# Patient Record
Sex: FEMALE | Race: OTHER | Employment: OTHER | URBAN - METROPOLITAN AREA
[De-identification: names, ages, dates, MRNs, and addresses within clinical notes are randomized per-mention and may not be internally consistent; named-entity substitution may affect disease eponyms.]

---

## 2017-02-13 ENCOUNTER — HOSPITAL ENCOUNTER (OUTPATIENT)
Age: 77
Discharge: HOME | End: 2017-02-13
Payer: MEDICARE

## 2017-02-13 ENCOUNTER — HOSPITAL ENCOUNTER (OUTPATIENT)
Dept: PAIN MANAGEMENT | Age: 77
Discharge: HOME | End: 2017-02-13
Attending: NURSE PRACTITIONER | Admitting: NURSE PRACTITIONER
Payer: MEDICARE

## 2017-02-13 DIAGNOSIS — M46.1 SACROILIITIS (HCC): ICD-10-CM

## 2017-02-13 DIAGNOSIS — M51.36 DEGENERATIVE LUMBAR DISC: ICD-10-CM

## 2017-02-13 DIAGNOSIS — Z71.89 ENCOUNTER FOR MEDICATION COUNSELING: Primary | ICD-10-CM

## 2017-02-13 DIAGNOSIS — M96.1 FAILED BACK SYNDROME OF LUMBAR SPINE: ICD-10-CM

## 2017-02-13 DIAGNOSIS — Z96.89 SPINAL CORD STIMULATOR STATUS: ICD-10-CM

## 2017-02-13 DIAGNOSIS — Z51.81 ENCOUNTER FOR MEDICATION MONITORING: ICD-10-CM

## 2017-02-13 DIAGNOSIS — M54.16 LUMBAR RADICULOPATHY: ICD-10-CM

## 2017-02-13 DIAGNOSIS — G89.4 CHRONIC PAIN ASSOCIATED WITH SIGNIFICANT PSYCHOSOCIAL DYSFUNCTION: ICD-10-CM

## 2017-02-13 DIAGNOSIS — M43.06 LUMBAR SPONDYLOLYSIS: ICD-10-CM

## 2017-02-13 DIAGNOSIS — M16.11 PRIMARY OSTEOARTHRITIS OF RIGHT HIP: ICD-10-CM

## 2017-02-13 LAB
-: ABNORMAL
ABSOLUTE EOS #: 0.2 K/UL (ref 0–0.4)
ABSOLUTE LYMPH #: 1.8 K/UL (ref 1–4.8)
ABSOLUTE MONO #: 0.4 K/UL (ref 0.1–1.3)
AMORPHOUS: ABNORMAL
ANION GAP SERPL CALCULATED.3IONS-SCNC: 12 MMOL/L (ref 9–17)
BACTERIA: ABNORMAL
BASOPHILS # BLD: 1 % (ref 0–2)
BASOPHILS ABSOLUTE: 0 K/UL (ref 0–0.2)
BILIRUBIN URINE: NEGATIVE
BUN BLDV-MCNC: 26 MG/DL (ref 8–23)
CASTS UA: ABNORMAL /LPF
CHLORIDE BLD-SCNC: 103 MMOL/L (ref 98–107)
CHOLESTEROL/HDL RATIO: 2.8
CHOLESTEROL: 139 MG/DL
CO2: 27 MMOL/L (ref 20–31)
COLOR: YELLOW
COMMENT UA: ABNORMAL
CREAT SERPL-MCNC: 0.95 MG/DL (ref 0.5–0.9)
CREATININE URINE: 170.4 MG/DL (ref 28–217)
CRYSTALS, UA: ABNORMAL /HPF
DIFFERENTIAL TYPE: ABNORMAL
EOSINOPHILS RELATIVE PERCENT: 3 % (ref 0–4)
EPITHELIAL CELLS UA: ABNORMAL /HPF
GFR AFRICAN AMERICAN: >60 ML/MIN
GFR NON-AFRICAN AMERICAN: 58 ML/MIN
GFR SERPL CREATININE-BSD FRML MDRD: ABNORMAL ML/MIN/{1.73_M2}
GFR SERPL CREATININE-BSD FRML MDRD: ABNORMAL ML/MIN/{1.73_M2}
GLUCOSE URINE: NEGATIVE
HCT VFR BLD CALC: 34.2 % (ref 36–46)
HDLC SERPL-MCNC: 50 MG/DL
HEMOGLOBIN: 11 G/DL (ref 12–16)
KETONES, URINE: NEGATIVE
LDL CHOLESTEROL: 68 MG/DL (ref 0–130)
LEUKOCYTE ESTERASE, URINE: NEGATIVE
LYMPHOCYTES # BLD: 34 % (ref 24–44)
MCH RBC QN AUTO: 28 PG (ref 26–34)
MCHC RBC AUTO-ENTMCNC: 32.2 G/DL (ref 31–37)
MCV RBC AUTO: 86.7 FL (ref 80–100)
MICROALBUMIN/CREAT 24H UR: <12 MG/L
MICROALBUMIN/CREAT UR-RTO: 7 MCG/MG CREAT
MONOCYTES # BLD: 7 % (ref 1–7)
MUCUS: ABNORMAL
NITRITE, URINE: NEGATIVE
OTHER OBSERVATIONS UA: ABNORMAL
PDW BLD-RTO: 14.6 % (ref 11.5–14.9)
PH UA: 5 (ref 5–8)
PLATELET # BLD: 204 K/UL (ref 150–450)
PLATELET ESTIMATE: ABNORMAL
PMV BLD AUTO: 8.6 FL (ref 6–12)
POTASSIUM SERPL-SCNC: 4.2 MMOL/L (ref 3.7–5.3)
PROTEIN UA: NEGATIVE
RBC # BLD: 3.95 M/UL (ref 4–5.2)
RBC # BLD: ABNORMAL 10*6/UL
RBC UA: ABNORMAL /HPF
RENAL EPITHELIAL, UA: ABNORMAL /HPF
SEG NEUTROPHILS: 55 % (ref 36–66)
SEGMENTED NEUTROPHILS ABSOLUTE COUNT: 3 K/UL (ref 1.3–9.1)
SODIUM BLD-SCNC: 142 MMOL/L (ref 135–144)
SPECIFIC GRAVITY UA: 1.02 (ref 1–1.03)
THYROXINE, FREE: 1.4 NG/DL (ref 0.93–1.7)
TRICHOMONAS: ABNORMAL
TRIGL SERPL-MCNC: 107 MG/DL
TSH SERPL DL<=0.05 MIU/L-ACNC: 1.81 MIU/L (ref 0.3–5)
TURBIDITY: ABNORMAL
URINE HGB: NEGATIVE
UROBILINOGEN, URINE: NORMAL
VLDLC SERPL CALC-MCNC: NORMAL MG/DL (ref 1–30)
WBC # BLD: 5.4 K/UL (ref 3.5–11)
WBC # BLD: ABNORMAL 10*3/UL
WBC UA: ABNORMAL /HPF
YEAST: ABNORMAL

## 2017-02-13 PROCEDURE — 80051 ELECTROLYTE PANEL: CPT

## 2017-02-13 PROCEDURE — 82043 UR ALBUMIN QUANTITATIVE: CPT

## 2017-02-13 PROCEDURE — 99213 OFFICE O/P EST LOW 20 MIN: CPT | Performed by: NURSE PRACTITIONER

## 2017-02-13 PROCEDURE — 36415 COLL VENOUS BLD VENIPUNCTURE: CPT

## 2017-02-13 PROCEDURE — 82570 ASSAY OF URINE CREATININE: CPT

## 2017-02-13 PROCEDURE — 82565 ASSAY OF CREATININE: CPT

## 2017-02-13 PROCEDURE — 84520 ASSAY OF UREA NITROGEN: CPT

## 2017-02-13 PROCEDURE — 80061 LIPID PANEL: CPT

## 2017-02-13 PROCEDURE — 85025 COMPLETE CBC W/AUTO DIFF WBC: CPT

## 2017-02-13 PROCEDURE — 84439 ASSAY OF FREE THYROXINE: CPT

## 2017-02-13 PROCEDURE — 84443 ASSAY THYROID STIM HORMONE: CPT

## 2017-02-13 PROCEDURE — 99213 OFFICE O/P EST LOW 20 MIN: CPT

## 2017-02-13 PROCEDURE — 81001 URINALYSIS AUTO W/SCOPE: CPT

## 2017-02-13 RX ORDER — OXYCODONE AND ACETAMINOPHEN 7.5; 325 MG/1; MG/1
1 TABLET ORAL EVERY 6 HOURS PRN
Qty: 120 TABLET | Refills: 0 | Status: SHIPPED | OUTPATIENT
Start: 2017-02-13 | End: 2017-03-02 | Stop reason: SDUPTHER

## 2017-02-13 RX ORDER — OXYCODONE HCL 20 MG/1
20 TABLET, FILM COATED, EXTENDED RELEASE ORAL EVERY 12 HOURS
Qty: 60 TABLET | Refills: 0 | Status: SHIPPED | OUTPATIENT
Start: 2017-02-13 | End: 2017-02-16

## 2017-02-13 RX ORDER — MORPHINE SULFATE 30 MG/1
30 TABLET, FILM COATED, EXTENDED RELEASE ORAL 2 TIMES DAILY
Qty: 60 TABLET | Refills: 0 | Status: SHIPPED | OUTPATIENT
Start: 2017-02-13 | End: 2017-02-13

## 2017-03-02 RX ORDER — OXYCODONE HCL 20 MG/1
20 TABLET, FILM COATED, EXTENDED RELEASE ORAL EVERY 12 HOURS
Qty: 60 TABLET | Refills: 0 | Status: SHIPPED | OUTPATIENT
Start: 2017-03-22 | End: 2017-04-21 | Stop reason: SDUPTHER

## 2017-03-02 RX ORDER — OXYCODONE AND ACETAMINOPHEN 7.5; 325 MG/1; MG/1
1 TABLET ORAL EVERY 6 HOURS PRN
Qty: 120 TABLET | Refills: 0 | Status: SHIPPED | OUTPATIENT
Start: 2017-03-15 | End: 2017-03-21 | Stop reason: SDUPTHER

## 2017-03-21 ENCOUNTER — HOSPITAL ENCOUNTER (OUTPATIENT)
Dept: PAIN MANAGEMENT | Age: 77
Discharge: HOME | End: 2017-03-21
Payer: MEDICARE

## 2017-03-21 VITALS
HEART RATE: 69 BPM | HEIGHT: 58 IN | SYSTOLIC BLOOD PRESSURE: 131 MMHG | WEIGHT: 147 LBS | TEMPERATURE: 97.7 F | OXYGEN SATURATION: 97 % | RESPIRATION RATE: 16 BRPM | DIASTOLIC BLOOD PRESSURE: 54 MMHG | BODY MASS INDEX: 30.86 KG/M2

## 2017-03-21 DIAGNOSIS — Z71.89 ENCOUNTER FOR MEDICATION COUNSELING: Primary | ICD-10-CM

## 2017-03-21 DIAGNOSIS — M43.06 LUMBAR SPONDYLOLYSIS: ICD-10-CM

## 2017-03-21 DIAGNOSIS — M16.11 PRIMARY OSTEOARTHRITIS OF RIGHT HIP: ICD-10-CM

## 2017-03-21 DIAGNOSIS — M46.1 SACROILIITIS (HCC): ICD-10-CM

## 2017-03-21 DIAGNOSIS — G89.4 CHRONIC PAIN ASSOCIATED WITH SIGNIFICANT PSYCHOSOCIAL DYSFUNCTION: ICD-10-CM

## 2017-03-21 DIAGNOSIS — M51.36 DEGENERATIVE LUMBAR DISC: ICD-10-CM

## 2017-03-21 DIAGNOSIS — M96.1 FAILED BACK SYNDROME OF LUMBAR SPINE: ICD-10-CM

## 2017-03-21 DIAGNOSIS — Z96.89 SPINAL CORD STIMULATOR STATUS: ICD-10-CM

## 2017-03-21 DIAGNOSIS — M54.16 LUMBAR RADICULOPATHY: ICD-10-CM

## 2017-03-21 PROCEDURE — 99213 OFFICE O/P EST LOW 20 MIN: CPT

## 2017-03-21 PROCEDURE — 99214 OFFICE O/P EST MOD 30 MIN: CPT | Performed by: PAIN MEDICINE

## 2017-03-21 PROCEDURE — 80307 DRUG TEST PRSMV CHEM ANLYZR: CPT

## 2017-03-21 PROCEDURE — 99214 OFFICE O/P EST MOD 30 MIN: CPT

## 2017-03-21 RX ORDER — TAMSULOSIN HYDROCHLORIDE 0.4 MG/1
0.4 CAPSULE ORAL DAILY
Status: ON HOLD | COMMUNITY
End: 2017-11-04 | Stop reason: HOSPADM

## 2017-03-21 RX ORDER — MORPHINE SULFATE 30 MG/1
30 CAPSULE, EXTENDED RELEASE ORAL 2 TIMES DAILY
COMMUNITY
End: 2017-04-21

## 2017-03-21 RX ORDER — OXYCODONE AND ACETAMINOPHEN 7.5; 325 MG/1; MG/1
1 TABLET ORAL EVERY 6 HOURS PRN
Qty: 28 TABLET | Refills: 0 | Status: SHIPPED | OUTPATIENT
Start: 2017-04-14 | End: 2017-04-21 | Stop reason: SDUPTHER

## 2017-03-21 ASSESSMENT — PAIN DESCRIPTION - ONSET: ONSET: ON-GOING

## 2017-03-21 ASSESSMENT — PAIN SCALES - GENERAL: PAINLEVEL_OUTOF10: 8

## 2017-03-21 ASSESSMENT — PAIN DESCRIPTION - PROGRESSION: CLINICAL_PROGRESSION: GRADUALLY WORSENING

## 2017-03-21 ASSESSMENT — ENCOUNTER SYMPTOMS
EYES NEGATIVE: 1
CONSTIPATION: 1
BACK PAIN: 1
RESPIRATORY NEGATIVE: 1

## 2017-03-21 ASSESSMENT — PAIN DESCRIPTION - DESCRIPTORS: DESCRIPTORS: BURNING;ACHING;SHARP

## 2017-03-21 ASSESSMENT — PAIN DESCRIPTION - LOCATION: LOCATION: BACK;HIP

## 2017-03-21 ASSESSMENT — PAIN DESCRIPTION - PAIN TYPE: TYPE: CHRONIC PAIN

## 2017-03-21 ASSESSMENT — PAIN DESCRIPTION - FREQUENCY: FREQUENCY: CONTINUOUS

## 2017-03-21 ASSESSMENT — PAIN DESCRIPTION - ORIENTATION: ORIENTATION: LOWER;LEFT

## 2017-03-22 ASSESSMENT — ENCOUNTER SYMPTOMS
ABDOMINAL PAIN: 0
SHORTNESS OF BREATH: 0
BOWEL INCONTINENCE: 0

## 2017-03-25 LAB
6-ACETYLMORPHINE, UR: NOT DETECTED
7-AMINOCLONAZEPAM, URINE: NOT DETECTED
ALPHA-OH-ALPRAZ, URINE: NOT DETECTED
ALPRAZOLAM, URINE: NOT DETECTED
AMPHETAMINES, URINE: NOT DETECTED
BARBITURATES, URINE: NOT DETECTED
BENZOYLECGONINE, UR: NOT DETECTED
BUPRENORPHINE URINE: NOT DETECTED
CARISOPRODOL, UR: NOT DETECTED
CLONAZEPAM, URINE: NOT DETECTED
CODEINE, URINE: NOT DETECTED
CREATININE URINE: 166 MG/DL (ref 20–400)
DIAZEPAM, URINE: NOT DETECTED
DRUGS EXPECTED, UR: NORMAL
EER HI RES INTERP UR: NORMAL
ETHYL GLUCURONIDE UR: PRESENT
FENTANYL URINE: NOT DETECTED
HYDROCODONE, URINE: NOT DETECTED
HYDROMORPHONE, URINE: NOT DETECTED
LORAZEPAM, URINE: NOT DETECTED
MARIJUANA METAB, UR: NOT DETECTED
MDA, UR: NOT DETECTED
MDEA, EVE, UR: NOT DETECTED
MDMA URINE: NOT DETECTED
MEPERIDINE METAB, UR: NOT DETECTED
METHADONE, URINE: NOT DETECTED
METHAMPHETAMINE, URINE: NOT DETECTED
METHYLPHENIDATE: NOT DETECTED
MIDAZOLAM, URINE: NOT DETECTED
MORPHINE URINE: PRESENT
NORBUPRENORPHINE, URINE: NOT DETECTED
NORDIAZEPAM, URINE: NOT DETECTED
NORFENTANYL, URINE: NOT DETECTED
NORHYDROCODONE, URINE: NOT DETECTED
NOROXYCODONE, URINE: PRESENT
NOROXYMORPHONE, URINE: NOT DETECTED
OXAZEPAM, URINE: NOT DETECTED
OXYCODONE URINE: PRESENT
OXYMORPHONE, URINE: PRESENT
PAIN MANAGEMENT DRUG PANEL INTERP, URINE: NORMAL
PAIN MGT DRUG PANEL, HI RES, UR: NORMAL
PCP,URINE: NOT DETECTED
PHENTERMINE, UR: NOT DETECTED
PROPOXYPHENE, URINE: NOT DETECTED
TAPENTADOL, URINE: NOT DETECTED
TAPENTADOL-O-SULFATE, URINE: NOT DETECTED
TEMAZEPAM, URINE: NOT DETECTED
TRAMADOL, URINE: NOT DETECTED
ZOLPIDEM, URINE: NOT DETECTED

## 2017-04-21 ENCOUNTER — HOSPITAL ENCOUNTER (OUTPATIENT)
Dept: PAIN MANAGEMENT | Age: 77
Discharge: HOME | End: 2017-04-21
Payer: MEDICARE

## 2017-04-21 DIAGNOSIS — G89.4 CHRONIC PAIN ASSOCIATED WITH SIGNIFICANT PSYCHOSOCIAL DYSFUNCTION: ICD-10-CM

## 2017-04-21 DIAGNOSIS — Z71.89 ENCOUNTER FOR MEDICATION COUNSELING: Primary | ICD-10-CM

## 2017-04-21 DIAGNOSIS — M96.1 FAILED BACK SYNDROME OF LUMBAR SPINE: ICD-10-CM

## 2017-04-21 DIAGNOSIS — Z96.89 SPINAL CORD STIMULATOR STATUS: ICD-10-CM

## 2017-04-21 DIAGNOSIS — M43.06 LUMBAR SPONDYLOLYSIS: ICD-10-CM

## 2017-04-21 DIAGNOSIS — M16.11 PRIMARY OSTEOARTHRITIS OF RIGHT HIP: ICD-10-CM

## 2017-04-21 DIAGNOSIS — M54.16 LUMBAR RADICULOPATHY: ICD-10-CM

## 2017-04-21 DIAGNOSIS — Z51.81 ENCOUNTER FOR MEDICATION MONITORING: ICD-10-CM

## 2017-04-21 DIAGNOSIS — M51.36 DEGENERATIVE LUMBAR DISC: ICD-10-CM

## 2017-04-21 DIAGNOSIS — M46.1 SACROILIITIS (HCC): ICD-10-CM

## 2017-04-21 PROCEDURE — 99213 OFFICE O/P EST LOW 20 MIN: CPT

## 2017-04-21 PROCEDURE — 99213 OFFICE O/P EST LOW 20 MIN: CPT | Performed by: NURSE PRACTITIONER

## 2017-04-21 PROCEDURE — 99214 OFFICE O/P EST MOD 30 MIN: CPT

## 2017-04-21 RX ORDER — OXYCODONE AND ACETAMINOPHEN 7.5; 325 MG/1; MG/1
1 TABLET ORAL EVERY 6 HOURS PRN
Qty: 120 TABLET | Refills: 0 | Status: SHIPPED | OUTPATIENT
Start: 2017-04-21 | End: 2017-04-21

## 2017-04-21 RX ORDER — OXYCODONE AND ACETAMINOPHEN 10; 325 MG/1; MG/1
1 TABLET ORAL EVERY 6 HOURS PRN
Qty: 120 TABLET | Refills: 0 | Status: SHIPPED | OUTPATIENT
Start: 2017-04-21 | End: 2017-05-22 | Stop reason: SDUPTHER

## 2017-04-21 RX ORDER — MORPHINE SULFATE 30 MG/1
30 CAPSULE, EXTENDED RELEASE ORAL 2 TIMES DAILY
Qty: 60 CAPSULE | Refills: 0 | Status: SHIPPED | OUTPATIENT
Start: 2017-04-21 | End: 2017-04-21

## 2017-04-21 RX ORDER — OXYCODONE HCL 20 MG/1
20 TABLET, FILM COATED, EXTENDED RELEASE ORAL EVERY 12 HOURS
Qty: 60 TABLET | Refills: 0 | Status: SHIPPED | OUTPATIENT
Start: 2017-04-21 | End: 2017-04-21

## 2017-04-21 RX ORDER — MORPHINE SULFATE 30 MG/1
30 TABLET, FILM COATED, EXTENDED RELEASE ORAL 2 TIMES DAILY
Qty: 60 TABLET | Refills: 0 | Status: SHIPPED | OUTPATIENT
Start: 2017-04-21 | End: 2017-05-22 | Stop reason: SDUPTHER

## 2017-05-22 ENCOUNTER — HOSPITAL ENCOUNTER (OUTPATIENT)
Dept: PAIN MANAGEMENT | Age: 77
Discharge: HOME | End: 2017-05-22
Payer: MEDICARE

## 2017-05-22 DIAGNOSIS — M96.1 FAILED BACK SYNDROME OF LUMBAR SPINE: ICD-10-CM

## 2017-05-22 DIAGNOSIS — M46.1 SACROILIITIS (HCC): ICD-10-CM

## 2017-05-22 DIAGNOSIS — M54.16 LUMBAR RADICULOPATHY: ICD-10-CM

## 2017-05-22 DIAGNOSIS — G89.4 CHRONIC PAIN ASSOCIATED WITH SIGNIFICANT PSYCHOSOCIAL DYSFUNCTION: ICD-10-CM

## 2017-05-22 DIAGNOSIS — M51.36 DEGENERATIVE LUMBAR DISC: ICD-10-CM

## 2017-05-22 DIAGNOSIS — M43.06 LUMBAR SPONDYLOLYSIS: ICD-10-CM

## 2017-05-22 DIAGNOSIS — Z96.89 SPINAL CORD STIMULATOR STATUS: ICD-10-CM

## 2017-05-22 DIAGNOSIS — Z51.81 ENCOUNTER FOR MEDICATION MONITORING: ICD-10-CM

## 2017-05-22 DIAGNOSIS — Z71.89 ENCOUNTER FOR MEDICATION COUNSELING: Primary | ICD-10-CM

## 2017-05-22 DIAGNOSIS — M16.11 PRIMARY OSTEOARTHRITIS OF RIGHT HIP: ICD-10-CM

## 2017-05-22 PROCEDURE — 99213 OFFICE O/P EST LOW 20 MIN: CPT | Performed by: NURSE PRACTITIONER

## 2017-05-22 PROCEDURE — 99213 OFFICE O/P EST LOW 20 MIN: CPT

## 2017-05-22 RX ORDER — OXYCODONE AND ACETAMINOPHEN 10; 325 MG/1; MG/1
1 TABLET ORAL EVERY 6 HOURS PRN
Qty: 120 TABLET | Refills: 0 | Status: SHIPPED | OUTPATIENT
Start: 2017-05-22 | End: 2017-06-21 | Stop reason: SDUPTHER

## 2017-05-22 RX ORDER — MORPHINE SULFATE 30 MG/1
30 TABLET, FILM COATED, EXTENDED RELEASE ORAL 2 TIMES DAILY
Qty: 60 TABLET | Refills: 0 | Status: SHIPPED | OUTPATIENT
Start: 2017-05-22 | End: 2017-06-21 | Stop reason: SDUPTHER

## 2017-05-25 ENCOUNTER — OFFICE VISIT (OUTPATIENT)
Dept: PODIATRY | Age: 77
End: 2017-05-25
Payer: MEDICARE

## 2017-05-25 VITALS
WEIGHT: 149 LBS | HEART RATE: 74 BPM | HEIGHT: 58 IN | DIASTOLIC BLOOD PRESSURE: 58 MMHG | BODY MASS INDEX: 31.28 KG/M2 | SYSTOLIC BLOOD PRESSURE: 125 MMHG

## 2017-05-25 DIAGNOSIS — M79.672 PAIN IN LEFT FOOT: ICD-10-CM

## 2017-05-25 DIAGNOSIS — L98.9 BENIGN SKIN LESION: Primary | ICD-10-CM

## 2017-05-25 PROCEDURE — G8400 PT W/DXA NO RESULTS DOC: HCPCS | Performed by: PODIATRIST

## 2017-05-25 PROCEDURE — 17110 DESTRUCTION B9 LES UP TO 14: CPT | Performed by: PODIATRIST

## 2017-05-25 PROCEDURE — 3017F COLORECTAL CA SCREEN DOC REV: CPT | Performed by: PODIATRIST

## 2017-05-25 PROCEDURE — 1123F ACP DISCUSS/DSCN MKR DOCD: CPT | Performed by: PODIATRIST

## 2017-05-25 PROCEDURE — 99213 OFFICE O/P EST LOW 20 MIN: CPT | Performed by: PODIATRIST

## 2017-05-25 PROCEDURE — G8417 CALC BMI ABV UP PARAM F/U: HCPCS | Performed by: PODIATRIST

## 2017-05-25 PROCEDURE — 4040F PNEUMOC VAC/ADMIN/RCVD: CPT | Performed by: PODIATRIST

## 2017-05-25 PROCEDURE — 1036F TOBACCO NON-USER: CPT | Performed by: PODIATRIST

## 2017-05-25 PROCEDURE — 3014F SCREEN MAMMO DOC REV: CPT | Performed by: PODIATRIST

## 2017-05-25 PROCEDURE — 1090F PRES/ABSN URINE INCON ASSESS: CPT | Performed by: PODIATRIST

## 2017-05-25 PROCEDURE — G8427 DOCREV CUR MEDS BY ELIG CLIN: HCPCS | Performed by: PODIATRIST

## 2017-05-25 ASSESSMENT — ENCOUNTER SYMPTOMS
DIARRHEA: 0
SHORTNESS OF BREATH: 0
NAUSEA: 0
BACK PAIN: 0
COLOR CHANGE: 0

## 2017-06-16 ENCOUNTER — OFFICE VISIT (OUTPATIENT)
Dept: PODIATRY | Age: 77
End: 2017-06-16
Payer: MEDICARE

## 2017-06-16 VITALS
BODY MASS INDEX: 31.07 KG/M2 | SYSTOLIC BLOOD PRESSURE: 102 MMHG | HEART RATE: 64 BPM | WEIGHT: 148 LBS | HEIGHT: 58 IN | DIASTOLIC BLOOD PRESSURE: 45 MMHG

## 2017-06-16 DIAGNOSIS — M79.672 PAIN IN LEFT FOOT: ICD-10-CM

## 2017-06-16 DIAGNOSIS — M20.42 HAMMER TOES OF BOTH FEET: ICD-10-CM

## 2017-06-16 DIAGNOSIS — M20.41 HAMMER TOES OF BOTH FEET: ICD-10-CM

## 2017-06-16 DIAGNOSIS — L98.9 BENIGN SKIN LESION: Primary | ICD-10-CM

## 2017-06-16 DIAGNOSIS — E11.9 TYPE 2 DIABETES MELLITUS WITHOUT COMPLICATION, WITHOUT LONG-TERM CURRENT USE OF INSULIN (HCC): ICD-10-CM

## 2017-06-16 PROCEDURE — 11055 PARING/CUTG B9 HYPRKER LES 1: CPT | Performed by: PODIATRIST

## 2017-06-16 PROCEDURE — 1036F TOBACCO NON-USER: CPT | Performed by: PODIATRIST

## 2017-06-21 ENCOUNTER — HOSPITAL ENCOUNTER (OUTPATIENT)
Dept: PAIN MANAGEMENT | Age: 77
Discharge: HOME | End: 2017-06-21
Payer: MEDICARE

## 2017-06-21 DIAGNOSIS — M51.36 DEGENERATIVE LUMBAR DISC: ICD-10-CM

## 2017-06-21 DIAGNOSIS — G89.4 CHRONIC PAIN ASSOCIATED WITH SIGNIFICANT PSYCHOSOCIAL DYSFUNCTION: ICD-10-CM

## 2017-06-21 DIAGNOSIS — M46.1 SACROILIITIS (HCC): ICD-10-CM

## 2017-06-21 DIAGNOSIS — M96.1 FAILED BACK SYNDROME OF LUMBAR SPINE: ICD-10-CM

## 2017-06-21 DIAGNOSIS — M43.06 LUMBAR SPONDYLOLYSIS: ICD-10-CM

## 2017-06-21 DIAGNOSIS — Z71.89 ENCOUNTER FOR MEDICATION COUNSELING: Primary | ICD-10-CM

## 2017-06-21 DIAGNOSIS — M54.16 LUMBAR RADICULOPATHY: ICD-10-CM

## 2017-06-21 DIAGNOSIS — M16.11 PRIMARY OSTEOARTHRITIS OF RIGHT HIP: ICD-10-CM

## 2017-06-21 DIAGNOSIS — Z51.81 ENCOUNTER FOR MEDICATION MONITORING: ICD-10-CM

## 2017-06-21 DIAGNOSIS — Z96.89 SPINAL CORD STIMULATOR STATUS: ICD-10-CM

## 2017-06-21 PROCEDURE — 99213 OFFICE O/P EST LOW 20 MIN: CPT | Performed by: NURSE PRACTITIONER

## 2017-06-21 PROCEDURE — 99213 OFFICE O/P EST LOW 20 MIN: CPT

## 2017-06-21 RX ORDER — OXYCODONE AND ACETAMINOPHEN 10; 325 MG/1; MG/1
1 TABLET ORAL EVERY 6 HOURS PRN
Qty: 120 TABLET | Refills: 0 | Status: SHIPPED | OUTPATIENT
Start: 2017-06-21 | End: 2017-07-18 | Stop reason: SDUPTHER

## 2017-06-21 RX ORDER — MORPHINE SULFATE 30 MG/1
30 TABLET, FILM COATED, EXTENDED RELEASE ORAL 2 TIMES DAILY
Qty: 60 TABLET | Refills: 0 | Status: SHIPPED | OUTPATIENT
Start: 2017-06-21 | End: 2017-07-18 | Stop reason: SDUPTHER

## 2017-06-22 ASSESSMENT — ENCOUNTER SYMPTOMS
DIARRHEA: 0
BACK PAIN: 0
COLOR CHANGE: 0
NAUSEA: 0
SHORTNESS OF BREATH: 0

## 2017-07-12 ENCOUNTER — TELEPHONE (OUTPATIENT)
Dept: PAIN MANAGEMENT | Age: 77
End: 2017-07-12

## 2017-07-17 ENCOUNTER — HOSPITAL ENCOUNTER (OUTPATIENT)
Dept: PAIN MANAGEMENT | Age: 77
Discharge: HOME | End: 2017-07-17
Payer: MEDICARE

## 2017-07-18 ENCOUNTER — HOSPITAL ENCOUNTER (OUTPATIENT)
Dept: PAIN MANAGEMENT | Age: 77
Discharge: HOME | End: 2017-07-18
Payer: MEDICARE

## 2017-07-18 VITALS
SYSTOLIC BLOOD PRESSURE: 112 MMHG | HEART RATE: 71 BPM | OXYGEN SATURATION: 98 % | RESPIRATION RATE: 16 BRPM | TEMPERATURE: 98.4 F | WEIGHT: 147 LBS | BODY MASS INDEX: 30.86 KG/M2 | HEIGHT: 58 IN | DIASTOLIC BLOOD PRESSURE: 64 MMHG

## 2017-07-18 DIAGNOSIS — Z51.81 ENCOUNTER FOR MEDICATION MONITORING: ICD-10-CM

## 2017-07-18 DIAGNOSIS — M43.06 LUMBAR SPONDYLOLYSIS: ICD-10-CM

## 2017-07-18 DIAGNOSIS — M51.36 DEGENERATIVE LUMBAR DISC: ICD-10-CM

## 2017-07-18 DIAGNOSIS — Z96.89 SPINAL CORD STIMULATOR STATUS: ICD-10-CM

## 2017-07-18 DIAGNOSIS — M54.16 LUMBAR RADICULOPATHY: ICD-10-CM

## 2017-07-18 DIAGNOSIS — Z71.89 ENCOUNTER FOR MEDICATION COUNSELING: Primary | ICD-10-CM

## 2017-07-18 DIAGNOSIS — M96.1 FAILED BACK SYNDROME OF LUMBAR SPINE: ICD-10-CM

## 2017-07-18 DIAGNOSIS — M16.11 PRIMARY OSTEOARTHRITIS OF RIGHT HIP: ICD-10-CM

## 2017-07-18 DIAGNOSIS — G89.4 CHRONIC PAIN ASSOCIATED WITH SIGNIFICANT PSYCHOSOCIAL DYSFUNCTION: ICD-10-CM

## 2017-07-18 PROCEDURE — 99213 OFFICE O/P EST LOW 20 MIN: CPT

## 2017-07-18 PROCEDURE — 99213 OFFICE O/P EST LOW 20 MIN: CPT | Performed by: NURSE PRACTITIONER

## 2017-07-18 RX ORDER — OXYCODONE AND ACETAMINOPHEN 10; 325 MG/1; MG/1
1 TABLET ORAL EVERY 6 HOURS PRN
Qty: 120 TABLET | Refills: 0 | Status: SHIPPED | OUTPATIENT
Start: 2017-07-21 | End: 2017-08-22 | Stop reason: SDUPTHER

## 2017-07-18 RX ORDER — MORPHINE SULFATE 30 MG/1
30 TABLET, FILM COATED, EXTENDED RELEASE ORAL 2 TIMES DAILY
Qty: 60 TABLET | Refills: 0 | Status: SHIPPED | OUTPATIENT
Start: 2017-07-21 | End: 2017-08-22 | Stop reason: SDUPTHER

## 2017-07-18 ASSESSMENT — ENCOUNTER SYMPTOMS
CONSTIPATION: 1
SHORTNESS OF BREATH: 1
BACK PAIN: 1
EYES NEGATIVE: 1

## 2017-08-08 ENCOUNTER — TELEPHONE (OUTPATIENT)
Dept: PODIATRY | Age: 77
End: 2017-08-08

## 2017-08-14 ENCOUNTER — APPOINTMENT (OUTPATIENT)
Dept: MRI IMAGING | Age: 77
DRG: 419 | End: 2017-08-14
Payer: MEDICARE

## 2017-08-14 ENCOUNTER — APPOINTMENT (OUTPATIENT)
Dept: CT IMAGING | Age: 77
DRG: 419 | End: 2017-08-14
Payer: MEDICARE

## 2017-08-14 ENCOUNTER — APPOINTMENT (OUTPATIENT)
Dept: ULTRASOUND IMAGING | Age: 77
DRG: 419 | End: 2017-08-14
Payer: MEDICARE

## 2017-08-14 ENCOUNTER — HOSPITAL ENCOUNTER (INPATIENT)
Age: 77
LOS: 6 days | Discharge: HOME | DRG: 419 | End: 2017-08-20
Attending: EMERGENCY MEDICINE | Admitting: FAMILY MEDICINE
Payer: MEDICARE

## 2017-08-14 DIAGNOSIS — K85.90 ACUTE PANCREATITIS, UNSPECIFIED COMPLICATION STATUS, UNSPECIFIED PANCREATITIS TYPE: Primary | ICD-10-CM

## 2017-08-14 LAB
ABSOLUTE EOS #: 0.1 K/UL (ref 0–0.4)
ABSOLUTE LYMPH #: 0.8 K/UL (ref 1–4.8)
ABSOLUTE MONO #: 0.5 K/UL (ref 0.1–1.3)
ALBUMIN SERPL-MCNC: 3.8 G/DL (ref 3.5–5.2)
ALBUMIN/GLOBULIN RATIO: ABNORMAL (ref 1–2.5)
ALP BLD-CCNC: 118 U/L (ref 35–104)
ALT SERPL-CCNC: 57 U/L (ref 5–33)
ANION GAP SERPL CALCULATED.3IONS-SCNC: 13 MMOL/L (ref 9–17)
AST SERPL-CCNC: 166 U/L
BASOPHILS # BLD: 0 %
BASOPHILS ABSOLUTE: 0 K/UL (ref 0–0.2)
BILIRUB SERPL-MCNC: 0.41 MG/DL (ref 0.3–1.2)
BILIRUBIN DIRECT: 0.23 MG/DL
BILIRUBIN, INDIRECT: 0.18 MG/DL (ref 0–1)
BUN BLDV-MCNC: 23 MG/DL (ref 8–23)
BUN/CREAT BLD: ABNORMAL (ref 9–20)
CALCIUM SERPL-MCNC: 8.6 MG/DL (ref 8.6–10.4)
CHLORIDE BLD-SCNC: 100 MMOL/L (ref 98–107)
CO2: 26 MMOL/L (ref 20–31)
CREAT SERPL-MCNC: 0.86 MG/DL (ref 0.5–0.9)
DIFFERENTIAL TYPE: ABNORMAL
EOSINOPHILS RELATIVE PERCENT: 1 %
GFR AFRICAN AMERICAN: >60 ML/MIN
GFR NON-AFRICAN AMERICAN: >60 ML/MIN
GFR SERPL CREATININE-BSD FRML MDRD: ABNORMAL ML/MIN/{1.73_M2}
GFR SERPL CREATININE-BSD FRML MDRD: ABNORMAL ML/MIN/{1.73_M2}
GLOBULIN: ABNORMAL G/DL (ref 1.5–3.8)
GLUCOSE BLD-MCNC: 151 MG/DL (ref 70–99)
HCT VFR BLD CALC: 35.1 % (ref 36–46)
HEMOGLOBIN: 11.3 G/DL (ref 12–16)
LACTIC ACID: 2.2 MMOL/L (ref 0.5–2.2)
LIPASE: 1500 U/L (ref 13–60)
LYMPHOCYTES # BLD: 8 %
MCH RBC QN AUTO: 28.2 PG (ref 26–34)
MCHC RBC AUTO-ENTMCNC: 32.2 G/DL (ref 31–37)
MCV RBC AUTO: 87.6 FL (ref 80–100)
MONOCYTES # BLD: 5 %
PDW BLD-RTO: 13.5 % (ref 11.5–14.9)
PLATELET # BLD: 207 K/UL (ref 150–450)
PLATELET ESTIMATE: ABNORMAL
PMV BLD AUTO: 9.2 FL (ref 6–12)
POTASSIUM SERPL-SCNC: 4.1 MMOL/L (ref 3.7–5.3)
RBC # BLD: 4.01 M/UL (ref 4–5.2)
RBC # BLD: ABNORMAL 10*6/UL
SEG NEUTROPHILS: 86 %
SEGMENTED NEUTROPHILS ABSOLUTE COUNT: 9.1 K/UL (ref 1.3–9.1)
SODIUM BLD-SCNC: 139 MMOL/L (ref 135–144)
TOTAL PROTEIN: 6.3 G/DL (ref 6.4–8.3)
TROPONIN INTERP: NORMAL
TROPONIN T: <0.03 NG/ML
WBC # BLD: 10.5 K/UL (ref 3.5–11)
WBC # BLD: ABNORMAL 10*3/UL

## 2017-08-14 PROCEDURE — 6360000004 HC RX CONTRAST MEDICATION: Performed by: EMERGENCY MEDICINE

## 2017-08-14 PROCEDURE — 2580000003 HC RX 258: Performed by: EMERGENCY MEDICINE

## 2017-08-14 PROCEDURE — 85025 COMPLETE CBC W/AUTO DIFF WBC: CPT

## 2017-08-14 PROCEDURE — 83690 ASSAY OF LIPASE: CPT

## 2017-08-14 PROCEDURE — 2060000000 HC ICU INTERMEDIATE R&B

## 2017-08-14 PROCEDURE — 83605 ASSAY OF LACTIC ACID: CPT

## 2017-08-14 PROCEDURE — 2580000003 HC RX 258: Performed by: FAMILY MEDICINE

## 2017-08-14 PROCEDURE — 93005 ELECTROCARDIOGRAM TRACING: CPT

## 2017-08-14 PROCEDURE — 74177 CT ABD & PELVIS W/CONTRAST: CPT

## 2017-08-14 PROCEDURE — 99285 EMERGENCY DEPT VISIT HI MDM: CPT

## 2017-08-14 PROCEDURE — 76705 ECHO EXAM OF ABDOMEN: CPT

## 2017-08-14 PROCEDURE — 96374 THER/PROPH/DIAG INJ IV PUSH: CPT

## 2017-08-14 PROCEDURE — 6360000002 HC RX W HCPCS: Performed by: EMERGENCY MEDICINE

## 2017-08-14 PROCEDURE — 36415 COLL VENOUS BLD VENIPUNCTURE: CPT

## 2017-08-14 PROCEDURE — 6370000000 HC RX 637 (ALT 250 FOR IP): Performed by: EMERGENCY MEDICINE

## 2017-08-14 PROCEDURE — 80076 HEPATIC FUNCTION PANEL: CPT

## 2017-08-14 PROCEDURE — 84484 ASSAY OF TROPONIN QUANT: CPT

## 2017-08-14 PROCEDURE — 80048 BASIC METABOLIC PNL TOTAL CA: CPT

## 2017-08-14 RX ORDER — SODIUM CHLORIDE 0.9 % (FLUSH) 0.9 %
10 SYRINGE (ML) INJECTION PRN
Status: DISCONTINUED | OUTPATIENT
Start: 2017-08-14 | End: 2017-08-14 | Stop reason: SDUPTHER

## 2017-08-14 RX ORDER — FUROSEMIDE 20 MG/1
20 TABLET ORAL DAILY
Status: DISCONTINUED | OUTPATIENT
Start: 2017-08-15 | End: 2017-08-15

## 2017-08-14 RX ORDER — PREGABALIN 150 MG/1
150 CAPSULE ORAL 2 TIMES DAILY
Status: DISCONTINUED | OUTPATIENT
Start: 2017-08-14 | End: 2017-08-15

## 2017-08-14 RX ORDER — ACETAMINOPHEN 325 MG/1
650 TABLET ORAL EVERY 4 HOURS PRN
Status: DISCONTINUED | OUTPATIENT
Start: 2017-08-14 | End: 2017-08-14 | Stop reason: SDUPTHER

## 2017-08-14 RX ORDER — OXYCODONE HYDROCHLORIDE 5 MG/1
5 TABLET ORAL EVERY 6 HOURS PRN
Status: DISCONTINUED | OUTPATIENT
Start: 2017-08-14 | End: 2017-08-18 | Stop reason: SDUPTHER

## 2017-08-14 RX ORDER — OXYCODONE AND ACETAMINOPHEN 10; 325 MG/1; MG/1
1 TABLET ORAL EVERY 6 HOURS PRN
Status: DISCONTINUED | OUTPATIENT
Start: 2017-08-14 | End: 2017-08-14 | Stop reason: CLARIF

## 2017-08-14 RX ORDER — MORPHINE SULFATE 4 MG/ML
4 INJECTION, SOLUTION INTRAMUSCULAR; INTRAVENOUS ONCE
Status: COMPLETED | OUTPATIENT
Start: 2017-08-14 | End: 2017-08-14

## 2017-08-14 RX ORDER — OXYCODONE HYDROCHLORIDE AND ACETAMINOPHEN 5; 325 MG/1; MG/1
1 TABLET ORAL EVERY 6 HOURS PRN
Status: DISCONTINUED | OUTPATIENT
Start: 2017-08-14 | End: 2017-08-18 | Stop reason: SDUPTHER

## 2017-08-14 RX ORDER — MAGNESIUM HYDROXIDE/ALUMINUM HYDROXICE/SIMETHICONE 120; 1200; 1200 MG/30ML; MG/30ML; MG/30ML
15 SUSPENSION ORAL ONCE
Status: COMPLETED | OUTPATIENT
Start: 2017-08-14 | End: 2017-08-14

## 2017-08-14 RX ORDER — 0.9 % SODIUM CHLORIDE 0.9 %
1000 INTRAVENOUS SOLUTION INTRAVENOUS ONCE
Status: COMPLETED | OUTPATIENT
Start: 2017-08-14 | End: 2017-08-14

## 2017-08-14 RX ORDER — SODIUM CHLORIDE 0.9 % (FLUSH) 0.9 %
10 SYRINGE (ML) INJECTION EVERY 12 HOURS SCHEDULED
Status: DISCONTINUED | OUTPATIENT
Start: 2017-08-14 | End: 2017-08-20 | Stop reason: HOSPADM

## 2017-08-14 RX ORDER — MORPHINE SULFATE 30 MG/1
30 TABLET, FILM COATED, EXTENDED RELEASE ORAL 2 TIMES DAILY
Status: DISCONTINUED | OUTPATIENT
Start: 2017-08-14 | End: 2017-08-20 | Stop reason: HOSPADM

## 2017-08-14 RX ORDER — ASPIRIN 81 MG/1
81 TABLET ORAL DAILY
Status: DISCONTINUED | OUTPATIENT
Start: 2017-08-15 | End: 2017-08-20 | Stop reason: HOSPADM

## 2017-08-14 RX ORDER — ACETAMINOPHEN 325 MG/1
650 TABLET ORAL EVERY 4 HOURS PRN
Status: DISCONTINUED | OUTPATIENT
Start: 2017-08-14 | End: 2017-08-20 | Stop reason: HOSPADM

## 2017-08-14 RX ORDER — LOSARTAN POTASSIUM 50 MG/1
50 TABLET ORAL DAILY
Status: DISCONTINUED | OUTPATIENT
Start: 2017-08-15 | End: 2017-08-20 | Stop reason: HOSPADM

## 2017-08-14 RX ORDER — HYDROCHLOROTHIAZIDE 12.5 MG/1
12.5 CAPSULE, GELATIN COATED ORAL DAILY
Status: DISCONTINUED | OUTPATIENT
Start: 2017-08-15 | End: 2017-08-15

## 2017-08-14 RX ORDER — TELMISARTAN AND HYDROCHLORTHIAZIDE 40; 12.5 MG/1; MG/1
1 TABLET ORAL DAILY
Status: DISCONTINUED | OUTPATIENT
Start: 2017-08-15 | End: 2017-08-14 | Stop reason: CLARIF

## 2017-08-14 RX ORDER — 0.9 % SODIUM CHLORIDE 0.9 %
100 INTRAVENOUS SOLUTION INTRAVENOUS ONCE
Status: COMPLETED | OUTPATIENT
Start: 2017-08-14 | End: 2017-08-14

## 2017-08-14 RX ORDER — PANTOPRAZOLE SODIUM 40 MG/1
40 TABLET, DELAYED RELEASE ORAL
Status: DISCONTINUED | OUTPATIENT
Start: 2017-08-15 | End: 2017-08-15

## 2017-08-14 RX ORDER — LEVOTHYROXINE SODIUM 0.05 MG/1
50 TABLET ORAL DAILY
Status: DISCONTINUED | OUTPATIENT
Start: 2017-08-15 | End: 2017-08-20 | Stop reason: HOSPADM

## 2017-08-14 RX ORDER — MONTELUKAST SODIUM 10 MG/1
10 TABLET ORAL NIGHTLY
Status: DISCONTINUED | OUTPATIENT
Start: 2017-08-14 | End: 2017-08-20 | Stop reason: HOSPADM

## 2017-08-14 RX ORDER — ONDANSETRON 2 MG/ML
4 INJECTION INTRAMUSCULAR; INTRAVENOUS EVERY 6 HOURS PRN
Status: DISCONTINUED | OUTPATIENT
Start: 2017-08-14 | End: 2017-08-18 | Stop reason: SDUPTHER

## 2017-08-14 RX ORDER — SODIUM CHLORIDE 9 MG/ML
INJECTION, SOLUTION INTRAVENOUS CONTINUOUS
Status: DISCONTINUED | OUTPATIENT
Start: 2017-08-14 | End: 2017-08-20

## 2017-08-14 RX ORDER — SODIUM CHLORIDE 9 MG/ML
INJECTION, SOLUTION INTRAVENOUS CONTINUOUS
Status: DISCONTINUED | OUTPATIENT
Start: 2017-08-14 | End: 2017-08-14 | Stop reason: SDUPTHER

## 2017-08-14 RX ORDER — TAMSULOSIN HYDROCHLORIDE 0.4 MG/1
0.4 CAPSULE ORAL DAILY
Status: DISCONTINUED | OUTPATIENT
Start: 2017-08-15 | End: 2017-08-15

## 2017-08-14 RX ORDER — SODIUM CHLORIDE 0.9 % (FLUSH) 0.9 %
10 SYRINGE (ML) INJECTION EVERY 12 HOURS SCHEDULED
Status: DISCONTINUED | OUTPATIENT
Start: 2017-08-14 | End: 2017-08-14 | Stop reason: SDUPTHER

## 2017-08-14 RX ORDER — SODIUM CHLORIDE 0.9 % (FLUSH) 0.9 %
10 SYRINGE (ML) INJECTION PRN
Status: DISCONTINUED | OUTPATIENT
Start: 2017-08-14 | End: 2017-08-20 | Stop reason: HOSPADM

## 2017-08-14 RX ADMIN — SODIUM CHLORIDE 100 ML: 9 INJECTION, SOLUTION INTRAVENOUS at 16:47

## 2017-08-14 RX ADMIN — SODIUM CHLORIDE: 9 INJECTION, SOLUTION INTRAVENOUS at 23:41

## 2017-08-14 RX ADMIN — MORPHINE SULFATE 4 MG: 4 INJECTION, SOLUTION INTRAMUSCULAR; INTRAVENOUS at 20:15

## 2017-08-14 RX ADMIN — ALUMINUM HYDROXIDE, MAGNESIUM HYDROXIDE, AND SIMETHICONE 15 ML: 200; 200; 20 SUSPENSION ORAL at 16:11

## 2017-08-14 RX ADMIN — SODIUM CHLORIDE 1000 ML: 9 INJECTION, SOLUTION INTRAVENOUS at 20:14

## 2017-08-14 RX ADMIN — SODIUM CHLORIDE: 9 INJECTION, SOLUTION INTRAVENOUS at 16:13

## 2017-08-14 RX ADMIN — Medication 10 ML: at 16:47

## 2017-08-14 RX ADMIN — LIDOCAINE HYDROCHLORIDE 15 ML: 20 SOLUTION ORAL; TOPICAL at 16:10

## 2017-08-14 RX ADMIN — SODIUM CHLORIDE 1000 ML: 9 INJECTION, SOLUTION INTRAVENOUS at 22:24

## 2017-08-14 RX ADMIN — IOVERSOL 130 ML: 741 INJECTION INTRA-ARTERIAL; INTRAVENOUS at 16:47

## 2017-08-14 ASSESSMENT — PAIN DESCRIPTION - DESCRIPTORS
DESCRIPTORS: ACHING;PRESSURE
DESCRIPTORS: ACHING;PRESSURE

## 2017-08-14 ASSESSMENT — PAIN DESCRIPTION - ORIENTATION
ORIENTATION: RIGHT;LEFT;MID
ORIENTATION: MID;LEFT
ORIENTATION: MID;LEFT

## 2017-08-14 ASSESSMENT — PAIN SCALES - GENERAL
PAINLEVEL_OUTOF10: 8
PAINLEVEL_OUTOF10: 9
PAINLEVEL_OUTOF10: 8
PAINLEVEL_OUTOF10: 7

## 2017-08-14 ASSESSMENT — PAIN DESCRIPTION - FREQUENCY: FREQUENCY: CONTINUOUS

## 2017-08-14 ASSESSMENT — ENCOUNTER SYMPTOMS
CONSTIPATION: 0
NAUSEA: 1
BACK PAIN: 0
SHORTNESS OF BREATH: 0
COUGH: 0
VOMITING: 1
DIARRHEA: 0
ABDOMINAL PAIN: 1

## 2017-08-14 ASSESSMENT — PAIN DESCRIPTION - ONSET: ONSET: ON-GOING

## 2017-08-14 ASSESSMENT — PAIN DESCRIPTION - LOCATION
LOCATION: ABDOMEN

## 2017-08-14 ASSESSMENT — PAIN DESCRIPTION - PAIN TYPE
TYPE: ACUTE PAIN

## 2017-08-14 ASSESSMENT — PAIN DESCRIPTION - PROGRESSION: CLINICAL_PROGRESSION: NOT CHANGED

## 2017-08-14 NOTE — IP AVS SNAPSHOT
After Visit Summary  (Discharge Instructions)    Medication List for Home    Based on the information you provided to us as well as any changes during this visit, the following is your updated medication list.  Compare this with your prescription bottles at home. If you have any questions or concerns, contact your primary care physician's office. Daily Medication List (This medication list can be shared with any healthcare provider who is helping you manage your medications)      These are medications you told us you were taking at home, CONTINUE taking them after you leave the hospital        Last Dose    Next Dose Due AM NOON PM NIGHT    aspirin 81 MG tablet   Take 81 mg by mouth daily. Tomorrow                          diclofenac sodium 1 % Gel   Apply 2 g topically 2 times daily                2 g on 8/20/2017  8:32 AM     Tonight                          furosemide 20 MG tablet   Commonly known as:  LASIX   Take 20 mg by mouth three times a week Takes 3 times a week                  Resume home regimen                       levothyroxine 50 MCG tablet   Commonly known as:  SYNTHROID   Take 50 mcg by mouth Daily. 50 mcg on 8/20/2017  5:20 AM     Tomorrow                          metFORMIN 500 MG tablet   Commonly known as:  GLUCOPHAGE   Take 500 mg by mouth 2 times daily (with meals). Tonight                          MICARDIS HCT 40-12.5 MG per tablet   Generic drug:  telmisartan-hydrochlorothiazide   Take 1 tablet by mouth daily. Tonight                          montelukast 10 MG tablet   Commonly known as:  SINGULAIR   Take 10 mg by mouth nightly. 10 mg on 8/19/2017  8:45 PM     Tonight                          morphine 30 MG extended release tablet   Commonly known as:  MS CONTIN   Take 1 tablet by mouth 2 times daily .  Earliest Fill Date: 7/21/17                30 mg on 8/20/2017  8:31 AM     Tonight omeprazole 20 MG delayed release capsule   Commonly known as:  PRILOSEC   Take 20 mg by mouth every evening. Tonight                          oxyCODONE-acetaminophen  MG per tablet   Commonly known as:  PERCOCET   Take 1 tablet by mouth every 6 hours as needed for Pain . Earliest Fill Date: 17                  As needed                       pregabalin 150 MG capsule   Commonly known as:  LYRICA   Take 150 mg by mouth 2 times daily. Tonight                          QVAR 40 MCG/ACT inhaler   Generic drug:  beclomethasone                1 puff on 2017  8:32 AM     Tomorrow                          tamsulosin 0.4 MG capsule   Commonly known as:  FLOMAX   Take 0.4 mg by mouth daily                  Tonight                                  Allergies as of 2017        Reactions    Adhesive Tape     Azithromycin       Immunizations as of 2017     No immunizations on file. Last Vitals          Most Recent Value    Temp  98 °F (36.7 °C)    Pulse  59    Resp  19    BP  (!)  159/50         After Visit Summary    This summary was created for you. Thank you for entrusting your care to us. The following information includes details about your hospital/visit stay along with steps you should take to help with your recovery once you leave the hospital.  In this packet, you will find information about the topics listed below:    · Instructions about your medications including a list of your home medications  · A summary of your hospital visit  · Follow-up appointments once you have left the hospital  · Your care plan at home      You may receive a survey regarding the care you received during your stay. Your input is valuable to us. We encourage you to complete and return your survey in the envelope provided. We hope you will choose us in the future for your healthcare needs.           Patient Information     Patient Name  Take the medication exactly as it was prescribed for you. Do not use the medication in larger amounts, or use it for longer than recommended by your doctor. Follow the instructions on your prescription label. Take cephalexin with a full glass of water. Dissolve the cephalexin dispersible tablet in a small amount of water, about 2 teaspoonfuls. Stir this mixture and drink all of it right away. To make sure you get the entire dose, add a little more water to the same glass, swirl gently and drink right away. Do not swallow or chew a dispersible tablet. Shake the oral suspension (liquid) well just before you measure a dose. To be sure you get the correct dose, measure the liquid with a marked measuring spoon or medicine cup, not with a regular table spoon. If you do not have a dose-measuring device, ask your pharmacist for one. Take cephalexin for the entire length of time prescribed by your doctor. Your symptoms may get better before the infection is completely treated. Cephalexin will not treat a viral infection such as the common cold or flu. This medication can cause you to have unusual results with certain medical tests. Tell any doctor who treats you that you are using cephalexin. Store the tablets and capsules at room temperature away from moisture and heat. Store the liquid medicine in the refrigerator. Throw away any unused medication after 14 days. What happens if I miss a dose? Take the missed dose as soon as you remember. If it is almost time for your next dose, skip the missed dose and take the medicine at your next regularly scheduled time. Do not take extra medicine to make up the missed dose. What happens if I overdose? Seek emergency medical attention if you think you have used too much of this medicine. Overdose symptoms may include nausea, vomiting, stomach pain, diarrhea, and blood in your urine. What should I avoid while taking cephalexin? Antibiotic medicines can cause diarrhea, which may be a sign of a new infection. If you have diarrhea that is watery or has blood in it, call your doctor. Do not use any medicine to stop the diarrhea unless your doctor has told you to. What are the possible side effects of cephalexin? Get emergency medical help if you have any of these signs of an allergic reaction: hives; difficulty breathing; swelling of your face, lips, tongue, or throat. Call your doctor at once if you have any of these serious side effects:  · diarrhea that is watery or bloody;  · seizure (convulsions);  · fever, sore throat, and headache with a severe blistering, peeling, and red skin rash;  · pale or yellowed skin, dark colored urine, fever, confusion or weakness;  · easy bruising or bleeding, unusual weakness;  · confusion, agitation, hallucinations (seeing things that are not there); or  · urinating less than usual or not at all. Less serious side effects may include:  · mild nausea, vomiting, diarrhea;  · dizziness, tired feeling;  · joint pain; or  · vaginal itching or discharge. This is not a complete list of side effects and others may occur. Call your doctor for medical advice about side effects. You may report side effects to FDA at 1-800-FDA-1088. You may report side effects to FDA at 1-800-FDA-1088. What other drugs will affect cephalexin? Before using cephalexin, tell your doctor if you are using any of the following drugs:  · a blood thinner such as warfarin (Coumadin);  · metformin (Fortamet, Glucophage, Riomet, Actoplus, Avandamet, Metaglip); or  · probenecid (Benemid). This list is not complete and there may be other drugs that can interact with cephalexin. Tell your doctor about all the prescription and over-the-counter medications you use. This includes vitamins, minerals, herbal products, and drugs prescribed by other doctors. Do not start using a new medication without telling your doctor. Where can I get more information? Your pharmacist can provide more information about cephalexin. Remember, keep this and all other medicines out of the reach of children, never share your medicines with others, and use this medication only for the indication prescribed. Every effort has been made to ensure that the information provided by Suraj [address] is accurate, up-to-date, and complete, but no guarantee is made to that effect. Drug information contained herein may be time sensitive. [facility] information has been compiled for use by healthcare practitioners and consumers in the [place] and therefore [facility] does not warrant that uses outside of the [place] are appropriate, unless specifically indicated otherwise. [facility]'s drug information does not endorse drugs, diagnose patients or recommend therapy. [facility]'s drug information is an informational resource designed to assist licensed healthcare practitioners in caring for their patients and/or to serve consumers viewing this service as a supplement to, and not a substitute for, the expertise, skill, knowledge and judgment of healthcare practitioners. The absence of a warning for a given drug or drug combination in no way should be construed to indicate that the drug or drug combination is safe, effective or appropriate for any given patient. [facility] does not assume any responsibility for any aspect of healthcare administered with the aid of information [facility] provides. The information contained herein is not intended to cover all possible uses, directions, precautions, warnings, drug interactions, allergic reactions, or adverse effects. If you have questions about the drugs you are taking, check with your doctor, nurse or pharmacist.  Copyright 3141-2235 [address] [address] [place] [address] Avenue: 7.04. Revision date: 12/15/2010.   Care instructions adapted under license by your healthcare professional.

## 2017-08-14 NOTE — IP AVS SNAPSHOT
Patient Information     Patient Name DOB Meda Mcburney 1940         This is your updated medication list to keep with you all times      TAKE these medications     aspirin 81 MG tablet       diclofenac sodium 1 % Gel   Apply 2 g topically 2 times daily       furosemide 20 MG tablet   Commonly known as:  LASIX       levothyroxine 50 MCG tablet   Commonly known as:  SYNTHROID       metFORMIN 500 MG tablet   Commonly known as:  GLUCOPHAGE       MICARDIS HCT 40-12.5 MG per tablet   Generic drug:  telmisartan-hydrochlorothiazide       montelukast 10 MG tablet   Commonly known as:  SINGULAIR       morphine 30 MG extended release tablet   Commonly known as:  MS CONTIN   Take 1 tablet by mouth 2 times daily . Earliest Fill Date: 17       omeprazole 20 MG delayed release capsule   Commonly known as:  PRILOSEC       oxyCODONE-acetaminophen  MG per tablet   Commonly known as:  PERCOCET   Take 1 tablet by mouth every 6 hours as needed for Pain .  Earliest Fill Date: 17       pregabalin 150 MG capsule   Commonly known as:  LYRICA       QVAR 40 MCG/ACT inhaler   Generic drug:  beclomethasone       tamsulosin 0.4 MG capsule   Commonly known as:  FLOMAX

## 2017-08-14 NOTE — ED PROVIDER NOTES
Osteoarthritis      SURGICAL HISTORY       Past Surgical History:   Procedure Laterality Date    ABDOMEN SURGERY      BACK SURGERY      CARPAL TUNNEL RELEASE Bilateral     CATARACT REMOVAL WITH IMPLANT Right 7/22/2014    [facility]    CATARACT REMOVAL WITH IMPLANT Left 8/5/2014    [facility]    CATARACT REMOVAL WITH IMPLANT  8-5-14    left eye    CYSTOSCOPY  1/2013    CYSTOSCOPY  11/04/2016    HYSTERECTOMY      KNEE ARTHROSCOPY Left     ROTATOR CUFF REPAIR Bilateral 1989    SKIN BIOPSY Right 11/2015    mole right posterior shoulder    SPINE SURGERY      spinal cord stimulator    SPINE SURGERY  91-4-15    renoval of spinal cord stimulator leads and battery    TUMOR REMOVAL  2005    Bladder tumor - Cancer    UVULOPALATOPHARYGOPLASTY  2006       CURRENT MEDICATIONS       Previous Medications    ASPIRIN 81 MG TABLET    Take 81 mg by mouth daily. DICLOFENAC SODIUM 1 % GEL    Apply 2 g topically 2 times daily    FUROSEMIDE (LASIX) 20 MG TABLET    Take 20 mg by mouth daily. Takes 3 times a week    LEVOTHYROXINE (SYNTHROID) 50 MCG TABLET    Take 50 mcg by mouth Daily. METFORMIN (GLUCOPHAGE) 500 MG TABLET    Take 500 mg by mouth 2 times daily (with meals). MONTELUKAST (SINGULAIR) 10 MG TABLET    Take 10 mg by mouth nightly. MORPHINE (MS CONTIN) 30 MG EXTENDED RELEASE TABLET    Take 1 tablet by mouth 2 times daily . Earliest Fill Date: 7/21/17    OMEPRAZOLE (PRILOSEC) 20 MG CAPSULE    Take 20 mg by mouth every evening. OXYCODONE-ACETAMINOPHEN (PERCOCET)  MG PER TABLET    Take 1 tablet by mouth every 6 hours as needed for Pain . Earliest Fill Date: 7/21/17    PREGABALIN (LYRICA) 150 MG CAPSULE    Take 150 mg by mouth 2 times daily. QVAR 40 MCG/ACT INHALER               TELMISARTAN-HYDROCHLOROTHIAZIDE (MICARDIS HCT) 40-12.5 MG PER TABLET    Take 1 tablet by mouth daily. ALLERGIES     is allergic to adhesive tape and azithromycin.     FAMILY HISTORY     indicated that her mother is . She indicated that her father is . SOCIAL HISTORY      reports that she has never smoked. She has never used smokeless tobacco. She reports that she does not drink alcohol or use illicit drugs. PHYSICAL EXAM     INITIAL VITALS: BP (!) 118/47  Pulse 77  Temp 98.6 °F (37 °C) (Temporal)   Resp 18  Ht 4' 10\" (1.473 m)  Wt 144 lb (65.3 kg)  SpO2 99%  BMI 30.1 kg/m2  Gen: Appears uncomfortable. Head: Normocephalic, atraumatic  Eye: Pupils equal round reactive to light, no conjunctivitis  Heart: Regular rate and rhythm no murmurs  Lungs: Clear to auscultation bilaterally, no respiratory distress  Chest wall: No crepitus, no tenderness palpation  Abdomen: Soft, epigastric TTP, nondistended, with no peritoneal signs  Neurologic: Patient is alert and oriented x3, motor and sensation is intact in all 4 extremities, cerebellar function is normal  Extremities: Full range of motion, no cyanosis, no edema, no signs of trauma, no tenderness to palpation    MEDICAL DECISION MAKING:     MDM  45-year-old female presenting with epigastric abdominal pain nausea and vomiting. Differential diagnosis is a gastritis perforated ulcer pancreatitis cholecystitis diverticulitis ischemic bowel acute coronary syndrome. EKG shows no evidence of acute ischemia, and there is no troponin elevation and so I doubt acute coronary syndrome. Laboratory studies are significant for an elevated lipase at 1500, and elevated liver function tests  Alk  P (118), ALT (57), AST (166). CT scan of the abdomen shows a distended gallbladder. Ultrasound of the gallbladder shows distention but no evidence of cholecystitis and there is no evident cholelithiasis. Gallstone pancreatitis given the elevated liver function tests and lipase and her symptoms. Discussed with GI, Dr. Dagoberto Sierra. Would like an MRCP. Pt has multiple indwelling hardware, unable to get tonight. Giving aggressive IVF hydration. NPO. 08/14/17 2015 08/14/17 2030 08/14/17 2045 08/14/17 2100   BP: (!) 115/50 (!) 123/46 (!) 118/49 (!) 118/47   Pulse:       Resp:       Temp:       TempSrc:       SpO2: 99% 99% 98% 99%   Weight:       Height:           The patient was given the following medications while in the emergency department:  Orders Placed This Encounter   Medications    aluminum & magnesium hydroxide-simethicone (MAALOX) 200-200-20 MG/5ML suspension 15 mL    lidocaine viscous (XYLOCAINE) 2 % solution 15 mL    0.9 % sodium chloride infusion    ioversol (OPTIRAY) 74 % injection 130 mL    0.9 % sodium chloride bolus    sodium chloride flush 0.9 % injection 10 mL    0.9 % sodium chloride bolus    0.9 % sodium chloride bolus    morphine (PF) injection 4 mg    tamsulosin (FLOMAX) capsule 0.4 mg    aspirin tablet 81 mg    diclofenac sodium 1 % gel 2 g    furosemide (LASIX) tablet 20 mg    levothyroxine (SYNTHROID) tablet 50 mcg    montelukast (SINGULAIR) tablet 10 mg    morphine (MS CONTIN) extended release tablet 30 mg    pantoprazole (PROTONIX) tablet 40 mg    oxyCODONE-acetaminophen (PERCOCET)  MG per tablet 1 tablet    pregabalin (LYRICA) capsule 150 mg    beclomethasone (QVAR) 40 MCG/ACT inhaler 2 puff    telmisartan-hydrochlorothiazide (MICARDIS HCT) 40-12.5 MG per tablet 1 tablet     Order Specific Question:   Please select a reason the therapeutic interchange was not accepted:      Answer:   [facility] for Pharmacy to Substitute with Components    sodium chloride flush 0.9 % injection 10 mL    sodium chloride flush 0.9 % injection 10 mL    acetaminophen (TYLENOL) tablet 650 mg    magnesium hydroxide (MILK OF MAGNESIA) 400 MG/5ML suspension 30 mL    ondansetron (ZOFRAN) injection 4 mg    enoxaparin (LOVENOX) injection 40 mg       -------------------------    CRITICAL CARE:       CONSULTS:  IP CONSULT TO GI  IP CONSULT TO PRIMARY CARE PROVIDER  IP CONSULT TO HISTORY AND PHYSICAL    PROCEDURES:  Procedures     FINAL

## 2017-08-15 ENCOUNTER — APPOINTMENT (OUTPATIENT)
Dept: MRI IMAGING | Age: 77
DRG: 419 | End: 2017-08-15
Payer: MEDICARE

## 2017-08-15 LAB
ABSOLUTE EOS #: 0.1 K/UL (ref 0–0.4)
ABSOLUTE LYMPH #: 2.3 K/UL (ref 1–4.8)
ABSOLUTE MONO #: 0.6 K/UL (ref 0.1–1.3)
AMYLASE: 493 U/L (ref 28–100)
ANION GAP SERPL CALCULATED.3IONS-SCNC: 8 MMOL/L (ref 9–17)
BASOPHILS # BLD: 0 %
BASOPHILS ABSOLUTE: 0 K/UL (ref 0–0.2)
BUN BLDV-MCNC: 14 MG/DL (ref 8–23)
BUN/CREAT BLD: ABNORMAL (ref 9–20)
CALCIUM SERPL-MCNC: 8.1 MG/DL (ref 8.6–10.4)
CHLORIDE BLD-SCNC: 108 MMOL/L (ref 98–107)
CO2: 25 MMOL/L (ref 20–31)
CREAT SERPL-MCNC: 0.67 MG/DL (ref 0.5–0.9)
DIFFERENTIAL TYPE: ABNORMAL
EOSINOPHILS RELATIVE PERCENT: 1 %
GFR AFRICAN AMERICAN: >60 ML/MIN
GFR NON-AFRICAN AMERICAN: >60 ML/MIN
GFR SERPL CREATININE-BSD FRML MDRD: ABNORMAL ML/MIN/{1.73_M2}
GFR SERPL CREATININE-BSD FRML MDRD: ABNORMAL ML/MIN/{1.73_M2}
GLUCOSE BLD-MCNC: 96 MG/DL (ref 70–99)
HCT VFR BLD CALC: 32.6 % (ref 36–46)
HEMOGLOBIN: 10.4 G/DL (ref 12–16)
LIPASE: 1170 U/L (ref 13–60)
LYMPHOCYTES # BLD: 21 %
MCH RBC QN AUTO: 28.1 PG (ref 26–34)
MCHC RBC AUTO-ENTMCNC: 31.9 G/DL (ref 31–37)
MCV RBC AUTO: 88 FL (ref 80–100)
MONOCYTES # BLD: 5 %
PDW BLD-RTO: 13.2 % (ref 11.5–14.9)
PLATELET # BLD: 186 K/UL (ref 150–450)
PLATELET ESTIMATE: ABNORMAL
PMV BLD AUTO: 9.2 FL (ref 6–12)
POTASSIUM SERPL-SCNC: 4.1 MMOL/L (ref 3.7–5.3)
RBC # BLD: 3.71 M/UL (ref 4–5.2)
RBC # BLD: ABNORMAL 10*6/UL
SEG NEUTROPHILS: 73 %
SEGMENTED NEUTROPHILS ABSOLUTE COUNT: 7.8 K/UL (ref 1.3–9.1)
SODIUM BLD-SCNC: 141 MMOL/L (ref 135–144)
WBC # BLD: 10.8 K/UL (ref 3.5–11)
WBC # BLD: ABNORMAL 10*3/UL

## 2017-08-15 PROCEDURE — 2060000000 HC ICU INTERMEDIATE R&B

## 2017-08-15 PROCEDURE — 2580000003 HC RX 258: Performed by: FAMILY MEDICINE

## 2017-08-15 PROCEDURE — 94664 DEMO&/EVAL PT USE INHALER: CPT

## 2017-08-15 PROCEDURE — 36415 COLL VENOUS BLD VENIPUNCTURE: CPT

## 2017-08-15 PROCEDURE — 82150 ASSAY OF AMYLASE: CPT

## 2017-08-15 PROCEDURE — 6370000000 HC RX 637 (ALT 250 FOR IP): Performed by: FAMILY MEDICINE

## 2017-08-15 PROCEDURE — 6360000002 HC RX W HCPCS: Performed by: FAMILY MEDICINE

## 2017-08-15 PROCEDURE — 80048 BASIC METABOLIC PNL TOTAL CA: CPT

## 2017-08-15 PROCEDURE — 85025 COMPLETE CBC W/AUTO DIFF WBC: CPT

## 2017-08-15 PROCEDURE — 83690 ASSAY OF LIPASE: CPT

## 2017-08-15 RX ADMIN — HYDROMORPHONE HYDROCHLORIDE 1 MG: 1 INJECTION, SOLUTION INTRAMUSCULAR; INTRAVENOUS; SUBCUTANEOUS at 07:46

## 2017-08-15 RX ADMIN — MORPHINE SULFATE 30 MG: 30 TABLET, EXTENDED RELEASE ORAL at 20:25

## 2017-08-15 RX ADMIN — HYDROMORPHONE HYDROCHLORIDE 1 MG: 1 INJECTION, SOLUTION INTRAMUSCULAR; INTRAVENOUS; SUBCUTANEOUS at 00:19

## 2017-08-15 RX ADMIN — DICLOFENAC 2 G: 10 GEL TOPICAL at 00:33

## 2017-08-15 RX ADMIN — HYDROMORPHONE HYDROCHLORIDE 1 MG: 1 INJECTION, SOLUTION INTRAMUSCULAR; INTRAVENOUS; SUBCUTANEOUS at 16:21

## 2017-08-15 RX ADMIN — SODIUM CHLORIDE: 9 INJECTION, SOLUTION INTRAVENOUS at 06:01

## 2017-08-15 RX ADMIN — BECLOMETHASONE DIPROPIONATE 1 PUFF: 40 AEROSOL, METERED RESPIRATORY (INHALATION) at 20:28

## 2017-08-15 RX ADMIN — LOSARTAN POTASSIUM 50 MG: 50 TABLET, FILM COATED ORAL at 12:16

## 2017-08-15 RX ADMIN — MONTELUKAST SODIUM 10 MG: 10 TABLET, FILM COATED ORAL at 22:52

## 2017-08-15 RX ADMIN — ENOXAPARIN SODIUM 40 MG: 40 INJECTION SUBCUTANEOUS at 12:14

## 2017-08-15 RX ADMIN — SODIUM CHLORIDE: 9 INJECTION, SOLUTION INTRAVENOUS at 16:21

## 2017-08-15 RX ADMIN — BECLOMETHASONE DIPROPIONATE 1 PUFF: 40 AEROSOL, METERED RESPIRATORY (INHALATION) at 12:13

## 2017-08-15 RX ADMIN — DICLOFENAC 2 G: 10 GEL TOPICAL at 20:29

## 2017-08-15 RX ADMIN — BECLOMETHASONE DIPROPIONATE 2 PUFF: 40 AEROSOL, METERED RESPIRATORY (INHALATION) at 00:19

## 2017-08-15 RX ADMIN — DICLOFENAC 2 G: 10 GEL TOPICAL at 12:16

## 2017-08-15 RX ADMIN — HYDROMORPHONE HYDROCHLORIDE 1 MG: 1 INJECTION, SOLUTION INTRAMUSCULAR; INTRAVENOUS; SUBCUTANEOUS at 04:24

## 2017-08-15 RX ADMIN — OXYCODONE HYDROCHLORIDE AND ACETAMINOPHEN 1 TABLET: 5; 325 TABLET ORAL at 12:23

## 2017-08-15 RX ADMIN — ASPIRIN 81 MG: 81 TABLET, COATED ORAL at 12:13

## 2017-08-15 ASSESSMENT — PAIN DESCRIPTION - PAIN TYPE
TYPE: ACUTE PAIN

## 2017-08-15 ASSESSMENT — PAIN DESCRIPTION - LOCATION
LOCATION: ABDOMEN

## 2017-08-15 ASSESSMENT — PAIN DESCRIPTION - DESCRIPTORS
DESCRIPTORS: ACHING;PRESSURE

## 2017-08-15 ASSESSMENT — PAIN DESCRIPTION - ORIENTATION
ORIENTATION: UPPER
ORIENTATION: MID;LEFT
ORIENTATION: MID;LEFT
ORIENTATION: UPPER
ORIENTATION: MID;LEFT
ORIENTATION: MID;LEFT

## 2017-08-15 ASSESSMENT — PAIN DESCRIPTION - ONSET
ONSET: ON-GOING

## 2017-08-15 ASSESSMENT — PAIN DESCRIPTION - PROGRESSION
CLINICAL_PROGRESSION: GRADUALLY IMPROVING
CLINICAL_PROGRESSION: NOT CHANGED
CLINICAL_PROGRESSION: NOT CHANGED
CLINICAL_PROGRESSION: GRADUALLY IMPROVING

## 2017-08-15 ASSESSMENT — PAIN DESCRIPTION - FREQUENCY
FREQUENCY: CONTINUOUS

## 2017-08-15 ASSESSMENT — PAIN SCALES - GENERAL
PAINLEVEL_OUTOF10: 6
PAINLEVEL_OUTOF10: 3
PAINLEVEL_OUTOF10: 0
PAINLEVEL_OUTOF10: 5
PAINLEVEL_OUTOF10: 6
PAINLEVEL_OUTOF10: 0
PAINLEVEL_OUTOF10: 5
PAINLEVEL_OUTOF10: 8
PAINLEVEL_OUTOF10: 4
PAINLEVEL_OUTOF10: 6
PAINLEVEL_OUTOF10: 2
PAINLEVEL_OUTOF10: 2

## 2017-08-15 NOTE — H&P
Bilateral 7/22/2014, 8/5/2014    [facility]    CYSTOSCOPY  1/2013    CYSTOSCOPY  11/04/2016    KNEE ARTHROSCOPY Left     LUMBAR FUSION      ROTATOR CUFF REPAIR Bilateral 1989    SKIN BIOPSY Right 11/2015    mole right posterior shoulder    SPINE SURGERY      spinal cord stimulator    SPINE SURGERY  91-4-15    renoval of spinal cord stimulator leads and battery    PASQUALE AND BSO      UVULOPALATOPHARYGOPLASTY  2006       FAMILY HISTORY       Family History   Problem Relation Age of Onset    Heart Failure Mother     Other Father       pneumonia    Alzheimer's Disease Father        SOCIAL HISTORY       Social History     Social History    Marital status:      Spouse name: N/A    Number of children: N/A    Years of education: N/A     Occupational History    retired      Social History Main Topics    Smoking status: Never Smoker    Smokeless tobacco: Never Used    Alcohol use No    Drug use: No    Sexual activity: Not Asked     Other Topics Concern    None     Social History Narrative           REVIEW OF SYSTEMS      Allergies   Allergen Reactions    Adhesive Tape     Azithromycin        No current facility-administered medications on file prior to encounter. Current Outpatient Prescriptions on File Prior to Encounter   Medication Sig Dispense Refill    oxyCODONE-acetaminophen (PERCOCET)  MG per tablet Take 1 tablet by mouth every 6 hours as needed for Pain . Earliest Fill Date: 7/21/17 120 tablet 0    morphine (MS CONTIN) 30 MG extended release tablet Take 1 tablet by mouth 2 times daily . Earliest Fill Date: 7/21/17 60 tablet 0    tamsulosin (FLOMAX) 0.4 MG capsule Take 0.4 mg by mouth daily      diclofenac sodium 1 % GEL Apply 2 g topically 2 times daily 1 Tube 0    QVAR 40 MCG/ACT inhaler       furosemide (LASIX) 20 MG tablet Take 20 mg by mouth three times a week Takes 3 times a week      pregabalin (LYRICA) 150 MG capsule Take 150 mg by mouth 2 times daily.       metFORMIN (GLUCOPHAGE) 500 MG tablet Take 500 mg by mouth 2 times daily (with meals).  telmisartan-hydrochlorothiazide (MICARDIS HCT) 40-12.5 MG per tablet Take 1 tablet by mouth daily.  montelukast (SINGULAIR) 10 MG tablet Take 10 mg by mouth nightly.  levothyroxine (SYNTHROID) 50 MCG tablet Take 50 mcg by mouth Daily.  omeprazole (PRILOSEC) 20 MG capsule Take 20 mg by mouth every evening.  aspirin 81 MG tablet Take 81 mg by mouth daily. General health:  Fairly good. No fever or chills. Skin:  No itching, redness or rash. Head, eyes, ears, nose, throat:  No headache, epistaxis, rhinorrhea hearing loss or sore throat. Neck:  No pain, stiffness or masses. Cardiovascular/Respiratory system:  No chest pain, palpitation, shortness of breath, coughing or expectoration. Gastrointestinal tract: See HPI. Genitourinary:  No burning on micturition. No hesitancy, urgency, frequency or discoloration of urine. Locomotor:  No bone or joint pains. No swelling or deformities. Neuropsychiatric:  No referable complaints. GENERAL PHYSICAL EXAM:         Vitals: BP (!) 136/51  Pulse 72  Temp 97.5 °F (36.4 °C) (Axillary)   Resp 16  Ht 4' 10\" (1.473 m)  Wt 149 lb 14.6 oz (68 kg)  SpO2 99%  BMI 31.33 kg/m2 Body mass index is 31.33 kg/(m^2). GENERAL APPEARANCE:  Aby Soriano is 78 y.o.,  female, moderately obese, nourished, conscious, alert. Does not appear to be distress or pain at this time. SKIN:  Warm, dry, no cyanosis or jaundice. HEAD:  Normocephalic, atraumatic, no swelling or tenderness. EYES:  Pupils equal, reactive to light, Conjunctiva is clear, EOMs intact silverio. eyelids WNL. EARS:  No discharge, no marked hearing loss. NOSE:  No rhinorrhea, epistaxis or septal deformity. THROAT:  Not congested. No ulceration bleeding or discharge.  No uvula    NECK:  No stiffness,

## 2017-08-15 NOTE — ED NOTES
Report given to [facility]. Copies made of MRI screening report, also carry cards from pt regarding implanted neuro-stimulator in her back. Copies forwarded to [facility] floor. pt ready for room 6568     Jonh White RN  08/14/17 9325

## 2017-08-15 NOTE — PROGRESS NOTES
Pt arrived to floor via stretcher from ED and was transferred to bed. Vitals taken. Admission and assessment complete. No distress noted. See doc flowsheet and admission navigator for details. POC and education initiated and reviewed with patient. Call light within reach, and pt educated on its use. Bed in lowest position and locked. Side rails up x 2. Denied further questions or needs at this time. Will continue to monitor.

## 2017-08-16 ENCOUNTER — ANESTHESIA EVENT (OUTPATIENT)
Dept: OPERATING ROOM | Age: 77
DRG: 419 | End: 2017-08-16
Payer: MEDICARE

## 2017-08-16 LAB
ABSOLUTE EOS #: 0 K/UL (ref 0–0.4)
ABSOLUTE LYMPH #: 1.4 K/UL (ref 1–4.8)
ABSOLUTE MONO #: 0.3 K/UL (ref 0.1–1.3)
AMYLASE: 178 U/L (ref 28–100)
ANION GAP SERPL CALCULATED.3IONS-SCNC: 15 MMOL/L (ref 9–17)
BASOPHILS # BLD: 0 %
BASOPHILS ABSOLUTE: 0 K/UL (ref 0–0.2)
BUN BLDV-MCNC: 16 MG/DL (ref 8–23)
BUN/CREAT BLD: ABNORMAL (ref 9–20)
CALCIUM SERPL-MCNC: 8.5 MG/DL (ref 8.6–10.4)
CHLORIDE BLD-SCNC: 109 MMOL/L (ref 98–107)
CO2: 20 MMOL/L (ref 20–31)
CREAT SERPL-MCNC: 0.61 MG/DL (ref 0.5–0.9)
DIFFERENTIAL TYPE: ABNORMAL
EOSINOPHILS RELATIVE PERCENT: 1 %
GFR AFRICAN AMERICAN: >60 ML/MIN
GFR NON-AFRICAN AMERICAN: >60 ML/MIN
GFR SERPL CREATININE-BSD FRML MDRD: ABNORMAL ML/MIN/{1.73_M2}
GFR SERPL CREATININE-BSD FRML MDRD: ABNORMAL ML/MIN/{1.73_M2}
GLUCOSE BLD-MCNC: 77 MG/DL (ref 70–99)
HCT VFR BLD CALC: 30.4 % (ref 36–46)
HEMOGLOBIN: 9.9 G/DL (ref 12–16)
LIPASE: 501 U/L (ref 13–60)
LYMPHOCYTES # BLD: 22 %
MAGNESIUM: 2 MG/DL (ref 1.6–2.6)
MCH RBC QN AUTO: 28.4 PG (ref 26–34)
MCHC RBC AUTO-ENTMCNC: 32.5 G/DL (ref 31–37)
MCV RBC AUTO: 87.4 FL (ref 80–100)
MONOCYTES # BLD: 4 %
PDW BLD-RTO: 13.4 % (ref 11.5–14.9)
PLATELET # BLD: 166 K/UL (ref 150–450)
PLATELET ESTIMATE: ABNORMAL
PMV BLD AUTO: 9.3 FL (ref 6–12)
POTASSIUM SERPL-SCNC: 4.3 MMOL/L (ref 3.7–5.3)
RBC # BLD: 3.47 M/UL (ref 4–5.2)
RBC # BLD: ABNORMAL 10*6/UL
SEG NEUTROPHILS: 73 %
SEGMENTED NEUTROPHILS ABSOLUTE COUNT: 4.5 K/UL (ref 1.3–9.1)
SODIUM BLD-SCNC: 144 MMOL/L (ref 135–144)
WBC # BLD: 6.2 K/UL (ref 3.5–11)
WBC # BLD: ABNORMAL 10*3/UL

## 2017-08-16 PROCEDURE — 83690 ASSAY OF LIPASE: CPT

## 2017-08-16 PROCEDURE — 99222 1ST HOSP IP/OBS MODERATE 55: CPT | Performed by: INTERNAL MEDICINE

## 2017-08-16 PROCEDURE — 93005 ELECTROCARDIOGRAM TRACING: CPT

## 2017-08-16 PROCEDURE — 36415 COLL VENOUS BLD VENIPUNCTURE: CPT

## 2017-08-16 PROCEDURE — 80048 BASIC METABOLIC PNL TOTAL CA: CPT

## 2017-08-16 PROCEDURE — 6360000002 HC RX W HCPCS: Performed by: FAMILY MEDICINE

## 2017-08-16 PROCEDURE — 2580000003 HC RX 258: Performed by: FAMILY MEDICINE

## 2017-08-16 PROCEDURE — 6370000000 HC RX 637 (ALT 250 FOR IP): Performed by: FAMILY MEDICINE

## 2017-08-16 PROCEDURE — 83735 ASSAY OF MAGNESIUM: CPT

## 2017-08-16 PROCEDURE — 85025 COMPLETE CBC W/AUTO DIFF WBC: CPT

## 2017-08-16 PROCEDURE — 82150 ASSAY OF AMYLASE: CPT

## 2017-08-16 PROCEDURE — 2060000000 HC ICU INTERMEDIATE R&B

## 2017-08-16 RX ADMIN — ASPIRIN 81 MG: 81 TABLET, COATED ORAL at 07:44

## 2017-08-16 RX ADMIN — LOSARTAN POTASSIUM 50 MG: 50 TABLET, FILM COATED ORAL at 07:47

## 2017-08-16 RX ADMIN — DICLOFENAC 2 G: 10 GEL TOPICAL at 07:45

## 2017-08-16 RX ADMIN — BECLOMETHASONE DIPROPIONATE 1 PUFF: 40 AEROSOL, METERED RESPIRATORY (INHALATION) at 07:43

## 2017-08-16 RX ADMIN — OXYCODONE HYDROCHLORIDE AND ACETAMINOPHEN 1 TABLET: 5; 325 TABLET ORAL at 07:44

## 2017-08-16 RX ADMIN — OXYCODONE HYDROCHLORIDE AND ACETAMINOPHEN 1 TABLET: 5; 325 TABLET ORAL at 20:35

## 2017-08-16 RX ADMIN — MORPHINE SULFATE 30 MG: 30 TABLET, EXTENDED RELEASE ORAL at 03:00

## 2017-08-16 RX ADMIN — MORPHINE SULFATE 30 MG: 30 TABLET, EXTENDED RELEASE ORAL at 07:44

## 2017-08-16 RX ADMIN — LEVOTHYROXINE SODIUM 50 MCG: 50 TABLET ORAL at 07:44

## 2017-08-16 RX ADMIN — MONTELUKAST SODIUM 10 MG: 10 TABLET, FILM COATED ORAL at 20:37

## 2017-08-16 RX ADMIN — SODIUM CHLORIDE: 9 INJECTION, SOLUTION INTRAVENOUS at 01:48

## 2017-08-16 RX ADMIN — BECLOMETHASONE DIPROPIONATE 1 PUFF: 40 AEROSOL, METERED RESPIRATORY (INHALATION) at 20:35

## 2017-08-16 RX ADMIN — ENOXAPARIN SODIUM 40 MG: 40 INJECTION SUBCUTANEOUS at 07:43

## 2017-08-16 RX ADMIN — SODIUM CHLORIDE: 9 INJECTION, SOLUTION INTRAVENOUS at 20:35

## 2017-08-16 RX ADMIN — DICLOFENAC 2 G: 10 GEL TOPICAL at 20:36

## 2017-08-16 ASSESSMENT — PAIN SCALES - GENERAL
PAINLEVEL_OUTOF10: 2
PAINLEVEL_OUTOF10: 6
PAINLEVEL_OUTOF10: 6
PAINLEVEL_OUTOF10: 3
PAINLEVEL_OUTOF10: 5

## 2017-08-16 ASSESSMENT — COPD QUESTIONNAIRES: CAT_SEVERITY: NO INTERVAL CHANGE

## 2017-08-16 NOTE — PLAN OF CARE
Problem: Falls - Risk of  Goal: Absence of falls  Outcome: Ongoing  No falls noted this shift. Patient ambulates with x1 standby staff assistance without difficulty. Bed kept in low position. Safe environment maintained. Bedside table & call light in reach. Uses call light appropriately when needing assistance. Problem: Pain:  Goal: Pain level will decrease  Pain level will decrease   Outcome: Ongoing  Pt medicated with pain medication prn. Assessed all pain characteristics including level, type, location, frequency, and onset. Non-pharmacologic interventions offered to pt as well. Pt states pain is tolerable at this time. Will continue to monitor.

## 2017-08-16 NOTE — PROGRESS NOTES
Patients home BIPAP/CPAP checked for integrity and cleanliness. Equipment sticker placed on home unit after check completed.

## 2017-08-16 NOTE — CONSULTS
REVIEW OF SYSTEMS: A 12-point review of systems was obtained and pertinent positives and negatives were enumerated above in the history of present illness. All other reviewed systems / symptoms were negative. ROS      PHYSICAL EXAMINATION: Vital signs reviewed per the nursing documentation. [unfilled]   [unfilled]   Body mass index is 31.29 kg/(m^2). General:  A O x 3 in NAD   Psych: . Normal affect. Mentation normal   HEENT: PERRLA. Clear conjunctivae and sclerae. Moist oral mucosae, no lesions or ulcers. The neck is supple, without lymphadenopathy or jugular venous distension. No masses. Normal thyroid. Cardiovascular: S1 S2 RRR no rubs or murmurs. Pulmonary: clear BL. No accessory muscle usage. Abdominal Exam: Soft, NT ND, no hepato or spleno megaly, +BS, no ascites. No groin masses or lymphadenopathy. Extremities: No edema. Skin: Warm skin. No skin rash. No spider nevi palmar erythema nail dystrophy. Joint: No joint swelling or deformity. Neurological: intact sensory. DTR+.  No asterixis     LABORATORY DATA: Reviewed   Lab Results   Component Value Date    WBC 6.2 08/16/2017    HGB 9.9 (L) 08/16/2017    HCT 30.4 (L) 08/16/2017    MCV 87.4 08/16/2017     08/16/2017     08/16/2017    K 4.3 08/16/2017     (H) 08/16/2017    CO2 20 08/16/2017    BUN 16 08/16/2017    CREATININE 0.61 08/16/2017    LABPROT 6.6 11/19/2012    LABALBU 3.8 08/14/2017    BILITOT 0.41 08/14/2017    ALKPHOS 118 (H) 08/14/2017     (H) 08/14/2017    ALT 57 (H) 08/14/2017    INR 1.0 03/12/2013      Lab Results   Component Value Date    RBC 3.47 (L) 08/16/2017    HGB 9.9 (L) 08/16/2017    MCV 87.4 08/16/2017    MCH 28.4 08/16/2017    MCHC 32.5 08/16/2017    RDW 13.4 08/16/2017    MPV 9.3 08/16/2017    BASOPCT 0 08/16/2017    LYMPHSABS 1.40 08/16/2017    MONOSABS 0.30 08/16/2017    NEUTROABS 4.50 08/16/2017    EOSABS 0.00 08/16/2017    BASOSABS 0.00 08/16/2017 DIAGNOSTIC TESTING:   Ct Abdomen Pelvis W Iv Contrast    Result Date: 8/14/2017  EXAMINATION: CT OF THE ABDOMEN AND PELVIS WITH CONTRAST 8/14/2017 4:48 pm TECHNIQUE: CT of the abdomen and pelvis was performed with the administration of intravenous contrast. Multiplanar reformatted images are provided for review. Dose modulation, iterative reconstruction, and/or weight based adjustment of the mA/kV was utilized to reduce the radiation dose to as low as reasonably achievable. COMPARISON: December 18, 2014 HISTORY: ORDERING SYSTEM PROVIDED HISTORY: abdominal pain TECHNOLOGIST PROVIDED HISTORY: IV Only Contrast Ordering Physician Provided Reason for Exam: ABDOMINAL PAIN Acuity: Unknown Type of Exam: Unknown FINDINGS: Lower Chest: Clear Organs: The abdominal wall appears normal. The liver, spleen, pancreas, and adrenals appear normal.  The gallbladder appears somewhat distended. Kidneys appear normal. The bladder appears normal. GI/Bowel: There is increased stool throughout the colon. The small bowel appears normal.  The stomach is normal.  The appendix appears normal. Pelvis: Normal Peritoneum/Retroperitoneum: The abdominal aorta and iliac arteries are normal in caliber. There is no pathologic adenopathy. Bones/Soft Tissues: There been laminectomies at L2 through L5. There are posterior fusion rods and bilateral pedicular screws at L2 through S1. The hardware appears intact without evidence of loosening. There is a disc prosthesis at L4-L5. There is a subcutaneous neurostimulator at the L2 level to the right of midline. Leads terminate posterior to the facets at this level. No acute disease. Incidental findings as noted above. Us Gallbladder Ruq    Result Date: 8/14/2017  EXAMINATION: RIGHT UPPER QUADRANT ULTRASOUND 8/14/2017 5:47 pm COMPARISON: None.  HISTORY: ORDERING SYSTEM PROVIDED HISTORY: RUQ pain, elevated LFT's TECHNOLOGIST PROVIDED HISTORY: Ordering Physician Provided Reason for Exam: RUQ

## 2017-08-17 ENCOUNTER — ANESTHESIA EVENT (OUTPATIENT)
Dept: OPERATING ROOM | Age: 77
DRG: 419 | End: 2017-08-17
Payer: MEDICARE

## 2017-08-17 ENCOUNTER — ANESTHESIA (OUTPATIENT)
Dept: OPERATING ROOM | Age: 77
DRG: 419 | End: 2017-08-17
Payer: MEDICARE

## 2017-08-17 VITALS — DIASTOLIC BLOOD PRESSURE: 59 MMHG | OXYGEN SATURATION: 100 % | SYSTOLIC BLOOD PRESSURE: 138 MMHG

## 2017-08-17 LAB
ABSOLUTE EOS #: 0.1 K/UL (ref 0–0.4)
ABSOLUTE LYMPH #: 1.5 K/UL (ref 1–4.8)
ABSOLUTE MONO #: 0.4 K/UL (ref 0.1–1.3)
AMYLASE: 69 U/L (ref 28–100)
ANION GAP SERPL CALCULATED.3IONS-SCNC: 12 MMOL/L (ref 9–17)
BASOPHILS # BLD: 1 %
BASOPHILS ABSOLUTE: 0 K/UL (ref 0–0.2)
BUN BLDV-MCNC: 23 MG/DL (ref 8–23)
BUN/CREAT BLD: ABNORMAL (ref 9–20)
CALCIUM SERPL-MCNC: 8.6 MG/DL (ref 8.6–10.4)
CHLORIDE BLD-SCNC: 113 MMOL/L (ref 98–107)
CO2: 20 MMOL/L (ref 20–31)
CREAT SERPL-MCNC: 0.67 MG/DL (ref 0.5–0.9)
DIFFERENTIAL TYPE: ABNORMAL
EOSINOPHILS RELATIVE PERCENT: 1 %
GFR AFRICAN AMERICAN: >60 ML/MIN
GFR NON-AFRICAN AMERICAN: >60 ML/MIN
GFR SERPL CREATININE-BSD FRML MDRD: ABNORMAL ML/MIN/{1.73_M2}
GFR SERPL CREATININE-BSD FRML MDRD: ABNORMAL ML/MIN/{1.73_M2}
GLUCOSE BLD-MCNC: 103 MG/DL (ref 65–105)
GLUCOSE BLD-MCNC: 64 MG/DL (ref 65–105)
GLUCOSE BLD-MCNC: 73 MG/DL (ref 70–99)
HCT VFR BLD CALC: 29.7 % (ref 36–46)
HEMOGLOBIN: 9.6 G/DL (ref 12–16)
LIPASE: 151 U/L (ref 13–60)
LYMPHOCYTES # BLD: 32 %
MCH RBC QN AUTO: 28.5 PG (ref 26–34)
MCHC RBC AUTO-ENTMCNC: 32.4 G/DL (ref 31–37)
MCV RBC AUTO: 87.8 FL (ref 80–100)
MONOCYTES # BLD: 8 %
PDW BLD-RTO: 13.6 % (ref 11.5–14.9)
PLATELET # BLD: 178 K/UL (ref 150–450)
PLATELET ESTIMATE: ABNORMAL
PMV BLD AUTO: 9.2 FL (ref 6–12)
POTASSIUM SERPL-SCNC: 4.2 MMOL/L (ref 3.7–5.3)
RBC # BLD: 3.39 M/UL (ref 4–5.2)
RBC # BLD: ABNORMAL 10*6/UL
SEG NEUTROPHILS: 58 %
SEGMENTED NEUTROPHILS ABSOLUTE COUNT: 2.7 K/UL (ref 1.3–9.1)
SODIUM BLD-SCNC: 145 MMOL/L (ref 135–144)
WBC # BLD: 4.7 K/UL (ref 3.5–11)
WBC # BLD: ABNORMAL 10*3/UL

## 2017-08-17 PROCEDURE — 2580000003 HC RX 258: Performed by: FAMILY MEDICINE

## 2017-08-17 PROCEDURE — 0FJB8ZZ INSPECTION OF HEPATOBILIARY DUCT, VIA NATURAL OR ARTIFICIAL OPENING ENDOSCOPIC: ICD-10-PCS | Performed by: INTERNAL MEDICINE

## 2017-08-17 PROCEDURE — 83690 ASSAY OF LIPASE: CPT

## 2017-08-17 PROCEDURE — 3700000001 HC ADD 15 MINUTES (ANESTHESIA): Performed by: INTERNAL MEDICINE

## 2017-08-17 PROCEDURE — 0FJ4XZZ INSPECTION OF GALLBLADDER, EXTERNAL APPROACH: ICD-10-PCS | Performed by: INTERNAL MEDICINE

## 2017-08-17 PROCEDURE — 2580000003 HC RX 258: Performed by: ANESTHESIOLOGY

## 2017-08-17 PROCEDURE — 7100000001 HC PACU RECOVERY - ADDTL 15 MIN: Performed by: INTERNAL MEDICINE

## 2017-08-17 PROCEDURE — 0FJGXZZ INSPECTION OF PANCREAS, EXTERNAL APPROACH: ICD-10-PCS | Performed by: INTERNAL MEDICINE

## 2017-08-17 PROCEDURE — 85025 COMPLETE CBC W/AUTO DIFF WBC: CPT

## 2017-08-17 PROCEDURE — 6360000002 HC RX W HCPCS: Performed by: ANESTHESIOLOGY

## 2017-08-17 PROCEDURE — 2500000003 HC RX 250 WO HCPCS: Performed by: ANESTHESIOLOGY

## 2017-08-17 PROCEDURE — 6370000000 HC RX 637 (ALT 250 FOR IP): Performed by: FAMILY MEDICINE

## 2017-08-17 PROCEDURE — 82150 ASSAY OF AMYLASE: CPT

## 2017-08-17 PROCEDURE — 82947 ASSAY GLUCOSE BLOOD QUANT: CPT

## 2017-08-17 PROCEDURE — 2060000000 HC ICU INTERMEDIATE R&B

## 2017-08-17 PROCEDURE — 3700000000 HC ANESTHESIA ATTENDED CARE: Performed by: INTERNAL MEDICINE

## 2017-08-17 PROCEDURE — 3609018700 HC ENDOSCOPIC ULTRASOUND: Performed by: INTERNAL MEDICINE

## 2017-08-17 PROCEDURE — 7100000000 HC PACU RECOVERY - FIRST 15 MIN: Performed by: INTERNAL MEDICINE

## 2017-08-17 PROCEDURE — 36415 COLL VENOUS BLD VENIPUNCTURE: CPT

## 2017-08-17 PROCEDURE — 0FJD8ZZ INSPECTION OF PANCREATIC DUCT, VIA NATURAL OR ARTIFICIAL OPENING ENDOSCOPIC: ICD-10-PCS | Performed by: INTERNAL MEDICINE

## 2017-08-17 PROCEDURE — 43242 EGD US FINE NEEDLE BX/ASPIR: CPT | Performed by: INTERNAL MEDICINE

## 2017-08-17 PROCEDURE — 80048 BASIC METABOLIC PNL TOTAL CA: CPT

## 2017-08-17 RX ORDER — ONDANSETRON 2 MG/ML
INJECTION INTRAMUSCULAR; INTRAVENOUS PRN
Status: DISCONTINUED | OUTPATIENT
Start: 2017-08-17 | End: 2017-08-17 | Stop reason: SDUPTHER

## 2017-08-17 RX ORDER — OXYCODONE HYDROCHLORIDE AND ACETAMINOPHEN 5; 325 MG/1; MG/1
2 TABLET ORAL PRN
Status: DISCONTINUED | OUTPATIENT
Start: 2017-08-17 | End: 2017-08-17 | Stop reason: HOSPADM

## 2017-08-17 RX ORDER — PROMETHAZINE HYDROCHLORIDE 25 MG/ML
6.25 INJECTION, SOLUTION INTRAMUSCULAR; INTRAVENOUS
Status: DISCONTINUED | OUTPATIENT
Start: 2017-08-17 | End: 2017-08-17 | Stop reason: HOSPADM

## 2017-08-17 RX ORDER — DIPHENHYDRAMINE HYDROCHLORIDE 50 MG/ML
12.5 INJECTION INTRAMUSCULAR; INTRAVENOUS
Status: DISCONTINUED | OUTPATIENT
Start: 2017-08-17 | End: 2017-08-17 | Stop reason: HOSPADM

## 2017-08-17 RX ORDER — OXYCODONE HYDROCHLORIDE AND ACETAMINOPHEN 5; 325 MG/1; MG/1
1 TABLET ORAL PRN
Status: DISCONTINUED | OUTPATIENT
Start: 2017-08-17 | End: 2017-08-17 | Stop reason: HOSPADM

## 2017-08-17 RX ORDER — MEPERIDINE HYDROCHLORIDE 25 MG/ML
12.5 INJECTION INTRAMUSCULAR; INTRAVENOUS; SUBCUTANEOUS EVERY 5 MIN PRN
Status: DISCONTINUED | OUTPATIENT
Start: 2017-08-17 | End: 2017-08-17 | Stop reason: HOSPADM

## 2017-08-17 RX ORDER — FENTANYL CITRATE 50 UG/ML
INJECTION, SOLUTION INTRAMUSCULAR; INTRAVENOUS PRN
Status: DISCONTINUED | OUTPATIENT
Start: 2017-08-17 | End: 2017-08-17 | Stop reason: SDUPTHER

## 2017-08-17 RX ORDER — MIDAZOLAM HYDROCHLORIDE 1 MG/ML
INJECTION INTRAMUSCULAR; INTRAVENOUS PRN
Status: DISCONTINUED | OUTPATIENT
Start: 2017-08-17 | End: 2017-08-17 | Stop reason: SDUPTHER

## 2017-08-17 RX ORDER — LIDOCAINE HYDROCHLORIDE 10 MG/ML
INJECTION, SOLUTION EPIDURAL; INFILTRATION; INTRACAUDAL; PERINEURAL PRN
Status: DISCONTINUED | OUTPATIENT
Start: 2017-08-17 | End: 2017-08-17 | Stop reason: SDUPTHER

## 2017-08-17 RX ORDER — DEXTROSE MONOHYDRATE 50 MG/ML
INJECTION, SOLUTION INTRAVENOUS CONTINUOUS
Status: ACTIVE | OUTPATIENT
Start: 2017-08-17 | End: 2017-08-17

## 2017-08-17 RX ORDER — SODIUM CHLORIDE 9 MG/ML
INJECTION, SOLUTION INTRAVENOUS CONTINUOUS PRN
Status: DISCONTINUED | OUTPATIENT
Start: 2017-08-17 | End: 2017-08-17 | Stop reason: SDUPTHER

## 2017-08-17 RX ORDER — FENTANYL CITRATE 50 UG/ML
25 INJECTION, SOLUTION INTRAMUSCULAR; INTRAVENOUS EVERY 5 MIN PRN
Status: DISCONTINUED | OUTPATIENT
Start: 2017-08-17 | End: 2017-08-17 | Stop reason: HOSPADM

## 2017-08-17 RX ORDER — PROPOFOL 10 MG/ML
INJECTION, EMULSION INTRAVENOUS PRN
Status: DISCONTINUED | OUTPATIENT
Start: 2017-08-17 | End: 2017-08-17 | Stop reason: SDUPTHER

## 2017-08-17 RX ORDER — MORPHINE SULFATE 2 MG/ML
1 INJECTION, SOLUTION INTRAMUSCULAR; INTRAVENOUS EVERY 5 MIN PRN
Status: DISCONTINUED | OUTPATIENT
Start: 2017-08-17 | End: 2017-08-17 | Stop reason: HOSPADM

## 2017-08-17 RX ORDER — DEXAMETHASONE SODIUM PHOSPHATE 4 MG/ML
INJECTION, SOLUTION INTRA-ARTICULAR; INTRALESIONAL; INTRAMUSCULAR; INTRAVENOUS; SOFT TISSUE PRN
Status: DISCONTINUED | OUTPATIENT
Start: 2017-08-17 | End: 2017-08-17 | Stop reason: SDUPTHER

## 2017-08-17 RX ADMIN — PROPOFOL 20 MG: 10 INJECTION, EMULSION INTRAVENOUS at 13:01

## 2017-08-17 RX ADMIN — DEXTROSE MONOHYDRATE: 50 INJECTION, SOLUTION INTRAVENOUS at 13:40

## 2017-08-17 RX ADMIN — PROPOFOL 20 MG: 10 INJECTION, EMULSION INTRAVENOUS at 13:13

## 2017-08-17 RX ADMIN — FENTANYL CITRATE 100 MCG: 50 INJECTION, SOLUTION INTRAMUSCULAR; INTRAVENOUS at 13:00

## 2017-08-17 RX ADMIN — ONDANSETRON 4 MG: 2 INJECTION INTRAMUSCULAR; INTRAVENOUS at 13:10

## 2017-08-17 RX ADMIN — MONTELUKAST SODIUM 10 MG: 10 TABLET, FILM COATED ORAL at 21:43

## 2017-08-17 RX ADMIN — SODIUM CHLORIDE: 9 INJECTION, SOLUTION INTRAVENOUS at 12:57

## 2017-08-17 RX ADMIN — DEXAMETHASONE SODIUM PHOSPHATE 4 MG: 4 INJECTION, SOLUTION INTRAMUSCULAR; INTRAVENOUS at 13:10

## 2017-08-17 RX ADMIN — DICLOFENAC 2 G: 10 GEL TOPICAL at 08:51

## 2017-08-17 RX ADMIN — BECLOMETHASONE DIPROPIONATE 1 PUFF: 40 AEROSOL, METERED RESPIRATORY (INHALATION) at 21:43

## 2017-08-17 RX ADMIN — SODIUM CHLORIDE: 9 INJECTION, SOLUTION INTRAVENOUS at 06:20

## 2017-08-17 RX ADMIN — MIDAZOLAM 2 MG: 1 INJECTION INTRAMUSCULAR; INTRAVENOUS at 12:58

## 2017-08-17 RX ADMIN — LIDOCAINE HYDROCHLORIDE 50 MG: 10 INJECTION, SOLUTION EPIDURAL; INFILTRATION; INTRACAUDAL; PERINEURAL at 13:01

## 2017-08-17 RX ADMIN — BECLOMETHASONE DIPROPIONATE 1 PUFF: 40 AEROSOL, METERED RESPIRATORY (INHALATION) at 08:51

## 2017-08-17 RX ADMIN — MORPHINE SULFATE 30 MG: 30 TABLET, EXTENDED RELEASE ORAL at 21:42

## 2017-08-17 RX ADMIN — PROPOFOL 20 MG: 10 INJECTION, EMULSION INTRAVENOUS at 13:15

## 2017-08-17 RX ADMIN — PROPOFOL 20 MG: 10 INJECTION, EMULSION INTRAVENOUS at 13:08

## 2017-08-17 ASSESSMENT — PAIN SCALES - GENERAL
PAINLEVEL_OUTOF10: 4
PAINLEVEL_OUTOF10: 0
PAINLEVEL_OUTOF10: 6

## 2017-08-17 ASSESSMENT — PAIN - FUNCTIONAL ASSESSMENT: PAIN_FUNCTIONAL_ASSESSMENT: 0-10

## 2017-08-17 NOTE — PROGRESS NOTES
Dr. Taqueria Gamboa NOTE        Patient:  Jeannie Cerda  YOB: 1940    MRN: [id number]     Acct: [de-identified]     Admit date: 8/14/2017    Pt seen and Chart reviewed. Consultant notes reviewed and care evaluated.     Problem List:  Patient Active Problem List   Diagnosis Code    Degenerative lumbar disc M51.36    Lumbar radiculopathy M54.16    Lumbar spondylolysis M43.06    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    Chronic pain associated with significant psychosocial dysfunction G89.4    Encounter for long-term (current) use of other medications Z79.899    Encounter for medication counseling Z71.89    Encounter for medication monitoring Z51.81    Primary osteoarthritis of right hip M16.11    Sacroiliitis ([address] M46.1    Spinal cord stimulator status Z96.89         Subjective: NPO for procedure,improving  Bradycardia episode yest, no S/S, resolved, EKG no changes    Diet:  Diet NPO, After Midnight      Medications:Current Inpatient    Scheduled Meds:   beclomethasone  1 puff Inhalation BID    sodium chloride flush  10 mL Intravenous 2 times per day    aspirin  81 mg Oral Daily    diclofenac sodium  2 g Topical BID    levothyroxine  50 mcg Oral Daily    montelukast  10 mg Oral Nightly    morphine  30 mg Oral BID    enoxaparin  40 mg Subcutaneous Daily    losartan  50 mg Oral Daily     Continuous Infusions:   sodium chloride 100 mL/hr at 08/17/17 0620     PRN Meds:sodium chloride flush, acetaminophen, magnesium hydroxide, ondansetron, HYDROmorphone, oxyCODONE-acetaminophen **AND** oxyCODONE    Objective:    Physical Exam:  Vitals: BP (!) 150/54  Pulse 64  Temp 97.9 °F (36.6 °C) (Oral)   Resp 18  Ht 4' 10\" (1.473 m)  Wt 149 lb 11.1 oz (67.9 kg)  SpO2 100%  BMI 31.29 kg/m2  24 hour intake/output:  Intake/Output Summary (Last 24 hours) at 08/17/17 0755  Last data filed at 08/17/17 0620   Gross per 24 hour   Intake          2106.67 ml   Output              650 ml   Net          1456.67 ml     Last 3 weights: Wt Readings from Last 3 Encounters:   08/16/17 149 lb 11.1 oz (67.9 kg)   07/18/17 147 lb (66.7 kg)   06/16/17 148 lb (67.1 kg)       Physical Examination:   General appearance - alert, well appearing, and in no distress  Mental status - alert, oriented to person, place, and time  Chest - clear to auscultation, no wheezes, rales or rhonchi, symmetric air entry  Heart - normal rate, regular rhythm, normal S1, S2, no murmurs, rubs, clicks or gallops  Abdomen - soft, nontender, nondistended, no masses or organomegaly  Neurological - alert, oriented, normal speech, no focal findings or movement disorder noted}  Extremities - peripheral pulses normal, no pedal edema, no clubbing or cyanosis  Skin - normal coloration and turgor, no rashes, no suspicious skin lesions noted     Labs:-    CBC:   Recent Labs      08/15/17   0746  08/16/17   0610  08/17/17   0517   WBC  10.8  6.2  4.7   HGB  10.4*  9.9*  9.6*   PLT  186  166  178     BMP:  Recent Labs      08/15/17   0813  08/16/17   0610  08/17/17   0517   NA  141  144  145*   K  4.1  4.3  4.2   CL  108*  109*  113*   CO2  25  20  20   BUN  14  16  23   CREATININE  0.67  0.61  0.67   GLUCOSE  96  77  73     Glucose:No results for input(s): POCGLU in the last 72 hours. HgbA1C: No results for input(s): LABA1C in the last 72 hours. INR: No results for input(s): INR in the last 72 hours. CARDIAC ENZYMES:No results for input(s): CKTOTAL, CKMB, CKMBINDEX, TROPONINI in the last 72 hours. BNP: No results for input(s): BNP in the last 72 hours. Lipids: No results for input(s): CHOL, TRIG, HDL, LDLCALC in the last 72 hours.     Invalid input(s): LDL  ABGs: No results found for: PH, PCO2, PO2, HCO3, O2SAT  Thyroid:   Lab Results   Component Value Date    TSH 1.81 02/13/2017      Urinalysis: Color, UA

## 2017-08-17 NOTE — DISCHARGE SUMMARY
Physician Discharge Summary     Patient ID:  Aby Soriano  [id number]  2 y.o.  1940    Admit date: 2017    Discharge date and time:      Admitting Physician: Mayra York DO     Discharge Physician: Mayra York DO      Admission Diagnoses:   Acute pancreatitis, unspecified complication status, unspecified pancreatitis type [K85.90]    Discharge Diagnoses:   Patient Active Problem List   Diagnosis Code    Degenerative lumbar disc M51.36    Lumbar radiculopathy M54.16    Lumbar spondylolysis M43.06    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    Chronic pain associated with significant psychosocial dysfunction G89.4    Encounter for long-term (current) use of other medications Z79.899    Encounter for medication counseling Z71.89    Encounter for medication monitoring Z51.81    Primary osteoarthritis of right hip M16.11    Sacroiliitis ([address] M46.1    Spinal cord stimulator status Z96.89     Active Problems:    * No active hospital problems. *      Admission Condition: stable    Discharged Condition: good    Hospital Course: NPO tx pancreatitis, etiol not yet known at this time, endoscopic US today    Significant Diagnostic Studies: endoscopy: pending    Treatments: IV hydration    Disposition: home    Patient Instructions:     Discharge Medications:  Current Discharge Medication List           Details   oxyCODONE-acetaminophen (PERCOCET)  MG per tablet Take 1 tablet by mouth every 6 hours as needed for Pain . Earliest Fill Date: 17  Qty: 120 tablet, Refills: 0      morphine (MS CONTIN) 30 MG extended release tablet Take 1 tablet by mouth 2 times daily .  Earliest Fill Date: 17  Qty: 60 tablet, Refills: 0      tamsulosin (FLOMAX) 0.4 MG capsule Take 0.4 mg by mouth daily      diclofenac sodium 1 % GEL Apply 2 g topically 2 times daily  Qty: 1 Tube, Refills: 0    Comments: Sample  3/2018  [id number]      QVAR 40 MCG/ACT inhaler       furosemide (LASIX) 20 MG tablet Take 20 mg by mouth three times a week Takes 3 times a week      pregabalin (LYRICA) 150 MG capsule Take 150 mg by mouth 2 times daily. metFORMIN (GLUCOPHAGE) 500 MG tablet Take 500 mg by mouth 2 times daily (with meals). telmisartan-hydrochlorothiazide (MICARDIS HCT) 40-12.5 MG per tablet Take 1 tablet by mouth daily. montelukast (SINGULAIR) 10 MG tablet Take 10 mg by mouth nightly. levothyroxine (SYNTHROID) 50 MCG tablet Take 50 mcg by mouth Daily. omeprazole (PRILOSEC) 20 MG capsule Take 20 mg by mouth every evening. aspirin 81 MG tablet Take 81 mg by mouth daily. Activity: activity as tolerated    Diet: pending    Follow-up with Nina Wheatley DO in 1 to 2 weeks, patient to call  for an appointment and if has any problems.       Electronically signed by Aundrea Hidalgo DO  on 8/17/2017 at 7:54 AM

## 2017-08-18 ENCOUNTER — ANESTHESIA (OUTPATIENT)
Dept: OPERATING ROOM | Age: 77
DRG: 419 | End: 2017-08-18
Payer: MEDICARE

## 2017-08-18 VITALS — OXYGEN SATURATION: 100 % | TEMPERATURE: 97.5 F | SYSTOLIC BLOOD PRESSURE: 191 MMHG | DIASTOLIC BLOOD PRESSURE: 111 MMHG

## 2017-08-18 LAB
ABSOLUTE EOS #: 0 K/UL (ref 0–0.4)
ABSOLUTE LYMPH #: 1.1 K/UL (ref 1–4.8)
ABSOLUTE MONO #: 0.5 K/UL (ref 0.1–1.3)
AMYLASE: 79 U/L (ref 28–100)
ANION GAP SERPL CALCULATED.3IONS-SCNC: 14 MMOL/L (ref 9–17)
BASOPHILS # BLD: 1 %
BASOPHILS ABSOLUTE: 0 K/UL (ref 0–0.2)
BUN BLDV-MCNC: 21 MG/DL (ref 8–23)
BUN/CREAT BLD: ABNORMAL (ref 9–20)
CALCIUM SERPL-MCNC: 8.6 MG/DL (ref 8.6–10.4)
CHLORIDE BLD-SCNC: 109 MMOL/L (ref 98–107)
CO2: 19 MMOL/L (ref 20–31)
CREAT SERPL-MCNC: 0.71 MG/DL (ref 0.5–0.9)
DIFFERENTIAL TYPE: ABNORMAL
EOSINOPHILS RELATIVE PERCENT: 0 %
GFR AFRICAN AMERICAN: >60 ML/MIN
GFR NON-AFRICAN AMERICAN: >60 ML/MIN
GFR SERPL CREATININE-BSD FRML MDRD: ABNORMAL ML/MIN/{1.73_M2}
GFR SERPL CREATININE-BSD FRML MDRD: ABNORMAL ML/MIN/{1.73_M2}
GLUCOSE BLD-MCNC: 104 MG/DL (ref 70–99)
GLUCOSE BLD-MCNC: 123 MG/DL (ref 65–105)
HCT VFR BLD CALC: 31.4 % (ref 36–46)
HEMOGLOBIN: 10.2 G/DL (ref 12–16)
LIPASE: 126 U/L (ref 13–60)
LYMPHOCYTES # BLD: 21 %
MCH RBC QN AUTO: 28.2 PG (ref 26–34)
MCHC RBC AUTO-ENTMCNC: 32.4 G/DL (ref 31–37)
MCV RBC AUTO: 87.1 FL (ref 80–100)
MONOCYTES # BLD: 9 %
PDW BLD-RTO: 13.2 % (ref 11.5–14.9)
PLATELET # BLD: 215 K/UL (ref 150–450)
PLATELET ESTIMATE: ABNORMAL
PMV BLD AUTO: 9.5 FL (ref 6–12)
POTASSIUM SERPL-SCNC: 4.3 MMOL/L (ref 3.7–5.3)
RBC # BLD: 3.6 M/UL (ref 4–5.2)
RBC # BLD: ABNORMAL 10*6/UL
SEG NEUTROPHILS: 69 %
SEGMENTED NEUTROPHILS ABSOLUTE COUNT: 3.8 K/UL (ref 1.3–9.1)
SODIUM BLD-SCNC: 142 MMOL/L (ref 135–144)
WBC # BLD: 5.4 K/UL (ref 3.5–11)
WBC # BLD: ABNORMAL 10*3/UL

## 2017-08-18 PROCEDURE — 88304 TISSUE EXAM BY PATHOLOGIST: CPT

## 2017-08-18 PROCEDURE — 2500000003 HC RX 250 WO HCPCS

## 2017-08-18 PROCEDURE — 3600000019 HC SURGERY ROBOT ADDTL 15MIN: Performed by: SURGERY

## 2017-08-18 PROCEDURE — 82947 ASSAY GLUCOSE BLOOD QUANT: CPT

## 2017-08-18 PROCEDURE — 3700000000 HC ANESTHESIA ATTENDED CARE: Performed by: SURGERY

## 2017-08-18 PROCEDURE — 82150 ASSAY OF AMYLASE: CPT

## 2017-08-18 PROCEDURE — 80048 BASIC METABOLIC PNL TOTAL CA: CPT

## 2017-08-18 PROCEDURE — 2500000003 HC RX 250 WO HCPCS: Performed by: SURGERY

## 2017-08-18 PROCEDURE — 99232 SBSQ HOSP IP/OBS MODERATE 35: CPT | Performed by: INTERNAL MEDICINE

## 2017-08-18 PROCEDURE — 6360000002 HC RX W HCPCS: Performed by: SURGERY

## 2017-08-18 PROCEDURE — 6370000000 HC RX 637 (ALT 250 FOR IP): Performed by: FAMILY MEDICINE

## 2017-08-18 PROCEDURE — 6370000000 HC RX 637 (ALT 250 FOR IP): Performed by: SURGERY

## 2017-08-18 PROCEDURE — 2060000000 HC ICU INTERMEDIATE R&B

## 2017-08-18 PROCEDURE — 2720000010 HC SURG SUPPLY STERILE: Performed by: SURGERY

## 2017-08-18 PROCEDURE — 3600000009 HC SURGERY ROBOT BASE: Performed by: SURGERY

## 2017-08-18 PROCEDURE — 2780000010 HC IMPLANT OTHER: Performed by: SURGERY

## 2017-08-18 PROCEDURE — 6360000002 HC RX W HCPCS: Performed by: ANESTHESIOLOGY

## 2017-08-18 PROCEDURE — 8E0W4CZ ROBOTIC ASSISTED PROCEDURE OF TRUNK REGION, PERCUTANEOUS ENDOSCOPIC APPROACH: ICD-10-PCS | Performed by: SURGERY

## 2017-08-18 PROCEDURE — 0FT44ZZ RESECTION OF GALLBLADDER, PERCUTANEOUS ENDOSCOPIC APPROACH: ICD-10-PCS | Performed by: SURGERY

## 2017-08-18 PROCEDURE — 7100000000 HC PACU RECOVERY - FIRST 15 MIN: Performed by: SURGERY

## 2017-08-18 PROCEDURE — 85025 COMPLETE CBC W/AUTO DIFF WBC: CPT

## 2017-08-18 PROCEDURE — 83690 ASSAY OF LIPASE: CPT

## 2017-08-18 PROCEDURE — 2580000003 HC RX 258: Performed by: FAMILY MEDICINE

## 2017-08-18 PROCEDURE — 36415 COLL VENOUS BLD VENIPUNCTURE: CPT

## 2017-08-18 PROCEDURE — 6360000002 HC RX W HCPCS

## 2017-08-18 PROCEDURE — 7100000001 HC PACU RECOVERY - ADDTL 15 MIN: Performed by: SURGERY

## 2017-08-18 PROCEDURE — 3700000001 HC ADD 15 MINUTES (ANESTHESIA): Performed by: SURGERY

## 2017-08-18 PROCEDURE — S2900 ROBOTIC SURGICAL SYSTEM: HCPCS | Performed by: SURGERY

## 2017-08-18 RX ORDER — LABETALOL HYDROCHLORIDE 5 MG/ML
5 INJECTION, SOLUTION INTRAVENOUS EVERY 10 MIN PRN
Status: DISCONTINUED | OUTPATIENT
Start: 2017-08-18 | End: 2017-08-18 | Stop reason: HOSPADM

## 2017-08-18 RX ORDER — DEXAMETHASONE SODIUM PHOSPHATE 4 MG/ML
INJECTION, SOLUTION INTRA-ARTICULAR; INTRALESIONAL; INTRAMUSCULAR; INTRAVENOUS; SOFT TISSUE PRN
Status: DISCONTINUED | OUTPATIENT
Start: 2017-08-18 | End: 2017-08-18 | Stop reason: SDUPTHER

## 2017-08-18 RX ORDER — CEFAZOLIN SODIUM 1 G/3ML
INJECTION, POWDER, FOR SOLUTION INTRAMUSCULAR; INTRAVENOUS
Status: DISPENSED
Start: 2017-08-18 | End: 2017-08-19

## 2017-08-18 RX ORDER — DIPHENHYDRAMINE HYDROCHLORIDE 50 MG/ML
12.5 INJECTION INTRAMUSCULAR; INTRAVENOUS
Status: DISCONTINUED | OUTPATIENT
Start: 2017-08-18 | End: 2017-08-18 | Stop reason: HOSPADM

## 2017-08-18 RX ORDER — OXYCODONE HYDROCHLORIDE AND ACETAMINOPHEN 5; 325 MG/1; MG/1
2 TABLET ORAL EVERY 4 HOURS PRN
Status: DISCONTINUED | OUTPATIENT
Start: 2017-08-18 | End: 2017-08-20 | Stop reason: HOSPADM

## 2017-08-18 RX ORDER — ONDANSETRON 2 MG/ML
4 INJECTION INTRAMUSCULAR; INTRAVENOUS EVERY 6 HOURS PRN
Status: DISCONTINUED | OUTPATIENT
Start: 2017-08-18 | End: 2017-08-20 | Stop reason: HOSPADM

## 2017-08-18 RX ORDER — FENTANYL CITRATE 50 UG/ML
50 INJECTION, SOLUTION INTRAMUSCULAR; INTRAVENOUS EVERY 5 MIN PRN
Status: DISCONTINUED | OUTPATIENT
Start: 2017-08-18 | End: 2017-08-18 | Stop reason: HOSPADM

## 2017-08-18 RX ORDER — MIDAZOLAM HYDROCHLORIDE 1 MG/ML
INJECTION INTRAMUSCULAR; INTRAVENOUS PRN
Status: DISCONTINUED | OUTPATIENT
Start: 2017-08-18 | End: 2017-08-18 | Stop reason: SDUPTHER

## 2017-08-18 RX ORDER — LIDOCAINE HYDROCHLORIDE 10 MG/ML
INJECTION, SOLUTION EPIDURAL; INFILTRATION; INTRACAUDAL; PERINEURAL PRN
Status: DISCONTINUED | OUTPATIENT
Start: 2017-08-18 | End: 2017-08-18 | Stop reason: SDUPTHER

## 2017-08-18 RX ORDER — BUPIVACAINE HYDROCHLORIDE 5 MG/ML
INJECTION, SOLUTION EPIDURAL; INTRACAUDAL PRN
Status: DISCONTINUED | OUTPATIENT
Start: 2017-08-18 | End: 2017-08-18 | Stop reason: HOSPADM

## 2017-08-18 RX ORDER — ACETAMINOPHEN 325 MG/1
650 TABLET ORAL EVERY 4 HOURS PRN
Status: DISCONTINUED | OUTPATIENT
Start: 2017-08-18 | End: 2017-08-18 | Stop reason: SDUPTHER

## 2017-08-18 RX ORDER — FENTANYL CITRATE 50 UG/ML
INJECTION, SOLUTION INTRAMUSCULAR; INTRAVENOUS PRN
Status: DISCONTINUED | OUTPATIENT
Start: 2017-08-18 | End: 2017-08-18 | Stop reason: SDUPTHER

## 2017-08-18 RX ORDER — FENTANYL CITRATE 50 UG/ML
25 INJECTION, SOLUTION INTRAMUSCULAR; INTRAVENOUS EVERY 5 MIN PRN
Status: DISCONTINUED | OUTPATIENT
Start: 2017-08-18 | End: 2017-08-18 | Stop reason: HOSPADM

## 2017-08-18 RX ORDER — ONDANSETRON 2 MG/ML
4 INJECTION INTRAMUSCULAR; INTRAVENOUS
Status: DISCONTINUED | OUTPATIENT
Start: 2017-08-18 | End: 2017-08-18 | Stop reason: HOSPADM

## 2017-08-18 RX ORDER — MEPERIDINE HYDROCHLORIDE 25 MG/ML
12.5 INJECTION INTRAMUSCULAR; INTRAVENOUS; SUBCUTANEOUS EVERY 5 MIN PRN
Status: DISCONTINUED | OUTPATIENT
Start: 2017-08-18 | End: 2017-08-18 | Stop reason: HOSPADM

## 2017-08-18 RX ORDER — SODIUM CHLORIDE 9 MG/ML
INJECTION, SOLUTION INTRAVENOUS CONTINUOUS
Status: DISCONTINUED | OUTPATIENT
Start: 2017-08-18 | End: 2017-08-20

## 2017-08-18 RX ORDER — MORPHINE SULFATE 2 MG/ML
1 INJECTION, SOLUTION INTRAMUSCULAR; INTRAVENOUS EVERY 5 MIN PRN
Status: DISCONTINUED | OUTPATIENT
Start: 2017-08-18 | End: 2017-08-18 | Stop reason: HOSPADM

## 2017-08-18 RX ORDER — GLYCOPYRROLATE 0.2 MG/ML
INJECTION INTRAMUSCULAR; INTRAVENOUS PRN
Status: DISCONTINUED | OUTPATIENT
Start: 2017-08-18 | End: 2017-08-18 | Stop reason: SDUPTHER

## 2017-08-18 RX ORDER — HYDRALAZINE HYDROCHLORIDE 20 MG/ML
5 INJECTION INTRAMUSCULAR; INTRAVENOUS EVERY 10 MIN PRN
Status: DISCONTINUED | OUTPATIENT
Start: 2017-08-18 | End: 2017-08-18 | Stop reason: HOSPADM

## 2017-08-18 RX ORDER — METOCLOPRAMIDE HYDROCHLORIDE 5 MG/ML
10 INJECTION INTRAMUSCULAR; INTRAVENOUS
Status: DISCONTINUED | OUTPATIENT
Start: 2017-08-18 | End: 2017-08-18 | Stop reason: HOSPADM

## 2017-08-18 RX ORDER — HYDROCODONE BITARTRATE AND ACETAMINOPHEN 5; 325 MG/1; MG/1
2 TABLET ORAL PRN
Status: DISCONTINUED | OUTPATIENT
Start: 2017-08-18 | End: 2017-08-18 | Stop reason: HOSPADM

## 2017-08-18 RX ORDER — OXYCODONE HYDROCHLORIDE AND ACETAMINOPHEN 5; 325 MG/1; MG/1
1 TABLET ORAL EVERY 4 HOURS PRN
Status: DISCONTINUED | OUTPATIENT
Start: 2017-08-18 | End: 2017-08-20 | Stop reason: HOSPADM

## 2017-08-18 RX ORDER — PROPOFOL 10 MG/ML
INJECTION, EMULSION INTRAVENOUS PRN
Status: DISCONTINUED | OUTPATIENT
Start: 2017-08-18 | End: 2017-08-18 | Stop reason: SDUPTHER

## 2017-08-18 RX ORDER — HYDROCODONE BITARTRATE AND ACETAMINOPHEN 5; 325 MG/1; MG/1
1 TABLET ORAL PRN
Status: DISCONTINUED | OUTPATIENT
Start: 2017-08-18 | End: 2017-08-18 | Stop reason: HOSPADM

## 2017-08-18 RX ORDER — ROCURONIUM BROMIDE 10 MG/ML
INJECTION, SOLUTION INTRAVENOUS PRN
Status: DISCONTINUED | OUTPATIENT
Start: 2017-08-18 | End: 2017-08-18 | Stop reason: SDUPTHER

## 2017-08-18 RX ORDER — SODIUM CHLORIDE 0.9 % (FLUSH) 0.9 %
10 SYRINGE (ML) INJECTION EVERY 12 HOURS SCHEDULED
Status: DISCONTINUED | OUTPATIENT
Start: 2017-08-18 | End: 2017-08-19 | Stop reason: SDUPTHER

## 2017-08-18 RX ORDER — ONDANSETRON 2 MG/ML
INJECTION INTRAMUSCULAR; INTRAVENOUS PRN
Status: DISCONTINUED | OUTPATIENT
Start: 2017-08-18 | End: 2017-08-18 | Stop reason: SDUPTHER

## 2017-08-18 RX ORDER — SODIUM CHLORIDE 0.9 % (FLUSH) 0.9 %
10 SYRINGE (ML) INJECTION PRN
Status: DISCONTINUED | OUTPATIENT
Start: 2017-08-18 | End: 2017-08-19 | Stop reason: SDUPTHER

## 2017-08-18 RX ADMIN — FENTANYL CITRATE 100 MCG: 50 INJECTION, SOLUTION INTRAMUSCULAR; INTRAVENOUS at 14:06

## 2017-08-18 RX ADMIN — ROCURONIUM BROMIDE 20 MG: 10 INJECTION INTRAVENOUS at 14:37

## 2017-08-18 RX ADMIN — OXYCODONE HYDROCHLORIDE AND ACETAMINOPHEN 2 TABLET: 5; 325 TABLET ORAL at 19:06

## 2017-08-18 RX ADMIN — LOSARTAN POTASSIUM 50 MG: 50 TABLET, FILM COATED ORAL at 09:06

## 2017-08-18 RX ADMIN — DICLOFENAC 2 G: 10 GEL TOPICAL at 20:31

## 2017-08-18 RX ADMIN — LIDOCAINE HYDROCHLORIDE 50 MG: 10 INJECTION, SOLUTION EPIDURAL; INFILTRATION; INTRACAUDAL; PERINEURAL at 14:01

## 2017-08-18 RX ADMIN — SODIUM CHLORIDE: 9 INJECTION, SOLUTION INTRAVENOUS at 04:54

## 2017-08-18 RX ADMIN — MIDAZOLAM 2 MG: 1 INJECTION INTRAMUSCULAR; INTRAVENOUS at 13:54

## 2017-08-18 RX ADMIN — HYDROMORPHONE HYDROCHLORIDE 0.5 MG: 1 INJECTION, SOLUTION INTRAMUSCULAR; INTRAVENOUS; SUBCUTANEOUS at 16:03

## 2017-08-18 RX ADMIN — ROCURONIUM BROMIDE 50 MG: 10 INJECTION INTRAVENOUS at 14:01

## 2017-08-18 RX ADMIN — PROPOFOL 50 MG: 10 INJECTION, EMULSION INTRAVENOUS at 14:04

## 2017-08-18 RX ADMIN — MONTELUKAST SODIUM 10 MG: 10 TABLET, FILM COATED ORAL at 20:29

## 2017-08-18 RX ADMIN — HYDROMORPHONE HYDROCHLORIDE 0.5 MG: 1 INJECTION, SOLUTION INTRAMUSCULAR; INTRAVENOUS; SUBCUTANEOUS at 16:12

## 2017-08-18 RX ADMIN — HYDROMORPHONE HYDROCHLORIDE 0.5 MG: 1 INJECTION, SOLUTION INTRAMUSCULAR; INTRAVENOUS; SUBCUTANEOUS at 16:31

## 2017-08-18 RX ADMIN — BECLOMETHASONE DIPROPIONATE 1 PUFF: 40 AEROSOL, METERED RESPIRATORY (INHALATION) at 09:06

## 2017-08-18 RX ADMIN — FENTANYL CITRATE 100 MCG: 50 INJECTION, SOLUTION INTRAMUSCULAR; INTRAVENOUS at 14:37

## 2017-08-18 RX ADMIN — SUGAMMADEX 150 MG: 100 INJECTION, SOLUTION INTRAVENOUS at 15:43

## 2017-08-18 RX ADMIN — DEXAMETHASONE SODIUM PHOSPHATE 4 MG: 4 INJECTION, SOLUTION INTRAMUSCULAR; INTRAVENOUS at 14:25

## 2017-08-18 RX ADMIN — BECLOMETHASONE DIPROPIONATE 1 PUFF: 40 AEROSOL, METERED RESPIRATORY (INHALATION) at 20:29

## 2017-08-18 RX ADMIN — Medication 2 G: at 23:11

## 2017-08-18 RX ADMIN — OXYCODONE HYDROCHLORIDE AND ACETAMINOPHEN 2 TABLET: 5; 325 TABLET ORAL at 23:15

## 2017-08-18 RX ADMIN — GLYCOPYRROLATE 0.3 MG: 0.2 INJECTION, SOLUTION INTRAMUSCULAR; INTRAVENOUS at 14:01

## 2017-08-18 RX ADMIN — FENTANYL CITRATE 50 MCG: 50 INJECTION, SOLUTION INTRAMUSCULAR; INTRAVENOUS at 14:01

## 2017-08-18 RX ADMIN — Medication 2 G: at 13:54

## 2017-08-18 RX ADMIN — MORPHINE SULFATE 30 MG: 30 TABLET, EXTENDED RELEASE ORAL at 20:29

## 2017-08-18 RX ADMIN — PROPOFOL 150 MG: 10 INJECTION, EMULSION INTRAVENOUS at 14:01

## 2017-08-18 RX ADMIN — HYDROMORPHONE HYDROCHLORIDE 0.5 MG: 1 INJECTION, SOLUTION INTRAMUSCULAR; INTRAVENOUS; SUBCUTANEOUS at 16:40

## 2017-08-18 RX ADMIN — ONDANSETRON 4 MG: 2 INJECTION INTRAMUSCULAR; INTRAVENOUS at 14:25

## 2017-08-18 ASSESSMENT — PAIN SCALES - GENERAL
PAINLEVEL_OUTOF10: 7
PAINLEVEL_OUTOF10: 4
PAINLEVEL_OUTOF10: 5
PAINLEVEL_OUTOF10: 5
PAINLEVEL_OUTOF10: 6
PAINLEVEL_OUTOF10: 0
PAINLEVEL_OUTOF10: 7
PAINLEVEL_OUTOF10: 5
PAINLEVEL_OUTOF10: 0
PAINLEVEL_OUTOF10: 6
PAINLEVEL_OUTOF10: 5
PAINLEVEL_OUTOF10: 9
PAINLEVEL_OUTOF10: 5

## 2017-08-18 ASSESSMENT — PAIN DESCRIPTION - ONSET: ONSET: AWAKENED FROM SLEEP

## 2017-08-18 ASSESSMENT — PAIN DESCRIPTION - FREQUENCY: FREQUENCY: CONTINUOUS

## 2017-08-18 ASSESSMENT — PAIN DESCRIPTION - PAIN TYPE: TYPE: SURGICAL PAIN

## 2017-08-18 ASSESSMENT — PAIN DESCRIPTION - DESCRIPTORS: DESCRIPTORS: ACHING

## 2017-08-18 ASSESSMENT — PAIN DESCRIPTION - PROGRESSION: CLINICAL_PROGRESSION: GRADUALLY WORSENING

## 2017-08-18 ASSESSMENT — PAIN DESCRIPTION - ORIENTATION: ORIENTATION: MID

## 2017-08-18 ASSESSMENT — PAIN DESCRIPTION - LOCATION: LOCATION: ABDOMEN

## 2017-08-18 NOTE — OP NOTE
[facility]     REFERRING PHYSICIAN: No ref. provider found     PRIMARY CARE PROVIDER: David Loza DO    ATTENDING PHYSICIAN: Chavo Ramirez MD     HISTORY: Ms. Marques Feliz is a 78 y.o. female who presents to the [place] [address] Endoscopy unit for EUS. The patient's clinical history is remarkable for recurrent pancreatitis. She is currently medically stable and appropriate for the planned procedure. PRPRCEDURE DIAGNOSIS:  Recurrent pancreatitis    POSTPROCEDURE DIAGNOSIS:  Gallbladder sludge  Prominent CBD   Prominent PD in HOP  Haziness of pancreatic tissue in body and tail    PROCEDURES PERFORMED:   1. EUS. INSTRUMENTS:   1. Pentax Linear [id number] Echoendoscope      MEDICATIONS:   MAC as per anesthesia      PREPARATION: After the risks, benefits and alternatives of the procedure were discussed, informed consent was obtained. Complications were said to include, but were not limited to: medication allergy, medication reaction, cardiovascular and respiratory problems, bleeding, perforation, infection, pancreatitis, aspiration, and/or missed diagnosis. Following arrival in the endoscopy room, the patient was placed in the supine position and a final time-out was performed in the presence of the nursing staff. The patient was then sedated and intubated, and the examination was started. FINDINGS: The Echoendoscope was inserted into the oropharynx under direct visualization and advanced smoothly into the stomach where a small gastric pool was suctioned. A cursory view of the gastric mucosa was normal. The scope was then further advanced through a patent pylorus to the second portion of the duodenum. The Gallbladder was evaluated in bulb position. Thick sludge was noted. The CBD was 7.8 mm with no filling defects. The PD was 5 mm in the HOP. The pancreatic tissue was edematous in body and tail.     IMPRESSION:   1. Gallbladder sludge  Prominent CBD   Prominent PD in
hemostasis was confirmed. All the port sites are visualized. No bleeding noted. All the ports were removed at this time. Fascia and the skin was approximated in usual fashion. Local anesthetic was infiltrated. Steri-Strips are applied. Sterile dressing was applied. Patient tolerated procedure well and was transferred to the recovery room in a stable condition. Recommendations: Findings are discussed with the patient's family at length. We'll keep her overnight for observation.

## 2017-08-18 NOTE — PROGRESS NOTES
NEGATIVE NEG Final     RBC, UA   Date Value Ref Range Status   02/13/2017 0 TO 2 /HPF Final     Bacteria, UA   Date Value Ref Range Status   02/13/2017 FEW (A) NONE Final     Comment:     Performed at [address] [facility], [address] [place] [address]   [phone]       Nitrite, Urine   Date Value Ref Range Status   02/13/2017 NEGATIVE NEG Final     WBC, UA   Date Value Ref Range Status   02/13/2017 2 TO 5 /HPF Final     Leukocyte Esterase, Urine   Date Value Ref Range Status   02/13/2017 NEGATIVE NEG Final     Comment:     Performed at [address] [facility], [address] [place] [address]   [phone]       Yeast, UA   Date Value Ref Range Status   02/13/2017 NOT REPORTED NONE Final     Glucose, Ur   Date Value Ref Range Status   02/13/2017 NEGATIVE NEG Final     Bilirubin Urine   Date Value Ref Range Status   02/13/2017 NEGATIVE NEG Final       CULTURES:    Current Rehabilitation Assessments:  PHYSICAL THERAPY:     OCCUPATIONAL THERAPY:     SPEECH:      Assessment:  Biliary colic  Gall bladder dysfunction  DM   COPD / asthma     Plan:  1. Is stable and ready for surgery   2.  Will follow postop     Ana Maria Maier MD             8/18/2017, 6:40 AM

## 2017-08-19 LAB
ABSOLUTE EOS #: 0 K/UL (ref 0–0.4)
ABSOLUTE LYMPH #: 1.2 K/UL (ref 1–4.8)
ABSOLUTE MONO #: 0.6 K/UL (ref 0.1–1.3)
AMYLASE: 39 U/L (ref 28–100)
ANION GAP SERPL CALCULATED.3IONS-SCNC: 11 MMOL/L (ref 9–17)
BASOPHILS # BLD: 0 %
BASOPHILS ABSOLUTE: 0 K/UL (ref 0–0.2)
BUN BLDV-MCNC: 16 MG/DL (ref 8–23)
BUN/CREAT BLD: ABNORMAL (ref 9–20)
CALCIUM SERPL-MCNC: 8.2 MG/DL (ref 8.6–10.4)
CHLORIDE BLD-SCNC: 108 MMOL/L (ref 98–107)
CO2: 22 MMOL/L (ref 20–31)
CREAT SERPL-MCNC: 0.74 MG/DL (ref 0.5–0.9)
DIFFERENTIAL TYPE: ABNORMAL
EOSINOPHILS RELATIVE PERCENT: 0 %
GFR AFRICAN AMERICAN: >60 ML/MIN
GFR NON-AFRICAN AMERICAN: >60 ML/MIN
GFR SERPL CREATININE-BSD FRML MDRD: ABNORMAL ML/MIN/{1.73_M2}
GFR SERPL CREATININE-BSD FRML MDRD: ABNORMAL ML/MIN/{1.73_M2}
GLUCOSE BLD-MCNC: 109 MG/DL (ref 70–99)
HCT VFR BLD CALC: 30.1 % (ref 36–46)
HEMOGLOBIN: 9.8 G/DL (ref 12–16)
LIPASE: 59 U/L (ref 13–60)
LYMPHOCYTES # BLD: 17 %
MCH RBC QN AUTO: 28.1 PG (ref 26–34)
MCHC RBC AUTO-ENTMCNC: 32.4 G/DL (ref 31–37)
MCV RBC AUTO: 86.7 FL (ref 80–100)
MONOCYTES # BLD: 8 %
PDW BLD-RTO: 13.3 % (ref 11.5–14.9)
PLATELET # BLD: 203 K/UL (ref 150–450)
PLATELET ESTIMATE: ABNORMAL
PMV BLD AUTO: 9.1 FL (ref 6–12)
POTASSIUM SERPL-SCNC: 3.7 MMOL/L (ref 3.7–5.3)
RBC # BLD: 3.47 M/UL (ref 4–5.2)
RBC # BLD: ABNORMAL 10*6/UL
SEG NEUTROPHILS: 75 %
SEGMENTED NEUTROPHILS ABSOLUTE COUNT: 5.4 K/UL (ref 1.3–9.1)
SODIUM BLD-SCNC: 141 MMOL/L (ref 135–144)
WBC # BLD: 7.2 K/UL (ref 3.5–11)
WBC # BLD: ABNORMAL 10*3/UL

## 2017-08-19 PROCEDURE — 2060000000 HC ICU INTERMEDIATE R&B

## 2017-08-19 PROCEDURE — 99232 SBSQ HOSP IP/OBS MODERATE 35: CPT | Performed by: INTERNAL MEDICINE

## 2017-08-19 PROCEDURE — 36415 COLL VENOUS BLD VENIPUNCTURE: CPT

## 2017-08-19 PROCEDURE — 82150 ASSAY OF AMYLASE: CPT

## 2017-08-19 PROCEDURE — 85025 COMPLETE CBC W/AUTO DIFF WBC: CPT

## 2017-08-19 PROCEDURE — 2580000003 HC RX 258: Performed by: SURGERY

## 2017-08-19 PROCEDURE — 80048 BASIC METABOLIC PNL TOTAL CA: CPT

## 2017-08-19 PROCEDURE — 83690 ASSAY OF LIPASE: CPT

## 2017-08-19 PROCEDURE — 6360000002 HC RX W HCPCS: Performed by: FAMILY MEDICINE

## 2017-08-19 PROCEDURE — 6360000002 HC RX W HCPCS: Performed by: SURGERY

## 2017-08-19 PROCEDURE — 6370000000 HC RX 637 (ALT 250 FOR IP): Performed by: FAMILY MEDICINE

## 2017-08-19 RX ADMIN — DICLOFENAC 2 G: 10 GEL TOPICAL at 20:46

## 2017-08-19 RX ADMIN — DICLOFENAC 2 G: 10 GEL TOPICAL at 08:42

## 2017-08-19 RX ADMIN — MONTELUKAST SODIUM 10 MG: 10 TABLET, FILM COATED ORAL at 20:45

## 2017-08-19 RX ADMIN — SODIUM CHLORIDE: 9 INJECTION, SOLUTION INTRAVENOUS at 04:19

## 2017-08-19 RX ADMIN — LEVOTHYROXINE SODIUM 50 MCG: 50 TABLET ORAL at 05:12

## 2017-08-19 RX ADMIN — ASPIRIN 81 MG: 81 TABLET, COATED ORAL at 08:41

## 2017-08-19 RX ADMIN — SODIUM CHLORIDE: 9 INJECTION, SOLUTION INTRAVENOUS at 23:42

## 2017-08-19 RX ADMIN — ENOXAPARIN SODIUM 40 MG: 40 INJECTION SUBCUTANEOUS at 08:41

## 2017-08-19 RX ADMIN — MORPHINE SULFATE 30 MG: 30 TABLET, EXTENDED RELEASE ORAL at 20:45

## 2017-08-19 RX ADMIN — MORPHINE SULFATE 30 MG: 30 TABLET, EXTENDED RELEASE ORAL at 08:41

## 2017-08-19 RX ADMIN — LOSARTAN POTASSIUM 50 MG: 50 TABLET, FILM COATED ORAL at 08:41

## 2017-08-19 RX ADMIN — BECLOMETHASONE DIPROPIONATE 1 PUFF: 40 AEROSOL, METERED RESPIRATORY (INHALATION) at 20:45

## 2017-08-19 RX ADMIN — BECLOMETHASONE DIPROPIONATE 1 PUFF: 40 AEROSOL, METERED RESPIRATORY (INHALATION) at 08:41

## 2017-08-19 RX ADMIN — Medication 2 G: at 05:12

## 2017-08-19 ASSESSMENT — PAIN SCALES - GENERAL
PAINLEVEL_OUTOF10: 5
PAINLEVEL_OUTOF10: 5
PAINLEVEL_OUTOF10: 7
PAINLEVEL_OUTOF10: 3

## 2017-08-19 NOTE — PROGRESS NOTES
19  Ht 4' 10\" (1.473 m)  Wt 149 lb 11.1 oz (67.9 kg)  SpO2 99%  BMI 31.29 kg/m2  24 hour intake/output:  Intake/Output Summary (Last 24 hours) at 08/19/17 0758  Last data filed at 08/18/17 1555   Gross per 24 hour   Intake             1000 ml   Output                5 ml   Net              995 ml     Last 3 weights: Wt Readings from Last 3 Encounters:   08/16/17 149 lb 11.1 oz (67.9 kg)   07/18/17 147 lb (66.7 kg)   06/16/17 148 lb (67.1 kg)       Physical Examination:   General appearance - alert, well appearing, and in no distress  Mental status - alert, oriented to person, place, and time  Chest - clear to auscultation, no wheezes, rales or rhonchi, symmetric air entry  Heart - normal rate, regular rhythm, normal S1, S2, no murmurs, rubs, clicks or gallops  Abdomen - soft, nontender,  ,mild distended, no masses or organomegaly  Neurological - }  Extremities - peripheral pulses normal, no pedal edema, no clubbing or cyanosis  Skin -      Labs:-    CBC:   Recent Labs      08/17/17   0517  08/18/17   0645  08/19/17   0557   WBC  4.7  5.4  7.2   HGB  9.6*  10.2*  9.8*   PLT  178  215  203     BMP:  Recent Labs      08/17/17   0517  08/18/17   0645  08/19/17   0557   NA  145*  142  141   K  4.2  4.3  3.7   CL  113*  109*  108*   CO2  20  19*  22   BUN  23  21  16   CREATININE  0.67  0.71  0.74   GLUCOSE  73  104*  109*     Glucose:  Recent Labs      08/17/17   1333  08/17/17   1405  08/18/17   1556   POCGLU  64*  103  123*     HgbA1C: No results for input(s): LABA1C in the last 72 hours. INR: No results for input(s): INR in the last 72 hours. CARDIAC ENZYMES:No results for input(s): CKTOTAL, CKMB, CKMBINDEX, TROPONINI in the last 72 hours. BNP: No results for input(s): BNP in the last 72 hours. Lipids: No results for input(s): CHOL, TRIG, HDL, LDLCALC in the last 72 hours.     Invalid input(s): LDL  ABGs: No results found for: PH, PCO2, PO2, HCO3, O2SAT  Thyroid:   Lab Results   Component Value Date    TSH

## 2017-08-19 NOTE — CONSULTS
or edema  GI Denies any melena, hematochezia, hematemesis or pyrosis   Denies any frequency, urgency, hesitancy or incontinence  Heme Denies bruising or bleeding easily  Endocrine See chart  Neuro Denies any focal motor or sensory deficits    OBJECTIVE:   VITALS:  height is 4' 10\" (1.473 m) and weight is 149 lb 11.1 oz (67.9 kg). Her oral temperature is 98.2 °F (36.8 °C). Her blood pressure is 132/45 (abnormal) and her pulse is 55. Her respiration is 19 and oxygen saturation is 99%. CONSTITUTIONAL: Alert and oriented times 3, no acute distress and cooperative to examination with proper mood and affect. SKIN: Skin color, texture, turgor normal. No rashes or lesions. LYMPH: no cervical nodes, no inguinal nodes  HEENT: Head is normocephalic, atraumatic. EOMI, PERRLA  NECK: Supple, symmetrical, trachea midline, no adenopathy, thyroid symmetric, not enlarged and no tenderness, skin normal  CHEST/LUNGS: chest symmetric with normal A/P diameter, normal respiratory rate and rhythm, lungs clear to auscultation without wheezes, rales or rhonchi. No accessory muscle use. Scars None   CARDIOVASCULAR: Heart regular rate and rhythm Normal S1 and S2. . Carotid and femoral pulses 2+/4 and equal bilaterally  ABDOMEN: Soft abdomen nondistended tender epigastric region nothing acute. No peritoneal signs  RECTAL: deferred, not clinically indicated  NEUROLOGIC: There are no focalizing motor or sensory deficits. CN II-XII are grossly intact.   EXTREMITIES: no cyanosis, no clubbing and no edema    LABS:   CBC with Differential:    Lab Results   Component Value Date    WBC 7.2 08/19/2017    RBC 3.47 08/19/2017    HGB 9.8 08/19/2017    HCT 30.1 08/19/2017     08/19/2017    MCV 86.7 08/19/2017    MCH 28.1 08/19/2017    MCHC 32.4 08/19/2017    RDW 13.3 08/19/2017    LYMPHOPCT 17 08/19/2017    MONOPCT 8 08/19/2017    BASOPCT 0 08/19/2017    MONOSABS 0.60 08/19/2017    LYMPHSABS 1.20 08/19/2017    EOSABS 0.00 08/19/2017    BASOSABS

## 2017-08-19 NOTE — PROGRESS NOTES
Interval Follow-Up Note     Subjective:  Main problem pancreatic ducts. She did well today. She underwent cholecystectomy. No chest pain shortness breath. OBJECTIVE:   BP (!) 143/51  Pulse 59  Temp 98.1 °F (36.7 °C) (Oral)   Resp 16  Ht 4' 10\" (1.473 m)  Wt 149 lb 11.1 oz (67.9 kg)  SpO2 100%  BMI 31.29 kg/m2  Body mass index is 31.29 kg/(m^2). GEN: A O x 3 in NAD   HEENT: Conjunctivae clear. Eyelids normal. No lymphadenopathy. No thyroid mass. CV: s1s2, RRR, no rubs. PULM: No accessory muscle use. Normal breath sounds bilaterally. ABD/GI: Soft NT ND +BS  EXT: No edema or rash. Warm skin. No joint swelling. Limited neuro exam intact.      Lab Results   Component Value Date    WBC 5.4 08/18/2017    HGB 10.2 (L) 08/18/2017    HCT 31.4 (L) 08/18/2017    MCV 87.1 08/18/2017     08/18/2017     08/18/2017    K 4.3 08/18/2017     (H) 08/18/2017    CO2 19 (L) 08/18/2017    BUN 21 08/18/2017    CREATININE 0.71 08/18/2017    LABPROT 6.6 11/19/2012    LABALBU 3.8 08/14/2017    BILITOT 0.41 08/14/2017    ALKPHOS 118 (H) 08/14/2017     (H) 08/14/2017    ALT 57 (H) 08/14/2017      Lab Results   Component Value Date    RBC 3.60 (L) 08/18/2017    HGB 10.2 (L) 08/18/2017    MCV 87.1 08/18/2017    MCH 28.2 08/18/2017    MCHC 32.4 08/18/2017    RDW 13.2 08/18/2017    MPV 9.5 08/18/2017    BASOPCT 1 08/18/2017    LYMPHSABS 1.10 08/18/2017    MONOSABS 0.50 08/18/2017    NEUTROABS 3.80 08/18/2017    EOSABS 0.00 08/18/2017    BASOSABS 0.00 08/18/2017        Past Medical History:   Diagnosis Date    Achilles tendonitis     right    Asthma     Degenerative lumbar disc     Diabetes mellitus ([facility] [address] [facility]     Failed back syndrome, lumbar     Fibromyalgia     History of bladder cancer     Hypertension     Hypothyroid     Kidney stones     Lumbar radiculopathy     Lumbar spondylolysis     Osteoarthritis     Spinal stenosis in cervical region           Current Facility-Administered

## 2017-08-20 VITALS
OXYGEN SATURATION: 100 % | HEART RATE: 59 BPM | BODY MASS INDEX: 31.42 KG/M2 | WEIGHT: 149.69 LBS | HEIGHT: 58 IN | DIASTOLIC BLOOD PRESSURE: 50 MMHG | RESPIRATION RATE: 19 BRPM | TEMPERATURE: 98 F | SYSTOLIC BLOOD PRESSURE: 159 MMHG

## 2017-08-20 LAB
ABSOLUTE EOS #: 0.3 K/UL (ref 0–0.4)
ABSOLUTE LYMPH #: 1.1 K/UL (ref 1–4.8)
ABSOLUTE MONO #: 0.4 K/UL (ref 0.1–1.3)
AMYLASE: 53 U/L (ref 28–100)
ANION GAP SERPL CALCULATED.3IONS-SCNC: 9 MMOL/L (ref 9–17)
BASOPHILS # BLD: 1 %
BASOPHILS ABSOLUTE: 0 K/UL (ref 0–0.2)
BUN BLDV-MCNC: 11 MG/DL (ref 8–23)
BUN/CREAT BLD: ABNORMAL (ref 9–20)
CALCIUM SERPL-MCNC: 8 MG/DL (ref 8.6–10.4)
CHLORIDE BLD-SCNC: 112 MMOL/L (ref 98–107)
CO2: 23 MMOL/L (ref 20–31)
CREAT SERPL-MCNC: 0.74 MG/DL (ref 0.5–0.9)
DIFFERENTIAL TYPE: ABNORMAL
EOSINOPHILS RELATIVE PERCENT: 8 %
GFR AFRICAN AMERICAN: >60 ML/MIN
GFR NON-AFRICAN AMERICAN: >60 ML/MIN
GFR SERPL CREATININE-BSD FRML MDRD: ABNORMAL ML/MIN/{1.73_M2}
GFR SERPL CREATININE-BSD FRML MDRD: ABNORMAL ML/MIN/{1.73_M2}
GLUCOSE BLD-MCNC: 86 MG/DL (ref 70–99)
HCT VFR BLD CALC: 27.3 % (ref 36–46)
HEMOGLOBIN: 9.1 G/DL (ref 12–16)
LIPASE: 73 U/L (ref 13–60)
LYMPHOCYTES # BLD: 26 %
MCH RBC QN AUTO: 29.3 PG (ref 26–34)
MCHC RBC AUTO-ENTMCNC: 33.4 G/DL (ref 31–37)
MCV RBC AUTO: 87.6 FL (ref 80–100)
MONOCYTES # BLD: 9 %
PDW BLD-RTO: 13.5 % (ref 11.5–14.9)
PLATELET # BLD: 177 K/UL (ref 150–450)
PLATELET ESTIMATE: ABNORMAL
PMV BLD AUTO: 9 FL (ref 6–12)
POTASSIUM SERPL-SCNC: 3.6 MMOL/L (ref 3.7–5.3)
RBC # BLD: 3.11 M/UL (ref 4–5.2)
RBC # BLD: ABNORMAL 10*6/UL
SEG NEUTROPHILS: 56 %
SEGMENTED NEUTROPHILS ABSOLUTE COUNT: 2.5 K/UL (ref 1.3–9.1)
SODIUM BLD-SCNC: 144 MMOL/L (ref 135–144)
WBC # BLD: 4.3 K/UL (ref 3.5–11)
WBC # BLD: ABNORMAL 10*3/UL

## 2017-08-20 PROCEDURE — 6360000002 HC RX W HCPCS: Performed by: FAMILY MEDICINE

## 2017-08-20 PROCEDURE — 83690 ASSAY OF LIPASE: CPT

## 2017-08-20 PROCEDURE — 85025 COMPLETE CBC W/AUTO DIFF WBC: CPT

## 2017-08-20 PROCEDURE — 80048 BASIC METABOLIC PNL TOTAL CA: CPT

## 2017-08-20 PROCEDURE — 97161 PT EVAL LOW COMPLEX 20 MIN: CPT

## 2017-08-20 PROCEDURE — 6370000000 HC RX 637 (ALT 250 FOR IP): Performed by: FAMILY MEDICINE

## 2017-08-20 PROCEDURE — 82150 ASSAY OF AMYLASE: CPT

## 2017-08-20 PROCEDURE — 36415 COLL VENOUS BLD VENIPUNCTURE: CPT

## 2017-08-20 RX ADMIN — LEVOTHYROXINE SODIUM 50 MCG: 50 TABLET ORAL at 05:20

## 2017-08-20 RX ADMIN — MORPHINE SULFATE 30 MG: 30 TABLET, EXTENDED RELEASE ORAL at 08:31

## 2017-08-20 RX ADMIN — LOSARTAN POTASSIUM 50 MG: 50 TABLET, FILM COATED ORAL at 08:31

## 2017-08-20 RX ADMIN — ENOXAPARIN SODIUM 40 MG: 40 INJECTION SUBCUTANEOUS at 08:32

## 2017-08-20 RX ADMIN — ASPIRIN 81 MG: 81 TABLET, COATED ORAL at 08:32

## 2017-08-20 RX ADMIN — BECLOMETHASONE DIPROPIONATE 1 PUFF: 40 AEROSOL, METERED RESPIRATORY (INHALATION) at 08:32

## 2017-08-20 RX ADMIN — DICLOFENAC 2 G: 10 GEL TOPICAL at 08:32

## 2017-08-20 ASSESSMENT — PAIN SCALES - GENERAL
PAINLEVEL_OUTOF10: 8
PAINLEVEL_OUTOF10: 8
PAINLEVEL_OUTOF10: 6

## 2017-08-20 ASSESSMENT — PAIN DESCRIPTION - DESCRIPTORS: DESCRIPTORS: ACHING;CONSTANT

## 2017-08-20 ASSESSMENT — PAIN DESCRIPTION - ORIENTATION: ORIENTATION: RIGHT

## 2017-08-20 ASSESSMENT — PAIN DESCRIPTION - FREQUENCY: FREQUENCY: CONTINUOUS

## 2017-08-20 ASSESSMENT — PAIN DESCRIPTION - PAIN TYPE: TYPE: SURGICAL PAIN

## 2017-08-20 ASSESSMENT — PAIN DESCRIPTION - LOCATION: LOCATION: ABDOMEN

## 2017-08-20 NOTE — PROGRESS NOTES
16  Ht 4' 10\" (1.473 m)  Wt 149 lb 11.1 oz (67.9 kg)  SpO2 100%  BMI 31.29 kg/m2  24 hour intake/output:  Intake/Output Summary (Last 24 hours) at 08/20/17 0807  Last data filed at 08/20/17 0556   Gross per 24 hour   Intake             4629 ml   Output             1200 ml   Net             3429 ml     Last 3 weights: Wt Readings from Last 3 Encounters:   08/16/17 149 lb 11.1 oz (67.9 kg)   07/18/17 147 lb (66.7 kg)   06/16/17 148 lb (67.1 kg)       Physical Examination:   General appearance - alert, well appearing, and in no distress  Mental status - alert, oriented to person, place, and time  Chest -   Heart -   Abdomen -   Neurological - }  Extremities - peripheral pulses normal, no pedal edema, no clubbing or cyanosis  Skin -      Labs:-    CBC:   Recent Labs      08/18/17   0645  08/19/17   0557  08/20/17   0502   WBC  5.4  7.2  4.3   HGB  10.2*  9.8*  9.1*   PLT  215  203  177     BMP:  Recent Labs      08/18/17   0645  08/19/17   0557  08/20/17   0502   NA  142  141  144   K  4.3  3.7  3.6*   CL  109*  108*  112*   CO2  19*  22  23   BUN  21  16  11   CREATININE  0.71  0.74  0.74   GLUCOSE  104*  109*  86     Glucose:  Recent Labs      08/17/17   1333  08/17/17   1405  08/18/17   1556   POCGLU  64*  103  123*     HgbA1C: No results for input(s): LABA1C in the last 72 hours. INR: No results for input(s): INR in the last 72 hours. CARDIAC ENZYMES:No results for input(s): CKTOTAL, CKMB, CKMBINDEX, TROPONINI in the last 72 hours. BNP: No results for input(s): BNP in the last 72 hours. Lipids: No results for input(s): CHOL, TRIG, HDL, LDLCALC in the last 72 hours.     Invalid input(s): LDL  ABGs: No results found for: PH, PCO2, PO2, HCO3, O2SAT  Thyroid:   Lab Results   Component Value Date    TSH 1.81 02/13/2017      Urinalysis: Color, UA   Date Value Ref Range Status   02/13/2017 YELLOW YEL Final     pH, UA   Date Value Ref Range Status   02/13/2017 5.0 5.0 - 8.0 Final     Specific Gravity, UA   Date

## 2017-08-20 NOTE — PROGRESS NOTES
Physical Therapy    Facility/Department: [facility] PROGRESSIVE CARE  Initial Assessment    NAME: Alina Weiss  : 1940  MRN: [id number]    Date of Service: 2017    Patient Diagnosis(es):   Patient Active Problem List    Diagnosis Date Noted    Acute pancreatitis     Abdominal pain, epigastric     Spinal cord stimulator status     Primary osteoarthritis of right hip     Sacroiliitis ([address]     Chronic pain associated with significant psychosocial dysfunction     Encounter for long-term (current) use of other medications     Encounter for medication counseling     Encounter for medication monitoring     Degenerative lumbar disc 2014    Lumbar radiculopathy 2014    Lumbar spondylolysis 2014    Failed back syndrome of lumbar spine 2014       Past Medical History:   Diagnosis Date    Achilles tendonitis     right    Asthma     Degenerative lumbar disc     Diabetes mellitus ([address]     Failed back syndrome, lumbar     Fibromyalgia     History of bladder cancer     Hypertension     Hypothyroid     Kidney stones     Lumbar radiculopathy     Lumbar spondylolysis     Osteoarthritis     Spinal stenosis in cervical region      Past Surgical History:   Procedure Laterality Date    BLADDER TUMOR EXCISION      CARPAL TUNNEL RELEASE Bilateral     CATARACT REMOVAL WITH IMPLANT Bilateral 2014, 2014    Dilma/Demian    CHOLECYSTECTOMY, LAPAROSCOPIC N/A 2017    CHOLECYSTECTOMY LAPAROSCOPIC ROBOTIC MULTIPORT performed by Kayli Orta MD at [address]  2013    CYSTOSCOPY  2016    ENDOSCOPIC ULTRASOUND (LOWER) N/A 2017    ENDOSCOPIC ULTRASOUND performed [address] Ramon Ronquillo MD [address] ARTHROS[address] Left     LUMBAR FUSION      ROTATOR CUFF REPAIR Bilateral     SKIN BIOPSY Right 2015    mole right posterior shoulder    SPINE SURGERY      spinal cord stimulator    SPINE SURGERY  91-4-15    renoval of spinal

## 2017-08-21 ENCOUNTER — TELEPHONE (OUTPATIENT)
Dept: PAIN MANAGEMENT | Age: 77
End: 2017-08-21

## 2017-08-22 LAB — SURGICAL PATHOLOGY REPORT: NORMAL

## 2017-08-22 RX ORDER — MORPHINE SULFATE 30 MG/1
30 TABLET, FILM COATED, EXTENDED RELEASE ORAL 2 TIMES DAILY
Qty: 60 TABLET | Refills: 0 | Status: SHIPPED | OUTPATIENT
Start: 2017-08-26 | End: 2017-09-25 | Stop reason: SDUPTHER

## 2017-08-22 RX ORDER — OXYCODONE AND ACETAMINOPHEN 10; 325 MG/1; MG/1
1 TABLET ORAL EVERY 6 HOURS PRN
Qty: 120 TABLET | Refills: 0 | Status: SHIPPED | OUTPATIENT
Start: 2017-08-26 | End: 2017-09-25 | Stop reason: SDUPTHER

## 2017-08-23 LAB
EKG ATRIAL RATE: 55 BPM
EKG ATRIAL RATE: 77 BPM
EKG P AXIS: 29 DEGREES
EKG P AXIS: 5 DEGREES
EKG P-R INTERVAL: 172 MS
EKG P-R INTERVAL: 178 MS
EKG Q-T INTERVAL: 370 MS
EKG Q-T INTERVAL: 440 MS
EKG QRS DURATION: 80 MS
EKG QRS DURATION: 82 MS
EKG QTC CALCULATION (BAZETT): 418 MS
EKG QTC CALCULATION (BAZETT): 420 MS
EKG R AXIS: -19 DEGREES
EKG R AXIS: -38 DEGREES
EKG T AXIS: 0 DEGREES
EKG T AXIS: 5 DEGREES
EKG VENTRICULAR RATE: 55 BPM
EKG VENTRICULAR RATE: 77 BPM

## 2017-09-08 ENCOUNTER — NURSE ONLY (OUTPATIENT)
Dept: PODIATRY | Age: 77
End: 2017-09-08

## 2017-09-08 ENCOUNTER — OFFICE VISIT (OUTPATIENT)
Dept: ORTHOPEDIC SURGERY | Age: 77
End: 2017-09-08
Payer: MEDICARE

## 2017-09-08 VITALS
WEIGHT: 139 LBS | DIASTOLIC BLOOD PRESSURE: 57 MMHG | HEART RATE: 72 BPM | HEIGHT: 58 IN | SYSTOLIC BLOOD PRESSURE: 114 MMHG | BODY MASS INDEX: 29.18 KG/M2

## 2017-09-08 DIAGNOSIS — M21.962 FOOT DEFORMITY, BILATERAL: ICD-10-CM

## 2017-09-08 DIAGNOSIS — M21.961 FOOT DEFORMITY, BILATERAL: ICD-10-CM

## 2017-09-08 DIAGNOSIS — G89.29 CHRONIC PAIN OF BOTH KNEES: Primary | ICD-10-CM

## 2017-09-08 DIAGNOSIS — M25.562 CHRONIC PAIN OF BOTH KNEES: Primary | ICD-10-CM

## 2017-09-08 DIAGNOSIS — E11.9 TYPE 2 DIABETES MELLITUS WITHOUT COMPLICATION, WITHOUT LONG-TERM CURRENT USE OF INSULIN (HCC): Primary | ICD-10-CM

## 2017-09-08 DIAGNOSIS — M25.561 CHRONIC PAIN OF BOTH KNEES: Primary | ICD-10-CM

## 2017-09-08 PROCEDURE — 1111F DSCHRG MED/CURRENT MED MERGE: CPT | Performed by: ORTHOPAEDIC SURGERY

## 2017-09-08 PROCEDURE — G8417 CALC BMI ABV UP PARAM F/U: HCPCS | Performed by: ORTHOPAEDIC SURGERY

## 2017-09-08 PROCEDURE — 3014F SCREEN MAMMO DOC REV: CPT | Performed by: ORTHOPAEDIC SURGERY

## 2017-09-08 PROCEDURE — 99213 OFFICE O/P EST LOW 20 MIN: CPT | Performed by: ORTHOPAEDIC SURGERY

## 2017-09-08 PROCEDURE — 3017F COLORECTAL CA SCREEN DOC REV: CPT | Performed by: ORTHOPAEDIC SURGERY

## 2017-09-08 PROCEDURE — 1123F ACP DISCUSS/DSCN MKR DOCD: CPT | Performed by: ORTHOPAEDIC SURGERY

## 2017-09-08 PROCEDURE — G8400 PT W/DXA NO RESULTS DOC: HCPCS | Performed by: ORTHOPAEDIC SURGERY

## 2017-09-08 PROCEDURE — 4040F PNEUMOC VAC/ADMIN/RCVD: CPT | Performed by: ORTHOPAEDIC SURGERY

## 2017-09-08 PROCEDURE — 1036F TOBACCO NON-USER: CPT | Performed by: ORTHOPAEDIC SURGERY

## 2017-09-08 PROCEDURE — 1090F PRES/ABSN URINE INCON ASSESS: CPT | Performed by: ORTHOPAEDIC SURGERY

## 2017-09-08 PROCEDURE — G8427 DOCREV CUR MEDS BY ELIG CLIN: HCPCS | Performed by: ORTHOPAEDIC SURGERY

## 2017-09-22 ENCOUNTER — NURSE ONLY (OUTPATIENT)
Dept: PODIATRY | Age: 77
End: 2017-09-22
Payer: MEDICARE

## 2017-09-22 DIAGNOSIS — M21.962 FOOT DEFORMITY, BILATERAL: ICD-10-CM

## 2017-09-22 DIAGNOSIS — M21.961 FOOT DEFORMITY, BILATERAL: ICD-10-CM

## 2017-09-22 DIAGNOSIS — E11.9 TYPE 2 DIABETES MELLITUS WITHOUT COMPLICATION, WITHOUT LONG-TERM CURRENT USE OF INSULIN (HCC): Primary | ICD-10-CM

## 2017-09-22 PROCEDURE — A5513 MULTI DEN INSERT CUSTOM MOLD: HCPCS | Performed by: PODIATRIST

## 2017-09-22 PROCEDURE — A5500 DIAB SHOE FOR DENSITY INSERT: HCPCS | Performed by: PODIATRIST

## 2017-09-25 ENCOUNTER — HOSPITAL ENCOUNTER (OUTPATIENT)
Dept: PAIN MANAGEMENT | Age: 77
Discharge: HOME | End: 2017-09-25
Payer: MEDICARE

## 2017-09-25 DIAGNOSIS — M43.06 LUMBAR SPONDYLOLYSIS: ICD-10-CM

## 2017-09-25 DIAGNOSIS — M51.36 DEGENERATIVE LUMBAR DISC: ICD-10-CM

## 2017-09-25 DIAGNOSIS — M54.16 LUMBAR RADICULOPATHY: ICD-10-CM

## 2017-09-25 DIAGNOSIS — Z51.81 ENCOUNTER FOR MEDICATION MONITORING: Primary | ICD-10-CM

## 2017-09-25 PROCEDURE — 99213 OFFICE O/P EST LOW 20 MIN: CPT

## 2017-09-25 PROCEDURE — 99213 OFFICE O/P EST LOW 20 MIN: CPT | Performed by: NURSE PRACTITIONER

## 2017-09-25 RX ORDER — OXYCODONE AND ACETAMINOPHEN 10; 325 MG/1; MG/1
1 TABLET ORAL EVERY 6 HOURS PRN
Qty: 120 TABLET | Refills: 0 | Status: SHIPPED | OUTPATIENT
Start: 2017-09-26 | End: 2017-10-23 | Stop reason: SDUPTHER

## 2017-09-25 RX ORDER — MORPHINE SULFATE 30 MG/1
30 TABLET, FILM COATED, EXTENDED RELEASE ORAL 2 TIMES DAILY
Qty: 60 TABLET | Refills: 0 | Status: SHIPPED | OUTPATIENT
Start: 2017-09-26 | End: 2017-10-23 | Stop reason: SDUPTHER

## 2017-09-25 ASSESSMENT — ENCOUNTER SYMPTOMS
BOWEL INCONTINENCE: 0
COUGH: 0
CONSTIPATION: 0
SHORTNESS OF BREATH: 0
BACK PAIN: 1

## 2017-10-23 ENCOUNTER — HOSPITAL ENCOUNTER (OUTPATIENT)
Dept: PAIN MANAGEMENT | Age: 77
Discharge: HOME | End: 2017-10-23
Payer: MEDICARE

## 2017-10-23 VITALS
HEART RATE: 72 BPM | OXYGEN SATURATION: 98 % | BODY MASS INDEX: 29.81 KG/M2 | SYSTOLIC BLOOD PRESSURE: 122 MMHG | WEIGHT: 142 LBS | HEIGHT: 58 IN | RESPIRATION RATE: 16 BRPM | DIASTOLIC BLOOD PRESSURE: 76 MMHG | TEMPERATURE: 98.6 F

## 2017-10-23 DIAGNOSIS — M51.36 DEGENERATIVE LUMBAR DISC: ICD-10-CM

## 2017-10-23 DIAGNOSIS — Z71.89 ENCOUNTER FOR MEDICATION COUNSELING: ICD-10-CM

## 2017-10-23 DIAGNOSIS — Z51.81 ENCOUNTER FOR MEDICATION MONITORING: ICD-10-CM

## 2017-10-23 DIAGNOSIS — G89.4 CHRONIC PAIN ASSOCIATED WITH SIGNIFICANT PSYCHOSOCIAL DYSFUNCTION: ICD-10-CM

## 2017-10-23 DIAGNOSIS — M46.1 SACROILIITIS (HCC): ICD-10-CM

## 2017-10-23 DIAGNOSIS — M96.1 FAILED BACK SYNDROME OF LUMBAR SPINE: ICD-10-CM

## 2017-10-23 DIAGNOSIS — M43.06 LUMBAR SPONDYLOLYSIS: Primary | ICD-10-CM

## 2017-10-23 DIAGNOSIS — Z96.89 SPINAL CORD STIMULATOR STATUS: ICD-10-CM

## 2017-10-23 DIAGNOSIS — M16.11 PRIMARY OSTEOARTHRITIS OF RIGHT HIP: ICD-10-CM

## 2017-10-23 DIAGNOSIS — M54.16 LUMBAR RADICULOPATHY: ICD-10-CM

## 2017-10-23 PROCEDURE — 99214 OFFICE O/P EST MOD 30 MIN: CPT | Performed by: PAIN MEDICINE

## 2017-10-23 PROCEDURE — 80307 DRUG TEST PRSMV CHEM ANLYZR: CPT

## 2017-10-23 PROCEDURE — 99213 OFFICE O/P EST LOW 20 MIN: CPT

## 2017-10-23 RX ORDER — MORPHINE SULFATE 30 MG/1
30 TABLET, FILM COATED, EXTENDED RELEASE ORAL 2 TIMES DAILY
Qty: 60 TABLET | Refills: 0 | Status: SHIPPED | OUTPATIENT
Start: 2017-10-26 | End: 2017-11-21 | Stop reason: SDUPTHER

## 2017-10-23 RX ORDER — OXYCODONE AND ACETAMINOPHEN 10; 325 MG/1; MG/1
1 TABLET ORAL EVERY 6 HOURS PRN
Qty: 120 TABLET | Refills: 0 | Status: SHIPPED | OUTPATIENT
Start: 2017-10-26 | End: 2017-11-21 | Stop reason: SDUPTHER

## 2017-10-23 ASSESSMENT — ENCOUNTER SYMPTOMS
PHOTOPHOBIA: 0
SHORTNESS OF BREATH: 0
BACK PAIN: 1
RESPIRATORY NEGATIVE: 1
EYES NEGATIVE: 1
BOWEL INCONTINENCE: 0
BLURRED VISION: 0
CONSTIPATION: 1

## 2017-10-23 ASSESSMENT — ACTIVITIES OF DAILY LIVING (ADL): EFFECT OF PAIN ON DAILY ACTIVITIES: PAIN INCREASES WITH STANDING AND WALKING

## 2017-10-23 ASSESSMENT — PAIN SCALES - GENERAL: PAINLEVEL_OUTOF10: 8

## 2017-10-23 ASSESSMENT — PAIN DESCRIPTION - PAIN TYPE: TYPE: CHRONIC PAIN

## 2017-10-23 ASSESSMENT — PAIN DESCRIPTION - PROGRESSION: CLINICAL_PROGRESSION: NOT CHANGED

## 2017-10-23 ASSESSMENT — PAIN DESCRIPTION - LOCATION: LOCATION: BACK;HIP

## 2017-10-23 ASSESSMENT — PAIN DESCRIPTION - DESCRIPTORS: DESCRIPTORS: ACHING;BURNING;CONSTANT;SHARP

## 2017-10-23 ASSESSMENT — PAIN DESCRIPTION - FREQUENCY: FREQUENCY: CONTINUOUS

## 2017-10-23 ASSESSMENT — PAIN DESCRIPTION - ORIENTATION: ORIENTATION: LEFT

## 2017-10-23 NOTE — PROGRESS NOTES
[facility]  Patient Pain Assessment  Consultation - No att. providers found    Primary Care Physician: Fer Ibarra DO    Chief complaint:   Chief Complaint   Patient presents with    Back Pain   . HISTORY OF PRESENT ILLNESS:  Junito Hogan is 78 y.o. female with    Patient return to the pain clinic with a chief complaint of pain involving the low back. Patient reports her pain is essentially same and has not changed since her last visit. She reports current medications are helping the pain. Patient apparently has cervical pain and the [facility] wanted to do an MRI but she couldn't have it done because she has a bone stimulator in place. She reports she is planning a trip more in December or so. Patient reports she may have a herniated disc in the neck. She also reports she is losing balance and falling. She is using a walker or a cane to help with ambulation. Back Pain   This is a chronic problem. The problem occurs constantly. The problem is unchanged. The pain is present in the lumbar spine and sacro-iliac. The quality of the pain is described as aching, shooting and stabbing. The pain radiates to the left thigh. The pain is at a severity of 8/10. The pain is severe. The pain is worse during the day. The symptoms are aggravated by bending, sitting, standing and twisting. Stiffness is present in the morning. Associated symptoms include leg pain (lleg pain ) and weakness. Pertinent negatives include no bladder incontinence, bowel incontinence, chest pain, fever, headaches, numbness, tingling or weight loss. Risk factors include sedentary lifestyle. She has tried analgesics, bed rest and walking (oral narcotics Morphine and Tramadol) for the symptoms. The treatment provided moderate relief. OARRS compliant?  yes  Concern for prescription abuse?no    Current Pain Assessment  Pain Assessment  Pain Assessment: 0-10  Pain Level: 8  Pain Type: Chronic pain  Pain MORPHINE : based on morphine   ________________________________________________________________   INCONSISTENT with medications provided:   Ethyl-glucuronide: based on immunoassay detection     Need to talk to the patient about alcohol use  Electronically signed by Shakila Del Rosario MD on 3/26/2017 at 5:35 AM          Was Serum/UDS as anticipated? No. See above. Recollecting today  Brought pill bottles in :yes   Was Pill count appropriate? :yes   Are currently pregnant? not applicable  Recent ER visits: No           Past Medical History      Diagnosis Date    Achilles tendonitis     right    Asthma     Degenerative lumbar disc     Diabetes mellitus (HCC)     Failed back syndrome, lumbar     Fibromyalgia     History of bladder cancer     Hypertension     Hypothyroid     Kidney stones     Lumbar radiculopathy     Lumbar spondylolysis     Osteoarthritis     Spinal stenosis in cervical region        Surgical History  Past Surgical History:   Procedure Laterality Date    BLADDER TUMOR EXCISION  2005    CARPAL TUNNEL RELEASE Bilateral     CATARACT REMOVAL WITH IMPLANT Bilateral 7/22/2014, 8/5/2014    Dilma/Demian    CHOLECYSTECTOMY, LAPAROSCOPIC N/A 8/18/2017    CHOLECYSTECTOMY LAPAROSCOPIC ROBOTIC MULTIPORT performed by María Bonilla MD at [address] [facility]  1/2013    CYSTOSCOPY  11/04/2016    ENDOSCOPIC ULTRASOUND (LOWER) N/A 8/17/2017    ENDOSCOPIC ULTRASOUND performed by Wiliam Ho MD at [address] (UPPER)  08/17/2017    A cursory view of the gastric mucosa was normal. The scope was then further advanced through a patent pylorus to the second portion of the duodenum. The Gallbladder was evaluated in bulb position. Thick sludge was noted. The CBD was 7.8 mm with no filling defects. The PD was 5 mm in the HOP. The pancreatic tissue was edematous in body and tail.     KNEE ARTHROSCOPY Left     LUMBAR FUSION      ROTATOR CUFF REPAIR Bilateral 1989    oriented to person, place, and time. She displays no atrophy. No cranial nerve deficit or sensory deficit. She exhibits normal muscle tone. Gait abnormal. Coordination normal.   Reflex Scores:       Patellar reflexes are 1+ on the right side and 1+ on the left side. Achilles reflexes are 1+ on the right side and 1+ on the left side. Skin: Skin is warm and dry. No erythema. Psychiatric: She has a normal mood and affect. Her speech is normal and behavior is normal. Judgment and thought content normal. Cognition and memory are normal.   Nursing note and vitals reviewed. Right Ankle Exam     Muscle Strength   The patient has normal right ankle strength. Left Ankle Exam     Muscle Strength   The patient has normal left ankle strength. Right Knee Exam     Range of Motion   The patient has normal right knee ROM. Left Knee Exam     Range of Motion   The patient has normal left knee ROM. Right Hip Exam   Right hip exam is normal.     Tenderness   The patient is experiencing tenderness in the posterior and greater trochanter. Range of Motion   The patient has normal right hip ROM. Right hip extension: Painful. Right hip flexion: Minimally painful. Right hip internal rotation: Painful. Right hip external rotation: Somewhat painful. Right hip abduction: Somewhat limited and painful. Adduction: normal     Muscle Strength   The patient has normal right hip strength. Abduction: 5/5/5   Adduction: 5/5/5   Flexion: 5/5/5     Tests   MANFRED: positive    Other   Erythema: absent  Scars: absent  Sensation: normal  Pulse: present      Left Hip Exam   Left hip exam is normal.    Range of Motion   The patient has normal left hip ROM. Left hip abduction:  is somewhat limited. Muscle Strength   The patient has normal left hip strength.    Abduction: 5/5/5   Adduction: 5/5/5   Flexion: 5/5/5     Tests   MANFRED: negative    Other   Erythema: absent  Scars: absent  Sensation: normal  Pulse: present      Back Exam     Tenderness   The patient is experiencing tenderness in the lumbar and sacroiliac. Range of Motion   The patient has normal back ROM. Back extension: Limited and painful. Back flexion: Limited and painful. Back lateral bend right: Limited and painful. Back lateral bend left: Limited and painful. Back rotation right: Limited and painful. Back rotation left: Limited and painful. Muscle Strength   Left Hamstrings:  5/5/5     Tests   Straight leg raise right: positive  Straight leg raise left: positive    Reflexes   Patellar reflexes: bilateral.    Other   Gait: antalgic (Using a cane to help with ambulation)   Erythema: no back redness  Scars: present    Comments:    kyphosis absent and scoliosis absent  Alignment of her shoulders, scapulae and iliac crests--symmetric  Paraspinal tenderness:Present  Lumbar lordosis-----------Decreased  Movements of the lumbar spine indicated above are diminished range with pain   Facet loading---  : Right side--    Pain-Moderate                                   Left side----   Pain  Moderate  Cricket's test -------------- Right side---positive                                           Left side-----negative            Nurses Notes and Vital Signs reviewed. DATA  Labs:  Benzodiazepines   Date Value Ref Range Status   05/08/2013 NEGATIVE NEG Final     Comment:           (Positive cutoff 200 ng/mL)                 Benzodiazepine Screen, Urine   Date Value Ref Range Status   10/09/2014 NEGATIVE NEG Final     Comment:           (Positive cutoff 200 ng/mL)                      Imaging:  Radiology Images and Reports reviewed where indicated and necessary  Lumbar spine 3 views, 9/6/2016       History: Fall       Technique: 3 views of the lumbar spine demonstrate L2-S1 bilateral transpedicular fixation. No evidence for hardware fractures. Interbody fusion is also seen at L2-L3 and L4-5 levels. Endplate degenerative changes at multiple levels.  Mild morphine (MS CONTIN) 30 MG extended release tablet     Sig: Take 1 tablet by mouth 2 times daily . Earliest Fill Date: 10/26/17     Dispense:  60 tablet     Refill:  0     Urine drug screens have been appropriateExcept for presence of alcohol in the screen done in March. No aberrant activity noted. Analgesia is achieved. Activities of daily living are possible because of medications. Safe use of medications explained to patient. Counselling/Preventive measures for pain  Control:    [x]  Spine strengthening exercises are discussed with patient in detail. [x] Ill effects of being on chronic pain medications such as sleep disturbances, hormonal changes, withdrawal symptoms,  chronic opioid dependence and tolerance were discussed with patient. I had asked the patient to minimize medication use and utilize pain medications only for uncontrolled rest pain or pain with exertional activities. I advised patient not to self escalate pain medications without consulting with us. At each of patient's future visits we will try to taper pain medications, while adjusting the adjunct medications, and re-evaluating for Physical Therapy to improve spinal and joint strength. We will continue to have discussions to decrease pain medications as tolerated. We discussed the same at today's visit and have not been to implement it, as the patient's pain is somewhat under control with current medications. We will repeat the urine screen today to check for drug compliance  Orders Placed This Encounter   Procedures    DRUG SCREEN, PAIN     Standing Status:   Standing     Number of Occurrences:   1     Controlled Substances Monitoring:     Attestation: The Prescription Monitoring Report for this patient was reviewed today.  (Almita De La Cruz MD)  Documentation: Random urine drug screen sent today., Obtaining appropriate analgesic effect of treatment., No signs of potential drug abuse or diversion identified., Existing medication contract. (Last urine screen was positive for alcohol) (Rut Mac MD)  Decision Making Process : Patient's health history and referral records thoroughly reviewed before focused physical examination and discussion with patient. Over 50% of today's visit is spent on examining the patient and counseling. Level of complexity of date to be reviewed is Moderate. The chart date reviewed include the following: Imaging Reports. Summary of Care. Time spent reviewing with patient the below reports:   Medication safety, Treatment options. Level of diagnosis and management options of this case is multiple: involving the following management options: Interventions as needed, medication management as appropriate, future visits, activity modification, heat/ice as needed, Urine drug screen as required. [x]The patient's questions were answered to the best of my abilities. This note was created using voice recognition software. There may be inaccuracies of transcription  that are inadvertently overlooked prior to the signature. There is any questions about the transcription please contact me. Return in  4 weeks  with  Afshin Alvarez CNP  for further plan of treatment.          Electronically signed by Rut Mac MD on 10/23/2017 at 10:20 PM

## 2017-10-27 LAB
6-ACETYLMORPHINE, UR: NOT DETECTED
7-AMINOCLONAZEPAM, URINE: NOT DETECTED
ALPHA-OH-ALPRAZ, URINE: NOT DETECTED
ALPRAZOLAM, URINE: NOT DETECTED
AMPHETAMINES, URINE: NOT DETECTED
BARBITURATES, URINE: NOT DETECTED
BENZOYLECGONINE, UR: NOT DETECTED
BUPRENORPHINE URINE: NOT DETECTED
CARISOPRODOL, UR: NOT DETECTED
CLONAZEPAM, URINE: NOT DETECTED
CODEINE, URINE: NOT DETECTED
CREATININE URINE: 76.1 MG/DL (ref 20–400)
DIAZEPAM, URINE: NOT DETECTED
DRUGS EXPECTED, UR: NORMAL
EER HI RES INTERP UR: NORMAL
ETHYL GLUCURONIDE UR: NOT DETECTED
FENTANYL URINE: NOT DETECTED
HYDROCODONE, URINE: NOT DETECTED
HYDROMORPHONE, URINE: NOT DETECTED
LORAZEPAM, URINE: NOT DETECTED
MARIJUANA METAB, UR: NOT DETECTED
MDA, UR: NOT DETECTED
MDEA, EVE, UR: NOT DETECTED
MDMA URINE: NOT DETECTED
MEPERIDINE METAB, UR: NOT DETECTED
METHADONE, URINE: NOT DETECTED
METHAMPHETAMINE, URINE: NOT DETECTED
METHYLPHENIDATE: NOT DETECTED
MIDAZOLAM, URINE: NOT DETECTED
MORPHINE URINE: PRESENT
NORBUPRENORPHINE, URINE: NOT DETECTED
NORDIAZEPAM, URINE: NOT DETECTED
NORFENTANYL, URINE: NOT DETECTED
NORHYDROCODONE, URINE: NOT DETECTED
NOROXYCODONE, URINE: PRESENT
NOROXYMORPHONE, URINE: NOT DETECTED
OXAZEPAM, URINE: NOT DETECTED
OXYCODONE URINE: PRESENT
OXYMORPHONE, URINE: NOT DETECTED
PAIN MANAGEMENT DRUG PANEL INTERP, URINE: NORMAL
PAIN MGT DRUG PANEL, HI RES, UR: NORMAL
PCP,URINE: NOT DETECTED
PHENTERMINE, UR: NOT DETECTED
PROPOXYPHENE, URINE: NOT DETECTED
TAPENTADOL, URINE: NOT DETECTED
TAPENTADOL-O-SULFATE, URINE: NOT DETECTED
TEMAZEPAM, URINE: NOT DETECTED
TRAMADOL, URINE: NOT DETECTED
ZOLPIDEM, URINE: NOT DETECTED

## 2017-11-02 ENCOUNTER — APPOINTMENT (OUTPATIENT)
Dept: CT IMAGING | Age: 77
DRG: 390 | End: 2017-11-02
Payer: MEDICARE

## 2017-11-02 ENCOUNTER — HOSPITAL ENCOUNTER (INPATIENT)
Age: 77
LOS: 2 days | Discharge: HOME | DRG: 390 | End: 2017-11-04
Attending: EMERGENCY MEDICINE | Admitting: FAMILY MEDICINE
Payer: MEDICARE

## 2017-11-02 DIAGNOSIS — K56.7 ILEUS (HCC): ICD-10-CM

## 2017-11-02 DIAGNOSIS — R10.13 ABDOMINAL PAIN, EPIGASTRIC: Primary | ICD-10-CM

## 2017-11-02 LAB
ABSOLUTE EOS #: 0.2 K/UL (ref 0–0.4)
ABSOLUTE IMMATURE GRANULOCYTE: ABNORMAL K/UL (ref 0–0.3)
ABSOLUTE LYMPH #: 1.3 K/UL (ref 1–4.8)
ABSOLUTE MONO #: 0.5 K/UL (ref 0.1–1.3)
ALBUMIN SERPL-MCNC: 3.8 G/DL (ref 3.5–5.2)
ALBUMIN/GLOBULIN RATIO: ABNORMAL (ref 1–2.5)
ALP BLD-CCNC: 96 U/L (ref 35–104)
ALT SERPL-CCNC: 12 U/L (ref 5–33)
ANION GAP SERPL CALCULATED.3IONS-SCNC: 11 MMOL/L (ref 9–17)
AST SERPL-CCNC: 13 U/L
BASOPHILS # BLD: 1 %
BASOPHILS ABSOLUTE: 0 K/UL (ref 0–0.2)
BILIRUB SERPL-MCNC: 0.27 MG/DL (ref 0.3–1.2)
BILIRUBIN URINE: NEGATIVE
BUN BLDV-MCNC: 23 MG/DL (ref 8–23)
BUN/CREAT BLD: ABNORMAL (ref 9–20)
CALCIUM SERPL-MCNC: 8.9 MG/DL (ref 8.6–10.4)
CHLORIDE BLD-SCNC: 101 MMOL/L (ref 98–107)
CO2: 28 MMOL/L (ref 20–31)
COLOR: YELLOW
COMMENT UA: NORMAL
CREAT SERPL-MCNC: 0.76 MG/DL (ref 0.5–0.9)
DIFFERENTIAL TYPE: ABNORMAL
EKG ATRIAL RATE: 62 BPM
EKG P AXIS: 4 DEGREES
EKG P-R INTERVAL: 172 MS
EKG Q-T INTERVAL: 410 MS
EKG QRS DURATION: 80 MS
EKG QTC CALCULATION (BAZETT): 416 MS
EKG R AXIS: -29 DEGREES
EKG T AXIS: 15 DEGREES
EKG VENTRICULAR RATE: 62 BPM
EOSINOPHILS RELATIVE PERCENT: 3 %
GFR AFRICAN AMERICAN: >60 ML/MIN
GFR NON-AFRICAN AMERICAN: >60 ML/MIN
GFR SERPL CREATININE-BSD FRML MDRD: ABNORMAL ML/MIN/{1.73_M2}
GFR SERPL CREATININE-BSD FRML MDRD: ABNORMAL ML/MIN/{1.73_M2}
GLUCOSE BLD-MCNC: 120 MG/DL (ref 70–99)
GLUCOSE BLD-MCNC: 123 MG/DL (ref 65–105)
GLUCOSE URINE: NEGATIVE
HCT VFR BLD CALC: 35.1 % (ref 36–46)
HEMOGLOBIN: 11.5 G/DL (ref 12–16)
IMMATURE GRANULOCYTES: ABNORMAL %
KETONES, URINE: NEGATIVE
LEUKOCYTE ESTERASE, URINE: NEGATIVE
LIPASE: 33 U/L (ref 13–60)
LYMPHOCYTES # BLD: 19 %
MCH RBC QN AUTO: 27.9 PG (ref 26–34)
MCHC RBC AUTO-ENTMCNC: 32.7 G/DL (ref 31–37)
MCV RBC AUTO: 85.2 FL (ref 80–100)
MONOCYTES # BLD: 8 %
MYOGLOBIN: 50 NG/ML (ref 25–58)
NITRITE, URINE: NEGATIVE
PDW BLD-RTO: 14.3 % (ref 11.5–14.9)
PH UA: 6.5 (ref 5–8)
PLATELET # BLD: 223 K/UL (ref 150–450)
PLATELET ESTIMATE: ABNORMAL
PMV BLD AUTO: 8.9 FL (ref 6–12)
POTASSIUM SERPL-SCNC: 4.2 MMOL/L (ref 3.7–5.3)
PROTEIN UA: NEGATIVE
RBC # BLD: 4.12 M/UL (ref 4–5.2)
RBC # BLD: ABNORMAL 10*6/UL
SEG NEUTROPHILS: 69 %
SEGMENTED NEUTROPHILS ABSOLUTE COUNT: 4.7 K/UL (ref 1.3–9.1)
SODIUM BLD-SCNC: 140 MMOL/L (ref 135–144)
SPECIFIC GRAVITY UA: 1.02 (ref 1–1.03)
TOTAL PROTEIN: 6.7 G/DL (ref 6.4–8.3)
TROPONIN INTERP: NORMAL
TROPONIN T: <0.03 NG/ML
TURBIDITY: CLEAR
URINE HGB: NEGATIVE
UROBILINOGEN, URINE: NORMAL
WBC # BLD: 6.7 K/UL (ref 3.5–11)
WBC # BLD: ABNORMAL 10*3/UL

## 2017-11-02 PROCEDURE — 99285 EMERGENCY DEPT VISIT HI MDM: CPT

## 2017-11-02 PROCEDURE — 74176 CT ABD & PELVIS W/O CONTRAST: CPT

## 2017-11-02 PROCEDURE — 83690 ASSAY OF LIPASE: CPT

## 2017-11-02 PROCEDURE — 2580000003 HC RX 258: Performed by: FAMILY MEDICINE

## 2017-11-02 PROCEDURE — 93005 ELECTROCARDIOGRAM TRACING: CPT

## 2017-11-02 PROCEDURE — 2500000003 HC RX 250 WO HCPCS: Performed by: EMERGENCY MEDICINE

## 2017-11-02 PROCEDURE — 96374 THER/PROPH/DIAG INJ IV PUSH: CPT

## 2017-11-02 PROCEDURE — 1200000000 HC SEMI PRIVATE

## 2017-11-02 PROCEDURE — 84484 ASSAY OF TROPONIN QUANT: CPT

## 2017-11-02 PROCEDURE — 36415 COLL VENOUS BLD VENIPUNCTURE: CPT

## 2017-11-02 PROCEDURE — 83874 ASSAY OF MYOGLOBIN: CPT

## 2017-11-02 PROCEDURE — S0028 INJECTION, FAMOTIDINE, 20 MG: HCPCS | Performed by: EMERGENCY MEDICINE

## 2017-11-02 PROCEDURE — 85025 COMPLETE CBC W/AUTO DIFF WBC: CPT

## 2017-11-02 PROCEDURE — 81003 URINALYSIS AUTO W/O SCOPE: CPT

## 2017-11-02 PROCEDURE — 80053 COMPREHEN METABOLIC PANEL: CPT

## 2017-11-02 PROCEDURE — 6370000000 HC RX 637 (ALT 250 FOR IP): Performed by: FAMILY MEDICINE

## 2017-11-02 PROCEDURE — 6360000002 HC RX W HCPCS: Performed by: EMERGENCY MEDICINE

## 2017-11-02 PROCEDURE — 82947 ASSAY GLUCOSE BLOOD QUANT: CPT

## 2017-11-02 PROCEDURE — 96375 TX/PRO/DX INJ NEW DRUG ADDON: CPT

## 2017-11-02 RX ORDER — PREGABALIN 150 MG/1
150 CAPSULE ORAL 2 TIMES DAILY
Status: DISCONTINUED | OUTPATIENT
Start: 2017-11-02 | End: 2017-11-04 | Stop reason: HOSPADM

## 2017-11-02 RX ORDER — SODIUM CHLORIDE 0.9 % (FLUSH) 0.9 %
10 SYRINGE (ML) INJECTION EVERY 12 HOURS SCHEDULED
Status: DISCONTINUED | OUTPATIENT
Start: 2017-11-02 | End: 2017-11-04 | Stop reason: HOSPADM

## 2017-11-02 RX ORDER — LEVOTHYROXINE SODIUM 0.05 MG/1
50 TABLET ORAL DAILY
Status: DISCONTINUED | OUTPATIENT
Start: 2017-11-03 | End: 2017-11-04 | Stop reason: HOSPADM

## 2017-11-02 RX ORDER — OXYCODONE HYDROCHLORIDE 5 MG/1
5 TABLET ORAL EVERY 6 HOURS PRN
Status: DISCONTINUED | OUTPATIENT
Start: 2017-11-02 | End: 2017-11-04 | Stop reason: HOSPADM

## 2017-11-02 RX ORDER — SODIUM CHLORIDE 0.9 % (FLUSH) 0.9 %
10 SYRINGE (ML) INJECTION PRN
Status: DISCONTINUED | OUTPATIENT
Start: 2017-11-02 | End: 2017-11-04 | Stop reason: HOSPADM

## 2017-11-02 RX ORDER — ONDANSETRON 2 MG/ML
4 INJECTION INTRAMUSCULAR; INTRAVENOUS EVERY 6 HOURS PRN
Status: DISCONTINUED | OUTPATIENT
Start: 2017-11-02 | End: 2017-11-04 | Stop reason: HOSPADM

## 2017-11-02 RX ORDER — ONDANSETRON 2 MG/ML
4 INJECTION INTRAMUSCULAR; INTRAVENOUS ONCE
Status: COMPLETED | OUTPATIENT
Start: 2017-11-02 | End: 2017-11-02

## 2017-11-02 RX ORDER — MAGNESIUM HYDROXIDE/ALUMINUM HYDROXICE/SIMETHICONE 120; 1200; 1200 MG/30ML; MG/30ML; MG/30ML
30 SUSPENSION ORAL
Status: ACTIVE | OUTPATIENT
Start: 2017-11-02 | End: 2017-11-02

## 2017-11-02 RX ORDER — PANTOPRAZOLE SODIUM 40 MG/1
40 TABLET, DELAYED RELEASE ORAL
Status: DISCONTINUED | OUTPATIENT
Start: 2017-11-03 | End: 2017-11-04 | Stop reason: HOSPADM

## 2017-11-02 RX ORDER — LOSARTAN POTASSIUM 50 MG/1
50 TABLET ORAL DAILY
Status: DISCONTINUED | OUTPATIENT
Start: 2017-11-02 | End: 2017-11-04 | Stop reason: HOSPADM

## 2017-11-02 RX ORDER — HYDROCHLOROTHIAZIDE 25 MG/1
12.5 TABLET ORAL DAILY
Status: DISCONTINUED | OUTPATIENT
Start: 2017-11-02 | End: 2017-11-04 | Stop reason: HOSPADM

## 2017-11-02 RX ORDER — DICYCLOMINE HYDROCHLORIDE 10 MG/1
20 CAPSULE ORAL ONCE
Status: DISCONTINUED | OUTPATIENT
Start: 2017-11-02 | End: 2017-11-03

## 2017-11-02 RX ORDER — OXYCODONE HYDROCHLORIDE AND ACETAMINOPHEN 5; 325 MG/1; MG/1
1 TABLET ORAL EVERY 6 HOURS PRN
Status: DISCONTINUED | OUTPATIENT
Start: 2017-11-02 | End: 2017-11-04 | Stop reason: HOSPADM

## 2017-11-02 RX ORDER — TELMISARTAN AND HYDROCHLORTHIAZIDE 40; 12.5 MG/1; MG/1
1 TABLET ORAL DAILY
Status: DISCONTINUED | OUTPATIENT
Start: 2017-11-02 | End: 2017-11-02

## 2017-11-02 RX ORDER — FUROSEMIDE 20 MG/1
20 TABLET ORAL
Status: DISCONTINUED | OUTPATIENT
Start: 2017-11-03 | End: 2017-11-04 | Stop reason: HOSPADM

## 2017-11-02 RX ORDER — MONTELUKAST SODIUM 10 MG/1
10 TABLET ORAL NIGHTLY
Status: DISCONTINUED | OUTPATIENT
Start: 2017-11-02 | End: 2017-11-04 | Stop reason: HOSPADM

## 2017-11-02 RX ORDER — MORPHINE SULFATE 30 MG/1
30 TABLET, FILM COATED, EXTENDED RELEASE ORAL 2 TIMES DAILY
Status: DISCONTINUED | OUTPATIENT
Start: 2017-11-02 | End: 2017-11-04 | Stop reason: HOSPADM

## 2017-11-02 RX ORDER — TELMISARTAN AND HYDROCHLORTHIAZIDE 40; 12.5 MG/1; MG/1
1 TABLET ORAL DAILY
Status: DISCONTINUED | OUTPATIENT
Start: 2017-11-03 | End: 2017-11-02 | Stop reason: CLARIF

## 2017-11-02 RX ORDER — TAMSULOSIN HYDROCHLORIDE 0.4 MG/1
0.4 CAPSULE ORAL DAILY
Status: DISCONTINUED | OUTPATIENT
Start: 2017-11-02 | End: 2017-11-04 | Stop reason: HOSPADM

## 2017-11-02 RX ORDER — ACETAMINOPHEN 325 MG/1
650 TABLET ORAL EVERY 4 HOURS PRN
Status: DISCONTINUED | OUTPATIENT
Start: 2017-11-02 | End: 2017-11-04 | Stop reason: HOSPADM

## 2017-11-02 RX ORDER — SODIUM CHLORIDE 9 MG/ML
INJECTION, SOLUTION INTRAVENOUS CONTINUOUS
Status: DISCONTINUED | OUTPATIENT
Start: 2017-11-02 | End: 2017-11-04 | Stop reason: HOSPADM

## 2017-11-02 RX ORDER — DICYCLOMINE HCL 20 MG
20 TABLET ORAL EVERY 6 HOURS
Qty: 15 TABLET | Refills: 0 | Status: SHIPPED | OUTPATIENT
Start: 2017-11-02 | End: 2017-11-04

## 2017-11-02 RX ORDER — ASPIRIN 81 MG/1
81 TABLET ORAL DAILY
Status: DISCONTINUED | OUTPATIENT
Start: 2017-11-03 | End: 2017-11-04 | Stop reason: HOSPADM

## 2017-11-02 RX ORDER — OXYCODONE AND ACETAMINOPHEN 10; 325 MG/1; MG/1
1 TABLET ORAL EVERY 6 HOURS PRN
Status: DISCONTINUED | OUTPATIENT
Start: 2017-11-02 | End: 2017-11-02 | Stop reason: CLARIF

## 2017-11-02 RX ADMIN — PREGABALIN 150 MG: 150 CAPSULE ORAL at 21:29

## 2017-11-02 RX ADMIN — ONDANSETRON 4 MG: 2 INJECTION INTRAMUSCULAR; INTRAVENOUS at 14:45

## 2017-11-02 RX ADMIN — TAMSULOSIN HYDROCHLORIDE 0.4 MG: 0.4 CAPSULE ORAL at 21:29

## 2017-11-02 RX ADMIN — SODIUM CHLORIDE: 9 INJECTION, SOLUTION INTRAVENOUS at 21:29

## 2017-11-02 RX ADMIN — MORPHINE SULFATE 30 MG: 30 TABLET, EXTENDED RELEASE ORAL at 21:29

## 2017-11-02 RX ADMIN — FAMOTIDINE 20 MG: 10 INJECTION, SOLUTION INTRAVENOUS at 14:45

## 2017-11-02 RX ADMIN — MONTELUKAST SODIUM 10 MG: 10 TABLET, FILM COATED ORAL at 21:29

## 2017-11-02 RX ADMIN — BECLOMETHASONE DIPROPIONATE 2 PUFF: 40 AEROSOL, METERED RESPIRATORY (INHALATION) at 21:29

## 2017-11-02 ASSESSMENT — PAIN SCALES - GENERAL
PAINLEVEL_OUTOF10: 8
PAINLEVEL_OUTOF10: 1
PAINLEVEL_OUTOF10: 2

## 2017-11-02 ASSESSMENT — ENCOUNTER SYMPTOMS
BACK PAIN: 0
RHINORRHEA: 0
NAUSEA: 0
EYE PAIN: 0
SHORTNESS OF BREATH: 0
ABDOMINAL PAIN: 1
EYE REDNESS: 0
COUGH: 0
VOMITING: 0

## 2017-11-02 ASSESSMENT — PAIN DESCRIPTION - LOCATION: LOCATION: ABDOMEN

## 2017-11-02 ASSESSMENT — PAIN DESCRIPTION - ORIENTATION: ORIENTATION: UPPER;RIGHT;LEFT

## 2017-11-02 NOTE — ED PROVIDER NOTES
[address]  eMERGENCY dEPARTMENT eNCOUnter      Pt Name: Tim Pino  MRN: [id number]  [place] 1940  Date of evaluation: 11/2/2017  PCP:    Anatoliy Willson DO      CHIEF COMPLAINT       Chief Complaint   Patient presents with    Abdominal Pain         HISTORY OF PRESENT ILLNESS   HPI   Tim Pino is a 78 y.o. female who presents For evaluation for abdominal pain. Patient states she's had abdominal pain in the upper aspect since this morning. Today's beta gastritis and reflux however she's not sure. She has history of pancreatitis and had her gallbladder removed recently as well. She denies any relation of pain to the back. No nausea vomiting or diarrhea. No fevers or chills. Denies chest pain or shortness of breath. Otherwise no other complaints       REVIEW OF SYSTEMS         Review of Systems   Constitutional: Negative for chills and fever. HENT: Negative for congestion and rhinorrhea. Eyes: Negative for pain and redness. Respiratory: Negative for cough and shortness of breath. Cardiovascular: Negative for chest pain and palpitations. Gastrointestinal: Positive for abdominal pain. Negative for nausea and vomiting. Genitourinary: Negative for dysuria, flank pain and urgency. Musculoskeletal: Negative for back pain, myalgias, neck pain and neck stiffness. Skin: Negative for rash. Neurological: Negative for light-headedness and headaches. Hematological: Does not bruise/bleed easily.           PAST MEDICAL HISTORY     Past Medical History:   Diagnosis Date    Achilles tendonitis     right    Asthma     Degenerative lumbar disc     Diabetes mellitus (HCC)     Failed back syndrome, lumbar     Fibromyalgia     History of bladder cancer     Hypertension     Hypothyroid     Kidney stones     Lumbar radiculopathy     Lumbar spondylolysis     Osteoarthritis     Spinal stenosis in cervical region        SURGICAL HISTORY       Past Surgical History:   Procedure Laterality Date    BLADDER TUMOR EXCISION  2005    CARPAL TUNNEL RELEASE Bilateral     CATARACT REMOVAL WITH IMPLANT Bilateral 7/22/2014, 8/5/2014    Dilma/Demian    CHOLECYSTECTOMY, LAPAROSCOPIC N/A 8/18/2017    CHOLECYSTECTOMY LAPAROSCOPIC ROBOTIC MULTIPORT performed by Bud France MD at [address]  1/2013    CYSTOSCOPY  11/04/2016    ENDOSCOPIC ULTRASOUND (LOWER) N/A 8/17/2017    ENDOSCOPIC ULTRASOUND performed by Leola Litten, MD at [address] (UPPER)  08/17/2017    A cursory view of the gastric mucosa was normal. The scope was then further advanced through a patent pylorus to the second portion of the duodenum. The Gallbladder was evaluated in bulb position. Thick sludge was noted. The CBD was 7.8 mm with no filling defects. The PD was 5 mm in the HOP. The pancreatic tissue was edematous in body and tail.  KNEE ARTHROSCOPY Left     LUMBAR FUSION      ROTATOR CUFF REPAIR Bilateral 1989    SKIN BIOPSY Right 11/2015    mole right posterior shoulder    SPINE SURGERY      spinal cord stimulator    SPINE SURGERY  91-4-15    renoval of spinal cord stimulator leads and battery    PASQUALE AND BSO      UVULOPALATOPHARYGOPLASTY  2006       CURRENT MEDICATIONS       Previous Medications    ASPIRIN 81 MG TABLET    Take 81 mg by mouth daily. DICLOFENAC SODIUM 1 % GEL    Apply 2 g topically 2 times daily    FUROSEMIDE (LASIX) 20 MG TABLET    Take 20 mg by mouth three times a week Takes 3 times a week    LEVOTHYROXINE (SYNTHROID) 50 MCG TABLET    Take 50 mcg by mouth Daily. METFORMIN (GLUCOPHAGE) 500 MG TABLET    Take 500 mg by mouth 2 times daily (with meals). MONTELUKAST (SINGULAIR) 10 MG TABLET    Take 10 mg by mouth nightly. MORPHINE (MS CONTIN) 30 MG EXTENDED RELEASE TABLET    Take 1 tablet by mouth 2 times daily . Earliest Fill Date: 10/26/17    OMEPRAZOLE (PRILOSEC) 20 MG CAPSULE    Take 20 mg by mouth every evening. OXYCODONE-ACETAMINOPHEN (PERCOCET)  MG PER TABLET    Take 1 tablet by mouth every 6 hours as needed for Pain . Earliest Fill Date: 10/26/17    PREGABALIN (LYRICA) 150 MG CAPSULE    Take 150 mg by mouth 2 times daily. QVAR 40 MCG/ACT INHALER        TAMSULOSIN (FLOMAX) 0.4 MG CAPSULE    Take 0.4 mg by mouth daily    TELMISARTAN-HYDROCHLOROTHIAZIDE (MICARDIS HCT) 40-12.5 MG PER TABLET    Take 1 tablet by mouth daily. ALLERGIES     Adhesive tape and Azithromycin    FAMILY HISTORY       Family Status   Relation Status    Mother     Father       Family History   Problem Relation Age of Onset    Heart Failure Mother     Other Father       pneumonia    Alzheimer's Disease Father        SOCIAL HISTORY       Social History     Social History    Marital status:      Spouse name: N/A    Number of children: N/A    Years of education: N/A     Occupational History    retired      Social History Main Topics    Smoking status: Never Smoker    Smokeless tobacco: Never Used    Alcohol use No    Drug use: No    Sexual activity: Not Asked     Other Topics Concern    None     Social History Narrative    None       PHYSICAL EXAM     INITIAL VITALS:  height is 4' 10\" (1.473 m) and weight is 145 lb (65.8 kg). Her oral temperature is 97.8 °F (36.6 °C). Her blood pressure is 145/46 (abnormal) and her pulse is 71. Her respiration is 16 and oxygen saturation is 98%. Physical Exam   Constitutional: She is oriented to person, place, and time. She appears well-developed and well-nourished. HENT:   Head: Normocephalic and atraumatic. Nose: Nose normal.   Mouth/Throat: Oropharynx is clear and moist.   Eyes: Conjunctivae and EOM are normal. Pupils are equal, round, and reactive to light. Neck: Normal range of motion. Neck supple. Cardiovascular: Normal rate, regular rhythm, normal heart sounds and intact distal pulses.     Pulmonary/Chest: Effort normal and breath sounds normal. Abdominal: Soft. Bowel sounds are normal. There is tenderness (Mild epigastric with some guarding and no rebound). Musculoskeletal: Normal range of motion. Neurological: She is alert and oriented to person, place, and time. Skin: Skin is warm and dry. Nursing note and vitals reviewed. DIFFERENTIAL DIAGNOSIS/ MDM:     Patient here with upper abdominal pain without any significant changes. Vitals are stable. Exam as noted. Labs and CT pending. Overall this could be more of a gastritis related symptoms    1500: Patient continues to stable condition. Labs are stable. CT shows a very mild ileus. At this time I talk to patient about next (home versus observation) and she does state she feels better and she wants to go home. Did tell her if these were to worsen as nausea vomiting worsening abdominal pain or fevers to return the emergency Department    1530: patient at this time changed her mind and wants to stay and will admit. PCP called Dr. Storm Lowery for admission    DIAGNOSTIC RESULTS     EKG: All EKG's are interpreted by the Emergency Department Physician who either signs or Co-signs this chart in the absence of a cardiologist.  EKG Interpretation    Interpreted by emergency department physician    Rhythm: normal sinus   Rate: normal  Axis: normal  Ectopy: none  Conduction: normal  ST Segments: nonspecific changes  T Waves: non specific changes  Q Waves: none    EKG  Impression: non-specific EKG        RADIOLOGY:   I directly visualized the following  images and reviewed the radiologist interpretations:  CT ABDOMEN PELVIS WO IV CONTRAST   Final Result   Fluid-filled mildly prominent small bowel loops without discrete transition   point, suggesting ileus. No free intraperitoneal air or fluid. Multiple additional chronic findings as described above.                 LABS:  Labs Reviewed   CBC WITH AUTO DIFFERENTIAL - Abnormal; Notable for the following:        Result Value    Hemoglobin 11.5 (*) Hematocrit 35.1 (*)     All other components within normal limits   COMPREHENSIVE METABOLIC PANEL - Abnormal; Notable for the following:     Glucose 120 (*)     Total Bilirubin 0.27 (*)     All other components within normal limits   TROP/MYOGLOBIN   LIPASE   URINALYSIS           EMERGENCY DEPARTMENT COURSE:   Vitals:    Vitals:    11/02/17 1322   BP: (!) 145/46   Pulse: 71   Resp: 16   Temp: 97.8 °F (36.6 °C)   TempSrc: Oral   SpO2: 98%   Weight: 145 lb (65.8 kg)   Height: 4' 10\" (1.473 m)     -------------------------  BP: (!) 145/46, Temp: 97.8 °F (36.6 °C), Pulse: 71, Resp: 16      FINAL IMPRESSION      1. Abdominal pain, epigastric    2.  Ileus [facility])          DISPOSITION/PLAN    DISPOSITION Admitted      (Please note that portions of this note were completed with a voice recognition program.  Efforts were made to edit the dictations but occasionally words are mis-transcribed.)    Kelly Vazquez MD, ANIYA, [facility]  Attending Emergency Physician                     Kelly Vazquez MD  11/02/17 7 Sarah Flynn MD  11/02/17 1535

## 2017-11-03 ENCOUNTER — APPOINTMENT (OUTPATIENT)
Dept: GENERAL RADIOLOGY | Age: 77
DRG: 390 | End: 2017-11-03
Payer: MEDICARE

## 2017-11-03 LAB
ABSOLUTE EOS #: 0.2 K/UL (ref 0–0.4)
ABSOLUTE IMMATURE GRANULOCYTE: ABNORMAL K/UL (ref 0–0.3)
ABSOLUTE LYMPH #: 2.55 K/UL (ref 1–4.8)
ABSOLUTE MONO #: 0.5 K/UL (ref 0.1–1.3)
ANION GAP SERPL CALCULATED.3IONS-SCNC: 10 MMOL/L (ref 9–17)
BASOPHILS # BLD: 0 %
BASOPHILS ABSOLUTE: 0 K/UL (ref 0–0.2)
BUN BLDV-MCNC: 20 MG/DL (ref 8–23)
BUN/CREAT BLD: ABNORMAL (ref 9–20)
CALCIUM SERPL-MCNC: 8.5 MG/DL (ref 8.6–10.4)
CHLORIDE BLD-SCNC: 104 MMOL/L (ref 98–107)
CO2: 28 MMOL/L (ref 20–31)
CREAT SERPL-MCNC: 0.71 MG/DL (ref 0.5–0.9)
DIFFERENTIAL TYPE: ABNORMAL
EOSINOPHILS RELATIVE PERCENT: 4 %
GFR AFRICAN AMERICAN: >60 ML/MIN
GFR NON-AFRICAN AMERICAN: >60 ML/MIN
GFR SERPL CREATININE-BSD FRML MDRD: ABNORMAL ML/MIN/{1.73_M2}
GFR SERPL CREATININE-BSD FRML MDRD: ABNORMAL ML/MIN/{1.73_M2}
GLUCOSE BLD-MCNC: 94 MG/DL (ref 70–99)
HCT VFR BLD CALC: 32.1 % (ref 36–46)
HEMOGLOBIN: 10.6 G/DL (ref 12–16)
IMMATURE GRANULOCYTES: ABNORMAL %
LYMPHOCYTES # BLD: 51 %
MCH RBC QN AUTO: 28.4 PG (ref 26–34)
MCHC RBC AUTO-ENTMCNC: 32.9 G/DL (ref 31–37)
MCV RBC AUTO: 86.4 FL (ref 80–100)
MONOCYTES # BLD: 10 %
MORPHOLOGY: NORMAL
PDW BLD-RTO: 14.7 % (ref 11.5–14.9)
PLATELET # BLD: 203 K/UL (ref 150–450)
PLATELET ESTIMATE: ABNORMAL
PMV BLD AUTO: 9.1 FL (ref 6–12)
POTASSIUM SERPL-SCNC: 4.3 MMOL/L (ref 3.7–5.3)
RBC # BLD: 3.72 M/UL (ref 4–5.2)
RBC # BLD: ABNORMAL 10*6/UL
SEG NEUTROPHILS: 35 %
SEGMENTED NEUTROPHILS ABSOLUTE COUNT: 1.75 K/UL (ref 1.3–9.1)
SODIUM BLD-SCNC: 142 MMOL/L (ref 135–144)
WBC # BLD: 5 K/UL (ref 3.5–11)
WBC # BLD: ABNORMAL 10*3/UL

## 2017-11-03 PROCEDURE — 6370000000 HC RX 637 (ALT 250 FOR IP): Performed by: FAMILY MEDICINE

## 2017-11-03 PROCEDURE — 1200000000 HC SEMI PRIVATE

## 2017-11-03 PROCEDURE — 2580000003 HC RX 258: Performed by: FAMILY MEDICINE

## 2017-11-03 PROCEDURE — 36415 COLL VENOUS BLD VENIPUNCTURE: CPT

## 2017-11-03 PROCEDURE — 99222 1ST HOSP IP/OBS MODERATE 55: CPT | Performed by: INTERNAL MEDICINE

## 2017-11-03 PROCEDURE — 74000 XR ABDOMEN LIMITED (KUB): CPT

## 2017-11-03 PROCEDURE — 80048 BASIC METABOLIC PNL TOTAL CA: CPT

## 2017-11-03 PROCEDURE — 85025 COMPLETE CBC W/AUTO DIFF WBC: CPT

## 2017-11-03 RX ORDER — DOCUSATE SODIUM 100 MG/1
100 CAPSULE, LIQUID FILLED ORAL 2 TIMES DAILY
Status: DISCONTINUED | OUTPATIENT
Start: 2017-11-03 | End: 2017-11-04 | Stop reason: HOSPADM

## 2017-11-03 RX ADMIN — MONTELUKAST SODIUM 10 MG: 10 TABLET, FILM COATED ORAL at 20:33

## 2017-11-03 RX ADMIN — LOSARTAN POTASSIUM 50 MG: 50 TABLET, FILM COATED ORAL at 08:06

## 2017-11-03 RX ADMIN — FUROSEMIDE 20 MG: 20 TABLET ORAL at 09:31

## 2017-11-03 RX ADMIN — LINACLOTIDE 145 MCG: 145 CAPSULE, GELATIN COATED ORAL at 08:01

## 2017-11-03 RX ADMIN — METFORMIN HYDROCHLORIDE 500 MG: 500 TABLET, FILM COATED ORAL at 09:31

## 2017-11-03 RX ADMIN — BECLOMETHASONE DIPROPIONATE 2 PUFF: 40 AEROSOL, METERED RESPIRATORY (INHALATION) at 20:32

## 2017-11-03 RX ADMIN — PREGABALIN 150 MG: 150 CAPSULE ORAL at 08:06

## 2017-11-03 RX ADMIN — TAMSULOSIN HYDROCHLORIDE 0.4 MG: 0.4 CAPSULE ORAL at 09:31

## 2017-11-03 RX ADMIN — DOCUSATE SODIUM 100 MG: 100 CAPSULE, LIQUID FILLED ORAL at 09:31

## 2017-11-03 RX ADMIN — PREGABALIN 150 MG: 150 CAPSULE ORAL at 20:33

## 2017-11-03 RX ADMIN — HYDROCHLOROTHIAZIDE 12.5 MG: 25 TABLET ORAL at 09:33

## 2017-11-03 RX ADMIN — SODIUM CHLORIDE: 9 INJECTION, SOLUTION INTRAVENOUS at 19:53

## 2017-11-03 RX ADMIN — SODIUM CHLORIDE: 9 INJECTION, SOLUTION INTRAVENOUS at 06:11

## 2017-11-03 RX ADMIN — DOCUSATE SODIUM 100 MG: 100 CAPSULE, LIQUID FILLED ORAL at 20:31

## 2017-11-03 RX ADMIN — METFORMIN HYDROCHLORIDE 500 MG: 500 TABLET, FILM COATED ORAL at 20:31

## 2017-11-03 RX ADMIN — LEVOTHYROXINE SODIUM 50 MCG: 50 TABLET ORAL at 08:05

## 2017-11-03 RX ADMIN — MORPHINE SULFATE 30 MG: 30 TABLET, EXTENDED RELEASE ORAL at 20:31

## 2017-11-03 RX ADMIN — ASPIRIN 81 MG: 81 TABLET, COATED ORAL at 09:33

## 2017-11-03 ASSESSMENT — PAIN SCALES - GENERAL
PAINLEVEL_OUTOF10: 1
PAINLEVEL_OUTOF10: 1
PAINLEVEL_OUTOF10: 2
PAINLEVEL_OUTOF10: 0

## 2017-11-03 NOTE — PLAN OF CARE
Problem: Falls - Risk of  Goal: Absence of falls  Outcome: Ongoing  Pt. Free of falls and injuries this shift. Problem: Pain:  Intervention: Assess pain scale for assessing level of pain  Adequate pain control achieved this shift. See MAR. Problem: Skin Integrity:  Intervention: Assess risk factors for impaired skin integrity and/or pressure ulcers  Skin integrity improved/maintained this shift. See head to toe assessment.

## 2017-11-03 NOTE — PROGRESS NOTES
Dr. Afshin Washington                                                                       Admission Note        Patient:  Candido Suero  YOB: 1940    MRN: [id number]     Acct: [de-identified]     Admit date: 11/2/2017    Pt seen and Chart reviewed. Consultant notes reviewed and outpatient/ ED care evaluated.     Subjective: adm with ileus, likely opiate induced constipation,   GB with EGD done 8/17  C/o fullness  Hold lovenox for now  hopefullly home soon if linzess helps    Patient Active Problem List   Diagnosis Code    Degenerative lumbar disc M51.36    Lumbar radiculopathy M54.16    Lumbar spondylolysis M43.06    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    Chronic pain associated with significant psychosocial dysfunction G89.4    Encounter for long-term (current) use of other medications Z79.899    Encounter for medication counseling Z71.89    Encounter for medication monitoring Z51.81    Primary osteoarthritis of right hip M16.11    Sacroiliitis (HCC) M46.1    Spinal cord stimulator status Z96.89    Acute pancreatitis K85.90    Abdominal pain, epigastric R10.13       Diet:  DIET FULL LIQUID;      Medications:Current Inpatient    Scheduled Meds:   linaclotide  145 mcg Oral QAM AC    sodium chloride flush  10 mL Intravenous 2 times per day    pneumococcal 13-valent conjugate  0.5 mL Intramuscular Once    influenza virus vaccine  0.5 mL Intramuscular Once    furosemide  20 mg Oral Once per day on Mon Wed Fri    levothyroxine  50 mcg Oral Daily    montelukast  10 mg Oral Nightly    morphine  30 mg Oral BID    pantoprazole  40 mg Oral QAM AC    pregabalin  150 mg Oral BID    beclomethasone  2 puff Inhalation BID    tamsulosin  0.4 mg Oral Daily    aspirin  81 mg Oral Daily    metFORMIN  500 mg Oral BID WC    losartan  50 mg Oral Daily    And    hydrochlorothiazide  12.5 mg Oral Daily     Continuous

## 2017-11-03 NOTE — FLOWSHEET NOTE
11/03/17 0815   Encounter Summary   Place of [address] [place] [address] Completed   Spiritual/Sabianist   Intervention Contacted support as requested per patient/family request   Who? [address] [facility] [address]    Why?  Spiritual Support   At Request Of Patient/Family

## 2017-11-03 NOTE — PROGRESS NOTES
Nutrition Assessment    Type and Reason for Visit: Positive Nutrition Screen (Difficulty chewing or swallowing)    Malnutrition Assessment:  · Malnutrition Status: No malnutrition    Nutrition Diagnosis:   · Problem: Swallowing difficulty  · Etiology: Difficulty swallowing    Signs and symptoms: Patient report of (intermittent difficulty swallowing)    Nutrition Assessment:  · Subjective Assessment: Patient reports occasional difficulty swallowing at times. Patient states that it feeling like she is \"swallowing at the wrong time, like the muscle will not work\". Patient ate 100% of breakfast and lunch full liquid trays. Patient reports constipation and receiving stool softeners. Nutrition Risk Level   Risk Level: Low    Nutrition Intervention  Food and/or Delivery: Continue current diet  Nutrition Education/Counseling/Coordination of Care:  Continued Inpatient Monitoring    Patient assessed for nutrition risk. Deemed to be at low risk at this time. Will continue to follow patient.       Regulo ALVAREZ R.D, L.D,  Clinical Dietitian  Pager # [phone]

## 2017-11-03 NOTE — FLOWSHEET NOTE
11/03/17 1243   Encounter Summary   Services provided to: Patient   Referral/Consult From: [place]   Continue Visiting (11-3-17)   Complexity of Encounter Low   Length of Encounter 15 minutes   Spiritual/Restorationism   Type Ritual   Intervention Anointing   Sacraments   Sacrament of Sick-Anointing Anointed  (Fr Knight 11-3-17)

## 2017-11-03 NOTE — FLOWSHEET NOTE
11/03/17 0956   Encounter Summary   Services provided to: Patient   Referral/Consult From: [place]  (AD Consult)   Support System Spouse; Children;Family members; Methodist/cher community;Friends/neighbors   Place of [facility]   Continue Visiting (11/3/17)   Complexity of Encounter Moderate   Length of Encounter 45 minutes   Spiritual Assessment Completed Yes   Routine   Type Initial   Intervention [place]   Spiritual/Worship   Type Spiritual support   Assessment Hopeful; Approachable;Coping;Peaceful   Intervention Active listening;Explored feelings, thoughts, concerns; Discussed meaning/purpose;Discussed relationship with God;Discussed belief system/[employer]aritan practices/cher;Communion;Prayer;Scripture;Ritual;Nurtured hope   Outcome Engaged in conversation;Receptive;Encouraged; Shared life review;Expressed feelings/needs/concerns;Coping;Expressed gratitude;Comfort   Sacraments   Communion Patient received communion   Advance Directives (For Healthcare)   Healthcare Directive No, patient does not have an advance directive for healthcare treatment   Information on Healthcare Directives Requested Yes   Patient Requests Assistance Yes, referral made to    Advance Directives Documents explained; Documents given

## 2017-11-04 ENCOUNTER — APPOINTMENT (OUTPATIENT)
Dept: GENERAL RADIOLOGY | Age: 77
DRG: 390 | End: 2017-11-04
Payer: MEDICARE

## 2017-11-04 VITALS
RESPIRATION RATE: 16 BRPM | WEIGHT: 144.18 LBS | TEMPERATURE: 98.4 F | DIASTOLIC BLOOD PRESSURE: 46 MMHG | HEIGHT: 58 IN | SYSTOLIC BLOOD PRESSURE: 110 MMHG | OXYGEN SATURATION: 99 % | HEART RATE: 59 BPM | BODY MASS INDEX: 30.27 KG/M2

## 2017-11-04 LAB
ABSOLUTE EOS #: 0.15 K/UL (ref 0–0.4)
ABSOLUTE IMMATURE GRANULOCYTE: ABNORMAL K/UL (ref 0–0.3)
ABSOLUTE LYMPH #: 2.04 K/UL (ref 1–4.8)
ABSOLUTE MONO #: 0.46 K/UL (ref 0.1–1.3)
ANION GAP SERPL CALCULATED.3IONS-SCNC: 9 MMOL/L (ref 9–17)
BASOPHILS # BLD: 0 %
BASOPHILS ABSOLUTE: 0 K/UL (ref 0–0.2)
BUN BLDV-MCNC: 20 MG/DL (ref 8–23)
BUN/CREAT BLD: ABNORMAL (ref 9–20)
CALCIUM SERPL-MCNC: 8.5 MG/DL (ref 8.6–10.4)
CHLORIDE BLD-SCNC: 105 MMOL/L (ref 98–107)
CO2: 28 MMOL/L (ref 20–31)
CREAT SERPL-MCNC: 0.68 MG/DL (ref 0.5–0.9)
DIFFERENTIAL TYPE: ABNORMAL
EOSINOPHILS RELATIVE PERCENT: 3 %
GFR AFRICAN AMERICAN: >60 ML/MIN
GFR NON-AFRICAN AMERICAN: >60 ML/MIN
GFR SERPL CREATININE-BSD FRML MDRD: ABNORMAL ML/MIN/{1.73_M2}
GFR SERPL CREATININE-BSD FRML MDRD: ABNORMAL ML/MIN/{1.73_M2}
GLUCOSE BLD-MCNC: 88 MG/DL (ref 70–99)
HCT VFR BLD CALC: 31.2 % (ref 36–46)
HEMOGLOBIN: 10.2 G/DL (ref 12–16)
IMMATURE GRANULOCYTES: ABNORMAL %
LYMPHOCYTES # BLD: 40 %
MCH RBC QN AUTO: 28.1 PG (ref 26–34)
MCHC RBC AUTO-ENTMCNC: 32.6 G/DL (ref 31–37)
MCV RBC AUTO: 86.4 FL (ref 80–100)
MONOCYTES # BLD: 9 %
MORPHOLOGY: NORMAL
PDW BLD-RTO: 14.4 % (ref 11.5–14.9)
PLATELET # BLD: 200 K/UL (ref 150–450)
PLATELET ESTIMATE: ABNORMAL
PMV BLD AUTO: 9.4 FL (ref 6–12)
POTASSIUM SERPL-SCNC: 3.9 MMOL/L (ref 3.7–5.3)
RBC # BLD: 3.61 M/UL (ref 4–5.2)
RBC # BLD: ABNORMAL 10*6/UL
SEG NEUTROPHILS: 48 %
SEGMENTED NEUTROPHILS ABSOLUTE COUNT: 2.45 K/UL (ref 1.3–9.1)
SODIUM BLD-SCNC: 142 MMOL/L (ref 135–144)
WBC # BLD: 5.1 K/UL (ref 3.5–11)
WBC # BLD: ABNORMAL 10*3/UL

## 2017-11-04 PROCEDURE — 90670 PCV13 VACCINE IM: CPT | Performed by: FAMILY MEDICINE

## 2017-11-04 PROCEDURE — 36415 COLL VENOUS BLD VENIPUNCTURE: CPT

## 2017-11-04 PROCEDURE — 85025 COMPLETE CBC W/AUTO DIFF WBC: CPT

## 2017-11-04 PROCEDURE — 99232 SBSQ HOSP IP/OBS MODERATE 35: CPT | Performed by: INTERNAL MEDICINE

## 2017-11-04 PROCEDURE — 6370000000 HC RX 637 (ALT 250 FOR IP): Performed by: FAMILY MEDICINE

## 2017-11-04 PROCEDURE — 6360000002 HC RX W HCPCS: Performed by: FAMILY MEDICINE

## 2017-11-04 PROCEDURE — 74000 XR ABDOMEN LIMITED (KUB): CPT

## 2017-11-04 PROCEDURE — G0009 ADMIN PNEUMOCOCCAL VACCINE: HCPCS | Performed by: FAMILY MEDICINE

## 2017-11-04 PROCEDURE — 2580000003 HC RX 258: Performed by: FAMILY MEDICINE

## 2017-11-04 PROCEDURE — 80048 BASIC METABOLIC PNL TOTAL CA: CPT

## 2017-11-04 RX ORDER — PSEUDOEPHEDRINE HCL 30 MG
100 TABLET ORAL 2 TIMES DAILY
Qty: 60 CAPSULE | Refills: 5 | Status: SHIPPED | OUTPATIENT
Start: 2017-11-04 | End: 2018-01-18 | Stop reason: ALTCHOICE

## 2017-11-04 RX ORDER — DICYCLOMINE HCL 20 MG
20 TABLET ORAL EVERY 6 HOURS
Qty: 15 TABLET | Refills: 0 | Status: SHIPPED | OUTPATIENT
Start: 2017-11-04 | End: 2017-11-04

## 2017-11-04 RX ORDER — DICYCLOMINE HCL 20 MG
20 TABLET ORAL EVERY 6 HOURS
Qty: 15 TABLET | Refills: 0 | Status: SHIPPED | OUTPATIENT
Start: 2017-11-04 | End: 2017-11-04 | Stop reason: HOSPADM

## 2017-11-04 RX ADMIN — HYDROCHLOROTHIAZIDE 12.5 MG: 25 TABLET ORAL at 09:29

## 2017-11-04 RX ADMIN — PREGABALIN 150 MG: 150 CAPSULE ORAL at 09:34

## 2017-11-04 RX ADMIN — METFORMIN HYDROCHLORIDE 500 MG: 500 TABLET, FILM COATED ORAL at 09:31

## 2017-11-04 RX ADMIN — DOCUSATE SODIUM 100 MG: 100 CAPSULE, LIQUID FILLED ORAL at 09:31

## 2017-11-04 RX ADMIN — SODIUM CHLORIDE: 9 INJECTION, SOLUTION INTRAVENOUS at 05:35

## 2017-11-04 RX ADMIN — TAMSULOSIN HYDROCHLORIDE 0.4 MG: 0.4 CAPSULE ORAL at 09:33

## 2017-11-04 RX ADMIN — MORPHINE SULFATE 30 MG: 30 TABLET, EXTENDED RELEASE ORAL at 09:31

## 2017-11-04 RX ADMIN — LINACLOTIDE 145 MCG: 145 CAPSULE, GELATIN COATED ORAL at 05:35

## 2017-11-04 RX ADMIN — PANTOPRAZOLE SODIUM 40 MG: 40 TABLET, DELAYED RELEASE ORAL at 05:35

## 2017-11-04 RX ADMIN — OXYCODONE HYDROCHLORIDE AND ACETAMINOPHEN 1 TABLET: 5; 325 TABLET ORAL at 13:23

## 2017-11-04 RX ADMIN — LOSARTAN POTASSIUM 50 MG: 50 TABLET, FILM COATED ORAL at 09:34

## 2017-11-04 RX ADMIN — PNEUMOCOCCAL 13-VALENT CONJUGATE VACCINE 0.5 ML: 2.2; 2.2; 2.2; 2.2; 2.2; 4.4; 2.2; 2.2; 2.2; 2.2; 2.2; 2.2; 2.2 INJECTION, SUSPENSION INTRAMUSCULAR at 13:19

## 2017-11-04 RX ADMIN — ASPIRIN 81 MG: 81 TABLET, COATED ORAL at 09:31

## 2017-11-04 RX ADMIN — Medication 10 ML: at 09:35

## 2017-11-04 RX ADMIN — LEVOTHYROXINE SODIUM 50 MCG: 50 TABLET ORAL at 05:35

## 2017-11-04 RX ADMIN — BECLOMETHASONE DIPROPIONATE 2 PUFF: 40 AEROSOL, METERED RESPIRATORY (INHALATION) at 09:31

## 2017-11-04 ASSESSMENT — PAIN DESCRIPTION - DESCRIPTORS: DESCRIPTORS: ACHING

## 2017-11-04 ASSESSMENT — PAIN SCALES - GENERAL
PAINLEVEL_OUTOF10: 7
PAINLEVEL_OUTOF10: 5
PAINLEVEL_OUTOF10: 3
PAINLEVEL_OUTOF10: 0

## 2017-11-04 ASSESSMENT — ENCOUNTER SYMPTOMS
SHORTNESS OF BREATH: 0
EYE DISCHARGE: 0
CONSTIPATION: 1
BACK PAIN: 0
NAUSEA: 1
BLURRED VISION: 0
SORE THROAT: 0
WHEEZING: 0
ABDOMINAL PAIN: 1
COUGH: 0

## 2017-11-04 ASSESSMENT — PAIN DESCRIPTION - PAIN TYPE: TYPE: CHRONIC PAIN

## 2017-11-04 ASSESSMENT — PAIN DESCRIPTION - FREQUENCY: FREQUENCY: CONTINUOUS

## 2017-11-04 ASSESSMENT — PAIN DESCRIPTION - LOCATION: LOCATION: BACK

## 2017-11-04 NOTE — PLAN OF CARE
Problem: Pain:  Goal: Pain level will decrease  Pain level will decrease  Outcome: Met This Shift  Pain assessed with each interaction. Medication offered as needed. Assisted with/encouraged position of comfort. Pain reassessment done and will continue to monitor. Patient denied complaint of pain    Problem: Skin Integrity:  Goal: Will show no infection signs and symptoms  Will show no infection signs and symptoms  Outcome: Met This Shift  Skin assessment completed. See Head to Toe assessment for details. No skin breakdown noted. Patient is ambulatory and able to reposition self. Vital signs are stable.

## 2017-11-04 NOTE — H&P
HISTORY and Amber [id number]         NAME:  Tim Pino  MRN: [id number]   YOB: 1940   Date: 11/4/2017   Age: 78 y.o. Gender: female       COMPLAINT AND PRESENT HISTORY:      Tim Pino is 78 y.o.,   female, admitted because of abdominal pain. Pt states pain started on 11/2 in the AM. Pt states abdominal pain in the epigastric, RUQ and LUQ of the abdomen, the pain is constant in character not related to meals or bowel movements, pain as 10/10 in intensity. Pt also C/O constipation, bowel movement before admission, 2 bowel movements since being admitted, denies any blood in stool. Pt has hx pancreatitis, gallbladder surgery on 8/18. Pt states abdominal pain has resolved. Pt denies nausea or vomiting,diarrhea, fever, chills. No urinary symptoms. DIAGNOSTIC RESULTS   Radiology:     SUPINE VIEW(S) OF THE ABDOMEN       11/4/2017 8:52 am       COMPARISON:   KUB November 3, 2017.       HISTORY:   ORDERING SYSTEM PROVIDED HISTORY: illeus   TECHNOLOGIST PROVIDED HISTORY:   Before dc this am   Reason for exam:->illeus   Ordering Physician Provided Reason for Exam: abd pain  R/o ileus   Acuity: Unknown   Type of Exam: Unknown   Additional signs and symptoms: abd pain  R/o ileus       FINDINGS:   Nonspecific bowel gas pattern is noted.  Moderate amount stool and air seen   throughout a nondilated colon.  No suspicious calcifications or free air. Patient status post lumbar fusion surgery with presumed neurostimulator in   place.           Impression   No significant change in abdominal findings compared to the prior KUB study   of November 3, 2017. CT OF THE ABDOMEN AND PELVIS WITHOUT CONTRAST 11/2/2017 2:30 pm       TECHNIQUE:   CT of the abdomen and pelvis was performed without the administration of   intravenous contrast. Multiplanar reformatted images are provided for review.    Dose modulation, iterative reconstruction, and/or weight based adjustment of the mA/kV was utilized to reduce the radiation dose to as low as reasonably   achievable. COMPARISON:   08/14/2017       HISTORY:   ORDERING SYSTEM PROVIDED HISTORY: upper abd pain   TECHNOLOGIST PROVIDED HISTORY:   Ordering Physician Provided Reason for Exam: ABDOMINAL PAIN   Acuity: Unknown   Type of Exam: Unknown       FINDINGS:   Lower Chest: Bibasilar dependent atelectasis/scarring noted. No obvious   focal airspace consolidation, sizeable pleural effusion, or pneumothorax. Organs: The liver, pancreas, and spleen are grossly within normal limits. No   evidence for intrahepatic or extrahepatic biliary ductal dilatation. Status   post cholecystectomy. GI/Bowel: Proximal small bowel loops are fluid-filled and mildly prominent. There is no discrete transition point although the distal small bowel loops   are relatively decompressed. Findings are suggestive of ileus or partial   small bowel obstruction. Fecal material noted throughout the colon. Pelvis: No evidence for free fluid. Peritoneum/Retroperitoneum: Adrenal glands are normal in size and   configuration. The kidneys are normal in size without definite   nephrolithiasis or hydronephrosis. The ureters run a nonobstructed course to   a normal-appearing urinary bladder. Vasculature: The aorta is normal in caliber. No definite lymphadenopathy   identified. Bones/Soft Tissues: Status post L2 through S1 posterior spinal fusion. There   is severe superimposed degenerative disease at L1-L2. Degenerative disease   and vacuum disc formation noted throughout the visualized lower thoracic and   upper lumbar spine. Spinal stimulator device noted overlying the right flank   although leads do not appear to extend into the spinal canal.           Impression   Fluid-filled mildly prominent small bowel loops without discrete transition   point, suggesting ileus. No free intraperitoneal air or fluid.        Multiple additional chronic findings as described above.        EKG:     Component Results     Component Value Ref Range & Units Status Collected Lab   Ventricular Rate 62  BPM Preliminary 11/02/2017  1:52    Atrial Rate 62  BPM Preliminary 11/02/2017  1:52    P-R Interval 172  ms Preliminary 11/02/2017  1:52    QRS Duration 80  ms Preliminary 11/02/2017  1:52    Q-T Interval 410  ms Preliminary 11/02/2017  1:52    QTc Calculation (Bazett) 416  ms Preliminary 11/02/2017  1:52    P Axis 4  degrees Preliminary 11/02/2017  1:52    R Axis -29  degrees Preliminary 11/02/2017  1:52    T Axis 15  degrees Preliminary 11/02/2017  1:52      Narrative     Normal sinus rhythm  Low voltage QRS  Cannot rule out Anterior infarct , age undetermined  Abnormal ECG  When compared with ECG of 16-AUG-2017 15:29,  No significant change was found       Labs:  CBC:   Recent Labs      11/02/17   1358  11/03/17   0641  11/04/17   0611   WBC  6.7  5.0  5.1   HGB  11.5*  10.6*  10.2*   PLT  223  203  200     BMP:    Recent Labs      11/02/17   1358  11/03/17   0641  11/04/17   0611   NA  140  142  142   K  4.2  4.3  3.9   CL  101  104  105   CO2  28  28  28   BUN  23  20  20   CREATININE  0.76  0.71  0.68   GLUCOSE  120*  94  88     Hepatic:   Recent Labs      11/02/17   1358   AST  13   ALT  12   BILITOT  0.27*   ALKPHOS  96     CARDIAC ENZY:   Recent Labs      11/02/17   1358   TROPONINT  <0.03   MYOGLOBIN  50     U/A:  Lab Results   Component Value Date    COLORU YELLOW 11/02/2017    WBCUA 2 TO 5 02/13/2017    RBCUA 0 TO 2 02/13/2017    MUCUS NOT REPORTED 02/13/2017    BACTERIA FEW 02/13/2017    SPECGRAV 1.017 11/02/2017    LEUKOCYTESUR NEGATIVE 11/02/2017    GLUCOSEU NEGATIVE 11/02/2017    AMORPHOUS NOT REPORTED 02/13/2017       PAST MEDICAL HISTORY     Past Medical History:   Diagnosis Date    Achilles tendonitis     right    Asthma     Degenerative lumbar disc     Diabetes mellitus ([address] Number of children: N/A    Years of education: N/A     Occupational History    retired      Social History Main Topics    Smoking status: Never Smoker    Smokeless tobacco: Never Used    Alcohol use No    Drug use: No    Sexual activity: Not Asked     Other Topics Concern    None     Social History Narrative    None           REVIEW OF SYSTEMS      Allergies   Allergen Reactions    Adhesive Tape     Azithromycin        No current facility-administered medications on file prior to encounter. Current Outpatient Prescriptions on File Prior to Encounter   Medication Sig Dispense Refill    oxyCODONE-acetaminophen (PERCOCET)  MG per tablet Take 1 tablet by mouth every 6 hours as needed for Pain . Earliest Fill Date: 10/26/17 120 tablet 0    morphine (MS CONTIN) 30 MG extended release tablet Take 1 tablet by mouth 2 times daily . Earliest Fill Date: 10/26/17 60 tablet 0    diclofenac sodium 1 % GEL Apply 2 g topically 2 times daily 1 Tube 0    QVAR 40 MCG/ACT inhaler       furosemide (LASIX) 20 MG tablet Take 20 mg by mouth three times a week Takes 3 times a week      pregabalin (LYRICA) 150 MG capsule Take 150 mg by mouth 2 times daily.  metFORMIN (GLUCOPHAGE) 500 MG tablet Take 500 mg by mouth 2 times daily (with meals).  telmisartan-hydrochlorothiazide (MICARDIS HCT) 40-12.5 MG per tablet Take 1 tablet by mouth daily.  montelukast (SINGULAIR) 10 MG tablet Take 10 mg by mouth nightly.  levothyroxine (SYNTHROID) 50 MCG tablet Take 50 mcg by mouth Daily.  omeprazole (PRILOSEC) 20 MG capsule Take 20 mg by mouth every evening.  aspirin 81 MG tablet Take 81 mg by mouth daily. General health:  Fairly good. No fever or chills. Skin:  No itching, redness or rash. Head, eyes, ears, nose, throat:  No headache, epistaxis, rhinorrhea hearing loss or sore throat. Neck:  No pain, stiffness or masses.

## 2017-11-04 NOTE — PROGRESS NOTES
[id number] [facility]      PROGRESS NOTE        Patient:  Joseph Ro  YOB: 1940    MRN: [id number]     Acct: [de-identified]     Admit date: 11/2/2017    Pt seen and Chart reviewed. Consultant notes reviewed and care evaluated. Subjective: doing better had good bm yest and feels better no pain or fullnes no n/v eating ok . Diet:  DIET GENERAL;      Medications:Current Inpatient    Scheduled Meds:   linaclotide  145 mcg Oral QAM AC    docusate sodium  100 mg Oral BID    sodium chloride flush  10 mL Intravenous 2 times per day    pneumococcal 13-valent conjugate  0.5 mL Intramuscular Once    influenza virus vaccine  0.5 mL Intramuscular Once    furosemide  20 mg Oral Once per day on Mon Wed Fri    levothyroxine  50 mcg Oral Daily    montelukast  10 mg Oral Nightly    morphine  30 mg Oral BID    pantoprazole  40 mg Oral QAM AC    pregabalin  150 mg Oral BID    beclomethasone  2 puff Inhalation BID    tamsulosin  0.4 mg Oral Daily    aspirin  81 mg Oral Daily    metFORMIN  500 mg Oral BID WC    losartan  50 mg Oral Daily    And    hydrochlorothiazide  12.5 mg Oral Daily     Continuous Infusions:   sodium chloride 100 mL/hr at 11/04/17 0535     PRN Meds:lidocaine viscous, sodium chloride flush, acetaminophen, ondansetron, diclofenac sodium, oxyCODONE-acetaminophen **AND** oxyCODONE        Physical Exam:  Vitals: BP (!) 110/46   Pulse 59   Temp 98.4 °F (36.9 °C) (Oral)   Resp 16   Ht 4' 10\" (1.473 m)   Wt 144 lb 2.9 oz (65.4 kg)   SpO2 99%   BMI 30.13 kg/m²   24 hour intake/output:  Intake/Output Summary (Last 24 hours) at 11/04/17 0815  Last data filed at 11/04/17 0539   Gross per 24 hour   Intake             2874 ml   Output             2475 ml   Net              399 ml     Last 3 weights:   Wt Readings from Last 3 Encounters:   11/02/17 144 lb 2.9 oz (65.4 kg)   10/23/17 142 lb (64.4 kg)   09/08/17 139 lb (63 kg) Physical Examination:   General appearance - alert, well appearing, and in no distress  Mental status - alert, oriented to person, place, and time  PERRLA wnl  Chest - clear to auscultation, no wheezes, rales or rhonchi, symmetric air entry  Heart - normal rate, regular rhythm, normal S1, S2, no murmurs, rubs, clicks or gallops  Abdomen - soft, nontender, nondistended, no masses or organomegaly has very good BS   Neurological - alert, oriented, normal speech, no focal findings or movement disorder noted  Extremities - peripheral pulses normal, no pedal edema, no clubbing or cyanosis  Skin - normal coloration and turgor, no rashes, no suspicious skin lesions noted     Labs rev   Xray rev    Assessment:  Abdominal pain  Opiates induced constipation  htn  Chronic back pain dm  htn  Left hip sprain sp fall 3 months ago    Plan:  1. Ok to dc this pm  2. Follow up with dr rosario   3.  Pt to take lenzies and colaoce and metamucil and ?mom if can not get lenzies to take mirelax and see dr rosario in 3-4 days    Liza Holman,              11/4/2017, 8:15 AM

## 2017-11-04 NOTE — PROGRESS NOTES
Interval Follow-Up Note     Subjective:  Main problem abdominal pain. She feels much better. Bowels are moving. No blood in the stool melena. No chest pain or shortness breath. OBJECTIVE:   BP (!) 110/46   Pulse 59   Temp 98.4 °F (36.9 °C) (Oral)   Resp 16   Ht 4' 10\" (1.473 m)   Wt 144 lb 2.9 oz (65.4 kg)   SpO2 99%   BMI 30.13 kg/m²   Body mass index is 30.13 kg/m². GEN: A O x 3 in NAD   HEENT: Conjunctivae clear. Eyelids normal. No lymphadenopathy. No thyroid mass. CV: s1s2, RRR, no rubs. PULM: No accessory muscle use. Normal breath sounds bilaterally. ABD/GI: Soft NT ND +BS  EXT: No edema or rash. Warm skin. No joint swelling. Limited neuro exam intact.      Lab Results   Component Value Date    WBC 5.1 11/04/2017    HGB 10.2 (L) 11/04/2017    HCT 31.2 (L) 11/04/2017    MCV 86.4 11/04/2017     11/04/2017     11/04/2017    K 3.9 11/04/2017     11/04/2017    CO2 28 11/04/2017    BUN 20 11/04/2017    CREATININE 0.68 11/04/2017    LABPROT 6.6 11/19/2012    LABALBU 3.8 11/02/2017    BILITOT 0.27 (L) 11/02/2017    ALKPHOS 96 11/02/2017    AST 13 11/02/2017    ALT 12 11/02/2017      Lab Results   Component Value Date    RBC 3.61 (L) 11/04/2017    HGB 10.2 (L) 11/04/2017    MCV 86.4 11/04/2017    MCH 28.1 11/04/2017    MCHC 32.6 11/04/2017    RDW 14.4 11/04/2017    MPV 9.4 11/04/2017    BASOPCT 0 11/04/2017    LYMPHSABS 2.04 11/04/2017    MONOSABS 0.46 11/04/2017    NEUTROABS 2.45 11/04/2017    EOSABS 0.15 11/04/2017    BASOSABS 0.00 11/04/2017        Past Medical History:   Diagnosis Date    Achilles tendonitis     right    Asthma     Degenerative lumbar disc     Diabetes mellitus ([facility] [address]     Failed back syndrome, lumbar     Fibromyalgia     History of bladder cancer     Hypertension     Hypothyroid     Kidney stones     Lumbar radiculopathy     Lumbar spondylolysis     Osteoarthritis     Spinal stenosis in cervical region           Current Facility-Administered 11/04/17 0933    aspirin EC tablet 81 mg, 81 mg, Oral, Daily, Shiva Wheatley, DO, 81 mg at 11/04/17 0931    metFORMIN (GLUCOPHAGE) tablet 500 mg, 500 mg, Oral, BID WC, Shiva Wheatley, DO, 500 mg at 11/04/17 0931    oxyCODONE-acetaminophen (PERCOCET) 5-325 MG per tablet 1 tablet, 1 tablet, Oral, Q6H PRN, 1 tablet at 11/04/17 1323 **AND** oxyCODONE (ROXICODONE) immediate release tablet 5 mg, 5 mg, Oral, Q6H PRN, Shiva LIZABETH Duranaziza DO    losartan (COZAAR) tablet 50 mg, 50 mg, Oral, Daily, 50 mg at 11/04/17 0934 **AND** hydrochlorothiazide (HYDRODIURIL) tablet 12.5 mg, 12.5 mg, Oral, Daily, Shiva Wheatley, DO, 12.5 mg at 11/04/17 5595     Ct Abdomen Pelvis Wo Iv Contrast    Result Date: 11/2/2017  EXAMINATION: CT OF THE ABDOMEN AND PELVIS WITHOUT CONTRAST 11/2/2017 2:30 pm TECHNIQUE: CT of the abdomen and pelvis was performed without the administration of intravenous contrast. Multiplanar reformatted images are provided for review. Dose modulation, iterative reconstruction, and/or weight based adjustment of the mA/kV was utilized to reduce the radiation dose to as low as reasonably achievable. COMPARISON: 08/14/2017 HISTORY: ORDERING SYSTEM PROVIDED HISTORY: upper abd pain TECHNOLOGIST PROVIDED HISTORY: Ordering Physician Provided Reason for Exam: ABDOMINAL PAIN Acuity: Unknown Type of Exam: Unknown FINDINGS: Lower Chest: Bibasilar dependent atelectasis/scarring noted. No obvious focal airspace consolidation, sizeable pleural effusion, or pneumothorax. Organs: The liver, pancreas, and spleen are grossly within normal limits. No evidence for intrahepatic or extrahepatic biliary ductal dilatation. Status post cholecystectomy. GI/Bowel: Proximal small bowel loops are fluid-filled and mildly prominent. There is no discrete transition point although the distal small bowel loops are relatively decompressed. Findings are suggestive of ileus or partial small bowel obstruction. Fecal material noted throughout the colon. Pelvis: No evidence for free fluid. Peritoneum/Retroperitoneum: Adrenal glands are normal in size and configuration. The kidneys are normal in size without definite nephrolithiasis or hydronephrosis. The ureters run a nonobstructed course to a normal-appearing urinary bladder. Vasculature: The aorta is normal in caliber. No definite lymphadenopathy identified. Bones/Soft Tissues: Status post L2 through S1 posterior spinal fusion. There is severe superimposed degenerative disease at L1-L2. Degenerative disease and vacuum disc formation noted throughout the visualized lower thoracic and upper lumbar spine. Spinal stimulator device noted overlying the right flank although leads do not appear to extend into the spinal canal.     Fluid-filled mildly prominent small bowel loops without discrete transition point, suggesting ileus. No free intraperitoneal air or fluid. Multiple additional chronic findings as described above. Xr Abdomen (kub) (single Ap View)    Result Date: 11/4/2017  EXAMINATION: SUPINE VIEW(S) OF THE ABDOMEN 11/4/2017 8:52 am COMPARISON: KUB November 3, 2017. HISTORY: ORDERING SYSTEM PROVIDED HISTORY: illeus TECHNOLOGIST PROVIDED HISTORY: Before dc this am Reason for exam:->illeus Ordering Physician Provided Reason for Exam: abd pain  R/o ileus Acuity: Unknown Type of Exam: Unknown Additional signs and symptoms: abd pain  R/o ileus FINDINGS: Nonspecific bowel gas pattern is noted. Moderate amount stool and air seen throughout a nondilated colon. No suspicious calcifications or free air. Patient status post lumbar fusion surgery with presumed neurostimulator in place. No significant change in abdominal findings compared to the prior KUB study of November 3, 2017.      Xr Abdomen (kub) (single Ap View)    Result Date: 11/3/2017  EXAMINATION: SUPINE VIEW(S) OF THE ABDOMEN 11/3/2017 7:06 am COMPARISON: CT abdomen and pelvis 11/02/2017 HISTORY: ORDERING SYSTEM PROVIDED HISTORY: abdominal pain TECHNOLOGIST PROVIDED HISTORY: Reason for exam:->abdominal pain Ordering Physician Provided Reason for Exam: F/u upper abdomen pain with possible ileus Acuity: Acute Type of Exam: Subsequent/Follow-up Additional signs and symptoms: F/u upper abdomen pain with possible ileus FINDINGS: There is large amount of stool throughout the colon. There is an overall relative paucity of air-filled small bowel which limits evaluation. The lung bases are clear. The sequelae of lumbar fusion is noted. 1. Overall paucity of air-filled small bowel loops limiting evaluation resulting in a nonspecific bowel gas pattern. Given the findings on the previous CT scan, the appearance suggests an ongoing ileus. 2. Large amount of stool throughout the colon. Intake/Output Summary (Last 24 hours) at 11/04/17 1417  Last data filed at 11/04/17 0539   Gross per 24 hour   Intake             2274 ml   Output             1950 ml   Net              324 ml        IMPRESSION / RECOMMENDATIONS: Ms. Joe Thomas is a 78 y.o. female followed for management of Abdominal pain. Constipation. Doing much better. Okay to discharge home. Follow-up in GI clinic in 3-4 weeks.        Naman Reveles MD

## 2017-11-04 NOTE — CONSULTS
GI Consult Note:  Name: Kisha Marcano  MRN: [id number]     [place]: [de-identified]  Room: [facility]9545-83    Admit Date: 11/2/2017  PCP: Jose Castro DO    Physician Requesting Consult: Jose Castro DO     Reason for Consult:  Abdominal distention and bloating. Chief Complaint:     Chief Complaint   Patient presents with    Abdominal Pain       History Obtained From:     patient, electronic medical record, nursing staff. History of Present Illness:      Kisha Marcano is a  78 y.o.  female who presents with Abdominal Pain      Symptoms:  Patient seen around 2 PM.  At that time she is doing well and denies symptoms. At the time of admission, yesterday, patient had abdominal distention and bloating. She also has vague epigastric discomfort. Has nausea. For this reason patient came to the emergency department, and imaging studies revealed ileus. Subsequently patient was admitted. Patient had similar symptoms in August 2017 and at that time it was felt that she may have gallbladder disease and had a cholecystectomy. Following this surgery patient has seen him, recurrent symptoms. On further discussion patient states that she has history of abdominal distention bloating. She has significant constipation. Has fair appetite and there is no dysphagia. She does not have nocturnal symptoms. No diarrhea. No melena or hematochezia. Patient did have EGD colonoscopy in the past.    Patient received Linzess 145 µg this morning. With that patient had a good bowel movement and she feels good.     Past Medical History:     Past Medical History:   Diagnosis Date    Achilles tendonitis     right    Asthma     Degenerative lumbar disc     Diabetes mellitus (HCC)     Failed back syndrome, lumbar     Fibromyalgia     History of bladder cancer     Hypertension     Hypothyroid     Kidney stones     Lumbar radiculopathy     Lumbar spondylolysis     Osteoarthritis     Spinal stenosis in by mouth daily   Yes Historical Provider, MD   diclofenac sodium 1 % GEL Apply 2 g topically 2 times daily 5/6/16  Yes CONSUELO Holley   QVAR 40 MCG/ACT inhaler  5/20/14  Yes Historical Provider, MD   furosemide (LASIX) 20 MG tablet Take 20 mg by mouth three times a week Takes 3 times a week   Yes Historical Provider, MD   pregabalin (LYRICA) 150 MG capsule Take 150 mg by mouth 2 times daily. Yes Historical Provider, MD   metFORMIN (GLUCOPHAGE) 500 MG tablet Take 500 mg by mouth 2 times daily (with meals). Yes Historical Provider, MD   telmisartan-hydrochlorothiazide (MICARDIS HCT) 40-12.5 MG per tablet Take 1 tablet by mouth daily. Yes Historical Provider, MD   montelukast (SINGULAIR) 10 MG tablet Take 10 mg by mouth nightly. Yes Historical Provider, MD   levothyroxine (SYNTHROID) 50 MCG tablet Take 50 mcg by mouth Daily. Yes Historical Provider, MD   omeprazole (PRILOSEC) 20 MG capsule Take 20 mg by mouth every evening. Yes Historical Provider, MD   aspirin 81 MG tablet Take 81 mg by mouth daily. Yes Historical Provider, MD        Allergies:       Adhesive tape and Azithromycin    Social History:     Tobacco:    reports that she has never smoked. She has never used smokeless tobacco.  Alcohol:      reports that she does not drink alcohol. Drug Use:  reports that she does not use drugs. Family History:     Family History   Problem Relation Age of Onset    Heart Failure Mother     Other Father       pneumonia    Alzheimer's Disease Father        Review of Systems:     Review of Systems   Constitutional: Negative for fever and weight loss. HENT: Negative for nosebleeds and sore throat. Eyes: Negative for blurred vision and discharge. Respiratory: Negative for cough, shortness of breath and wheezing. Cardiovascular: Negative for chest pain, palpitations and leg swelling. Gastrointestinal: Positive for abdominal pain, constipation and nausea.    Genitourinary: Negative for flank pain,

## 2017-11-04 NOTE — DISCHARGE SUMMARY
Physician Discharge Summary     Patient ID:  Дмитрий Reynolds  [id number]  87 y.o.  1940    Admit date: 11/2/2017    Discharge date and time: No discharge date for patient encounter. Admitting Physician: Naveen Gramajo DO     Discharge Physician: Janell Cain DO      Admission Diagnoses:   [facility]) [K56.7]    Discharge Diagnoses:   illeus   Opiates induced constipation  Chronic back pain  htn  dm    Hospital Course:admitetd with abdominal pain had illeus and constiipation second to opiates use did ok staretd on lenzies and had a bm and felt better and dc home    Patient Instructions: see list incr fluids    Discharge Medications:  [unfilled]    Activity: ad phyllis    Diet: DIET GENERAL;    Follow-up with Janell Cain DO in 1 to 2 weeks, patient to call  for an appointment and if has any problems.       Electronically signed by Janell Cain DO  on 11/4/2017 at 8:18 AM

## 2017-11-04 NOTE — DISCHARGE INSTR - DIET

## 2017-11-07 ENCOUNTER — HOSPITAL ENCOUNTER (OUTPATIENT)
Dept: WOMENS IMAGING | Age: 77
Discharge: HOME | End: 2017-11-07
Payer: MEDICARE

## 2017-11-07 DIAGNOSIS — Z12.31 SCREENING MAMMOGRAM, ENCOUNTER FOR: ICD-10-CM

## 2017-11-07 PROCEDURE — 77063 BREAST TOMOSYNTHESIS BI: CPT

## 2017-11-21 ENCOUNTER — HOSPITAL ENCOUNTER (OUTPATIENT)
Dept: PAIN MANAGEMENT | Age: 77
Discharge: HOME | End: 2017-11-21
Payer: MEDICARE

## 2017-11-21 VITALS
OXYGEN SATURATION: 99 % | HEIGHT: 58 IN | SYSTOLIC BLOOD PRESSURE: 100 MMHG | RESPIRATION RATE: 16 BRPM | WEIGHT: 145 LBS | DIASTOLIC BLOOD PRESSURE: 47 MMHG | TEMPERATURE: 98.4 F | BODY MASS INDEX: 30.44 KG/M2 | HEART RATE: 68 BPM

## 2017-11-21 DIAGNOSIS — M54.16 LUMBAR RADICULOPATHY: ICD-10-CM

## 2017-11-21 DIAGNOSIS — M46.1 SACROILIITIS (HCC): ICD-10-CM

## 2017-11-21 DIAGNOSIS — Z71.89 ENCOUNTER FOR MEDICATION COUNSELING: ICD-10-CM

## 2017-11-21 DIAGNOSIS — Z96.89 SPINAL CORD STIMULATOR STATUS: ICD-10-CM

## 2017-11-21 DIAGNOSIS — M96.1 FAILED BACK SYNDROME OF LUMBAR SPINE: ICD-10-CM

## 2017-11-21 DIAGNOSIS — M51.36 DEGENERATIVE LUMBAR DISC: ICD-10-CM

## 2017-11-21 DIAGNOSIS — Z51.81 ENCOUNTER FOR MEDICATION MONITORING: ICD-10-CM

## 2017-11-21 DIAGNOSIS — G89.4 CHRONIC PAIN ASSOCIATED WITH SIGNIFICANT PSYCHOSOCIAL DYSFUNCTION: ICD-10-CM

## 2017-11-21 DIAGNOSIS — M43.06 LUMBAR SPONDYLOLYSIS: Primary | ICD-10-CM

## 2017-11-21 PROCEDURE — 99213 OFFICE O/P EST LOW 20 MIN: CPT | Performed by: NURSE PRACTITIONER

## 2017-11-21 PROCEDURE — 99213 OFFICE O/P EST LOW 20 MIN: CPT

## 2017-11-21 RX ORDER — CEPHALEXIN 500 MG/1
CAPSULE ORAL
COMMUNITY
Start: 2017-11-13 | End: 2017-12-18 | Stop reason: ALTCHOICE

## 2017-11-21 RX ORDER — OXYCODONE AND ACETAMINOPHEN 10; 325 MG/1; MG/1
1 TABLET ORAL EVERY 6 HOURS PRN
Qty: 120 TABLET | Refills: 0 | Status: SHIPPED | OUTPATIENT
Start: 2017-11-25 | End: 2017-12-18 | Stop reason: SDUPTHER

## 2017-11-21 RX ORDER — MORPHINE SULFATE 30 MG/1
30 TABLET, FILM COATED, EXTENDED RELEASE ORAL 2 TIMES DAILY
Qty: 60 TABLET | Refills: 0 | Status: SHIPPED | OUTPATIENT
Start: 2017-11-25 | End: 2017-12-18 | Stop reason: SDUPTHER

## 2017-11-21 ASSESSMENT — ENCOUNTER SYMPTOMS
RESPIRATORY NEGATIVE: 1
BACK PAIN: 1
CONSTIPATION: 1

## 2017-11-21 NOTE — PROGRESS NOTES
[facility]  Progress  Note    Patient is here today to review medication contract. Chief Complaint: low back pain      HPI: She c/o low back pain , non-radiating. She has history of 2 lumbar surgeries. She has history of spinal cord stimulator which was removed as it was not helping. She is  Waiting to get  bone stimulator removed, which she had put in from a previous lumbar surgery. .  She had one ED visit for constipation and was admitted  for 2 days for constipation. Now on linzess and colace. Sleep is okay. She uses c-pap. She is hoping to get bone stimulator removed. She will then have cervical MRI done and go back and see surgeon at  [facility]. She states at this time she does not feel she can decrease her pain medication. Back Pain   This is a chronic problem. The current episode started more than 1 year ago. The problem occurs constantly. The problem is unchanged. The pain is present in the lumbar spine. The quality of the pain is described as aching. The pain does not radiate. The pain is at a severity of 8/10. The pain is severe. The pain is worse during the day. The symptoms are aggravated by standing (walking). Associated symptoms include numbness and weakness. (Left leg ) Risk factors include menopause. She has tried analgesics and ice for the symptoms. The treatment provided mild relief. *Possible side effects, risk of tolerance and or dependence and alternative treatments discussed    Obtaining appropriate analgesic effect of treatment   No signs of potential drug abuse or diversion identified    Treatment goals:  Functional status: walk without pain      Aberrancy none   Analgesia  Pain 8  Adverse  Effects : on linzess, BM every other day  ADL;s :  Housework, ymca 3 times a week    Patient denies any new neurological symptoms. No bowel or bladder incontinence, no weakness, and no falling.     Pill count: appropriate  :   Controlled Substances Monitoring: Detected   Final 10/23/2017  2:11 PM [facility]   PCP, Urine Not Detected   Final 10/23/2017  2:11 PM [facility]   Phentermine, Ur Not Detected   Final 10/23/2017  2:11 PM [facility]   Propoxyphene, Urine Not Detected   Final 10/23/2017  2:11 PM [facility]   Tapentadol-O-Sulfate, Urine Not Detected   Final 10/23/2017  2:11 PM [facility]   Tapentadol, Urine Not Detected   Final 10/23/2017  2:11 PM [facility]   Temazepam, Urine Not Detected   Final 10/23/2017  2:11 PM [facility]   Tramadol, Urine Not Detected   Final 10/23/2017  2:11 PM [facility]   Zolpidem, Urine Not Detected   Final 10/23/2017  2:11 PM [facility]   Drugs Expected, Ur   Final 10/23/2017  2:11 PM [phone] [id number] AT 6AM, [place] [id number] AT 6AM AND 12 NOON    Creatinine, Ur 76.1  20.0 - 400.0 mg/dL Final 10/23/2017  2:11 PM [facility]   Pain Mgt Drug Panel, Hi Res, Ur See Below   Final 10/23/2017  2:11 PM [facility]   (NOTE)   Methodology: Qualitative Enzyme Immunoassay and Qualitative Liquid   Chromatography-Time of Flight-Mass Spectrometry or Tandem Mass   Spectrometry, Quantitative Spectrophotometry   The absence of expected drug(s) and/or drug metabolite(s) may   indicate non-compliance, inappropriate timing of specimen   collection relative to drug administration, poor drug absorption,   diluted/adulterated urine, or limitations of testing. The   concentration must be greater than or equal to the cutoff to be   reported as present.  If specific drug concentrations are   required, contact the laboratory within two weeks of specimen   collection to request quantification by a second analytical   technique. Interpretive questions should be directed to the   laboratory. Results based on immunoassay detection that do not match clinical   expectations should be   interpreted with caution. Confirmatory testing by mass   spectrometry for immunoassay-based results is available, if   ordered within two weeks of specimen collection. Additional   charges apply.    For medical purposes

## 2017-11-29 ENCOUNTER — TELEPHONE (OUTPATIENT)
Dept: ORTHOPEDIC SURGERY | Age: 77
End: 2017-11-29

## 2017-12-12 ENCOUNTER — OFFICE VISIT (OUTPATIENT)
Dept: ORTHOPEDIC SURGERY | Age: 77
End: 2017-12-12

## 2017-12-12 VITALS — WEIGHT: 148 LBS | HEIGHT: 58 IN | BODY MASS INDEX: 31.07 KG/M2

## 2017-12-12 DIAGNOSIS — M96.1 FAILED BACK SYNDROME OF LUMBAR SPINE: ICD-10-CM

## 2017-12-12 DIAGNOSIS — M54.16 LUMBAR RADICULOPATHY: ICD-10-CM

## 2017-12-12 DIAGNOSIS — M51.36 DEGENERATIVE LUMBAR DISC: Primary | ICD-10-CM

## 2017-12-12 DIAGNOSIS — M43.06 LUMBAR SPONDYLOLYSIS: ICD-10-CM

## 2017-12-12 PROCEDURE — 99024 POSTOP FOLLOW-UP VISIT: CPT | Performed by: ORTHOPAEDIC SURGERY

## 2017-12-12 NOTE — PROGRESS NOTES
Subjective:      Patient ID: Wendi Simental is a 78 y.o. female. HPI   patient has been told by multiple providers that she needs to have a on stimulator removed from her lumbar spine so she can have an MRI of her neck. Patient with history of L 2 to sacrum posterior decompression Yovanny Barthel fusion. patient is requesting bone stimulator be removed so that she can have an MRI  Review of Systems    Objective:   Physical Exam   Constitutional: She is oriented to person, place, and time. She appears well-developed and well-nourished. HENT:   Head: Normocephalic and atraumatic. Eyes: Conjunctivae and EOM are normal.   Neck: Normal range of motion. Pulmonary/Chest: Effort normal. No respiratory distress. Neurological: She is alert and oriented to person, place, and time. She has normal strength. No sensory deficit. Normal gait   Skin: Skin is warm and dry. Psychiatric: Her behavior is normal. Thought content normal.   Nursing note and vitals reviewed. normal gait with a cane. Diagnostic x-rays thoracic spine reviewed no spinal cord stimulator     Diagnostic x-rays CT scan lumbar spine reviewed patient with a bone growth stimulator that I had not seen nevertheless she does have a power source  That is subcu with wires  That  Track to the superior aspect of her screw malachi construct    Assessment:      Encounter Diagnoses   Name Primary?  Degenerative lumbar disc Yes    Lumbar spondylolysis     Lumbar radiculopathy     Failed back syndrome of lumbar spine     painful hardware lumbar actually the patient has a bone stimulator power packet knees be removed for MRI cervical        Plan:      Encounter Diagnoses   Name Primary?     Degenerative lumbar disc Yes    Lumbar spondylolysis     Lumbar radiculopathy     Failed back syndrome of lumbar spine       patient with chronic neck pain     Okay to schedule removal  bone growth stimulator

## 2017-12-18 ENCOUNTER — HOSPITAL ENCOUNTER (OUTPATIENT)
Dept: PAIN MANAGEMENT | Age: 77
Discharge: HOME | End: 2017-12-18
Payer: MEDICARE

## 2017-12-18 VITALS
SYSTOLIC BLOOD PRESSURE: 135 MMHG | HEART RATE: 75 BPM | OXYGEN SATURATION: 96 % | WEIGHT: 148 LBS | RESPIRATION RATE: 16 BRPM | HEIGHT: 57 IN | DIASTOLIC BLOOD PRESSURE: 68 MMHG | TEMPERATURE: 98 F | BODY MASS INDEX: 31.93 KG/M2

## 2017-12-18 DIAGNOSIS — M16.11 PRIMARY OSTEOARTHRITIS OF RIGHT HIP: ICD-10-CM

## 2017-12-18 DIAGNOSIS — G89.4 CHRONIC PAIN ASSOCIATED WITH SIGNIFICANT PSYCHOSOCIAL DYSFUNCTION: ICD-10-CM

## 2017-12-18 DIAGNOSIS — M51.36 DEGENERATIVE LUMBAR DISC: ICD-10-CM

## 2017-12-18 DIAGNOSIS — M46.1 SACROILIITIS (HCC): ICD-10-CM

## 2017-12-18 DIAGNOSIS — Z71.89 ENCOUNTER FOR MEDICATION COUNSELING: ICD-10-CM

## 2017-12-18 DIAGNOSIS — Z51.81 ENCOUNTER FOR MEDICATION MONITORING: ICD-10-CM

## 2017-12-18 DIAGNOSIS — M43.06 LUMBAR SPONDYLOLYSIS: Primary | ICD-10-CM

## 2017-12-18 DIAGNOSIS — M96.1 FAILED BACK SYNDROME OF LUMBAR SPINE: ICD-10-CM

## 2017-12-18 DIAGNOSIS — M54.16 LUMBAR RADICULOPATHY: ICD-10-CM

## 2017-12-18 PROCEDURE — 99213 OFFICE O/P EST LOW 20 MIN: CPT | Performed by: NURSE PRACTITIONER

## 2017-12-18 PROCEDURE — 99213 OFFICE O/P EST LOW 20 MIN: CPT

## 2017-12-18 RX ORDER — MORPHINE SULFATE 30 MG/1
30 TABLET, FILM COATED, EXTENDED RELEASE ORAL 2 TIMES DAILY
Qty: 20 TABLET | Refills: 0 | Status: SHIPPED | OUTPATIENT
Start: 2017-12-24 | End: 2017-12-18 | Stop reason: SDUPTHER

## 2017-12-18 RX ORDER — OXYCODONE AND ACETAMINOPHEN 10; 325 MG/1; MG/1
1 TABLET ORAL EVERY 6 HOURS PRN
Qty: 120 TABLET | Refills: 0 | Status: SHIPPED | OUTPATIENT
Start: 2018-01-04 | End: 2018-02-02 | Stop reason: SDUPTHER

## 2017-12-18 RX ORDER — CEFUROXIME AXETIL 500 MG/1
TABLET ORAL
COMMUNITY
Start: 2017-12-01 | End: 2017-12-18 | Stop reason: ALTCHOICE

## 2017-12-18 RX ORDER — PREDNISONE 20 MG/1
TABLET ORAL
COMMUNITY
Start: 2017-11-30 | End: 2017-12-18 | Stop reason: ALTCHOICE

## 2017-12-18 RX ORDER — MORPHINE SULFATE 30 MG/1
30 TABLET, FILM COATED, EXTENDED RELEASE ORAL 2 TIMES DAILY
Qty: 60 TABLET | Refills: 0 | Status: SHIPPED | OUTPATIENT
Start: 2018-01-04 | End: 2018-02-02 | Stop reason: SDUPTHER

## 2017-12-18 RX ORDER — OXYCODONE AND ACETAMINOPHEN 10; 325 MG/1; MG/1
1 TABLET ORAL EVERY 6 HOURS PRN
Qty: 40 TABLET | Refills: 0 | Status: SHIPPED | OUTPATIENT
Start: 2017-12-24 | End: 2017-12-18 | Stop reason: SDUPTHER

## 2017-12-18 ASSESSMENT — ENCOUNTER SYMPTOMS
CONSTIPATION: 1
SHORTNESS OF BREATH: 1
BACK PAIN: 1

## 2018-01-15 ENCOUNTER — HOSPITAL ENCOUNTER (OUTPATIENT)
Dept: PREADMISSION TESTING | Age: 78
Discharge: HOME | End: 2018-01-15

## 2018-01-18 ENCOUNTER — HOSPITAL ENCOUNTER (OUTPATIENT)
Dept: PREADMISSION TESTING | Age: 78
Discharge: HOME | End: 2018-01-18
Payer: MEDICARE

## 2018-01-18 VITALS
WEIGHT: 149 LBS | TEMPERATURE: 98.3 F | HEART RATE: 60 BPM | OXYGEN SATURATION: 99 % | SYSTOLIC BLOOD PRESSURE: 120 MMHG | DIASTOLIC BLOOD PRESSURE: 61 MMHG | BODY MASS INDEX: 32.15 KG/M2 | RESPIRATION RATE: 16 BRPM | HEIGHT: 57 IN

## 2018-01-18 LAB
ABSOLUTE EOS #: 0.2 K/UL (ref 0–0.4)
ABSOLUTE IMMATURE GRANULOCYTE: ABNORMAL K/UL (ref 0–0.3)
ABSOLUTE LYMPH #: 1.6 K/UL (ref 1–4.8)
ABSOLUTE MONO #: 0.6 K/UL (ref 0.1–1.3)
ANION GAP SERPL CALCULATED.3IONS-SCNC: 11 MMOL/L (ref 9–17)
BASOPHILS # BLD: 1 % (ref 0–2)
BASOPHILS ABSOLUTE: 0 K/UL (ref 0–0.2)
BUN BLDV-MCNC: 25 MG/DL (ref 8–23)
BUN/CREAT BLD: ABNORMAL (ref 9–20)
CALCIUM SERPL-MCNC: 9 MG/DL (ref 8.6–10.4)
CHLORIDE BLD-SCNC: 100 MMOL/L (ref 98–107)
CO2: 29 MMOL/L (ref 20–31)
CREAT SERPL-MCNC: 0.7 MG/DL (ref 0.5–0.9)
DIFFERENTIAL TYPE: ABNORMAL
EOSINOPHILS RELATIVE PERCENT: 4 % (ref 0–4)
GFR AFRICAN AMERICAN: >60 ML/MIN
GFR NON-AFRICAN AMERICAN: >60 ML/MIN
GFR SERPL CREATININE-BSD FRML MDRD: ABNORMAL ML/MIN/{1.73_M2}
GFR SERPL CREATININE-BSD FRML MDRD: ABNORMAL ML/MIN/{1.73_M2}
GLUCOSE BLD-MCNC: 89 MG/DL (ref 70–99)
HCT VFR BLD CALC: 33 % (ref 36–46)
HEMOGLOBIN: 10.7 G/DL (ref 12–16)
IMMATURE GRANULOCYTES: ABNORMAL %
LYMPHOCYTES # BLD: 32 % (ref 24–44)
MCH RBC QN AUTO: 27.9 PG (ref 26–34)
MCHC RBC AUTO-ENTMCNC: 32.3 G/DL (ref 31–37)
MCV RBC AUTO: 86.4 FL (ref 80–100)
MONOCYTES # BLD: 11 % (ref 1–7)
NRBC AUTOMATED: ABNORMAL PER 100 WBC
PDW BLD-RTO: 14.3 % (ref 11.5–14.9)
PLATELET # BLD: 213 K/UL (ref 150–450)
PLATELET ESTIMATE: ABNORMAL
PMV BLD AUTO: 9.5 FL (ref 6–12)
POTASSIUM SERPL-SCNC: 4.3 MMOL/L (ref 3.7–5.3)
RBC # BLD: 3.82 M/UL (ref 4–5.2)
RBC # BLD: ABNORMAL 10*6/UL
SEG NEUTROPHILS: 52 % (ref 36–66)
SEGMENTED NEUTROPHILS ABSOLUTE COUNT: 2.6 K/UL (ref 1.3–9.1)
SODIUM BLD-SCNC: 140 MMOL/L (ref 135–144)
WBC # BLD: 5.1 K/UL (ref 3.5–11)
WBC # BLD: ABNORMAL 10*3/UL

## 2018-01-18 PROCEDURE — 80048 BASIC METABOLIC PNL TOTAL CA: CPT

## 2018-01-18 PROCEDURE — 85025 COMPLETE CBC W/AUTO DIFF WBC: CPT

## 2018-01-18 PROCEDURE — 36415 COLL VENOUS BLD VENIPUNCTURE: CPT

## 2018-01-18 ASSESSMENT — PAIN DESCRIPTION - PROGRESSION: CLINICAL_PROGRESSION: NOT CHANGED

## 2018-01-18 ASSESSMENT — PAIN DESCRIPTION - ONSET: ONSET: ON-GOING

## 2018-01-18 ASSESSMENT — PAIN DESCRIPTION - DESCRIPTORS: DESCRIPTORS: CONSTANT;ACHING

## 2018-01-18 ASSESSMENT — PAIN DESCRIPTION - FREQUENCY: FREQUENCY: CONTINUOUS

## 2018-01-18 ASSESSMENT — PAIN DESCRIPTION - ORIENTATION: ORIENTATION: LOWER

## 2018-01-18 ASSESSMENT — PAIN DESCRIPTION - PAIN TYPE: TYPE: CHRONIC PAIN

## 2018-01-18 ASSESSMENT — PAIN DESCRIPTION - LOCATION: LOCATION: BACK

## 2018-01-18 ASSESSMENT — PAIN SCALES - GENERAL: PAINLEVEL_OUTOF10: 8

## 2018-01-18 NOTE — H&P
HISTORY and [facility] [address]       NAME:  Ninfa Jasmine  MRN: [id number]   YOB: 1940   Date: 1/18/2018   Age: 78 y.o. Gender: female       COMPLAINT AND PRESENT HISTORY:   HPI   Ninfa Jasmine is 78 y.o.,  female, undergoing preadmission testing for Preadmission Testing for Lumbar back pain. Pt C/O of persistent pain that didn't improve even after spinal cord stimulator. The pains don't radiate to the legs. The symptoms started 10 years. No recent falls or trauma. No redness, swelling or rashes. Pt C/O of pain, limitation in the range of motion. Pt denies any other symptoms. PAST MEDICAL HISTORY     Past Medical History:   Diagnosis Date    Achilles tendonitis     right    Asthma     Cough     clear phlegm    Degenerative lumbar disc     Diabetes mellitus (HCC)     type 2    Failed back syndrome, lumbar     Fast heart beat     Hx of \"8-10 years ago\"    Fibromyalgia     History of bladder cancer     Hypertension     Hypothyroid     Kidney stones     Lumbar radiculopathy     Lumbar spondylolysis     Osteoarthritis     Sleep apnea     uses CPAP machine nightly    Spinal stenosis in cervical region     Wears glasses      Pt denies any history of stroke, heart disease, COPD, GERD, HLD, Cancer, Seizures,Thyroid disease, Hepatitis, TB, Psychiatric Disorders or Substance abuse.     SURGICAL HISTORY       Past Surgical History:   Procedure Laterality Date    BLADDER TUMOR EXCISION  2005    CARDIOVERSION      \"8-10 years ago\"  to get \"heart into regular rhythm\"    CARPAL TUNNEL RELEASE Bilateral     CATARACT REMOVAL WITH IMPLANT Bilateral 7/22/2014, 8/5/2014    Dilma/Demian    CHOLECYSTECTOMY, LAPAROSCOPIC N/A 8/18/2017    CHOLECYSTECTOMY LAPAROSCOPIC ROBOTIC MULTIPORT performed by Jeri Lee MD at [facility]  1/2013    CYSTOSCOPY  11/04/2016    ENDOSCOPIC ULTRASOUND (LOWER) N/A 8/17/2017    ENDOSCOPIC ULTRASOUND Earliest Fill Date: 1/4/18 60 tablet 0    linaclotide (LINZESS) 145 MCG capsule Take 1 capsule by mouth every morning (before breakfast) 30 capsule 0    diclofenac sodium 1 % GEL Apply 2 g topically 2 times daily 1 Tube 0    QVAR 40 MCG/ACT inhaler Inhale 1 puff into the lungs daily       furosemide (LASIX) 20 MG tablet Take 20 mg by mouth three times a week Takes 3 times a week      pregabalin (LYRICA) 150 MG capsule Take 150 mg by mouth 2 times daily.  metFORMIN (GLUCOPHAGE) 500 MG tablet Take 500 mg by mouth 2 times daily (with meals).  telmisartan-hydrochlorothiazide (MICARDIS HCT) 40-12.5 MG per tablet Take 1 tablet by mouth daily.  montelukast (SINGULAIR) 10 MG tablet Take 10 mg by mouth nightly.  levothyroxine (SYNTHROID) 50 MCG tablet Take 50 mcg by mouth Daily.  omeprazole (PRILOSEC) 20 MG capsule Take 20 mg by mouth every evening.  aspirin 81 MG tablet Take 81 mg by mouth daily. No current facility-administered medications on file prior to encounter. General health:  Fairly good. No fever or chills. Skin:  No itching, redness or rash. HEENT:  No headache, epistaxis or sore throat. Neck:  Chronic neck pain, stiffness No masses. Cardiovascular/Respiratory system:  Mild chest congestion with occ green phlegm. No chest pain, palpitation or shortness of breath. Gastrointestinal tract: No abdominal pain, nausea, vomiting, diarrhea or constipation. Genitourinary:  No burning on micturition. No hesitancy, urgency, frequency or discoloration of urine. Locomotor: See HPI. Neuropsychiatric:  No referable complaints. GENERAL PHYSICAL EXAM:     Vitals: /61   Pulse 60   Temp 98.3 °F (36.8 °C) (Oral)   Resp 16   Ht 4' 9\" (1.448 m)   Wt 149 lb (67.6 kg)   SpO2 99%   BMI 32.24 kg/m²  Body mass index is 32.24 kg/m². GENERAL APPEARANCE:   Subha Sterling is 78 y.o.,  female, mildly obese, nourished, conscious, alert. Does not appear to be distress or pain at this time. SKIN:  Warm, dry, no cyanosis or jaundice. HEAD:  Normocephalic, atraumatic, no swelling or tenderness. EYES:  Pupils equal, reactive to light. EARS:  No discharge, no marked hearing loss. NOSE:  No rhinorrhea, epistaxis or septal deformity. THROAT:  Not congested. No ulceration bleeding or discharge. NECK:  No stiffness, trachea central.  No palpable masses or L.N.                 CHEST:  Symmetrical and equal on expansion. HEART:  RRR S1 > S2. No audible murmurs or gallops. LUNGS:  Equal on expansion, normal breath sounds. No adventitious sounds. ABDOMEN:  Obese. Soft on palpation. No localized tenderness. No guarding or rigidity. No palpable organomegaly. LYMPHATICS:  No palpable cervical lymphadenopathy. LOCOMOTOR, BACK AND SPINE: Lumbar tenderness No deformities. EXTREMITIES:  Symmetrical, no pedal edema. Opals sign negative. No discoloration or ulcerations. NEUROLOGIC:  The patient is conscious, alert, oriented, No apparent focal sensory or motor deficits.                                                                                   PROVISIONAL DIAGNOSES / SURGERY:      Lumbar back pain, painful hardware, spinal stimulator Removal.    Patient Active Problem List    Diagnosis Date Noted    Irritable bowel syndrome with constipation     Acute pancreatitis     Abdominal pain, epigastric     Spinal cord stimulator status     Primary osteoarthritis of right hip     Sacroiliitis (HCC)     Chronic pain associated with significant psychosocial dysfunction     Encounter for long-term (current) use of other medications     Encounter for medication counseling     Encounter for medication monitoring     Degenerative lumbar disc 05/13/2014    Lumbar radiculopathy 05/13/2014    Lumbar spondylolysis 05/13/2014    Failed back syndrome of lumbar spine 05/13/2014           YAYA CRUZ PA-C on 1/18/2018 at 2:17 PM

## 2018-01-20 ENCOUNTER — ANESTHESIA EVENT (OUTPATIENT)
Dept: OPERATING ROOM | Age: 78
End: 2018-01-20
Payer: MEDICARE

## 2018-01-20 RX ORDER — SODIUM CHLORIDE, SODIUM LACTATE, POTASSIUM CHLORIDE, CALCIUM CHLORIDE 600; 310; 30; 20 MG/100ML; MG/100ML; MG/100ML; MG/100ML
INJECTION, SOLUTION INTRAVENOUS CONTINUOUS
Status: CANCELLED | OUTPATIENT
Start: 2018-01-20

## 2018-01-20 RX ORDER — SODIUM CHLORIDE 0.9 % (FLUSH) 0.9 %
10 SYRINGE (ML) INJECTION PRN
Status: CANCELLED | OUTPATIENT
Start: 2018-01-20

## 2018-01-20 RX ORDER — SODIUM CHLORIDE 0.9 % (FLUSH) 0.9 %
10 SYRINGE (ML) INJECTION EVERY 12 HOURS SCHEDULED
Status: CANCELLED | OUTPATIENT
Start: 2018-01-20

## 2018-01-20 RX ORDER — LIDOCAINE HYDROCHLORIDE 10 MG/ML
1 INJECTION, SOLUTION EPIDURAL; INFILTRATION; INTRACAUDAL; PERINEURAL
Status: CANCELLED | OUTPATIENT
Start: 2018-01-20 | End: 2018-01-20

## 2018-01-24 ENCOUNTER — APPOINTMENT (OUTPATIENT)
Dept: GENERAL RADIOLOGY | Age: 78
End: 2018-01-24
Attending: ORTHOPAEDIC SURGERY
Payer: MEDICARE

## 2018-01-24 ENCOUNTER — ANESTHESIA (OUTPATIENT)
Dept: OPERATING ROOM | Age: 78
End: 2018-01-24
Payer: MEDICARE

## 2018-01-24 ENCOUNTER — HOSPITAL ENCOUNTER (OUTPATIENT)
Age: 78
Setting detail: OUTPATIENT SURGERY
Discharge: HOME | End: 2018-01-24
Attending: ORTHOPAEDIC SURGERY | Admitting: ORTHOPAEDIC SURGERY
Payer: MEDICARE

## 2018-01-24 VITALS
RESPIRATION RATE: 18 BRPM | BODY MASS INDEX: 32.15 KG/M2 | TEMPERATURE: 96.7 F | WEIGHT: 149 LBS | OXYGEN SATURATION: 98 % | SYSTOLIC BLOOD PRESSURE: 138 MMHG | HEIGHT: 57 IN | HEART RATE: 71 BPM | DIASTOLIC BLOOD PRESSURE: 72 MMHG

## 2018-01-24 VITALS — TEMPERATURE: 96.6 F | SYSTOLIC BLOOD PRESSURE: 102 MMHG | OXYGEN SATURATION: 100 % | DIASTOLIC BLOOD PRESSURE: 54 MMHG

## 2018-01-24 PROCEDURE — 3600000003 HC SURGERY LEVEL 3 BASE: Performed by: ORTHOPAEDIC SURGERY

## 2018-01-24 PROCEDURE — 3209999900 FLUORO FOR SURGICAL PROCEDURES

## 2018-01-24 PROCEDURE — 2720000010 HC SURG SUPPLY STERILE: Performed by: ORTHOPAEDIC SURGERY

## 2018-01-24 PROCEDURE — 2580000003 HC RX 258: Performed by: ORTHOPAEDIC SURGERY

## 2018-01-24 PROCEDURE — 3700000000 HC ANESTHESIA ATTENDED CARE: Performed by: ORTHOPAEDIC SURGERY

## 2018-01-24 PROCEDURE — 88300 SURGICAL PATH GROSS: CPT

## 2018-01-24 PROCEDURE — 2580000003 HC RX 258: Performed by: ANESTHESIOLOGY

## 2018-01-24 PROCEDURE — 2500000003 HC RX 250 WO HCPCS: Performed by: ORTHOPAEDIC SURGERY

## 2018-01-24 PROCEDURE — 72100 X-RAY EXAM L-S SPINE 2/3 VWS: CPT

## 2018-01-24 PROCEDURE — 7100000031 HC ASPR PHASE II RECOVERY - ADDTL 15 MIN: Performed by: ORTHOPAEDIC SURGERY

## 2018-01-24 PROCEDURE — 7100000030 HC ASPR PHASE II RECOVERY - FIRST 15 MIN: Performed by: ORTHOPAEDIC SURGERY

## 2018-01-24 PROCEDURE — A6251 ABSORPT DRG <=16 SQ IN W/O B: HCPCS | Performed by: ORTHOPAEDIC SURGERY

## 2018-01-24 PROCEDURE — 3600000013 HC SURGERY LEVEL 3 ADDTL 15MIN: Performed by: ORTHOPAEDIC SURGERY

## 2018-01-24 PROCEDURE — 3700000001 HC ADD 15 MINUTES (ANESTHESIA): Performed by: ORTHOPAEDIC SURGERY

## 2018-01-24 PROCEDURE — 2500000003 HC RX 250 WO HCPCS: Performed by: NURSE ANESTHETIST, CERTIFIED REGISTERED

## 2018-01-24 PROCEDURE — 7100000000 HC PACU RECOVERY - FIRST 15 MIN: Performed by: ORTHOPAEDIC SURGERY

## 2018-01-24 PROCEDURE — 6360000002 HC RX W HCPCS: Performed by: NURSE ANESTHETIST, CERTIFIED REGISTERED

## 2018-01-24 PROCEDURE — 2580000003 HC RX 258: Performed by: NURSE ANESTHETIST, CERTIFIED REGISTERED

## 2018-01-24 PROCEDURE — 7100000001 HC PACU RECOVERY - ADDTL 15 MIN: Performed by: ORTHOPAEDIC SURGERY

## 2018-01-24 PROCEDURE — 6360000002 HC RX W HCPCS: Performed by: ORTHOPAEDIC SURGERY

## 2018-01-24 RX ORDER — DEXAMETHASONE SODIUM PHOSPHATE 4 MG/ML
INJECTION, SOLUTION INTRA-ARTICULAR; INTRALESIONAL; INTRAMUSCULAR; INTRAVENOUS; SOFT TISSUE PRN
Status: DISCONTINUED | OUTPATIENT
Start: 2018-01-24 | End: 2018-01-24 | Stop reason: SDUPTHER

## 2018-01-24 RX ORDER — FENTANYL CITRATE 50 UG/ML
INJECTION, SOLUTION INTRAMUSCULAR; INTRAVENOUS PRN
Status: DISCONTINUED | OUTPATIENT
Start: 2018-01-24 | End: 2018-01-24 | Stop reason: SDUPTHER

## 2018-01-24 RX ORDER — NEOSTIGMINE METHYLSULFATE 1 MG/ML
INJECTION, SOLUTION INTRAVENOUS PRN
Status: DISCONTINUED | OUTPATIENT
Start: 2018-01-24 | End: 2018-01-24 | Stop reason: SDUPTHER

## 2018-01-24 RX ORDER — MORPHINE SULFATE 2 MG/ML
2 INJECTION, SOLUTION INTRAMUSCULAR; INTRAVENOUS EVERY 5 MIN PRN
Status: DISCONTINUED | OUTPATIENT
Start: 2018-01-24 | End: 2018-01-24 | Stop reason: HOSPADM

## 2018-01-24 RX ORDER — LIDOCAINE HYDROCHLORIDE 10 MG/ML
1 INJECTION, SOLUTION EPIDURAL; INFILTRATION; INTRACAUDAL; PERINEURAL
Status: DISCONTINUED | OUTPATIENT
Start: 2018-01-24 | End: 2018-01-24 | Stop reason: HOSPADM

## 2018-01-24 RX ORDER — LIDOCAINE HYDROCHLORIDE 10 MG/ML
INJECTION, SOLUTION EPIDURAL; INFILTRATION; INTRACAUDAL; PERINEURAL PRN
Status: DISCONTINUED | OUTPATIENT
Start: 2018-01-24 | End: 2018-01-24 | Stop reason: SDUPTHER

## 2018-01-24 RX ORDER — SODIUM CHLORIDE, SODIUM LACTATE, POTASSIUM CHLORIDE, CALCIUM CHLORIDE 600; 310; 30; 20 MG/100ML; MG/100ML; MG/100ML; MG/100ML
INJECTION, SOLUTION INTRAVENOUS CONTINUOUS PRN
Status: DISCONTINUED | OUTPATIENT
Start: 2018-01-24 | End: 2018-01-24 | Stop reason: SDUPTHER

## 2018-01-24 RX ORDER — PROPOFOL 10 MG/ML
INJECTION, EMULSION INTRAVENOUS PRN
Status: DISCONTINUED | OUTPATIENT
Start: 2018-01-24 | End: 2018-01-24 | Stop reason: SDUPTHER

## 2018-01-24 RX ORDER — SODIUM CHLORIDE 0.9 % (FLUSH) 0.9 %
10 SYRINGE (ML) INJECTION PRN
Status: DISCONTINUED | OUTPATIENT
Start: 2018-01-24 | End: 2018-01-24 | Stop reason: HOSPADM

## 2018-01-24 RX ORDER — ONDANSETRON 2 MG/ML
INJECTION INTRAMUSCULAR; INTRAVENOUS PRN
Status: DISCONTINUED | OUTPATIENT
Start: 2018-01-24 | End: 2018-01-24 | Stop reason: SDUPTHER

## 2018-01-24 RX ORDER — DIPHENHYDRAMINE HYDROCHLORIDE 50 MG/ML
12.5 INJECTION INTRAMUSCULAR; INTRAVENOUS
Status: DISCONTINUED | OUTPATIENT
Start: 2018-01-24 | End: 2018-01-24 | Stop reason: HOSPADM

## 2018-01-24 RX ORDER — MIDAZOLAM HYDROCHLORIDE 1 MG/ML
INJECTION INTRAMUSCULAR; INTRAVENOUS PRN
Status: DISCONTINUED | OUTPATIENT
Start: 2018-01-24 | End: 2018-01-24 | Stop reason: SDUPTHER

## 2018-01-24 RX ORDER — LABETALOL HYDROCHLORIDE 5 MG/ML
5 INJECTION, SOLUTION INTRAVENOUS EVERY 10 MIN PRN
Status: DISCONTINUED | OUTPATIENT
Start: 2018-01-24 | End: 2018-01-24 | Stop reason: HOSPADM

## 2018-01-24 RX ORDER — HYDROMORPHONE HCL 110MG/55ML
0.5 PATIENT CONTROLLED ANALGESIA SYRINGE INTRAVENOUS EVERY 5 MIN PRN
Status: DISCONTINUED | OUTPATIENT
Start: 2018-01-24 | End: 2018-01-24 | Stop reason: HOSPADM

## 2018-01-24 RX ORDER — LIDOCAINE HYDROCHLORIDE AND EPINEPHRINE BITARTRATE 20; .01 MG/ML; MG/ML
INJECTION, SOLUTION SUBCUTANEOUS PRN
Status: DISCONTINUED | OUTPATIENT
Start: 2018-01-24 | End: 2018-01-24 | Stop reason: HOSPADM

## 2018-01-24 RX ORDER — ONDANSETRON 2 MG/ML
4 INJECTION INTRAMUSCULAR; INTRAVENOUS
Status: DISCONTINUED | OUTPATIENT
Start: 2018-01-24 | End: 2018-01-24 | Stop reason: HOSPADM

## 2018-01-24 RX ORDER — SODIUM CHLORIDE, SODIUM LACTATE, POTASSIUM CHLORIDE, CALCIUM CHLORIDE 600; 310; 30; 20 MG/100ML; MG/100ML; MG/100ML; MG/100ML
INJECTION, SOLUTION INTRAVENOUS CONTINUOUS
Status: DISCONTINUED | OUTPATIENT
Start: 2018-01-24 | End: 2018-01-24 | Stop reason: HOSPADM

## 2018-01-24 RX ORDER — GLYCOPYRROLATE 0.2 MG/ML
INJECTION INTRAMUSCULAR; INTRAVENOUS PRN
Status: DISCONTINUED | OUTPATIENT
Start: 2018-01-24 | End: 2018-01-24 | Stop reason: SDUPTHER

## 2018-01-24 RX ORDER — ROCURONIUM BROMIDE 10 MG/ML
INJECTION, SOLUTION INTRAVENOUS PRN
Status: DISCONTINUED | OUTPATIENT
Start: 2018-01-24 | End: 2018-01-24 | Stop reason: SDUPTHER

## 2018-01-24 RX ORDER — SODIUM CHLORIDE 0.9 % (FLUSH) 0.9 %
10 SYRINGE (ML) INJECTION EVERY 12 HOURS SCHEDULED
Status: DISCONTINUED | OUTPATIENT
Start: 2018-01-24 | End: 2018-01-24 | Stop reason: HOSPADM

## 2018-01-24 RX ORDER — MEPERIDINE HYDROCHLORIDE 50 MG/ML
12.5 INJECTION INTRAMUSCULAR; INTRAVENOUS; SUBCUTANEOUS EVERY 5 MIN PRN
Status: DISCONTINUED | OUTPATIENT
Start: 2018-01-24 | End: 2018-01-24 | Stop reason: HOSPADM

## 2018-01-24 RX ADMIN — CEFAZOLIN 2 G: 1 INJECTION, POWDER, FOR SOLUTION INTRAMUSCULAR; INTRAVENOUS at 14:55

## 2018-01-24 RX ADMIN — SODIUM CHLORIDE, POTASSIUM CHLORIDE, SODIUM LACTATE AND CALCIUM CHLORIDE: 600; 310; 30; 20 INJECTION, SOLUTION INTRAVENOUS at 11:55

## 2018-01-24 RX ADMIN — SODIUM CHLORIDE, POTASSIUM CHLORIDE, SODIUM LACTATE AND CALCIUM CHLORIDE: 600; 310; 30; 20 INJECTION, SOLUTION INTRAVENOUS at 14:35

## 2018-01-24 RX ADMIN — MIDAZOLAM 2 MG: 1 INJECTION INTRAMUSCULAR; INTRAVENOUS at 14:35

## 2018-01-24 RX ADMIN — ONDANSETRON 4 MG: 2 INJECTION INTRAMUSCULAR; INTRAVENOUS at 15:13

## 2018-01-24 RX ADMIN — GLYCOPYRROLATE 0.6 MG: 0.2 INJECTION, SOLUTION INTRAMUSCULAR; INTRAVENOUS at 15:13

## 2018-01-24 RX ADMIN — DEXAMETHASONE SODIUM PHOSPHATE 4 MG: 4 INJECTION, SOLUTION INTRAMUSCULAR; INTRAVENOUS at 15:13

## 2018-01-24 RX ADMIN — NEOSTIGMINE METHYLSULFATE 3 MG: 1 INJECTION, SOLUTION INTRAVENOUS at 15:13

## 2018-01-24 RX ADMIN — PROPOFOL 150 MG: 10 INJECTION, EMULSION INTRAVENOUS at 14:38

## 2018-01-24 RX ADMIN — LIDOCAINE HYDROCHLORIDE 50 MG: 10 INJECTION, SOLUTION EPIDURAL; INFILTRATION; INTRACAUDAL; PERINEURAL at 14:38

## 2018-01-24 RX ADMIN — FENTANYL CITRATE 100 MCG: 50 INJECTION, SOLUTION INTRAMUSCULAR; INTRAVENOUS at 14:38

## 2018-01-24 RX ADMIN — ROCURONIUM BROMIDE 30 MG: 10 INJECTION INTRAVENOUS at 14:38

## 2018-01-24 ASSESSMENT — PULMONARY FUNCTION TESTS
PIF_VALUE: 31
PIF_VALUE: 26
PIF_VALUE: 15
PIF_VALUE: 26
PIF_VALUE: 28
PIF_VALUE: 2
PIF_VALUE: 22
PIF_VALUE: 27
PIF_VALUE: 2
PIF_VALUE: 0
PIF_VALUE: 26
PIF_VALUE: 1
PIF_VALUE: 20
PIF_VALUE: 15
PIF_VALUE: 25
PIF_VALUE: 1
PIF_VALUE: 27
PIF_VALUE: 24
PIF_VALUE: 22
PIF_VALUE: 5
PIF_VALUE: 15
PIF_VALUE: 26
PIF_VALUE: 27
PIF_VALUE: 27
PIF_VALUE: 26
PIF_VALUE: 24
PIF_VALUE: 2
PIF_VALUE: 27
PIF_VALUE: 15
PIF_VALUE: 1
PIF_VALUE: 15
PIF_VALUE: 26
PIF_VALUE: 18
PIF_VALUE: 1
PIF_VALUE: 27
PIF_VALUE: 27
PIF_VALUE: 2
PIF_VALUE: 2
PIF_VALUE: 26
PIF_VALUE: 27
PIF_VALUE: 15
PIF_VALUE: 15
PIF_VALUE: 6
PIF_VALUE: 26

## 2018-01-24 ASSESSMENT — PAIN SCALES - GENERAL
PAINLEVEL_OUTOF10: 0

## 2018-01-24 ASSESSMENT — PAIN - FUNCTIONAL ASSESSMENT: PAIN_FUNCTIONAL_ASSESSMENT: 0-10

## 2018-01-24 ASSESSMENT — ENCOUNTER SYMPTOMS
SHORTNESS OF BREATH: 0
STRIDOR: 0

## 2018-01-24 ASSESSMENT — LIFESTYLE VARIABLES: SMOKING_STATUS: 0

## 2018-01-24 NOTE — ANESTHESIA PRE PROCEDURE
hypertension: no interval change,     (-) pacemaker, valvular problems/murmurs, past MI, CAD, CABG/stent, dysrhythmias,  angina,  CHF, orthopnea, PND,  ODEN, murmur, weak pulses,  friction rub, systolic click, carotid bruit,  JVD and peripheral edema    ECG reviewed  Rhythm: regular  Rate: normal           Beta Blocker:  Not on Beta Blocker         Neuro/Psych:   (+) neuromuscular disease:,    (-) seizures, TIA, CVA, headaches, psychiatric history and depression/anxiety            GI/Hepatic/Renal: Neg GI/Hepatic/Renal ROS       (-) hiatal hernia, GERD, PUD, hepatitis, liver disease, no renal disease, bowel prep and no morbid obesity       Endo/Other:    (+) Type II DM, , hypothyroidism: arthritis: OA., no malignancy/cancer. (-) hyperthyroidism, blood dyscrasia, no Type I DM, no electrolyte abnormalities, no malignancy/cancer               Abdominal:           Vascular: negative vascular ROS. - PVD, DVT and PE. Anesthesia Plan      MAC and general     ASA 3       Induction: intravenous. MIPS: Postoperative opioids intended and Prophylactic antiemetics administered. Anesthetic plan and risks discussed with patient. Plan discussed with CRNA.                   Cuong Eason MD   1/24/2018

## 2018-01-24 NOTE — INTERVAL H&P NOTE
HISTORY and [facility] [address]       NAME:  Demar Santacruz  MRN: [id number]   YOB: 1940   Date: 1/24/2018   Age: 78 y.o. Gender: female     H&P Update Note    H&P from 01/18/2018  reviewed and updated, Patient examined. Pt just recovering from a cold. sl inspiratory coarseness to post bases, silverio. No fever or chills. No chest pain or SOB. Vitals: BP (!) 105/52   Pulse 58   Temp 98.6 °F (37 °C) (Oral)   Resp 16   Ht 4' 9\" (1.448 m)   Wt 149 lb (67.6 kg)   SpO2 95%   BMI 32.24 kg/m²  Body mass index is 32.24 kg/m². No interval changes. I concur with the findings.        NAGI CONDE, CNP on 1/24/2018 at 12:50 PM

## 2018-01-24 NOTE — OP NOTE
The power source was dissected. The wire loops were  freed and dissected. They were followed to the cephalad aspect of the  pocket where they started to go anterior to their context apparently with  the spinal hardware. The wire connector and a wire extension were then pulled out. On one side,  a small tip of a secondary wire extension-type device remained implanted in  the soft tissue, presumably potentially in bone as well as the scar that  surrounds the screw malachi construct. As this is not  to cause any significant problems with MRIs in the  future and dissection to remove, it was anticipated to cause much greater  problems than leaving it alone. We elected to leave this small portion of  the wire remaining. Deep fascial layer was reapproximated with a #2-0  Vicryl suture, subcuticular layer with 2-0 Vicryl, followed by a 3-0 Vicryl  skin closure. Sterile dressing was applied. The patient was awakened from  anesthesia, taken to the recovery room in stable condition. Complications  arising during operation, none noted.         JOVANNA Rubin    D: 01/24/2018 15:36:38       T: 01/24/2018 17:43:07     [id number]  Job#: [id number]     Doc#: [id number]    CC:

## 2018-01-24 NOTE — BRIEF OP NOTE
Brief Postoperative Note    Amy Glass  YOB: 1940  [id number]    Pre-operative Diagnosis: retained hardware    Post-operative Diagnosis: Same    Procedure: removal hardware deep with fluroscopy    Anesthesia: General    Surgeons/Assistants: Ana    Estimated Blood Loss: less than 50     Complications: None    Specimens: Was Not Obtained    Findings: see dictation    Electronically signed by Cherry Phan MD on 1/24/2018 at 3:20 PM

## 2018-01-24 NOTE — H&P (VIEW-ONLY)
Earliest Fill Date: 1/4/18 60 tablet 0    linaclotide (LINZESS) 145 MCG capsule Take 1 capsule by mouth every morning (before breakfast) 30 capsule 0    diclofenac sodium 1 % GEL Apply 2 g topically 2 times daily 1 Tube 0    QVAR 40 MCG/ACT inhaler Inhale 1 puff into the lungs daily       furosemide (LASIX) 20 MG tablet Take 20 mg by mouth three times a week Takes 3 times a week      pregabalin (LYRICA) 150 MG capsule Take 150 mg by mouth 2 times daily.  metFORMIN (GLUCOPHAGE) 500 MG tablet Take 500 mg by mouth 2 times daily (with meals).  telmisartan-hydrochlorothiazide (MICARDIS HCT) 40-12.5 MG per tablet Take 1 tablet by mouth daily.  montelukast (SINGULAIR) 10 MG tablet Take 10 mg by mouth nightly.  levothyroxine (SYNTHROID) 50 MCG tablet Take 50 mcg by mouth Daily.  omeprazole (PRILOSEC) 20 MG capsule Take 20 mg by mouth every evening.  aspirin 81 MG tablet Take 81 mg by mouth daily. No current facility-administered medications on file prior to encounter. General health:  Fairly good. No fever or chills. Skin:  No itching, redness or rash. HEENT:  No headache, epistaxis or sore throat. Neck:  Chronic neck pain, stiffness No masses. Cardiovascular/Respiratory system:  Mild chest congestion with occ green phlegm. No chest pain, palpitation or shortness of breath. Gastrointestinal tract: No abdominal pain, nausea, vomiting, diarrhea or constipation. Genitourinary:  No burning on micturition. No hesitancy, urgency, frequency or discoloration of urine. Locomotor: See HPI. Neuropsychiatric:  No referable complaints. GENERAL PHYSICAL EXAM:     Vitals: /61   Pulse 60   Temp 98.3 °F (36.8 °C) (Oral)   Resp 16   Ht 4' 9\" (1.448 m)   Wt 149 lb (67.6 kg)   SpO2 99%   BMI 32.24 kg/m²  Body mass index is 32.24 kg/m².

## 2018-01-24 NOTE — ANESTHESIA POSTPROCEDURE EVALUATION
POST- ANESTHESIA EVALUATION       Pt Name: Joyce Mares  MRN: [id number]  YOB: 1940  Date of evaluation: 1/24/2018  Time:  4:11 PM      /88   Pulse 87   Temp 97.5 °F (36.4 °C) (Infrared)   Resp 16   Ht 4' 9\" (1.448 m)   Wt 149 lb (67.6 kg)   SpO2 99%   BMI 32.24 kg/m²      Consciousness Level  Awake  Cardiopulmonary Status  Stable  Pain Adequately Treated YES  Nausea / Vomiting  NO  Adequate Hydration  YES  Anesthesia Related Complications NONE      Electronically signed by Elida Baca MD on 1/24/2018 at 4:11 PM       Department of Anesthesiology  Postprocedure Note    Patient: Joyce Mares  MRN: [id number]  [place]: 1940  Date of evaluation: 1/24/2018  Time:  4:10 PM     Procedure Summary     Date:  01/24/18 Room / Location:  [address] [place] [address] [address] / [address]    Anesthesia Start:  4243 Anesthesia Stop:  8787    Procedure:  SPINAL HARDWARE REMOVAL LUMBAR BONE STIMULATOR (N/A ) Diagnosis:  (PAINFUL LUMBAR HARDWARE)    Surgeon:  Davey Snow MD Responsible Provider:  Elida Baca MD    Anesthesia Type:  MAC, general ASA Status:  3          Anesthesia Type: MAC, general    Ana Phase I: Ana Score: 7    Ana Phase II:      Last vitals: Reviewed and per EMR flowsheets.        Anesthesia Post Evaluation

## 2018-01-26 LAB — SURGICAL PATHOLOGY REPORT: NORMAL

## 2018-02-02 ENCOUNTER — HOSPITAL ENCOUNTER (OUTPATIENT)
Dept: PAIN MANAGEMENT | Age: 78
Discharge: HOME | End: 2018-02-02
Payer: MEDICARE

## 2018-02-02 VITALS
RESPIRATION RATE: 16 BRPM | SYSTOLIC BLOOD PRESSURE: 118 MMHG | OXYGEN SATURATION: 98 % | HEART RATE: 70 BPM | TEMPERATURE: 99.3 F | DIASTOLIC BLOOD PRESSURE: 58 MMHG

## 2018-02-02 DIAGNOSIS — M51.36 DEGENERATIVE LUMBAR DISC: ICD-10-CM

## 2018-02-02 DIAGNOSIS — M54.16 LUMBAR RADICULOPATHY: ICD-10-CM

## 2018-02-02 DIAGNOSIS — Z51.81 ENCOUNTER FOR MEDICATION MONITORING: ICD-10-CM

## 2018-02-02 DIAGNOSIS — Z71.89 ENCOUNTER FOR MEDICATION COUNSELING: ICD-10-CM

## 2018-02-02 DIAGNOSIS — G89.4 CHRONIC PAIN ASSOCIATED WITH SIGNIFICANT PSYCHOSOCIAL DYSFUNCTION: ICD-10-CM

## 2018-02-02 DIAGNOSIS — M47.816 LUMBAR SPONDYLOSIS: ICD-10-CM

## 2018-02-02 DIAGNOSIS — M43.06 LUMBAR SPONDYLOLYSIS: ICD-10-CM

## 2018-02-02 DIAGNOSIS — M51.36 DDD (DEGENERATIVE DISC DISEASE), LUMBAR: Primary | ICD-10-CM

## 2018-02-02 DIAGNOSIS — M16.11 PRIMARY OSTEOARTHRITIS OF RIGHT HIP: ICD-10-CM

## 2018-02-02 DIAGNOSIS — M96.1 FAILED BACK SYNDROME OF LUMBAR SPINE: ICD-10-CM

## 2018-02-02 DIAGNOSIS — M46.1 SACROILIITIS (HCC): ICD-10-CM

## 2018-02-02 DIAGNOSIS — M96.1 FAILED BACK SYNDROME: ICD-10-CM

## 2018-02-02 PROCEDURE — 99213 OFFICE O/P EST LOW 20 MIN: CPT | Performed by: NURSE PRACTITIONER

## 2018-02-02 PROCEDURE — 99213 OFFICE O/P EST LOW 20 MIN: CPT

## 2018-02-02 RX ORDER — MORPHINE SULFATE 30 MG/1
30 TABLET, FILM COATED, EXTENDED RELEASE ORAL 2 TIMES DAILY
Qty: 60 TABLET | Refills: 0 | Status: SHIPPED | OUTPATIENT
Start: 2018-02-03 | End: 2018-02-26 | Stop reason: SDUPTHER

## 2018-02-02 RX ORDER — OXYCODONE AND ACETAMINOPHEN 10; 325 MG/1; MG/1
1 TABLET ORAL EVERY 6 HOURS PRN
Qty: 120 TABLET | Refills: 0 | Status: SHIPPED | OUTPATIENT
Start: 2018-02-03 | End: 2018-02-26 | Stop reason: SDUPTHER

## 2018-02-02 ASSESSMENT — ENCOUNTER SYMPTOMS
CONSTIPATION: 1
BACK PAIN: 1
RESPIRATORY NEGATIVE: 1

## 2018-02-02 NOTE — PROGRESS NOTES
Amphetamines, urine Not Detected   Final 10/23/2017  2:11 PM [facility]   Barbiturates, Ur Not Detected   Final 10/23/2017  2:11 PM [facility]   Benzoylecgonine, Ur Not Detected   Final 10/23/2017  2:11 PM [facility]   Buprenorphine Urine Not Detected   Final 10/23/2017  2:11 PM [facility]   Carisoprodol, Ur Not Detected   Final 10/23/2017  2:11 PM [facility]   (NOTE)   The carisoprodol immunoassay has cross-reactivity to carisoprodol   and meprobamate.     Clonazepam, Urine Not Detected   Final 10/23/2017  2:11 PM [facility]   Codeine, Urine Not Detected   Final 10/23/2017  2:11 PM [facility]   MDA, Ur Not Detected   Final 10/23/2017  2:11 PM [facility]   Diazepam, Urine Not Detected   Final 10/23/2017  2:11 PM [facility]   Ethyl Glucuronide Ur Not Detected   Final 10/23/2017  2:11 PM [facility]   Fentanyl, Ur Not Detected   Final 10/23/2017  2:11 PM [facility]   Hydrocodone, Urine Not Detected   Final 10/23/2017  2:11 PM [facility]   Hydromorphone, Urine Not Detected   Final 10/23/2017  2:11 PM [facility]   Lorazepam, Urine Not Detected   Final 10/23/2017  2:11 PM [facility]   Marijuana Metab, Ur Not Detected   Final 10/23/2017  2:11 PM [facility]   MDEA, CRYSTAL, Ur Not Detected   Final 10/23/2017  2:11 PM [facility]   MDMA URINE Not Detected   Final 10/23/2017  2:11 PM [facility]   Meperidine Metab, Ur Not Detected   Final 10/23/2017  2:11 PM [facility]   Methadone, Urine Not Detected   Final 10/23/2017  2:11 PM [facility]   Methamphetamine, Urine Not Detected   Final 10/23/2017  2:11 PM [facility]   Methylphenidate Not Detected   Final 10/23/2017  2:11 PM [facility]   Midazolam, Urine Not Detected   Final 10/23/2017  2:11 PM [facility]   Morphine Urine Present   Final 10/23/2017  2:11 PM [facility]   Norbuprenorphine, Urine Not Detected   Final 10/23/2017  2:11 PM [facility]   Nordiazepam, Urine Not Detected   Final 10/23/2017  2:11 PM [facility]   Norfentanyl, Urine Not Detected   Final 10/23/2017  2:11 PM [facility]   NORHYDROCODONE, URINE Not Detected   Final 10/23/2017  2:11 PM [facility]   Noroxycodone, Urine Present   Final 10/23/2017  2:11 PM [facility]   NOROXYMORPHONE, URINE Not Detected   Final 10/23/2017  2:11 PM [facility]   Oxazepam, Urine Not Detected   Final 10/23/2017  2:11 PM [facility]   Oxycodone Urine Present   Final 10/23/2017  2:11 PM [facility]   Oxymorphone, Urine Not Detected   Final 10/23/2017  2:11 PM [facility]   PCP, Urine Not Detected   Final 10/23/2017  2:11 PM [facility]   Phentermine, Ur Not Detected   Final 10/23/2017  2:11 PM [facility]   Propoxyphene, Urine Not Detected   Final 10/23/2017  2:11 PM [facility]   Tapentadol-O-Sulfate, Urine Not Detected   Final 10/23/2017  2:11 PM [facility]   Tapentadol, Urine Not Detected   Final 10/23/2017  2:11 PM [facility]   Temazepam, Urine Not Detected   Final 10/23/2017  2:11 PM [facility]   Tramadol, Urine Not Detected   Final 10/23/2017  2:11 PM [facility]   Zolpidem, Urine Not Detected   Final 10/23/2017  2:11 PM [facility]   Drugs Expected, Ur   Final 10/23/2017  2:11 PM [phone] AT 6AM, [place] ON [id number] AT 6AM AND 12 NOON    Creatinine, Ur 76.1  20.0 - 400.0 mg/dL Final 10/23/2017  2:11 PM [facility]   Pain Mgt Drug Panel, Hi Res, Ur See Below   Final 10/23/2017  2:11 PM [facility]   (NOTE)   Methodology: Qualitative Enzyme Immunoassay and Qualitative Liquid   Chromatography-Time of Flight-Mass Spectrometry or Tandem Mass   Spectrometry, Quantitative Spectrophotometry   The absence of expected drug(s) and/or drug metabolite(s) may   indicate non-compliance, inappropriate timing of specimen   collection relative to drug administration, poor drug absorption,   diluted/adulterated urine, or limitations of testing. The   concentration must be greater than or equal to the cutoff to be   reported as present.  If specific drug concentrations are   required, contact the laboratory within two weeks of specimen   collection to request quantification by a second analytical   technique. Interpretive questions should be directed to the   laboratory.    Results based on immunoassay detection that do not match clinical   expectations should be   interpreted with caution. Confirmatory testing by mass   spectrometry for immunoassay-based results is available, if   ordered within two weeks of specimen collection. Additional   charges apply. For medical purposes only; not valid for forensic use. This test was developed and its performance characteristics   determined by [facility]. The U.S. Food and Drug   Administration has not approved or cleared this test; however, FDA   clearance or approval is not currently required for clinical use. The results are not intended to be used as the sole means for   clinical diagnosis or patient management decisions. EER Hi Res Interp Ur See Note   Final 10/23/2017  2:11 PM [facility]   (NOTE)   Access [facility] Enhanced Report using either link below:   -Direct access: [email or web address]   -Enter Username, Password: [email or web address]   Username: [email or web address]   Password: [email or web address]   Performed by [facility] [phone]   www. Lebron Diggs MD, Lab. Director   Performed at [facility]: SALINA ZHAO [address] [place] [address] [phone]    Testing Performed By       Assessment:    Problem List Items Addressed This Visit     DDD (degenerative disc disease), lumbar - Primary    Relevant Medications    oxyCODONE-acetaminophen (PERCOCET)  MG per tablet (Start on 2/3/2018)    morphine (MS CONTIN) 30 MG extended release tablet (Start on 2/3/2018)    Lumbar radiculopathy    Lumbar spondylolysis    Failed back syndrome of lumbar spine    Chronic pain associated with significant psychosocial dysfunction    Encounter for medication counseling    Encounter for medication monitoring    Primary osteoarthritis of right hip    Relevant Medications    oxyCODONE-acetaminophen (PERCOCET)  MG per tablet (Start on 2/3/2018)    morphine (MS CONTIN) 30 MG extended release tablet (Start on 2/3/2018)    Sacroiliitis (HCC)    Relevant Medications contin  Samples of  movantik 25 mg  Contract requirements met. Satisfactory pain management plan. Medication helps with personal goals and self care needs. Patient is stable on current regimen of meds and medication is effectively managing pain, we will continue current medications without changes. As always, we encourage daily stretching and strengthening exercises, and recommend minimizing use of pain medications unless patient cannot get through daily activities due to pain.   Follow up appointment made for 4 weeks

## 2018-02-06 ENCOUNTER — OFFICE VISIT (OUTPATIENT)
Dept: ORTHOPEDIC SURGERY | Age: 78
End: 2018-02-06

## 2018-02-06 DIAGNOSIS — M50.30 DDD (DEGENERATIVE DISC DISEASE), CERVICAL: ICD-10-CM

## 2018-02-06 DIAGNOSIS — M51.36 DDD (DEGENERATIVE DISC DISEASE), LUMBAR: Primary | ICD-10-CM

## 2018-02-06 DIAGNOSIS — M43.06 LUMBAR SPONDYLOLYSIS: ICD-10-CM

## 2018-02-06 DIAGNOSIS — M96.1 FAILED BACK SYNDROME OF LUMBAR SPINE: ICD-10-CM

## 2018-02-06 DIAGNOSIS — Z98.890 S/P HARDWARE REMOVAL: ICD-10-CM

## 2018-02-06 DIAGNOSIS — M54.12 CERVICAL RADICULAR PAIN: ICD-10-CM

## 2018-02-06 DIAGNOSIS — M54.2 NECK PAIN: ICD-10-CM

## 2018-02-06 PROCEDURE — 99024 POSTOP FOLLOW-UP VISIT: CPT | Performed by: ORTHOPAEDIC SURGERY

## 2018-02-26 ENCOUNTER — HOSPITAL ENCOUNTER (OUTPATIENT)
Dept: PAIN MANAGEMENT | Age: 78
Discharge: HOME | End: 2018-02-26
Payer: MEDICARE

## 2018-02-26 DIAGNOSIS — M47.816 LUMBAR SPONDYLOSIS: ICD-10-CM

## 2018-02-26 DIAGNOSIS — M51.36 DDD (DEGENERATIVE DISC DISEASE), LUMBAR: ICD-10-CM

## 2018-02-26 DIAGNOSIS — M96.1 FAILED BACK SYNDROME: ICD-10-CM

## 2018-02-26 PROCEDURE — 99213 OFFICE O/P EST LOW 20 MIN: CPT | Performed by: NURSE PRACTITIONER

## 2018-02-26 PROCEDURE — 99214 OFFICE O/P EST MOD 30 MIN: CPT

## 2018-02-26 RX ORDER — OMEGA-3/DHA/EPA/FISH OIL 60 MG-90MG
CAPSULE ORAL
COMMUNITY
End: 2018-04-30 | Stop reason: ALTCHOICE

## 2018-02-26 RX ORDER — OXYCODONE AND ACETAMINOPHEN 10; 325 MG/1; MG/1
1 TABLET ORAL EVERY 6 HOURS PRN
Qty: 120 TABLET | Refills: 0 | Status: SHIPPED | OUTPATIENT
Start: 2018-03-05 | End: 2018-03-29 | Stop reason: SDUPTHER

## 2018-02-26 RX ORDER — DIPHENHYDRAMINE HCL 25 MG
25 CAPSULE ORAL EVERY 6 HOURS PRN
COMMUNITY
End: 2018-04-30 | Stop reason: ALTCHOICE

## 2018-02-26 RX ORDER — MORPHINE SULFATE 30 MG/1
30 TABLET, FILM COATED, EXTENDED RELEASE ORAL 2 TIMES DAILY
Qty: 60 TABLET | Refills: 0 | Status: SHIPPED | OUTPATIENT
Start: 2018-03-05 | End: 2018-03-27 | Stop reason: SDUPTHER

## 2018-02-26 RX ORDER — LORATADINE 10 MG/1
10 CAPSULE, LIQUID FILLED ORAL DAILY
COMMUNITY
End: 2018-04-30 | Stop reason: ALTCHOICE

## 2018-02-26 ASSESSMENT — ENCOUNTER SYMPTOMS
CONSTIPATION: 0
SHORTNESS OF BREATH: 0
COUGH: 0
BACK PAIN: 1

## 2018-02-26 NOTE — PROGRESS NOTES
Take 25 mg by mouth every 6 hours as needed for Itching, Disp: , Rfl:     loratadine (CLARITIN) 10 MG capsule, Take 10 mg by mouth daily, Disp: , Rfl:     oxyCODONE-acetaminophen (PERCOCET)  MG per tablet, Take 1 tablet by mouth every 6 hours as needed for Pain for up to 30 days. Earliest Fill Date: 2/3/18, Disp: 120 tablet, Rfl: 0    morphine (MS CONTIN) 30 MG extended release tablet, Take 1 tablet by mouth 2 times daily for 30 days. Earliest Fill Date: 2/3/18, Disp: 60 tablet, Rfl: 0    diclofenac sodium 1 % GEL, Apply 2 g topically 2 times daily, Disp: 1 Tube, Rfl: 0    QVAR 40 MCG/ACT inhaler, Inhale 1 puff into the lungs daily , Disp: , Rfl:     furosemide (LASIX) 20 MG tablet, Take 20 mg by mouth three times a week Takes 3 times a week, Disp: , Rfl:     pregabalin (LYRICA) 150 MG capsule, Take 150 mg by mouth 2 times daily. , Disp: , Rfl:     metFORMIN (GLUCOPHAGE) 500 MG tablet, Take 500 mg by mouth 2 times daily (with meals). , Disp: , Rfl:     telmisartan-hydrochlorothiazide (MICARDIS HCT) 40-12.5 MG per tablet, Take 1 tablet by mouth daily. , Disp: , Rfl:     montelukast (SINGULAIR) 10 MG tablet, Take 10 mg by mouth nightly., Disp: , Rfl:     levothyroxine (SYNTHROID) 50 MCG tablet, Take 50 mcg by mouth Daily. , Disp: , Rfl:     omeprazole (PRILOSEC) 20 MG capsule, Take 20 mg by mouth every evening., Disp: , Rfl:     aspirin 81 MG tablet, Take 81 mg by mouth daily. , Disp: , Rfl:     Family History   Problem Relation Age of Onset    Heart Failure Mother     Other Father       pneumonia    Alzheimer's Disease Father        Social History     Social History    Marital status:      Spouse name: N/A    Number of children: N/A    Years of education: N/A     Occupational History    retired      Social History Main Topics    Smoking status: Never Smoker    Smokeless tobacco: Never Used    Alcohol use Yes      Comment: rare    Drug use: No    Sexual activity: Not on file     Other Urine Not Detected   Final 10/23/2017  2:11 PM [facility]  Methamphetamine, Urine Not Detected   Final 10/23/2017  2:11 PM [facility]  Methylphenidate Not Detected   Final 10/23/2017  2:11 PM [facility]  Midazolam, Urine Not Detected   Final 10/23/2017  2:11 PM [facility]  Morphine Urine Present   Final 10/23/2017  2:11 PM [facility]  Norbuprenorphine, Urine Not Detected   Final 10/23/2017  2:11 PM [facility]  Nordiazepam, Urine Not Detected   Final 10/23/2017  2:11 PM [facility]  Norfentanyl, Urine Not Detected   Final 10/23/2017  2:11 PM [facility]  NORHYDROCODONE, URINE Not Detected   Final 10/23/2017  2:11 PM [facility]  Noroxycodone, Urine Present   Final 10/23/2017  2:11 PM [facility]  NOROXYMORPHONE, URINE Not Detected   Final 10/23/2017  2:11 PM [facility]  Oxazepam, Urine Not Detected   Final 10/23/2017  2:11 PM [facility]  Oxycodone Urine Present   Final 10/23/2017  2:11 PM [facility]  Oxymorphone, Urine Not Detected   Final 10/23/2017  2:11 PM [facility]  PCP, Urine Not Detected   Final 10/23/2017  2:11 PM [facility]  Phentermine, Ur Not Detected   Final 10/23/2017  2:11 PM [facility]  Propoxyphene, Urine Not Detected   Final 10/23/2017  2:11 PM [facility]  Tapentadol-O-Sulfate, Urine Not Detected   Final 10/23/2017  2:11 PM [facility]  Tapentadol, Urine Not Detected   Final 10/23/2017  2:11 PM [facility]  Temazepam, Urine Not Detected   Final 10/23/2017  2:11 PM [facility]  Tramadol, Urine Not Detected   Final 10/23/2017  2:11 PM [facility]  Zolpidem, Urine Not Detected   Final 10/23/2017  2:11 PM [facility]  Drugs Expected, Ur   Final 10/23/2017  2:11 PM [phone] AT 6AM, [place] ON [phone] AT 6AM AND 12 NOON   Creatinine, Ur 76.1  20.0 - 400.0 mg/dL Final 10/23/2017  2:11 PM [facility]  Pain Mgt Drug Panel, Hi Res, Ur See Below   Final 10/23/2017  2:11 PM [facility]  (NOTE)   Methodology: Qualitative Enzyme Immunoassay and Qualitative Liquid   Chromatography-Time of Flight-Mass Spectrometry or Tandem Mass   Spectrometry, Quantitative Spectrophotometry   The absence of expected drug(s) and/or drug on 3/5/2018)    morphine (MS CONTIN) 30 MG extended release tablet (Start on 3/5/2018)            Treatment Plan:  DISCUSSION: Treatment options discussed with patient and all questions answered to patient's satisfaction. TREATMENT OPTIONS:   Continue current medication  Contract compliance requirements are met. Satisfactory pain management plan. Medications help with personal goals and self-care needs. Follow up appointment scheduled.

## 2018-03-01 ENCOUNTER — OFFICE VISIT (OUTPATIENT)
Dept: ORTHOPEDIC SURGERY | Age: 78
End: 2018-03-01
Payer: MEDICARE

## 2018-03-01 DIAGNOSIS — M43.06 LUMBAR SPONDYLOLYSIS: ICD-10-CM

## 2018-03-01 DIAGNOSIS — M54.16 LUMBAR RADICULOPATHY: ICD-10-CM

## 2018-03-01 DIAGNOSIS — M43.06 LUMBAR SPONDYLOLYSIS: Primary | ICD-10-CM

## 2018-03-01 DIAGNOSIS — M51.36 DDD (DEGENERATIVE DISC DISEASE), LUMBAR: Primary | ICD-10-CM

## 2018-03-01 DIAGNOSIS — M96.1 FAILED BACK SYNDROME OF LUMBAR SPINE: ICD-10-CM

## 2018-03-01 DIAGNOSIS — M47.816 LUMBAR SPONDYLOSIS: ICD-10-CM

## 2018-03-01 DIAGNOSIS — M51.36 DDD (DEGENERATIVE DISC DISEASE), LUMBAR: ICD-10-CM

## 2018-03-27 ENCOUNTER — HOSPITAL ENCOUNTER (OUTPATIENT)
Dept: PAIN MANAGEMENT | Age: 78
Discharge: HOME | End: 2018-03-27
Payer: MEDICARE

## 2018-03-27 VITALS
BODY MASS INDEX: 30.44 KG/M2 | TEMPERATURE: 98.2 F | WEIGHT: 145 LBS | OXYGEN SATURATION: 99 % | RESPIRATION RATE: 16 BRPM | SYSTOLIC BLOOD PRESSURE: 99 MMHG | DIASTOLIC BLOOD PRESSURE: 50 MMHG | HEIGHT: 58 IN | HEART RATE: 75 BPM

## 2018-03-27 DIAGNOSIS — M51.36 DDD (DEGENERATIVE DISC DISEASE), LUMBAR: Primary | ICD-10-CM

## 2018-03-27 DIAGNOSIS — Z71.89 ENCOUNTER FOR MEDICATION COUNSELING: ICD-10-CM

## 2018-03-27 DIAGNOSIS — M50.30 DDD (DEGENERATIVE DISC DISEASE), CERVICAL: ICD-10-CM

## 2018-03-27 DIAGNOSIS — G89.4 CHRONIC PAIN ASSOCIATED WITH SIGNIFICANT PSYCHOSOCIAL DYSFUNCTION: ICD-10-CM

## 2018-03-27 DIAGNOSIS — M54.12 CERVICAL RADICULAR PAIN: ICD-10-CM

## 2018-03-27 DIAGNOSIS — Z51.81 ENCOUNTER FOR MEDICATION MONITORING: ICD-10-CM

## 2018-03-27 DIAGNOSIS — M96.1 FAILED BACK SYNDROME: ICD-10-CM

## 2018-03-27 DIAGNOSIS — M51.36 DEGENERATIVE LUMBAR DISC: ICD-10-CM

## 2018-03-27 DIAGNOSIS — M43.06 LUMBAR SPONDYLOLYSIS: ICD-10-CM

## 2018-03-27 DIAGNOSIS — M47.816 LUMBAR SPONDYLOSIS: ICD-10-CM

## 2018-03-27 PROCEDURE — 99213 OFFICE O/P EST LOW 20 MIN: CPT | Performed by: NURSE PRACTITIONER

## 2018-03-27 PROCEDURE — 80307 DRUG TEST PRSMV CHEM ANLYZR: CPT

## 2018-03-27 PROCEDURE — 99213 OFFICE O/P EST LOW 20 MIN: CPT

## 2018-03-27 RX ORDER — BECLOMETHASONE DIPROPIONATE HFA 40 UG/1
1 AEROSOL, METERED RESPIRATORY (INHALATION) DAILY
COMMUNITY
Start: 2018-03-08 | End: 2019-04-25 | Stop reason: ALTCHOICE

## 2018-03-27 RX ORDER — MORPHINE SULFATE 30 MG/1
30 TABLET, FILM COATED, EXTENDED RELEASE ORAL 2 TIMES DAILY
Qty: 60 TABLET | Refills: 0 | Status: SHIPPED | OUTPATIENT
Start: 2018-04-04 | End: 2018-05-04

## 2018-03-27 RX ORDER — TOBRAMYCIN AND DEXAMETHASONE 3; 1 MG/ML; MG/ML
SUSPENSION/ DROPS OPHTHALMIC
COMMUNITY
Start: 2018-03-23 | End: 2018-05-31 | Stop reason: ALTCHOICE

## 2018-03-27 ASSESSMENT — ENCOUNTER SYMPTOMS
BACK PAIN: 1
RESPIRATORY NEGATIVE: 1
CONSTIPATION: 1

## 2018-03-27 NOTE — PROGRESS NOTES
not valid for forensic use. This test was developed and its performance characteristics   determined by [facility]. The U.S. Food and Drug   Administration has not approved or cleared this test; however, FDA   clearance or approval is not currently required for clinical use. The results are not intended to be used as the sole means for   clinical diagnosis or patient management decisions. EER Hi Res Interp Ur See Note    Final 10/23/2017  2:11 PM [facility]   (NOTE)   Access [facility] Enhanced Report using either link below:   -Direct access: [email or web address]   -Enter Username, Password: [email or web address]   Username: [email or web address]   Password: [email or web address]   Performed by [place] [place] [address], [address] [facility] [phone]   www. Klarissa Elliott MD, Lab. Director   Performed at [facility]: SALINA HERNANDEZ [address] [phone]    Testing Performed By         Past Medical History:   Diagnosis Date    Achilles tendonitis     right    Asthma     Cough     clear phlegm    Degenerative lumbar disc     Diabetes mellitus (HCC)     type 2    Failed back syndrome, lumbar     Fast heart beat     Hx of \"8-10 years ago\"    Fibromyalgia     History of bladder cancer     Hypertension     Hypothyroid     Kidney stones     Lumbar radiculopathy     Lumbar spondylolysis     Osteoarthritis     Sleep apnea     uses CPAP machine nightly    Spinal stenosis in cervical region     Wears glasses        Past Surgical History:   Procedure Laterality Date    BLADDER TUMOR EXCISION  2005    CARDIOVERSION      \"8-10 years ago\"  to get \"heart into regular rhythm\"    CARPAL TUNNEL RELEASE Bilateral     CATARACT REMOVAL WITH IMPLANT Bilateral 7/22/2014, 8/5/2014    Dilma/Demian    CHOLECYSTECTOMY, LAPAROSCOPIC N/A 8/18/2017    CHOLECYSTECTOMY LAPAROSCOPIC ROBOTIC MULTIPORT performed by Georges Chow MD at [address] [place] [address] 1/2013    CYSTOSCOPY  11/04/2016    ENDOSCOPIC ULTRASOUND (LOWER) N/A 8/17/2017    ENDOSCOPIC ULTRASOUND performed by Nichole Cardoza MD at [address] (UPPER)  08/17/2017    A cursory view of the gastric mucosa was normal. The scope was then further advanced through a patent pylorus to the second portion of the duodenum. The Gallbladder was evaluated in bulb position. Thick sludge was noted. The CBD was 7.8 mm with no filling defects. The PD was 5 mm in the HOP. The pancreatic tissue was edematous in body and tail.  KNEE ARTHROSCOPY Left     LUMBAR FUSION      [place] OFFICE/OUTPT VISIT,PROCEDURE ONLY N/A 1/24/2018    SPINAL HARDWARE REMOVAL LUMBAR BONE STIMULATOR performed by Samir Davidson MD at [facility] Bilateral 1989    SKIN BIOPSY Right 11/2015    mole right posterior shoulder    SPINE SURGERY      spinal cord stimulator    SPINE SURGERY  91-4-15    renoval of spinal cord stimulator leads and battery    PASQUALE AND BSO      TUBAL LIGATION      UVULOPALATOPHARYGOPLASTY  2006       Allergies   Allergen Reactions    Azithromycin Shortness Of Breath     Tightness in chest    Adhesive Tape      OK to use paper tape per Patient         Current Outpatient Prescriptions:     [START ON 4/4/2018] morphine (MS CONTIN) 30 MG extended release tablet, Take 1 tablet by mouth 2 times daily for 30 days. Earliest Fill Date: 4/4/18, Disp: 60 tablet, Rfl: 0    oxyCODONE-acetaminophen (PERCOCET)  MG per tablet, Take 1 tablet by mouth every 6 hours as needed for Pain for up to 30 days. Earliest Fill Date: 3/5/18, Disp: 120 tablet, Rfl: 0    furosemide (LASIX) 20 MG tablet, Take 20 mg by mouth three times a week Takes 3 times a week, Disp: , Rfl:     pregabalin (LYRICA) 150 MG capsule, Take 150 mg by mouth 2 times daily. , Disp: , Rfl:     telmisartan-hydrochlorothiazide (MICARDIS HCT) 40-12.5 MG per tablet, Take 1 tablet by mouth daily. , Disp: , Rfl:     montelukast

## 2018-03-29 ENCOUNTER — HOSPITAL ENCOUNTER (OUTPATIENT)
Dept: MRI IMAGING | Age: 78
Discharge: HOME | End: 2018-03-31
Payer: MEDICARE

## 2018-03-29 DIAGNOSIS — M96.1 FAILED BACK SYNDROME: ICD-10-CM

## 2018-03-29 DIAGNOSIS — M47.816 LUMBAR SPONDYLOSIS: ICD-10-CM

## 2018-03-29 DIAGNOSIS — M50.30 DDD (DEGENERATIVE DISC DISEASE), CERVICAL: ICD-10-CM

## 2018-03-29 DIAGNOSIS — M54.2 NECK PAIN: ICD-10-CM

## 2018-03-29 DIAGNOSIS — M54.12 CERVICAL RADICULAR PAIN: ICD-10-CM

## 2018-03-29 DIAGNOSIS — M51.36 DDD (DEGENERATIVE DISC DISEASE), LUMBAR: ICD-10-CM

## 2018-03-29 DIAGNOSIS — Z98.890 S/P HARDWARE REMOVAL: ICD-10-CM

## 2018-03-29 PROCEDURE — 72141 MRI NECK SPINE W/O DYE: CPT

## 2018-03-29 RX ORDER — OXYCODONE AND ACETAMINOPHEN 10; 325 MG/1; MG/1
1 TABLET ORAL EVERY 6 HOURS PRN
Qty: 120 TABLET | Refills: 0 | Status: SHIPPED | OUTPATIENT
Start: 2018-04-04 | End: 2018-05-04

## 2018-03-31 LAB
6-ACETYLMORPHINE, UR: NOT DETECTED
7-AMINOCLONAZEPAM, URINE: NOT DETECTED
ALPHA-OH-ALPRAZ, URINE: NOT DETECTED
ALPRAZOLAM, URINE: NOT DETECTED
AMPHETAMINES, URINE: NOT DETECTED
BARBITURATES, URINE: NOT DETECTED
BENZOYLECGONINE, UR: NOT DETECTED
BUPRENORPHINE URINE: NOT DETECTED
CARISOPRODOL, UR: NOT DETECTED
CLONAZEPAM, URINE: NOT DETECTED
CODEINE, URINE: NOT DETECTED
CREATININE URINE: 72.6 MG/DL (ref 20–400)
DIAZEPAM, URINE: NOT DETECTED
DRUGS EXPECTED, UR: NORMAL
EER HI RES INTERP UR: NORMAL
ETHYL GLUCURONIDE UR: NOT DETECTED
FENTANYL URINE: NOT DETECTED
HYDROCODONE, URINE: NOT DETECTED
HYDROMORPHONE, URINE: NOT DETECTED
LORAZEPAM, URINE: NOT DETECTED
MARIJUANA METAB, UR: NOT DETECTED
MDA, UR: NOT DETECTED
MDEA, EVE, UR: NOT DETECTED
MDMA URINE: NOT DETECTED
MEPERIDINE METAB, UR: NOT DETECTED
METHADONE, URINE: NOT DETECTED
METHAMPHETAMINE, URINE: NOT DETECTED
METHYLPHENIDATE: NOT DETECTED
MIDAZOLAM, URINE: NOT DETECTED
MORPHINE URINE: PRESENT
NORBUPRENORPHINE, URINE: NOT DETECTED
NORDIAZEPAM, URINE: NOT DETECTED
NORFENTANYL, URINE: NOT DETECTED
NORHYDROCODONE, URINE: NOT DETECTED
NOROXYCODONE, URINE: PRESENT
NOROXYMORPHONE, URINE: NOT DETECTED
OXAZEPAM, URINE: NOT DETECTED
OXYCODONE URINE: PRESENT
OXYMORPHONE, URINE: NOT DETECTED
PAIN MANAGEMENT DRUG PANEL INTERP, URINE: NORMAL
PAIN MGT DRUG PANEL, HI RES, UR: NORMAL
PCP,URINE: NOT DETECTED
PHENTERMINE, UR: NOT DETECTED
PROPOXYPHENE, URINE: NOT DETECTED
TAPENTADOL, URINE: NOT DETECTED
TAPENTADOL-O-SULFATE, URINE: NOT DETECTED
TEMAZEPAM, URINE: NOT DETECTED
TRAMADOL, URINE: NOT DETECTED
ZOLPIDEM, URINE: NOT DETECTED

## 2018-04-24 ENCOUNTER — OFFICE VISIT (OUTPATIENT)
Dept: ORTHOPEDIC SURGERY | Age: 78
End: 2018-04-24
Payer: MEDICARE

## 2018-04-24 DIAGNOSIS — M54.2 NECK PAIN: ICD-10-CM

## 2018-04-24 DIAGNOSIS — M50.30 DDD (DEGENERATIVE DISC DISEASE), CERVICAL: ICD-10-CM

## 2018-04-24 DIAGNOSIS — M48.02 SPINAL STENOSIS IN CERVICAL REGION: ICD-10-CM

## 2018-04-24 DIAGNOSIS — M54.12 CERVICAL RADICULAR PAIN: Primary | ICD-10-CM

## 2018-04-24 PROCEDURE — 99213 OFFICE O/P EST LOW 20 MIN: CPT | Performed by: ORTHOPAEDIC SURGERY

## 2018-04-30 ENCOUNTER — HOSPITAL ENCOUNTER (OUTPATIENT)
Dept: PAIN MANAGEMENT | Age: 78
Discharge: HOME | End: 2018-04-30
Payer: MEDICARE

## 2018-04-30 DIAGNOSIS — M51.36 DDD (DEGENERATIVE DISC DISEASE), LUMBAR: ICD-10-CM

## 2018-04-30 DIAGNOSIS — M96.1 FAILED BACK SYNDROME OF LUMBAR SPINE: ICD-10-CM

## 2018-04-30 DIAGNOSIS — M47.816 LUMBAR SPONDYLOSIS: ICD-10-CM

## 2018-04-30 DIAGNOSIS — Z71.89 ENCOUNTER FOR MEDICATION COUNSELING: ICD-10-CM

## 2018-04-30 DIAGNOSIS — M43.06 LUMBAR SPONDYLOLYSIS: ICD-10-CM

## 2018-04-30 DIAGNOSIS — M96.1 FAILED BACK SYNDROME: ICD-10-CM

## 2018-04-30 DIAGNOSIS — M54.16 LUMBAR RADICULOPATHY: Primary | ICD-10-CM

## 2018-04-30 DIAGNOSIS — M54.12 CERVICAL RADICULAR PAIN: ICD-10-CM

## 2018-04-30 DIAGNOSIS — Z51.81 ENCOUNTER FOR MEDICATION MONITORING: ICD-10-CM

## 2018-04-30 PROCEDURE — 99213 OFFICE O/P EST LOW 20 MIN: CPT

## 2018-04-30 PROCEDURE — 99213 OFFICE O/P EST LOW 20 MIN: CPT | Performed by: NURSE PRACTITIONER

## 2018-04-30 RX ORDER — OXYCODONE AND ACETAMINOPHEN 10; 325 MG/1; MG/1
1 TABLET ORAL EVERY 6 HOURS PRN
Qty: 120 TABLET | Refills: 0 | Status: SHIPPED | OUTPATIENT
Start: 2018-05-05 | End: 2018-05-31 | Stop reason: SDUPTHER

## 2018-04-30 RX ORDER — MORPHINE SULFATE 30 MG/1
30 TABLET, FILM COATED, EXTENDED RELEASE ORAL 2 TIMES DAILY
Qty: 60 TABLET | Refills: 0 | Status: SHIPPED | OUTPATIENT
Start: 2018-05-05 | End: 2018-05-31 | Stop reason: SDUPTHER

## 2018-04-30 ASSESSMENT — ENCOUNTER SYMPTOMS
COUGH: 0
BACK PAIN: 1
SHORTNESS OF BREATH: 0
CONSTIPATION: 0

## 2018-05-31 ENCOUNTER — HOSPITAL ENCOUNTER (OUTPATIENT)
Dept: PAIN MANAGEMENT | Age: 78
Discharge: HOME | End: 2018-05-31
Payer: MEDICARE

## 2018-05-31 VITALS
RESPIRATION RATE: 16 BRPM | OXYGEN SATURATION: 94 % | DIASTOLIC BLOOD PRESSURE: 54 MMHG | TEMPERATURE: 98.5 F | WEIGHT: 151.5 LBS | BODY MASS INDEX: 31.8 KG/M2 | HEIGHT: 58 IN | SYSTOLIC BLOOD PRESSURE: 102 MMHG | HEART RATE: 65 BPM

## 2018-05-31 DIAGNOSIS — M54.16 LUMBAR RADICULOPATHY: Primary | ICD-10-CM

## 2018-05-31 DIAGNOSIS — Z71.89 ENCOUNTER FOR MEDICATION COUNSELING: ICD-10-CM

## 2018-05-31 DIAGNOSIS — M51.36 DDD (DEGENERATIVE DISC DISEASE), LUMBAR: ICD-10-CM

## 2018-05-31 DIAGNOSIS — M47.816 LUMBAR SPONDYLOSIS: ICD-10-CM

## 2018-05-31 DIAGNOSIS — G89.4 CHRONIC PAIN ASSOCIATED WITH SIGNIFICANT PSYCHOSOCIAL DYSFUNCTION: ICD-10-CM

## 2018-05-31 DIAGNOSIS — M46.1 SACROILIITIS (HCC): ICD-10-CM

## 2018-05-31 DIAGNOSIS — M43.06 LUMBAR SPONDYLOLYSIS: ICD-10-CM

## 2018-05-31 DIAGNOSIS — M48.02 SPINAL STENOSIS IN CERVICAL REGION: ICD-10-CM

## 2018-05-31 DIAGNOSIS — M96.1 FAILED BACK SYNDROME: ICD-10-CM

## 2018-05-31 DIAGNOSIS — Z51.81 ENCOUNTER FOR MEDICATION MONITORING: ICD-10-CM

## 2018-05-31 PROCEDURE — 99213 OFFICE O/P EST LOW 20 MIN: CPT

## 2018-05-31 PROCEDURE — 99214 OFFICE O/P EST MOD 30 MIN: CPT | Performed by: PAIN MEDICINE

## 2018-05-31 RX ORDER — MORPHINE SULFATE 30 MG/1
30 TABLET, FILM COATED, EXTENDED RELEASE ORAL 2 TIMES DAILY
Qty: 60 TABLET | Refills: 0 | Status: SHIPPED | OUTPATIENT
Start: 2018-06-04 | End: 2018-06-29 | Stop reason: SDUPTHER

## 2018-05-31 RX ORDER — OXYCODONE AND ACETAMINOPHEN 10; 325 MG/1; MG/1
1 TABLET ORAL EVERY 6 HOURS PRN
Qty: 120 TABLET | Refills: 0 | Status: SHIPPED | OUTPATIENT
Start: 2018-06-04 | End: 2018-06-29 | Stop reason: SDUPTHER

## 2018-05-31 ASSESSMENT — PAIN DESCRIPTION - PROGRESSION: CLINICAL_PROGRESSION: GRADUALLY WORSENING

## 2018-05-31 ASSESSMENT — ENCOUNTER SYMPTOMS
GASTROINTESTINAL NEGATIVE: 1
SINUS PAIN: 0
BLURRED VISION: 0
SORE THROAT: 0
BACK PAIN: 1
EYES NEGATIVE: 1
NAUSEA: 0
HEARTBURN: 0
COUGH: 1
SHORTNESS OF BREATH: 1
VOMITING: 0

## 2018-05-31 ASSESSMENT — PAIN DESCRIPTION - PAIN TYPE: TYPE: CHRONIC PAIN

## 2018-05-31 ASSESSMENT — PAIN DESCRIPTION - ONSET: ONSET: ON-GOING

## 2018-05-31 ASSESSMENT — PAIN DESCRIPTION - LOCATION: LOCATION: BACK

## 2018-05-31 ASSESSMENT — PAIN DESCRIPTION - ORIENTATION: ORIENTATION: LEFT

## 2018-05-31 ASSESSMENT — PAIN SCALES - GENERAL: PAINLEVEL_OUTOF10: 8

## 2018-05-31 ASSESSMENT — PAIN DESCRIPTION - DESCRIPTORS: DESCRIPTORS: CONSTANT;ACHING;SHARP

## 2018-05-31 ASSESSMENT — ACTIVITIES OF DAILY LIVING (ADL): EFFECT OF PAIN ON DAILY ACTIVITIES: PAIN INCREASES W/STANDING & WALKING

## 2018-05-31 ASSESSMENT — PAIN DESCRIPTION - FREQUENCY: FREQUENCY: CONTINUOUS

## 2018-06-01 ASSESSMENT — ENCOUNTER SYMPTOMS
PHOTOPHOBIA: 0
CONSTIPATION: 0
BOWEL INCONTINENCE: 0

## 2018-06-04 ENCOUNTER — TELEPHONE (OUTPATIENT)
Dept: PAIN MANAGEMENT | Age: 78
End: 2018-06-04

## 2018-06-22 ENCOUNTER — HOSPITAL ENCOUNTER (EMERGENCY)
Age: 78
Discharge: HOME | End: 2018-06-22
Attending: EMERGENCY MEDICINE
Payer: MEDICARE

## 2018-06-22 ENCOUNTER — APPOINTMENT (OUTPATIENT)
Dept: GENERAL RADIOLOGY | Age: 78
End: 2018-06-22
Payer: MEDICARE

## 2018-06-22 VITALS
TEMPERATURE: 97.8 F | DIASTOLIC BLOOD PRESSURE: 86 MMHG | RESPIRATION RATE: 18 BRPM | HEIGHT: 58 IN | HEART RATE: 75 BPM | BODY MASS INDEX: 31.49 KG/M2 | OXYGEN SATURATION: 99 % | WEIGHT: 150 LBS | SYSTOLIC BLOOD PRESSURE: 99 MMHG

## 2018-06-22 DIAGNOSIS — S86.911A KNEE STRAIN, RIGHT, INITIAL ENCOUNTER: Primary | ICD-10-CM

## 2018-06-22 PROCEDURE — 99283 EMERGENCY DEPT VISIT LOW MDM: CPT

## 2018-06-22 PROCEDURE — 73562 X-RAY EXAM OF KNEE 3: CPT

## 2018-06-22 ASSESSMENT — PAIN SCALES - GENERAL: PAINLEVEL_OUTOF10: 10

## 2018-06-22 ASSESSMENT — PAIN DESCRIPTION - LOCATION: LOCATION: KNEE

## 2018-06-22 ASSESSMENT — PAIN DESCRIPTION - DESCRIPTORS: DESCRIPTORS: SHARP

## 2018-06-22 ASSESSMENT — PAIN DESCRIPTION - ORIENTATION: ORIENTATION: RIGHT

## 2018-06-22 ASSESSMENT — ENCOUNTER SYMPTOMS: BACK PAIN: 1

## 2018-06-22 ASSESSMENT — PAIN DESCRIPTION - PAIN TYPE: TYPE: ACUTE PAIN

## 2018-06-29 ENCOUNTER — HOSPITAL ENCOUNTER (OUTPATIENT)
Dept: PAIN MANAGEMENT | Age: 78
Discharge: HOME | End: 2018-06-29
Payer: MEDICARE

## 2018-06-29 ENCOUNTER — OFFICE VISIT (OUTPATIENT)
Dept: ORTHOPEDIC SURGERY | Age: 78
End: 2018-06-29
Payer: MEDICARE

## 2018-06-29 VITALS
DIASTOLIC BLOOD PRESSURE: 72 MMHG | BODY MASS INDEX: 31.49 KG/M2 | SYSTOLIC BLOOD PRESSURE: 122 MMHG | HEIGHT: 58 IN | HEART RATE: 74 BPM | WEIGHT: 150 LBS

## 2018-06-29 DIAGNOSIS — M54.16 LUMBAR RADICULOPATHY: ICD-10-CM

## 2018-06-29 DIAGNOSIS — M96.1 FAILED BACK SYNDROME: ICD-10-CM

## 2018-06-29 DIAGNOSIS — Z51.81 ENCOUNTER FOR MEDICATION MONITORING: ICD-10-CM

## 2018-06-29 DIAGNOSIS — M47.816 LUMBAR SPONDYLOSIS: ICD-10-CM

## 2018-06-29 DIAGNOSIS — M43.06 LUMBAR SPONDYLOLYSIS: ICD-10-CM

## 2018-06-29 DIAGNOSIS — M96.1 FAILED BACK SYNDROME OF LUMBAR SPINE: ICD-10-CM

## 2018-06-29 DIAGNOSIS — S70.02XA HEMATOMA OF LEFT HIP, INITIAL ENCOUNTER: ICD-10-CM

## 2018-06-29 DIAGNOSIS — M50.30 DDD (DEGENERATIVE DISC DISEASE), CERVICAL: ICD-10-CM

## 2018-06-29 DIAGNOSIS — M48.02 SPINAL STENOSIS IN CERVICAL REGION: ICD-10-CM

## 2018-06-29 DIAGNOSIS — M54.12 CERVICAL RADICULAR PAIN: ICD-10-CM

## 2018-06-29 DIAGNOSIS — M51.36 DDD (DEGENERATIVE DISC DISEASE), LUMBAR: Primary | ICD-10-CM

## 2018-06-29 DIAGNOSIS — S89.91XA RIGHT KNEE INJURY, INITIAL ENCOUNTER: Primary | ICD-10-CM

## 2018-06-29 PROCEDURE — 99213 OFFICE O/P EST LOW 20 MIN: CPT | Performed by: NURSE PRACTITIONER

## 2018-06-29 PROCEDURE — 99214 OFFICE O/P EST MOD 30 MIN: CPT

## 2018-06-29 PROCEDURE — 99213 OFFICE O/P EST LOW 20 MIN: CPT | Performed by: ORTHOPAEDIC SURGERY

## 2018-06-29 RX ORDER — MORPHINE SULFATE 30 MG/1
30 TABLET, FILM COATED, EXTENDED RELEASE ORAL 2 TIMES DAILY
Qty: 60 TABLET | Refills: 0 | Status: SHIPPED | OUTPATIENT
Start: 2018-07-05 | End: 2018-07-26 | Stop reason: SDUPTHER

## 2018-06-29 RX ORDER — NALOXONE HYDROCHLORIDE 4 MG/.1ML
1 SPRAY NASAL PRN
Qty: 1 EACH | Refills: 0 | Status: SHIPPED | OUTPATIENT
Start: 2018-06-29 | End: 2019-05-24 | Stop reason: SDUPTHER

## 2018-06-29 RX ORDER — OXYCODONE AND ACETAMINOPHEN 10; 325 MG/1; MG/1
1 TABLET ORAL EVERY 6 HOURS PRN
Qty: 120 TABLET | Refills: 0 | Status: SHIPPED | OUTPATIENT
Start: 2018-07-05 | End: 2018-07-26 | Stop reason: SDUPTHER

## 2018-06-30 VITALS
HEART RATE: 66 BPM | WEIGHT: 150 LBS | SYSTOLIC BLOOD PRESSURE: 115 MMHG | DIASTOLIC BLOOD PRESSURE: 56 MMHG | BODY MASS INDEX: 31.49 KG/M2 | HEIGHT: 58 IN | RESPIRATION RATE: 16 BRPM

## 2018-06-30 ASSESSMENT — ENCOUNTER SYMPTOMS
SHORTNESS OF BREATH: 0
CONSTIPATION: 0
COUGH: 0
BACK PAIN: 1

## 2018-07-05 DIAGNOSIS — M25.561 ACUTE PAIN OF RIGHT KNEE: ICD-10-CM

## 2018-07-05 DIAGNOSIS — M51.36 DDD (DEGENERATIVE DISC DISEASE), LUMBAR: Primary | ICD-10-CM

## 2018-07-05 DIAGNOSIS — M54.16 LUMBAR RADICULOPATHY: ICD-10-CM

## 2018-07-05 DIAGNOSIS — M43.06 LUMBAR SPONDYLOLYSIS: ICD-10-CM

## 2018-07-07 ENCOUNTER — HOSPITAL ENCOUNTER (OUTPATIENT)
Dept: MRI IMAGING | Age: 78
Discharge: HOME | End: 2018-07-09
Payer: MEDICARE

## 2018-07-07 DIAGNOSIS — S89.91XA RIGHT KNEE INJURY, INITIAL ENCOUNTER: ICD-10-CM

## 2018-07-07 PROCEDURE — 72148 MRI LUMBAR SPINE W/O DYE: CPT

## 2018-07-09 ENCOUNTER — TELEPHONE (OUTPATIENT)
Dept: ORTHOPEDIC SURGERY | Age: 78
End: 2018-07-09

## 2018-07-26 ENCOUNTER — HOSPITAL ENCOUNTER (OUTPATIENT)
Dept: PAIN MANAGEMENT | Age: 78
Discharge: HOME | End: 2018-07-26
Payer: MEDICARE

## 2018-07-26 VITALS
WEIGHT: 150 LBS | DIASTOLIC BLOOD PRESSURE: 49 MMHG | SYSTOLIC BLOOD PRESSURE: 128 MMHG | HEART RATE: 60 BPM | HEIGHT: 58 IN | BODY MASS INDEX: 31.49 KG/M2 | RESPIRATION RATE: 16 BRPM | OXYGEN SATURATION: 100 % | TEMPERATURE: 98.1 F

## 2018-07-26 DIAGNOSIS — M46.1 SACROILIITIS (HCC): ICD-10-CM

## 2018-07-26 DIAGNOSIS — M50.30 DDD (DEGENERATIVE DISC DISEASE), CERVICAL: ICD-10-CM

## 2018-07-26 DIAGNOSIS — M43.06 LUMBAR SPONDYLOLYSIS: ICD-10-CM

## 2018-07-26 DIAGNOSIS — G89.4 CHRONIC PAIN ASSOCIATED WITH SIGNIFICANT PSYCHOSOCIAL DYSFUNCTION: ICD-10-CM

## 2018-07-26 DIAGNOSIS — M54.16 LUMBAR RADICULOPATHY: ICD-10-CM

## 2018-07-26 DIAGNOSIS — M16.11 PRIMARY OSTEOARTHRITIS OF RIGHT HIP: ICD-10-CM

## 2018-07-26 DIAGNOSIS — Z51.81 ENCOUNTER FOR MEDICATION MONITORING: ICD-10-CM

## 2018-07-26 DIAGNOSIS — M96.1 FAILED BACK SYNDROME: ICD-10-CM

## 2018-07-26 DIAGNOSIS — M47.816 LUMBAR SPONDYLOSIS: ICD-10-CM

## 2018-07-26 DIAGNOSIS — Z71.89 ENCOUNTER FOR MEDICATION COUNSELING: ICD-10-CM

## 2018-07-26 DIAGNOSIS — M51.36 DEGENERATIVE LUMBAR DISC: ICD-10-CM

## 2018-07-26 DIAGNOSIS — Z96.89 SPINAL CORD STIMULATOR STATUS: ICD-10-CM

## 2018-07-26 DIAGNOSIS — M96.1 FAILED BACK SYNDROME OF LUMBAR SPINE: ICD-10-CM

## 2018-07-26 DIAGNOSIS — M51.36 DDD (DEGENERATIVE DISC DISEASE), LUMBAR: Primary | ICD-10-CM

## 2018-07-26 DIAGNOSIS — M48.02 SPINAL STENOSIS IN CERVICAL REGION: ICD-10-CM

## 2018-07-26 PROCEDURE — 99213 OFFICE O/P EST LOW 20 MIN: CPT

## 2018-07-26 RX ORDER — MORPHINE SULFATE 30 MG/1
30 TABLET, FILM COATED, EXTENDED RELEASE ORAL 2 TIMES DAILY
Qty: 60 TABLET | Refills: 0 | Status: SHIPPED | OUTPATIENT
Start: 2018-08-04 | End: 2018-08-28 | Stop reason: SDUPTHER

## 2018-07-26 RX ORDER — OXYCODONE AND ACETAMINOPHEN 10; 325 MG/1; MG/1
1 TABLET ORAL EVERY 6 HOURS PRN
Qty: 120 TABLET | Refills: 0 | Status: SHIPPED | OUTPATIENT
Start: 2018-08-04 | End: 2018-08-28 | Stop reason: SDUPTHER

## 2018-07-26 ASSESSMENT — ENCOUNTER SYMPTOMS
BACK PAIN: 1
CONSTIPATION: 1

## 2018-07-26 NOTE — PROGRESS NOTES
Josh 89 PROGRESS NOTE      Patient here today to review MEDICATION CONTRACT    Chief Complaint: low back pain    HPI: She c/o low back pain. Has history of 2 lumbar surgeries. Her back pain is unchanged. She has had a few falls , she states she loses her balance. She stumbles. She denies dizziness. She saw Dr Shameka Arora for her right knee and she had MRI lumbar done. She is in PT for her back  She has not noted any difference. She has had no ED visits. She is sleeping better. She has right knee pain and left knee. Back Pain   This is a chronic problem. The current episode started more than 1 year ago. The problem occurs constantly. The problem is unchanged. The pain is present in the lumbar spine. The quality of the pain is described as burning and aching. The pain does not radiate. The pain is at a severity of 8/10. The pain is severe. The pain is the same all the time. Exacerbated by: activity. (Numbness feet   Back feels heavy) Risk factors include menopause. She has tried analgesics, ice and bed rest for the symptoms. The treatment provided mild relief. Patient denies any new neurological symptoms. No bowel or bladder incontinence, no weakness, and no falling. Treatment goals:walk without cane  Functional status:       Aberrancy none   Analgesia  Pain 8  Adverse  Effects :metamucil, BM QOD  ADL;s :in PT, light housework        Pill count: appropriate forgot narcan    Morphine equivalent dose as reported on OARRS:120  Attestation: The Prescription Monitoring Report for this patient was reviewed today. CONSUELO العلي - CNP)  Documentation: Obtaining appropriate analgesic effect of treatment., No signs of potential drug abuse or diversion identified. Nabeel Baez, CONSUELO - CNP)  Chronic Pain: Treatment objectives documented - patient is progressing appropriately. , Functional status reviewed - continues with improved or maintaining ADL's., Reviewed the patient's functional status Not Detected   Final 03/27/2018 12:00 PM [facility]   Propoxyphene, Urine Not Detected   Final 03/27/2018 12:00 PM [facility]   Tapentadol-O-Sulfate, Urine Not Detected   Final 03/27/2018 12:00 PM [facility]   Tapentadol, Urine Not Detected   Final 03/27/2018 12:00 PM [facility]   Temazepam, Urine Not Detected   Final 03/27/2018 12:00 PM [facility]   Tramadol, Urine Not Detected   Final 03/27/2018 12:00 PM [facility]   Zolpidem, Urine Not Detected   Final 03/27/2018 12:00 PM [facility]   Drugs Expected, Ur   Final 03/27/2018 12:00  [facility] [facility]   MORPHINE 6AM 3.27.18 OXYCODONE 6 AM 3.27.18    Creatinine, Ur 72.6  20.0 - 400.0 mg/dL Final 03/27/2018 12:00 PM [facility]   Pain Mgt Drug Panel, Hi Res, Ur See Below   Final 03/27/2018 12:00 PM [facility]   (NOTE)   Methodology: Qualitative Enzyme Immunoassay and Qualitative Liquid   Chromatography-Time of Flight-Mass Spectrometry or Tandem Mass   Spectrometry, Quantitative Spectrophotometry   The absence of expected drug(s) and/or drug metabolite(s) may   indicate non-compliance, inappropriate timing of specimen   collection relative to drug administration, poor drug absorption,   diluted/adulterated urine, or limitations of testing. The   concentration must be greater than or equal to the cutoff to be   reported as present.  If specific drug concentrations are   required, contact the laboratory within two weeks of specimen   collection to request quantification by a second analytical   technique. Interpretive questions should be directed to the   laboratory. Results based on immunoassay detection that do not match clinical   expectations should be   interpreted with caution. Confirmatory testing by mass   spectrometry for immunoassay-based results is available, if   ordered within two weeks of specimen collection. Additional   charges apply. For medical purposes only; not valid for forensic use. This test was developed and its performance characteristics   determined by [employer].  The U.S. Food and Drug   Administration has not approved or cleared this test; however, FDA   clearance or approval is not currently required for clinical use. The results are not intended to be used as the sole means for   clinical diagnosis or patient management decisions. EER Hi Res Interp Ur See Note   Final 03/27/2018 12:00 PM [facility]   (NOTE)   Access [facility] Enhanced Report using either link below:   -Direct access: [email or web address]   -Enter Username, Password: [email or web address]   Username: [email or web address]   Password: [email or web address]   Performed by [place] [address], [address] [facility] [phone]   www. Whitney Figueroa MD, Lab. Director   Performed at [facility]: SALINA DAVID W [address] [place] [address], [address] [place] [address] [phone]            Past Medical History:   Diagnosis Date    Achilles tendonitis     right    Asthma     Cough     clear phlegm    Degenerative lumbar disc     Diabetes mellitus (HCC)     type 2    Failed back syndrome, lumbar     Fast heart beat     Hx of \"8-10 years ago\"    Fibromyalgia     History of bladder cancer     Hypertension     Hypothyroid     Kidney stones     Lumbar radiculopathy     Lumbar spondylolysis     Osteoarthritis     Sleep apnea     uses CPAP machine nightly    Spinal stenosis in cervical region     Wears glasses        Past Surgical History:   Procedure Laterality Date    BACK SURGERY  2010    lumbar fusion    BLADDER TUMOR EXCISION  2005    CARDIOVERSION      \"8-10 years ago\"  to get \"heart into regular rhythm\"    CARPAL TUNNEL RELEASE Bilateral     CATARACT REMOVAL WITH IMPLANT Bilateral 7/22/2014, 8/5/2014    Dilma/Demian    CHOLECYSTECTOMY, LAPAROSCOPIC N/A 8/18/2017    CHOLECYSTECTOMY LAPAROSCOPIC ROBOTIC MULTIPORT performed by Whitney Dao MD at [address]  1/2013    CYSTOSCOPY  11/04/2016    ENDOSCOPIC ULTRASOUND (LOWER) N/A 8/17/2017    ENDOSCOPIC ULTRASOUND performed by tablet, Take 50 mcg by mouth Daily. , Disp: , Rfl:     omeprazole (PRILOSEC) 20 MG capsule, Take 20 mg by mouth every evening., Disp: , Rfl:     aspirin 81 MG tablet, Take 81 mg by mouth daily. , Disp: , Rfl:     naloxone (NARCAN) 4 MG/0.1ML LIQD nasal spray, 1 spray by Nasal route as needed (respiratory distress), Disp: 1 each, Rfl: 0    diclofenac sodium 1 % GEL, Apply 2 g topically 2 times daily, Disp: 1 Tube, Rfl: 0    telmisartan-hydrochlorothiazide (MICARDIS HCT) 40-12.5 MG per tablet, Take 1 tablet by mouth daily. , Disp: , Rfl:     Family History   Problem Relation Age of Onset    Heart Failure Mother     Other Father          pneumonia    Alzheimer's Disease Father        Social History     Social History    Marital status:      Spouse name: N/A    Number of children: N/A    Years of education: N/A     Occupational History    retired      Social History Main Topics    Smoking status: Never Smoker    Smokeless tobacco: Never Used    Alcohol use Yes      Comment: rare    Drug use: No    Sexual activity: Not on file     Other Topics Concern    Not on file     Social History Narrative    No narrative on file       Review of Systems:  Review of Systems   Constitution: Negative. HENT:        Buzzing sound in ears   Eyes:        Glasses   Cardiovascular: Negative. Endocrine:        Blood sugars low 100's   Hematologic/Lymphatic: Bruises/bleeds easily. Musculoskeletal: Positive for back pain, falls and joint pain. Gastrointestinal: Positive for constipation. Genitourinary: Negative. Neurological: Positive for loss of balance. Uses cane    Psychiatric/Behavioral: Negative. Physical Exam:  BP (!) 128/49   Pulse 60   Temp 98.1 °F (36.7 °C) (Oral)   Resp 16   Ht 4' 10\" (1.473 m)   Wt 150 lb (68 kg)   SpO2 100%   BMI 31.35 kg/m²     Physical Exam   Neck: Normal range of motion. Neck supple. Musculoskeletal:        Left hip: She exhibits tenderness. Right knee: Tenderness found. Left knee: Tenderness found. Lumbar back: She exhibits decreased range of motion and tenderness. Back:    Lumbar scar   Neurological: She has normal strength. Gait abnormal.   Reflex Scores:       Patellar reflexes are 1+ on the right side and 0 on the left side. Achilles reflexes are 1+ on the right side and 0 on the left side. Sensory deficit  Ambulates with a cane   Skin: Skin is warm, dry and intact. Psychiatric: Affect and judgment normal.     Imaging:  EXAMINATION:   MRI OF THE LUMBAR SPINE WITHOUT CONTRAST, 7/7/2018 1:24 pm       TECHNIQUE:   Multiplanar multisequence MRI of the lumbar spine was performed without the   administration of intravenous contrast.       COMPARISON:   Radiograph lumbar spine March 1, 2018       HISTORY:   ORDERING SYSTEM PROVIDED HISTORY: Right knee injury, initial encounter   TECHNOLOGIST PROVIDED HISTORY:   Reason for exam:->Back pain   Ordering Physician Provided Reason for Exam: RIGHT KNEE PAIN/ BACK PAIN   Acuity: Chronic   Type of Exam: Ongoing   Additional signs and symptoms: PT COMPLAINS OF RIGHT KNEE AND LOW BACK PAIN;   PT STATES LOW BACK PAIN X YEAR AND A HALF, BUT HAS BEEN GETTING WORSE AND   HAVING FALL THE LAST COUPLE MONTHS       FINDINGS:   BONES/ALIGNMENT: There is normal alignment of the spine except mild   levoconvex scoliosis.  The vertebral body heights are maintained.  There is   slight retro listhesis at L1-2 posterior fusion rods and bilateral pedicular   screws at L2 through S1.  Sensitivity is limited due to magnetic   susceptibility artifact.  Laminectomies noted at this level.  There is a   fluid collection at L4-5 measuring 23 x 24 mm in AP and transverse dimensions   and 36 mm in craniocaudal dimension.  This appears to communicate with the   thecal sac.  . The bone marrow signal appears unremarkable.       SPINAL CORD: The conus terminates normally.       SOFT TISSUES: No paraspinal mass

## 2018-08-28 ENCOUNTER — HOSPITAL ENCOUNTER (OUTPATIENT)
Dept: PAIN MANAGEMENT | Age: 78
Discharge: HOME | End: 2018-08-28
Payer: MEDICARE

## 2018-08-28 VITALS
DIASTOLIC BLOOD PRESSURE: 67 MMHG | WEIGHT: 149 LBS | BODY MASS INDEX: 31.28 KG/M2 | HEIGHT: 58 IN | OXYGEN SATURATION: 97 % | SYSTOLIC BLOOD PRESSURE: 124 MMHG | RESPIRATION RATE: 16 BRPM | TEMPERATURE: 98.6 F | HEART RATE: 68 BPM

## 2018-08-28 DIAGNOSIS — M54.16 LUMBAR RADICULOPATHY: ICD-10-CM

## 2018-08-28 DIAGNOSIS — M43.06 LUMBAR SPONDYLOLYSIS: ICD-10-CM

## 2018-08-28 DIAGNOSIS — M47.816 LUMBAR SPONDYLOSIS: ICD-10-CM

## 2018-08-28 DIAGNOSIS — G89.4 CHRONIC PAIN ASSOCIATED WITH SIGNIFICANT PSYCHOSOCIAL DYSFUNCTION: ICD-10-CM

## 2018-08-28 DIAGNOSIS — M50.30 DDD (DEGENERATIVE DISC DISEASE), CERVICAL: ICD-10-CM

## 2018-08-28 DIAGNOSIS — M96.1 FAILED BACK SYNDROME: ICD-10-CM

## 2018-08-28 DIAGNOSIS — M48.02 SPINAL STENOSIS IN CERVICAL REGION: ICD-10-CM

## 2018-08-28 DIAGNOSIS — M16.11 PRIMARY OSTEOARTHRITIS OF RIGHT HIP: ICD-10-CM

## 2018-08-28 DIAGNOSIS — M96.1 FAILED BACK SYNDROME OF LUMBAR SPINE: ICD-10-CM

## 2018-08-28 DIAGNOSIS — M51.36 DEGENERATIVE LUMBAR DISC: ICD-10-CM

## 2018-08-28 DIAGNOSIS — M51.36 DDD (DEGENERATIVE DISC DISEASE), LUMBAR: Primary | ICD-10-CM

## 2018-08-28 DIAGNOSIS — Z51.81 ENCOUNTER FOR MEDICATION MONITORING: ICD-10-CM

## 2018-08-28 DIAGNOSIS — Z96.89 SPINAL CORD STIMULATOR STATUS: ICD-10-CM

## 2018-08-28 DIAGNOSIS — M46.1 SACROILIITIS (HCC): ICD-10-CM

## 2018-08-28 DIAGNOSIS — Z71.89 ENCOUNTER FOR MEDICATION COUNSELING: ICD-10-CM

## 2018-08-28 PROCEDURE — 99213 OFFICE O/P EST LOW 20 MIN: CPT

## 2018-08-28 PROCEDURE — 99213 OFFICE O/P EST LOW 20 MIN: CPT | Performed by: NURSE PRACTITIONER

## 2018-08-28 RX ORDER — OXYCODONE AND ACETAMINOPHEN 10; 325 MG/1; MG/1
1 TABLET ORAL EVERY 6 HOURS PRN
Qty: 120 TABLET | Refills: 0 | Status: SHIPPED | OUTPATIENT
Start: 2018-09-03 | End: 2018-09-28 | Stop reason: SDUPTHER

## 2018-08-28 RX ORDER — MORPHINE SULFATE 30 MG/1
30 TABLET, FILM COATED, EXTENDED RELEASE ORAL 2 TIMES DAILY
Qty: 60 TABLET | Refills: 0 | Status: SHIPPED | OUTPATIENT
Start: 2018-09-03 | End: 2018-09-28 | Stop reason: SDUPTHER

## 2018-08-28 ASSESSMENT — ENCOUNTER SYMPTOMS
BACK PAIN: 1
COUGH: 1
SORE THROAT: 1
CONSTIPATION: 1

## 2018-08-28 NOTE — PROGRESS NOTES
Clonazepam, Urine Not Detected   Final 03/27/2018 12:00 PM [facility]   Codeine, Urine Not Detected   Final 03/27/2018 12:00 PM [facility]   MDA, Ur Not Detected   Final 03/27/2018 12:00 PM [facility]   Diazepam, Urine Not Detected   Final 03/27/2018 12:00 PM [facility]   Ethyl Glucuronide Ur Not Detected   Final 03/27/2018 12:00 PM [facility]   Fentanyl, Ur Not Detected   Final 03/27/2018 12:00 PM [facility]   Hydrocodone, Urine Not Detected   Final 03/27/2018 12:00 PM [facility]   Hydromorphone, Urine Not Detected   Final 03/27/2018 12:00 PM [facility]   Lorazepam, Urine Not Detected   Final 03/27/2018 12:00 PM [facility]   Marijuana Metab, Ur Not Detected   Final 03/27/2018 12:00 PM [facility]   MDEA, CRYSTAL, Ur Not Detected   Final 03/27/2018 12:00 PM [facility]   MDMA, Urine Not Detected   Final 03/27/2018 12:00 PM [facility]   Meperidine Metab, Ur Not Detected   Final 03/27/2018 12:00 PM [facility]   Methadone, Urine Not Detected   Final 03/27/2018 12:00 PM [facility]   Methamphetamine, Urine Not Detected   Final 03/27/2018 12:00 PM [facility]   Methylphenidate Not Detected   Final 03/27/2018 12:00 PM [facility]   Midazolam, Urine Not Detected   Final 03/27/2018 12:00 PM [facility]   Morphine Urine Present   Final 03/27/2018 12:00 PM [facility]   Norbuprenorphine, Urine Not Detected   Final 03/27/2018 12:00 PM [facility]   Nordiazepam, Urine Not Detected   Final 03/27/2018 12:00 PM [facility]   Norfentanyl, Urine Not Detected   Final 03/27/2018 12:00 PM [facility]   NORHYDROCODONE, URINE Not Detected   Final 03/27/2018 12:00 PM [facility]   Noroxycodone, Urine Present   Final 03/27/2018 12:00 PM [facility]   NOROXYMORPHONE, URINE Not Detected   Final 03/27/2018 12:00 PM [facility]   Oxazepam, Urine Not Detected   Final 03/27/2018 12:00 PM [facility]   Oxycodone Urine Present   Final 03/27/2018 12:00 PM [facility]   Oxymorphone, Urine Not Detected   Final 03/27/2018 12:00 PM [facility]   PCP, Urine Not Detected   Final 03/27/2018 12:00 PM [facility]   Phentermine, Ur Not Detected   Final 03/27/2018 12:00 PM [facility]   Propoxyphene, Urine Not Detected   Final 03/27/2018 12:00 PM [facility]   Tapentadol-O-Sulfate, Urine Not Detected   Final 03/27/2018 12:00 PM [facility]   Tapentadol, Urine Not Detected   Final 03/27/2018 12:00 PM [facility]   Temazepam, Urine Not Detected   Final 03/27/2018 12:00 PM [facility]   Tramadol, Urine Not Detected   Final 03/27/2018 12:00 PM [facility]   Zolpidem, Urine Not Detected   Final 03/27/2018 12:00 PM [facility]   Drugs Expected, Ur   Final 03/27/2018 12:00  [facility] [facility]   MORPHINE 6AM 3.27.18 OXYCODONE 6 AM 3.27.18    Creatinine, Ur 72.6  20.0 - 400.0 mg/dL Final 03/27/2018 12:00 PM [facility]   Pain Mgt Drug Panel, Hi Res, Ur See Below   Final 03/27/2018 12:00 PM [facility]   (NOTE)   Methodology: Qualitative Enzyme Immunoassay and Qualitative Liquid   Chromatography-Time of Flight-Mass Spectrometry or Tandem Mass   Spectrometry, Quantitative Spectrophotometry   The absence of expected drug(s) and/or drug metabolite(s) may   indicate non-compliance, inappropriate timing of specimen   collection relative to drug administration, poor drug absorption,   diluted/adulterated urine, or limitations of testing. The   concentration must be greater than or equal to the cutoff to be   reported as present.  If specific drug concentrations are   required, contact the laboratory within two weeks of specimen   collection to request quantification by a second analytical   technique. Interpretive questions should be directed to the   laboratory. Results based on immunoassay detection that do not match clinical   expectations should be   interpreted with caution. Confirmatory testing by mass   spectrometry for immunoassay-based results is available, if   ordered within two weeks of specimen collection. Additional   charges apply. For medical purposes only; not valid for forensic use. This test was developed and its performance characteristics   determined by [employer]. The U.S.  Food and Drug   Administration has not approved or cleared this test; however, FDA   clearance or approval is not currently required for clinical use. The results are not intended to be used as the sole means for   clinical diagnosis or patient management decisions. EER Hi Res Interp Ur See Note   Final 03/27/2018 12:00 PM [facility]   (NOTE)   Access [facility] Enhanced Report using either link below:   -Direct access: [email or web address]   -Enter Username, Password: [email or web address]   Username: [email or web address]   Password: [email or web address]   Performed by [place] [address], [address] [facility] [phone]   www. Carlos Palmer MD, Lab. Director   Performed at [facility]: SALINA ZHAO [address] [place] [address] [phone]            Past Medical History:   Diagnosis Date    Achilles tendonitis     right    Asthma     Cough     clear phlegm    Degenerative lumbar disc     Diabetes mellitus (HCC)     type 2    Failed back syndrome, lumbar     Fast heart beat     Hx of \"8-10 years ago\"    Fibromyalgia     History of bladder cancer     Hypertension     Hypothyroid     Kidney stones     Lumbar radiculopathy     Lumbar spondylolysis     Osteoarthritis     Sleep apnea     uses CPAP machine nightly    Spinal stenosis in cervical region     Wears glasses        Past Surgical History:   Procedure Laterality Date    BACK SURGERY  2010    lumbar fusion    BLADDER TUMOR EXCISION  2005    CARDIOVERSION      \"8-10 years ago\"  to get \"heart into regular rhythm\"    CARPAL TUNNEL RELEASE Bilateral     CATARACT REMOVAL WITH IMPLANT Bilateral 7/22/2014, 8/5/2014    Dilma/eDmian    CHOLECYSTECTOMY, LAPAROSCOPIC N/A 8/18/2017    CHOLECYSTECTOMY LAPAROSCOPIC ROBOTIC MULTIPORT performed by Ela Lema MD at [address]  1/2013    CYSTOSCOPY  11/04/2016    ENDOSCOPIC ULTRASOUND (LOWER) N/A 8/17/2017    ENDOSCOPIC ULTRASOUND performed by Kirsty Ceron MD at [address] (UPPER)  08/17/2017    A cursory view of the gastric motion. Neck supple. Musculoskeletal:        Lumbar back: She exhibits decreased range of motion and tenderness. Back:    Lumbar scar   Neurological: She is alert and oriented to person, place, and time. Gait abnormal.   Reflex Scores:       Patellar reflexes are 1+ on the left side. Achilles reflexes are 1+ on the right side and 1+ on the left side. Ambulates with a cane   Skin: Skin is warm, dry and intact. Psychiatric: Affect and judgment normal.       EXAMINATION:   MRI OF THE LUMBAR SPINE WITHOUT CONTRAST, 7/7/2018 1:24 pm       TECHNIQUE:   Multiplanar multisequence MRI of the lumbar spine was performed without the   administration of intravenous contrast.       COMPARISON:   Radiograph lumbar spine March 1, 2018       HISTORY:   ORDERING SYSTEM PROVIDED HISTORY: Right knee injury, initial encounter   TECHNOLOGIST PROVIDED HISTORY:   Reason for exam:->Back pain   Ordering Physician Provided Reason for Exam: RIGHT KNEE PAIN/ BACK PAIN   Acuity: Chronic   Type of Exam: Ongoing   Additional signs and symptoms: PT COMPLAINS OF RIGHT KNEE AND LOW BACK PAIN;   PT STATES LOW BACK PAIN X YEAR AND A HALF, BUT HAS BEEN GETTING WORSE AND   HAVING FALL THE LAST COUPLE MONTHS       FINDINGS:   BONES/ALIGNMENT: There is normal alignment of the spine except mild   levoconvex scoliosis.  The vertebral body heights are maintained.  There is   slight retro listhesis at L1-2 posterior fusion rods and bilateral pedicular   screws at L2 through S1.  Sensitivity is limited due to magnetic   susceptibility artifact.  Laminectomies noted at this level.  There is a   fluid collection at L4-5 measuring 23 x 24 mm in AP and transverse dimensions   and 36 mm in craniocaudal dimension.  This appears to communicate with the   thecal sac.  . The bone marrow signal appears unremarkable.       SPINAL CORD: The conus terminates normally.       SOFT TISSUES: No paraspinal mass identified.       L1-L2: Moderate central lateral spinal stenosis.       L2-L3: Small posterior central disc marginal osteophyte and circumferential   disc marginal osteophyte causing moderate narrowing of the neural foramina   bilaterally.  Central thecal sac is patent.       L3-L4: There is no significant disc herniation, spinal canal stenosis or   neural foraminal narrowing.       L4-L5: There is no significant disc herniation, spinal canal stenosis or   neural foraminal narrowing.       L5-S1: Mild unroofing of the posterior disc due to slight anterior   subluxation.  Moderate narrowing of the neural foramina bilaterally.  Central   thecal sac is widely patent.           Impression   Posterior interbody fusion L2 through S1.  Posterior fluid collection L3-4   through L5-S1 consistent with possible pseudomeningocele.  This could also   represent a seroma, hematoma, or abscess.  Follow-up MRI with contrast may be   helpful if clinically warranted.           Assessment:  Problem List Items Addressed This Visit     Degenerative lumbar disc - Primary    Relevant Medications    oxyCODONE-acetaminophen (PERCOCET)  MG per tablet (Start on 9/3/2018)    morphine (MS CONTIN) 30 MG extended release tablet (Start on 9/3/2018)    Lumbar radiculopathy    Lumbar spondylolysis    Failed back syndrome of lumbar spine    Chronic pain associated with significant psychosocial dysfunction    Relevant Medications    oxyCODONE-acetaminophen (PERCOCET)  MG per tablet (Start on 9/3/2018)    morphine (MS CONTIN) 30 MG extended release tablet (Start on 9/3/2018)    Encounter for medication monitoring    Primary osteoarthritis of right hip    Relevant Medications    oxyCODONE-acetaminophen (PERCOCET)  MG per tablet (Start on 9/3/2018)    morphine (MS CONTIN) 30 MG extended release tablet (Start on 9/3/2018)    Sacroiliitis (HCC)    Relevant Medications    oxyCODONE-acetaminophen (PERCOCET)  MG per tablet (Start on 9/3/2018)    morphine (MS CONTIN) 30 MG extended release tablet (Start on 9/3/2018)    Spinal cord stimulator status    Lumbar spondylosis    Relevant Medications    oxyCODONE-acetaminophen (PERCOCET)  MG per tablet (Start on 9/3/2018)    morphine (MS CONTIN) 30 MG extended release tablet (Start on 9/3/2018)    DDD (degenerative disc disease), cervical    Relevant Medications    oxyCODONE-acetaminophen (PERCOCET)  MG per tablet (Start on 9/3/2018)    morphine (MS CONTIN) 30 MG extended release tablet (Start on 9/3/2018)    Spinal stenosis in cervical region    Failed back syndrome    Relevant Medications    oxyCODONE-acetaminophen (PERCOCET)  MG per tablet (Start on 9/3/2018)    morphine (MS CONTIN) 30 MG extended release tablet (Start on 9/3/2018)    Encounter for medication counseling        Treatment Plan:  DISCUSSION: Treatment options discussed with patient and all questions answered to patient's satisfaction. Possible side effects, risk of tolerance and or dependence and alternative treatments discussed    Obtaining appropriate analgesic effect of treatment   No signs of potential drug abuse or diversion identified    [x] Ill effects of being on chronic pain medications such as sleep disturbances, respiratory depression, hormonal changes, withdrawal symptoms, chronic opioid dependence and tolerance as well as risk of taking opioids with Benzodiazepines and taking opioids along with alcohol,  were discussed with patient. I had asked the patient to minimize medication use and utilize pain medications only for uncontrolled rest pain or pain with exertional activities. I advised patient not to self-escalate pain medications without consulting with us. At each of patient's future visits we will try to taper pain medications, while adjusting the adjunct medications, and re-evaluating for Physical Therapy to improve spinal and joint strength. We will continue to have discussions to decrease pain medications as tolerated.       Counseled

## 2018-09-05 ENCOUNTER — HOSPITAL ENCOUNTER (OUTPATIENT)
Age: 78
Discharge: HOME | End: 2018-09-05
Payer: MEDICARE

## 2018-09-05 LAB
ANION GAP SERPL CALCULATED.3IONS-SCNC: 11 MMOL/L (ref 9–17)
BUN BLDV-MCNC: 21 MG/DL (ref 8–23)
BUN/CREAT BLD: ABNORMAL (ref 9–20)
CALCIUM SERPL-MCNC: 9.1 MG/DL (ref 8.6–10.4)
CHLORIDE BLD-SCNC: 101 MMOL/L (ref 98–107)
CO2: 28 MMOL/L (ref 20–31)
CREAT SERPL-MCNC: 0.87 MG/DL (ref 0.5–0.9)
GFR AFRICAN AMERICAN: >60 ML/MIN
GFR NON-AFRICAN AMERICAN: >60 ML/MIN
GFR SERPL CREATININE-BSD FRML MDRD: ABNORMAL ML/MIN/{1.73_M2}
GFR SERPL CREATININE-BSD FRML MDRD: ABNORMAL ML/MIN/{1.73_M2}
GLUCOSE BLD-MCNC: 151 MG/DL (ref 70–99)
POTASSIUM SERPL-SCNC: 5 MMOL/L (ref 3.7–5.3)
SODIUM BLD-SCNC: 140 MMOL/L (ref 135–144)

## 2018-09-05 PROCEDURE — 80048 BASIC METABOLIC PNL TOTAL CA: CPT

## 2018-09-05 PROCEDURE — 36415 COLL VENOUS BLD VENIPUNCTURE: CPT

## 2018-09-28 ENCOUNTER — HOSPITAL ENCOUNTER (OUTPATIENT)
Dept: GENERAL RADIOLOGY | Age: 78
Discharge: HOME | End: 2018-09-30
Payer: MEDICARE

## 2018-09-28 ENCOUNTER — HOSPITAL ENCOUNTER (OUTPATIENT)
Dept: PAIN MANAGEMENT | Age: 78
Discharge: HOME | End: 2018-09-28
Payer: MEDICARE

## 2018-09-28 ENCOUNTER — HOSPITAL ENCOUNTER (OUTPATIENT)
Age: 78
Discharge: HOME | End: 2018-09-30
Payer: MEDICARE

## 2018-09-28 VITALS
OXYGEN SATURATION: 100 % | TEMPERATURE: 98.2 F | HEIGHT: 58 IN | SYSTOLIC BLOOD PRESSURE: 117 MMHG | WEIGHT: 147 LBS | HEART RATE: 61 BPM | BODY MASS INDEX: 30.86 KG/M2 | RESPIRATION RATE: 20 BRPM | DIASTOLIC BLOOD PRESSURE: 46 MMHG

## 2018-09-28 DIAGNOSIS — M51.36 DEGENERATIVE LUMBAR DISC: ICD-10-CM

## 2018-09-28 DIAGNOSIS — M47.816 LUMBAR SPONDYLOSIS: ICD-10-CM

## 2018-09-28 DIAGNOSIS — R05.9 COUGH: ICD-10-CM

## 2018-09-28 DIAGNOSIS — G89.4 CHRONIC PAIN ASSOCIATED WITH SIGNIFICANT PSYCHOSOCIAL DYSFUNCTION: ICD-10-CM

## 2018-09-28 DIAGNOSIS — M96.1 FAILED BACK SYNDROME: ICD-10-CM

## 2018-09-28 DIAGNOSIS — M96.1 FAILED BACK SYNDROME OF LUMBAR SPINE: ICD-10-CM

## 2018-09-28 DIAGNOSIS — M43.06 LUMBAR SPONDYLOLYSIS: ICD-10-CM

## 2018-09-28 DIAGNOSIS — M54.12 CERVICAL RADICULAR PAIN: ICD-10-CM

## 2018-09-28 DIAGNOSIS — M46.1 SACROILIITIS (HCC): ICD-10-CM

## 2018-09-28 DIAGNOSIS — Z71.89 ENCOUNTER FOR MEDICATION COUNSELING: ICD-10-CM

## 2018-09-28 DIAGNOSIS — R52 PAIN: ICD-10-CM

## 2018-09-28 DIAGNOSIS — M51.36 DDD (DEGENERATIVE DISC DISEASE), LUMBAR: Primary | ICD-10-CM

## 2018-09-28 DIAGNOSIS — M54.16 LUMBAR RADICULOPATHY: ICD-10-CM

## 2018-09-28 DIAGNOSIS — M48.02 SPINAL STENOSIS IN CERVICAL REGION: ICD-10-CM

## 2018-09-28 DIAGNOSIS — M16.11 PRIMARY OSTEOARTHRITIS OF RIGHT HIP: ICD-10-CM

## 2018-09-28 DIAGNOSIS — M50.30 DDD (DEGENERATIVE DISC DISEASE), CERVICAL: ICD-10-CM

## 2018-09-28 PROCEDURE — 73030 X-RAY EXAM OF SHOULDER: CPT

## 2018-09-28 PROCEDURE — 99213 OFFICE O/P EST LOW 20 MIN: CPT | Performed by: NURSE PRACTITIONER

## 2018-09-28 PROCEDURE — 99213 OFFICE O/P EST LOW 20 MIN: CPT

## 2018-09-28 PROCEDURE — 71046 X-RAY EXAM CHEST 2 VIEWS: CPT

## 2018-09-28 RX ORDER — MORPHINE SULFATE 30 MG/1
30 TABLET, FILM COATED, EXTENDED RELEASE ORAL 2 TIMES DAILY
Qty: 60 TABLET | Refills: 0 | Status: SHIPPED | OUTPATIENT
Start: 2018-10-03 | End: 2018-11-01 | Stop reason: SDUPTHER

## 2018-09-28 RX ORDER — FLUTICASONE PROPIONATE 50 MCG
SPRAY, SUSPENSION (ML) NASAL
COMMUNITY
End: 2019-08-21

## 2018-09-28 RX ORDER — OXYCODONE AND ACETAMINOPHEN 10; 325 MG/1; MG/1
1 TABLET ORAL EVERY 6 HOURS PRN
Qty: 120 TABLET | Refills: 0 | Status: SHIPPED | OUTPATIENT
Start: 2018-10-03 | End: 2018-11-01 | Stop reason: SDUPTHER

## 2018-09-28 ASSESSMENT — ENCOUNTER SYMPTOMS
CONSTIPATION: 1
BACK PAIN: 1
EYES NEGATIVE: 1
RESPIRATORY NEGATIVE: 1

## 2018-09-28 NOTE — PROGRESS NOTES
03/27/2018 12:00 PM [facility]   Carisoprodol, Ur Not Detected    Final 03/27/2018 12:00 PM [facility]   (NOTE)   The carisoprodol immunoassay has cross-reactivity to carisoprodol   and meprobamate.     Clonazepam, Urine Not Detected    Final 03/27/2018 12:00 PM [facility]   Codeine, Urine Not Detected    Final 03/27/2018 12:00 PM [facility]   MDA, Ur Not Detected    Final 03/27/2018 12:00 PM [facility]   Diazepam, Urine Not Detected    Final 03/27/2018 12:00 PM [facility]   Ethyl Glucuronide Ur Not Detected    Final 03/27/2018 12:00 PM [facility]   Fentanyl, Ur Not Detected    Final 03/27/2018 12:00 PM [facility]   Hydrocodone, Urine Not Detected    Final 03/27/2018 12:00 PM [facility]   Hydromorphone, Urine Not Detected    Final 03/27/2018 12:00 PM [facility]   Lorazepam, Urine Not Detected    Final 03/27/2018 12:00 PM [facility]   Marijuana Metab, Ur Not Detected    Final 03/27/2018 12:00 PM [facility]   MDEA, CRYSTAL, Ur Not Detected    Final 03/27/2018 12:00 PM [facility]   MDMA, Urine Not Detected    Final 03/27/2018 12:00 PM [facility]   Meperidine Metab, Ur Not Detected    Final 03/27/2018 12:00 PM [facility]   Methadone, Urine Not Detected    Final 03/27/2018 12:00 PM [facility]   Methamphetamine, Urine Not Detected    Final 03/27/2018 12:00 PM [facility]   Methylphenidate Not Detected    Final 03/27/2018 12:00 PM [facility]   Midazolam, Urine Not Detected    Final 03/27/2018 12:00 PM [facility]   Morphine Urine Present    Final 03/27/2018 12:00 PM [facility]   Norbuprenorphine, Urine Not Detected    Final 03/27/2018 12:00 PM [facility]   Nordiazepam, Urine Not Detected    Final 03/27/2018 12:00 PM [facility]   Norfentanyl, Urine Not Detected    Final 03/27/2018 12:00 PM [facility]   NORHYDROCODONE, URINE Not Detected    Final 03/27/2018 12:00 PM [facility]   Noroxycodone, Urine Present    Final 03/27/2018 12:00 PM [facility]   NOROXYMORPHONE, URINE Not Detected    Final 03/27/2018 12:00 PM [facility]   Oxazepam, Urine Not Detected    Final 03/27/2018 12:00 PM [facility]   Oxycodone Urine Present    Final 03/27/2018 12:00 PM [facility]   Oxymorphone, Urine Not Detected    Disp: , Rfl:     telmisartan-hydrochlorothiazide (MICARDIS HCT) 40-12.5 MG per tablet, Take 1 tablet by mouth daily. , Disp: , Rfl:     montelukast (SINGULAIR) 10 MG tablet, Take 10 mg by mouth nightly., Disp: , Rfl:     levothyroxine (SYNTHROID) 50 MCG tablet, Take 50 mcg by mouth Daily. , Disp: , Rfl:     omeprazole (PRILOSEC) 20 MG capsule, Take 20 mg by mouth every evening., Disp: , Rfl:     aspirin 81 MG tablet, Take 81 mg by mouth daily. , Disp: , Rfl:     fluticasone (FLONASE) 50 MCG/ACT nasal spray, fluticasone 50 mcg/actuation nasal spray,suspension, Disp: , Rfl:     naloxone (NARCAN) 4 MG/0.1ML LIQD nasal spray, 1 spray by Nasal route as needed (respiratory distress), Disp: 1 each, Rfl: 0    diclofenac sodium 1 % GEL, Apply 2 g topically 2 times daily, Disp: 1 Tube, Rfl: 0    Family History   Problem Relation Age of Onset    Heart Failure Mother     Other Father          pneumonia    Alzheimer's Disease Father        Social History     Social History    Marital status:      Spouse name: N/A    Number of children: N/A    Years of education: N/A     Occupational History    retired      Social History Main Topics    Smoking status: Never Smoker    Smokeless tobacco: Never Used    Alcohol use Yes      Comment: rare    Drug use: No    Sexual activity: Not on file     Other Topics Concern    Not on file     Social History Narrative    No narrative on file       Review of Systems:  Review of Systems   Constitution: Positive for weakness. HENT: Positive for congestion. Eyes: Negative. Cardiovascular: Negative. Respiratory: Negative. Endocrine: Negative. HGB A1c was 5.6   Hematologic/Lymphatic: Bruises/bleeds easily. Skin: Negative. Musculoskeletal: Positive for back pain and joint pain. Left shoulder   Gastrointestinal: Positive for constipation. Genitourinary: Positive for nocturia.    Neurological:        Uses a cane    Psychiatric/Behavioral: Negative. Physical Exam:  BP (!) 117/46   Pulse 61   Temp 98.2 °F (36.8 °C) (Oral)   Resp 20   Ht 4' 10\" (1.473 m)   Wt 147 lb (66.7 kg)   SpO2 100%   BMI 30.72 kg/m²     Physical Exam   Constitutional: She is oriented to person, place, and time and well-developed, well-nourished, and in no distress. Neck: Normal range of motion. Neck supple. Pulmonary/Chest: Effort normal.   Musculoskeletal:        Lumbar back: She exhibits decreased range of motion and tenderness. Lumbar scar   Neurological: She is alert and oriented to person, place, and time. She has normal strength. Gait abnormal.   Reflex Scores:       Patellar reflexes are 1+ on the right side and 1+ on the left side. Achilles reflexes are 1+ on the right side and 1+ on the left side. Ambulate with a cane   Skin: Skin is warm, dry and intact. Area of pain   Psychiatric: Affect and judgment normal.        Imaging:  EXAMINATION:   MRI OF THE LUMBAR SPINE WITHOUT CONTRAST, 7/7/2018 1:24 pm       TECHNIQUE:   Multiplanar multisequence MRI of the lumbar spine was performed without the   administration of intravenous contrast.       COMPARISON:   Radiograph lumbar spine March 1, 2018       HISTORY:   ORDERING SYSTEM PROVIDED HISTORY: Right knee injury, initial encounter   TECHNOLOGIST PROVIDED HISTORY:   Reason for exam:->Back pain   Ordering Physician Provided Reason for Exam: RIGHT KNEE PAIN/ BACK PAIN   Acuity: Chronic   Type of Exam: Ongoing   Additional signs and symptoms: PT COMPLAINS OF RIGHT KNEE AND LOW BACK PAIN;   PT STATES LOW BACK PAIN X YEAR AND A HALF, BUT HAS BEEN GETTING WORSE AND   HAVING FALL THE LAST COUPLE MONTHS       FINDINGS:   BONES/ALIGNMENT: There is normal alignment of the spine except mild   levoconvex scoliosis.  The vertebral body heights are maintained.  There is   slight retro listhesis at L1-2 posterior fusion rods and bilateral pedicular   screws at L2 through S1.  Sensitivity is limited due to

## 2018-10-24 ENCOUNTER — HOSPITAL ENCOUNTER (OUTPATIENT)
Age: 78
Discharge: HOME | End: 2018-10-26
Payer: MEDICARE

## 2018-10-24 ENCOUNTER — HOSPITAL ENCOUNTER (OUTPATIENT)
Dept: GENERAL RADIOLOGY | Age: 78
Discharge: HOME | End: 2018-10-26
Payer: MEDICARE

## 2018-10-24 DIAGNOSIS — R52 PAIN: ICD-10-CM

## 2018-10-24 PROCEDURE — 73560 X-RAY EXAM OF KNEE 1 OR 2: CPT

## 2018-11-01 ENCOUNTER — HOSPITAL ENCOUNTER (OUTPATIENT)
Dept: PAIN MANAGEMENT | Age: 78
Discharge: HOME | End: 2018-11-01
Payer: MEDICARE

## 2018-11-01 VITALS
HEART RATE: 72 BPM | BODY MASS INDEX: 30.86 KG/M2 | RESPIRATION RATE: 16 BRPM | HEIGHT: 58 IN | TEMPERATURE: 98.7 F | OXYGEN SATURATION: 96 % | DIASTOLIC BLOOD PRESSURE: 73 MMHG | SYSTOLIC BLOOD PRESSURE: 117 MMHG | WEIGHT: 147 LBS

## 2018-11-01 DIAGNOSIS — M51.36 DEGENERATIVE LUMBAR DISC: ICD-10-CM

## 2018-11-01 DIAGNOSIS — M50.30 DDD (DEGENERATIVE DISC DISEASE), CERVICAL: ICD-10-CM

## 2018-11-01 DIAGNOSIS — M47.816 LUMBAR SPONDYLOSIS: ICD-10-CM

## 2018-11-01 DIAGNOSIS — M51.36 DDD (DEGENERATIVE DISC DISEASE), LUMBAR: Primary | ICD-10-CM

## 2018-11-01 DIAGNOSIS — M54.16 LUMBAR RADICULOPATHY: ICD-10-CM

## 2018-11-01 DIAGNOSIS — Z71.89 ENCOUNTER FOR MEDICATION COUNSELING: ICD-10-CM

## 2018-11-01 DIAGNOSIS — Z51.81 ENCOUNTER FOR MEDICATION MONITORING: ICD-10-CM

## 2018-11-01 DIAGNOSIS — M43.06 LUMBAR SPONDYLOLYSIS: ICD-10-CM

## 2018-11-01 DIAGNOSIS — M16.11 PRIMARY OSTEOARTHRITIS OF RIGHT HIP: ICD-10-CM

## 2018-11-01 DIAGNOSIS — M96.1 FAILED BACK SYNDROME: ICD-10-CM

## 2018-11-01 DIAGNOSIS — M46.1 SACROILIITIS (HCC): ICD-10-CM

## 2018-11-01 DIAGNOSIS — G89.4 CHRONIC PAIN ASSOCIATED WITH SIGNIFICANT PSYCHOSOCIAL DYSFUNCTION: ICD-10-CM

## 2018-11-01 DIAGNOSIS — M96.1 FAILED BACK SYNDROME OF LUMBAR SPINE: ICD-10-CM

## 2018-11-01 DIAGNOSIS — M48.02 SPINAL STENOSIS IN CERVICAL REGION: ICD-10-CM

## 2018-11-01 PROCEDURE — 99213 OFFICE O/P EST LOW 20 MIN: CPT | Performed by: NURSE PRACTITIONER

## 2018-11-01 PROCEDURE — 99213 OFFICE O/P EST LOW 20 MIN: CPT

## 2018-11-01 RX ORDER — MORPHINE SULFATE 30 MG/1
30 TABLET, FILM COATED, EXTENDED RELEASE ORAL 2 TIMES DAILY
Qty: 60 TABLET | Refills: 0 | Status: SHIPPED | OUTPATIENT
Start: 2018-11-02 | End: 2018-11-28 | Stop reason: SDUPTHER

## 2018-11-01 RX ORDER — OXYCODONE AND ACETAMINOPHEN 10; 325 MG/1; MG/1
1 TABLET ORAL EVERY 6 HOURS PRN
Qty: 120 TABLET | Refills: 0 | Status: SHIPPED | OUTPATIENT
Start: 2018-11-05 | End: 2018-11-28 | Stop reason: SDUPTHER

## 2018-11-01 RX ORDER — LORATADINE 10 MG/1
10 CAPSULE, LIQUID FILLED ORAL DAILY
COMMUNITY
End: 2018-11-28 | Stop reason: ALTCHOICE

## 2018-11-01 ASSESSMENT — ENCOUNTER SYMPTOMS
CONSTIPATION: 1
BACK PAIN: 1
COUGH: 1

## 2018-11-01 NOTE — PROGRESS NOTES
CONSUELO Mar - CNP)  Review ofOARRS does not show any aberrant prescription behavior. Medication is helping the patient stay active. Patient denies any side effects and reports adequate analgesia. No sign of misuse/abuse. When was the last UDS: 3-27-18          Was the UDS appropriate:yes        Record/Diagnostics Review:       As above, I did review the imaging     3/31/2018 10:44 AM - Jose Alejandro, M[facility]pn Incoming Lab Results From [email or web address]      Component Results      Component Value Ref Range & Units Status Collected Lab   Pain Management Drug Panel Interp, Urine Consistent    Final 03/27/2018 12:00 PM [facility]   (NOTE)   ________________________________________________________________   DRUGS EXPECTED:   MORPHINE [3/27/18]   OXYCODONE [3/27/18]   ________________________________________________________________   CONSISTENT with medications provided:   MORPHINE : based on morphine   OXYCODONE : based on oxycodone, noroxycodone   ________________________________________________________________   INTERPRETIVE INFORMATION: Pain Mgt [place], Mass Spec/EMIT, Ur,                            Interp   Interpretation depends on accuracy and completeness of patient   medication information submitted by client. 6-Acetylmorphine, Ur Not Detected    Final 03/27/2018 12:00 PM [facility]   7-Aminoclonazepam, Urine Not Detected    Final 03/27/2018 12:00 PM [facility]   Alpha-OH-Alpraz, Urine Not Detected    Final 03/27/2018 12:00 PM [facility]   Alprazolam, Urine Not Detected    Final 03/27/2018 12:00 PM [facility]   Amphetamines, urine Not Detected    Final 03/27/2018 12:00 PM [facility]   Barbiturates, Ur Not Detected    Final 03/27/2018 12:00 PM [facility]   Benzoylecgonine, Ur Not Detected    Final 03/27/2018 12:00 PM [facility]   Buprenorphine Urine Not Detected    Final 03/27/2018 12:00 PM [facility]   Carisoprodol, Ur Not Detected    Final 03/27/2018 12:00 PM [facility]   (NOTE)   The carisoprodol immunoassay has cross-reactivity to carisoprodol   and meprobamate.     Clonazepam, Urine Disp: , Rfl:     montelukast (SINGULAIR) 10 MG tablet, Take 10 mg by mouth nightly., Disp: , Rfl:     levothyroxine (SYNTHROID) 50 MCG tablet, Take 50 mcg by mouth Daily. , Disp: , Rfl:     omeprazole (PRILOSEC) 20 MG capsule, Take 20 mg by mouth every evening., Disp: , Rfl:     aspirin 81 MG tablet, Take 81 mg by mouth daily. , Disp: , Rfl:     fluticasone (FLONASE) 50 MCG/ACT nasal spray, fluticasone 50 mcg/actuation nasal spray,suspension, Disp: , Rfl:     naloxone (NARCAN) 4 MG/0.1ML LIQD nasal spray, 1 spray by Nasal route as needed (respiratory distress), Disp: 1 each, Rfl: 0    diclofenac sodium 1 % GEL, Apply 2 g topically 2 times daily, Disp: 1 Tube, Rfl: 0    Family History   Problem Relation Age of Onset    Heart Failure Mother     Other Father          pneumonia    Alzheimer's Disease Father        Social History     Social History    Marital status:      Spouse name: N/A    Number of children: N/A    Years of education: N/A     Occupational History    retired      Social History Main Topics    Smoking status: Never Smoker    Smokeless tobacco: Never Used    Alcohol use Yes      Comment: rare    Drug use: No    Sexual activity: Not on file     Other Topics Concern    Not on file     Social History Narrative    No narrative on file       Review of Systems:  Review of Systems   Constitution: Negative. HENT: Positive for congestion. Eyes:        Glasses   Cardiovascular: Negative. Respiratory: Positive for cough. Endocrine:        Blood sugars under control   Hematologic/Lymphatic: Bruises/bleeds easily. Skin: Negative. Musculoskeletal: Positive for back pain and joint pain. Gastrointestinal: Positive for constipation. Genitourinary: Negative. Neurological: Positive for loss of balance. Cane    Psychiatric/Behavioral: Negative.           Physical Exam:  /73   Pulse 72   Temp 98.7 °F (37.1 °C) (Oral)   Resp 16   Ht 4' 10\" (1.473 m)   Wt 147 lb extended release tablet (Start on 11/2/2018)    Sacroiliitis ([address]    Relevant Medications    oxyCODONE-acetaminophen (PERCOCET)  MG per tablet (Start on 11/5/2018)    morphine (MS CONTIN) 30 MG extended release tablet (Start on 11/2/2018)    Other Relevant Orders    SC INJECT SI JOINT ARTHRGRPHY&/ANES/STEROID W/IMAGE    Lumbar spondylosis    Relevant Medications    oxyCODONE-acetaminophen (PERCOCET)  MG per tablet (Start on 11/5/2018)    morphine (MS CONTIN) 30 MG extended release tablet (Start on 11/2/2018)    DDD (degenerative disc disease), cervical    Relevant Medications    oxyCODONE-acetaminophen (PERCOCET)  MG per tablet (Start on 11/5/2018)    morphine (MS CONTIN) 30 MG extended release tablet (Start on 11/2/2018)    Spinal stenosis in cervical region    Failed back syndrome    Relevant Medications    oxyCODONE-acetaminophen (PERCOCET)  MG per tablet (Start on 11/5/2018)    morphine (MS CONTIN) 30 MG extended release tablet (Start on 11/2/2018)    Encounter for medication counseling      Other Visit Diagnoses     Degenerative lumbar disc        Relevant Medications    oxyCODONE-acetaminophen (PERCOCET)  MG per tablet (Start on 11/5/2018)    morphine (MS CONTIN) 30 MG extended release tablet (Start on 11/2/2018)          Treatment Plan:  DISCUSSION: Treatment options discussed withpatient and all questions answered to patient's satisfaction. Possible side effects, risk of tolerance and or dependence and alternative treatments discussed    Obtaining appropriate analgesic effect of treatment   No signs of potential drug abuse or diversion identified    [x] Ill effects of being on chronic pain medications such as sleepdisturbances, respiratory depression, hormonal changes, withdrawal symptoms, chronic opioid dependence and tolerance as well as risk of taking opioids with Benzodiazepines and taking opioids along with alcohol,  werediscussed with patient.  I had asked the patient

## 2018-11-12 ENCOUNTER — HOSPITAL ENCOUNTER (OUTPATIENT)
Dept: GENERAL RADIOLOGY | Age: 78
Discharge: HOME | End: 2018-11-14
Payer: MEDICARE

## 2018-11-12 ENCOUNTER — HOSPITAL ENCOUNTER (OUTPATIENT)
Dept: PAIN MANAGEMENT | Age: 78
Discharge: HOME | End: 2018-11-12
Payer: MEDICARE

## 2018-11-12 VITALS
TEMPERATURE: 97.8 F | DIASTOLIC BLOOD PRESSURE: 65 MMHG | WEIGHT: 147 LBS | BODY MASS INDEX: 30.86 KG/M2 | OXYGEN SATURATION: 100 % | RESPIRATION RATE: 15 BRPM | HEART RATE: 76 BPM | SYSTOLIC BLOOD PRESSURE: 147 MMHG | HEIGHT: 58 IN

## 2018-11-12 DIAGNOSIS — M46.1 SACROILIITIS (HCC): ICD-10-CM

## 2018-11-12 DIAGNOSIS — M46.1 SACROILIITIS (HCC): Primary | ICD-10-CM

## 2018-11-12 PROCEDURE — 6360000004 HC RX CONTRAST MEDICATION

## 2018-11-12 PROCEDURE — 6360000002 HC RX W HCPCS

## 2018-11-12 PROCEDURE — 3209999900 FLUORO FOR SURGICAL PROCEDURES

## 2018-11-12 PROCEDURE — 27096 INJECT SACROILIAC JOINT: CPT | Performed by: PAIN MEDICINE

## 2018-11-12 PROCEDURE — 2500000003 HC RX 250 WO HCPCS

## 2018-11-12 PROCEDURE — G0260 INJ FOR SACROILIAC JT ANESTH: HCPCS

## 2018-11-12 ASSESSMENT — PAIN DESCRIPTION - PAIN TYPE: TYPE: CHRONIC PAIN

## 2018-11-12 ASSESSMENT — PAIN SCALES - GENERAL
PAINLEVEL_OUTOF10: 7
PAINLEVEL_OUTOF10: 0

## 2018-11-12 ASSESSMENT — ACTIVITIES OF DAILY LIVING (ADL): EFFECT OF PAIN ON DAILY ACTIVITIES: PAIN INCREASES WITH STANDING AND WALKING

## 2018-11-12 ASSESSMENT — PAIN DESCRIPTION - LOCATION: LOCATION: BACK

## 2018-11-12 ASSESSMENT — PAIN DESCRIPTION - FREQUENCY: FREQUENCY: CONTINUOUS

## 2018-11-12 ASSESSMENT — PAIN DESCRIPTION - PROGRESSION: CLINICAL_PROGRESSION: NOT CHANGED

## 2018-11-12 ASSESSMENT — PAIN DESCRIPTION - ORIENTATION: ORIENTATION: RIGHT;LEFT;LOWER

## 2018-11-12 ASSESSMENT — PAIN DESCRIPTION - ONSET: ONSET: ON-GOING

## 2018-11-12 ASSESSMENT — PAIN - FUNCTIONAL ASSESSMENT: PAIN_FUNCTIONAL_ASSESSMENT: 0-10

## 2018-11-12 ASSESSMENT — PAIN DESCRIPTION - DESCRIPTORS: DESCRIPTORS: ACHING;SHARP

## 2018-11-13 ENCOUNTER — TELEPHONE (OUTPATIENT)
Dept: PAIN MANAGEMENT | Age: 78
End: 2018-11-13

## 2018-11-28 ENCOUNTER — HOSPITAL ENCOUNTER (OUTPATIENT)
Dept: PAIN MANAGEMENT | Age: 78
Discharge: HOME | End: 2018-11-28
Payer: MEDICARE

## 2018-11-28 VITALS
OXYGEN SATURATION: 98 % | BODY MASS INDEX: 30.86 KG/M2 | HEIGHT: 58 IN | SYSTOLIC BLOOD PRESSURE: 132 MMHG | HEART RATE: 74 BPM | RESPIRATION RATE: 16 BRPM | WEIGHT: 147 LBS | TEMPERATURE: 98.8 F | DIASTOLIC BLOOD PRESSURE: 53 MMHG

## 2018-11-28 DIAGNOSIS — M47.816 LUMBAR SPONDYLOSIS: ICD-10-CM

## 2018-11-28 DIAGNOSIS — M46.1 SACROILIITIS (HCC): ICD-10-CM

## 2018-11-28 DIAGNOSIS — Z51.81 ENCOUNTER FOR MEDICATION MONITORING: ICD-10-CM

## 2018-11-28 DIAGNOSIS — M54.16 LUMBAR RADICULOPATHY: Primary | ICD-10-CM

## 2018-11-28 DIAGNOSIS — M51.36 DDD (DEGENERATIVE DISC DISEASE), LUMBAR: ICD-10-CM

## 2018-11-28 DIAGNOSIS — M54.12 CERVICAL RADICULAR PAIN: ICD-10-CM

## 2018-11-28 DIAGNOSIS — Z71.89 ENCOUNTER FOR MEDICATION COUNSELING: ICD-10-CM

## 2018-11-28 DIAGNOSIS — M96.1 FAILED BACK SYNDROME: ICD-10-CM

## 2018-11-28 DIAGNOSIS — M48.02 SPINAL STENOSIS IN CERVICAL REGION: ICD-10-CM

## 2018-11-28 PROCEDURE — 99214 OFFICE O/P EST MOD 30 MIN: CPT | Performed by: PAIN MEDICINE

## 2018-11-28 PROCEDURE — 99213 OFFICE O/P EST LOW 20 MIN: CPT

## 2018-11-28 RX ORDER — OXYCODONE AND ACETAMINOPHEN 10; 325 MG/1; MG/1
1 TABLET ORAL EVERY 6 HOURS PRN
Qty: 88 TABLET | Refills: 0 | Status: SHIPPED | OUTPATIENT
Start: 2018-11-28 | End: 2018-11-28 | Stop reason: SDUPTHER

## 2018-11-28 RX ORDER — OXYCODONE AND ACETAMINOPHEN 10; 325 MG/1; MG/1
1 TABLET ORAL EVERY 6 HOURS PRN
Qty: 88 TABLET | Refills: 0 | Status: SHIPPED | OUTPATIENT
Start: 2018-11-28 | End: 2018-12-13 | Stop reason: SDUPTHER

## 2018-11-28 RX ORDER — MORPHINE SULFATE 30 MG/1
30 TABLET, FILM COATED, EXTENDED RELEASE ORAL 2 TIMES DAILY
Qty: 60 TABLET | Refills: 0 | Status: SHIPPED | OUTPATIENT
Start: 2018-12-02 | End: 2018-12-19 | Stop reason: SDUPTHER

## 2018-11-28 ASSESSMENT — PAIN DESCRIPTION - PROGRESSION: CLINICAL_PROGRESSION: NOT CHANGED

## 2018-11-28 ASSESSMENT — ENCOUNTER SYMPTOMS
GASTROINTESTINAL NEGATIVE: 1
TROUBLE SWALLOWING: 0
EYE PAIN: 0
DIARRHEA: 0
BACK PAIN: 1
SINUS PAIN: 0
NAUSEA: 0
SHORTNESS OF BREATH: 0
VOMITING: 0
CONSTIPATION: 0
PHOTOPHOBIA: 0
COUGH: 0
ABDOMINAL PAIN: 0

## 2018-11-28 ASSESSMENT — PAIN SCALES - GENERAL: PAINLEVEL_OUTOF10: 6

## 2018-11-28 ASSESSMENT — ACTIVITIES OF DAILY LIVING (ADL): EFFECT OF PAIN ON DAILY ACTIVITIES: PAIN INCREASES W/STANDING & WALKING

## 2018-11-28 ASSESSMENT — PAIN DESCRIPTION - PAIN TYPE: TYPE: CHRONIC PAIN

## 2018-11-28 ASSESSMENT — PAIN DESCRIPTION - DESCRIPTORS: DESCRIPTORS: CONSTANT;ACHING

## 2018-11-28 ASSESSMENT — PAIN DESCRIPTION - ORIENTATION: ORIENTATION: RIGHT;LEFT

## 2018-11-28 ASSESSMENT — PAIN DESCRIPTION - FREQUENCY: FREQUENCY: CONTINUOUS

## 2018-11-28 ASSESSMENT — PAIN DESCRIPTION - LOCATION: LOCATION: BACK

## 2018-11-28 ASSESSMENT — PAIN DESCRIPTION - ONSET: ONSET: ON-GOING

## 2018-11-28 NOTE — PROGRESS NOTES
CYSTOSCOPY  11/04/2016    ENDOSCOPIC ULTRASOUND (LOWER) N/A 8/17/2017    ENDOSCOPIC ULTRASOUND performed by Ce Garcia MD at [address] (UPPER)  08/17/2017    A cursory view of the gastric mucosa was normal. The scope was then further advanced through a patent pylorus to the second portion of the duodenum. The Gallbladder was evaluated in bulb position. Thick sludge was noted. The CBD was 7.8 mm with no filling defects. The PD was 5 mm in the HOP. The pancreatic tissue was edematous in body and tail.  KNEE ARTHROSCOPY Left     LUMBAR FUSION      [facility] OFFICE/OUTPT VISIT,PROCEDURE ONLY N/A 1/24/2018    SPINAL HARDWARE REMOVAL LUMBAR BONE STIMULATOR performed by Arley Rivera MD at [facility] Bilateral 1989    SKIN BIOPSY Right 11/2015    mole right posterior shoulder    SPINE SURGERY      spinal cord stimulator    SPINE SURGERY  91-4-15    renoval of spinal cord stimulator leads and battery    PASQUALE AND BSO      TUBAL LIGATION      UVULOPALATOPHARYGOPLASTY  2006       Medications  Current Outpatient Prescriptions   Medication Sig Dispense Refill    [START ON 12/2/2018] morphine (MS CONTIN) 30 MG extended release tablet Take 1 tablet by mouth 2 times daily for 30 days. [facility] Date: 12/2/18 60 tablet 0    oxyCODONE-acetaminophen (PERCOCET)  MG per tablet Take 1 tablet by mouth every 6 hours as needed for Pain for up to 30 days. . 88 tablet 0    fluticasone (FLONASE) 50 MCG/ACT nasal spray fluticasone 50 mcg/actuation nasal spray,suspension      naloxone (NARCAN) 4 MG/0.1ML LIQD nasal spray 1 spray by Nasal route as needed (respiratory distress) 1 each 0    QVAR REDIHALER 40 MCG/ACT AERB inhaler       diclofenac sodium 1 % GEL Apply 2 g topically 2 times daily 1 Tube 0    pregabalin (LYRICA) 150 MG capsule Take 150 mg by mouth 2 times daily.  metFORMIN (GLUCOPHAGE) 500 MG tablet Take 500 mg by mouth 2 times daily (with meals).       telmisartan-hydrochlorothiazide (MICARDIS HCT) 40-12.5 MG per tablet Take 1 tablet by mouth daily.  montelukast (SINGULAIR) 10 MG tablet Take 10 mg by mouth nightly.  levothyroxine (SYNTHROID) 50 MCG tablet Take 50 mcg by mouth Daily.  omeprazole (PRILOSEC) 20 MG capsule Take 20 mg by mouth every evening.  aspirin 81 MG tablet Take 81 mg by mouth daily. No current facility-administered medications for this encounter. Allergies  Azithromycin and Adhesive tape    Family History  family history includes Alzheimer's Disease in her father; Heart Failure in her mother; Other in her father. Social History  Social History     Social History    Marital status:      Spouse name: N/A    Number of children: N/A    Years of education: N/A     Occupational History    retired      Social History Main Topics    Smoking status: Never Smoker    Smokeless tobacco: Never Used    Alcohol use Yes      Comment: rare    Drug use: No    Sexual activity: Not Asked     Other Topics Concern    None     Social History Narrative    None      reports that she does not use drugs. REVIEW OF SYSTEMS:  Review of Systems   Constitutional: Negative. Negative for chills and fever. HENT: Positive for congestion. Negative for sinus pain and trouble swallowing. Eyes: Negative for photophobia, pain and visual disturbance. Respiratory: Negative for cough and shortness of breath. Cardiovascular: Negative. Negative for chest pain, palpitations and leg swelling. Gastrointestinal: Negative. Negative for abdominal pain, constipation, diarrhea, nausea and vomiting. Endocrine: Negative for cold intolerance and polyuria. Genitourinary: Negative. Negative for dysuria and hematuria. Musculoskeletal: Positive for back pain, gait problem and myalgias. Negative for neck pain. Skin: Negative for pallor. Allergic/Immunologic: Negative for environmental allergies and food allergies. chart date reviewed include the following: Imaging Reports. Summary of Care. Time spent reviewing with patient the below reports:   Medication safety, Treatment options. Level of diagnosis and management options of this case is multiple: involving the following management options: Interventions as needed, medication management as appropriate, future visits, activity modification, heat/ice as needed, Urine drug screen as required. [x]The patient's questions were answered to the best of my abilities. This note was created using voice recognition software. There may be inaccuracies of transcription  that are inadvertently overlooked prior to the signature. There is any questions about the transcription please contact me. Return in  4 weeks  With CNP  for further plan of treatment.     Electronically signed by Shaggy Dai MD on 11/28/2018 at 1:47 PM

## 2018-12-13 ENCOUNTER — HOSPITAL ENCOUNTER (OUTPATIENT)
Dept: PAIN MANAGEMENT | Age: 78
Discharge: HOME | End: 2018-12-13
Payer: MEDICARE

## 2018-12-13 VITALS
HEIGHT: 58 IN | SYSTOLIC BLOOD PRESSURE: 115 MMHG | OXYGEN SATURATION: 97 % | TEMPERATURE: 98.4 F | WEIGHT: 147 LBS | RESPIRATION RATE: 16 BRPM | DIASTOLIC BLOOD PRESSURE: 37 MMHG | BODY MASS INDEX: 30.86 KG/M2 | HEART RATE: 64 BPM

## 2018-12-13 DIAGNOSIS — M16.11 PRIMARY OSTEOARTHRITIS OF RIGHT HIP: ICD-10-CM

## 2018-12-13 DIAGNOSIS — M54.12 CERVICAL RADICULAR PAIN: ICD-10-CM

## 2018-12-13 DIAGNOSIS — M96.1 FAILED BACK SYNDROME OF LUMBAR SPINE: ICD-10-CM

## 2018-12-13 DIAGNOSIS — M54.16 LUMBAR RADICULOPATHY: ICD-10-CM

## 2018-12-13 DIAGNOSIS — M43.06 LUMBAR SPONDYLOLYSIS: ICD-10-CM

## 2018-12-13 DIAGNOSIS — Z51.81 ENCOUNTER FOR MEDICATION MONITORING: ICD-10-CM

## 2018-12-13 DIAGNOSIS — M96.1 FAILED BACK SYNDROME: ICD-10-CM

## 2018-12-13 DIAGNOSIS — M51.36 DDD (DEGENERATIVE DISC DISEASE), LUMBAR: Primary | ICD-10-CM

## 2018-12-13 DIAGNOSIS — Z71.89 ENCOUNTER FOR MEDICATION COUNSELING: ICD-10-CM

## 2018-12-13 DIAGNOSIS — M50.30 DDD (DEGENERATIVE DISC DISEASE), CERVICAL: ICD-10-CM

## 2018-12-13 DIAGNOSIS — M46.1 SACROILIITIS (HCC): ICD-10-CM

## 2018-12-13 DIAGNOSIS — M48.02 SPINAL STENOSIS IN CERVICAL REGION: ICD-10-CM

## 2018-12-13 DIAGNOSIS — M47.816 LUMBAR SPONDYLOSIS: ICD-10-CM

## 2018-12-13 PROCEDURE — 99213 OFFICE O/P EST LOW 20 MIN: CPT | Performed by: NURSE PRACTITIONER

## 2018-12-13 PROCEDURE — 99213 OFFICE O/P EST LOW 20 MIN: CPT

## 2018-12-13 RX ORDER — OXYCODONE AND ACETAMINOPHEN 10; 325 MG/1; MG/1
1 TABLET ORAL EVERY 6 HOURS PRN
Qty: 120 TABLET | Refills: 0 | Status: SHIPPED | OUTPATIENT
Start: 2018-12-14 | End: 2019-01-11 | Stop reason: SDUPTHER

## 2018-12-13 ASSESSMENT — ENCOUNTER SYMPTOMS
BACK PAIN: 1
CONSTIPATION: 1

## 2018-12-13 NOTE — PROGRESS NOTES
Josh Echols PROGRESS NOTE      Patient here today to review Medication Agreement    Chief Complaint:low back pain       HPI: She c/o low back painwhich is unchanged. She has history of lumbar surgery. .She had undergone left SI joint injection on 2018 with about 20% improvement. I tis starting to wear off. She is sleeping well. She  Isstarting to do some home exercises. She has no Ed visits. She was unable to fill her  Percocet script as it . Back Pain   This is a chronic problem. The problem occurs constantly. The problem is unchanged. The pain is present in the lumbar spine. The quality of the pain is described as aching (dull). The pain does not radiate. The pain is at a severity of 5/10. The pain is moderate. Worse during: in morning. Exacerbated by: walking. Associated symptoms include numbness. (Feet, cramps in toes and ankle) Risk factors include menopause. She has tried analgesics and home exercises for the symptoms. The treatment provided mild relief. Patient denies any new neurological symptoms. No bowel or bladder incontinence, no weakness, and no falling. Treatment goals:  Functional status: walk better      Aberrancy:none    Analgesia:pain 5    Adverse  Effects :constipation, senokot BM every 3 days    ADL;s :some housework,     Home exercises        Pill count: appropriate  37 ms contin   Brought narcan    Morphine equivalent dose as reported on OARRS:120  Attestation: The Prescription Monitoring Report for this patient was reviewed today. Kimmy Mccormack, APRN - CNP)  Documentation: Obtaining appropriate analgesic effect of treatment., No signs of potential drug abuse or diversion identified. Kimmy Mccormack, APRN - CNP)  Chronic Pain: Dose reduction has been attempted., Reviewed the patient's functional status and documentation. , Functional status reviewed - continues with improved or maintaining ADL's., Treatment objectives documented - patient is Phentermine, Ur Not Detected    Final 03/27/2018 12:00 PM [facility]   Propoxyphene, Urine Not Detected    Final 03/27/2018 12:00 PM [facility]   Tapentadol-O-Sulfate, Urine Not Detected    Final 03/27/2018 12:00 PM [facility]   Tapentadol, Urine Not Detected    Final 03/27/2018 12:00 PM [facility]   Temazepam, Urine Not Detected    Final 03/27/2018 12:00 PM [facility]   Tramadol, Urine Not Detected    Final 03/27/2018 12:00 PM [facility]   Zolpidem, Urine Not Detected    Final 03/27/2018 12:00 PM [facility]   Drugs Expected, Ur     Final 03/27/2018 12:00  [facility] [facility]   MORPHINE 6AM 3.27.18 OXYCODONE 6 AM 3.27.18    Creatinine, Ur 72.6  20.0 - 400.0 mg/dL Final 03/27/2018 12:00 PM [facility]   Pain Mgt Drug Panel, Hi Res, Ur See Below    Final 03/27/2018 12:00 PM [facility]   (NOTE)   Methodology: Qualitative Enzyme Immunoassay and Qualitative Liquid   Chromatography-Time of Flight-Mass Spectrometry or Tandem Mass   Spectrometry, Quantitative Spectrophotometry   The absence of expected drug(s) and/or drug metabolite(s) may   indicate non-compliance, inappropriate timing of specimen   collection relative to drug administration, poor drug absorption,   diluted/adulterated urine, or limitations of testing. The   concentration must be greater than or equal to the cutoff to be   reported as present.  If specific drug concentrations are   required, contact the laboratory within two weeks of specimen   collection to request quantification by a second analytical   technique. Interpretive questions should be directed to the   laboratory. Results based on immunoassay detection that do not match clinical   expectations should be   interpreted with caution. Confirmatory testing by mass   spectrometry for immunoassay-based results is available, if   ordered within two weeks of specimen collection. Additional   charges apply. For medical purposes only; not valid for forensic use.    This test was developed and its performance characteristics   determined by (PRILOSEC) 20 MG capsule, Take 20 mg by mouth every evening., Disp: , Rfl:     aspirin 81 MG tablet, Take 81 mg by mouth daily. , Disp: , Rfl:     fluticasone (FLONASE) 50 MCG/ACT nasal spray, fluticasone 50 mcg/actuation nasal spray,suspension, Disp: , Rfl:     naloxone (NARCAN) 4 MG/0.1ML LIQD nasal spray, 1 spray by Nasal route as needed (respiratory distress), Disp: 1 each, Rfl: 0    diclofenac sodium 1 % GEL, Apply 2 g topically 2 times daily, Disp: 1 Tube, Rfl: 0    telmisartan-hydrochlorothiazide (MICARDIS HCT) 40-12.5 MG per tablet, Take 1 tablet by mouth daily. , Disp: , Rfl:     Family History   Problem Relation Age of Onset    Heart Failure Mother     Other Father          pneumonia    Alzheimer's Disease Father        Social History     Social History    Marital status:      Spouse name: N/A    Number of children: N/A    Years of education: N/A     Occupational History    retired      Social History Main Topics    Smoking status: Never Smoker    Smokeless tobacco: Never Used    Alcohol use Yes      Comment: rare    Drug use: No    Sexual activity: Not on file     Other Topics Concern    Not on file     Social History Narrative    No narrative on file       Review of Systems:  Review of Systems   Constitution: Negative. HENT:        Buzzing sound ears   Eyes:        Glasses   Cardiovascular: Negative. Endocrine:        Blood sugars under control   Hematologic/Lymphatic: Bruises/bleeds easily. Musculoskeletal: Positive for back pain and joint pain. Gastrointestinal: Positive for constipation. Genitourinary: Positive for nocturia. Neurological: Positive for loss of balance and numbness. Uses a cane    Psychiatric/Behavioral: Negative.           Physical Exam:  BP (!) 115/37   Pulse 64   Temp 98.4 °F (36.9 °C) (Oral)   Resp 16   Ht 4' 10\" (1.473 m)   Wt 147 lb (66.7 kg)   SpO2 97%   BMI 30.72 kg/m²     Physical Exam   Constitutional: She appears drugs  or any medications that can depress breathing  Patient is also advised to tell us if there is any changes in their medications from other physicians.     Her ms contin is due 19, I told her I would e-prescribe it to her pharmacy next week so it does not  and will do partial script to get on same schedule as percocet    TREATMENT OPTIONS:     Return in 4 weeks  Medication Agreement Requirements Met  Continue Opioid therapy  Script written for percocet  Follow up appointment made

## 2018-12-21 NOTE — PROGRESS NOTES
"Telephone Encounter by Markie Guerrier at 05/24/17 02:37 PM     Author:  Markie Guerrier Service:  (none) Author Type:  Patient      Filed:  05/24/17 03:29 PM Encounter Date:  5/24/2017 Status:  Signed     :  Markie Guerrier (Patient )              Luana Tsang    Patient Age: 5year old    ACCT STATUS: COPAY  MESSAGE:[MM1.1T]   Patient's mom calling to let Dr. Erna Guadarrama know that they have decided to go with [facility] for patient custom orthotics. Contact number for Michelle Ruggiero [address] is [phone]. Any other questions mom can be reached at [phone]. Message routed to team.[MM1.1M]      Next and Last Visit with Provider and Department  Next visit with Vandana Gill. is on No match found  Next visit with PODIATRY is on No match found  Last visit with Vandana Gill. was on 05/15/2017 at 11:30 AM in [address] [place] [address]  visit with PODIATRY was on 05/15/2017 at 11:30 AM in [address]: As of 05/09/2017 weight is 30 lbs. (13.608 kg). BMI is 15.51 kg/(m^2) calculated from:     Height 3' . 375"" (0.924 m) as of 3/24/17     Weight 29 lb 3.2 oz (13.245 kg) as of 3/24/17[MM1.1T]      No Known Allergies[MM1.2T]  Current Outpatient Prescriptions     Medication  Sig   â¢ topiramate (TOPAMAX) 15 MG Cap 25 mg. PHARMACY to use:[MM1.1T] n/a[MM1.1M]          Pharmacy preference(s) on file:    [address], [address] [place] [address], [address] [place] [address] to leave response (including medical information) with family member or on answering machine[MM1.1M]  ROUTING:[MM1.1T] Patient's physician/staff[MM1.1M]        PCP: Pankaj Morton.  Fabrizio Aj MD         INS: Payor: Rodger Schaumann / Plan: B Jones[employer]neill / Product Type: *No Product type* / Note: This is the primary coverage, but no account was found for this location or the patient's " primary location.    ADDRESS:  [place] [address][[id number]]       Revision History        User Key Date/Time User Provider Type Action    > MM1.2 05/24/17 03:29 PM Alysha Neves Patient  Sign     MM1.1 05/24/17 02:37 PM Kjisabelle [facility] Patient      M - Manual, T - Template 6-Acetylmorphine, Ur Not Detected   Final 10/23/2017  2:11 PM [facility]   7-Aminoclonazepam, Urine Not Detected   Final 10/23/2017  2:11 PM [facility]   Alpha-OH-Alpraz, Urine Not Detected   Final 10/23/2017  2:11 PM [facility]   Alprazolam, Urine Not Detected   Final 10/23/2017  2:11 PM [facility]   Amphetamines, urine Not Detected   Final 10/23/2017  2:11 PM [facility]   Barbiturates, Ur Not Detected   Final 10/23/2017  2:11 PM [facility]   Benzoylecgonine, Ur Not Detected   Final 10/23/2017  2:11 PM [facility]   Buprenorphine Urine Not Detected   Final 10/23/2017  2:11 PM [facility]   Carisoprodol, Ur Not Detected   Final 10/23/2017  2:11 PM [facility]   (NOTE)   The carisoprodol immunoassay has cross-reactivity to carisoprodol   and meprobamate.     Clonazepam, Urine Not Detected   Final 10/23/2017  2:11 PM [facility]   Codeine, Urine Not Detected   Final 10/23/2017  2:11 PM [facility]   MDA, Ur Not Detected   Final 10/23/2017  2:11 PM [facility]   Diazepam, Urine Not Detected   Final 10/23/2017  2:11 PM [facility]   Ethyl Glucuronide Ur Not Detected   Final 10/23/2017  2:11 PM [facility]   Fentanyl, Ur Not Detected   Final 10/23/2017  2:11 PM [facility]   Hydrocodone, Urine Not Detected   Final 10/23/2017  2:11 PM [facility]   Hydromorphone, Urine Not Detected   Final 10/23/2017  2:11 PM [facility]   Lorazepam, Urine Not Detected   Final 10/23/2017  2:11 PM [facility]   Marijuana Metab, Ur Not Detected   Final 10/23/2017  2:11 PM [facility]   MDEA, CRYSTAL, Ur Not Detected   Final 10/23/2017  2:11 PM [facility]   MDMA URINE Not Detected   Final 10/23/2017  2:11 PM [facility]   Meperidine Metab, Ur Not Detected   Final 10/23/2017  2:11 PM [facility]   Methadone, Urine Not Detected   Final 10/23/2017  2:11 PM [facility]   Methamphetamine, Urine Not Detected   Final 10/23/2017  2:11 PM [facility]   Methylphenidate Not Detected   Final 10/23/2017  2:11 PM [facility]   Midazolam, Urine Not Detected   Final 10/23/2017  2:11 PM [facility]   Morphine Urine Present   Final 10/23/2017  2:11 PM [facility]   Norbuprenorphine, Urine Not Detected   Final 10/23/2017  2:11 PM [facility] laboratory within two weeks of specimen   collection to request quantification by a second analytical   technique. Interpretive questions should be directed to the   laboratory. Results based on immunoassay detection that do not match clinical   expectations should be   interpreted with caution. Confirmatory testing by mass   spectrometry for immunoassay-based results is available, if   ordered within two weeks of specimen collection. Additional   charges apply. For medical purposes only; not valid for forensic use. This test was developed and its performance characteristics   determined by [facility]. The U.S. Food and Drug   Administration has not approved or cleared this test; however, FDA   clearance or approval is not currently required for clinical use. The results are not intended to be used as the sole means for   clinical diagnosis or patient management decisions. EER Hi Res Interp Ur See Note   Final 10/23/2017  2:11 PM [facility]   (NOTE)   Access [facility] Enhanced Report using either link below:   -Direct access: [email or web address]   -Enter Username, Password: [email or web address]   Username: [email or web address]   Password: [email or web address]   Performed by [place] [address], [address] [facility] [phone]   www. Brooklyn Dietrich MD, Lab. Director   Performed at [facility]: SALINA ZHAO [address] [place] [address] [phone]    Testing Performed By       Assessment:    Problem List Items Addressed This Visit     Degenerative lumbar disc    Relevant Medications    oxyCODONE-acetaminophen (PERCOCET)  MG per tablet (Start on 1/4/2018)    morphine (MS CONTIN) 30 MG extended release tablet (Start on 1/4/2018)    Lumbar radiculopathy    Lumbar spondylolysis - Primary    Failed back syndrome of lumbar spine    Chronic pain associated with significant psychosocial dysfunction    Encounter for medication counseling    Encounter for medication

## 2019-01-11 ENCOUNTER — HOSPITAL ENCOUNTER (OUTPATIENT)
Dept: PAIN MANAGEMENT | Age: 79
Discharge: HOME | End: 2019-01-11
Payer: MEDICARE

## 2019-01-11 ENCOUNTER — OFFICE VISIT (OUTPATIENT)
Dept: ORTHOPEDIC SURGERY | Age: 79
End: 2019-01-11
Payer: MEDICARE

## 2019-01-11 VITALS
HEART RATE: 67 BPM | BODY MASS INDEX: 30.86 KG/M2 | RESPIRATION RATE: 16 BRPM | TEMPERATURE: 98.1 F | HEIGHT: 58 IN | WEIGHT: 147 LBS | SYSTOLIC BLOOD PRESSURE: 126 MMHG | OXYGEN SATURATION: 96 % | DIASTOLIC BLOOD PRESSURE: 58 MMHG

## 2019-01-11 DIAGNOSIS — M54.16 LUMBAR RADICULOPATHY: ICD-10-CM

## 2019-01-11 DIAGNOSIS — M25.562 CHRONIC PAIN OF LEFT KNEE: Primary | ICD-10-CM

## 2019-01-11 DIAGNOSIS — F11.90 CHRONIC, CONTINUOUS USE OF OPIOIDS: ICD-10-CM

## 2019-01-11 DIAGNOSIS — G89.29 CHRONIC PAIN OF LEFT KNEE: Primary | ICD-10-CM

## 2019-01-11 DIAGNOSIS — M48.02 SPINAL STENOSIS IN CERVICAL REGION: ICD-10-CM

## 2019-01-11 DIAGNOSIS — M96.1 FAILED BACK SYNDROME: ICD-10-CM

## 2019-01-11 DIAGNOSIS — M50.30 DDD (DEGENERATIVE DISC DISEASE), CERVICAL: ICD-10-CM

## 2019-01-11 DIAGNOSIS — M47.816 LUMBAR SPONDYLOSIS: ICD-10-CM

## 2019-01-11 DIAGNOSIS — M51.36 DDD (DEGENERATIVE DISC DISEASE), LUMBAR: Primary | ICD-10-CM

## 2019-01-11 DIAGNOSIS — M79.601 PAIN OF RIGHT UPPER EXTREMITY: ICD-10-CM

## 2019-01-11 DIAGNOSIS — M96.1 FAILED BACK SYNDROME OF LUMBAR SPINE: ICD-10-CM

## 2019-01-11 PROCEDURE — 20610 DRAIN/INJ JOINT/BURSA W/O US: CPT | Performed by: ORTHOPAEDIC SURGERY

## 2019-01-11 PROCEDURE — 99213 OFFICE O/P EST LOW 20 MIN: CPT

## 2019-01-11 PROCEDURE — 99213 OFFICE O/P EST LOW 20 MIN: CPT | Performed by: NURSE PRACTITIONER

## 2019-01-11 PROCEDURE — 99213 OFFICE O/P EST LOW 20 MIN: CPT | Performed by: ORTHOPAEDIC SURGERY

## 2019-01-11 RX ORDER — BUPIVACAINE HYDROCHLORIDE 2.5 MG/ML
2 INJECTION, SOLUTION INFILTRATION; PERINEURAL ONCE
Status: COMPLETED | OUTPATIENT
Start: 2019-01-11 | End: 2019-01-11

## 2019-01-11 RX ORDER — BETAMETHASONE SODIUM PHOSPHATE AND BETAMETHASONE ACETATE 3; 3 MG/ML; MG/ML
12 INJECTION, SUSPENSION INTRA-ARTICULAR; INTRALESIONAL; INTRAMUSCULAR; SOFT TISSUE ONCE
Status: COMPLETED | OUTPATIENT
Start: 2019-01-11 | End: 2019-01-11

## 2019-01-11 RX ORDER — INFLUENZA A VIRUS A/SINGAPORE/GP1908/2015 IVR-180A (H1N1) ANTIGEN (PROPIOLACTONE INACTIVATED), INFLUENZA A VIRUS A/SINGAPORE/INFIMH-16-0019/2016 IVR-186 (H3N2) ANTIGEN (PROPIOLACTONE INACTIVATED) AND INFLUENZA B VIRUS B/MARYLAND/15/2016 ANTIGEN (PROPIOLACTONE INACTIVATED) 15; 15; 15 UG/.5ML; UG/.5ML; UG/.5ML
INJECTION, SUSPENSION INTRAMUSCULAR
COMMUNITY
Start: 2018-12-27 | End: 2019-01-11 | Stop reason: ALTCHOICE

## 2019-01-11 RX ORDER — OXYCODONE AND ACETAMINOPHEN 10; 325 MG/1; MG/1
1 TABLET ORAL EVERY 6 HOURS PRN
Qty: 120 TABLET | Refills: 0 | Status: SHIPPED | OUTPATIENT
Start: 2019-01-13 | End: 2019-02-12 | Stop reason: SDUPTHER

## 2019-01-11 RX ORDER — LIDOCAINE HYDROCHLORIDE 10 MG/ML
2 INJECTION, SOLUTION EPIDURAL; INFILTRATION; INTRACAUDAL; PERINEURAL ONCE
Status: COMPLETED | OUTPATIENT
Start: 2019-01-11 | End: 2019-01-11

## 2019-01-11 RX ADMIN — BETAMETHASONE SODIUM PHOSPHATE AND BETAMETHASONE ACETATE 12 MG: 3; 3 INJECTION, SUSPENSION INTRA-ARTICULAR; INTRALESIONAL; INTRAMUSCULAR; SOFT TISSUE at 09:56

## 2019-01-11 RX ADMIN — LIDOCAINE HYDROCHLORIDE 2 ML: 10 INJECTION, SOLUTION EPIDURAL; INFILTRATION; INTRACAUDAL; PERINEURAL at 09:57

## 2019-01-11 RX ADMIN — BUPIVACAINE HYDROCHLORIDE 5 MG: 2.5 INJECTION, SOLUTION INFILTRATION; PERINEURAL at 09:56

## 2019-01-11 ASSESSMENT — ENCOUNTER SYMPTOMS
BACK PAIN: 1
CONSTIPATION: 1
RESPIRATORY NEGATIVE: 1

## 2019-01-23 DIAGNOSIS — M96.1 FAILED BACK SYNDROME: ICD-10-CM

## 2019-01-23 DIAGNOSIS — M47.816 LUMBAR SPONDYLOSIS: ICD-10-CM

## 2019-01-23 DIAGNOSIS — M51.36 DDD (DEGENERATIVE DISC DISEASE), LUMBAR: ICD-10-CM

## 2019-01-23 RX ORDER — MORPHINE SULFATE 30 MG/1
30 TABLET, FILM COATED, EXTENDED RELEASE ORAL 2 TIMES DAILY
Qty: 60 TABLET | Refills: 0 | Status: SHIPPED | OUTPATIENT
Start: 2019-02-01 | End: 2019-03-04 | Stop reason: SDUPTHER

## 2019-02-04 ENCOUNTER — HOSPITAL ENCOUNTER (OUTPATIENT)
Dept: PAIN MANAGEMENT | Age: 79
Discharge: HOME | End: 2019-02-04
Payer: MEDICARE

## 2019-02-12 ENCOUNTER — HOSPITAL ENCOUNTER (OUTPATIENT)
Dept: PAIN MANAGEMENT | Age: 79
Discharge: HOME | End: 2019-02-12
Payer: MEDICARE

## 2019-02-12 VITALS
BODY MASS INDEX: 31.49 KG/M2 | HEIGHT: 58 IN | WEIGHT: 150 LBS | HEART RATE: 68 BPM | DIASTOLIC BLOOD PRESSURE: 53 MMHG | RESPIRATION RATE: 16 BRPM | TEMPERATURE: 99.4 F | OXYGEN SATURATION: 95 % | SYSTOLIC BLOOD PRESSURE: 118 MMHG

## 2019-02-12 DIAGNOSIS — M54.12 CERVICAL RADICULAR PAIN: ICD-10-CM

## 2019-02-12 DIAGNOSIS — M48.02 SPINAL STENOSIS IN CERVICAL REGION: ICD-10-CM

## 2019-02-12 DIAGNOSIS — M46.1 SACROILIITIS (HCC): ICD-10-CM

## 2019-02-12 DIAGNOSIS — M43.06 LUMBAR SPONDYLOLYSIS: ICD-10-CM

## 2019-02-12 DIAGNOSIS — M50.30 DDD (DEGENERATIVE DISC DISEASE), CERVICAL: ICD-10-CM

## 2019-02-12 DIAGNOSIS — F11.90 CHRONIC, CONTINUOUS USE OF OPIOIDS: ICD-10-CM

## 2019-02-12 DIAGNOSIS — M96.1 FAILED BACK SYNDROME OF LUMBAR SPINE: ICD-10-CM

## 2019-02-12 DIAGNOSIS — M51.36 DDD (DEGENERATIVE DISC DISEASE), LUMBAR: Primary | ICD-10-CM

## 2019-02-12 DIAGNOSIS — Z51.81 ENCOUNTER FOR MEDICATION MONITORING: ICD-10-CM

## 2019-02-12 DIAGNOSIS — Z71.89 ENCOUNTER FOR MEDICATION COUNSELING: ICD-10-CM

## 2019-02-12 DIAGNOSIS — M54.16 LUMBAR RADICULOPATHY: ICD-10-CM

## 2019-02-12 DIAGNOSIS — M16.11 PRIMARY OSTEOARTHRITIS OF RIGHT HIP: ICD-10-CM

## 2019-02-12 DIAGNOSIS — M96.1 FAILED BACK SYNDROME: ICD-10-CM

## 2019-02-12 DIAGNOSIS — M47.816 LUMBAR SPONDYLOSIS: ICD-10-CM

## 2019-02-12 PROCEDURE — 99213 OFFICE O/P EST LOW 20 MIN: CPT

## 2019-02-12 PROCEDURE — 99213 OFFICE O/P EST LOW 20 MIN: CPT | Performed by: NURSE PRACTITIONER

## 2019-02-12 RX ORDER — OXYCODONE AND ACETAMINOPHEN 10; 325 MG/1; MG/1
1 TABLET ORAL EVERY 6 HOURS PRN
Qty: 120 TABLET | Refills: 0 | Status: SHIPPED | OUTPATIENT
Start: 2019-02-14 | End: 2019-03-11 | Stop reason: SDUPTHER

## 2019-02-12 ASSESSMENT — ENCOUNTER SYMPTOMS
SHORTNESS OF BREATH: 1
GASTROINTESTINAL NEGATIVE: 1
BACK PAIN: 1

## 2019-02-26 ENCOUNTER — HOSPITAL ENCOUNTER (OUTPATIENT)
Dept: WOMENS IMAGING | Age: 79
Discharge: HOME | End: 2019-02-28
Payer: MEDICARE

## 2019-02-26 DIAGNOSIS — Z12.39 SCREENING BREAST EXAMINATION: ICD-10-CM

## 2019-02-26 PROCEDURE — 77067 SCR MAMMO BI INCL CAD: CPT

## 2019-03-11 ENCOUNTER — HOSPITAL ENCOUNTER (OUTPATIENT)
Dept: PAIN MANAGEMENT | Age: 79
Discharge: HOME | End: 2019-03-11
Payer: MEDICARE

## 2019-03-11 VITALS
WEIGHT: 150 LBS | HEIGHT: 58 IN | RESPIRATION RATE: 16 BRPM | OXYGEN SATURATION: 96 % | TEMPERATURE: 97 F | HEART RATE: 68 BPM | BODY MASS INDEX: 31.49 KG/M2

## 2019-03-11 DIAGNOSIS — F11.90 CHRONIC, CONTINUOUS USE OF OPIOIDS: ICD-10-CM

## 2019-03-11 DIAGNOSIS — M48.02 SPINAL STENOSIS IN CERVICAL REGION: ICD-10-CM

## 2019-03-11 DIAGNOSIS — R26.89 IMBALANCE: Chronic | ICD-10-CM

## 2019-03-11 DIAGNOSIS — M96.1 FAILED BACK SYNDROME: Primary | ICD-10-CM

## 2019-03-11 PROCEDURE — 99213 OFFICE O/P EST LOW 20 MIN: CPT

## 2019-03-11 PROCEDURE — 99215 OFFICE O/P EST HI 40 MIN: CPT | Performed by: ANESTHESIOLOGY

## 2019-03-11 RX ORDER — MORPHINE SULFATE 30 MG/1
30 TABLET, FILM COATED, EXTENDED RELEASE ORAL 2 TIMES DAILY
Qty: 24 TABLET | Refills: 0 | Status: SHIPPED | OUTPATIENT
Start: 2019-03-11 | End: 2019-04-08 | Stop reason: SDUPTHER

## 2019-03-11 RX ORDER — OXYCODONE AND ACETAMINOPHEN 10; 325 MG/1; MG/1
1 TABLET ORAL EVERY 6 HOURS PRN
Qty: 120 TABLET | Refills: 0 | Status: SHIPPED | OUTPATIENT
Start: 2019-03-11 | End: 2019-04-08 | Stop reason: SDUPTHER

## 2019-03-11 ASSESSMENT — ENCOUNTER SYMPTOMS
SHORTNESS OF BREATH: 1
EYES NEGATIVE: 1
SINUS PRESSURE: 1
BACK PAIN: 1

## 2019-03-11 ASSESSMENT — PAIN DESCRIPTION - DESCRIPTORS: DESCRIPTORS: ACHING;CONSTANT

## 2019-03-11 ASSESSMENT — PAIN DESCRIPTION - FREQUENCY: FREQUENCY: CONTINUOUS

## 2019-03-11 ASSESSMENT — PAIN SCALES - GENERAL: PAINLEVEL_OUTOF10: 6

## 2019-03-11 ASSESSMENT — PAIN DESCRIPTION - ORIENTATION: ORIENTATION: RIGHT;LEFT

## 2019-03-11 ASSESSMENT — PAIN DESCRIPTION - LOCATION: LOCATION: BACK;LEG

## 2019-03-11 ASSESSMENT — PAIN DESCRIPTION - PROGRESSION: CLINICAL_PROGRESSION: NOT CHANGED

## 2019-03-11 ASSESSMENT — PAIN - FUNCTIONAL ASSESSMENT: PAIN_FUNCTIONAL_ASSESSMENT: PREVENTS OR INTERFERES SOME ACTIVE ACTIVITIES AND ADLS

## 2019-03-11 ASSESSMENT — PAIN DESCRIPTION - ONSET: ONSET: ON-GOING

## 2019-03-11 ASSESSMENT — ACTIVITIES OF DAILY LIVING (ADL): EFFECT OF PAIN ON DAILY ACTIVITIES: PAIN INCREASES WITH STANDING AND WALKING

## 2019-03-22 ENCOUNTER — HOSPITAL ENCOUNTER (OUTPATIENT)
Dept: CT IMAGING | Age: 79
Discharge: HOME | End: 2019-03-24
Payer: MEDICARE

## 2019-03-22 ENCOUNTER — HOSPITAL ENCOUNTER (OUTPATIENT)
Dept: INTERVENTIONAL RADIOLOGY/VASCULAR | Age: 79
Discharge: HOME | End: 2019-03-24
Payer: MEDICARE

## 2019-03-22 VITALS
DIASTOLIC BLOOD PRESSURE: 56 MMHG | RESPIRATION RATE: 18 BRPM | HEIGHT: 58 IN | HEART RATE: 61 BPM | OXYGEN SATURATION: 97 % | SYSTOLIC BLOOD PRESSURE: 113 MMHG | TEMPERATURE: 96.8 F | BODY MASS INDEX: 31.28 KG/M2 | WEIGHT: 149 LBS

## 2019-03-22 DIAGNOSIS — M96.1 FAILED BACK SYNDROME: ICD-10-CM

## 2019-03-22 LAB
INR BLD: 1
PARTIAL THROMBOPLASTIN TIME: 30.8 SEC (ref 24–36)
PLATELET # BLD: 231 K/UL (ref 150–450)
PROTHROMBIN TIME: 12.7 SEC (ref 11.8–14.6)

## 2019-03-22 PROCEDURE — 36415 COLL VENOUS BLD VENIPUNCTURE: CPT

## 2019-03-22 PROCEDURE — 7100000030 HC ASPR PHASE II RECOVERY - FIRST 15 MIN

## 2019-03-22 PROCEDURE — 7100000011 HC PHASE II RECOVERY - ADDTL 15 MIN

## 2019-03-22 PROCEDURE — 2709999900 IR MYELOGRAM 2 OR MORE REGIONS

## 2019-03-22 PROCEDURE — 85730 THROMBOPLASTIN TIME PARTIAL: CPT

## 2019-03-22 PROCEDURE — 72132 CT LUMBAR SPINE W/DYE: CPT

## 2019-03-22 PROCEDURE — 6360000004 HC RX CONTRAST MEDICATION: Performed by: RADIOLOGY

## 2019-03-22 PROCEDURE — 85049 AUTOMATED PLATELET COUNT: CPT

## 2019-03-22 PROCEDURE — 85610 PROTHROMBIN TIME: CPT

## 2019-03-22 PROCEDURE — 62305 MYELOGRAPHY LUMBAR INJECTION: CPT | Performed by: RADIOLOGY

## 2019-03-22 PROCEDURE — 72126 CT NECK SPINE W/DYE: CPT

## 2019-03-22 PROCEDURE — 2500000003 HC RX 250 WO HCPCS: Performed by: RADIOLOGY

## 2019-03-22 PROCEDURE — 7100000010 HC PHASE II RECOVERY - FIRST 15 MIN

## 2019-03-22 PROCEDURE — 7100000031 HC ASPR PHASE II RECOVERY - ADDTL 15 MIN

## 2019-03-22 PROCEDURE — 72129 CT CHEST SPINE W/DYE: CPT

## 2019-03-22 RX ORDER — LIDOCAINE HYDROCHLORIDE 10 MG/ML
INJECTION, SOLUTION INFILTRATION; PERINEURAL
Status: COMPLETED | OUTPATIENT
Start: 2019-03-22 | End: 2019-03-22

## 2019-03-22 RX ORDER — ACETAMINOPHEN 325 MG/1
650 TABLET ORAL EVERY 4 HOURS PRN
Status: DISCONTINUED | OUTPATIENT
Start: 2019-03-22 | End: 2019-03-25 | Stop reason: HOSPADM

## 2019-03-22 RX ADMIN — IOPAMIDOL 11 ML: 612 INJECTION, SOLUTION INTRATHECAL at 13:18

## 2019-03-22 RX ADMIN — LIDOCAINE HYDROCHLORIDE 2 ML: 10 INJECTION, SOLUTION INFILTRATION; PERINEURAL at 12:58

## 2019-03-22 ASSESSMENT — PAIN SCALES - GENERAL: PAINLEVEL_OUTOF10: 4

## 2019-03-22 ASSESSMENT — PAIN DESCRIPTION - ORIENTATION: ORIENTATION: LOWER

## 2019-03-22 ASSESSMENT — PAIN DESCRIPTION - DESCRIPTORS: DESCRIPTORS: ACHING;CONSTANT

## 2019-03-22 ASSESSMENT — PAIN DESCRIPTION - PAIN TYPE: TYPE: CHRONIC PAIN

## 2019-03-22 ASSESSMENT — PAIN DESCRIPTION - LOCATION: LOCATION: BACK

## 2019-03-22 ASSESSMENT — PAIN - FUNCTIONAL ASSESSMENT: PAIN_FUNCTIONAL_ASSESSMENT: 0-10

## 2019-03-25 ENCOUNTER — OFFICE VISIT (OUTPATIENT)
Dept: FAMILY MEDICINE CLINIC | Age: 79
End: 2019-03-25
Payer: MEDICARE

## 2019-03-25 ENCOUNTER — HOSPITAL ENCOUNTER (OUTPATIENT)
Dept: GENERAL RADIOLOGY | Age: 79
Discharge: HOME | End: 2019-03-27
Payer: MEDICARE

## 2019-03-25 ENCOUNTER — HOSPITAL ENCOUNTER (OUTPATIENT)
Age: 79
Discharge: HOME | End: 2019-03-27
Payer: MEDICARE

## 2019-03-25 VITALS
DIASTOLIC BLOOD PRESSURE: 71 MMHG | BODY MASS INDEX: 31.7 KG/M2 | RESPIRATION RATE: 18 BRPM | OXYGEN SATURATION: 95 % | WEIGHT: 151 LBS | HEIGHT: 58 IN | SYSTOLIC BLOOD PRESSURE: 139 MMHG | HEART RATE: 69 BPM | TEMPERATURE: 98.3 F

## 2019-03-25 DIAGNOSIS — M25.532 LEFT WRIST PAIN: ICD-10-CM

## 2019-03-25 DIAGNOSIS — M25.532 LEFT WRIST PAIN: Primary | ICD-10-CM

## 2019-03-25 PROCEDURE — G8510 SCR DEP NEG, NO PLAN REQD: HCPCS | Performed by: FAMILY MEDICINE

## 2019-03-25 PROCEDURE — 73110 X-RAY EXAM OF WRIST: CPT

## 2019-03-25 PROCEDURE — 99213 OFFICE O/P EST LOW 20 MIN: CPT | Performed by: FAMILY MEDICINE

## 2019-03-25 PROCEDURE — 3288F FALL RISK ASSESSMENT DOCD: CPT | Performed by: FAMILY MEDICINE

## 2019-03-25 ASSESSMENT — ENCOUNTER SYMPTOMS
ALLERGIC/IMMUNOLOGIC NEGATIVE: 1
GASTROINTESTINAL NEGATIVE: 1
RESPIRATORY NEGATIVE: 1
EYES NEGATIVE: 1

## 2019-03-25 ASSESSMENT — PATIENT HEALTH QUESTIONNAIRE - PHQ9: DEPRESSION UNABLE TO ASSESS: PT REFUSES

## 2019-03-28 ENCOUNTER — APPOINTMENT (OUTPATIENT)
Dept: GENERAL RADIOLOGY | Age: 79
End: 2019-03-28
Payer: MEDICARE

## 2019-03-28 ENCOUNTER — HOSPITAL ENCOUNTER (EMERGENCY)
Age: 79
Discharge: HOME | End: 2019-03-28
Attending: EMERGENCY MEDICINE
Payer: MEDICARE

## 2019-03-28 VITALS
WEIGHT: 150 LBS | TEMPERATURE: 98.9 F | BODY MASS INDEX: 31.49 KG/M2 | HEIGHT: 58 IN | SYSTOLIC BLOOD PRESSURE: 126 MMHG | OXYGEN SATURATION: 100 % | HEART RATE: 83 BPM | RESPIRATION RATE: 18 BRPM | DIASTOLIC BLOOD PRESSURE: 51 MMHG

## 2019-03-28 DIAGNOSIS — M25.532 WRIST PAIN, LEFT: Primary | ICD-10-CM

## 2019-03-28 PROCEDURE — 73110 X-RAY EXAM OF WRIST: CPT

## 2019-03-28 PROCEDURE — 99283 EMERGENCY DEPT VISIT LOW MDM: CPT

## 2019-03-28 RX ORDER — IBUPROFEN 200 MG
400 TABLET ORAL ONCE
Status: DISCONTINUED | OUTPATIENT
Start: 2019-03-28 | End: 2019-03-29 | Stop reason: HOSPADM

## 2019-03-28 ASSESSMENT — PAIN SCALES - GENERAL
PAINLEVEL_OUTOF10: 10
PAINLEVEL_OUTOF10: 10

## 2019-03-29 NOTE — ED PROVIDER NOTES
[address] ED  eMERGENCY dEPARTMENT eNCOUnter   Independent Attestation     Pt Name: Waqas Turner  MRN: [id number]  [place] 1940  Date of evaluation: 3/28/19       Waqas Turner is a 70 y.o. female who presents with Wrist Injury        Based on the medical record, the care appears appropriate. I was personally available for consultation in the Emergency Department.     Daniel Vieyra MD  Attending Emergency  Physician                  Daniel Vieyra MD  03/28/19 3008

## 2019-03-29 NOTE — ED PROVIDER NOTES
[address] ED  eMERGENCY dEPARTMENT eNCOUnter      Pt Name: Abigail Garcia  MRN: [id number]  [place] 1940  Date of evaluation: 3/28/19      CHIEF COMPLAINT       Chief Complaint   Patient presents with    Wrist Injury         HISTORY OF PRESENT ILLNESS    Abigail Garcia is a 70 y.o. female who presents complaining of left wrist pain  The history is provided by the patient. Wrist Problem   Location:  Wrist  Wrist location:  L wrist  Injury: yes    Time since incident:  4 days  Mechanism of injury: fall    Fall:     Fall occurred: was tying her shoes and lost her balance landed on outstretched left hand, had neg xray, still has pain. Entrapped after fall: no    Pain details:     Quality:  Aching    Radiates to:  Does not radiate    Severity:  Mild    Onset quality:  Sudden    Duration:  4 days    Timing:  Intermittent    Progression:  Waxing and waning  Dislocation: no    Foreign body present:  No foreign bodies  Tetanus status:  Unknown  Prior injury to area:  Unable to specify  Relieved by:  Rest  Worsened by: Movement, stress and stretching area  Ineffective treatments:  None tried  Associated symptoms: decreased range of motion and swelling        REVIEW OF SYSTEMS       Review of Systems   Musculoskeletal: Positive for arthralgias (left wrist pain). All other systems reviewed and are negative.       PAST MEDICAL HISTORY     Past Medical History:   Diagnosis Date    Achilles tendonitis     right    Asthma     COPD [facility]chronic obstructive pulmonary disease) ([facility])     Cough     clear phlegm    Degenerative lumbar disc     Diabetes mellitus (HCC)     type 2    Failed back syndrome, lumbar     Fast heart beat     Hx of \"8-10 years ago\"    Fibromyalgia     History of bladder cancer     Hypertension     Hypothyroid     Kidney stones     Lumbar radiculopathy     Lumbar spondylolysis     Osteoarthritis     Sleep apnea     uses CPAP machine nightly    Spinal stenosis in cervical region     Wears glasses        SURGICAL HISTORY       Past Surgical History:   Procedure Laterality Date    BACK SURGERY  2010    lumbar fusion    BLADDER TUMOR EXCISION  2005    CARDIOVERSION      \"8-10 years ago\"  to get \"heart into regular rhythm\"    CARPAL TUNNEL RELEASE Bilateral     CATARACT REMOVAL WITH IMPLANT Bilateral 7/22/2014, 8/5/2014    Dilma/Demian    CHOLECYSTECTOMY, LAPAROSCOPIC N/A 8/18/2017    CHOLECYSTECTOMY LAPAROSCOPIC ROBOTIC MULTIPORT performed by Jordon Beebe MD at [address]  1/2013    CYSTOSCOPY  11/04/2016    CYSTOSCOPY  01/04/2019    ENDOSCOPIC ULTRASOUND (LOWER) N/A 8/17/2017    ENDOSCOPIC ULTRASOUND performed by Susy Reeder MD at [address] (UPPER)  08/17/2017    A cursory view of the gastric mucosa was normal. The scope was then further advanced through a patent pylorus to the second portion of the duodenum. The Gallbladder was evaluated in bulb position. Thick sludge was noted. The CBD was 7.8 mm with no filling defects. The PD was 5 mm in the HOP. The pancreatic tissue was edematous in body and tail.  KNEE ARTHROSCOPY Left     LUMBAR FUSION      HI OFFICE/OUTPT VISIT,PROCEDURE ONLY N/A 1/24/2018    SPINAL HARDWARE REMOVAL LUMBAR BONE STIMULATOR performed by Jamarcus Vasquez MD at [facility] Bilateral 1989    SKIN BIOPSY Right 11/2015    mole right posterior shoulder    SPINE SURGERY      spinal cord stimulator    SPINE SURGERY  91-4-15    renoval of spinal cord stimulator leads and battery    PASQUALE AND BSO      TUBAL LIGATION      UVULOPALATOPHARYGOPLASTY  2006       CURRENT MEDICATIONS       Discharge Medication List as of 3/28/2019 10:33 PM      CONTINUE these medications which have NOT CHANGED    Details   oxyCODONE-acetaminophen (PERCOCET)  MG per tablet Take 1 tablet by mouth every 6 hours as needed for Pain for up to 30 days. , Disp-120 tablet, R-0Print      fluticasone (FLONASE) 50 MCG/ACT nasal spray fluticasone 50 mcg/actuation nasal spray,suspensionHistorical Med      naloxone (NARCAN) 4 MG/0.1ML LIQD nasal spray 1 spray by Nasal route as needed (respiratory distress), Disp-1 each, R-0Normal      QVAR REDIHALER 40 MCG/ACT AERB inhaler 1 puff daily , DAWHistorical Med      diclofenac sodium 1 % GEL Apply 2 g topically 2 times daily, Topical, 2 TIMES DAILY Starting 2016, Until Discontinued, Disp-1 Tube, R-0, OTC      pregabalin (LYRICA) 150 MG capsule Take 150 mg by mouth 2 times daily. metFORMIN (GLUCOPHAGE) 500 MG tablet Take 500 mg by mouth 2 times daily (with meals). telmisartan-hydrochlorothiazide (MICARDIS HCT) 40-12.5 MG per tablet Take 1 tablet by mouth daily. montelukast (SINGULAIR) 10 MG tablet Take 10 mg by mouth daily Historical Med      levothyroxine (SYNTHROID) 50 MCG tablet Take 50 mcg by mouth Daily. omeprazole (PRILOSEC) 20 MG capsule Take 20 mg by mouth every evening. aspirin 81 MG tablet Take 81 mg by mouth daily. ALLERGIES     is allergic to azithromycin and adhesive tape. FAMILY HISTORY     indicated that her mother is . She indicated that her father is . SOCIAL HISTORY      reports that she has never smoked. She has never used smokeless tobacco. She reports that she drinks alcohol. She reports that she does not use drugs. PHYSICAL EXAM     INITIAL VITALS: BP (!) 126/51   Pulse 83   Temp 98.9 °F (37.2 °C) (Oral)   Resp 18   Ht 4' 10\" (1.473 m)   Wt 150 lb (68 kg)   SpO2 100%   BMI 31.35 kg/m²      Physical Exam   Constitutional: She is oriented to person, place, and time. She appears well-developed and well-nourished. HENT:   Head: Normocephalic and atraumatic. Cardiovascular: Normal rate, regular rhythm and normal heart sounds.    Pulmonary/Chest: Effort normal and breath sounds normal.   Musculoskeletal: She exhibits tenderness (distal radius tenderness, tender of distal ulne, there is mild swelling, no deformity. ). Neurological: She is alert and oriented to person, place, and time. Nursing note and vitals reviewed. MEDICAL DECISION MAKING:         DIAGNOSTIC RESULTS     EKG: All EKG's are interpreted by the Emergency Department Physician who either signs or Co-signs this chart in the absence of acardiologist.        RADIOLOGY:[place] film, CT, MRI, and formal ultrasound images (except ED bedside ultrasound) are read by the radiologist and the images and interpretations are directly viewed by the emergency physician. XR WRIST LEFT (MIN 3 VIEWS) (Final result)   Result time 03/28/19 22:15:53   Final result by Mariella Osorio MD (03/28/19 22:15:53)                Impression:    No acute or subacute fractures are identified. 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 LABS:All lab results were reviewed by myself, and all abnormals are listed below. Labs Reviewed - No data to display      EMERGENCY DEPARTMENT COURSE:   Vitals:    Vitals:    03/28/19 2052 03/28/19 2053   BP:  (!) 126/51   Pulse: 83    Resp: 18    Temp: 98.9 °F (37.2 °C)    TempSrc: Oral    SpO2: 100%    Weight: 150 lb (68 kg)    Height: 4' 10\" (1.473 m)        The patient was given the following medications while in the emergency department:  Orders Placed This Encounter   Medications    DISCONTD: ibuprofen (ADVIL;MOTRIN) tablet 400 mg       -------------------------      CRITICAL CARE:     CONSULTS:  None    PROCEDURES:  Procedures    FINAL IMPRESSION      1.  Wrist pain, left          DISPOSITION/PLAN   DISPOSITION Decision To Discharge 03/28/2019 10:13:02 PM      PATIENT REFERREDTO:  Kathleen Bliss DO  [address]  [address] [place] [address] Rd  [phone]    Schedule an appointment as soon as possible for a visit in 2 days  Or see your orthopedic doctor for recheck    [address]  [address]

## 2019-04-01 ENCOUNTER — OFFICE VISIT (OUTPATIENT)
Dept: NEUROSURGERY | Age: 79
End: 2019-04-01
Payer: MEDICARE

## 2019-04-01 VITALS
WEIGHT: 151 LBS | SYSTOLIC BLOOD PRESSURE: 127 MMHG | BODY MASS INDEX: 31.7 KG/M2 | HEART RATE: 74 BPM | HEIGHT: 58 IN | DIASTOLIC BLOOD PRESSURE: 72 MMHG

## 2019-04-01 DIAGNOSIS — M48.02 CERVICAL STENOSIS OF SPINAL CANAL: ICD-10-CM

## 2019-04-01 DIAGNOSIS — Z98.1 HISTORY OF FUSION OF LUMBAR SPINE: ICD-10-CM

## 2019-04-01 DIAGNOSIS — R26.81 GAIT INSTABILITY: ICD-10-CM

## 2019-04-01 DIAGNOSIS — R53.81 DEBILITY: ICD-10-CM

## 2019-04-01 DIAGNOSIS — M47.14 THORACIC SPONDYLOSIS WITH MYELOPATHY: Primary | ICD-10-CM

## 2019-04-01 DIAGNOSIS — R33.9 URINE RETENTION: ICD-10-CM

## 2019-04-01 PROCEDURE — 99202 OFFICE O/P NEW SF 15 MIN: CPT | Performed by: PHYSICIAN ASSISTANT

## 2019-04-01 NOTE — PROGRESS NOTES
[address]  [address] [id number]  Dept: [phone]    Patient:  Monica Leonard  YOB: 1940  Date: 4/1/19  PRIMARY CARE PHYSICIAN: Christine Banerjee DO  REFERRED BY: Antonio Durán     Chief Complaint   Patient presents with    Consultation    Back Pain    Other     gait instability        HPI:     Monica Leonard is a 70 y.o. female on whom a neurosurgical consultation has been requested by Christine Banerjee DO for back pain and gait change.       History:     Past Medical History:   Diagnosis Date    Achilles tendonitis     right    Asthma     COPD (chronic obstructive pulmonary disease) (HCC)     Cough     clear phlegm    Degenerative lumbar disc     Diabetes mellitus (HCC)     type 2    Failed back syndrome, lumbar     Fast heart beat     Hx of \"8-10 years ago\"    Fibromyalgia     History of bladder cancer     Hypertension     Hypothyroid     Kidney stones     Lumbar radiculopathy     Lumbar spondylolysis     Osteoarthritis     Sleep apnea     uses CPAP machine nightly    Spinal stenosis in cervical region     Wears glasses      Past Surgical History:   Procedure Laterality Date    BACK SURGERY  2010    lumbar fusion    BLADDER TUMOR EXCISION  2005    CARDIOVERSION      \"8-10 years ago\"  to get \"heart into regular rhythm\"    CARPAL TUNNEL RELEASE Bilateral     CATARACT REMOVAL WITH IMPLANT Bilateral 7/22/2014, 8/5/2014    Dilma/Demian    CHOLECYSTECTOMY, LAPAROSCOPIC N/A 8/18/2017    CHOLECYSTECTOMY LAPAROSCOPIC ROBOTIC MULTIPORT performed by Noe Cross MD at [address] [place] [address]  1/2013    CYSTOSCOPY  11/04/2016    CYSTOSCOPY  01/04/2019    ENDOSCOPIC ULTRASOUND (LOWER) N/A 8/17/2017    ENDOSCOPIC ULTRASOUND performed by Venessa Bess MD at [address] [place] [address] (UPPER)  08/17/2017    A cursory view of the gastric mucosa was normal. The scope was then further advanced .   Strength bilateral lower extremities full and symmetric at 5/5 iliopsoas, quadriceps, hamstring, tibialis anterior, gastrocnemius, and EHL. Sensory: Grossly intact. Reflexes: +2/4 in bilateral upper extremities. +2/4 in bilateral patellar and achilles. Hoffmans negative, no clonus. Gait: Normal, narrow based. Studies Review:     Reviewed CT Type:  Film and Report    Images:  CT cervical 3/22/19  Narrative   EXAMINATION:   CT OF THE CERVICAL SPINE WITH INTRATHECAL CONTRAST 3/22/2019 1:25 pm:       TECHNIQUE:   CT of the cervical spine was performed after the administration of   intrathecal contrast with multiplanar reconstructed images provided for   interpretation.  Dose modulation, iterative reconstruction, and/or weight   based adjustment of the mA/kV was utilized to reduce the radiation dose to as   low as reasonably achievable.       COMPARISON:   MRI 03/29/2018       HISTORY:   ORDERING SYSTEM PROVIDED HISTORY: Failed back syndrome   TECHNOLOGIST PROVIDED HISTORY:   POST MYELOGRAM   Ordering Physician Provided Reason for Exam: FAILED BACK SYNDROME, POST   MYELOGRAM       FINDINGS:   BONES/ALIGNMENT: Straightening of normal cervical lordosis which could be   positional or due to spasm.  Vertebral body heights are maintained.  No   osseous destructive lesion is seen.  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       SOFT TISSUES: There is no prevertebral soft tissue swelling.  Possible 3 mm   hypodense left thyroid nodule.  No follow-up imaging is recommended.       C2-C3: There is no significant disc protrusion, spinal canal stenosis or   neural foraminal narrowing.       C3-C4: Disc degenerative changes with loss of disc space height and   osteophytes.  Mild diffuse disc osteophyte complex slightly eccentric to the   left resulting in mass effect on the ventral aspect of the thecal sac.  Mild   central spinal and moderate left neural INTRATHECAL CONTRAST 3/22/2019 1:25 pm:       TECHNIQUE:   CT of the lumbar spine was performed after the administration of intrathecal   contrast with multiplanar reconstructed images provided for interpretation. Dose modulation, iterative reconstruction, and/or weight based adjustment of   the mA/kV was utilized to reduce the radiation dose to as low as reasonably   achievable.       COMPARISON:   MRI 07/07/2018       HISTORY:   ORDERING SYSTEM PROVIDED HISTORY: Failed back syndrome   TECHNOLOGIST PROVIDED HISTORY:   POST MYELOGRAM   Ordering Physician Provided Reason for Exam: FAILED BACK SYNDROME, POST   MYELOGRAM       Chronic back pain, surgery approximately five years ago.       FINDINGS:   BONES/ALIGNMENT: Slight levoconvex curvature of the lumbar spine with   advanced lumbar spondylotic changes.  Vertebral body heights are maintained. Extensive postop changes with bilateral pedicle screws and posterior fusion   rods L2-S1 with a transverse bar at the L3-L4 level.  Intervertebral disc   prostheses at L4-L5 and L2-L3.  Previous posterior decompression L2-L5. There is regional artifact from metallic hardware.  Stable mild grade 1   anterolisthesis L5 on S1.  At least moderate spinal stenosis T12-L1.       SOFT TISSUES: No paraspinal mass is seen.  Similar to the prior MRI there is   a fluid collection/probable seroma in the soft tissues posterior to the   spinal canal extending between the fusion rods from approximately L3-L5.    This area is difficult to evaluate due to artifact from hardware.  No   definite contrast is seen in the region to indicate communication with the   thecal sac.       L1-L2: Advanced disc degenerative changes with near complete loss of disc   space height and posterior disc osteophyte complex.  Facet arthropathy and   ligamentum flavum thickening.  Combination results in moderate to severe   spinal stenosis.  Moderate to severe bilateral neural foraminal stenosis.       L2-L3: [place]        Plan:   Ms. Vicky Carbajal is a 70 y.o. female being seen in the neurosurgery clinic today in regard to chronic neck pain, progressive worsening mid and lower back pain and increasing gait instability over the past year. Patient has had at least 4 falls in the last 2 months. Patient has longstanding history of chronic back and neck pain related to degenerative spine disease. Prevous spine surgery x 2. includes posterior lumbar spinal  fusion, L2-S1, in 2010. Hx implanted bone growth stimulator surgically removed 1/24/18. Patient had CT myelogram of cervical, thoracic, and lumbar spine on 3/22/19. Diagnosis and plan discussed with the patient and all questions answered. Followup: Return for With Dr Retha Gowers. Thoracic myelopathy. Prescriptions Ordered:  No orders of the defined types were placed in this encounter. Orders Placed:  Orders Placed This Encounter   Procedures    MRI THORACIC SPINE WO CONTRAST     Standing Status:   Future     Number of Occurrences:   1     Standing Expiration Date:   4/1/2020    MRI Cervical Spine WO Contrast     Standing Status:   Future     Number of Occurrences:   1     Standing Expiration Date:   6/30/2019   Frdea Velásquez MD, Physical Medicine and Rehabilitation, [place]     Referral Priority:   Routine     Referral Type:   Eval and Treat     Referral Reason:   Specialty Services Required     Referred to Provider:   Jackie Hernandez MD     Requested Specialty:   Physical Medicine and Rehab     Number of Visits Requested:   1       Electronically signed by DENNY Wells on 5/12/2019 at 6:24 PM    Please note that this chart was generated using voice recognition Dragon dictation software. Although every effort was made to ensure the accuracy of this automated transcription, some errors in transcription may have occurred.

## 2019-04-03 ENCOUNTER — OFFICE VISIT (OUTPATIENT)
Dept: ORTHOPEDIC SURGERY | Age: 79
End: 2019-04-03
Payer: MEDICARE

## 2019-04-03 VITALS — HEIGHT: 59 IN | BODY MASS INDEX: 30.24 KG/M2 | WEIGHT: 150 LBS

## 2019-04-03 DIAGNOSIS — S69.92XA INJURY OF LEFT WRIST, INITIAL ENCOUNTER: Primary | ICD-10-CM

## 2019-04-03 PROCEDURE — 99213 OFFICE O/P EST LOW 20 MIN: CPT | Performed by: ORTHOPAEDIC SURGERY

## 2019-04-03 NOTE — PROGRESS NOTES
Octavio Rivas M.D.            [address] [facility] [address] [facility], [address] [facility]             Dept Phone: [phone]             Dept Fax:  [phone]    Suzette Reaves returns today. She's here for left wrist injury. She took a fall on outstretched hand on 3/24/19. She was seen at both at an urgent care center as well as an emergency room. She had x-rays at that time and these were all read as unremarkable. There was a question by primary care with possible fracture she's here today for follow-up    Examination the patient out of her left wrist splint notes that she has basically no tenderness over the distal radius at all. She has no snuffbox tenderness no styloid tenderness no ulnar styloid tenderness. She has no metacarpal tenderness. She does have some tenderness on the volar side over the FCR tendon. She has no finger pain motion. Very minimal of any swelling. She has removed her rings. No elbow tenderness. She's neurovascular intact    I reviewed the patient's both sets of x-rays which are AP lateral upright both wrist on 2 different occasions. I do not see evidence for acute injury. She does have a little bit of ulnar plus alignment. Impression  Left wrist sprain without evidence for acute injury other than/bony fracture    Plan   patient and her  perform the above. Encouraged her to work on range of motion and gradually wean herself out of the splint. X here when necessary        Review of Systems   Constitutional: Negative. HENT: Negative. Respiratory: Negative. Cardiovascular: Negative. Neurological: Negative. Musculoskeletal:   Wrist Injury Pearl Lam and injured wrist 3/24/19)          Current Outpatient Medications:     oxyCODONE-acetaminophen (PERCOCET)  MG per tablet, Take 1 tablet by mouth every 6 hours as needed for Pain for up to 30 days. , Disp: 120 tablet, Rfl: 0    fluticasone (FLONASE) 50 MCG/ACT nasal spray, fluticasone 50 mcg/actuation nasal spray,suspension, Disp: , Rfl:     naloxone (NARCAN) 4 MG/0.1ML LIQD nasal spray, 1 spray by Nasal route as needed (respiratory distress), Disp: 1 each, Rfl: 0    QVAR REDIHALER 40 MCG/ACT AERB inhaler, 1 puff daily , Disp: , Rfl:     diclofenac sodium 1 % GEL, Apply 2 g topically 2 times daily, Disp: 1 Tube, Rfl: 0    pregabalin (LYRICA) 150 MG capsule, Take 150 mg by mouth 2 times daily. , Disp: , Rfl:     metFORMIN (GLUCOPHAGE) 500 MG tablet, Take 500 mg by mouth 2 times daily (with meals). , Disp: , Rfl:     telmisartan-hydrochlorothiazide (MICARDIS HCT) 40-12.5 MG per tablet, Take 1 tablet by mouth daily. , Disp: , Rfl:     montelukast (SINGULAIR) 10 MG tablet, Take 10 mg by mouth daily , Disp: , Rfl:     levothyroxine (SYNTHROID) 50 MCG tablet, Take 50 mcg by mouth Daily. , Disp: , Rfl:     omeprazole (PRILOSEC) 20 MG capsule, Take 20 mg by mouth every evening., Disp: , Rfl:     aspirin 81 MG tablet, Take 81 mg by mouth daily. , Disp: , Rfl:     Allergies   Allergen Reactions    Azithromycin Shortness Of Breath     Tightness in chest    Adhesive Tape      OK to use paper tape per Patient       Social History     Socioeconomic History    Marital status:      Spouse name: Not on file    Number of children: Not on file    Years of education: Not on file    Highest education level: Not on file   Occupational History    Occupation: retired   Social Needs    Financial resource strain: Not on file    Food insecurity:     Worry: Not on file     Inability: Not on file   [employer] needs:     Medical: Not on file     Non-medical: Not on file   Tobacco Use    Smoking status: Never Smoker    Smokeless tobacco: Never Used   Substance and Sexual Activity    Alcohol use: Yes     Comment: rare    Drug use: No    Sexual activity: Not on file   Lifestyle    Physical activity:     Days per week: Not on file     Minutes per session: Not on file    Stress: Not on file   Relationships    Social connections:     Talks on phone: Not on file     Gets together: Not on file     Attends [employer] service: Not on file     Active member of club or organization: Not on file     Attends meetings of clubs or organizations: Not on file     Relationship status: Not on file    Intimate partner violence:     Fear of current or ex partner: Not on file     Emotionally abused: Not on file     Physically abused: Not on file     Forced sexual activity: Not on file   Other Topics Concern    Not on file   Social History Narrative    Not on file       Past Medical History:   Diagnosis Date    Achilles tendonitis     right    Asthma     COPD (chronic obstructive pulmonary disease) ([facility] [address]     Cough     clear phlegm    Degenerative lumbar disc     Diabetes mellitus ([facility] [address] [facility]     type 2    Failed back syndrome, lumbar     Fast heart beat     Hx of \"8-10 years ago\"    Fibromyalgia     History of bladder cancer     Hypertension     Hypothyroid     Kidney stones     Lumbar radiculopathy     Lumbar spondylolysis     Osteoarthritis     Sleep apnea     uses CPAP machine nightly    Spinal stenosis in cervical region     Wears glasses      Past Surgical History:   Procedure Laterality Date    BACK SURGERY  2010    lumbar fusion    BLADDER TUMOR EXCISION  2005    CARDIOVERSION      \"8-10 years ago\"  to get \"heart into regular rhythm\"    CARPAL TUNNEL RELEASE Bilateral     CATARACT REMOVAL WITH IMPLANT Bilateral 7/22/2014, 8/5/2014    Dilma/Demian    CHOLECYSTECTOMY, LAPAROSCOPIC N/A 8/18/2017    CHOLECYSTECTOMY LAPAROSCOPIC ROBOTIC MULTIPORT performed by Sun Lee MD at [address]  1/2013    CYSTOSCOPY  11/04/2016    CYSTOSCOPY  01/04/2019    ENDOSCOPIC ULTRASOUND (LOWER) N/A 8/17/2017    ENDOSCOPIC ULTRASOUND performed by Rosa Diehl MD at [address] [facility] (UPPER)  08/17/2017    A cursory view of the gastric mucosa was normal. The scope was then further advanced through a patent pylorus to the second portion of the duodenum. The Gallbladder was evaluated in bulb position. Thick sludge was noted. The CBD was 7.8 mm with no filling defects. The PD was 5 mm in the HOP. The pancreatic tissue was edematous in body and tail.  KNEE ARTHROSCOPY Left     LUMBAR FUSION      MD OFFICE/OUTPT VISIT,PROCEDURE ONLY N/A 1/24/2018    SPINAL HARDWARE REMOVAL LUMBAR BONE STIMULATOR performed by Moy Miles MD at [address] [place] [address] Bilateral 1989    SKIN BIOPSY Right 11/2015    mole right posterior shoulder    SPINE SURGERY      spinal cord stimulator    SPINE SURGERY  91-4-15    renoval of spinal cord stimulator leads and battery    PASQUALE AND BSO      TUBAL LIGATION      UVULOPALATOPHARYGOPLASTY  2006     Family History   Problem Relation Age of Onset    Heart Failure Mother     Other Father          pneumonia    Alzheimer's Disease Father          No orders of the defined types were placed in this encounter. 1. Injury of left wrist, initial encounter            Aracelis Dumont MD    Please note that this chart was generated using voice recognition Dragon dictation software. Although every effort was made to ensure the accuracy of this automated transcription, some errors in transcription may have occurred.

## 2019-04-08 ENCOUNTER — HOSPITAL ENCOUNTER (OUTPATIENT)
Dept: MRI IMAGING | Age: 79
Discharge: HOME | End: 2019-04-10
Payer: MEDICARE

## 2019-04-08 ENCOUNTER — HOSPITAL ENCOUNTER (OUTPATIENT)
Dept: PAIN MANAGEMENT | Age: 79
Discharge: HOME | End: 2019-04-08
Payer: MEDICARE

## 2019-04-08 VITALS
HEIGHT: 59 IN | RESPIRATION RATE: 16 BRPM | WEIGHT: 150 LBS | TEMPERATURE: 98.1 F | BODY MASS INDEX: 30.24 KG/M2 | OXYGEN SATURATION: 97 % | HEART RATE: 69 BPM

## 2019-04-08 DIAGNOSIS — R26.81 GAIT INSTABILITY: ICD-10-CM

## 2019-04-08 DIAGNOSIS — M48.062 SPINAL STENOSIS OF LUMBAR REGION WITH NEUROGENIC CLAUDICATION: ICD-10-CM

## 2019-04-08 DIAGNOSIS — M96.1 FAILED BACK SYNDROME: ICD-10-CM

## 2019-04-08 DIAGNOSIS — Z51.81 ENCOUNTER FOR MEDICATION MONITORING: ICD-10-CM

## 2019-04-08 DIAGNOSIS — M47.816 LUMBAR SPONDYLOSIS: ICD-10-CM

## 2019-04-08 DIAGNOSIS — M16.11 PRIMARY OSTEOARTHRITIS OF RIGHT HIP: ICD-10-CM

## 2019-04-08 DIAGNOSIS — M54.16 LUMBAR RADICULOPATHY: ICD-10-CM

## 2019-04-08 DIAGNOSIS — M43.06 LUMBAR SPONDYLOLYSIS: ICD-10-CM

## 2019-04-08 DIAGNOSIS — M48.02 SPINAL STENOSIS IN CERVICAL REGION: Primary | ICD-10-CM

## 2019-04-08 DIAGNOSIS — M50.30 DDD (DEGENERATIVE DISC DISEASE), CERVICAL: ICD-10-CM

## 2019-04-08 DIAGNOSIS — F11.90 CHRONIC, CONTINUOUS USE OF OPIOIDS: ICD-10-CM

## 2019-04-08 DIAGNOSIS — R26.89 IMBALANCE: ICD-10-CM

## 2019-04-08 DIAGNOSIS — M51.36 DEGENERATIVE LUMBAR DISC: ICD-10-CM

## 2019-04-08 DIAGNOSIS — M51.36 DDD (DEGENERATIVE DISC DISEASE), LUMBAR: ICD-10-CM

## 2019-04-08 DIAGNOSIS — R33.9 URINE RETENTION: ICD-10-CM

## 2019-04-08 DIAGNOSIS — M47.14 THORACIC SPONDYLOSIS WITH MYELOPATHY: ICD-10-CM

## 2019-04-08 DIAGNOSIS — Z96.89 SPINAL CORD STIMULATOR STATUS: ICD-10-CM

## 2019-04-08 DIAGNOSIS — Z71.89 ENCOUNTER FOR MEDICATION COUNSELING: ICD-10-CM

## 2019-04-08 DIAGNOSIS — M96.1 FAILED BACK SYNDROME OF LUMBAR SPINE: ICD-10-CM

## 2019-04-08 DIAGNOSIS — M46.1 SACROILIITIS (HCC): ICD-10-CM

## 2019-04-08 PROCEDURE — 72141 MRI NECK SPINE W/O DYE: CPT

## 2019-04-08 PROCEDURE — 72146 MRI CHEST SPINE W/O DYE: CPT

## 2019-04-08 PROCEDURE — 99213 OFFICE O/P EST LOW 20 MIN: CPT | Performed by: NURSE PRACTITIONER

## 2019-04-08 PROCEDURE — 80307 DRUG TEST PRSMV CHEM ANLYZR: CPT

## 2019-04-08 PROCEDURE — 99213 OFFICE O/P EST LOW 20 MIN: CPT

## 2019-04-08 RX ORDER — OXYCODONE AND ACETAMINOPHEN 10; 325 MG/1; MG/1
1 TABLET ORAL EVERY 6 HOURS PRN
Qty: 120 TABLET | Refills: 0 | Status: SHIPPED | OUTPATIENT
Start: 2019-04-12 | End: 2019-05-06 | Stop reason: SDUPTHER

## 2019-04-08 RX ORDER — MORPHINE SULFATE 30 MG/1
30 TABLET, FILM COATED, EXTENDED RELEASE ORAL 2 TIMES DAILY
Qty: 60 TABLET | Refills: 0 | Status: SHIPPED | OUTPATIENT
Start: 2019-04-08 | End: 2019-05-06 | Stop reason: SDUPTHER

## 2019-04-08 ASSESSMENT — ENCOUNTER SYMPTOMS
RESPIRATORY NEGATIVE: 1
BACK PAIN: 1
CONSTIPATION: 1

## 2019-04-08 NOTE — PROGRESS NOTES
Josh 89 PROGRESS NOTE      Patient here today to review Medication Agreement    Chief Complaint: low back pain    HPI: She c/o low back pain which has not. Changed,She states a CT myelogram done l of cervical, thoracic and lumbar spine. She saw Neurosurgeon's assistant and will have MRI cervical and thoracic spine done today. She states had a few falls, 3 times, since last viist. She landed on left wrist last fall and went to urgent care and had x-ray done and.ent to ED because pain got worse. She states was referred to PMR> She had another x-ray of left wrist, no fracture, Has a splint left wrist.  She saw DR Karolina Anaya, no fractuue. Sleep is good. Back Pain   This is a chronic problem. The current episode started more than 1 year ago. The problem occurs constantly. The problem is unchanged. The pain is present in the lumbar spine. The quality of the pain is described as aching and burning. The pain does not radiate. The pain is at a severity of 6/10. The pain is moderate. The pain is the same all the time. The symptoms are aggravated by standing (walking,). Associated symptoms include numbness and weakness. (Numbness left leg, weakness legs) Risk factors include menopause. She has tried analgesics and ice for the symptoms. The treatment provided mild relief. Patient denies any new neurological symptoms. No bowel or bladder incontinence, no weakness, and no falling. Treatment goals:  Functional status: walk better       Aberrancy:   Any alcoholic beverages   no   Any illegal drugs   no      Analgesia:pain 6      Adverse  Effects :  Constipation,   Senokot, BM qod drowsiness    ADL;s :not as active this past month        Pill count: appropriate  Forgot narcan    Morphine equivalent dose as reported on OARRS:120  Attestation: The Prescription Monitoring Report for this patient was reviewed today.  Marcine Fleischer, APRN - CNP)  Chronic Pain Routine Monitoring: Random urine drug screen Detected    Final 03/27/2018 12:00 PM [facility]   Buprenorphine Urine Not Detected    Final 03/27/2018 12:00 PM [facility]   Carisoprodol, Ur Not Detected    Final 03/27/2018 12:00 PM [facility]   (NOTE)   The carisoprodol immunoassay has cross-reactivity to carisoprodol   and meprobamate.     Clonazepam, Urine Not Detected    Final 03/27/2018 12:00 PM [facility]   Codeine, Urine Not Detected    Final 03/27/2018 12:00 PM [facility]   MDA, Ur Not Detected    Final 03/27/2018 12:00 PM [facility]   Diazepam, Urine Not Detected    Final 03/27/2018 12:00 PM [facility]   Ethyl Glucuronide Ur Not Detected    Final 03/27/2018 12:00 PM [facility]   Fentanyl, Ur Not Detected    Final 03/27/2018 12:00 PM [facility]   Hydrocodone, Urine Not Detected    Final 03/27/2018 12:00 PM [facility]   Hydromorphone, Urine Not Detected    Final 03/27/2018 12:00 PM [facility]   Lorazepam, Urine Not Detected    Final 03/27/2018 12:00 PM [facility]   Marijuana Metab, Ur Not Detected    Final 03/27/2018 12:00 PM [facility]   MDEA, CRYSTAL, Ur Not Detected    Final 03/27/2018 12:00 PM [facility]   MDMA, Urine Not Detected    Final 03/27/2018 12:00 PM [facility]   Meperidine Metab, Ur Not Detected    Final 03/27/2018 12:00 PM [facility]   Methadone, Urine Not Detected    Final 03/27/2018 12:00 PM [facility]   Methamphetamine, Urine Not Detected    Final 03/27/2018 12:00 PM [facility]   Methylphenidate Not Detected    Final 03/27/2018 12:00 PM [facility]   Midazolam, Urine Not Detected    Final 03/27/2018 12:00 PM [facility]   Morphine Urine Present    Final 03/27/2018 12:00 PM [facility]   Norbuprenorphine, Urine Not Detected    Final 03/27/2018 12:00 PM [facility]   Nordiazepam, Urine Not Detected    Final 03/27/2018 12:00 PM [facility]   Norfentanyl, Urine Not Detected    Final 03/27/2018 12:00 PM [facility]   NORHYDROCODONE, URINE Not Detected    Final 03/27/2018 12:00 PM [facility]   Noroxycodone, Urine Present    Final 03/27/2018 12:00 PM [facility]   NOROXYMORPHONE, URINE Not Detected    Final 03/27/2018 12:00 PM [facility]   Oxazepam, Urine Not Detected    Final 03/27/2018 12:00 PM [facility]   Oxycodone Urine Present    Final 03/27/2018 12:00 PM [facility]   Oxymorphone, Urine Not Detected    Final 03/27/2018 12:00 PM [facility]   PCP, Urine Not Detected    Final 03/27/2018 12:00 PM [facility]   Phentermine, Ur Not Detected    Final 03/27/2018 12:00 PM [facility]   Propoxyphene, Urine Not Detected    Final 03/27/2018 12:00 PM [facility]   Tapentadol-O-Sulfate, Urine Not Detected    Final 03/27/2018 12:00 PM [facility]   Tapentadol, Urine Not Detected    Final 03/27/2018 12:00 PM [facility]   Temazepam, Urine Not Detected    Final 03/27/2018 12:00 PM [facility]   Tramadol, Urine Not Detected    Final 03/27/2018 12:00 PM [facility]   Zolpidem, Urine Not Detected    Final 03/27/2018 12:00 PM [facility]   Drugs Expected, Ur     Final 03/27/2018 12:00  [facility]   MORPHINE 6AM 3.27.18 OXYCODONE 6 AM 3.27.18    Creatinine, Ur 72.6  20.0 - 400.0 mg/dL Final 03/27/2018 12:00 PM [facility]   Pain Mgt Drug Panel, Hi Res, Ur See Below    Final 03/27/2018 12:00 PM [facility]   (NOTE)   Methodology: Qualitative Enzyme Immunoassay and Qualitative Liquid   Chromatography-Time of Flight-Mass Spectrometry or Tandem Mass   Spectrometry, Quantitative Spectrophotometry   The absence of expected drug(s) and/or drug metabolite(s) may   indicate non-compliance, inappropriate timing of specimen   collection relative to drug administration, poor drug absorption,   diluted/adulterated urine, or limitations of testing. The   concentration must be greater than or equal to the cutoff to be   reported as present.  If specific drug concentrations are   required, contact the laboratory within two weeks of specimen   collection to request quantification by a second analytical   technique. Interpretive questions should be directed to the   laboratory. Results based on immunoassay detection that do not match clinical   expectations should be   interpreted with caution.  Confirmatory testing by mass   spectrometry for immunoassay-based results is available, if   ordered within two weeks of Dilma/Demian    CHOLECYSTECTOMY, LAPAROSCOPIC N/A 8/18/2017    CHOLECYSTECTOMY LAPAROSCOPIC ROBOTIC MULTIPORT performed by David Marinelli MD at [facility]  1/2013    CYSTOSCOPY  11/04/2016    CYSTOSCOPY  01/04/2019    ENDOSCOPIC ULTRASOUND (LOWER) N/A 8/17/2017    ENDOSCOPIC ULTRASOUND performed by Gracia Peace MD at [facility] [address] (UPPER)  08/17/2017    A cursory view of the gastric mucosa was normal. The scope was then further advanced through a patent pylorus to the second portion of the duodenum. The Gallbladder was evaluated in bulb position. Thick sludge was noted. The CBD was 7.8 mm with no filling defects. The PD was 5 mm in the HOP. The pancreatic tissue was edematous in body and tail.  KNEE ARTHROSCOPY Left     LUMBAR FUSION      [place] OFFICE/OUTPT VISIT,PROCEDURE ONLY N/A 1/24/2018    SPINAL HARDWARE REMOVAL LUMBAR BONE STIMULATOR performed by Anna Fung MD at [address] [place] [address] Bilateral 1989    SKIN BIOPSY Right 11/2015    mole right posterior shoulder    SPINE SURGERY      spinal cord stimulator    SPINE SURGERY  91-4-15    renoval of spinal cord stimulator leads and battery    PASQUALE AND BSO      TUBAL LIGATION      UVULOPALATOPHARYGOPLASTY  2006       Allergies   Allergen Reactions    Azithromycin Shortness Of Breath     Tightness in chest    Adhesive Tape      OK to use paper tape per Patient         Current Outpatient Medications:     morphine (MS CONTIN) 30 MG extended release tablet, Take 1 tablet by mouth 2 times daily for 12 days. , Disp: 60 tablet, Rfl: 0    [START ON 4/12/2019] oxyCODONE-acetaminophen (PERCOCET)  MG per tablet, Take 1 tablet by mouth every 6 hours as needed for Pain for up to 30 days. , Disp: 120 tablet, Rfl: 0    QVAR REDIHALER 40 MCG/ACT AERB inhaler, 1 puff daily , Disp: , Rfl:     diclofenac sodium 1 % GEL, Apply 2 g topically 2 times daily, Disp: 1 Tube, Rfl: 0    pregabalin (LYRICA) 150 MG capsule, Take 150 mg by mouth 2 times daily. , Disp: , Rfl:     metFORMIN (GLUCOPHAGE) 500 MG tablet, Take 500 mg by mouth 2 times daily (with meals). , Disp: , Rfl:     telmisartan-hydrochlorothiazide (MICARDIS HCT) 40-12.5 MG per tablet, Take 1 tablet by mouth daily. , Disp: , Rfl:     montelukast (SINGULAIR) 10 MG tablet, Take 10 mg by mouth daily , Disp: , Rfl:     levothyroxine (SYNTHROID) 50 MCG tablet, Take 50 mcg by mouth Daily. , Disp: , Rfl:     omeprazole (PRILOSEC) 20 MG capsule, Take 20 mg by mouth every evening., Disp: , Rfl:     aspirin 81 MG tablet, Take 81 mg by mouth daily. , Disp: , Rfl:     fluticasone (FLONASE) 50 MCG/ACT nasal spray, fluticasone 50 mcg/actuation nasal spray,suspension, Disp: , Rfl:     naloxone (NARCAN) 4 MG/0.1ML LIQD nasal spray, 1 spray by Nasal route as needed (respiratory distress), Disp: 1 each, Rfl: 0    Family History   Problem Relation Age of Onset    Heart Failure Mother     Other Father          pneumonia    Alzheimer's Disease Father        Social History     Socioeconomic History    Marital status:      Spouse name: Not on file    Number of children: Not on file    Years of education: Not on file    Highest education level: Not on file   Occupational History    Occupation: retired   Social Needs    Financial resource strain: Not on file    Food insecurity:     Worry: Not on file     Inability: Not on file   [employer] needs:     Medical: Not on file     Non-medical: Not on file   Tobacco Use    Smoking status: Never Smoker    Smokeless tobacco: Never Used   Substance and Sexual Activity    Alcohol use: Yes     Comment: rare    Drug use: No    Sexual activity: Not on file   Lifestyle    Physical activity:     Days per week: Not on file     Minutes per session: Not on file    Stress: Not on file   Relationships    Social connections:     Talks on phone: Not on file     Gets together: Not on file     Attends Zoroastrian service: Not on file     Active member of club or organization: Not on file     Attends meetings of clubs or organizations: Not on file     Relationship status: Not on file    Intimate partner violence:     Fear of current or ex partner: Not on file     Emotionally abused: Not on file     Physically abused: Not on file     Forced sexual activity: Not on file   Other Topics Concern    Not on file   Social History Narrative    Not on file       Review of Systems:  Review of Systems   Constitution: Negative. HENT: Negative. Eyes:        Glasses   Cardiovascular: Negative. Respiratory: Negative. Endocrine: Negative. Hematologic/Lymphatic: Negative. Skin: Negative. Musculoskeletal: Positive for back pain, falls and joint pain. Gastrointestinal: Positive for constipation. Genitourinary: Positive for hesitancy. Neurological: Positive for loss of balance, numbness and weakness. Cane   Psychiatric/Behavioral: Negative. Physical Exam:  Pulse 69   Temp 98.1 °F (36.7 °C) (Oral)   Resp 16   Ht 4' 11\" (1.499 m)   Wt 150 lb (68 kg)   SpO2 97%   BMI 30.30 kg/m²     Physical Exam   Constitutional: She appears well-developed. HENT:   Head: Normocephalic. Neck: Neck supple. Pulmonary/Chest: Effort normal.   Musculoskeletal:        Left wrist: She exhibits tenderness. Lumbar back: She exhibits decreased range of motion and tenderness. Lumbar scar   Neurological: She is alert. She has normal strength. Gait abnormal.   Reflex Scores:       Patellar reflexes are 1+ on the right side and 1+ on the left side. Achilles reflexes are 1+ on the right side and 1+ on the left side. Ambulates with a cane   Skin:        Psychiatric: She has a normal mood and affect.  Her speech is normal and behavior is normal. Judgment and thought content normal. Cognition and memory are normal.        Imaging:  Narrative   EXAMINATION:   CT OF THE LUMBAR SPINE WITH INTRATHECAL CONTRAST 3/22/2019 1:25 pm:     TECHNIQUE:   CT of the lumbar spine was performed after the administration of intrathecal   contrast with multiplanar reconstructed images provided for interpretation. Dose modulation, iterative reconstruction, and/or weight based adjustment of   the mA/kV was utilized to reduce the radiation dose to as low as reasonably   achievable.       COMPARISON:   MRI 07/07/2018       HISTORY:   ORDERING SYSTEM PROVIDED HISTORY: Failed back syndrome   TECHNOLOGIST PROVIDED HISTORY:   POST MYELOGRAM   Ordering Physician Provided Reason for Exam: FAILED BACK SYNDROME, POST   MYELOGRAM       Chronic back pain, surgery approximately five years ago.       FINDINGS:   BONES/ALIGNMENT: Slight levoconvex curvature of the lumbar spine with   advanced lumbar spondylotic changes.  Vertebral body heights are maintained. Extensive postop changes with bilateral pedicle screws and posterior fusion   rods L2-S1 with a transverse bar at the L3-L4 level.  Intervertebral disc   prostheses at L4-L5 and L2-L3.  Previous posterior decompression L2-L5. There is regional artifact from metallic hardware.  Stable mild grade 1   anterolisthesis L5 on S1.  At least moderate spinal stenosis T12-L1.       SOFT TISSUES: No paraspinal mass is seen.  Similar to the prior MRI there is   a fluid collection/probable seroma in the soft tissues posterior to the   spinal canal extending between the fusion rods from approximately L3-L5.    This area is difficult to evaluate due to artifact from hardware.  No   definite contrast is seen in the region to indicate communication with the   thecal sac.       L1-L2: Advanced disc degenerative changes with near complete loss of disc   space height and posterior disc osteophyte complex.  Facet arthropathy and   ligamentum flavum thickening.  Combination results in moderate to severe   spinal stenosis.  Moderate to severe bilateral neural foraminal stenosis.       L2-L3: Postoperative changes.  No significant spinal stenosis.  T3-T4 and T4-T5 disc spaces show no significant spinal stenosis. There are disc degenerative changes T5-T9 with mild diffuse disc osteophyte   complex at all levels resulting in mass effect on the ventral thecal sac and   mild spinal stenoses.  Mild ligamentum flavum thickening at T6-T7 and to a   lesser extent T7-T8.  At T10-T11 there are advanced disc degenerative changes   with vacuum disc phenomena.  Diffuse disc osteophyte complex resulting in   mass effect on the ventral aspect of thecal sac.  Bilateral facet arthropathy   with posterior ligamentous thickening resulting in moderate to severe spinal   and right neural foraminal stenosis.  At least mild left neural foraminal   stenosis.  At T11-T12 there are advanced disc degenerative changes.  Mild   facet arthropathy and ligamentum flavum thickening.  Mild diffuse disc   osteophyte complex resulting in moderate spinal and neural foraminal   stenoses.  At T12-L1 there is bilateral facet arthropathy and ligamentum   flavum thickening.  Disc osteophyte complex resulting in flattening of the   ventral aspect of the thecal sac.  The combination results in moderate to   severe spinal and neural foraminal stenoses.       SOFT TISSUES: No paraspinal mass is seen.  Calcified left hilar and   mediastinal lymph nodes compatible with prior granulomatous infection.  Mild   calcific plaque of the aorta.           Impression   Moderately advanced thoracic spondylotic changes with multilevel spinal and   neural foraminal stenoses, most prominent from T10-L1 with moderate to severe   spinal stenoses.         EXAMINATION:   CT OF THE CERVICAL SPINE WITH INTRATHECAL CONTRAST 3/22/2019 1:25 pm:       TECHNIQUE:   CT of the cervical spine was performed after the administration of   intrathecal contrast with multiplanar reconstructed images provided for   interpretation.  Dose modulation, iterative reconstruction, and/or weight   based adjustment of the mA/kV was neural foraminal narrowing.  Mild spondylotic changes.           Impression   Moderately advanced cervical spondylotic changes and multilevel   mild-to-moderate spinal and neural foraminal stenoses as noted above.               EXAMINATION:   3 XRAY VIEWS OF THE LEFT WRIST       3/28/2019 6:25 pm       COMPARISON:   03/25/2019       HISTORY:   ORDERING SYSTEM PROVIDED HISTORY: pain s/p fall on sunday   TECHNOLOGIST PROVIDED HISTORY:   pain s/p fall on sunday   Ordering Physician Provided Reason for Exam: Pain   Acuity: Acute   Type of Exam: Initial   Mechanism of Injury: Fall x 4 days       FINDINGS:   No acute fracture or dislocation identified. Char Peaches is borderline widening of   the scapholunate interosseous distance.  Having said that, normal carpal   alignment is demonstrated on the lateral examination, with no elevation of   the scapholunate angle, and with normal alignment of the lunate relative to   the distal radius.  Chondrocalcinosis is detected at the triangular   fibrocartilage.  Subtle ulnar positive variance is noted, with cystic changes   seen proximally within the lunate and triquetrum.  Mild osteoarthrosis of the   1st carpometacarpal joint.           Impression   No acute or subacute fractures are identified.       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             Assessment:      Problem List Items Addressed This Visit     Spinal stenosis in cervical region - Primary    Spinal cord stimulator status    Sacroiliitis (HCC)    Relevant Medications    morphine (MS CONTIN) 30 MG extended release tablet    oxyCODONE-acetaminophen (PERCOCET)  MG per tablet (Start on 4/12/2019)    Primary osteoarthritis of right hip    Relevant Medications    morphine (MS CONTIN) 30 MG extended release tablet    oxyCODONE-acetaminophen (PERCOCET)  MG per tablet (Start on 4/12/2019)    Lumbar spondylosis Relevant Medications    morphine (MS CONTIN) 30 MG extended release tablet    oxyCODONE-acetaminophen (PERCOCET)  MG per tablet (Start on 4/12/2019)    Lumbar spondylolysis    Lumbar radiculopathy    Imbalance (Chronic)    Failed back syndrome of lumbar spine    Failed back syndrome    Relevant Medications    morphine (MS CONTIN) 30 MG extended release tablet    oxyCODONE-acetaminophen (PERCOCET)  MG per tablet (Start on 4/12/2019)    Encounter for medication monitoring    Encounter for medication counseling    Degenerative lumbar disc    Relevant Medications    morphine (MS CONTIN) 30 MG extended release tablet    oxyCODONE-acetaminophen (PERCOCET)  MG per tablet (Start on 4/12/2019)    DDD (degenerative disc disease), cervical    Relevant Medications    morphine (MS CONTIN) 30 MG extended release tablet    oxyCODONE-acetaminophen (PERCOCET)  MG per tablet (Start on 4/12/2019)    Chronic, continuous use of opioids            Treatment Plan:  DISCUSSION: Treatment options discussed withpatient and all questions answered to patient's satisfaction. Possible side effects, risk of tolerance and or dependence and alternative treatments discussed    Obtaining appropriate analgesic effect of treatment   No signs of potential drug abuse or diversion identified    [x] Ill effects of being on chronic pain medications such as sleep disturbances, respiratory depression, hormonal changes, withdrawal symptoms, chronic opioid dependence and tolerance as well as risk of taking opioids with Benzodiazepines and taking opioids along with alcohol,  werediscussed with patient. I had asked the patient to minimize medication use and utilize pain medications only for uncontrolled rest pain or pain with exertional activities. I advised patient not to self-escalate painmedications without consulting with us.   At each of patient's future visits we will try to taper pain medications, while adjusting the adjunct

## 2019-04-12 LAB
6-ACETYLMORPHINE, UR: NOT DETECTED
7-AMINOCLONAZEPAM, URINE: NOT DETECTED
ALPHA-OH-ALPRAZ, URINE: NOT DETECTED
ALPRAZOLAM, URINE: NOT DETECTED
AMPHETAMINES, URINE: NOT DETECTED
BARBITURATES, URINE: NOT DETECTED
BENZOYLECGONINE, UR: NOT DETECTED
BUPRENORPHINE URINE: NOT DETECTED
CARISOPRODOL, UR: NOT DETECTED
CLONAZEPAM, URINE: NOT DETECTED
CODEINE, URINE: NOT DETECTED
CREATININE URINE: 175.7 MG/DL (ref 20–400)
DIAZEPAM, URINE: NOT DETECTED
DRUGS EXPECTED, UR: NORMAL
EER HI RES INTERP UR: NORMAL
ETHYL GLUCURONIDE UR: NOT DETECTED
FENTANYL URINE: NOT DETECTED
HYDROCODONE, URINE: NOT DETECTED
HYDROMORPHONE, URINE: NOT DETECTED
LORAZEPAM, URINE: NOT DETECTED
MARIJUANA METAB, UR: NOT DETECTED
MDA, UR: NOT DETECTED
MDEA, EVE, UR: NOT DETECTED
MDMA URINE: NOT DETECTED
MEPERIDINE METAB, UR: NOT DETECTED
METHADONE, URINE: NOT DETECTED
METHAMPHETAMINE, URINE: NOT DETECTED
METHYLPHENIDATE: NOT DETECTED
MIDAZOLAM, URINE: NOT DETECTED
MORPHINE URINE: PRESENT
NORBUPRENORPHINE, URINE: NOT DETECTED
NORDIAZEPAM, URINE: NOT DETECTED
NORFENTANYL, URINE: NOT DETECTED
NORHYDROCODONE, URINE: NOT DETECTED
NOROXYCODONE, URINE: PRESENT
NOROXYMORPHONE, URINE: PRESENT
OXAZEPAM, URINE: NOT DETECTED
OXYCODONE URINE: PRESENT
OXYMORPHONE, URINE: PRESENT
PAIN MANAGEMENT DRUG PANEL INTERP, URINE: NORMAL
PAIN MGT DRUG PANEL, HI RES, UR: NORMAL
PCP,URINE: NOT DETECTED
PHENTERMINE, UR: NOT DETECTED
PROPOXYPHENE, URINE: NOT DETECTED
TAPENTADOL, URINE: NOT DETECTED
TAPENTADOL-O-SULFATE, URINE: NOT DETECTED
TEMAZEPAM, URINE: NOT DETECTED
TRAMADOL, URINE: NOT DETECTED
ZOLPIDEM, URINE: NOT DETECTED

## 2019-04-25 ENCOUNTER — INITIAL CONSULT (OUTPATIENT)
Dept: PHYSICAL MEDICINE AND REHAB | Age: 79
End: 2019-04-25
Payer: MEDICARE

## 2019-04-25 VITALS
WEIGHT: 154.6 LBS | DIASTOLIC BLOOD PRESSURE: 61 MMHG | SYSTOLIC BLOOD PRESSURE: 101 MMHG | TEMPERATURE: 98.2 F | BODY MASS INDEX: 31.23 KG/M2 | HEART RATE: 67 BPM

## 2019-04-25 DIAGNOSIS — G89.29 CHRONIC BILATERAL LOW BACK PAIN WITHOUT SCIATICA: ICD-10-CM

## 2019-04-25 DIAGNOSIS — M96.1 FAILED BACK SYNDROME: Primary | ICD-10-CM

## 2019-04-25 DIAGNOSIS — M54.50 CHRONIC BILATERAL LOW BACK PAIN WITHOUT SCIATICA: ICD-10-CM

## 2019-04-25 PROCEDURE — 99204 OFFICE O/P NEW MOD 45 MIN: CPT | Performed by: PHYSICAL MEDICINE & REHABILITATION

## 2019-04-25 ASSESSMENT — ENCOUNTER SYMPTOMS
BOWEL INCONTINENCE: 1
BACK PAIN: 1

## 2019-04-25 NOTE — PROGRESS NOTES
[facility] PHYSICIANS  [facility] PHYSICAL MEDICINE & REHABILITATION  [address] [place] [address]  Dept: [phone]  Dept Fax: [phone]    New Patient ConsultationNote    Deborah Comment, 70 y.o., female, is c/o of Back Pain (c/o low back pain but does not radiate) and Neck Pain (does not radiate and is not constant)   and request for evaluation of low back pain and impaired gait by Donald Todd PA.    HPI:     Back Pain   This is a chronic problem. The current episode started more than 1 year ago. The problem occurs daily. The problem is unchanged. The pain is present in the lumbar spine. The quality of the pain is described as aching. The pain does not radiate. The pain is at a severity of 4/10. The pain is moderate. The pain is worse during the night. The symptoms are aggravated by standing and twisting. Stiffness is present in the morning. Associated symptoms include bowel incontinence and weakness. Pertinent negatives include no bladder incontinence, leg pain, numbness, paresthesias, perianal numbness or tingling. Risk factors include lack of exercise and menopause. She has tried analgesics and ice for the symptoms. The treatment provided mild relief. Patient with chronic low back pain. Denies lower extremity weakness, but ADLs are affected by her osteoarthritis in B knees and R shoulder. These issues are affecting her as much if not more than the chronic aching low back pain. She has not had any formal physical therapy for 1-2 years. She reports 1 episode of bowel incontinence 1 week ago, but feels this may be attributed to her rectocele. She was referred to GI by her urologist for this. Advised her to contact neurosurgery if she has any recurrent or worsening episodes of incontinence that are not related to a GI cause. She sees pain management for medications. She had recent MRI and sees neurosurgery again soon. Ambulating with straight cane.      She had one recent incident with sharp shooting pain just inferior to her L scapula without injury causing it. This responded to heat and myofascial release with a tennis ball. Past Medical History:   Diagnosis Date    Achilles tendonitis     right    Asthma     COPD [facility]chronic obstructive [facility] disease) ([facility])     Cough     clear phlegm    Degenerative lumbar disc     Diabetes mellitus (HCC)     type 2    Failed back syndrome, lumbar     Fast heart beat     Hx of \"8-10 years ago\"    Fibromyalgia     History of bladder cancer     Hypertension     Hypothyroid     Kidney stones     Lumbar radiculopathy     Lumbar spondylolysis     Osteoarthritis     Sleep apnea     uses CPAP machine nightly    Spinal stenosis in cervical region     Wears glasses       Past Surgical History:   Procedure Laterality Date    BACK SURGERY  2010    lumbar fusion    BLADDER TUMOR EXCISION  2005    CARDIOVERSION      \"8-10 years ago\"  to get \"heart into regular rhythm\"    CARPAL TUNNEL RELEASE Bilateral     CATARACT REMOVAL WITH IMPLANT Bilateral 7/22/2014, 8/5/2014    Dilma/Demian    CHOLECYSTECTOMY, LAPAROSCOPIC N/A 8/18/2017    CHOLECYSTECTOMY LAPAROSCOPIC ROBOTIC MULTIPORT performed by Mary Ortiz MD at [address]  1/2013    CYSTOSCOPY  11/04/2016    CYSTOSCOPY  01/04/2019    ENDOSCOPIC ULTRASOUND (LOWER) N/A 8/17/2017    ENDOSCOPIC ULTRASOUND performed by Maria Elena Barahona MD at [address] (UPPER)  08/17/2017    A cursory view of the gastric mucosa was normal. The scope was then further advanced through a patent pylorus to the second portion of the duodenum. The Gallbladder was evaluated in bulb position. Thick sludge was noted. The CBD was 7.8 mm with no filling defects. The PD was 5 mm in the HOP. The pancreatic tissue was edematous in body and tail.     KNEE ARTHROSCOPY Left     LUMBAR FUSION      [place] OFFICE/OUTPT VISIT,PROCEDURE ONLY N/A 1/24/2018    SPINAL HARDWARE REMOVAL LUMBAR BONE STIMULATOR performed by Sherlyn Sepulveda MD at [address] Bilateral 1989    SKIN BIOPSY Right 11/2015    mole right posterior shoulder    SPINE SURGERY      spinal cord stimulator    SPINE SURGERY  91-4-15    renoval of spinal cord stimulator leads and battery    PASQUALE AND BSO      TUBAL LIGATION      UVULOPALATOPHARYGOPLASTY  2006     Family History   Problem Relation Age of Onset    Heart Failure Mother     Other Father          pneumonia    Alzheimer's Disease Father      Social History     Socioeconomic History    Marital status:      Spouse name: None    Number of children: None    Years of education: None    Highest education level: None   Occupational History    Occupation: retired   Social Needs    Financial resource strain: None    Food insecurity:     Worry: None     Inability: None    Transportation needs:     Medical: None     Non-medical: None   Tobacco Use    Smoking status: Never Smoker    Smokeless tobacco: Never Used   Substance and Sexual Activity    Alcohol use: Yes     Comment: rare    Drug use: No    Sexual activity: None   Lifestyle    Physical activity:     Days per week: None     Minutes per session: None    Stress: None   Relationships    Social connections:     Talks on phone: None     Gets together: None     Attends Synagogue service: None     Active member of club or organization: None     Attends meetings of clubs or organizations: None     Relationship status: None    Intimate partner violence:     Fear of current or ex partner: None     Emotionally abused: None     Physically abused: None     Forced sexual activity: None   Other Topics Concern    None   Social History Narrative    None          Current Outpatient Medications   Medication Sig Dispense Refill    beclomethasone (QVAR) 80 MCG/ACT inhaler Inhale 1 puff into the lungs daily      Misc.  Devices MISC 1 each by Does not apply route once as needed (mid-low back muscle spasm urgency,frequency and difficulty urinating. Musculoskeletal: Negative for stiff joints. Neurological: Negative for headaches. Dermatologic: Negative rashes, ulcers, or lesions. Psychiatric: Negative for depressed mood or anxiety. Objective:     Physical Exam  /61   Pulse 67   Temp 98.2 °F (36.8 °C) (Tympanic)   Wt 154 lb 9.6 oz (70.1 kg)   BMI 31.23 kg/m²   Constitutional: She is in no distress. She appears well-developed and well-nourished. HEENT:  Normocephalic. EOMI. PERRL. Mucous membranes pink and moist.   Pulmonary/Chest: Respirations WNL and unlabored. Neurological: She is alert and oriented to person, place, and time. She has DTRs 2+ R patella and B Achilles, absent L patella. Sensation intact to light touch. Skin: Skin is warm, dry and intact with good turgor. Psychiatric: She has a normal mood and affect. Her behavior is normal.Thought content normal.   MSK: Significant paraspinal muscle tightness/spasm B lumbar paraspinal muscles. Tenderness:      Back Lumbar     Greater Trochanter        Range of Motion:      Back Flexion: normal     Back Extension: decreased       Tests      Pina's:            Right negative                             Left negative     Slump:            Right negative                             Left negative     SLR:                Right Negative                             Left Negative       Muscle Strength RIGHT     Abductor: 5/5     Adductor: 5/5     Quadriceps: 5/5     Hamstrings: 5/5     Iliopsoas: 5/5       Muscle Strength LEFT     Abductor: 5/5     Adductor: 5/5     Quadriceps: 5/5     Hamstrings: 5/5     Iliopsoas: 5/5       Reflexes     Patellar: Abnormal Left    Achilles: Normal       Sensation: Normal   Gait: Antalgic   Toe Walk: normal   Heel Walk: abnormal: unable to perform     EXTREMITIES: No edema. No calf tenderness to palpation bilaterally. Nursing note and vitals reviewed.     Imaging:    MRI OF THE LUMBAR SPINE WITHOUT CONTRAST, 7/7/2018 1:24 pm       TECHNIQUE:   Multiplanar multisequence MRI of the lumbar spine was performed without the   administration of intravenous contrast.       COMPARISON:   Radiograph lumbar spine March 1, 2018       HISTORY:   ORDERING SYSTEM PROVIDED HISTORY: Right knee injury, initial encounter   TECHNOLOGIST PROVIDED HISTORY:   Reason for exam:->Back pain   Ordering Physician Provided Reason for Exam: RIGHT KNEE PAIN/ BACK PAIN   Acuity: Chronic   Type of Exam: Ongoing   Additional signs and symptoms: PT COMPLAINS OF RIGHT KNEE AND LOW BACK PAIN;   PT STATES LOW BACK PAIN X YEAR AND A HALF, BUT HAS BEEN GETTING WORSE AND   HAVING FALL THE LAST COUPLE MONTHS       FINDINGS:   BONES/ALIGNMENT: There is normal alignment of the spine except mild   levoconvex scoliosis. The vertebral body heights are maintained.  There is   slight retro listhesis at L1-2 posterior fusion rods and bilateral pedicular   screws at L2 through S1.  Sensitivity is limited due to magnetic   susceptibility artifact.  Laminectomies noted at this level.  There is a   fluid collection at L4-5 measuring 23 x 24 mm in AP and transverse dimensions   and 36 mm in craniocaudal dimension.  This appears to communicate with the   thecal sac.  . The bone marrow signal appears unremarkable.       SPINAL CORD: The conus terminates normally.       SOFT TISSUES: No paraspinal mass identified.       L1-L2: Moderate central lateral spinal stenosis.       L2-L3: Small posterior central disc marginal osteophyte and circumferential   disc marginal osteophyte causing moderate narrowing of the neural foramina   bilaterally.  Central thecal sac is patent.       L3-L4: There is no significant disc herniation, spinal canal stenosis or   neural foraminal narrowing.       L4-L5: There is no significant disc herniation, spinal canal stenosis or   neural foraminal narrowing.       L5-S1: Mild unroofing of the posterior disc due to slight anterior   subluxation.  Moderate narrowing of the neural foramina bilaterally.  Central   thecal sac is widely patent.           Impression   Posterior interbody fusion L2 through S1.  Posterior fluid collection L3-4   through L5-S1 consistent with possible pseudomeningocele.  This could also   represent a seroma, hematoma, or abscess.  Follow-up MRI with contrast may be   helpful if clinically warranted. CT OF THE LUMBAR SPINE WITH INTRATHECAL CONTRAST 3/22/2019 1:25 pm:       TECHNIQUE:   CT of the lumbar spine was performed after the administration of intrathecal   contrast with multiplanar reconstructed images provided for interpretation. Dose modulation, iterative reconstruction, and/or weight based adjustment of   the mA/kV was utilized to reduce the radiation dose to as low as reasonably   achievable.       COMPARISON:   MRI 07/07/2018       HISTORY:   ORDERING SYSTEM PROVIDED HISTORY: Failed back syndrome   TECHNOLOGIST PROVIDED HISTORY:   POST MYELOGRAM   Ordering Physician Provided Reason for Exam: FAILED BACK SYNDROME, POST   MYELOGRAM       Chronic back pain, surgery approximately five years ago.       FINDINGS:   BONES/ALIGNMENT: Slight levoconvex curvature of the lumbar spine with   advanced lumbar spondylotic changes.  Vertebral body heights are maintained. Extensive postop changes with bilateral pedicle screws and posterior fusion   rods L2-S1 with a transverse bar at the L3-L4 level.  Intervertebral disc   prostheses at L4-L5 and L2-L3.  Previous posterior decompression L2-L5. There is regional artifact from metallic hardware.  Stable mild grade 1   anterolisthesis L5 on S1.  At least moderate spinal stenosis T12-L1.       SOFT TISSUES: No paraspinal mass is seen.  Similar to the prior MRI there is   a fluid collection/probable seroma in the soft tissues posterior to the   spinal canal extending between the fusion rods from approximately L3-L5. This area is difficult to evaluate due to artifact from hardware.  No   definite contrast is seen in the region to indicate communication with the   thecal sac.       L1-L2: Advanced disc degenerative changes with near complete loss of disc   space height and posterior disc osteophyte complex.  Facet arthropathy and   ligamentum flavum thickening.  Combination results in moderate to severe   spinal stenosis.  Moderate to severe bilateral neural foraminal stenosis.       L2-L3: Postoperative changes.  No significant spinal or neural foraminal   stenosis.       L3-L4: Previous surgery without significant spinal or neural foraminal   stenosis.       L4-L5: Prior surgery with posterior decompression.  No significant spinal or   neural foraminal stenosis visualized.       L5-S1: No significant spinal stenosis.  Mild facet arthropathy.  Previous   posterior decompression.  Mild bilateral neural foraminal stenosis.         Impression   Advanced degenerative changes with wide posterior decompression and fusion   L2-S1 without significant spinal stenosis at the surgical levels.       Moderate to severe spinal stenosis L1-L2.       Similar to and better shown on the previous MRI is a fluid collection in the   posterior soft tissues centered at L4 favored to represent a seroma. Assessment:       Diagnosis Orders   1. Failed back syndrome     2. Chronic bilateral low back pain without sciatica            Plan:      Discussed therapy refresher with patient to encourage long-term maintenance of core strengthening and stretching program. She was previously active in aquatic classes at the local [facility] before the winter and would like to resume those with some [employer] wellness classes prior to therapy. Agree with this plan and will try to conserve therapy sessions for potential post-operative treatment of B knee and R shoulder osteoarthritis. Will evaluate her progress at next visit and consider formal therapy if no improvement at that time.  She may also benefit from some deep

## 2019-04-25 NOTE — LETTER
[address] [address]  Phone: [phone]  Fax: [phone]    [place], [place]        2019       Patient: Scooter Crowe   MR Number: [id number]   YOB: 1940   Date of Visit: 2019       Dear Dr. Karishma Lacy: Thank you for the request for consultation for Cecelia Maddox to me for the evaluation of chronic low back pain. Patient reported this was her primary complaint as cervical spine pain was not affecting her daily function and is tolerable. Below are the relevant portions of my assessment and plan of care. Assessment:      Diagnosis Orders   1. Failed back syndrome     2. Chronic bilateral low back pain without sciatica           Plan:     Discussed therapy refresher with patient to encourage long-term maintenance of core strengthening and stretching program. She was previously active in aquatic classes at the local [facility] before the winter and would like to resume those with some [employer] wellness classes prior to therapy. Agree with this plan and will try to conserve therapy sessions for potential post-operative treatment of B knee and R shoulder osteoarthritis. Will evaluate her progress at next visit and consider formal therapy if no improvement at that time. She may also benefit from some deep tissue/myofascial treatments in therapy but will monitor self-guided efforts first.     Encourage heat for 20 minutes prior to use of theracane. No orders of the defined types were placed in this encounter. Orders Placed This Encounter   Medications    Misc. Devices MISC     Si each by Does not apply route once as needed (mid-low back muscle spasm and pain s/p laminectomy and fusion) Please dispense one theracane device. Massage back as tolerated to relieve muscle spasms. Dispense:  1 Device     Refill:  0     Return in about 8 weeks (around 2019).

## 2019-04-25 NOTE — COMMUNICATION BODY
Assessment:      Diagnosis Orders   1. Failed back syndrome     2. Chronic bilateral low back pain without sciatica           Plan:     Discussed therapy refresher with patient to encourage long-term maintenance of core strengthening and stretching program. She was previously active in aquatic classes at the local [facility] before the winter and would like to resume those with some [employer] wellness classes prior to therapy. Agree with this plan and will try to conserve therapy sessions for potential post-operative treatment of B knee and R shoulder osteoarthritis. Will evaluate her progress at next visit and consider formal therapy if no improvement at that time. She may also benefit from some deep tissue/myofascial treatments in therapy but will monitor self-guided efforts first.     Encourage heat for 20 minutes prior to use of theracane. No orders of the defined types were placed in this encounter. Orders Placed This Encounter   Medications    Misc. Devices MISC     Si each by Does not apply route once as needed (mid-low back muscle spasm and pain s/p laminectomy and fusion) Please dispense one theracane device. Massage back as tolerated to relieve muscle spasms. Dispense:  1 Device     Refill:  0     Return in about 8 weeks (around 2019). 16

## 2019-05-06 ENCOUNTER — HOSPITAL ENCOUNTER (OUTPATIENT)
Dept: PAIN MANAGEMENT | Age: 79
Discharge: HOME | End: 2019-05-06
Payer: MEDICARE

## 2019-05-06 ENCOUNTER — OFFICE VISIT (OUTPATIENT)
Dept: ORTHOPEDIC SURGERY | Age: 79
End: 2019-05-06
Payer: MEDICARE

## 2019-05-06 VITALS
HEIGHT: 59 IN | WEIGHT: 150 LBS | HEART RATE: 66 BPM | OXYGEN SATURATION: 96 % | DIASTOLIC BLOOD PRESSURE: 56 MMHG | RESPIRATION RATE: 16 BRPM | BODY MASS INDEX: 30.24 KG/M2 | TEMPERATURE: 98 F | SYSTOLIC BLOOD PRESSURE: 116 MMHG

## 2019-05-06 VITALS — BODY MASS INDEX: 31.49 KG/M2 | HEIGHT: 58 IN | WEIGHT: 150 LBS

## 2019-05-06 DIAGNOSIS — F11.90 CHRONIC, CONTINUOUS USE OF OPIOIDS: ICD-10-CM

## 2019-05-06 DIAGNOSIS — M96.1 FAILED BACK SYNDROME OF LUMBAR SPINE: ICD-10-CM

## 2019-05-06 DIAGNOSIS — G89.4 CHRONIC PAIN ASSOCIATED WITH SIGNIFICANT PSYCHOSOCIAL DYSFUNCTION: ICD-10-CM

## 2019-05-06 DIAGNOSIS — M25.521 RIGHT ELBOW PAIN: Primary | ICD-10-CM

## 2019-05-06 DIAGNOSIS — M47.816 LUMBAR SPONDYLOSIS: ICD-10-CM

## 2019-05-06 DIAGNOSIS — M46.1 SACROILIITIS (HCC): ICD-10-CM

## 2019-05-06 DIAGNOSIS — M51.36 DEGENERATIVE LUMBAR DISC: ICD-10-CM

## 2019-05-06 DIAGNOSIS — R26.89 IMBALANCE: ICD-10-CM

## 2019-05-06 DIAGNOSIS — Z96.89 SPINAL CORD STIMULATOR STATUS: ICD-10-CM

## 2019-05-06 DIAGNOSIS — M96.1 FAILED BACK SYNDROME: ICD-10-CM

## 2019-05-06 DIAGNOSIS — M51.36 DDD (DEGENERATIVE DISC DISEASE), LUMBAR: ICD-10-CM

## 2019-05-06 DIAGNOSIS — M54.12 CERVICAL RADICULAR PAIN: ICD-10-CM

## 2019-05-06 DIAGNOSIS — M48.02 SPINAL STENOSIS IN CERVICAL REGION: Primary | ICD-10-CM

## 2019-05-06 DIAGNOSIS — M43.06 LUMBAR SPONDYLOLYSIS: ICD-10-CM

## 2019-05-06 DIAGNOSIS — M54.16 LUMBAR RADICULOPATHY: ICD-10-CM

## 2019-05-06 DIAGNOSIS — M16.11 PRIMARY OSTEOARTHRITIS OF RIGHT HIP: ICD-10-CM

## 2019-05-06 DIAGNOSIS — Z71.89 ENCOUNTER FOR MEDICATION COUNSELING: ICD-10-CM

## 2019-05-06 DIAGNOSIS — Z51.81 ENCOUNTER FOR MEDICATION MONITORING: ICD-10-CM

## 2019-05-06 PROCEDURE — 99213 OFFICE O/P EST LOW 20 MIN: CPT | Performed by: ORTHOPAEDIC SURGERY

## 2019-05-06 PROCEDURE — 20550 NJX 1 TENDON SHEATH/LIGAMENT: CPT | Performed by: ORTHOPAEDIC SURGERY

## 2019-05-06 PROCEDURE — 99213 OFFICE O/P EST LOW 20 MIN: CPT

## 2019-05-06 PROCEDURE — 99213 OFFICE O/P EST LOW 20 MIN: CPT | Performed by: NURSE PRACTITIONER

## 2019-05-06 RX ORDER — BETAMETHASONE SODIUM PHOSPHATE AND BETAMETHASONE ACETATE 3; 3 MG/ML; MG/ML
6 INJECTION, SUSPENSION INTRA-ARTICULAR; INTRALESIONAL; INTRAMUSCULAR; SOFT TISSUE ONCE
Status: COMPLETED | OUTPATIENT
Start: 2019-05-06 | End: 2019-05-06

## 2019-05-06 RX ORDER — BUPIVACAINE HYDROCHLORIDE 5 MG/ML
2 INJECTION, SOLUTION PERINEURAL ONCE
Status: COMPLETED | OUTPATIENT
Start: 2019-05-06 | End: 2019-05-06

## 2019-05-06 RX ORDER — LIDOCAINE HYDROCHLORIDE 10 MG/ML
2 INJECTION, SOLUTION EPIDURAL; INFILTRATION; INTRACAUDAL; PERINEURAL ONCE
Status: COMPLETED | OUTPATIENT
Start: 2019-05-06 | End: 2019-05-06

## 2019-05-06 RX ORDER — OXYCODONE AND ACETAMINOPHEN 10; 325 MG/1; MG/1
1 TABLET ORAL EVERY 6 HOURS PRN
Qty: 96 TABLET | Refills: 0 | Status: SHIPPED | OUTPATIENT
Start: 2019-05-14 | End: 2019-05-24 | Stop reason: SDUPTHER

## 2019-05-06 RX ORDER — MORPHINE SULFATE 30 MG/1
30 TABLET, FILM COATED, EXTENDED RELEASE ORAL 2 TIMES DAILY
Qty: 60 TABLET | Refills: 0 | Status: SHIPPED | OUTPATIENT
Start: 2019-05-08 | End: 2019-05-20

## 2019-05-06 RX ADMIN — BETAMETHASONE SODIUM PHOSPHATE AND BETAMETHASONE ACETATE 6 MG: 3; 3 INJECTION, SUSPENSION INTRA-ARTICULAR; INTRALESIONAL; INTRAMUSCULAR; SOFT TISSUE at 15:43

## 2019-05-06 RX ADMIN — BUPIVACAINE HYDROCHLORIDE 10 MG: 5 INJECTION, SOLUTION PERINEURAL at 15:44

## 2019-05-06 RX ADMIN — LIDOCAINE HYDROCHLORIDE 2 ML: 10 INJECTION, SOLUTION EPIDURAL; INFILTRATION; INTRACAUDAL; PERINEURAL at 15:53

## 2019-05-06 ASSESSMENT — ENCOUNTER SYMPTOMS
SHORTNESS OF BREATH: 1
CONSTIPATION: 1
BACK PAIN: 1

## 2019-05-06 NOTE — PROGRESS NOTES
file    Stress: Not on file   Relationships    Social connections:     Talks on phone: Not on file     Gets together: Not on file     Attends Mosque service: Not on file     Active member of club or organization: Not on file     Attends meetings of clubs or organizations: Not on file     Relationship status: Not on file    Intimate partner violence:     Fear of current or ex partner: Not on file     Emotionally abused: Not on file     Physically abused: Not on file     Forced sexual activity: Not on file   Other Topics Concern    Not on file   Social History Narrative    Not on file       Past Medical History:   Diagnosis Date    Achilles tendonitis     right    Asthma     [facility]     Cough     clear phlegm    Degenerative lumbar disc     Diabetes mellitus ([facility]     type 2    Failed back syndrome, lumbar     Fast heart beat     Hx of \"8-10 years ago\"    Fibromyalgia     History of bladder cancer     Hypertension     Hypothyroid     Kidney stones     Lumbar radiculopathy     Lumbar spondylolysis     Osteoarthritis     Sleep apnea     uses CPAP machine nightly    Spinal stenosis in cervical region     Wears glasses      Past Surgical History:   Procedure Laterality Date    BACK SURGERY  2010    lumbar fusion    BLADDER TUMOR EXCISION  2005    CARDIOVERSION      \"8-10 years ago\"  to get \"heart into regular rhythm\"    CARPAL TUNNEL RELEASE Bilateral     CATARACT REMOVAL WITH IMPLANT Bilateral 7/22/2014, 8/5/2014    Dilma/Demian    CHOLECYSTECTOMY, LAPAROSCOPIC N/A 8/18/2017    CHOLECYSTECTOMY LAPAROSCOPIC ROBOTIC MULTIPORT performed by Kathy Diamond MD at [address] [facility]  1/2013    CYSTOSCOPY  11/04/2016    CYSTOSCOPY  01/04/2019    ENDOSCOPIC ULTRASOUND (LOWER) N/A 8/17/2017    ENDOSCOPIC ULTRASOUND performed by Roni Nino MD at [address] (UPPER)  08/17/2017    A cursory view of the gastric mucosa was normal. The scope was then further advanced through a patent pylorus to the second portion of the duodenum. The Gallbladder was evaluated in bulb position. Thick sludge was noted. The CBD was 7.8 mm with no filling defects. The PD was 5 mm in the HOP. The pancreatic tissue was edematous in body and tail.  KNEE ARTHROSCOPY Left     LUMBAR FUSION      MD OFFICE/OUTPT VISIT,PROCEDURE ONLY N/A 1/24/2018    SPINAL HARDWARE REMOVAL LUMBAR BONE STIMULATOR performed by Yandel Benton MD at [address] [facility] Bilateral 1989    SKIN BIOPSY Right 11/2015    mole right posterior shoulder    SPINE SURGERY      spinal cord stimulator    SPINE SURGERY  91-4-15    renoval of spinal cord stimulator leads and battery    PASQUALE AND BSO      TUBAL LIGATION      UVULOPALATOPHARYGOPLASTY  2006     Family History   Problem Relation Age of Onset    Heart Failure Mother     Other Father          pneumonia    Alzheimer's Disease Father            Orders Placed This Encounter   Procedures    20605 - DRAIN/INJECT INTERMEDIATE JOINT/BURSA       1. Right elbow pain            [employer] MD    Please note that this chart was generated using voice recognition Dragon dictation software. Although every effort was made to ensure the accuracy of this automated transcription, some errors in transcription may have occurred.

## 2019-05-06 NOTE — PROGRESS NOTES
Josh 89 PROGRESS NOTE      Patient here today to review Medication Agreement    Chief Complaint:  Low back pain      HPI: She c/o low back pain which has not changed. She has history of 2 cervical surgeries. She had recent cervical and thoracic MRI. She has appt with neurosurgeon this month. She saw Dr Alesha Up was discussed. Sleep is good. She has had no ED visits. Back Pain   This is a chronic problem. The current episode started more than 1 year ago. The problem occurs constantly. The problem is unchanged. The pain is present in the lumbar spine and thoracic spine. The quality of the pain is described as aching. The pain does not radiate. The pain is at a severity of 8/10. The pain is moderate. Worse during: in am. The symptoms are aggravated by standing (walking). Associated symptoms include weakness. Risk factors include menopause. She has tried analgesics, heat and ice for the symptoms. The treatment provided mild relief. Patient denies any new neurological symptoms. No bowel or bladder incontinence, no weakness, and no falling. Treatment goals:  Functional status: walk better      Aberrancy:   Any alcoholic beverages   no    Any illegal drugs   no      Analgesia:pain 8      Adverse  Effects :BM qod, senokot    ADL;s :housework, plans on starting back to [facility]        Pill count: appropriate 36 percocet 4 ms contin    Morphine equivalent dose as reported on OARRS:120  Attestation: The Prescription Monitoring Report for this patient was reviewed today. Teresa Sales, APRN - CNP)  Chronic Pain Routine Monitoring: No signs of potential drug abuse or diversion identified: otherwise, see note documentation, Obtaining appropriate analgesic effect of treatment. (Teresa Sales, APRN - CNP)  Chronic Pain > 80 MEDD: Obtained or confirmed a written medication contract was on file. Teresa Sales, APRN - CNP)  Review ofOARRS does not show any aberrant prescription behavior. Final 03/27/2018 12:00 PM [facility]   MDA, Ur Not Detected    Final 03/27/2018 12:00 PM [facility]   Diazepam, Urine Not Detected    Final 03/27/2018 12:00 PM [facility]   Ethyl Glucuronide Ur Not Detected    Final 03/27/2018 12:00 PM [facility]   Fentanyl, Ur Not Detected    Final 03/27/2018 12:00 PM [facility]   Hydrocodone, Urine Not Detected    Final 03/27/2018 12:00 PM [facility]   Hydromorphone, Urine Not Detected    Final 03/27/2018 12:00 PM [facility]   Lorazepam, Urine Not Detected    Final 03/27/2018 12:00 PM [facility]   Marijuana Metab, Ur Not Detected    Final 03/27/2018 12:00 PM [facility]   MDEA, CRYSTAL, Ur Not Detected    Final 03/27/2018 12:00 PM [facility]   MDMA, Urine Not Detected    Final 03/27/2018 12:00 PM [facility]   Meperidine Metab, Ur Not Detected    Final 03/27/2018 12:00 PM [facility]   Methadone, Urine Not Detected    Final 03/27/2018 12:00 PM [facility]   Methamphetamine, Urine Not Detected    Final 03/27/2018 12:00 PM [facility]   Methylphenidate Not Detected    Final 03/27/2018 12:00 PM [facility]   Midazolam, Urine Not Detected    Final 03/27/2018 12:00 PM [facility]   Morphine Urine Present    Final 03/27/2018 12:00 PM [facility]   Norbuprenorphine, Urine Not Detected    Final 03/27/2018 12:00 PM [facility]   Nordiazepam, Urine Not Detected    Final 03/27/2018 12:00 PM [facility]   Norfentanyl, Urine Not Detected    Final 03/27/2018 12:00 PM [facility]   NORHYDROCODONE, URINE Not Detected    Final 03/27/2018 12:00 PM [facility]   Noroxycodone, Urine Present    Final 03/27/2018 12:00 PM [facility]   NOROXYMORPHONE, URINE Not Detected    Final 03/27/2018 12:00 PM [facility]   Oxazepam, Urine Not Detected    Final 03/27/2018 12:00 PM [facility]   Oxycodone Urine Present    Final 03/27/2018 12:00 PM [facility]   Oxymorphone, Urine Not Detected    Final 03/27/2018 12:00 PM [facility]   PCP, Urine Not Detected    Final 03/27/2018 12:00 PM [facility]   Phentermine, Ur Not Detected    Final 03/27/2018 12:00 PM [facility]   Propoxyphene, Urine Not Detected    Final 03/27/2018 12:00 PM [facility]   Tapentadol-O-Sulfate, Urine Not Detected    Final 03/27/2018 not intended to be used as the sole means for   clinical diagnosis or patient management decisions. EER Hi Res Interp Ur See Note    Final 03/27/2018 12:00 PM [facility]   (NOTE)   Access [facility] Enhanced Report using either link below:   -Direct access: [email or web address]   -Enter Username, Password: [email or web address]   Username: [email or web address]   Password: [email or web address]   Performed by [facility] [address] [facility] [phone]   www. Denia Tineo MD, Lab. Director   Performed at [facility]: SALINA ZHAO [address] [place] [address] [phone]                       Past Medical History:   Diagnosis Date    Achilles tendonitis     right    Asthma     COPD (chronic obstructive pulmonary disease) (HCC)     Cough     clear phlegm    Degenerative lumbar disc     Diabetes mellitus (HCC)     type 2    Failed back syndrome, lumbar     Fast heart beat     Hx of \"8-10 years ago\"    Fibromyalgia     History of bladder cancer     Hypertension     Hypothyroid     Kidney stones     Lumbar radiculopathy     Lumbar spondylolysis     Osteoarthritis     Sleep apnea     uses CPAP machine nightly    Spinal stenosis in cervical region     Wears glasses        Past Surgical History:   Procedure Laterality Date    BACK SURGERY  2010    lumbar fusion    BLADDER TUMOR EXCISION  2005    CARDIOVERSION      \"8-10 years ago\"  to get \"heart into regular rhythm\"    CARPAL TUNNEL RELEASE Bilateral     CATARACT REMOVAL WITH IMPLANT Bilateral 7/22/2014, 8/5/2014    Dilma/Demian    CHOLECYSTECTOMY, LAPAROSCOPIC N/A 8/18/2017    CHOLECYSTECTOMY LAPAROSCOPIC ROBOTIC MULTIPORT performed by David Marinelli MD at [address]  1/2013    CYSTOSCOPY  11/04/2016    CYSTOSCOPY  01/04/2019    ENDOSCOPIC ULTRASOUND (LOWER) N/A 8/17/2017    ENDOSCOPIC ULTRASOUND performed by Gracia Peace MD at [address] [facility] ([facility]) 08/17/2017    A cursory view of the gastric mucosa was normal. The scope was then further advanced through a patent pylorus to the second portion of the duodenum. The Gallbladder was evaluated in bulb position. Thick sludge was noted. The CBD was 7.8 mm with no filling defects. The PD was 5 mm in the HOP. The pancreatic tissue was edematous in body and tail.  KNEE ARTHROSCOPY Left     LUMBAR FUSION      TX OFFICE/OUTPT VISIT,PROCEDURE ONLY N/A 1/24/2018    SPINAL HARDWARE REMOVAL LUMBAR BONE STIMULATOR performed by Ronan Wan MD at [facility] Bilateral 1989    SKIN BIOPSY Right 11/2015    mole right posterior shoulder    SPINE SURGERY      spinal cord stimulator    SPINE SURGERY  91-4-15    renoval of spinal cord stimulator leads and battery    PASQUALE AND BSO      TUBAL LIGATION      UVULOPALATOPHARYGOPLASTY  2006       Allergies   Allergen Reactions    Azithromycin Shortness Of Breath     Tightness in chest    Adhesive Tape      OK to use paper tape per Patient         Current Outpatient Medications:     [START ON 5/14/2019] oxyCODONE-acetaminophen (PERCOCET)  MG per tablet, Take 1 tablet by mouth every 6 hours as needed for Pain for up to 24 days. , Disp: 96 tablet, Rfl: 0    [START ON 5/8/2019] morphine (MS CONTIN) 30 MG extended release tablet, Take 1 tablet by mouth 2 times daily for 12 days. , Disp: 60 tablet, Rfl: 0    beclomethasone (QVAR) 80 MCG/ACT inhaler, Inhale 1 puff into the lungs daily, Disp: , Rfl:     pregabalin (LYRICA) 150 MG capsule, Take 150 mg by mouth 2 times daily. , Disp: , Rfl:     metFORMIN (GLUCOPHAGE) 500 MG tablet, Take 500 mg by mouth 2 times daily (with meals). , Disp: , Rfl:     telmisartan-hydrochlorothiazide (MICARDIS HCT) 40-12.5 MG per tablet, Take 1 tablet by mouth daily. , Disp: , Rfl:     montelukast (SINGULAIR) 10 MG tablet, Take 10 mg by mouth daily , Disp: , Rfl:     omeprazole (PRILOSEC) 20 MG capsule, Take 20 mg by mouth every without the   administration of intravenous contrast.       COMPARISON:   03/29/2018       HISTORY:   ORDERING SYSTEM PROVIDED HISTORY: Spinal stenosis of lumbar region with   neurogenic claudication   TECHNOLOGIST PROVIDED HISTORY:   Ordering Physician Provided Reason for Exam: pt states neck and back pain -   chroninc, no recent injury   Acuity: Chronic   Type of Exam: Ongoing       FINDINGS:   BONES/ALIGNMENT: Vertebral body heights are maintained.  Alignment is normal.   Minimal edematous degenerative changes are present at C3-4, unchanged. Marrow signal is otherwise within normal limits for age.  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       SPINAL CORD: No spinal cord signal abnormality is seen. Nayeli Leventhal is no abnormal   fluid collection or mass within the spinal canal.       SOFT TISSUES: There is an incidental subcentimeter cystic left thyroid   nodule.  Paraspinal soft tissues are otherwise unremarkable.       C2-C3: Disc height and signal maintained.  No neural foraminal narrowing or   spinal canal stenosis.       C3-C4: Mild disc height loss and desiccation.  Severe left and mild right   neural foraminal narrowing secondary to uncovertebral and facet hypertrophy. Moderate spinal canal stenosis secondary to disc bulge and ligamentum flavum   hypertrophy.       C4-C5: Mild disc height loss and desiccation.  Severe left and moderate right   neural foraminal narrowing secondary to uncovertebral and facet hypertrophy.    Moderate spinal canal stenosis secondary to disc bulge and ligamentum flavum   hypertrophy.       C5-C6: Mild disc height loss and desiccation.  Severe left neural foraminal   narrowing secondary to uncovertebral and facet hypertrophy.  Mild right   neural foraminal narrowing secondary to uncovertebral hypertrophy.  Mild   spinal canal stenosis secondary to disc bulge.       C6-C7: Mild disc height loss and desiccation.  Mild bilateral neural   foraminal narrowing secondary to visualized at L2-3. mild   edematous super endplate changes are present anteriorly at T12-L1.  No   suspect osseous lesion is evident.       SPINAL CORD: There is focal T2 hyperintensity in the spinal cord at the   T10-11 level.  No additional cord signal abnormality is seen. Gradie Bran is no   abnormal fluid collection or mass within the spinal canal.       SOFT TISSUES: Paraspinal soft tissues are unremarkable.       DEGENERATIVE CHANGES: Mild diffuse disc height loss and desiccation   throughout the thoracic spine.  Multilevel disc bulges and facet hypertrophy   resulting in severe T10-11 and moderate T11-12 and T12-L1 spinal canal   stenosis.  Additional mild spinal canal stenosis noted at T1-, T2-3, T5-6,   T6-7, and T7-8.           Impression   1.  Multilevel disc bulges and facet hypertrophy resulting in spinal canal   stenoses throughout the thoracic spine greatest at T10-11 where it is severe   and T11-12 and T12-L1 where it is moderate.  Findings are not substantially   changed from prior CT myelogram.   2. Focal cord signal abnormality at T10-11 may reflect chronic spondylotic   myelomalacia, cord edema, or a combination thereof.             Assessment:    Problem List Items Addressed This Visit     Spinal stenosis in cervical region - Primary    Spinal cord stimulator status    Sacroiliitis (HCC)    Relevant Medications    oxyCODONE-acetaminophen (PERCOCET)  MG per tablet (Start on 5/14/2019)    morphine (MS CONTIN) 30 MG extended release tablet (Start on 5/8/2019)    Primary osteoarthritis of right hip    Relevant Medications    oxyCODONE-acetaminophen (PERCOCET)  MG per tablet (Start on 5/14/2019)    morphine (MS CONTIN) 30 MG extended release tablet (Start on 5/8/2019)    Lumbar spondylosis    Relevant Medications    oxyCODONE-acetaminophen (PERCOCET)  MG per tablet (Start on 5/14/2019)    morphine (MS CONTIN) 30 MG extended release tablet (Start on 5/8/2019)    Lumbar spondylolysis Lumbar radiculopathy    Imbalance (Chronic)    Failed back syndrome of lumbar spine    Failed back syndrome    Relevant Medications    oxyCODONE-acetaminophen (PERCOCET)  MG per tablet (Start on 5/14/2019)    morphine (MS CONTIN) 30 MG extended release tablet (Start on 5/8/2019)    Encounter for medication monitoring    Encounter for medication counseling    DDD (degenerative disc disease), lumbar    Relevant Medications    oxyCODONE-acetaminophen (PERCOCET)  MG per tablet (Start on 5/14/2019)    morphine (MS CONTIN) 30 MG extended release tablet (Start on 5/8/2019)    Chronic, continuous use of opioids    Chronic pain associated with significant psychosocial dysfunction    Relevant Medications    oxyCODONE-acetaminophen (PERCOCET)  MG per tablet (Start on 5/14/2019)    morphine (MS CONTIN) 30 MG extended release tablet (Start on 5/8/2019)    Cervical radicular pain    Relevant Medications    oxyCODONE-acetaminophen (PERCOCET)  MG per tablet (Start on 5/14/2019)    morphine (MS CONTIN) 30 MG extended release tablet (Start on 5/8/2019)              Treatment Plan:  DISCUSSION: Treatment options discussed withpatient and all questions answered to patient's satisfaction. Possible side effects, risk of tolerance and or dependence and alternative treatments discussed    Obtaining appropriate analgesic effect of treatment   No signs of potential drug abuse or diversion identified    [x] Ill effects of being on chronic pain medications such as sleep disturbances, respiratory depression, hormonal changes, withdrawal symptoms, chronic opioid dependence and tolerance as well as risk of taking opioids with Benzodiazepines and taking opioids along with alcohol,  werediscussed with patient. I had asked the patient to minimize medication use and utilize pain medications only for uncontrolled rest pain or pain with exertional activities.  I advised patient not to self-escalate painmedications without consulting with us. At each of patient's future visits we will try to taper pain medications, while adjusting the adjunct medications, and re-evaluating for Physical Therapy to improve spinal andjoint strength. We will continue to have discussions to decrease pain medications as tolerated. Counseled patient on effects their pain medication and /or their medical condition mayhave on their  ability to drive or operate machinery. Instructed not to drive or operate machinery if drowsy     I also discussed with the patient regarding the dangers of combining narcotic pain medication with tranquilizers, alcohol or illegal drugs or taking the medication any way other than prescribed. The dangers were discussed  including respiratory depression and death. Patient was told to tell  all  physicians regarding the medications he is getting from pain clinic. Patient is warned not to take any unprescribed medications over-the-countermedications that can depress breathing . Patient is advised to talk to the pharmacist or physicians if planning to take any over-the-counter medications before  takeing them. Patient is strongly advised to avoid tranquilizers or  relaxants, illegal drugs  or any medications that can depress breathing  Patient is also advised to tell us if there is any changes in their medications from other physicians.     1. Partial script percocet to get on same  schedule    TREATMENT OPTIONS:     Return in 4 weeks  Medication Agreement Requirements Met  Continue Opioid therapy  Script written for percocet, ms contin  Follow up appointment made

## 2019-05-12 NOTE — PROGRESS NOTES
[address]  [address] [id number]  Dept: [phone]    Patient:  Monica Leonard  YOB: 1940  Date: 4/1/19  PRIMARY CARE PHYSICIAN: Christine Banerjee DO  REFERRED BY: Antonio Durán     Chief Complaint   Patient presents with    Consultation    Back Pain    Other     gait instability        HPI:     Monica Leonard is a 70 y.o. female on whom a neurosurgical consultation has been requested by Christine Banerjee DO for neck pain, back pain and increasing gait instability. Patient has long standing history of chronic spine pain and debility secondary to severe degenerative spine disease. Previous lumbar spine surgery x 2, posterior spinal fusion L2-S1 2010. Implanted bone growth stimulator was removed 1/24/18. Patient is established with pain management and maintained on chronic opiate therapy. Referral placed for NS evaluation for progressive worsening of axial mid and lower back pain and increasingly unstable gait and falling over the past year. Patient had CT myelogram of C/T/L spine 3/22/19 showing, of most significance, moderate to severe central canal and NF stenosis at T10/11, T11/12, T12/L1. Over the past week patient's symptoms have acutely escalated to the point where she is almost incapable of ambulating on her own due to instability and lower extremity weakness. Onset was preceded by a fall at home on 3/24/19. Patient hurt her left wrist which has been imaged, no fracture found. Neck pain is mainly axial, radiating to bilateral, paraspinal, trapezius muscles, at times into left arm. Pain is aching, shooting, and throbbing. Severity is 6/10. Left wrist continues to hurt up to her elbow. Left hand weak, fingers tingling since injury. Both arms feel tired and heavy. Back pain axial lumbar. Aching and burning, severity 7-8/10. Radiation to paraspinal muscles.  Exacerbated with ambulation and prolonged standing. Legs become weak and feel heavy and numb. Patient denies bowel or bladder incontinence. No saddle anesthesia. Gait is extremely unstable over the past week since fall. Patient cannot ambulate without holding on to something or she will fall. History:     Past Medical History:   Diagnosis Date    Achilles tendonitis     right    Asthma     COPD (chronic obstructive pulmonary disease) (HCC)     Cough     clear phlegm    Degenerative lumbar disc     Diabetes mellitus (HCC)     type 2    Failed back syndrome, lumbar     Fast heart beat     Hx of \"8-10 years ago\"    Fibromyalgia     History of bladder cancer     Hypertension     Hypothyroid     Kidney stones     Lumbar radiculopathy     Lumbar spondylolysis     Osteoarthritis     Sleep apnea     uses CPAP machine nightly    Spinal stenosis in cervical region     Wears glasses      Past Surgical History:   Procedure Laterality Date    BACK SURGERY  2010    lumbar fusion    BLADDER TUMOR EXCISION  2005    CARDIOVERSION      \"8-10 years ago\"  to get \"heart into regular rhythm\"    CARPAL TUNNEL RELEASE Bilateral     CATARACT REMOVAL WITH IMPLANT Bilateral 7/22/2014, 8/5/2014    Dilma/Demian    CHOLECYSTECTOMY, LAPAROSCOPIC N/A 8/18/2017    CHOLECYSTECTOMY LAPAROSCOPIC ROBOTIC MULTIPORT performed by Felicitas Henriquez MD at [facility]  1/2013    CYSTOSCOPY  11/04/2016    CYSTOSCOPY  01/04/2019    ENDOSCOPIC ULTRASOUND (LOWER) N/A 8/17/2017    ENDOSCOPIC ULTRASOUND performed by Fco Mclean MD at [address] (UPPER)  08/17/2017    A cursory view of the gastric mucosa was normal. The scope was then further advanced through a patent pylorus to the second portion of the duodenum. The Gallbladder was evaluated in bulb position. Thick sludge was noted. The CBD was 7.8 mm with no filling defects. The PD was 5 mm in the HOP. The pancreatic tissue was edematous in body and tail.     KNEE ARTHROSCOPY Left     LUMBAR FUSION      [place] OFFICE/OUTPT VISIT,PROCEDURE ONLY N/A 1/24/2018    SPINAL HARDWARE REMOVAL LUMBAR BONE STIMULATOR performed by Jamarcus Vasquez MD at [facility] Bilateral 1989    SKIN BIOPSY Right 11/2015    mole right posterior shoulder    SPINE SURGERY      spinal cord stimulator    SPINE SURGERY  91-4-15    renoval of spinal cord stimulator leads and battery    PASQUALE AND BSO      TUBAL LIGATION      UVULOPALATOPHARYGOPLASTY  2006     Family History   Problem Relation Age of Onset    Heart Failure Mother     Other Father          pneumonia    Alzheimer's Disease Father      Current Outpatient Medications on File Prior to Visit   Medication Sig Dispense Refill    fluticasone (FLONASE) 50 MCG/ACT nasal spray fluticasone 50 mcg/actuation nasal spray,suspension      naloxone (NARCAN) 4 MG/0.1ML LIQD nasal spray 1 spray by Nasal route as needed (respiratory distress) 1 each 0    diclofenac sodium 1 % GEL Apply 2 g topically 2 times daily 1 Tube 0    pregabalin (LYRICA) 150 MG capsule Take 150 mg by mouth 2 times daily.  metFORMIN (GLUCOPHAGE) 500 MG tablet Take 500 mg by mouth 2 times daily (with meals).  telmisartan-hydrochlorothiazide (MICARDIS HCT) 40-12.5 MG per tablet Take 1 tablet by mouth daily.  montelukast (SINGULAIR) 10 MG tablet Take 10 mg by mouth daily       levothyroxine (SYNTHROID) 50 MCG tablet Take 50 mcg by mouth Daily.  omeprazole (PRILOSEC) 20 MG capsule Take 20 mg by mouth every evening.  aspirin 81 MG tablet Take 81 mg by mouth daily. No current facility-administered medications on file prior to visit.       Social History     Tobacco Use    Smoking status: Never Smoker    Smokeless tobacco: Never Used   Substance Use Topics    Alcohol use: Yes     Comment: rare    Drug use: No     Allergies   Allergen Reactions    Azithromycin Shortness Of Breath     Tightness in chest    Adhesive Tape      OK to use paper tape per Patient       Review of Systems  Constitutional: Negative for activity change and appetite change. HEENT: Negative for ear pain and facial swelling. Eyes: Negative for discharge and itching. Respiratory: Negative for choking and chest tightness. Cardiovascular: Negative for chest pain and leg swelling. Gastrointestinal: Negative for nausea and abdominal pain. Endocrine: Negative for cold intolerance and heat intolerance. Genitourinary: Negative for frequency and flank pain. Musculoskeletal: Negative for myalgias and joint swelling. Skin: Negative for rash and wound. Allergic/Immunologic: Negative for environmental allergies and food allergies. Hematological: Negative for adenopathy. Does not bruise/bleed easily. Psychiatric/Behavioral: Negative for self-injury. The patient is not nervous/anxious. Physical Exam:      /72 (Site: Right Upper Arm, Position: Sitting, Cuff Size: Medium Adult)   Pulse 74   Ht 4' 9.99\" (1.473 m)   Wt 151 lb (68.5 kg)   BMI 31.57 kg/m²   Estimated body mass index is 31.57 kg/m² as calculated from the following:    Height as of this encounter: 4' 9.99\" (1.473 m). Weight as of this encounter: 151 lb (68.5 kg). General:  Dieudonne Simms is a 70y.o. year old female who appears her stated age. HEENT: Normocephalic atraumatic. Neck supple. Chest: Clear to auscultation bilaterally. Regular rate and rhythm. Abdomen: Soft nontender nondistended. Normoactive bowel sounds. Neurological Exam  Alert and oriented x 3. CN II-XII intact. Motor examination:   Strength in bilateral upper extremities diffusely diminished at +4/5 deltoid, triceps, biceps, wrist ext/flex,and .  and wrist flex/ext 4/5 on left. Strength bilateral lower extremities diffusely diminished at +4/5 iliopsoas, quadriceps, hamstring, tibialis anterior, gastrocnemius, and EHL. Sensory: Grossly intact. Reflexes: Brisk +2/4 in bilateral upper extremities.  +3/4 in bilateral patellar and achilles. Hoffmans negative, no clonus. Gait: Ataxic, unstable gait. Ambulating with a cane. Studies Review:     Reviewed CT Type:  Film and Report    Images:  CT cervical 3/22/19  Narrative   EXAMINATION:   CT OF THE CERVICAL SPINE WITH INTRATHECAL CONTRAST 3/22/2019 1:25 pm:       TECHNIQUE:   CT of the cervical spine was performed after the administration of   intrathecal contrast with multiplanar reconstructed images provided for   interpretation.  Dose modulation, iterative reconstruction, and/or weight   based adjustment of the mA/kV was utilized to reduce the radiation dose to as   low as reasonably achievable.       COMPARISON:   MRI 03/29/2018       HISTORY:   ORDERING SYSTEM PROVIDED HISTORY: Failed back syndrome   TECHNOLOGIST PROVIDED HISTORY:   POST MYELOGRAM   Ordering Physician Provided Reason for Exam: FAILED BACK SYNDROME, POST   MYELOGRAM       FINDINGS:   BONES/ALIGNMENT: Straightening of normal cervical lordosis which could be   positional or due to spasm.  Vertebral body heights are maintained.  No   osseous destructive lesion is seen.  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       SOFT TISSUES: There is no prevertebral soft tissue swelling.  Possible 3 mm   hypodense left thyroid nodule.  No follow-up imaging is recommended.       C2-C3: There is no significant disc protrusion, spinal canal stenosis or   neural foraminal narrowing.       C3-C4: Disc degenerative changes with loss of disc space height and   osteophytes.  Mild diffuse disc osteophyte complex slightly eccentric to the   left resulting in mass effect on the ventral aspect of the thecal sac.  Mild   central spinal and moderate left neural foraminal stenosis.       C4-C5: Disc degenerative changes with disc space loss and osteophytes.  Mild   disc osteophyte complex eccentric to the right with uncovertebral hypertrophy   resulting in mass effect on the right anterolateral aspect of the thecal sac. Mild spinal stenosis.  Mild to moderate left neural foraminal stenosis.       C5-C6: Advanced disc degenerative changes with disc osteophyte complex   resulting in slight mass effect on the ventral aspect of thecal sac.  Mild   central spinal stenosis.  Moderate left neural foraminal stenosis.       C6-C7: Moderate disc degenerative changes.  Mild disc osteophyte complex   without significant central spinal or neural foraminal stenosis.       C7-T1: There is no significant disc protrusion, spinal canal stenosis or   neural foraminal narrowing.  Mild spondylotic changes.           Impression   Moderately advanced cervical spondylotic changes and multilevel   mild-to-moderate spinal and neural foraminal stenoses as noted above.         CT thoracic spine 3/22/19  Narrative   EXAMINATION:   CT OF THE THORACIC SPINE WITH INTRATHECAL CONTRAST 3/22/2019 1:25 pm       TECHNIQUE:   CT of the thoracic spine was performed after the administration of   intrathecal contrast with multiplanar reconstructed images provided for   interpretation. Dose modulation, iterative reconstruction, and/or weight   based adjustment of the mA/kV was utilized to reduce the radiation dose to as   low as reasonably achievable.       COMPARISON:   12/12/2017       HISTORY:   ORDERING SYSTEM PROVIDED HISTORY: Failed back syndrome   TECHNOLOGIST PROVIDED HISTORY:   POST MYELOGRAM   Ordering Physician Provided Reason for Exam: FAILED BACK SYNDROME, POST   MYELOGRAM       Previous spinal stimulator, chronic back pain.       FINDINGS:   BONES/ALIGNMENT: There is normal alignment of the spine. The vertebral body   heights are maintained.  No osseous destructive lesion is seen.  Developmental   incomplete segmentation of T3 and T4.       DEGENERATIVE CHANGES: Advanced thoracic spondylotic changes.  At T1-T2 and   T2-T3 there is mild disc osteophyte complex eccentric to the left resulting   in mild mass modulation, iterative reconstruction, and/or weight based adjustment of   the mA/kV was utilized to reduce the radiation dose to as low as reasonably   achievable.       COMPARISON:   MRI 07/07/2018       HISTORY:   ORDERING SYSTEM PROVIDED HISTORY: Failed back syndrome   TECHNOLOGIST PROVIDED HISTORY:   POST MYELOGRAM   Ordering Physician Provided Reason for Exam: FAILED BACK SYNDROME, POST   MYELOGRAM       Chronic back pain, surgery approximately five years ago.       FINDINGS:   BONES/ALIGNMENT: Slight levoconvex curvature of the lumbar spine with   advanced lumbar spondylotic changes.  Vertebral body heights are maintained. Extensive postop changes with bilateral pedicle screws and posterior fusion   rods L2-S1 with a transverse bar at the L3-L4 level.  Intervertebral disc   prostheses at L4-L5 and L2-L3.  Previous posterior decompression L2-L5. There is regional artifact from metallic hardware.  Stable mild grade 1   anterolisthesis L5 on S1.  At least moderate spinal stenosis T12-L1.       SOFT TISSUES: No paraspinal mass is seen.  Similar to the prior MRI there is   a fluid collection/probable seroma in the soft tissues posterior to the   spinal canal extending between the fusion rods from approximately L3-L5. This area is difficult to evaluate due to artifact from hardware.  No   definite contrast is seen in the region to indicate communication with the   thecal sac.       L1-L2: Advanced disc degenerative changes with near complete loss of disc   space height and posterior disc osteophyte complex.  Facet arthropathy and   ligamentum flavum thickening.  Combination results in moderate to severe   spinal stenosis.  Moderate to severe bilateral neural foraminal stenosis.       L2-L3: Postoperative changes.  No significant spinal or neural foraminal   stenosis.       L3-L4: Previous surgery without significant spinal or neural foraminal   stenosis.       L4-L5: Prior surgery with posterior decompression.  No significant spinal or   neural foraminal stenosis visualized.       L5-S1: No significant spinal stenosis.  Mild facet arthropathy.  Previous   posterior decompression.  Mild bilateral neural foraminal stenosis.         Impression   Advanced degenerative changes with wide posterior decompression and fusion   L2-S1 without significant spinal stenosis at the surgical levels.       Moderate to severe spinal stenosis L1-L2.       Similar to and better shown on the previous MRI is a fluid collection in the   posterior soft tissues centered at L4 favored to represent a seroma.           Assessment and Plan:     Nunu Candelario was seen today for consultation, back pain and other. Diagnoses and all orders for this visit:    Thoracic spondylosis with myelopathy  -     MRI THORACIC SPINE WO CONTRAST; Future  -     Alex Lucia MD, [facility], [place]    Cervical stenosis of spinal canal  -     Alex Lucia MD, [facility], [place]    Gait instability  -     MRI THORACIC SPINE WO CONTRAST; Future  -     MRI Cervical Spine WO Contrast; Future  -     Alex Lucia MD, [facility], [place]    Urine retention  -     MRI THORACIC SPINE [address]; Future  -     MRI Cervical Spine WO Contrast; Future    History of fusion of lumbar spine  -     Alex Lucia MD, Physical Medicine and Natalie Meyer MD, [facility], [place]        Plan:   Ms. Callie Gagnon is a 70 y.o. female being seen in the neurosurgery clinic today in regard to chronic neck pain, progressive worsening mid and lower back pain and increasing gait instability over the past year. Recently patient fell at home 3/24/19 and has since had significant worsening of symptoms. Patient has had at least 4 falls in the past week due to unstable gait and leg weakness.   She has become Standing Expiration Date:   4/1/2020    MRI Cervical Spine WO Contrast     Standing Status:   Future     Number of Occurrences:   1     Standing Expiration Date:   6/30/2019   Maurice Alicia MD, [facility], [place]     Referral Priority:   Routine     Referral Type:   Eval and Treat     Referral Reason:   Specialty Services Required     Referred to Provider:   Lois Orozco MD     Requested Specialty:   Physical Medicine and Rehab     Number of Visits Requested:   1       Electronically signed by DENNY Schultz on 5/12/2019 at 9:17 PM    Please note that this chart was generated using voice recognition Dragon dictation software. Although every effort was made to ensure the accuracy of this automated transcription, some errors in transcription may have occurred.

## 2019-05-20 ENCOUNTER — OFFICE VISIT (OUTPATIENT)
Dept: NEUROSURGERY | Age: 79
End: 2019-05-20
Payer: MEDICARE

## 2019-05-20 VITALS
BODY MASS INDEX: 31.56 KG/M2 | WEIGHT: 151 LBS | DIASTOLIC BLOOD PRESSURE: 70 MMHG | SYSTOLIC BLOOD PRESSURE: 116 MMHG | HEART RATE: 75 BPM

## 2019-05-20 DIAGNOSIS — Q76.49 SPINAL DEFORMITY: Primary | ICD-10-CM

## 2019-05-20 DIAGNOSIS — M47.14 THORACIC MYELOPATHY: ICD-10-CM

## 2019-05-20 DIAGNOSIS — G95.9 CERVICAL MYELOPATHY (HCC): ICD-10-CM

## 2019-05-20 PROCEDURE — 99213 OFFICE O/P EST LOW 20 MIN: CPT | Performed by: NEUROLOGICAL SURGERY

## 2019-05-20 NOTE — PROGRESS NOTES
CHOLECYSTECTOMY, LAPAROSCOPIC N/A 8/18/2017    CHOLECYSTECTOMY LAPAROSCOPIC ROBOTIC MULTIPORT performed by Sravanthi Saldaña MD at [address] [facility] [address]  1/2013    CYSTOSCOPY  11/04/2016    CYSTOSCOPY  01/04/2019    ENDOSCOPIC ULTRASOUND (LOWER) N/A 8/17/2017    ENDOSCOPIC ULTRASOUND performed by Alexandra Barber MD at [address] (UPPER)  08/17/2017    A cursory view of the gastric mucosa was normal. The scope was then further advanced through a patent pylorus to the second portion of the duodenum. The Gallbladder was evaluated in bulb position. Thick sludge was noted. The CBD was 7.8 mm with no filling defects. The PD was 5 mm in the HOP. The pancreatic tissue was edematous in body and tail.  KNEE ARTHROSCOPY Left     LUMBAR FUSION      [place] OFFICE/OUTPT VISIT,PROCEDURE ONLY N/A 1/24/2018    SPINAL HARDWARE REMOVAL LUMBAR BONE STIMULATOR performed by Carmelo Kaur MD at [facility] Bilateral 1989    SKIN BIOPSY Right 11/2015    mole right posterior shoulder    SPINE SURGERY      spinal cord stimulator    SPINE SURGERY  91-4-15    renoval of spinal cord stimulator leads and battery    PASQUALE AND BSO      TUBAL LIGATION      UVULOPALATOPHARYGOPLASTY  2006     Family History   Problem Relation Age of Onset    Heart Failure Mother     Other Father          pneumonia    Alzheimer's Disease Father      Current Outpatient Medications on File Prior to Visit   Medication Sig Dispense Refill    oxyCODONE-acetaminophen (PERCOCET)  MG per tablet Take 1 tablet by mouth every 6 hours as needed for Pain for up to 24 days. 96 tablet 0    morphine (MS CONTIN) 30 MG extended release tablet Take 1 tablet by mouth 2 times daily for 12 days.  60 tablet 0    beclomethasone (QVAR) 80 MCG/ACT inhaler Inhale 1 puff into the lungs daily      fluticasone (FLONASE) 50 MCG/ACT nasal spray fluticasone 50 mcg/actuation nasal spray,suspension      naloxone (NARCAN) 4 MG/0.1ML LIQD nasal spray 1 spray by Nasal route as needed (respiratory distress) 1 each 0    diclofenac sodium 1 % GEL Apply 2 g topically 2 times daily 1 Tube 0    pregabalin (LYRICA) 150 MG capsule Take 150 mg by mouth 2 times daily.  metFORMIN (GLUCOPHAGE) 500 MG tablet Take 500 mg by mouth 2 times daily (with meals).  telmisartan-hydrochlorothiazide (MICARDIS HCT) 40-12.5 MG per tablet Take 1 tablet by mouth daily.  montelukast (SINGULAIR) 10 MG tablet Take 10 mg by mouth daily       levothyroxine (SYNTHROID) 50 MCG tablet Take 50 mcg by mouth Daily.  omeprazole (PRILOSEC) 20 MG capsule Take 20 mg by mouth every evening.  aspirin 81 MG tablet Take 81 mg by mouth daily.  Misc. Devices MISC 1 each by Does not apply route once as needed (mid-low back muscle spasm and pain s/p laminectomy and fusion) Please dispense one theracane device. Massage back as tolerated to relieve muscle spasms. 1 Device 0     No current facility-administered medications on file prior to visit. Social History     Tobacco Use    Smoking status: Never Smoker    Smokeless tobacco: Never Used   Substance Use Topics    Alcohol use: Yes     Comment: rare    Drug use: No       Allergies   Allergen Reactions    Azithromycin Shortness Of Breath     Tightness in chest    Adhesive Tape      OK to use paper tape per Patient       Review of Systems  Constitutional: Negative for activity change and appetite change. HENT: Negative for ear pain and facial swelling. Eyes: Negative for discharge and itching. Respiratory: Negative for choking and chest tightness. Cardiovascular: Negative for chest pain and leg swelling. Gastrointestinal: Negative for nausea and abdominal pain. Endocrine: Negative for cold intolerance and heat intolerance. Genitourinary: Negative for frequency and flank pain. Musculoskeletal: Negative for myalgias and joint swelling. Skin: Negative for rash and wound.    Allergic/Immunologic: Negative for environmental allergies and food allergies. Hematological: Negative for adenopathy. Does not bruise/bleed easily. Psychiatric/Behavioral: Negative for self-injury. The patient is not nervous/anxious. Physical Exam:      /70 (Site: Right Upper Arm, Position: Sitting, Cuff Size: Medium Adult)   Pulse 75   Wt 151 lb (68.5 kg)   BMI 31.56 kg/m²   Estimated body mass index is 31.56 kg/m² as calculated from the following:    Height as of 5/6/19: 4' 10\" (1.473 m). Weight as of this encounter: 151 lb (68.5 kg). General:  Marciano Davis is a 70y.o. year old female who appears her stated age. HEENT: Normocephalic atraumatic. Neck supple. Chest: regular rate; pulses equal  Abdomen: Soft nontender nondistended. Normoactive bowel sounds. Ext: DP and PT pulses 2+, good cap refill  Neuro    Mentation  Appropriate affect  Registration intact  Orientation intact  3 item recall intact  Judgement intact to situation    Cranial Nerves:   Pupils equal and reactive to light  Extraocular motion intact  Face and shrug symmetric  Tongue midline  No dysarthria  v1-3 sensation symmetric, masseter tone symmetric  Hearing symmetric and intact to finger rub    Sensation:   Diminished bilateral lower extremities    Motor  L deltoid 5/5; R deltoid 5/5  L biceps 5/5; R biceps 5/5  L triceps 5/5; R triceps 5/5  L wrist extension 5/5; R wrist extension 5/5  L intrinsics 5/5; R intrinsics 5/5     L iliopsoas 5/5 , R iliopsoas 5/5  L quadriceps 5/5; R quadriceps 5/5  L Dorsiflexion 5/5; R dorsiflexion 5/5  L Plantarflexion 5/5; R plantarflexion 5/5  L EHL 5/5; R EHL 5/5    Reflexes  Preoperative 4 patellar's Achilles positive Hoffmans. Studies Review:     MRI thoracic spine reveals significant adjacent level disease with significant central stenosis at T10 11 and T11 12 some cord signal change. Cervical spine stenosis worst at C4 5 as well. Assessment and Plan:      1. Spinal deformity    2. Thoracic myelopathy    3. Cervical myelopathy (HCC)          Plan: The patient likely has cervical as well as thoracic overlying myelopathy along with multiplanar spinal deformity causing a significant amount of axial back pain. This is evidenced from her forward flexed posture requiring significant amount of correction from the hips and knees and subsequent axial spasmodic back pain. Cervical flex ext  Dexa scan  Scoliosis xrays      Followup: No follow-ups on file. Prescriptions Ordered:  No orders of the defined types were placed in this encounter. Orders Placed:  No orders of the defined types were placed in this encounter. Electronically signed by Maria Victoria Melara DO on 5/20/2019 at 11:05 AM    Please note that this chart was generated using voice recognition Dragon dictation software. Although every effort was made to ensure the accuracy of this automated transcription, some errors in transcription may have occurred.

## 2019-05-24 ENCOUNTER — HOSPITAL ENCOUNTER (OUTPATIENT)
Dept: PAIN MANAGEMENT | Age: 79
Discharge: HOME | End: 2019-05-24
Payer: MEDICARE

## 2019-05-24 VITALS
TEMPERATURE: 97.8 F | WEIGHT: 151 LBS | RESPIRATION RATE: 16 BRPM | DIASTOLIC BLOOD PRESSURE: 49 MMHG | HEART RATE: 68 BPM | BODY MASS INDEX: 31.7 KG/M2 | HEIGHT: 58 IN | OXYGEN SATURATION: 98 % | SYSTOLIC BLOOD PRESSURE: 121 MMHG

## 2019-05-24 DIAGNOSIS — M51.36 DDD (DEGENERATIVE DISC DISEASE), LUMBAR: Primary | ICD-10-CM

## 2019-05-24 DIAGNOSIS — M16.11 PRIMARY OSTEOARTHRITIS OF RIGHT HIP: ICD-10-CM

## 2019-05-24 DIAGNOSIS — M43.06 LUMBAR SPONDYLOLYSIS: ICD-10-CM

## 2019-05-24 DIAGNOSIS — R26.89 IMBALANCE: ICD-10-CM

## 2019-05-24 DIAGNOSIS — M46.1 SACROILIITIS (HCC): ICD-10-CM

## 2019-05-24 DIAGNOSIS — G89.4 CHRONIC PAIN ASSOCIATED WITH SIGNIFICANT PSYCHOSOCIAL DYSFUNCTION: ICD-10-CM

## 2019-05-24 DIAGNOSIS — M48.02 SPINAL STENOSIS IN CERVICAL REGION: ICD-10-CM

## 2019-05-24 DIAGNOSIS — M96.1 FAILED BACK SYNDROME: ICD-10-CM

## 2019-05-24 DIAGNOSIS — Z96.89 SPINAL CORD STIMULATOR STATUS: ICD-10-CM

## 2019-05-24 DIAGNOSIS — M96.1 FAILED BACK SYNDROME OF LUMBAR SPINE: ICD-10-CM

## 2019-05-24 DIAGNOSIS — Z51.81 ENCOUNTER FOR MEDICATION MONITORING: ICD-10-CM

## 2019-05-24 DIAGNOSIS — M54.16 LUMBAR RADICULOPATHY: ICD-10-CM

## 2019-05-24 PROCEDURE — 99213 OFFICE O/P EST LOW 20 MIN: CPT

## 2019-05-24 PROCEDURE — 99213 OFFICE O/P EST LOW 20 MIN: CPT | Performed by: NURSE PRACTITIONER

## 2019-05-24 RX ORDER — NALOXONE HYDROCHLORIDE 4 MG/.1ML
1 SPRAY NASAL PRN
Qty: 1 EACH | Refills: 0 | Status: SHIPPED | OUTPATIENT
Start: 2019-05-24 | End: 2019-07-16 | Stop reason: SDUPTHER

## 2019-05-24 RX ORDER — OXYCODONE AND ACETAMINOPHEN 10; 325 MG/1; MG/1
1 TABLET ORAL EVERY 6 HOURS PRN
Qty: 120 TABLET | Refills: 0 | Status: SHIPPED | OUTPATIENT
Start: 2019-06-14 | End: 2019-05-30 | Stop reason: SDUPTHER

## 2019-05-24 RX ORDER — MORPHINE SULFATE 30 MG/1
30 TABLET, FILM COATED, EXTENDED RELEASE ORAL 2 TIMES DAILY
Qty: 24 TABLET | Refills: 0 | Status: SHIPPED | OUTPATIENT
Start: 2019-06-09 | End: 2019-05-24 | Stop reason: SDUPTHER

## 2019-05-24 RX ORDER — SENNA AND DOCUSATE SODIUM 50; 8.6 MG/1; MG/1
1 TABLET, FILM COATED ORAL DAILY PRN
COMMUNITY
End: 2019-11-18 | Stop reason: ALTCHOICE

## 2019-05-24 RX ORDER — MORPHINE SULFATE 30 MG/1
30 TABLET, FILM COATED, EXTENDED RELEASE ORAL 2 TIMES DAILY
Qty: 60 TABLET | Refills: 0 | Status: SHIPPED | OUTPATIENT
Start: 2019-06-09 | End: 2019-05-30 | Stop reason: SDUPTHER

## 2019-05-24 ASSESSMENT — ENCOUNTER SYMPTOMS
CONSTIPATION: 1
BACK PAIN: 1

## 2019-05-24 NOTE — PROGRESS NOTES
diversion identified: otherwise, see note documentation, Obtaining appropriate analgesic effect of treatment. (CONSUELO Camacho CNP)  Chronic Pain > 80 MEDD: Obtained or confirmed a written medication contract was on file. CONSUELO Camacho CNP)  Review ofOARRS does not show any aberrant prescription behavior. Medication is helping the patient stay active. Patient denies any side effects and reports adequate analgesia. No sign of misuse/abuse. When was thelast UDS:   4-12-19          Was the UDSyes appropriate:      Record/Diagnostics Review:      As above, I did review the imaging    4/12/2019 10:30 AM - Jose Alejandro, Alcides Incoming Lab Results From [facility]     Component Value Ref Range & Units Status Collected Lab   Pain Management Drug Panel Interp, Urine Consistent   Final 04/08/2019  2:45 PM [facility]   (NOTE)   ________________________________________________________________   DRUGS EXPECTED:   OXYCODONE [4/8/19]   MORPHINE [4/8/19]   ________________________________________________________________   CONSISTENT with medications provided:   OXYCODONE : based on oxycodone, noroxycodone, noroxymorphone,   oxymorphone   MORPHINE : based on morphine   ________________________________________________________________   INTERPRETIVE INFORMATION: Pain Mgt [place], Mass Spec/EMIT, Ur,                            Interp   Interpretation depends on accuracy and completeness of patient   medication information submitted by client.     6-Acetylmorphine, Ur Not Detected   Final 04/08/2019  2:45 PM [facility]   7-Aminoclonazepam, Urine Not Detected   Final 04/08/2019  2:45 PM [facility]   Alpha-OH-Alpraz, Urine Not Detected   Final 04/08/2019  2:45 PM [facility]   Alprazolam, Urine Not Detected   Final 04/08/2019  2:45 PM [facility]   Amphetamines, urine Not Detected   Final 04/08/2019  2:45 PM [facility]   Barbiturates, Ur Not Detected   Final 04/08/2019  2:45 PM [facility]   Benzoylecgonine, Ur Not Detected   Final 04/08/2019  2:45 PM [facility]   Buprenorphine Urine  2:45 PM [facility]   PCP, Urine Not Detected   Final 04/08/2019  2:45 PM [facility]   Phentermine, Ur Not Detected   Final 04/08/2019  2:45 PM [facility]   Propoxyphene, Urine Not Detected   Final 04/08/2019  2:45 PM [facility]   Tapentadol-O-Sulfate, Urine Not Detected   Final 04/08/2019  2:45 PM [facility]   Tapentadol, Urine Not Detected   Final 04/08/2019  2:45 PM [facility]   Temazepam, Urine Not Detected   Final 04/08/2019  2:45 PM [facility]   Tramadol, Urine Not Detected   Final 04/08/2019  2:45 PM [facility]   Zolpidem, Urine Not Detected   Final 04/08/2019  2:45 PM [facility]   Drugs Expected, Ur   Corrected 04/08/2019  2:45 PM [address] [place] [address] [id number] 6AM. OXYCODONE [id number] 12PM    CORRECTED ON 04/08 AT 1658: PREVIOUSLY REPORTED AS MORPHINE [id number] 6AM   Creatinine, Ur 175.7  20.0 - 400.0 mg/dL Final 04/08/2019  2:45 PM [facility]   Pain Mgt Drug Panel, Hi Res, Ur See Below   Final 04/08/2019  2:45 PM [facility]   (NOTE)   Methodology: Qualitative Enzyme Immunoassay and Qualitative Liquid   Chromatography-Time of Flight-Mass Spectrometry or Tandem Mass   Spectrometry, Quantitative Spectrophotometry   The absence of expected drug(s) and/or drug metabolite(s) may   indicate non-compliance, inappropriate timing of specimen   collection relative to drug administration, poor drug absorption,   diluted/adulterated urine, or limitations of testing. The   concentration must be greater than or equal to the cutoff to be   reported as present.  If specific drug concentrations are   required, contact the laboratory within two weeks of specimen   collection to request quantification by a second analytical   technique. Interpretive questions should be directed to the   laboratory. Results based on immunoassay detection that do not match clinical   expectations should be   interpreted with caution. Confirmatory testing by mass   spectrometry for immunoassay-based results is available, if   ordered within two weeks of specimen collection.  Additional charges apply. For medical purposes only; not valid for forensic use. This test was developed and its performance characteristics   determined by [facility]. The U.S. Food and Drug   Administration has not approved or cleared this test; however, FDA   clearance or approval is not currently required for clinical use. The results are not intended to be used as the sole means for   clinical diagnosis or patient management decisions. EER Hi Res Interp Ur See Note   Final 04/08/2019  2:45 PM [facility]   (NOTE)   Access [facility] Enhanced Report using either link below:   -Direct access:   [email or web address]   -Enter Username, Password: [email or web address]   Username: [email or web address]   Password: [email or web address]   Performed by [facility],   [address] [address] [place] [phone]   www. Bruna Vora MD, Lab.  Director            Past Medical History:   Diagnosis Date    Achilles tendonitis     right    Asthma     COPD (chronic obstructive pulmonary disease) (HCC)     Cough     clear phlegm    Degenerative lumbar disc     Diabetes mellitus (HCC)     type 2    Failed back syndrome, lumbar     Fast heart beat     Hx of \"8-10 years ago\"    Fibromyalgia     History of bladder cancer     Hypertension     Hypothyroid     Kidney stones     Lumbar radiculopathy     Lumbar spondylolysis     Osteoarthritis     Sleep apnea     uses CPAP machine nightly    Spinal stenosis in cervical region     Wears glasses        Past Surgical History:   Procedure Laterality Date    BACK SURGERY  2010    lumbar fusion    BLADDER TUMOR EXCISION  2005    CARDIOVERSION      \"8-10 years ago\"  to get \"heart into regular rhythm\"    CARPAL TUNNEL RELEASE Bilateral     CATARACT REMOVAL WITH IMPLANT Bilateral 7/22/2014, 8/5/2014    Dilma/Demian    CHOLECYSTECTOMY, LAPAROSCOPIC N/A 8/18/2017    CHOLECYSTECTOMY LAPAROSCOPIC ROBOTIC MULTIPORT performed by Niesha Walker MD at [address]  1/2013    CYSTOSCOPY  11/04/2016    CYSTOSCOPY  01/04/2019    ENDOSCOPIC ULTRASOUND (LOWER) N/A 8/17/2017    ENDOSCOPIC ULTRASOUND performed by Susy Reeder MD at [address] (UPPER)  08/17/2017    A cursory view of the gastric mucosa was normal. The scope was then further advanced through a patent pylorus to the second portion of the duodenum. The Gallbladder was evaluated in bulb position. Thick sludge was noted. The CBD was 7.8 mm with no filling defects. The PD was 5 mm in the HOP. The pancreatic tissue was edematous in body and tail.  KNEE ARTHROSCOPY Left     LUMBAR FUSION      [place] OFFICE/OUTPT VISIT,PROCEDURE ONLY N/A 1/24/2018    SPINAL HARDWARE REMOVAL LUMBAR BONE STIMULATOR performed by Jamarcus Vasquez MD at [address] [place] [address] Bilateral 1989    SKIN BIOPSY Right 11/2015    mole right posterior shoulder    SPINE SURGERY      spinal cord stimulator    SPINE SURGERY  91-4-15    renoval of spinal cord stimulator leads and battery    PASQUALE AND BSO      TUBAL LIGATION      UVULOPALATOPHARYGOPLASTY  2006       Allergies   Allergen Reactions    Azithromycin Shortness Of Breath     Tightness in chest    Adhesive Tape      OK to use paper tape per Patient         Current Outpatient Medications:     sennosides-docusate sodium (SENOKOT-S) 8.6-50 MG tablet, Take 1 tablet by mouth daily, Disp: , Rfl:     [START ON 6/14/2019] oxyCODONE-acetaminophen (PERCOCET)  MG per tablet, Take 1 tablet by mouth every 6 hours as needed for Pain for up to 30 days. , Disp: 120 tablet, Rfl: 0    naloxone (NARCAN) 4 MG/0.1ML LIQD nasal spray, 1 spray by Nasal route as needed (respiratory distress), Disp: 1 each, Rfl: 0    [START ON 6/9/2019] morphine (MS CONTIN) 30 MG extended release tablet, Take 1 tablet by mouth 2 times daily for 30 days. , Disp: 60 tablet, Rfl: 0    beclomethasone (QVAR) 80 MCG/ACT inhaler, Inhale 1 puff into the lungs daily, Disp: , Rfl:     pregabalin (LYRICA) 150 MG capsule, Take 150 mg by mouth 2 times daily. , Disp: , Rfl:     metFORMIN (GLUCOPHAGE) 500 MG tablet, Take 500 mg by mouth 2 times daily (with meals). , Disp: , Rfl:     telmisartan-hydrochlorothiazide (MICARDIS HCT) 40-12.5 MG per tablet, Take 1 tablet by mouth daily. , Disp: , Rfl:     montelukast (SINGULAIR) 10 MG tablet, Take 10 mg by mouth daily , Disp: , Rfl:     levothyroxine (SYNTHROID) 50 MCG tablet, Take 50 mcg by mouth Daily. , Disp: , Rfl:     omeprazole (PRILOSEC) 20 MG capsule, Take 20 mg by mouth every evening., Disp: , Rfl:     aspirin 81 MG tablet, Take 81 mg by mouth daily. , Disp: , Rfl:     Misc. Devices MISC, 1 each by Does not apply route once as needed (mid-low back muscle spasm and pain s/p laminectomy and fusion) Please dispense one theracane device. Massage back as tolerated to relieve muscle spasms. , Disp: 1 Device, Rfl: 0    fluticasone (FLONASE) 50 MCG/ACT nasal spray, fluticasone 50 mcg/actuation nasal spray,suspension, Disp: , Rfl:     diclofenac sodium 1 % GEL, Apply 2 g topically 2 times daily, Disp: 1 Tube, Rfl: 0    Family History   Problem Relation Age of Onset    Heart Failure Mother     Other Father          pneumonia    Alzheimer's Disease Father        Social History     Socioeconomic History    Marital status:      Spouse name: Not on file    Number of children: Not on file    Years of education: Not on file    Highest education level: Not on file   Occupational History    Occupation: retired   Social Needs    Financial resource strain: Not on file    Food insecurity:     Worry: Not on file     Inability: Not on file   Yohobuy needs:     Medical: Not on file     Non-medical: Not on file   Tobacco Use    Smoking status: Never Smoker    Smokeless tobacco: Never Used   Substance and Sexual Activity    Alcohol use: Yes     Comment: rare    Drug use: No    Sexual activity: Not on file   Lifestyle    Physical activity:     Days per week: Not on file     Minutes per session: Not on file    Stress: Not on file   Relationships    Social connections:     Talks on phone: Not on file     Gets together: Not on file     Attends Anabaptist service: Not on file     Active member of club or organization: Not on file     Attends meetings of clubs or organizations: Not on file     Relationship status: Not on file    Intimate partner violence:     Fear of current or ex partner: Not on file     Emotionally abused: Not on file     Physically abused: Not on file     Forced sexual activity: Not on file   Other Topics Concern    Not on file   Social History Narrative    Not on file       Review of Systems:  Review of Systems   Constitution: Negative. HENT: Positive for hearing loss. Eyes:        Glasses   Endocrine: Negative. Hematologic/Lymphatic: Negative. Skin: Positive for itching. Musculoskeletal: Positive for back pain and joint pain. Wrist left, knees   Gastrointestinal: Positive for constipation. Genitourinary: Positive for nocturia. Neurological: Positive for light-headedness, numbness and weakness. Walker         Physical Exam:  BP (!) 121/49   Pulse 68   Temp 97.8 °F (36.6 °C) (Oral)   Resp 16   Ht 4' 10\" (1.473 m)   Wt 151 lb (68.5 kg)   SpO2 98%   BMI 31.56 kg/m²     Physical Exam   Constitutional: She appears well-developed. Neck: Neck supple. Pulmonary/Chest: Effort normal.   Musculoskeletal:        Left wrist: She exhibits tenderness. Left knee: Tenderness found. Medial joint line and lateral joint line tenderness noted. Lumbar back: She exhibits decreased range of motion and tenderness. Lumbar scar   Neurological: Gait abnormal.   Reflex Scores:       Patellar reflexes are 1+ on the right side and 1+ on the left side. Achilles reflexes are 1+ on the right side and 1+ on the left side.   ambulates with a cane   Skin:        Psychiatric: She has a normal mood and affect.  Her speech is normal and behavior is normal. Judgment and thought content normal. Cognition and memory are normal.   Teary eyed         Assessment:    Problem List Items Addressed This Visit     Spinal stenosis in cervical region    Spinal cord stimulator status    Sacroiliitis (HCC)    Relevant Medications    oxyCODONE-acetaminophen (PERCOCET)  MG per tablet (Start on 6/14/2019)    morphine (MS CONTIN) 30 MG extended release tablet (Start on 6/9/2019)    Primary osteoarthritis of right hip    Relevant Medications    oxyCODONE-acetaminophen (PERCOCET)  MG per tablet (Start on 6/14/2019)    morphine (MS CONTIN) 30 MG extended release tablet (Start on 6/9/2019)    Lumbar spondylolysis    Lumbar radiculopathy    Imbalance (Chronic)    Failed back syndrome of lumbar spine    Failed back syndrome    Relevant Medications    oxyCODONE-acetaminophen (PERCOCET)  MG per tablet (Start on 6/14/2019)    morphine (MS CONTIN) 30 MG extended release tablet (Start on 6/9/2019)    Encounter for medication monitoring    DDD (degenerative disc disease), lumbar - Primary    Relevant Medications    oxyCODONE-acetaminophen (PERCOCET)  MG per tablet (Start on 6/14/2019)    morphine (MS CONTIN) 30 MG extended release tablet (Start on 6/9/2019)    Chronic pain associated with significant psychosocial dysfunction    Relevant Medications    oxyCODONE-acetaminophen (PERCOCET)  MG per tablet (Start on 6/14/2019)    morphine (MS CONTIN) 30 MG extended release tablet (Start on 6/9/2019)        Imaging:  EXAMINATION:   MRI OF THE CERVICAL SPINE WITHOUT CONTRAST 4/8/2019 6:45 pm       TECHNIQUE:   Multiplanar multisequence MRI of the cervical spine was performed without the   administration of intravenous contrast.       COMPARISON:   03/29/2018       HISTORY:   ORDERING SYSTEM PROVIDED HISTORY: Spinal stenosis of lumbar region with   neurogenic claudication   TECHNOLOGIST PROVIDED HISTORY:   Ordering Physician Provided Reason for Exam: pt states neck and back pain -   chroninc, no recent injury   Acuity: Chronic   Type of Exam: Ongoing       FINDINGS:   BONES/ALIGNMENT: Vertebral body heights are maintained.  Alignment is normal.   Minimal edematous degenerative changes are present at C3-4, unchanged. Marrow signal is otherwise within normal limits for age.  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       SPINAL CORD: No spinal cord signal abnormality is seen. [place] Fret is no abnormal   fluid collection or mass within the spinal canal.       SOFT TISSUES: There is an incidental subcentimeter cystic left thyroid   nodule.  Paraspinal soft tissues are otherwise unremarkable.       C2-C3: Disc height and signal maintained.  No neural foraminal narrowing or   spinal canal stenosis.       C3-C4: Mild disc height loss and desiccation.  Severe left and mild right   neural foraminal narrowing secondary to uncovertebral and facet hypertrophy. Moderate spinal canal stenosis secondary to disc bulge and ligamentum flavum   hypertrophy.       C4-C5: Mild disc height loss and desiccation.  Severe left and moderate right   neural foraminal narrowing secondary to uncovertebral and facet hypertrophy.    Moderate spinal canal stenosis secondary to disc bulge and ligamentum flavum   hypertrophy.       C5-C6: Mild disc height loss and desiccation.  Severe left neural foraminal   narrowing secondary to uncovertebral and facet hypertrophy.  Mild right   neural foraminal narrowing secondary to uncovertebral hypertrophy.  Mild   spinal canal stenosis secondary to disc bulge.       C6-C7: Mild disc height loss and desiccation.  Mild bilateral neural   foraminal narrowing secondary to uncovertebral hypertrophy.  Mild spinal   canal stenosis secondary to disc bulge.       C7-T1: Disc height and signal maintained.  Mild bilateral neural foraminal   narrowing secondary to uncovertebral and facet hypertrophy.  Mild spinal   canal stenosis secondary to disc bulge.       T1-2: Mild disc height loss and desiccation.  Mild bilateral neural foraminal   narrowing and mild spinal canal stenosis secondary to disc bulge and facet   hypertrophy.       T2-3: Moderate disc height loss and desiccation.  Mild bilateral neural   foraminal narrowing and mild spinal canal stenosis secondary to disc bulge   and facet hypertrophy.           Impression   1. No substantial interval change since 03/29/2018 allowing for differences   in technique. 2. Spinal canal stenoses, moderate at C3-4 and C4-5 and mild at the C5-6   through T2-3 levels. 3. Multilevel neural foraminal narrowing as detailed above and greatest   involving the left C4, C5, and C6 neural foramina where it is severe. 4. Incidental subcentimeter cystic right thyroid nodule, which is likely   benign and for which no further follow-up is warranted.        EXAMINATION:   MRI OF THE THORACIC SPINE WITHOUT CONTRAST  4/8/2019 6:46 pm       TECHNIQUE:   Multiplanar multisequence MRI of the thoracic spine was performed without the   administration of intravenous contrast.       COMPARISON:   CT myelogram 03/22/2019       HISTORY:   ORDERING SYSTEM PROVIDED HISTORY: Thoracic spondylosis with myelopathy   TECHNOLOGIST PROVIDED HISTORY:   Ordering Physician Provided Reason for Exam: pt states chronic neck and mid   back pain,no recent injury   Acuity: Chronic   Type of Exam: Ongoing   Additional signs and symptoms: prev myelogram,ct       FINDINGS:   BONES/ALIGNMENT: Vertebral body heights are maintained.  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  Alignment is normal.  Laminectomies and anterior   and posterior surgical fusion changes partially visualized at L2-3. mild   edematous super endplate changes are present anteriorly at T12-L1.  No   suspect osseous lesion is evident.       SPINAL CORD: There is focal T2 hyperintensity in the spinal cord at the   T10-11 level.  No additional cord signal L3-L4 level.  Intervertebral disc   prostheses at L4-L5 and L2-L3.  Previous posterior decompression L2-L5. There is regional artifact from metallic hardware.  Stable mild grade 1   anterolisthesis L5 on S1.  At least moderate spinal stenosis T12-L1.       SOFT TISSUES: No paraspinal mass is seen.  Similar to the prior MRI there is   a fluid collection/probable seroma in the soft tissues posterior to the   spinal canal extending between the fusion rods from approximately L3-L5. This area is difficult to evaluate due to artifact from hardware.  No   definite contrast is seen in the region to indicate communication with the   thecal sac.       L1-L2: Advanced disc degenerative changes with near complete loss of disc   space height and posterior disc osteophyte complex.  Facet arthropathy and   ligamentum flavum thickening.  Combination results in moderate to severe   spinal stenosis.  Moderate to severe bilateral neural foraminal stenosis.       L2-L3: Postoperative changes.  No significant spinal or neural foraminal   stenosis.       L3-L4: Previous surgery without significant spinal or neural foraminal   stenosis.       L4-L5: Prior surgery with posterior decompression.  No significant spinal or   neural foraminal stenosis visualized.       L5-S1: No significant spinal stenosis.  Mild facet arthropathy.  Previous   posterior decompression.  Mild bilateral neural foraminal stenosis.         Impression   Advanced degenerative changes with wide posterior decompression and fusion   L2-S1 without significant spinal stenosis at the surgical levels.       Moderate to severe spinal stenosis L1-L2.       Similar to and better shown on the previous MRI is a fluid collection in the   posterior soft tissues centered at L4 favored to represent a seroma.               Treatment Plan:  DISCUSSION: Treatment options discussed withpatient and all questions answered to patient's satisfaction.      Possible side effects, risk of tolerance and or dependence and alternative treatments discussed    Obtaining appropriate analgesic effect of treatment   No signs of potential drug abuse or diversion identified    [x] Ill effects of being on chronic pain medications such as sleep disturbances, respiratory depression, hormonal changes, withdrawal symptoms, chronic opioid dependence and tolerance as well as risk of taking opioids with Benzodiazepines and taking opioids along with alcohol,  werediscussed with patient. I had asked the patient to minimize medication use and utilize pain medications only for uncontrolled rest pain or pain with exertional activities. I advised patient not to self-escalate painmedications without consulting with us. At each of patient's future visits we will try to taper pain medications, while adjusting the adjunct medications, and re-evaluating for Physical Therapy to improve spinal andjoint strength. We will continue to have discussions to decrease pain medications as tolerated. Counseled patient on effects their pain medication and /or their medical condition mayhave on their  ability to drive or operate machinery. Instructed not to drive or operate machinery if drowsy     I also discussed with the patient regarding the dangers of combining narcotic pain medication with tranquilizers, alcohol or illegal drugs or taking the medication any way other than prescribed. The dangers were discussed  including respiratory depression and death. Patient was told to tell  all  physicians regarding the medications he is getting from pain clinic. Patient is warned not to take any unprescribed medications over-the-countermedications that can depress breathing . Patient is advised to talk to the pharmacist or physicians if planning to take any over-the-counter medications before  takeing them.  Patient is strongly advised to avoid tranquilizers or  relaxants, illegal drugs  or any medications that can depress breathing  Patient is also advised to tell us if there is any changes in their medications from other physicians.     Addendum patient tells writer she forgot to tell me she is going on vacation 6/29/19 and will not return until 7-13-19, will needs scripts redone, scripts voided, I told her I will e-prescribe next week and do partial and full scripts         TREATMENT OPTIONS:     Return in 4 weeks  Medication Agreement Requirements Met  Continue Opioid therapy  Script written for ms contin, percocet voided, script for narcan  Follow up appointment made

## 2019-05-28 ENCOUNTER — HOSPITAL ENCOUNTER (OUTPATIENT)
Dept: WOMENS IMAGING | Age: 79
Discharge: HOME | End: 2019-05-30
Payer: MEDICARE

## 2019-05-28 DIAGNOSIS — Q76.49 SPINAL DEFORMITY: ICD-10-CM

## 2019-05-28 PROCEDURE — 77080 DXA BONE DENSITY AXIAL: CPT

## 2019-05-30 DIAGNOSIS — M96.1 FAILED BACK SYNDROME: ICD-10-CM

## 2019-05-30 RX ORDER — MORPHINE SULFATE 30 MG/1
30 TABLET, FILM COATED, EXTENDED RELEASE ORAL 2 TIMES DAILY
Qty: 60 TABLET | Refills: 0 | Status: SHIPPED | OUTPATIENT
Start: 2019-06-27 | End: 2019-07-16 | Stop reason: SDUPTHER

## 2019-05-30 RX ORDER — MORPHINE SULFATE 30 MG/1
30 TABLET, FILM COATED, EXTENDED RELEASE ORAL 2 TIMES DAILY
Qty: 36 TABLET | Refills: 0 | Status: SHIPPED | OUTPATIENT
Start: 2019-06-09 | End: 2019-05-30 | Stop reason: SDUPTHER

## 2019-05-30 RX ORDER — OXYCODONE AND ACETAMINOPHEN 10; 325 MG/1; MG/1
1 TABLET ORAL EVERY 6 HOURS PRN
Qty: 120 TABLET | Refills: 0 | Status: SHIPPED | OUTPATIENT
Start: 2019-06-27 | End: 2019-07-16 | Stop reason: SDUPTHER

## 2019-05-30 RX ORDER — OXYCODONE AND ACETAMINOPHEN 10; 325 MG/1; MG/1
1 TABLET ORAL EVERY 6 HOURS PRN
Qty: 52 TABLET | Refills: 0 | Status: SHIPPED | OUTPATIENT
Start: 2019-06-14 | End: 2019-05-30 | Stop reason: SDUPTHER

## 2019-06-03 ENCOUNTER — HOSPITAL ENCOUNTER (OUTPATIENT)
Dept: GENERAL RADIOLOGY | Age: 79
Discharge: HOME | End: 2019-06-05
Payer: MEDICARE

## 2019-06-03 ENCOUNTER — HOSPITAL ENCOUNTER (OUTPATIENT)
Age: 79
Discharge: HOME | End: 2019-06-05
Payer: MEDICARE

## 2019-06-03 DIAGNOSIS — G95.9 CERVICAL MYELOPATHY (HCC): ICD-10-CM

## 2019-06-03 DIAGNOSIS — Q76.49 SPINAL DEFORMITY: ICD-10-CM

## 2019-06-03 PROCEDURE — 72050 X-RAY EXAM NECK SPINE 4/5VWS: CPT

## 2019-06-03 PROCEDURE — 72082 X-RAY EXAM ENTIRE SPI 2/3 VW: CPT

## 2019-06-19 ENCOUNTER — OFFICE VISIT (OUTPATIENT)
Dept: NEUROSURGERY | Age: 79
End: 2019-06-19
Payer: MEDICARE

## 2019-06-19 ENCOUNTER — OFFICE VISIT (OUTPATIENT)
Dept: ORTHOPEDIC SURGERY | Age: 79
End: 2019-06-19
Payer: MEDICARE

## 2019-06-19 VITALS
HEART RATE: 66 BPM | WEIGHT: 156 LBS | BODY MASS INDEX: 32.6 KG/M2 | DIASTOLIC BLOOD PRESSURE: 62 MMHG | SYSTOLIC BLOOD PRESSURE: 103 MMHG

## 2019-06-19 VITALS — HEIGHT: 58 IN | BODY MASS INDEX: 31.07 KG/M2 | WEIGHT: 148 LBS

## 2019-06-19 DIAGNOSIS — M85.859 OSTEOPENIA OF HIP, UNSPECIFIED LATERALITY: ICD-10-CM

## 2019-06-19 DIAGNOSIS — M47.14 THORACIC MYELOPATHY: ICD-10-CM

## 2019-06-19 DIAGNOSIS — M47.12 CERVICAL SPONDYLOSIS WITH MYELOPATHY: Primary | ICD-10-CM

## 2019-06-19 DIAGNOSIS — M25.562 LEFT KNEE PAIN, UNSPECIFIED CHRONICITY: Primary | ICD-10-CM

## 2019-06-19 DIAGNOSIS — R53.81 DEBILITY: ICD-10-CM

## 2019-06-19 PROCEDURE — 99213 OFFICE O/P EST LOW 20 MIN: CPT | Performed by: ORTHOPAEDIC SURGERY

## 2019-06-19 PROCEDURE — 99213 OFFICE O/P EST LOW 20 MIN: CPT | Performed by: NEUROLOGICAL SURGERY

## 2019-06-19 RX ORDER — ERGOCALCIFEROL 1.25 MG/1
50000 CAPSULE ORAL WEEKLY
Qty: 4 CAPSULE | Refills: 3 | Status: SHIPPED | OUTPATIENT
Start: 2019-06-19 | End: 2019-10-21 | Stop reason: SDUPTHER

## 2019-06-19 NOTE — PROGRESS NOTES
Elliot Arceo M.D.            [address] [place] [address]., [address] [facility], [address] [facility]             Dept Phone: [phone]             Dept Fax:  [phone]  Lena Tierney returns today. She is here for bilateral knee pain. She has had problems for years. She did have an injection of steroid approximately 7 months ago she said helped marginally on the left side. Physical examination both knees pretty much identical.  She has had overall valgus disposition of both knees. She has at least attend if not 50 degree flexion contracture on the left side. The right side a little bit less. She has a decent varus and valgus stability. She does have very mild patellofemoral pain and crepitus. Collaterals and cruciates seem to be okay. No hip rotational pain no effusions present. XR taken today:  Xr Knee Bilateral Standing    Result Date: 6/19/2019  Rays taken today reviewed by me show standing AP of both knees as well as laterals of both knees. She has pretty significant valgus gonarthrosis of both knees left greater than right. On the lateral view space patient has severe patellofemoral arthrosis on both sides and significant spur formation especially on the right knee. Pression  Significant valgus gonarthrosis both knees    Plan  Patient is aware of that she likely require arthroplasty at some point in future. She has not tried Visco supplementation. Work to put in for prior authorization for [employer]. Literature was given. We will wait for her PA  Chief Complaint   Patient presents with    Knee Pain     Bilateral         Review of Systems   Constitutional: Negative. HENT: Negative. Respiratory: Negative. Cardiovascular: Negative. Neurological: Negative.     Musculoskeletal:   Knee Pain (Bilateral )          Current Outpatient Medications:     [START ON 6/27/2019] oxyCODONE-acetaminophen (PERCOCET)  MG per tablet, Take 1 tablet by mouth every 6 hours as needed for Pain for up to 30 days. , Disp: 120 tablet, Rfl: 0    [START ON 6/27/2019] morphine (MS CONTIN) 30 MG extended release tablet, Take 1 tablet by mouth 2 times daily for 30 days. , Disp: 60 tablet, Rfl: 0    sennosides-docusate sodium (SENOKOT-S) 8.6-50 MG tablet, Take 1 tablet by mouth daily, Disp: , Rfl:     naloxone (NARCAN) 4 MG/0.1ML LIQD nasal spray, 1 spray by Nasal route as needed (respiratory distress), Disp: 1 each, Rfl: 0    beclomethasone (QVAR) 80 MCG/ACT inhaler, Inhale 1 puff into the lungs daily, Disp: , Rfl:     fluticasone (FLONASE) 50 MCG/ACT nasal spray, fluticasone 50 mcg/actuation nasal spray,suspension, Disp: , Rfl:     diclofenac sodium 1 % GEL, Apply 2 g topically 2 times daily, Disp: 1 Tube, Rfl: 0    pregabalin (LYRICA) 150 MG capsule, Take 150 mg by mouth 2 times daily. , Disp: , Rfl:     metFORMIN (GLUCOPHAGE) 500 MG tablet, Take 500 mg by mouth 2 times daily (with meals). , Disp: , Rfl:     telmisartan-hydrochlorothiazide (MICARDIS HCT) 40-12.5 MG per tablet, Take 1 tablet by mouth daily. , Disp: , Rfl:     montelukast (SINGULAIR) 10 MG tablet, Take 10 mg by mouth daily , Disp: , Rfl:     levothyroxine (SYNTHROID) 50 MCG tablet, Take 50 mcg by mouth Daily. , Disp: , Rfl:     omeprazole (PRILOSEC) 20 MG capsule, Take 20 mg by mouth every evening., Disp: , Rfl:     aspirin 81 MG tablet, Take 81 mg by mouth daily. , Disp: , Rfl:     vitamin D (ERGOCALCIFEROL) 15452 units CAPS capsule, Take 1 capsule by mouth once a week, Disp: 4 capsule, Rfl: 3    Misc. Devices MISC, 1 each by Does not apply route once as needed (mid-low back muscle spasm and pain s/p laminectomy and fusion) Please dispense one theracane device. Massage back as tolerated to relieve muscle spasms. , Disp: 1 Device, Rfl: 0    Allergies   Allergen Reactions    Azithromycin Shortness Of Breath     Tightness in chest    Adhesive Tape      OK to use paper tape per Patient Social History     Socioeconomic History    Marital status:      Spouse name: Not on file    Number of children: Not on file    Years of education: Not on file    Highest education level: Not on file   Occupational History    Occupation: retired   Social Needs    Financial resource strain: Not on file    Food insecurity:     Worry: Not on file     Inability: Not on file   [employer] needs:     Medical: Not on file     Non-medical: Not on file   Tobacco Use    Smoking status: Never Smoker    Smokeless tobacco: Never Used   Substance and Sexual Activity    Alcohol use: Yes     Comment: rare    Drug use: No    Sexual activity: Not on file   Lifestyle    Physical activity:     Days per week: Not on file     Minutes per session: Not on file    Stress: Not on file   Relationships    Social connections:     Talks on phone: Not on file     Gets together: Not on file     Attends Yazdanism service: Not on file     Active member of club or organization: Not on file     Attends meetings of clubs or organizations: Not on file     Relationship status: Not on file    Intimate partner violence:     Fear of current or ex partner: Not on file     Emotionally abused: Not on file     Physically abused: Not on file     Forced sexual activity: Not on file   Other Topics Concern    Not on file   Social History Narrative    Not on file       Past Medical History:   Diagnosis Date    Achilles tendonitis     right    Asthma     [facility]) [facility]     Cough     clear phlegm    Degenerative lumbar disc     Diabetes mellitus ([facility] [address] [facility]     type 2    Failed back syndrome, lumbar     Fast heart beat     Hx of \"8-10 years ago\"    Fibromyalgia     History of bladder cancer     Hypertension     Hypothyroid     Kidney stones     Lumbar radiculopathy     Lumbar spondylolysis     Osteoarthritis     Sleep apnea     uses CPAP machine nightly    Spinal stenosis in cervical chronicity            Ry Claros MD    Please note that this chart was generated using voice recognition Dragon dictation software. Although every effort was made to ensure the accuracy of this automated transcription, some errors in transcription may have occurred.

## 2019-06-19 NOTE — PROGRESS NOTES
[address]  [address] [place] [address]  Dept: [phone]    Patient:  Alphonso Coelho  YOB: 1940  Date: 6/19/19    The patient is a 70 y.o. female who presents today for consult of the following problems:     Chief Complaint   Patient presents with    Extremity Weakness    Fall             HPI:     Alphonso Coelho is a 70 y.o. female on whom neurosurgical consultation was requested by Dean Dasilva DO for management of cervical stenosis myelopathy along with thoracic myelopathy    Patient has significant issues with bilateral upper extremities in addition to the tennis elbow and localized pain she has problems with dropping things dexterity and trouble with jewelry buttoning writing. She notes significant difficulty with dropping things. Over the course of the last few months she has had progressive difficulty with gait ambulation falls urinary retention as well. Kathie Amparo       History:     Past Medical History:   Diagnosis Date    Achilles tendonitis     right    Asthma     [facility]) [facility]     Cough     clear phlegm    Degenerative lumbar disc     Diabetes mellitus ([facility])     type 2    Failed back syndrome, lumbar     Fast heart beat     Hx of \"8-10 years ago\"    Fibromyalgia     History of bladder cancer     Hypertension     Hypothyroid     Kidney stones     Lumbar radiculopathy     Lumbar spondylolysis     Osteoarthritis     Sleep apnea     uses CPAP machine nightly    Spinal stenosis in cervical region     Wears glasses      Past Surgical History:   Procedure Laterality Date    BACK SURGERY  2010    lumbar fusion    BLADDER TUMOR EXCISION  2005    CARDIOVERSION      \"8-10 years ago\"  to get \"heart into regular rhythm\"    CARPAL TUNNEL RELEASE Bilateral     CATARACT REMOVAL WITH IMPLANT Bilateral 7/22/2014, 8/5/2014    Dilma/Demian    CHOLECYSTECTOMY, each 0    beclomethasone (QVAR) 80 MCG/ACT inhaler Inhale 1 puff into the lungs daily      fluticasone (FLONASE) 50 MCG/ACT nasal spray fluticasone 50 mcg/actuation nasal spray,suspension      diclofenac sodium 1 % GEL Apply 2 g topically 2 times daily 1 Tube 0    pregabalin (LYRICA) 150 MG capsule Take 150 mg by mouth 2 times daily.  metFORMIN (GLUCOPHAGE) 500 MG tablet Take 500 mg by mouth 2 times daily (with meals).  telmisartan-hydrochlorothiazide (MICARDIS HCT) 40-12.5 MG per tablet Take 1 tablet by mouth daily.  montelukast (SINGULAIR) 10 MG tablet Take 10 mg by mouth daily       levothyroxine (SYNTHROID) 50 MCG tablet Take 50 mcg by mouth Daily.  omeprazole (PRILOSEC) 20 MG capsule Take 20 mg by mouth every evening.  aspirin 81 MG tablet Take 81 mg by mouth daily.  Misc. Devices MISC 1 each by Does not apply route once as needed (mid-low back muscle spasm and pain s/p laminectomy and fusion) Please dispense one theracane device. Massage back as tolerated to relieve muscle spasms. 1 Device 0     No current facility-administered medications on file prior to visit. Social History     Tobacco Use    Smoking status: Never Smoker    Smokeless tobacco: Never Used   Substance Use Topics    Alcohol use: Yes     Comment: rare    Drug use: No       Allergies   Allergen Reactions    Azithromycin Shortness Of Breath     Tightness in chest    Adhesive Tape      OK to use paper tape per Patient       Review of Systems  Constitutional: Negative for activity change and appetite change. HENT: Negative for ear pain and facial swelling. Eyes: Negative for discharge and itching. Respiratory: Negative for choking and chest tightness. Cardiovascular: Negative for chest pain and leg swelling. Gastrointestinal: Negative for nausea and abdominal pain. Endocrine: Negative for cold intolerance and heat intolerance. Genitourinary: Negative for frequency and flank pain. Musculoskeletal: Negative for myalgias and joint swelling. Skin: Negative for rash and wound. Allergic/Immunologic: Negative for environmental allergies and food allergies. Hematological: Negative for adenopathy. Does not bruise/bleed easily. Psychiatric/Behavioral: Negative for self-injury. The patient is not nervous/anxious. Physical Exam:      /62 (Site: Right Upper Arm, Position: Sitting, Cuff Size: Medium Adult)   Pulse 66   Wt 156 lb (70.8 kg)   BMI 32.60 kg/m²   Estimated body mass index is 32.6 kg/m² as calculated from the following:    Height as of 5/24/19: 4' 10\" (1.473 m). Weight as of this encounter: 156 lb (70.8 kg). General:  Alphonso Coelho is a 70y.o. year old female who appears her stated age. HEENT: Normocephalic atraumatic. Neck supple. Chest: regular rate; pulses equal  Abdomen: Soft nontender nondistended. Normoactive bowel sounds. Ext: DP and PT pulses 2+, good cap refill  Neuro    Mentation  Appropriate affect  Registration intact  Orientation intact  3 item recall intact  Judgement intact to situation    Cranial Nerves:   Pupils equal and reactive to light  Extraocular motion intact  Face and shrug symmetric  Tongue midline  No dysarthria  v1-3 sensation symmetric, masseter tone symmetric  Hearing symmetric and intact to finger rub    Sensation:   Diminished sensation distal left arm nondermatomal    Motor  L deltoid 5/5; R deltoid 5/5  L biceps 5/5; R biceps 5/5  L triceps 5/5; R triceps 5/5  L wrist extension 5/5; R wrist extension 5/5  L intrinsics 5/5; R intrinsics 5/5     L iliopsoas 5/5 , R iliopsoas 5/5  L quadriceps 5/5; R quadriceps 5/5  L Dorsiflexion 5/5; R dorsiflexion 5/5  L Plantarflexion 5/5; R plantarflexion 5/5  L EHL 5/5; R EHL 5/5    Reflexes  3 out of 4 bilateral biceps triceps patellar's.   No Topete's or clonus    hoffmans L: neg  hoffmans R: neg  Clonus L: neg  Clonus R: neg  Babinski L: up  Babinski R; up    In the office upon standing and resting for approximately 4 to 5 minutes unaided by cane the patient had a clearly forward flexed posture upon attempting to straighten her knees    Studies Review:     MRI cervical spine show significant stenosis at C4-5 being the worst and C3-4 as well. MRI thoracic with multilevel stenosis at T9-10 all the way down to the T12-L1 level. Scoliosis x-rays do not show any obvious sagittal plane alignment. Assessment and Plan:      1. Cervical spondylosis with myelopathy    2. Thoracic myelopathy    3. Debility    4. Osteopenia of hip, unspecified laterality          Plan: The patient clearly has signs of cervical stenosis with myelopathy worst at C4-5. I explained to her that considering the multi-level stenosis in the cervical and thoracic spine with a prior history of lumbar fusion I would stage the approach and performed a cervical decompression first in the form of C3-4 C4-5 ACDF. We will then repeat her scoliosis x-rays [place] stand for 5 to 10 minutes to see that she really does not have a sagittal plane deformity. Regarding the thoracolumbar region I believe the multilevel stenosis in addition to prior fusion will require a revision surgery along with decompression which is a much larger procedure. I believe it is in her best interest to stage the procedures and performed a cervical surgery first followed by the thoracolumbar at another time. I will start her on vitamin D at this point for her osteopenia and go forward with a spinal procedure in the neck    Followup: No follow-ups on file. Prescriptions Ordered:  Orders Placed This Encounter   Medications    vitamin D (ERGOCALCIFEROL) 07174 units CAPS capsule     Sig: Take 1 capsule by mouth once a week     Dispense:  4 capsule     Refill:  3        Orders Placed:  No orders of the defined types were placed in this encounter.        Electronically signed by Brian Carlson DO on 6/19/2019 at 10:54 AM    Please note that this chart was generated using voice recognition Dragon dictation software. Although every effort was made to ensure the accuracy of this automated transcription, some errors in transcription may have occurred.

## 2019-06-20 ENCOUNTER — TELEPHONE (OUTPATIENT)
Dept: ORTHOPEDIC SURGERY | Age: 79
End: 2019-06-20

## 2019-06-20 NOTE — TELEPHONE ENCOUNTER
Pt. Called today for continued wrist pain. She was seen in April, and encouraged to wean herself out of her brace and start preforming ROM exercises at that time. Pt. reports she has been doing some exercises, but is still wearing her brace quite a bit, and she is still having pain despite also taking her pain med's. Pt denied any new injury or increase in pain since initial injury. Pt. Was encouraged to try an anti-inflammatory medication in alternation with her Percocet. Pt was also encouraged to ice her wrist and elevate it as needed. Pt. Was also mailed some additional ROM exercises to try. Pt. Was also offered that a message could be put out to Dr. Corey Gonsales, but she declined and wants to try theses suggestions prior to sending a message. Pt was encouraged to call back to the office if she has no improvement and as needed. Pt. Is agreeable to plan.

## 2019-06-27 ENCOUNTER — OFFICE VISIT (OUTPATIENT)
Dept: ORTHOPEDIC SURGERY | Age: 79
End: 2019-06-27
Payer: MEDICARE

## 2019-06-27 DIAGNOSIS — M25.562 BILATERAL CHRONIC KNEE PAIN: ICD-10-CM

## 2019-06-27 DIAGNOSIS — M17.0 PRIMARY OSTEOARTHRITIS OF BOTH KNEES: Primary | ICD-10-CM

## 2019-06-27 DIAGNOSIS — G89.29 BILATERAL CHRONIC KNEE PAIN: ICD-10-CM

## 2019-06-27 DIAGNOSIS — M25.561 BILATERAL CHRONIC KNEE PAIN: ICD-10-CM

## 2019-06-27 PROCEDURE — 20610 DRAIN/INJ JOINT/BURSA W/O US: CPT | Performed by: PHYSICIAN ASSISTANT

## 2019-06-27 PROCEDURE — 99024 POSTOP FOLLOW-UP VISIT: CPT | Performed by: PHYSICIAN ASSISTANT

## 2019-06-27 RX ORDER — LIDOCAINE HYDROCHLORIDE 10 MG/ML
2 INJECTION, SOLUTION EPIDURAL; INFILTRATION; INTRACAUDAL; PERINEURAL ONCE
Status: COMPLETED | OUTPATIENT
Start: 2019-06-27 | End: 2019-06-27

## 2019-06-27 RX ADMIN — LIDOCAINE HYDROCHLORIDE 2 ML: 10 INJECTION, SOLUTION EPIDURAL; INFILTRATION; INTRACAUDAL; PERINEURAL at 09:53

## 2019-06-27 ASSESSMENT — ENCOUNTER SYMPTOMS
EYES NEGATIVE: 1
RHINORRHEA: 0
COUGH: 0
NAUSEA: 0
COLOR CHANGE: 0
VOMITING: 0

## 2019-06-27 NOTE — PROGRESS NOTES
Patient ID: Seble Crocker is a 70 y.o. female. Chief Complaint   Patient presents with    Knee Pain     Bilateral         HPI  Ms. Sherly Mcclendon is a 74-year-old female presents for bilateral Monovisc injections. Prior authorization for these injections has been approved at this time. She has no new complaints states she continues to have the same chronic knee pain that she has been having for months. Denies any recent fever, chills, nausea, vomiting knee joint redness or warmth.     Past Medical History:   Diagnosis Date    Achilles tendonitis     right    Asthma     COPD (chronic obstructive pulmonary disease) (HCC)     Cough     clear phlegm    Degenerative lumbar disc     Diabetes mellitus (HCC)     type 2    Failed back syndrome, lumbar     Fast heart beat     Hx of \"8-10 years ago\"    Fibromyalgia     History of bladder cancer     Hypertension     Hypothyroid     Kidney stones     Lumbar radiculopathy     Lumbar spondylolysis     Osteoarthritis     Sleep apnea     uses CPAP machine nightly    Spinal stenosis in cervical region     Wears glasses      Past Surgical History:   Procedure Laterality Date    BACK SURGERY  2010    lumbar fusion    BLADDER TUMOR EXCISION  2005    CARDIOVERSION      \"8-10 years ago\"  to get \"heart into regular rhythm\"    CARPAL TUNNEL RELEASE Bilateral     CATARACT REMOVAL WITH IMPLANT Bilateral 7/22/2014, 8/5/2014    Dilma/Demian    CHOLECYSTECTOMY, LAPAROSCOPIC N/A 8/18/2017    CHOLECYSTECTOMY LAPAROSCOPIC ROBOTIC MULTIPORT performed by Sienna Guillen MD at [address]  1/2013    CYSTOSCOPY  11/04/2016    CYSTOSCOPY  01/04/2019    ENDOSCOPIC ULTRASOUND (LOWER) N/A 8/17/2017    ENDOSCOPIC ULTRASOUND performed by Chhaya Calvillo MD at [address] (UPPER)  08/17/2017    A cursory view of the gastric mucosa was normal. The scope was then further advanced through a patent pylorus to the second portion of the duodenum. The Gallbladder was evaluated in bulb position. Thick sludge was noted. The CBD was 7.8 mm with no filling defects. The PD was 5 mm in the HOP. The pancreatic tissue was edematous in body and tail.  KNEE ARTHROSCOPY Left     LUMBAR FUSION      ID OFFICE/OUTPT VISIT,PROCEDURE ONLY N/A 1/24/2018    SPINAL HARDWARE REMOVAL LUMBAR BONE STIMULATOR performed by Mario Braga MD at [address] Bilateral 1989    SKIN BIOPSY Right 11/2015    mole right posterior shoulder    SPINE SURGERY      spinal cord stimulator    SPINE SURGERY  91-4-15    renoval of spinal cord stimulator leads and battery    PASQUALE AND BSO      TUBAL LIGATION      UVULOPALATOPHARYGOPLASTY  2006     Family History   Problem Relation Age of Onset    Heart Failure Mother     Other Father          pneumonia    Alzheimer's Disease Father        Review of Systems   Constitutional: Negative for chills and fever. HENT: Negative for congestion and rhinorrhea. Eyes: Negative. Respiratory: Negative for cough. Cardiovascular: Negative for chest pain and leg swelling. Gastrointestinal: Negative for nausea and vomiting. Musculoskeletal: Positive for arthralgias and joint swelling. Skin: Negative for color change and rash. Neurological: Negative for dizziness and numbness. Physical exam:    No new examination is carried out today as patient is here only for Monovisc injections. Assessment:     Encounter Diagnoses   Name Primary?     Primary osteoarthritis of both knees Yes    Bilateral chronic knee pain        Orders Placed This Encounter   Procedures    ID ARTHROCENTESIS ASPIR&/INJ MAJOR JT/BURSA W/O US    ID ARTHROCENTESIS ASPIR&/INJ MAJOR JT/BURSA W/O US      An informed verbal consent for the procedure was obtained and risks including, but not limited to: allergy to medications, injection, bleeding, stiffness of joint, recurrence of symptoms, loss of function, swelling, drainage, irrigation, need for surgery and pseudo-septic inflammation, were explained to the patient. Also, discussed was the potential for further injections, irrigation and debridement and surgery. Alternate means of treatment have also been discussed with the patient. Administrations This Visit     hyaluronate (MONOVISC) injection 88 mg     Admin Date  06/27/2019  09:50 Action  Given Dose  88 mg Route  Intra-articular Site  Knee Right Administered By  Ney Seth RN    Ordering Provider:  DENNY [facility]iles [facility]:  [id number]    Lot#:  [id number]    :  [address] [place] [address]    Patient Supplied?:  No           Admin Date  06/27/2019  09:52 Action  Given Dose  88 mg Route  Intra-articular Site  Knee Left Administered By  Ney Seth RN    Ordering Provider:  DENNY Aviles. [facility]:  [id number]    Lot#:  [id number]    :  [address]     Patient Supplied?:  No          lidocaine PF 1 % injection 2 mL     Admin Date  06/27/2019  09:53 Action  Given Dose  2 mL Route  Intradermal Site  Knee Left Administered By  Ney Seth RN    Ordering Provider:  DENNY Aviles. [facility] 47:  [id number]    Lot#:  [id number]    :  [employer]    Patient Supplied?:  No    Admin Date  06/27/2019  09:53 Action  Given Dose  2 mL Route  Intradermal Site  Knee Right Administered By  Ney Seth RN    Ordering Provider:  DENNY Aviles    [facility]:  [id number]    Lot#:  [id number]    :  [address] [facility]    Patient Supplied?:  No              Under Sterile conditions both knees were prepped using Betadine and alcohol swabs. The knees are injected superficially with 4 cc of plain lidocaine. Subsequently both knees are injected with Monovisc into the joint. She tolerated procedure well. Plan:     Monovisc injection given into both knees today. Patient is instructed to keep activity tonight to a minimum after being injected with viscosupplementation today.  Instructed to ice and elevated area tonight and return to normal activity as tolerated tomorrow. Patient is educated on appropriate length of time to allow for visco to start to take affect. Follow up PRN  1. Primary osteoarthritis of both knees    2. Bilateral chronic knee pain        Lindsey Gilbert    Please note that this chart was generated using voicerecognition Dragon dictation software. Although every effort was made to ensurethe accuracy of this automated transcription, some errors in transcription may haveoccurred.

## 2019-07-16 ENCOUNTER — HOSPITAL ENCOUNTER (OUTPATIENT)
Dept: PAIN MANAGEMENT | Age: 79
Discharge: HOME | End: 2019-07-16
Payer: MEDICARE

## 2019-07-16 VITALS
DIASTOLIC BLOOD PRESSURE: 51 MMHG | HEIGHT: 58 IN | HEART RATE: 66 BPM | TEMPERATURE: 98.5 F | SYSTOLIC BLOOD PRESSURE: 110 MMHG | RESPIRATION RATE: 16 BRPM | WEIGHT: 148 LBS | OXYGEN SATURATION: 99 % | BODY MASS INDEX: 31.07 KG/M2

## 2019-07-16 DIAGNOSIS — M48.02 SPINAL STENOSIS IN CERVICAL REGION: ICD-10-CM

## 2019-07-16 DIAGNOSIS — M54.16 LUMBAR RADICULOPATHY: ICD-10-CM

## 2019-07-16 DIAGNOSIS — M47.816 LUMBAR SPONDYLOSIS: ICD-10-CM

## 2019-07-16 DIAGNOSIS — M96.1 FAILED BACK SYNDROME: ICD-10-CM

## 2019-07-16 DIAGNOSIS — M46.1 SACROILIITIS (HCC): ICD-10-CM

## 2019-07-16 DIAGNOSIS — R26.89 IMBALANCE: ICD-10-CM

## 2019-07-16 DIAGNOSIS — M43.06 LUMBAR SPONDYLOLYSIS: ICD-10-CM

## 2019-07-16 DIAGNOSIS — M96.1 FAILED BACK SYNDROME OF LUMBAR SPINE: ICD-10-CM

## 2019-07-16 DIAGNOSIS — M16.11 PRIMARY OSTEOARTHRITIS OF RIGHT HIP: ICD-10-CM

## 2019-07-16 DIAGNOSIS — Z51.81 ENCOUNTER FOR MEDICATION MONITORING: ICD-10-CM

## 2019-07-16 DIAGNOSIS — Z96.89 SPINAL CORD STIMULATOR STATUS: ICD-10-CM

## 2019-07-16 DIAGNOSIS — M51.36 DDD (DEGENERATIVE DISC DISEASE), LUMBAR: Primary | ICD-10-CM

## 2019-07-16 DIAGNOSIS — G89.4 CHRONIC PAIN ASSOCIATED WITH SIGNIFICANT PSYCHOSOCIAL DYSFUNCTION: ICD-10-CM

## 2019-07-16 PROCEDURE — 99213 OFFICE O/P EST LOW 20 MIN: CPT | Performed by: NURSE PRACTITIONER

## 2019-07-16 PROCEDURE — 99213 OFFICE O/P EST LOW 20 MIN: CPT

## 2019-07-16 RX ORDER — OXYCODONE AND ACETAMINOPHEN 10; 325 MG/1; MG/1
1 TABLET ORAL EVERY 6 HOURS PRN
Qty: 120 TABLET | Refills: 0 | Status: SHIPPED | OUTPATIENT
Start: 2019-07-27 | End: 2019-07-16 | Stop reason: SDUPTHER

## 2019-07-16 RX ORDER — OXYCODONE AND ACETAMINOPHEN 10; 325 MG/1; MG/1
1 TABLET ORAL EVERY 6 HOURS PRN
Qty: 120 TABLET | Refills: 0 | Status: SHIPPED | OUTPATIENT
Start: 2019-07-27 | End: 2019-08-21 | Stop reason: SDUPTHER

## 2019-07-16 RX ORDER — MORPHINE SULFATE 30 MG/1
30 TABLET, FILM COATED, EXTENDED RELEASE ORAL 2 TIMES DAILY
Qty: 60 TABLET | Refills: 0 | Status: SHIPPED | OUTPATIENT
Start: 2019-07-27 | End: 2019-08-21 | Stop reason: SDUPTHER

## 2019-07-16 RX ORDER — NALOXONE HYDROCHLORIDE 4 MG/.1ML
1 SPRAY NASAL PRN
Qty: 1 EACH | Refills: 0 | Status: SHIPPED | OUTPATIENT
Start: 2019-07-16 | End: 2019-11-01 | Stop reason: ALTCHOICE

## 2019-07-16 ASSESSMENT — ENCOUNTER SYMPTOMS
CONSTIPATION: 1
RESPIRATORY NEGATIVE: 1
BACK PAIN: 1

## 2019-07-16 NOTE — PROGRESS NOTES
Josh Echols PROGRESS NOTE      Patient here today to review Medication Agreement    Chief Complaint:lower back      HPI: She c/o low back pain  Which has not changed. She has history of lumbar surgery, She saw Dr Giacomo Feliz and he discussed cervical surgery. She has problems with her balance. It is hard to walk without her cane. She stumbles. She is sleeping well.she has been active the last month,NO Ed visits. She states Dr Hayden did knee injections 2 weeks ago. States no improvement yet. Back Pain   This is a chronic problem. The current episode started more than 1 year ago. The problem occurs constantly. The problem is unchanged. The pain is present in the lumbar spine. The quality of the pain is described as aching (dull). The pain does not radiate. The pain is at a severity of 8/10. The pain is severe. Worse during: am. The symptoms are aggravated by standing (walking). Associated symptoms include numbness. (Left leg) Risk factors include menopause. She has tried analgesics and ice for the symptoms. The treatment provided mild relief. Patient denies any new neurological symptoms. No bowel or bladder incontinence, no weakness, and no falling. Treatment goals:  Functional status: get balance better    Aberrancy:   Any alcoholic beverages   no    Any illegal drugs  no       Analgesia:pain 8      Adverse  Effects :senookot, BM qod    ADL;s :light housework,        Pill count: appropriate Insurance will not cover Narcan #23 ms contin # 48 percocet    Morphine equivalent dose as reported on OARRS: 120   Periodic Controlled Substance Monitoring: Possible medication side effects, risk of tolerance/dependence & alternative treatments discussed., No signs of potential drug abuse or diversion identified. , Assessed functional status., Obtaining appropriate analgesic effect of treatment.  [place], CONSUELO - CNP)  Chronic Pain > 80 MEDD: Co-prescribed Naloxone., Obtained or Detected    Final 04/08/2019  2:45 PM [facility]   Propoxyphene, Urine Not Detected    Final 04/08/2019  2:45 PM [facility]   Tapentadol-O-Sulfate, Urine Not Detected    Final 04/08/2019  2:45 PM [facility]   Tapentadol, Urine Not Detected    Final 04/08/2019  2:45 PM [facility]   Temazepam, Urine Not Detected    Final 04/08/2019  2:45 PM [facility]   Tramadol, Urine Not Detected    Final 04/08/2019  2:45 PM [facility]   Zolpidem, Urine Not Detected    Final 04/08/2019  2:45 PM [facility]   Drugs Expected, Ur     Corrected 04/08/2019  2:45  [facility] [id number] 6AM. OXYCODONE [id number] 12PM    CORRECTED ON 04/08 AT 1658: PREVIOUSLY REPORTED AS MORPHINE [id number] 6AM   Creatinine, Ur 175.7  20.0 - 400.0 mg/dL Final 04/08/2019  2:45 PM [facility]   Pain Mgt Drug Panel, Hi Res, Ur See Below    Final 04/08/2019  2:45 PM [facility]   (NOTE)   Methodology: Qualitative Enzyme Immunoassay and Qualitative Liquid   Chromatography-Time of Flight-Mass Spectrometry or Tandem Mass   Spectrometry, Quantitative Spectrophotometry   The absence of expected drug(s) and/or drug metabolite(s) may   indicate non-compliance, inappropriate timing of specimen   collection relative to drug administration, poor drug absorption,   diluted/adulterated urine, or limitations of testing. The   concentration must be greater than or equal to the cutoff to be   reported as present.  If specific drug concentrations are   required, contact the laboratory within two weeks of specimen   collection to request quantification by a second analytical   technique. Interpretive questions should be directed to the   laboratory. Results based on immunoassay detection that do not match clinical   expectations should be   interpreted with caution. Confirmatory testing by mass   spectrometry for immunoassay-based results is available, if   ordered within two weeks of specimen collection. Additional   charges apply. For medical purposes only; not valid for forensic use.    This test was developed and its performance characteristics   determined by [facility]. The U.S. Food and Drug   Administration has not approved or cleared this test; however, FDA   clearance or approval is not currently required for clinical use. The results are not intended to be used as the sole means for   clinical diagnosis or patient management decisions. EER Hi Res Interp Ur See Note    Final 04/08/2019  2:45 PM [facility]   (NOTE)   Access [facility] Enhanced Report using either link below:   -Direct access:   [email or web address]   -Enter Username, Password: [email or web address]   Username: [email or web address]   Password: [email or web address]   Performed by [facility],   [id number] [place] [id number]   www. Muna Benz MD, Lab.  Director             Past Medical History:   Diagnosis Date    Achilles tendonitis     right    Asthma     COPD (chronic obstructive pulmonary disease) (HCC)     Cough     clear phlegm    Degenerative lumbar disc     Diabetes mellitus (HCC)     type 2    Failed back syndrome, lumbar     Fast heart beat     Hx of \"8-10 years ago\"    Fibromyalgia     History of bladder cancer     Hypertension     Hypothyroid     Kidney stones     Lumbar radiculopathy     Lumbar spondylolysis     Osteoarthritis     Sleep apnea     uses CPAP machine nightly    Spinal stenosis in cervical region     Wears glasses        Past Surgical History:   Procedure Laterality Date    BACK SURGERY  2010    lumbar fusion    BLADDER TUMOR EXCISION  2005    CARDIOVERSION      \"8-10 years ago\"  to get \"heart into regular rhythm\"    CARPAL TUNNEL RELEASE Bilateral     CATARACT REMOVAL WITH IMPLANT Bilateral 7/22/2014, 8/5/2014    Dilma/Demian    CHOLECYSTECTOMY, LAPAROSCOPIC N/A 8/18/2017    CHOLECYSTECTOMY LAPAROSCOPIC ROBOTIC MULTIPORT performed by Juan Mack MD at [address] [place] [address]  1/2013    CYSTOSCOPY  11/04/2016    CYSTOSCOPY  01/04/2019 Not on file   Lifestyle    Physical activity:     Days per week: Not on file     Minutes per session: Not on file    Stress: Not on file   Relationships    Social connections:     Talks on phone: Not on file     Gets together: Not on file     Attends [employer] service: Not on file     Active member of club or organization: Not on file     Attends meetings of clubs or organizations: Not on file     Relationship status: Not on file    Intimate partner violence:     Fear of current or ex partner: Not on file     Emotionally abused: Not on file     Physically abused: Not on file     Forced sexual activity: Not on file   Other Topics Concern    Not on file   Social History Narrative    Not on file       Review of Systems:  Review of Systems   Constitution: Negative. HENT: Negative. Eyes:        Glasses   Cardiovascular: Positive for dyspnea on exertion. Respiratory: Negative. Endocrine: Negative. Hematologic/Lymphatic: Bruises/bleeds easily. Skin: Negative. Musculoskeletal: Positive for back pain and joint pain. Gastrointestinal: Positive for constipation. Genitourinary: Positive for hesitancy and nocturia. Neurological: Positive for loss of balance and numbness. Cane   Psychiatric/Behavioral: Negative. Physical Exam:  BP (!) 110/51   Pulse 66   Temp 98.5 °F (36.9 °C) (Oral)   Resp 16   Ht 4' 10\" (1.473 m)   Wt 148 lb (67.1 kg)   SpO2 99%   BMI 30.93 kg/m²     Physical Exam   Constitutional: She appears well-developed. Neck: Normal range of motion. Neck supple. Pulmonary/Chest: Effort normal.   Musculoskeletal:        Right elbow: Tenderness found. Left wrist: She exhibits tenderness. Right knee: Tenderness found. Medial joint line and lateral joint line tenderness noted. Left knee: Tenderness found. Medial joint line and lateral joint line tenderness noted. Lumbar back: She exhibits decreased range of motion and tenderness.    Lumbar scar   Neurological: She is alert. Gait abnormal.   Reflex Scores:       Patellar reflexes are 1+ on the right side and 1+ on the left side. Achilles reflexes are 1+ on the right side and 0 on the left side. Ambulates with a cane   Skin: Skin is warm, dry and intact. Psychiatric: She has a normal mood and affect. Her speech is normal and behavior is normal. Judgment and thought content normal. Cognition and memory are normal.             Imaging:  EXAMINATION:   MRI OF THE CERVICAL SPINE WITHOUT CONTRAST 4/8/2019 6:45 pm       TECHNIQUE:   Multiplanar multisequence MRI of the cervical spine was performed without the   administration of intravenous contrast.       COMPARISON:   03/29/2018       HISTORY:   ORDERING SYSTEM PROVIDED HISTORY: Spinal stenosis of lumbar region with   neurogenic claudication   TECHNOLOGIST PROVIDED HISTORY:   Ordering Physician Provided Reason for Exam: pt states neck and back pain -   chroninc, no recent injury   Acuity: Chronic   Type of Exam: Ongoing       FINDINGS:   BONES/ALIGNMENT: Vertebral body heights are maintained.  Alignment is normal.   Minimal edematous degenerative changes are present at C3-4, unchanged. Marrow signal is otherwise within normal limits for age.  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       SPINAL CORD: No spinal cord signal abnormality is seen. Balbina Shall is no abnormal   fluid collection or mass within the spinal canal.       SOFT TISSUES: There is an incidental subcentimeter cystic left thyroid   nodule.  Paraspinal soft tissues are otherwise unremarkable.       C2-C3: Disc height and signal maintained.  No neural foraminal narrowing or   spinal canal stenosis.       C3-C4: Mild disc height loss and desiccation.  Severe left and mild right   neural foraminal narrowing secondary to uncovertebral and facet hypertrophy.    Moderate spinal canal stenosis secondary to disc bulge and ligamentum flavum   hypertrophy.       C4-C5: Mild disc height on 7/27/2019)    oxyCODONE-acetaminophen (PERCOCET)  MG per tablet (Start on 7/27/2019)    Chronic pain associated with significant psychosocial dysfunction    Relevant Medications    morphine (MS CONTIN) 30 MG extended release tablet (Start on 7/27/2019)    oxyCODONE-acetaminophen (PERCOCET)  MG per tablet (Start on 7/27/2019)              Treatment Plan:  DISCUSSION: Treatment options discussed withpatient and all questions answered to patient's satisfaction. Possible side effects, risk of tolerance and or dependence and alternative treatments discussed    Obtaining appropriate analgesic effect of treatment   No signs of potential drug abuse or diversion identified    [x] Ill effects of being on chronic pain medications such as sleep disturbances, respiratory depression, hormonal changes, withdrawal symptoms, chronic opioid dependence and tolerance as well as risk of taking opioids with Benzodiazepines and taking opioids along with alcohol,  werediscussed with patient. I had asked the patient to minimize medication use and utilize pain medications only for uncontrolled rest pain or pain with exertional activities. I advised patient not to self-escalate painmedications without consulting with us. At each of patient's future visits we will try to taper pain medications, while adjusting the adjunct medications, and re-evaluating for Physical Therapy to improve spinal andjoint strength. We will continue to have discussions to decrease pain medications as tolerated. Counseled patient on effects their pain medication and /or their medical condition mayhave on their  ability to drive or operate machinery. Instructed not to drive or operate machinery if drowsy     I also discussed with the patient regarding the dangers of combining narcotic pain medication with tranquilizers, alcohol or illegal drugs or taking the medication any way other than prescribed.  The dangers were discussed  including

## 2019-07-18 ENCOUNTER — OFFICE VISIT (OUTPATIENT)
Dept: ORTHOPEDIC SURGERY | Age: 79
End: 2019-07-18
Payer: MEDICARE

## 2019-07-18 DIAGNOSIS — M65.4 DE QUERVAIN'S DISEASE (RADIAL STYLOID TENOSYNOVITIS): ICD-10-CM

## 2019-07-18 DIAGNOSIS — M25.532 LEFT WRIST PAIN: Primary | ICD-10-CM

## 2019-07-18 PROCEDURE — 99213 OFFICE O/P EST LOW 20 MIN: CPT | Performed by: PHYSICIAN ASSISTANT

## 2019-07-18 PROCEDURE — 20600 DRAIN/INJ JOINT/BURSA W/O US: CPT | Performed by: PHYSICIAN ASSISTANT

## 2019-07-18 RX ORDER — LIDOCAINE HYDROCHLORIDE 10 MG/ML
1 INJECTION, SOLUTION EPIDURAL; INFILTRATION; INTRACAUDAL; PERINEURAL ONCE
Status: COMPLETED | OUTPATIENT
Start: 2019-07-18 | End: 2019-07-18

## 2019-07-18 RX ORDER — BETAMETHASONE SODIUM PHOSPHATE AND BETAMETHASONE ACETATE 3; 3 MG/ML; MG/ML
12 INJECTION, SUSPENSION INTRA-ARTICULAR; INTRALESIONAL; INTRAMUSCULAR; SOFT TISSUE ONCE
Status: COMPLETED | OUTPATIENT
Start: 2019-07-18 | End: 2019-07-18

## 2019-07-18 RX ADMIN — BETAMETHASONE SODIUM PHOSPHATE AND BETAMETHASONE ACETATE 12 MG: 3; 3 INJECTION, SUSPENSION INTRA-ARTICULAR; INTRALESIONAL; INTRAMUSCULAR; SOFT TISSUE at 11:29

## 2019-07-18 RX ADMIN — LIDOCAINE HYDROCHLORIDE 1 ML: 10 INJECTION, SOLUTION EPIDURAL; INFILTRATION; INTRACAUDAL; PERINEURAL at 11:30

## 2019-07-18 ASSESSMENT — ENCOUNTER SYMPTOMS
VOMITING: 0
RHINORRHEA: 0
EYES NEGATIVE: 1
COUGH: 0
COLOR CHANGE: 0
NAUSEA: 0

## 2019-07-18 NOTE — PROGRESS NOTES
Patient ID: Jeannie Cerda is a 70 y.o. female. Chief Complaint   Patient presents with    Follow-up     left wrist        HPI  Alicia Thibodeaux presents for evaluation of left wrist pain. She has been seen by Dr. Tevin Patricio back in March where she had negative x-rays of the left wrist however this pain she has today is much different than the pain she had then. She localizes her pain to the base of the left thumb states this pain is made worse with grasping objects as well as putting her thumb in her palm to make a fist.    Eyes any known injury. She has not tried many medications.   She has tried wearing her cock-up wrist splint over this does not seem to help this \"thumb\" pain    Past Medical History:   Diagnosis Date    Achilles tendonitis     right    Asthma     COPD [facility]ch[facility] obstructive [facility] disease[facility] ([facility])     Cough     clear phlegm    Degenerative lumbar disc     Diabetes mellitus (HCC)     type 2    Failed back syndrome, lumbar     Fast heart beat     Hx of \"8-10 years ago\"    Fibromyalgia     History of bladder cancer     Hypertension     Hypothyroid     Kidney stones     Lumbar radiculopathy     Lumbar spondylolysis     Osteoarthritis     Sleep apnea     uses CPAP machine nightly    Spinal stenosis in cervical region     Wears glasses      Past Surgical History:   Procedure Laterality Date    BACK SURGERY  2010    lumbar fusion    BLADDER TUMOR EXCISION  2005    CARDIOVERSION      \"8-10 years ago\"  to get \"heart into regular rhythm\"    CARPAL TUNNEL RELEASE Bilateral     CATARACT REMOVAL WITH IMPLANT Bilateral 7/22/2014, 8/5/2014    Dilma/Demian    CHOLECYSTECTOMY, LAPAROSCOPIC N/A 8/18/2017    CHOLECYSTECTOMY LAPAROSCOPIC ROBOTIC MULTIPORT performed by Perri Ruiz MD at [facility]  1/2013    CYSTOSCOPY  11/04/2016    CYSTOSCOPY  01/04/2019    ENDOSCOPIC ULTRASOUND (LOWER) N/A 8/17/2017    ENDOSCOPIC ULTRASOUND performed by Lilliam Vazquez MD at [facility]    ENDOSCOPIC ULTRASOUND (UPPER)  08/17/2017    A cursory view of the gastric mucosa was normal. The scope was then further advanced through a patent pylorus to the second portion of the duodenum. The Gallbladder was evaluated in bulb position. Thick sludge was noted. The CBD was 7.8 mm with no filling defects. The PD was 5 mm in the HOP. The pancreatic tissue was edematous in body and tail.  KNEE ARTHROSCOPY Left     LUMBAR FUSION      [place] OFFICE/OUTPT VISIT,PROCEDURE ONLY N/A 1/24/2018    SPINAL HARDWARE REMOVAL LUMBAR BONE STIMULATOR performed by Naty Machado MD at [address] [place] [address] Bilateral 1989    SKIN BIOPSY Right 11/2015    mole right posterior shoulder    SPINE SURGERY      spinal cord stimulator    SPINE SURGERY  91-4-15    renoval of spinal cord stimulator leads and battery    PASQUALE AND BSO      TUBAL LIGATION      UVULOPALATOPHARYGOPLASTY  2006     Family History   Problem Relation Age of Onset    Heart Failure Mother     Other Father          pneumonia    Alzheimer's Disease Father        Review of Systems   Constitutional: Negative for chills and fever. HENT: Negative for congestion and rhinorrhea. Eyes: Negative. Respiratory: Negative for cough. Cardiovascular: Negative for chest pain and leg swelling. Gastrointestinal: Negative for nausea and vomiting. Musculoskeletal: Positive for arthralgias. Skin: Negative for color change and rash. Neurological: Negative for dizziness and numbness. Physical Exam   Constitutional: She is oriented to person, place, and time. She appears well-developed and well-nourished. HENT:   Head: Normocephalic and atraumatic. Eyes: EOM are normal.   Neck: Normal range of motion. Neck supple. Cardiovascular: Normal rate. Pulmonary/Chest: Effort normal.   Abdominal: Soft. Musculoskeletal:   Left wrist:  Inspection: Evidence of obvious deformity, swelling erythema or ecchymosis.   Palpation: No tenderness and 1 cc of Celestone is injected into the tendon sheath. A bandage is applied to the injection site. Patient tolerated procedure well. Plan:     Patient was educated on postinjection protocol    Given thumb spica splint today    Orders Placed This Encounter   Procedures    VT ARTHROCENTESIS ASPIR&/INJ SMALL JT/BURSA W/O         [facility]      Please note that this chart was generated using voicerecognition Dragon dictation software. Although every effort was made to ensurethe accuracy of this automated transcription, some errors in transcription may haveoccurred.

## 2019-08-07 ENCOUNTER — OFFICE VISIT (OUTPATIENT)
Dept: FAMILY MEDICINE CLINIC | Age: 79
End: 2019-08-07
Payer: MEDICARE

## 2019-08-07 VITALS
TEMPERATURE: 98.7 F | SYSTOLIC BLOOD PRESSURE: 114 MMHG | OXYGEN SATURATION: 97 % | DIASTOLIC BLOOD PRESSURE: 61 MMHG | HEART RATE: 85 BPM

## 2019-08-07 DIAGNOSIS — B96.89 ACUTE BACTERIAL SINUSITIS: ICD-10-CM

## 2019-08-07 DIAGNOSIS — J01.90 ACUTE BACTERIAL SINUSITIS: ICD-10-CM

## 2019-08-07 DIAGNOSIS — J40 BRONCHITIS: Primary | ICD-10-CM

## 2019-08-07 PROCEDURE — 99213 OFFICE O/P EST LOW 20 MIN: CPT | Performed by: NURSE PRACTITIONER

## 2019-08-07 RX ORDER — BENZONATATE 100 MG/1
100 CAPSULE ORAL 2 TIMES DAILY PRN
Qty: 14 CAPSULE | Refills: 0 | Status: SHIPPED | OUTPATIENT
Start: 2019-08-07 | End: 2019-08-07 | Stop reason: CLARIF

## 2019-08-07 RX ORDER — ALBUTEROL SULFATE 90 UG/1
2 AEROSOL, METERED RESPIRATORY (INHALATION) EVERY 4 HOURS PRN
Qty: 1 INHALER | Refills: 0 | Status: SHIPPED | OUTPATIENT
Start: 2019-08-07 | End: 2019-08-07 | Stop reason: ALTCHOICE

## 2019-08-07 RX ORDER — PREDNISONE 20 MG/1
40 TABLET ORAL DAILY
Qty: 10 TABLET | Refills: 0 | Status: SHIPPED | OUTPATIENT
Start: 2019-08-07 | End: 2019-08-12

## 2019-08-07 RX ORDER — BENZONATATE 100 MG/1
100 CAPSULE ORAL 2 TIMES DAILY PRN
Qty: 14 CAPSULE | Refills: 0 | Status: SHIPPED | OUTPATIENT
Start: 2019-08-07 | End: 2019-08-14

## 2019-08-07 RX ORDER — AMOXICILLIN AND CLAVULANATE POTASSIUM 875; 125 MG/1; MG/1
1 TABLET, FILM COATED ORAL 2 TIMES DAILY
Qty: 20 TABLET | Refills: 0 | Status: SHIPPED | OUTPATIENT
Start: 2019-08-07 | End: 2019-08-17

## 2019-08-07 RX ORDER — ALBUTEROL SULFATE 90 UG/1
2 AEROSOL, METERED RESPIRATORY (INHALATION) 4 TIMES DAILY
Qty: 1 INHALER | Refills: 0 | Status: SHIPPED | OUTPATIENT
Start: 2019-08-07 | End: 2019-08-21

## 2019-08-07 ASSESSMENT — ENCOUNTER SYMPTOMS
EYE ITCHING: 0
SHORTNESS OF BREATH: 1
SORE THROAT: 0
RHINORRHEA: 1
HEMOPTYSIS: 0
COUGH: 1
NAUSEA: 0
EYES NEGATIVE: 1
HEARTBURN: 0
EYE DISCHARGE: 0
WHEEZING: 1
ALLERGIC/IMMUNOLOGIC NEGATIVE: 1
ABDOMINAL PAIN: 0
VOMITING: 0
CHEST TIGHTNESS: 0
DIARRHEA: 0

## 2019-08-07 NOTE — PROGRESS NOTES
TUNNEL RELEASE Bilateral     CATARACT REMOVAL WITH IMPLANT Bilateral 7/22/2014, 8/5/2014    Dilma/Demian    CHOLECYSTECTOMY, LAPAROSCOPIC N/A 8/18/2017    CHOLECYSTECTOMY LAPAROSCOPIC ROBOTIC MULTIPORT performed by Jai Quiroz MD at [address]  1/2013    CYSTOSCOPY  11/04/2016    CYSTOSCOPY  01/04/2019    ENDOSCOPIC ULTRASOUND (LOWER) N/A 8/17/2017    ENDOSCOPIC ULTRASOUND performed by Chel Wheeler MD at [address] (UPPER)  08/17/2017    A cursory view of the gastric mucosa was normal. The scope was then further advanced through a patent pylorus to the second portion of the duodenum. The Gallbladder was evaluated in bulb position. Thick sludge was noted. The CBD was 7.8 mm with no filling defects. The PD was 5 mm in the HOP. The pancreatic tissue was edematous in body and tail.     KNEE ARTHROSCOPY Left     LUMBAR FUSION      [place] OFFICE/OUTPT VISIT,PROCEDURE ONLY N/A 1/24/2018    SPINAL HARDWARE REMOVAL LUMBAR BONE STIMULATOR performed by Selena Parham MD at [address] Bilateral 1989    SKIN BIOPSY Right 11/2015    mole right posterior shoulder    SPINE SURGERY      spinal cord stimulator    SPINE SURGERY  91-4-15    renoval of spinal cord stimulator leads and battery    PASQUALE AND BSO      TUBAL LIGATION      UVULOPALATOPHARYGOPLASTY  2006       Family History   Problem Relation Age of Onset    Heart Failure Mother     Other Father          pneumonia    Alzheimer's Disease Father        Social History     Tobacco Use    Smoking status: Never Smoker    Smokeless tobacco: Never Used   Substance Use Topics    Alcohol use: Yes     Comment: rare      Current Outpatient Medications   Medication Sig Dispense Refill    vitamin D (ERGOCALCIFEROL) 13914 units CAPS capsule Take 1 capsule by mouth once a week 4 capsule 3    sennosides-docusate sodium (SENOKOT-S) 8.6-50 MG tablet Take 1 tablet by mouth daily      beclomethasone (QVAR) 80 this visit. Allergies   Allergen Reactions    Azithromycin Shortness Of Breath     Tightness in chest    Adhesive Tape      OK to use paper tape per Patient           Subjective:      Review of Systems   Constitutional: Positive for chills. Negative for appetite change, fatigue, fever and weight loss. HENT: Positive for postnasal drip and rhinorrhea. Negative for congestion, ear pain and sore throat. Eyes: Negative. Negative for discharge and itching. Respiratory: Positive for cough, shortness of breath and wheezing. Negative for hemoptysis and chest tightness. Cardiovascular: Negative for chest pain, palpitations and leg swelling. Gastrointestinal: Negative for abdominal pain, diarrhea, heartburn, nausea and vomiting. Endocrine: Negative. Genitourinary: Negative for dysuria, frequency and urgency. Musculoskeletal: Negative for myalgias, neck pain and neck stiffness. Skin: Negative for rash. Allergic/Immunologic: Negative. Neurological: Negative for dizziness, weakness, light-headedness and headaches. Hematological: Negative for adenopathy. Psychiatric/Behavioral: Negative for confusion. Objective:      Physical Exam   Constitutional: She is oriented to person, place, and time. Vital signs are normal. She appears well-developed and well-nourished. HENT:   Head: Normocephalic. Right Ear: External ear normal. A middle ear effusion is present. Left Ear: External ear normal. A middle ear effusion is present. Nose: Right sinus exhibits frontal sinus tenderness. Left sinus exhibits frontal sinus tenderness. Mouth/Throat: Oropharynx is clear and moist.   Eyes: Pupils are equal, round, and reactive to light. Conjunctivae and EOM are normal.   Neck: Normal range of motion. Neck supple. Cardiovascular: Normal rate, regular rhythm, S1 normal, S2 normal and normal heart sounds. Exam reveals no gallop and no friction rub. No murmur heard.   Pulmonary/Chest: Effort normal. No stridor. No respiratory distress. She has no decreased breath sounds. She has wheezes in the right upper field and the left upper field. She has no rhonchi. She has no rales. Diminished airflow throughout bilat lungs  Bilat upper lobes with exp wheezing    Abdominal: Soft. Bowel sounds are normal.   Musculoskeletal: Normal range of motion. Lymphadenopathy:     She has cervical adenopathy. Right cervical: Superficial cervical adenopathy present. Left cervical: Superficial cervical adenopathy present. Neurological: She is alert and oriented to person, place, and time. She has normal reflexes. No cranial nerve deficit. Coordination normal.   Skin: Skin is warm and dry. No rash noted. Psychiatric: She has a normal mood and affect. Thought content normal.   Vitals reviewed. /61   Pulse 85   Temp 98.7 °F (37.1 °C)   SpO2 97%     Assessment:       Diagnosis Orders   1. Bronchitis  predniSONE (DELTASONE) 20 MG tablet    amoxicillin-clavulanate (AUGMENTIN) 875-125 MG per tablet    benzonatate (TESSALON) 100 MG capsule     (PROVENTIL HFA) 108 (90 Base) MCG/ACT inhaler           2. Acute bacterial sinusitis  predniSONE (DELTASONE) 20 MG tablet    amoxicillin-clavulanate (AUGMENTIN) 875-125 MG per tablet                     Plan:     1.) Start Augmentin today  2.) Start Tessalon PRN cough   3.) Albuterol PRN SOB and/or wheezing   4.) Start Prednisone   5.) Flonase PRN   6.) RTO PRN   7.) Follow-up with PCP     Problem List     None           Patient given educationalmaterials - see patient instructions. Discussed use, benefit, and side effectsof prescribed medications. All patient questions answered. Pt verbalized understanding. Instructed to continue current medications, diet and exercise. Patient agreedwith treatment plan. Follow up as directed.      Electronically signed by Hetty Nyhan, APRN - CNP on 8/28/2019 at 2:01 PM

## 2019-08-16 ENCOUNTER — OFFICE VISIT (OUTPATIENT)
Dept: FAMILY MEDICINE CLINIC | Age: 79
End: 2019-08-16
Payer: MEDICARE

## 2019-08-16 VITALS
TEMPERATURE: 98.5 F | DIASTOLIC BLOOD PRESSURE: 64 MMHG | SYSTOLIC BLOOD PRESSURE: 102 MMHG | OXYGEN SATURATION: 95 % | HEART RATE: 65 BPM

## 2019-08-16 DIAGNOSIS — J44.1 COPD EXACERBATION (HCC): Primary | ICD-10-CM

## 2019-08-16 DIAGNOSIS — J20.9 ACUTE BRONCHITIS, UNSPECIFIED ORGANISM: ICD-10-CM

## 2019-08-16 DIAGNOSIS — R05.9 COUGH: ICD-10-CM

## 2019-08-16 PROCEDURE — 99214 OFFICE O/P EST MOD 30 MIN: CPT | Performed by: NURSE PRACTITIONER

## 2019-08-16 PROCEDURE — 94640 AIRWAY INHALATION TREATMENT: CPT | Performed by: NURSE PRACTITIONER

## 2019-08-16 RX ORDER — BENZONATATE 100 MG/1
100 CAPSULE ORAL 3 TIMES DAILY PRN
Qty: 20 CAPSULE | Refills: 0 | Status: SHIPPED | OUTPATIENT
Start: 2019-08-16 | End: 2019-10-15

## 2019-08-16 RX ORDER — ALBUTEROL SULFATE 2.5 MG/3ML
2.5 SOLUTION RESPIRATORY (INHALATION) EVERY 4 HOURS PRN
Qty: 50 EACH | Refills: 0 | Status: SHIPPED | OUTPATIENT
Start: 2019-08-16 | End: 2019-10-15

## 2019-08-16 RX ORDER — DOXYCYCLINE HYCLATE 100 MG/1
100 CAPSULE ORAL 2 TIMES DAILY
Qty: 20 CAPSULE | Refills: 0 | Status: SHIPPED | OUTPATIENT
Start: 2019-08-16 | End: 2019-08-21

## 2019-08-16 RX ORDER — PREDNISONE 20 MG/1
TABLET ORAL
Qty: 4 TABLET | Refills: 0 | Status: SHIPPED | OUTPATIENT
Start: 2019-08-16 | End: 2019-08-21

## 2019-08-16 RX ORDER — IPRATROPIUM BROMIDE AND ALBUTEROL SULFATE 2.5; .5 MG/3ML; MG/3ML
1 SOLUTION RESPIRATORY (INHALATION) ONCE
Status: COMPLETED | OUTPATIENT
Start: 2019-08-16 | End: 2019-08-16

## 2019-08-16 RX ADMIN — IPRATROPIUM BROMIDE AND ALBUTEROL SULFATE 1 AMPULE: 2.5; .5 SOLUTION RESPIRATORY (INHALATION) at 14:55

## 2019-08-16 ASSESSMENT — ENCOUNTER SYMPTOMS
WHEEZING: 1
SHORTNESS OF BREATH: 1
TROUBLE SWALLOWING: 0
VOMITING: 0
DIARRHEA: 0
COUGH: 1

## 2019-08-16 ASSESSMENT — PATIENT HEALTH QUESTIONNAIRE - PHQ9: DEPRESSION UNABLE TO ASSESS: PT REFUSES

## 2019-08-16 NOTE — PATIENT INSTRUCTIONS
air from a humidifier, hot shower, or sink filled with hot water. The heat and moisture will thin mucus so you can cough it out. · Do not smoke. Smoking can make bronchitis worse. If you need help quitting, talk to your doctor about stop-smoking programs and medicines. These can increase your chances of quitting for good. When should you call for help? Call 911 anytime you think you may need emergency care. For example, call if:    · You have severe trouble breathing.    Call your doctor now or seek immediate medical care if:    · You have new or worse trouble breathing.     · You cough up dark brown or bloody mucus (sputum).     · You have a new or higher fever.     · You have a new rash.    Watch closely for changes in your health, and be sure to contact your doctor if:    · You cough more deeply or more often, especially if you notice more mucus or a change in the color of your mucus.     · You are not getting better as expected. Where can you learn more? Go to https://PrimeSense.Qualys. org and sign in to your uromovie account. Enter H333 in the SportStylist box to learn more about \"Bronchitis: Care Instructions. \"     If you do not have an account, please click on the \"Sign Up Now\" link. Current as of: September 5, 2018  Content Version: 12.1  © 1503-8919 [employer]. Care instructions adapted under license by [facility] ([facility]). If you have questions about a medical condition or this instruction, always ask your healthcare professional. [place] [address] any warranty or liability for your use of this information. Patient Education        Chronic Obstructive Pulmonary Disease (COPD): Care Instructions  Your Care Instructions    Chronic obstructive pulmonary disease (COPD) is a general term for a group of lung diseases, including emphysema and chronic bronchitis.  People with COPD have decreased airflow in and out of the lungs, which makes it hard to breathe. The airways also can get clogged with thick mucus. Cigarette smoking is a major cause of COPD. Although there is no cure for COPD, you can slow its progress. Following your treatment plan and taking care of yourself can help you feel better and live longer. Follow-up care is a key part of your treatment and safety. Be sure to make and go to all appointments, and call your doctor if you are having problems. It's also a good idea to know your test results and keep a list of the medicines you take. How can you care for yourself at home?   Staying healthy    · Do not smoke. This is the most important step you can take to prevent more damage to your lungs. If you need help quitting, talk to your doctor about stop-smoking programs and medicines. These can increase your chances of quitting for good.     · Avoid colds and flu. Get a pneumococcal vaccine shot. If you have had one before, ask your doctor whether you need a second dose. Get the flu vaccine every fall. If you must be around people with colds or the flu, wash your hands often.     · Avoid secondhand smoke, air pollution, and high altitudes. Also avoid cold, dry air and hot, humid air. Stay at home with your windows closed when air pollution is bad.    Medicines and oxygen therapy    · Take your medicines exactly as prescribed. Call your doctor if you think you are having a problem with your medicine.     · You may be taking medicines such as:  ? Bronchodilators. These help open your airways and make breathing easier. Bronchodilators are either short-acting (work for 6 to 9 hours) or long-acting (work for 24 hours). You inhale most bronchodilators, so they start to act quickly. Always carry your quick-relief inhaler with you in case you need it while you are away from home. ? Corticosteroids (prednisone, budesonide). These reduce airway inflammation. They come in pill or inhaled form.  You must take these medicines every day for them to work well.     yourself can help you feel better and live longer. Follow-up care is a key part of your treatment and safety. Be sure to make and go to all appointments, and call your doctor if you are having problems. It's also a good idea to know your test results and keep a list of the medicines you take. How can you care for yourself at home?   Staying healthy    · Do not smoke. This is the most important step you can take to prevent more damage to your lungs. If you need help quitting, talk to your doctor about stop-smoking programs and medicines. These can increase your chances of quitting for good.     · Avoid colds and flu. Get a pneumococcal vaccine shot. If you have had one before, ask your doctor whether you need a second dose. Get the flu vaccine every fall. If you must be around people with colds or the flu, wash your hands often.     · Avoid secondhand smoke, air pollution, and high altitudes. Also avoid cold, dry air and hot, humid air. Stay at home with your windows closed when air pollution is bad.    Medicines and oxygen therapy    · Take your medicines exactly as prescribed. Call your doctor if you think you are having a problem with your medicine.     · You may be taking medicines such as:  ? Bronchodilators. These help open your airways and make breathing easier. Bronchodilators are either short-acting (work for 6 to 9 hours) or long-acting (work for 24 hours). You inhale most bronchodilators, so they start to act quickly. Always carry your quick-relief inhaler with you in case you need it while you are away from home. ? Corticosteroids (prednisone, budesonide). These reduce airway inflammation. They come in pill or inhaled form. You must take these medicines every day for them to work well.     · A spacer may help you get more inhaled medicine to your lungs. Ask your doctor or pharmacist if a spacer is right for you.  If it is, ask how to use it properly.     · Do not take any vitamins, over-the-counter

## 2019-08-16 NOTE — PROGRESS NOTES
mg by mouth daily.  naloxone (NARCAN) 4 MG/0.1ML LIQD nasal spray 1 spray by Nasal route as needed (respiratory distress) (Patient not taking: Reported on 8/7/2019) 1 each 0    Misc. Devices MISC 1 each by Does not apply route once as needed (mid-low back muscle spasm and pain s/p laminectomy and fusion) Please dispense one theracane device. Massage back as tolerated to relieve muscle spasms. 1 Device 0     No current facility-administered medications for this visit. Allergies   Allergen Reactions    Azithromycin Shortness Of Breath     Tightness in chest    Adhesive Tape      OK to use paper tape per Patient       Subjective:     Review of Systems   Constitutional: Negative for fever. HENT: Positive for congestion. Negative for trouble swallowing. Respiratory: Positive for cough, shortness of breath and wheezing. Gastrointestinal: Negative for diarrhea and vomiting. Genitourinary: Negative for difficulty urinating. Skin: Negative for rash. Neurological: Negative for dizziness. All other systems reviewed and are negative. Objective:      Physical Exam   Constitutional: She is oriented to person, place, and time. She appears well-developed and well-nourished. No distress. HENT:   Head: Normocephalic and atraumatic. Mouth/Throat: Oropharynx is clear and moist.   Eyes: Pupils are equal, round, and reactive to light. Right eye exhibits no discharge. Left eye exhibits no discharge. No scleral icterus. Neck: Normal range of motion. Neck supple. Cardiovascular: Normal rate, regular rhythm and normal heart sounds. No murmur heard. Pulmonary/Chest: Effort normal. No stridor. No respiratory distress. She has wheezes (mild amount of exp posterior wheezes noted). She has no rales. Abdominal: Soft. Bowel sounds are normal. There is no tenderness. Musculoskeletal: Normal range of motion. She exhibits no edema.    Neg homans   No calf swelling   Neurological: She is alert and to er for chest pain, shortness of breath, fever, weakness, feeling worse  Follow up with Dr. Isaura Caputo next week- need to recheck also follow up for chest xray results  Chest xray shows generalized interstitial thickening, borderline cardiomegaly      Return for follow up with primary care in 2-3 days, go to the ER for worsening, change or concern. Orders Placed This Encounter   Medications    ipratropium-albuterol (DUONEB) nebulizer solution 1 ampule    albuterol (PROVENTIL) (2.5 MG/3ML) 0.083% nebulizer solution     Sig: Take 3 mLs by nebulization every 4 hours as needed for Wheezing     Dispense:  50 each     Refill:  0    predniSONE (DELTASONE) 20 MG tablet     Si tabs po x 1 day, 1 tab po x 3 days, half tab po x 2 days     Dispense:  4 tablet     Refill:  0    benzonatate (TESSALON PERLES) 100 MG capsule     Sig: Take 1 capsule by mouth 3 times daily as needed for Cough     Dispense:  20 capsule     Refill:  0    doxycycline hyclate (VIBRAMYCIN) 100 MG capsule     Sig: Take 1 capsule by mouth 2 times daily for 10 days     Dispense:  20 capsule     Refill:  0         Patient given educational materials - see patientinstructions. Discussed use, benefit, and side effects of prescribed medications. All patient questions answered. Pt voiced understanding.     Electronically signed by CONSUELO Malin 2019 at 4:17 PM

## 2019-08-21 ENCOUNTER — HOSPITAL ENCOUNTER (OUTPATIENT)
Dept: PAIN MANAGEMENT | Age: 79
Discharge: HOME | End: 2019-08-21
Payer: MEDICARE

## 2019-08-21 VITALS
DIASTOLIC BLOOD PRESSURE: 54 MMHG | HEIGHT: 58 IN | HEART RATE: 84 BPM | BODY MASS INDEX: 30.02 KG/M2 | OXYGEN SATURATION: 99 % | RESPIRATION RATE: 16 BRPM | SYSTOLIC BLOOD PRESSURE: 123 MMHG | WEIGHT: 143 LBS | TEMPERATURE: 99.3 F

## 2019-08-21 DIAGNOSIS — G89.4 CHRONIC PAIN ASSOCIATED WITH SIGNIFICANT PSYCHOSOCIAL DYSFUNCTION: ICD-10-CM

## 2019-08-21 DIAGNOSIS — M43.06 LUMBAR SPONDYLOLYSIS: ICD-10-CM

## 2019-08-21 DIAGNOSIS — M96.1 FAILED BACK SYNDROME: ICD-10-CM

## 2019-08-21 DIAGNOSIS — Z96.89 SPINAL CORD STIMULATOR STATUS: ICD-10-CM

## 2019-08-21 DIAGNOSIS — M16.11 PRIMARY OSTEOARTHRITIS OF RIGHT HIP: ICD-10-CM

## 2019-08-21 DIAGNOSIS — M54.16 LUMBAR RADICULOPATHY: ICD-10-CM

## 2019-08-21 DIAGNOSIS — M48.02 SPINAL STENOSIS IN CERVICAL REGION: ICD-10-CM

## 2019-08-21 DIAGNOSIS — M46.1 SACROILIITIS (HCC): ICD-10-CM

## 2019-08-21 DIAGNOSIS — M51.36 DDD (DEGENERATIVE DISC DISEASE), LUMBAR: Primary | ICD-10-CM

## 2019-08-21 DIAGNOSIS — M50.30 DDD (DEGENERATIVE DISC DISEASE), CERVICAL: ICD-10-CM

## 2019-08-21 DIAGNOSIS — M96.1 FAILED BACK SYNDROME OF LUMBAR SPINE: ICD-10-CM

## 2019-08-21 DIAGNOSIS — Z51.81 ENCOUNTER FOR MEDICATION MONITORING: ICD-10-CM

## 2019-08-21 DIAGNOSIS — M47.816 LUMBAR SPONDYLOSIS: ICD-10-CM

## 2019-08-21 DIAGNOSIS — R26.89 IMBALANCE: ICD-10-CM

## 2019-08-21 DIAGNOSIS — M54.12 CERVICAL RADICULAR PAIN: ICD-10-CM

## 2019-08-21 PROCEDURE — 99213 OFFICE O/P EST LOW 20 MIN: CPT

## 2019-08-21 PROCEDURE — 99213 OFFICE O/P EST LOW 20 MIN: CPT | Performed by: NURSE PRACTITIONER

## 2019-08-21 RX ORDER — IPRATROPIUM/ALBUTEROL SULFATE 20-100 MCG
MIST INHALER (GRAM) INHALATION
COMMUNITY
Start: 2019-08-20 | End: 2019-10-15

## 2019-08-21 RX ORDER — PREGABALIN 150 MG/1
150 CAPSULE ORAL 2 TIMES DAILY
COMMUNITY

## 2019-08-21 RX ORDER — MORPHINE SULFATE 30 MG/1
30 TABLET, FILM COATED, EXTENDED RELEASE ORAL 2 TIMES DAILY
Qty: 60 TABLET | Refills: 0 | Status: SHIPPED | OUTPATIENT
Start: 2019-08-26 | End: 2019-09-17 | Stop reason: SDUPTHER

## 2019-08-21 RX ORDER — OXYCODONE AND ACETAMINOPHEN 10; 325 MG/1; MG/1
1 TABLET ORAL EVERY 6 HOURS PRN
Qty: 120 TABLET | Refills: 0 | Status: SHIPPED | OUTPATIENT
Start: 2019-08-26 | End: 2019-09-17 | Stop reason: SDUPTHER

## 2019-08-21 RX ORDER — FLUTICASONE PROPIONATE 50 MCG
1 SPRAY, SUSPENSION (ML) NASAL DAILY PRN
COMMUNITY
End: 2019-11-18

## 2019-08-21 RX ORDER — DOXYCYCLINE HYCLATE 100 MG
TABLET ORAL
COMMUNITY
End: 2019-10-01 | Stop reason: ALTCHOICE

## 2019-08-21 RX ORDER — MONTELUKAST SODIUM 10 MG/1
10 TABLET ORAL NIGHTLY
COMMUNITY

## 2019-08-21 RX ORDER — PREDNISONE 20 MG/1
TABLET ORAL
COMMUNITY
End: 2019-10-15

## 2019-08-21 RX ORDER — BENZONATATE 100 MG/1
CAPSULE ORAL
COMMUNITY
End: 2019-08-21

## 2019-08-21 ASSESSMENT — ENCOUNTER SYMPTOMS
BACK PAIN: 1
COUGH: 1
CONSTIPATION: 1

## 2019-08-21 NOTE — DISCHARGE INSTR - COC
Continuity of Care Form    Patient Name: Michael Castillo   :  1940  MRN:  [id number]    Admit date:  2019  Discharge date:  ***    Code Status Order: Prior   Advance Directives:     Admitting Physician:  No admitting provider for patient encounter. PCP: Pramod Schuler DO    Discharging Nurse: [facility] Unit/Room#: No information available for this encounter. Discharging Unit Phone Number: ***    Emergency Contact:   Extended Emergency Contact Information  Primary Emergency Contact: HarleyAlexandrea  Address: [address], [address] Phone: [phone]  Work Phone: [phone]  Mobile Phone: [phone]  Relation: Child  Hearing or visual needs: None  Other needs: None  Preferred language: English   needed? No  Secondary Emergency Contact: Yung Welch  Address: [address], [address] Phone: [phone]  Relation: Spouse  Hearing or visual needs: None  Other needs: None  Preferred language: English   needed?  No    Past Surgical History:  Past Surgical History:   Procedure Laterality Date    BACK SURGERY  2010    lumbar fusion    BLADDER TUMOR EXCISION  2005    CARDIOVERSION      \"8-10 years ago\"  to get \"heart into regular rhythm\"    CARPAL TUNNEL RELEASE Bilateral     CATARACT REMOVAL WITH IMPLANT Bilateral 2014, 2014    [facility]    CHOLECYSTECTOMY, LAPAROSCOPIC N/A 2017    CHOLECYSTECTOMY LAPAROSCOPIC ROBOTIC MULTIPORT performed by Yasmine Wan MD at [facility]  2013    CYSTOSCOPY  2016    CYSTOSCOPY  2019    ENDOSCOPIC ULTRASOUND (LOWER) N/A 2017    ENDOSCOPIC ULTRASOUND performed by Diana Bridges MD at [address] (UPPER)  2017    A cursory view of the gastric mucosa was normal. The scope was then further advanced through a patent pylorus to the second vitals taken for this visit. Last documented pain score (0-10 scale):    Last Weight:   Wt Readings from Last 1 Encounters:   19 148 lb (67.1 kg)     Mental Status:  {IP PT MENTAL STATUS:}    IV Access:  { JACINDA IV ACCESS:631332147}    Nursing Mobility/ADLs:  Walking   {CHP DME BBQY:237372731}  Transfer  {P DME NILE:994355209}  Bathing  {CHP DME OHXM:325103124}  Dressing  {CHP DME YBQN:220948032}  Toileting  {CHP DME ALUT:518885774}  Feeding  {CHP DME RPQF:097123608}  Med Admin  {P DME NIYE:127341628}  Med Delivery   { JACINDA MED Delivery:514154205}    Wound Care Documentation and Therapy:  Incision 17 Abdomen (Active)   Number of days: 732       Incision 18 Back (Active)   Number of days: 573       Puncture 19 Back Lower;Medial (Active)   Number of days: 151        Elimination:  Continence:   · Bowel: {YES / AN:12467}  · Bladder: {YES / NP:56283}  Urinary Catheter: {Urinary Catheter:294867310}   Colostomy/Ileostomy/Ileal Conduit: {YES / XM:53908}       Date of Last BM: ***  No intake or output data in the 24 hours ending 19 1110  No intake/output data recorded.     Safety Concerns:     [facility] Safety Concerns:441969112}    Impairments/Disabilities:      [facility] Impairments/Disabilities:223474012}    Nutrition Therapy:  Current Nutrition Therapy:   [facility] Diet List:539913767}    Routes of Feeding: {P DME Other Feedings:936836988}  Liquids: {Slp liquid thickness:96608}  Daily Fluid Restriction: {CHP DME Yes amt example:103722280}  Last Modified Barium Swallow with Video (Video Swallowing Test): {Done Not Done PHXB:188820478}    Treatments at the Time of Hospital Discharge:   Respiratory Treatments: ***  Oxygen Therapy:  {Therapy; copd oxygen:33869}  Ventilator:    { CC Vent FIE}    Rehab Therapies: {THERAPEUTIC INTERVENTION:5642057411}  Weight Bearing Status/Restrictions: [address] [facility] Weight Bearin}  Other Medical Equipment (for information only, NOT a DME order):  {EQUIPMENT:934239577}  Other Treatments: ***    Patient's personal belongings (please select all that are sent with patient):  {CHP DME Belongings:787717199}    RN SIGNATURE:  {Esignature:657519715}    CASE MANAGEMENT/SOCIAL WORK SECTION    Inpatient Status Date: ***    Readmission Risk Assessment Score:  Readmission Risk              Risk of Unplanned Readmission:        0           Discharging to Facility/ Agency   · Name:   · Address:  · Phone:  · Fax:    Dialysis Facility (if applicable)   · Name:  · Address:  · Dialysis Schedule:  · Phone:  · Fax:    / signature: {Esignature:600127662}    PHYSICIAN SECTION    Prognosis: {Prognosis:9936482250}    Condition at Discharge: [address] [place] [address] Patient Condition:181888061}    Rehab Potential (if transferring to Rehab): {Prognosis:1550596039}    Recommended Labs or Other Treatments After Discharge: ***    Physician Certification: I certify the above information and transfer of Som Lloyd  is necessary for the continuing treatment of the diagnosis listed and that she requires {Admit to Appropriate Level of Care:03953} for {GREATER/LESS:640716641} 30 days.      Update Admission H&P: {CHP DME Changes in UGCYR:609693220}    PHYSICIAN SIGNATURE:  {Esignature:258575613}

## 2019-08-26 ENCOUNTER — TELEPHONE (OUTPATIENT)
Dept: NEUROLOGY | Age: 79
End: 2019-08-26

## 2019-08-26 NOTE — TELEPHONE ENCOUNTER
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 I explained to her that considering the multi-level stenosis in the cervical and thoracic spine with a prior history of lumbar fusion I would stage the approach and performed a cervical decompression first in the form of C3-4 C4-5 ACDF. Laura Christianson is this patient scheduled.

## 2019-08-27 NOTE — TELEPHONE ENCOUNTER
Dimitri Roll see what she says -- may have been one of those that wanted to dwell over it and call to schedule.  We discussed c spine surgery with ongoing workup for the lumbar

## 2019-09-17 ENCOUNTER — HOSPITAL ENCOUNTER (OUTPATIENT)
Dept: PAIN MANAGEMENT | Age: 79
Discharge: HOME | End: 2019-09-17
Payer: MEDICARE

## 2019-09-17 VITALS
SYSTOLIC BLOOD PRESSURE: 119 MMHG | HEIGHT: 58 IN | BODY MASS INDEX: 30.64 KG/M2 | DIASTOLIC BLOOD PRESSURE: 47 MMHG | HEART RATE: 74 BPM | RESPIRATION RATE: 16 BRPM | WEIGHT: 146 LBS

## 2019-09-17 DIAGNOSIS — M54.12 CERVICAL RADICULAR PAIN: ICD-10-CM

## 2019-09-17 DIAGNOSIS — M43.06 LUMBAR SPONDYLOLYSIS: ICD-10-CM

## 2019-09-17 DIAGNOSIS — M54.16 LUMBAR RADICULOPATHY: ICD-10-CM

## 2019-09-17 DIAGNOSIS — Z51.81 ENCOUNTER FOR MEDICATION MONITORING: ICD-10-CM

## 2019-09-17 DIAGNOSIS — M96.1 FAILED BACK SYNDROME: ICD-10-CM

## 2019-09-17 DIAGNOSIS — M48.02 SPINAL STENOSIS IN CERVICAL REGION: ICD-10-CM

## 2019-09-17 DIAGNOSIS — M50.30 DDD (DEGENERATIVE DISC DISEASE), CERVICAL: ICD-10-CM

## 2019-09-17 DIAGNOSIS — M96.1 FAILED BACK SYNDROME OF LUMBAR SPINE: ICD-10-CM

## 2019-09-17 DIAGNOSIS — M51.36 DDD (DEGENERATIVE DISC DISEASE), LUMBAR: Primary | ICD-10-CM

## 2019-09-17 PROCEDURE — 99213 OFFICE O/P EST LOW 20 MIN: CPT

## 2019-09-17 PROCEDURE — 99213 OFFICE O/P EST LOW 20 MIN: CPT | Performed by: NURSE PRACTITIONER

## 2019-09-17 RX ORDER — MORPHINE SULFATE 30 MG/1
30 TABLET, FILM COATED, EXTENDED RELEASE ORAL 2 TIMES DAILY
Qty: 60 TABLET | Refills: 0 | Status: SHIPPED | OUTPATIENT
Start: 2019-09-26 | End: 2019-10-15 | Stop reason: SDUPTHER

## 2019-09-17 RX ORDER — OXYCODONE AND ACETAMINOPHEN 10; 325 MG/1; MG/1
1 TABLET ORAL EVERY 6 HOURS PRN
Qty: 120 TABLET | Refills: 0 | Status: SHIPPED | OUTPATIENT
Start: 2019-09-26 | End: 2019-10-15 | Stop reason: SDUPTHER

## 2019-09-17 ASSESSMENT — ENCOUNTER SYMPTOMS
COUGH: 0
SHORTNESS OF BREATH: 0
CONSTIPATION: 0
BACK PAIN: 1

## 2019-09-17 NOTE — PROGRESS NOTES
Reported on 8/7/2019), Disp: 1 each, Rfl: 0    vitamin D (ERGOCALCIFEROL) 78709 units CAPS capsule, Take 1 capsule by mouth once a week, Disp: 4 capsule, Rfl: 3    sennosides-docusate sodium (SENOKOT-S) 8.6-50 MG tablet, Take 1 tablet by mouth daily, Disp: , Rfl:     beclomethasone (QVAR) 80 MCG/ACT inhaler, Inhale 1 puff into the lungs daily, Disp: , Rfl:     Misc. Devices MISC, 1 each by Does not apply route once as needed (mid-low back muscle spasm and pain s/p laminectomy and fusion) Please dispense one theracane device. Massage back as tolerated to relieve muscle spasms. , Disp: 1 Device, Rfl: 0    diclofenac sodium 1 % GEL, Apply 2 g topically 2 times daily, Disp: 1 Tube, Rfl: 0    metFORMIN (GLUCOPHAGE) 500 MG tablet, Take 500 mg by mouth 2 times daily (with meals). , Disp: , Rfl:     telmisartan-hydrochlorothiazide (MICARDIS HCT) 40-12.5 MG per tablet, Take 1 tablet by mouth daily. , Disp: , Rfl:     levothyroxine (SYNTHROID) 50 MCG tablet, Take 50 mcg by mouth Daily. , Disp: , Rfl:     omeprazole (PRILOSEC) 20 MG capsule, Take 20 mg by mouth every evening., Disp: , Rfl:     aspirin 81 MG tablet, Take 81 mg by mouth daily. , Disp: , Rfl:     Family History   Problem Relation Age of Onset    Heart Failure Mother     Other Father          pneumonia    Alzheimer's Disease Father        Social History     Socioeconomic History    Marital status:      Spouse name: Not on file    Number of children: Not on file    Years of education: Not on file    Highest education level: Not on file   Occupational History    Occupation: retired   Social Needs    Financial resource strain: Not on file    Food insecurity:     Worry: Not on file     Inability: Not on file   [employer] needs:     Medical: Not on file     Non-medical: Not on file   Tobacco Use    Smoking status: Never Smoker    Smokeless tobacco: Never Used   Substance and Sexual Activity    Alcohol use: Yes     Comment: rare    Drug

## 2019-10-01 ENCOUNTER — OFFICE VISIT (OUTPATIENT)
Dept: PODIATRY | Age: 79
End: 2019-10-01
Payer: MEDICARE

## 2019-10-01 VITALS — HEIGHT: 58 IN | WEIGHT: 148 LBS | BODY MASS INDEX: 31.07 KG/M2

## 2019-10-01 DIAGNOSIS — M79.672 PAIN IN LEFT FOOT: ICD-10-CM

## 2019-10-01 DIAGNOSIS — M77.42 METATARSALGIA OF LEFT FOOT: Primary | ICD-10-CM

## 2019-10-01 PROCEDURE — 99213 OFFICE O/P EST LOW 20 MIN: CPT | Performed by: PODIATRIST

## 2019-10-01 ASSESSMENT — ENCOUNTER SYMPTOMS
NAUSEA: 0
SHORTNESS OF BREATH: 0
COLOR CHANGE: 0
BACK PAIN: 0
DIARRHEA: 0

## 2019-10-11 ENCOUNTER — OFFICE VISIT (OUTPATIENT)
Dept: ORTHOPEDIC SURGERY | Age: 79
End: 2019-10-11
Payer: MEDICARE

## 2019-10-11 VITALS — HEIGHT: 58 IN | WEIGHT: 146 LBS | BODY MASS INDEX: 30.64 KG/M2

## 2019-10-11 DIAGNOSIS — M25.521 RIGHT ELBOW PAIN: Primary | ICD-10-CM

## 2019-10-11 PROCEDURE — 20605 DRAIN/INJ JOINT/BURSA W/O US: CPT | Performed by: ORTHOPAEDIC SURGERY

## 2019-10-11 PROCEDURE — 99213 OFFICE O/P EST LOW 20 MIN: CPT | Performed by: ORTHOPAEDIC SURGERY

## 2019-10-11 RX ORDER — BUPIVACAINE HYDROCHLORIDE 5 MG/ML
2 INJECTION, SOLUTION PERINEURAL ONCE
Status: COMPLETED | OUTPATIENT
Start: 2019-10-11 | End: 2019-10-11

## 2019-10-11 RX ORDER — LIDOCAINE HYDROCHLORIDE 10 MG/ML
2 INJECTION, SOLUTION EPIDURAL; INFILTRATION; INTRACAUDAL; PERINEURAL ONCE
Status: COMPLETED | OUTPATIENT
Start: 2019-10-11 | End: 2019-10-11

## 2019-10-11 RX ORDER — BETAMETHASONE SODIUM PHOSPHATE AND BETAMETHASONE ACETATE 3; 3 MG/ML; MG/ML
6 INJECTION, SUSPENSION INTRA-ARTICULAR; INTRALESIONAL; INTRAMUSCULAR; SOFT TISSUE ONCE
Status: COMPLETED | OUTPATIENT
Start: 2019-10-11 | End: 2019-10-11

## 2019-10-11 RX ADMIN — BETAMETHASONE SODIUM PHOSPHATE AND BETAMETHASONE ACETATE 6 MG: 3; 3 INJECTION, SUSPENSION INTRA-ARTICULAR; INTRALESIONAL; INTRAMUSCULAR; SOFT TISSUE at 11:15

## 2019-10-11 RX ADMIN — LIDOCAINE HYDROCHLORIDE 2 ML: 10 INJECTION, SOLUTION EPIDURAL; INFILTRATION; INTRACAUDAL; PERINEURAL at 11:17

## 2019-10-11 RX ADMIN — BUPIVACAINE HYDROCHLORIDE 10 MG: 5 INJECTION, SOLUTION PERINEURAL at 11:16

## 2019-10-15 ENCOUNTER — HOSPITAL ENCOUNTER (OUTPATIENT)
Dept: PAIN MANAGEMENT | Age: 79
Discharge: HOME | End: 2019-10-15
Payer: MEDICARE

## 2019-10-15 VITALS
OXYGEN SATURATION: 96 % | DIASTOLIC BLOOD PRESSURE: 53 MMHG | RESPIRATION RATE: 16 BRPM | SYSTOLIC BLOOD PRESSURE: 111 MMHG | WEIGHT: 146 LBS | HEART RATE: 70 BPM | HEIGHT: 58 IN | TEMPERATURE: 98.2 F | BODY MASS INDEX: 30.64 KG/M2

## 2019-10-15 DIAGNOSIS — M96.1 FAILED BACK SYNDROME OF LUMBAR SPINE: ICD-10-CM

## 2019-10-15 DIAGNOSIS — M51.36 DDD (DEGENERATIVE DISC DISEASE), LUMBAR: Primary | ICD-10-CM

## 2019-10-15 DIAGNOSIS — Z71.89 ENCOUNTER FOR MEDICATION COUNSELING: ICD-10-CM

## 2019-10-15 DIAGNOSIS — M50.30 DDD (DEGENERATIVE DISC DISEASE), CERVICAL: ICD-10-CM

## 2019-10-15 DIAGNOSIS — M48.02 SPINAL STENOSIS IN CERVICAL REGION: ICD-10-CM

## 2019-10-15 DIAGNOSIS — M54.16 LUMBAR RADICULOPATHY: ICD-10-CM

## 2019-10-15 DIAGNOSIS — M96.1 FAILED BACK SYNDROME: ICD-10-CM

## 2019-10-15 DIAGNOSIS — Z51.81 ENCOUNTER FOR MEDICATION MONITORING: ICD-10-CM

## 2019-10-15 DIAGNOSIS — R26.89 IMBALANCE: ICD-10-CM

## 2019-10-15 DIAGNOSIS — M54.12 CERVICAL RADICULAR PAIN: ICD-10-CM

## 2019-10-15 DIAGNOSIS — M43.06 LUMBAR SPONDYLOLYSIS: ICD-10-CM

## 2019-10-15 PROCEDURE — 99213 OFFICE O/P EST LOW 20 MIN: CPT

## 2019-10-15 PROCEDURE — 99214 OFFICE O/P EST MOD 30 MIN: CPT | Performed by: PAIN MEDICINE

## 2019-10-15 RX ORDER — OXYCODONE AND ACETAMINOPHEN 10; 325 MG/1; MG/1
1 TABLET ORAL EVERY 6 HOURS PRN
Qty: 120 TABLET | Refills: 0 | Status: SHIPPED | OUTPATIENT
Start: 2019-10-26 | End: 2019-11-22 | Stop reason: SDUPTHER

## 2019-10-15 RX ORDER — MORPHINE SULFATE 30 MG/1
30 TABLET, FILM COATED, EXTENDED RELEASE ORAL 2 TIMES DAILY
Qty: 60 TABLET | Refills: 0 | Status: SHIPPED | OUTPATIENT
Start: 2019-10-26 | End: 2019-11-22 | Stop reason: SDUPTHER

## 2019-10-15 ASSESSMENT — PAIN - FUNCTIONAL ASSESSMENT: PAIN_FUNCTIONAL_ASSESSMENT: PREVENTS OR INTERFERES SOME ACTIVE ACTIVITIES AND ADLS

## 2019-10-15 ASSESSMENT — ENCOUNTER SYMPTOMS
ABDOMINAL PAIN: 0
EYE REDNESS: 0
CONSTIPATION: 1
ALLERGIC/IMMUNOLOGIC NEGATIVE: 1
SHORTNESS OF BREATH: 0
BACK PAIN: 1
SORE THROAT: 0
COUGH: 0

## 2019-10-15 ASSESSMENT — PAIN DESCRIPTION - PAIN TYPE: TYPE: CHRONIC PAIN

## 2019-10-15 ASSESSMENT — PAIN DESCRIPTION - DESCRIPTORS: DESCRIPTORS: ACHING;CONSTANT

## 2019-10-15 ASSESSMENT — ACTIVITIES OF DAILY LIVING (ADL): EFFECT OF PAIN ON DAILY ACTIVITIES: PAIN INCREASES WITH STANDING AND WALKING

## 2019-10-15 ASSESSMENT — PAIN DESCRIPTION - ONSET: ONSET: ON-GOING

## 2019-10-15 ASSESSMENT — PAIN DESCRIPTION - LOCATION: LOCATION: BACK

## 2019-10-15 ASSESSMENT — PAIN DESCRIPTION - FREQUENCY: FREQUENCY: CONTINUOUS

## 2019-10-15 ASSESSMENT — PAIN SCALES - GENERAL: PAINLEVEL_OUTOF10: 8

## 2019-10-15 ASSESSMENT — PAIN DESCRIPTION - ORIENTATION: ORIENTATION: RIGHT;LEFT

## 2019-10-15 ASSESSMENT — PAIN DESCRIPTION - PROGRESSION: CLINICAL_PROGRESSION: NOT CHANGED

## 2019-10-21 RX ORDER — ERGOCALCIFEROL 1.25 MG/1
50000 CAPSULE ORAL WEEKLY
Qty: 4 CAPSULE | Refills: 3 | Status: SHIPPED | OUTPATIENT
Start: 2019-10-21 | End: 2020-02-21

## 2019-11-01 ENCOUNTER — HOSPITAL ENCOUNTER (OUTPATIENT)
Dept: GENERAL RADIOLOGY | Age: 79
Discharge: HOME | End: 2019-11-03
Payer: MEDICARE

## 2019-11-01 ENCOUNTER — HOSPITAL ENCOUNTER (OUTPATIENT)
Dept: PREADMISSION TESTING | Age: 79
Discharge: HOME | End: 2019-11-05
Payer: MEDICARE

## 2019-11-01 VITALS
HEIGHT: 58 IN | SYSTOLIC BLOOD PRESSURE: 111 MMHG | OXYGEN SATURATION: 99 % | WEIGHT: 147 LBS | BODY MASS INDEX: 30.86 KG/M2 | HEART RATE: 67 BPM | RESPIRATION RATE: 16 BRPM | DIASTOLIC BLOOD PRESSURE: 68 MMHG | TEMPERATURE: 98.2 F

## 2019-11-01 LAB
ABO/RH: NORMAL
ABSOLUTE EOS #: 0.15 K/UL (ref 0–0.44)
ABSOLUTE IMMATURE GRANULOCYTE: <0.03 K/UL (ref 0–0.3)
ABSOLUTE LYMPH #: 1.57 K/UL (ref 1.1–3.7)
ABSOLUTE MONO #: 0.39 K/UL (ref 0.1–1.2)
ANION GAP SERPL CALCULATED.3IONS-SCNC: 12 MMOL/L (ref 9–17)
ANTIBODY SCREEN: NEGATIVE
ARM BAND NUMBER: NORMAL
BASOPHILS # BLD: 0 % (ref 0–2)
BASOPHILS ABSOLUTE: <0.03 K/UL (ref 0–0.2)
BILIRUBIN URINE: NEGATIVE
BUN BLDV-MCNC: 24 MG/DL (ref 8–23)
BUN/CREAT BLD: ABNORMAL (ref 9–20)
CALCIUM SERPL-MCNC: 8.9 MG/DL (ref 8.6–10.4)
CHLORIDE BLD-SCNC: 102 MMOL/L (ref 98–107)
CO2: 28 MMOL/L (ref 20–31)
COLOR: YELLOW
COMMENT UA: NORMAL
CREAT SERPL-MCNC: 0.67 MG/DL (ref 0.5–0.9)
DIFFERENTIAL TYPE: ABNORMAL
EKG ATRIAL RATE: 62 BPM
EKG P AXIS: 0 DEGREES
EKG P-R INTERVAL: 178 MS
EKG Q-T INTERVAL: 404 MS
EKG QRS DURATION: 82 MS
EKG QTC CALCULATION (BAZETT): 410 MS
EKG R AXIS: -29 DEGREES
EKG T AXIS: 9 DEGREES
EKG VENTRICULAR RATE: 62 BPM
EOSINOPHILS RELATIVE PERCENT: 3 % (ref 1–4)
EXPIRATION DATE: NORMAL
GFR AFRICAN AMERICAN: >60 ML/MIN
GFR NON-AFRICAN AMERICAN: >60 ML/MIN
GFR SERPL CREATININE-BSD FRML MDRD: ABNORMAL ML/MIN/{1.73_M2}
GFR SERPL CREATININE-BSD FRML MDRD: ABNORMAL ML/MIN/{1.73_M2}
GLUCOSE BLD-MCNC: 90 MG/DL (ref 70–99)
GLUCOSE URINE: NEGATIVE
HCT VFR BLD CALC: 33.8 % (ref 36.3–47.1)
HEMOGLOBIN: 10.2 G/DL (ref 11.9–15.1)
IMMATURE GRANULOCYTES: 0 %
INR BLD: 0.9
KETONES, URINE: NEGATIVE
LEUKOCYTE ESTERASE, URINE: NEGATIVE
LYMPHOCYTES # BLD: 34 % (ref 24–43)
MCH RBC QN AUTO: 27.7 PG (ref 25.2–33.5)
MCHC RBC AUTO-ENTMCNC: 30.2 G/DL (ref 28.4–34.8)
MCV RBC AUTO: 91.8 FL (ref 82.6–102.9)
MONOCYTES # BLD: 8 % (ref 3–12)
NITRITE, URINE: NEGATIVE
NRBC AUTOMATED: 0 PER 100 WBC
PARTIAL THROMBOPLASTIN TIME: 21.8 SEC (ref 20.5–30.5)
PDW BLD-RTO: 13.8 % (ref 11.8–14.4)
PH UA: 6.5 (ref 5–8)
PLATELET # BLD: 231 K/UL (ref 138–453)
PLATELET ESTIMATE: ABNORMAL
PMV BLD AUTO: 10.9 FL (ref 8.1–13.5)
POTASSIUM SERPL-SCNC: 4.2 MMOL/L (ref 3.7–5.3)
PROTEIN UA: NEGATIVE
PROTHROMBIN TIME: 9.5 SEC (ref 9–12)
RBC # BLD: 3.68 M/UL (ref 3.95–5.11)
RBC # BLD: ABNORMAL 10*6/UL
SEG NEUTROPHILS: 55 % (ref 36–65)
SEGMENTED NEUTROPHILS ABSOLUTE COUNT: 2.51 K/UL (ref 1.5–8.1)
SODIUM BLD-SCNC: 142 MMOL/L (ref 135–144)
SPECIFIC GRAVITY UA: 1.02 (ref 1–1.03)
TURBIDITY: CLEAR
URINE HGB: NEGATIVE
UROBILINOGEN, URINE: NORMAL
WBC # BLD: 4.6 K/UL (ref 3.5–11.3)
WBC # BLD: ABNORMAL 10*3/UL

## 2019-11-01 PROCEDURE — 71046 X-RAY EXAM CHEST 2 VIEWS: CPT

## 2019-11-01 PROCEDURE — 86900 BLOOD TYPING SEROLOGIC ABO: CPT

## 2019-11-01 PROCEDURE — 93005 ELECTROCARDIOGRAM TRACING: CPT | Performed by: NEUROLOGICAL SURGERY

## 2019-11-01 PROCEDURE — 80048 BASIC METABOLIC PNL TOTAL CA: CPT

## 2019-11-01 PROCEDURE — 85025 COMPLETE CBC W/AUTO DIFF WBC: CPT

## 2019-11-01 PROCEDURE — 36415 COLL VENOUS BLD VENIPUNCTURE: CPT

## 2019-11-01 PROCEDURE — 85730 THROMBOPLASTIN TIME PARTIAL: CPT

## 2019-11-01 PROCEDURE — 86901 BLOOD TYPING SEROLOGIC RH(D): CPT

## 2019-11-01 PROCEDURE — 85610 PROTHROMBIN TIME: CPT

## 2019-11-01 PROCEDURE — 86850 RBC ANTIBODY SCREEN: CPT

## 2019-11-01 PROCEDURE — 81003 URINALYSIS AUTO W/O SCOPE: CPT

## 2019-11-01 PROCEDURE — 93010 ELECTROCARDIOGRAM REPORT: CPT | Performed by: INTERNAL MEDICINE

## 2019-11-01 RX ORDER — SODIUM CHLORIDE, SODIUM LACTATE, POTASSIUM CHLORIDE, CALCIUM CHLORIDE 600; 310; 30; 20 MG/100ML; MG/100ML; MG/100ML; MG/100ML
1000 INJECTION, SOLUTION INTRAVENOUS CONTINUOUS
Status: CANCELLED | OUTPATIENT
Start: 2019-11-01

## 2019-11-01 ASSESSMENT — PAIN DESCRIPTION - FREQUENCY: FREQUENCY: INTERMITTENT

## 2019-11-01 ASSESSMENT — PAIN DESCRIPTION - ONSET: ONSET: ON-GOING

## 2019-11-01 ASSESSMENT — PAIN DESCRIPTION - DESCRIPTORS: DESCRIPTORS: ACHING;CONSTANT

## 2019-11-01 ASSESSMENT — PAIN DESCRIPTION - LOCATION: LOCATION: BACK

## 2019-11-01 ASSESSMENT — PAIN SCALES - GENERAL: PAINLEVEL_OUTOF10: 7

## 2019-11-01 ASSESSMENT — PAIN DESCRIPTION - PROGRESSION: CLINICAL_PROGRESSION: GRADUALLY WORSENING

## 2019-11-01 ASSESSMENT — PAIN DESCRIPTION - ORIENTATION: ORIENTATION: LOWER

## 2019-11-04 ENCOUNTER — TELEPHONE (OUTPATIENT)
Dept: NEUROSURGERY | Age: 79
End: 2019-11-04

## 2019-11-14 ENCOUNTER — APPOINTMENT (OUTPATIENT)
Dept: GENERAL RADIOLOGY | Age: 79
DRG: 472 | End: 2019-11-14
Attending: NEUROLOGICAL SURGERY
Payer: MEDICARE

## 2019-11-14 ENCOUNTER — ANESTHESIA EVENT (OUTPATIENT)
Dept: OPERATING ROOM | Age: 79
DRG: 472 | End: 2019-11-14
Payer: MEDICARE

## 2019-11-14 ENCOUNTER — ANESTHESIA (OUTPATIENT)
Dept: OPERATING ROOM | Age: 79
DRG: 472 | End: 2019-11-14
Payer: MEDICARE

## 2019-11-14 ENCOUNTER — HOSPITAL ENCOUNTER (INPATIENT)
Age: 79
LOS: 2 days | Discharge: HOME | DRG: 472 | End: 2019-11-16
Attending: NEUROLOGICAL SURGERY | Admitting: NEUROLOGICAL SURGERY
Payer: MEDICARE

## 2019-11-14 VITALS — DIASTOLIC BLOOD PRESSURE: 60 MMHG | SYSTOLIC BLOOD PRESSURE: 122 MMHG | OXYGEN SATURATION: 98 % | TEMPERATURE: 98.9 F

## 2019-11-14 DIAGNOSIS — M48.02 CERVICAL STENOSIS OF SPINE: Primary | ICD-10-CM

## 2019-11-14 DIAGNOSIS — M54.16 LUMBAR RADICULOPATHY: ICD-10-CM

## 2019-11-14 DIAGNOSIS — M96.1 FAILED BACK SYNDROME OF LUMBAR SPINE: ICD-10-CM

## 2019-11-14 LAB
GLUCOSE BLD-MCNC: 123 MG/DL (ref 74–100)
GLUCOSE BLD-MCNC: 150 MG/DL (ref 65–105)
HCT VFR BLD CALC: 26.9 %
HEMOGLOBIN: 8.6 GM/DL
POC POTASSIUM: 4.4 MMOL/L (ref 3.5–4.5)

## 2019-11-14 PROCEDURE — 72040 X-RAY EXAM NECK SPINE 2-3 VW: CPT

## 2019-11-14 PROCEDURE — 82947 ASSAY GLUCOSE BLOOD QUANT: CPT

## 2019-11-14 PROCEDURE — 2580000003 HC RX 258: Performed by: NEUROLOGICAL SURGERY

## 2019-11-14 PROCEDURE — 85018 HEMOGLOBIN: CPT

## 2019-11-14 PROCEDURE — 3600000004 HC SURGERY LEVEL 4 BASE: Performed by: NEUROLOGICAL SURGERY

## 2019-11-14 PROCEDURE — 0RB30ZZ EXCISION OF CERVICAL VERTEBRAL DISC, OPEN APPROACH: ICD-10-PCS | Performed by: NEUROLOGICAL SURGERY

## 2019-11-14 PROCEDURE — 85014 HEMATOCRIT: CPT

## 2019-11-14 PROCEDURE — 6360000002 HC RX W HCPCS: Performed by: ANESTHESIOLOGY

## 2019-11-14 PROCEDURE — 2500000003 HC RX 250 WO HCPCS: Performed by: NEUROLOGICAL SURGERY

## 2019-11-14 PROCEDURE — L0120 CERV FLEX N/ADJ FOAM PRE OTS: HCPCS | Performed by: NEUROLOGICAL SURGERY

## 2019-11-14 PROCEDURE — 22552 ARTHRD ANT NTRBD CERVICAL EA: CPT | Performed by: NEUROLOGICAL SURGERY

## 2019-11-14 PROCEDURE — C9359 IMPLNT,BON VOID FILLER-PUTTY: HCPCS | Performed by: NEUROLOGICAL SURGERY

## 2019-11-14 PROCEDURE — 22845 INSERT SPINE FIXATION DEVICE: CPT | Performed by: NEUROLOGICAL SURGERY

## 2019-11-14 PROCEDURE — 00NW0ZZ RELEASE CERVICAL SPINAL CORD, OPEN APPROACH: ICD-10-PCS | Performed by: NEUROLOGICAL SURGERY

## 2019-11-14 PROCEDURE — 1200000000 HC SEMI PRIVATE

## 2019-11-14 PROCEDURE — 2580000003 HC RX 258: Performed by: ANESTHESIOLOGY

## 2019-11-14 PROCEDURE — 0RG20A0 FUSION OF 2 OR MORE CERVICAL VERTEBRAL JOINTS WITH INTERBODY FUSION DEVICE, ANTERIOR APPROACH, ANTERIOR COLUMN, OPEN APPROACH: ICD-10-PCS | Performed by: NEUROLOGICAL SURGERY

## 2019-11-14 PROCEDURE — C1713 ANCHOR/SCREW BN/BN,TIS/BN: HCPCS | Performed by: NEUROLOGICAL SURGERY

## 2019-11-14 PROCEDURE — 3700000001 HC ADD 15 MINUTES (ANESTHESIA): Performed by: NEUROLOGICAL SURGERY

## 2019-11-14 PROCEDURE — 2780000010 HC IMPLANT OTHER: Performed by: NEUROLOGICAL SURGERY

## 2019-11-14 PROCEDURE — 3700000000 HC ANESTHESIA ATTENDED CARE: Performed by: NEUROLOGICAL SURGERY

## 2019-11-14 PROCEDURE — 7100000000 HC PACU RECOVERY - FIRST 15 MIN: Performed by: NEUROLOGICAL SURGERY

## 2019-11-14 PROCEDURE — 22853 INSJ BIOMECHANICAL DEVICE: CPT | Performed by: NEUROLOGICAL SURGERY

## 2019-11-14 PROCEDURE — 6370000000 HC RX 637 (ALT 250 FOR IP): Performed by: REGISTERED NURSE

## 2019-11-14 PROCEDURE — 84132 ASSAY OF SERUM POTASSIUM: CPT

## 2019-11-14 PROCEDURE — 6360000002 HC RX W HCPCS: Performed by: REGISTERED NURSE

## 2019-11-14 PROCEDURE — 2500000003 HC RX 250 WO HCPCS: Performed by: NURSE ANESTHETIST, CERTIFIED REGISTERED

## 2019-11-14 PROCEDURE — 2709999900 HC NON-CHARGEABLE SUPPLY: Performed by: NEUROLOGICAL SURGERY

## 2019-11-14 PROCEDURE — 6370000000 HC RX 637 (ALT 250 FOR IP): Performed by: NEUROLOGICAL SURGERY

## 2019-11-14 PROCEDURE — 2720000010 HC SURG SUPPLY STERILE: Performed by: NEUROLOGICAL SURGERY

## 2019-11-14 PROCEDURE — 22551 ARTHRD ANT NTRBDY CERVICAL: CPT | Performed by: NEUROLOGICAL SURGERY

## 2019-11-14 PROCEDURE — 7100000001 HC PACU RECOVERY - ADDTL 15 MIN: Performed by: NEUROLOGICAL SURGERY

## 2019-11-14 PROCEDURE — 2580000003 HC RX 258: Performed by: NURSE ANESTHETIST, CERTIFIED REGISTERED

## 2019-11-14 PROCEDURE — 2580000003 HC RX 258: Performed by: REGISTERED NURSE

## 2019-11-14 PROCEDURE — 6360000002 HC RX W HCPCS: Performed by: NURSE ANESTHETIST, CERTIFIED REGISTERED

## 2019-11-14 PROCEDURE — 3600000014 HC SURGERY LEVEL 4 ADDTL 15MIN: Performed by: NEUROLOGICAL SURGERY

## 2019-11-14 DEVICE — SCREW SPNL L14MM DIA4MM ANT CERV TI SELF DRL VAR ANG FULL: Type: IMPLANTABLE DEVICE | Site: SPINE CERVICAL | Status: FUNCTIONAL

## 2019-11-14 DEVICE — IMPLANTABLE DEVICE: Type: IMPLANTABLE DEVICE | Status: FUNCTIONAL

## 2019-11-14 DEVICE — DBM 006001 PROGENIX PLUS 1CC
Type: IMPLANTABLE DEVICE | Site: SPINE CERVICAL | Status: FUNCTIONAL
Brand: PROGENIX® PUTTY AND PROGENIX® PLUS

## 2019-11-14 DEVICE — IMPLANTABLE DEVICE: Type: IMPLANTABLE DEVICE | Site: SPINE CERVICAL | Status: FUNCTIONAL

## 2019-11-14 RX ORDER — ROCURONIUM BROMIDE 10 MG/ML
INJECTION, SOLUTION INTRAVENOUS PRN
Status: DISCONTINUED | OUTPATIENT
Start: 2019-11-14 | End: 2019-11-14 | Stop reason: SDUPTHER

## 2019-11-14 RX ORDER — LEVOTHYROXINE SODIUM 0.05 MG/1
50 TABLET ORAL DAILY
Status: DISCONTINUED | OUTPATIENT
Start: 2019-11-14 | End: 2019-11-16 | Stop reason: HOSPADM

## 2019-11-14 RX ORDER — ONDANSETRON 2 MG/ML
4 INJECTION INTRAMUSCULAR; INTRAVENOUS
Status: DISCONTINUED | OUTPATIENT
Start: 2019-11-14 | End: 2019-11-14 | Stop reason: HOSPADM

## 2019-11-14 RX ORDER — LIDOCAINE HYDROCHLORIDE AND EPINEPHRINE 10; 10 MG/ML; UG/ML
INJECTION, SOLUTION INFILTRATION; PERINEURAL PRN
Status: DISCONTINUED | OUTPATIENT
Start: 2019-11-14 | End: 2019-11-14 | Stop reason: ALTCHOICE

## 2019-11-14 RX ORDER — SODIUM CHLORIDE 9 MG/ML
INJECTION, SOLUTION INTRAVENOUS CONTINUOUS
Status: DISCONTINUED | OUTPATIENT
Start: 2019-11-14 | End: 2019-11-15

## 2019-11-14 RX ORDER — PROPOFOL 10 MG/ML
INJECTION, EMULSION INTRAVENOUS PRN
Status: DISCONTINUED | OUTPATIENT
Start: 2019-11-14 | End: 2019-11-14 | Stop reason: SDUPTHER

## 2019-11-14 RX ORDER — POLYETHYLENE GLYCOL 3350 17 G/17G
17 POWDER, FOR SOLUTION ORAL DAILY PRN
Status: DISCONTINUED | OUTPATIENT
Start: 2019-11-14 | End: 2019-11-16 | Stop reason: HOSPADM

## 2019-11-14 RX ORDER — GLYCOPYRROLATE 1 MG/5 ML
SYRINGE (ML) INTRAVENOUS PRN
Status: DISCONTINUED | OUTPATIENT
Start: 2019-11-14 | End: 2019-11-14 | Stop reason: SDUPTHER

## 2019-11-14 RX ORDER — MONTELUKAST SODIUM 10 MG/1
10 TABLET ORAL NIGHTLY
Status: DISCONTINUED | OUTPATIENT
Start: 2019-11-14 | End: 2019-11-16 | Stop reason: HOSPADM

## 2019-11-14 RX ORDER — DOCUSATE SODIUM 100 MG/1
100 CAPSULE, LIQUID FILLED ORAL 2 TIMES DAILY
Status: DISCONTINUED | OUTPATIENT
Start: 2019-11-14 | End: 2019-11-16 | Stop reason: HOSPADM

## 2019-11-14 RX ORDER — MEPERIDINE HYDROCHLORIDE 50 MG/ML
12.5 INJECTION INTRAMUSCULAR; INTRAVENOUS; SUBCUTANEOUS EVERY 5 MIN PRN
Status: DISCONTINUED | OUTPATIENT
Start: 2019-11-14 | End: 2019-11-14 | Stop reason: HOSPADM

## 2019-11-14 RX ORDER — OXYCODONE HYDROCHLORIDE 5 MG/1
5 TABLET ORAL EVERY 4 HOURS PRN
Status: DISCONTINUED | OUTPATIENT
Start: 2019-11-14 | End: 2019-11-15

## 2019-11-14 RX ORDER — SODIUM CHLORIDE 0.9 % (FLUSH) 0.9 %
10 SYRINGE (ML) INJECTION PRN
Status: DISCONTINUED | OUTPATIENT
Start: 2019-11-14 | End: 2019-11-16 | Stop reason: HOSPADM

## 2019-11-14 RX ORDER — LIDOCAINE HYDROCHLORIDE 10 MG/ML
INJECTION, SOLUTION EPIDURAL; INFILTRATION; INTRACAUDAL; PERINEURAL PRN
Status: DISCONTINUED | OUTPATIENT
Start: 2019-11-14 | End: 2019-11-14 | Stop reason: SDUPTHER

## 2019-11-14 RX ORDER — ERGOCALCIFEROL 1.25 MG/1
50000 CAPSULE ORAL WEEKLY
Status: DISCONTINUED | OUTPATIENT
Start: 2019-11-17 | End: 2019-11-16 | Stop reason: HOSPADM

## 2019-11-14 RX ORDER — FENTANYL CITRATE 50 UG/ML
25 INJECTION, SOLUTION INTRAMUSCULAR; INTRAVENOUS EVERY 5 MIN PRN
Status: COMPLETED | OUTPATIENT
Start: 2019-11-14 | End: 2019-11-14

## 2019-11-14 RX ORDER — CEFAZOLIN SODIUM 2 G/50ML
SOLUTION INTRAVENOUS PRN
Status: DISCONTINUED | OUTPATIENT
Start: 2019-11-14 | End: 2019-11-14 | Stop reason: SDUPTHER

## 2019-11-14 RX ORDER — TELMISARTAN AND HYDROCHLORTHIAZIDE 40; 12.5 MG/1; MG/1
1 TABLET ORAL DAILY
Status: DISCONTINUED | OUTPATIENT
Start: 2019-11-14 | End: 2019-11-14 | Stop reason: CLARIF

## 2019-11-14 RX ORDER — MIDAZOLAM HYDROCHLORIDE 1 MG/ML
INJECTION INTRAMUSCULAR; INTRAVENOUS PRN
Status: DISCONTINUED | OUTPATIENT
Start: 2019-11-14 | End: 2019-11-14 | Stop reason: SDUPTHER

## 2019-11-14 RX ORDER — SODIUM CHLORIDE, SODIUM LACTATE, POTASSIUM CHLORIDE, CALCIUM CHLORIDE 600; 310; 30; 20 MG/100ML; MG/100ML; MG/100ML; MG/100ML
1000 INJECTION, SOLUTION INTRAVENOUS CONTINUOUS
Status: DISCONTINUED | OUTPATIENT
Start: 2019-11-14 | End: 2019-11-14

## 2019-11-14 RX ORDER — OXYCODONE HYDROCHLORIDE 5 MG/1
10 TABLET ORAL EVERY 4 HOURS PRN
Status: DISCONTINUED | OUTPATIENT
Start: 2019-11-14 | End: 2019-11-15

## 2019-11-14 RX ORDER — CYCLOBENZAPRINE HCL 10 MG
10 TABLET ORAL 3 TIMES DAILY PRN
Status: DISCONTINUED | OUTPATIENT
Start: 2019-11-14 | End: 2019-11-16 | Stop reason: HOSPADM

## 2019-11-14 RX ORDER — PREGABALIN 75 MG/1
150 CAPSULE ORAL 2 TIMES DAILY
Status: DISCONTINUED | OUTPATIENT
Start: 2019-11-14 | End: 2019-11-16 | Stop reason: HOSPADM

## 2019-11-14 RX ORDER — HYDROCHLOROTHIAZIDE 12.5 MG/1
12.5 CAPSULE, GELATIN COATED ORAL DAILY
Status: DISCONTINUED | OUTPATIENT
Start: 2019-11-14 | End: 2019-11-16 | Stop reason: HOSPADM

## 2019-11-14 RX ORDER — SODIUM CHLORIDE, SODIUM LACTATE, POTASSIUM CHLORIDE, CALCIUM CHLORIDE 600; 310; 30; 20 MG/100ML; MG/100ML; MG/100ML; MG/100ML
INJECTION, SOLUTION INTRAVENOUS CONTINUOUS PRN
Status: DISCONTINUED | OUTPATIENT
Start: 2019-11-14 | End: 2019-11-14 | Stop reason: SDUPTHER

## 2019-11-14 RX ORDER — FENTANYL CITRATE 50 UG/ML
INJECTION, SOLUTION INTRAMUSCULAR; INTRAVENOUS PRN
Status: DISCONTINUED | OUTPATIENT
Start: 2019-11-14 | End: 2019-11-14 | Stop reason: SDUPTHER

## 2019-11-14 RX ORDER — MAGNESIUM HYDROXIDE 1200 MG/15ML
LIQUID ORAL CONTINUOUS PRN
Status: COMPLETED | OUTPATIENT
Start: 2019-11-14 | End: 2019-11-14

## 2019-11-14 RX ORDER — LOSARTAN POTASSIUM 50 MG/1
50 TABLET ORAL DAILY
Status: DISCONTINUED | OUTPATIENT
Start: 2019-11-14 | End: 2019-11-16 | Stop reason: HOSPADM

## 2019-11-14 RX ORDER — PANTOPRAZOLE SODIUM 40 MG/1
40 TABLET, DELAYED RELEASE ORAL
Status: DISCONTINUED | OUTPATIENT
Start: 2019-11-14 | End: 2019-11-16 | Stop reason: HOSPADM

## 2019-11-14 RX ORDER — ONDANSETRON 2 MG/ML
4 INJECTION INTRAMUSCULAR; INTRAVENOUS EVERY 6 HOURS PRN
Status: DISCONTINUED | OUTPATIENT
Start: 2019-11-14 | End: 2019-11-16 | Stop reason: HOSPADM

## 2019-11-14 RX ORDER — DEXAMETHASONE SODIUM PHOSPHATE 10 MG/ML
INJECTION INTRAMUSCULAR; INTRAVENOUS PRN
Status: DISCONTINUED | OUTPATIENT
Start: 2019-11-14 | End: 2019-11-14 | Stop reason: SDUPTHER

## 2019-11-14 RX ORDER — PROPOFOL 10 MG/ML
INJECTION, EMULSION INTRAVENOUS CONTINUOUS PRN
Status: DISCONTINUED | OUTPATIENT
Start: 2019-11-14 | End: 2019-11-14 | Stop reason: SDUPTHER

## 2019-11-14 RX ORDER — FENTANYL CITRATE 50 UG/ML
50 INJECTION, SOLUTION INTRAMUSCULAR; INTRAVENOUS EVERY 5 MIN PRN
Status: DISCONTINUED | OUTPATIENT
Start: 2019-11-14 | End: 2019-11-14 | Stop reason: HOSPADM

## 2019-11-14 RX ORDER — ONDANSETRON 2 MG/ML
INJECTION INTRAMUSCULAR; INTRAVENOUS PRN
Status: DISCONTINUED | OUTPATIENT
Start: 2019-11-14 | End: 2019-11-14 | Stop reason: SDUPTHER

## 2019-11-14 RX ORDER — SODIUM CHLORIDE 0.9 % (FLUSH) 0.9 %
10 SYRINGE (ML) INJECTION EVERY 12 HOURS SCHEDULED
Status: DISCONTINUED | OUTPATIENT
Start: 2019-11-14 | End: 2019-11-16 | Stop reason: HOSPADM

## 2019-11-14 RX ORDER — ACETAMINOPHEN 325 MG/1
650 TABLET ORAL EVERY 6 HOURS
Status: DISCONTINUED | OUTPATIENT
Start: 2019-11-14 | End: 2019-11-15

## 2019-11-14 RX ORDER — FLUTICASONE PROPIONATE 50 MCG
1 SPRAY, SUSPENSION (ML) NASAL DAILY PRN
Status: DISCONTINUED | OUTPATIENT
Start: 2019-11-14 | End: 2019-11-16 | Stop reason: HOSPADM

## 2019-11-14 RX ADMIN — HYDROMORPHONE HYDROCHLORIDE 0.5 MG: 1 INJECTION, SOLUTION INTRAMUSCULAR; INTRAVENOUS; SUBCUTANEOUS at 16:03

## 2019-11-14 RX ADMIN — MONTELUKAST SODIUM 10 MG: 10 TABLET, FILM COATED ORAL at 19:54

## 2019-11-14 RX ADMIN — FENTANYL CITRATE 50 MCG: 50 INJECTION, SOLUTION INTRAMUSCULAR; INTRAVENOUS at 13:53

## 2019-11-14 RX ADMIN — PROPOFOL 150 MCG/KG/MIN: 10 INJECTION, EMULSION INTRAVENOUS at 09:34

## 2019-11-14 RX ADMIN — FENTANYL CITRATE 25 MCG: 50 INJECTION, SOLUTION INTRAMUSCULAR; INTRAVENOUS at 13:03

## 2019-11-14 RX ADMIN — ONDANSETRON 4 MG: 2 INJECTION, SOLUTION INTRAMUSCULAR; INTRAVENOUS at 12:18

## 2019-11-14 RX ADMIN — PREGABALIN 150 MG: 75 CAPSULE ORAL at 19:54

## 2019-11-14 RX ADMIN — CEFAZOLIN SODIUM 2 G: 2 SOLUTION INTRAVENOUS at 09:48

## 2019-11-14 RX ADMIN — PHENYLEPHRINE HYDROCHLORIDE 100 MCG: 10 INJECTION INTRAVENOUS at 09:41

## 2019-11-14 RX ADMIN — FENTANYL CITRATE 100 MCG: 50 INJECTION INTRAMUSCULAR; INTRAVENOUS at 10:24

## 2019-11-14 RX ADMIN — PHENYLEPHRINE HYDROCHLORIDE 100 MCG: 10 INJECTION INTRAVENOUS at 09:38

## 2019-11-14 RX ADMIN — MIDAZOLAM HYDROCHLORIDE 2 MG: 1 INJECTION, SOLUTION INTRAMUSCULAR; INTRAVENOUS at 09:12

## 2019-11-14 RX ADMIN — HYDROMORPHONE HYDROCHLORIDE 0.5 MG: 1 INJECTION, SOLUTION INTRAMUSCULAR; INTRAVENOUS; SUBCUTANEOUS at 23:42

## 2019-11-14 RX ADMIN — METFORMIN HYDROCHLORIDE 500 MG: 500 TABLET ORAL at 17:42

## 2019-11-14 RX ADMIN — FENTANYL CITRATE 25 MCG: 50 INJECTION, SOLUTION INTRAMUSCULAR; INTRAVENOUS at 13:40

## 2019-11-14 RX ADMIN — FENTANYL CITRATE 100 MCG: 50 INJECTION INTRAMUSCULAR; INTRAVENOUS at 09:14

## 2019-11-14 RX ADMIN — LIDOCAINE HYDROCHLORIDE 50 MG: 10 INJECTION, SOLUTION EPIDURAL; INFILTRATION; INTRACAUDAL; PERINEURAL at 09:14

## 2019-11-14 RX ADMIN — FENTANYL CITRATE 25 MCG: 50 INJECTION, SOLUTION INTRAMUSCULAR; INTRAVENOUS at 13:45

## 2019-11-14 RX ADMIN — OXYCODONE HYDROCHLORIDE 10 MG: 5 TABLET ORAL at 21:38

## 2019-11-14 RX ADMIN — ACETAMINOPHEN 650 MG: 325 TABLET ORAL at 21:37

## 2019-11-14 RX ADMIN — HYDROMORPHONE HYDROCHLORIDE 0.5 MG: 1 INJECTION, SOLUTION INTRAMUSCULAR; INTRAVENOUS; SUBCUTANEOUS at 19:54

## 2019-11-14 RX ADMIN — FENTANYL CITRATE 50 MCG: 50 INJECTION, SOLUTION INTRAMUSCULAR; INTRAVENOUS at 12:58

## 2019-11-14 RX ADMIN — DEXAMETHASONE SODIUM PHOSPHATE 10 MG: 10 INJECTION INTRAMUSCULAR; INTRAVENOUS at 10:12

## 2019-11-14 RX ADMIN — DOCUSATE SODIUM 100 MG: 100 CAPSULE, LIQUID FILLED ORAL at 19:54

## 2019-11-14 RX ADMIN — DEXTROSE MONOHYDRATE 2 G: 50 INJECTION, SOLUTION INTRAVENOUS at 17:42

## 2019-11-14 RX ADMIN — SODIUM CHLORIDE, POTASSIUM CHLORIDE, SODIUM LACTATE AND CALCIUM CHLORIDE: 600; 310; 30; 20 INJECTION, SOLUTION INTRAVENOUS at 09:12

## 2019-11-14 RX ADMIN — SODIUM CHLORIDE: 9 INJECTION, SOLUTION INTRAVENOUS at 15:00

## 2019-11-14 RX ADMIN — SODIUM CHLORIDE, POTASSIUM CHLORIDE, SODIUM LACTATE AND CALCIUM CHLORIDE 1000 ML: 600; 310; 30; 20 INJECTION, SOLUTION INTRAVENOUS at 14:10

## 2019-11-14 RX ADMIN — SODIUM CHLORIDE, POTASSIUM CHLORIDE, SODIUM LACTATE AND CALCIUM CHLORIDE 1000 ML: 600; 310; 30; 20 INJECTION, SOLUTION INTRAVENOUS at 08:02

## 2019-11-14 RX ADMIN — FENTANYL CITRATE 25 MCG: 50 INJECTION, SOLUTION INTRAMUSCULAR; INTRAVENOUS at 13:05

## 2019-11-14 RX ADMIN — PROPOFOL 200 MG: 10 INJECTION, EMULSION INTRAVENOUS at 09:14

## 2019-11-14 RX ADMIN — ROCURONIUM BROMIDE 30 MG: 10 INJECTION INTRAVENOUS at 09:14

## 2019-11-14 RX ADMIN — Medication 0.4 MG: at 09:41

## 2019-11-14 ASSESSMENT — PULMONARY FUNCTION TESTS
PIF_VALUE: 26
PIF_VALUE: 24
PIF_VALUE: 3
PIF_VALUE: 28
PIF_VALUE: 26
PIF_VALUE: 24
PIF_VALUE: 27
PIF_VALUE: 27
PIF_VALUE: 28
PIF_VALUE: 27
PIF_VALUE: 29
PIF_VALUE: 28
PIF_VALUE: 27
PIF_VALUE: 22
PIF_VALUE: 26
PIF_VALUE: 27
PIF_VALUE: 27
PIF_VALUE: 26
PIF_VALUE: 25
PIF_VALUE: 21
PIF_VALUE: 27
PIF_VALUE: 31
PIF_VALUE: 27
PIF_VALUE: 28
PIF_VALUE: 27
PIF_VALUE: 24
PIF_VALUE: 27
PIF_VALUE: 26
PIF_VALUE: 25
PIF_VALUE: 29
PIF_VALUE: 26
PIF_VALUE: 25
PIF_VALUE: 23
PIF_VALUE: 28
PIF_VALUE: 26
PIF_VALUE: 27
PIF_VALUE: 26
PIF_VALUE: 25
PIF_VALUE: 26
PIF_VALUE: 26
PIF_VALUE: 3
PIF_VALUE: 27
PIF_VALUE: 26
PIF_VALUE: 27
PIF_VALUE: 26
PIF_VALUE: 29
PIF_VALUE: 26
PIF_VALUE: 27
PIF_VALUE: 27
PIF_VALUE: 25
PIF_VALUE: 29
PIF_VALUE: 22
PIF_VALUE: 20
PIF_VALUE: 29
PIF_VALUE: 27
PIF_VALUE: 28
PIF_VALUE: 28
PIF_VALUE: 27
PIF_VALUE: 28
PIF_VALUE: 27
PIF_VALUE: 29
PIF_VALUE: 30
PIF_VALUE: 27
PIF_VALUE: 27
PIF_VALUE: 28
PIF_VALUE: 1
PIF_VALUE: 2
PIF_VALUE: 26
PIF_VALUE: 27
PIF_VALUE: 26
PIF_VALUE: 27
PIF_VALUE: 26
PIF_VALUE: 27
PIF_VALUE: 27
PIF_VALUE: 28
PIF_VALUE: 26
PIF_VALUE: 30
PIF_VALUE: 28
PIF_VALUE: 29
PIF_VALUE: 30
PIF_VALUE: 26
PIF_VALUE: 27
PIF_VALUE: 0
PIF_VALUE: 25
PIF_VALUE: 26
PIF_VALUE: 27
PIF_VALUE: 25
PIF_VALUE: 26
PIF_VALUE: 28
PIF_VALUE: 4
PIF_VALUE: 26
PIF_VALUE: 30
PIF_VALUE: 27
PIF_VALUE: 26
PIF_VALUE: 27
PIF_VALUE: 29
PIF_VALUE: 28
PIF_VALUE: 27
PIF_VALUE: 25
PIF_VALUE: 7
PIF_VALUE: 22
PIF_VALUE: 26
PIF_VALUE: 25
PIF_VALUE: 26
PIF_VALUE: 22
PIF_VALUE: 0
PIF_VALUE: 26
PIF_VALUE: 28
PIF_VALUE: 28
PIF_VALUE: 27
PIF_VALUE: 26
PIF_VALUE: 1
PIF_VALUE: 28
PIF_VALUE: 29
PIF_VALUE: 29
PIF_VALUE: 25
PIF_VALUE: 27
PIF_VALUE: 28
PIF_VALUE: 29
PIF_VALUE: 26
PIF_VALUE: 25
PIF_VALUE: 26
PIF_VALUE: 27
PIF_VALUE: 27
PIF_VALUE: 30
PIF_VALUE: 28
PIF_VALUE: 27
PIF_VALUE: 26
PIF_VALUE: 30
PIF_VALUE: 30
PIF_VALUE: 28
PIF_VALUE: 25
PIF_VALUE: 28
PIF_VALUE: 3
PIF_VALUE: 27
PIF_VALUE: 26
PIF_VALUE: 29
PIF_VALUE: 26
PIF_VALUE: 24
PIF_VALUE: 27
PIF_VALUE: 29
PIF_VALUE: 25
PIF_VALUE: 27
PIF_VALUE: 28
PIF_VALUE: 26
PIF_VALUE: 26
PIF_VALUE: 27
PIF_VALUE: 30
PIF_VALUE: 26
PIF_VALUE: 27
PIF_VALUE: 28
PIF_VALUE: 20
PIF_VALUE: 25
PIF_VALUE: 28
PIF_VALUE: 0
PIF_VALUE: 28
PIF_VALUE: 26
PIF_VALUE: 4
PIF_VALUE: 26
PIF_VALUE: 27
PIF_VALUE: 28
PIF_VALUE: 27
PIF_VALUE: 26
PIF_VALUE: 28
PIF_VALUE: 28
PIF_VALUE: 2
PIF_VALUE: 28
PIF_VALUE: 25
PIF_VALUE: 26
PIF_VALUE: 25
PIF_VALUE: 27
PIF_VALUE: 28
PIF_VALUE: 6
PIF_VALUE: 29
PIF_VALUE: 27
PIF_VALUE: 27
PIF_VALUE: 30
PIF_VALUE: 28
PIF_VALUE: 26
PIF_VALUE: 27
PIF_VALUE: 28
PIF_VALUE: 26
PIF_VALUE: 27
PIF_VALUE: 29
PIF_VALUE: 27
PIF_VALUE: 25
PIF_VALUE: 28
PIF_VALUE: 26
PIF_VALUE: 23
PIF_VALUE: 27
PIF_VALUE: 29
PIF_VALUE: 27
PIF_VALUE: 30
PIF_VALUE: 27
PIF_VALUE: 28
PIF_VALUE: 27
PIF_VALUE: 26
PIF_VALUE: 28
PIF_VALUE: 27
PIF_VALUE: 26

## 2019-11-14 ASSESSMENT — PAIN SCALES - GENERAL
PAINLEVEL_OUTOF10: 10
PAINLEVEL_OUTOF10: 7
PAINLEVEL_OUTOF10: 9
PAINLEVEL_OUTOF10: 9
PAINLEVEL_OUTOF10: 8
PAINLEVEL_OUTOF10: 7
PAINLEVEL_OUTOF10: 8
PAINLEVEL_OUTOF10: 7
PAINLEVEL_OUTOF10: 7
PAINLEVEL_OUTOF10: 8
PAINLEVEL_OUTOF10: 7
PAINLEVEL_OUTOF10: 10

## 2019-11-14 ASSESSMENT — PAIN DESCRIPTION - FREQUENCY: FREQUENCY: INTERMITTENT

## 2019-11-14 ASSESSMENT — PAIN DESCRIPTION - PROGRESSION
CLINICAL_PROGRESSION: NOT CHANGED

## 2019-11-14 ASSESSMENT — PAIN DESCRIPTION - PAIN TYPE
TYPE: SURGICAL PAIN;CHRONIC PAIN
TYPE: CHRONIC PAIN

## 2019-11-14 ASSESSMENT — PAIN DESCRIPTION - LOCATION
LOCATION: BACK;NECK
LOCATION: BACK

## 2019-11-14 ASSESSMENT — ENCOUNTER SYMPTOMS
SHORTNESS OF BREATH: 0
STRIDOR: 0

## 2019-11-14 ASSESSMENT — PAIN DESCRIPTION - ORIENTATION: ORIENTATION: LOWER

## 2019-11-14 ASSESSMENT — LIFESTYLE VARIABLES: SMOKING_STATUS: 0

## 2019-11-14 ASSESSMENT — PAIN - FUNCTIONAL ASSESSMENT: PAIN_FUNCTIONAL_ASSESSMENT: 0-10

## 2019-11-14 ASSESSMENT — PAIN DESCRIPTION - ONSET: ONSET: ON-GOING

## 2019-11-14 ASSESSMENT — PAIN DESCRIPTION - DESCRIPTORS: DESCRIPTORS: ACHING;CONSTANT

## 2019-11-15 PROCEDURE — 97166 OT EVAL MOD COMPLEX 45 MIN: CPT

## 2019-11-15 PROCEDURE — 97110 THERAPEUTIC EXERCISES: CPT

## 2019-11-15 PROCEDURE — APPSS15 APP SPLIT SHARED TIME 0-15 MINUTES: Performed by: REGISTERED NURSE

## 2019-11-15 PROCEDURE — 1200000000 HC SEMI PRIVATE

## 2019-11-15 PROCEDURE — 97535 SELF CARE MNGMENT TRAINING: CPT

## 2019-11-15 PROCEDURE — 2580000003 HC RX 258: Performed by: REGISTERED NURSE

## 2019-11-15 PROCEDURE — 97161 PT EVAL LOW COMPLEX 20 MIN: CPT

## 2019-11-15 PROCEDURE — 6370000000 HC RX 637 (ALT 250 FOR IP): Performed by: REGISTERED NURSE

## 2019-11-15 PROCEDURE — 97116 GAIT TRAINING THERAPY: CPT

## 2019-11-15 PROCEDURE — 6360000002 HC RX W HCPCS: Performed by: REGISTERED NURSE

## 2019-11-15 RX ORDER — MORPHINE SULFATE 30 MG/1
30 TABLET, FILM COATED, EXTENDED RELEASE ORAL 2 TIMES DAILY
Status: DISCONTINUED | OUTPATIENT
Start: 2019-11-15 | End: 2019-11-16 | Stop reason: HOSPADM

## 2019-11-15 RX ORDER — OXYCODONE HYDROCHLORIDE 5 MG/1
20 TABLET ORAL EVERY 4 HOURS PRN
Status: DISCONTINUED | OUTPATIENT
Start: 2019-11-15 | End: 2019-11-16 | Stop reason: HOSPADM

## 2019-11-15 RX ORDER — ACETAMINOPHEN 500 MG
1000 TABLET ORAL 3 TIMES DAILY
Status: DISCONTINUED | OUTPATIENT
Start: 2019-11-15 | End: 2019-11-15

## 2019-11-15 RX ORDER — OXYCODONE HYDROCHLORIDE 5 MG/1
10 TABLET ORAL EVERY 4 HOURS PRN
Status: DISCONTINUED | OUTPATIENT
Start: 2019-11-15 | End: 2019-11-16 | Stop reason: HOSPADM

## 2019-11-15 RX ORDER — ACETAMINOPHEN 500 MG
1000 TABLET ORAL EVERY 8 HOURS PRN
Status: DISCONTINUED | OUTPATIENT
Start: 2019-11-15 | End: 2019-11-15

## 2019-11-15 RX ORDER — ACETAMINOPHEN 500 MG
1000 TABLET ORAL EVERY 8 HOURS SCHEDULED
Status: DISCONTINUED | OUTPATIENT
Start: 2019-11-15 | End: 2019-11-16 | Stop reason: HOSPADM

## 2019-11-15 RX ADMIN — PREGABALIN 150 MG: 75 CAPSULE ORAL at 09:04

## 2019-11-15 RX ADMIN — HYDROCHLOROTHIAZIDE 12.5 MG: 12.5 CAPSULE ORAL at 09:03

## 2019-11-15 RX ADMIN — DEXTROSE MONOHYDRATE 2 G: 50 INJECTION, SOLUTION INTRAVENOUS at 11:09

## 2019-11-15 RX ADMIN — MONTELUKAST SODIUM 10 MG: 10 TABLET, FILM COATED ORAL at 19:40

## 2019-11-15 RX ADMIN — LOSARTAN POTASSIUM 50 MG: 50 TABLET, FILM COATED ORAL at 09:03

## 2019-11-15 RX ADMIN — DOCUSATE SODIUM 100 MG: 100 CAPSULE, LIQUID FILLED ORAL at 19:40

## 2019-11-15 RX ADMIN — DEXTROSE MONOHYDRATE 2 G: 50 INJECTION, SOLUTION INTRAVENOUS at 01:44

## 2019-11-15 RX ADMIN — OXYCODONE HYDROCHLORIDE 10 MG: 5 TABLET ORAL at 01:44

## 2019-11-15 RX ADMIN — ACETAMINOPHEN 1000 MG: 500 TABLET ORAL at 17:28

## 2019-11-15 RX ADMIN — PANTOPRAZOLE SODIUM 40 MG: 40 TABLET, DELAYED RELEASE ORAL at 09:04

## 2019-11-15 RX ADMIN — OXYCODONE HYDROCHLORIDE 10 MG: 5 TABLET ORAL at 06:13

## 2019-11-15 RX ADMIN — HYDROMORPHONE HYDROCHLORIDE 0.5 MG: 1 INJECTION, SOLUTION INTRAMUSCULAR; INTRAVENOUS; SUBCUTANEOUS at 03:45

## 2019-11-15 RX ADMIN — OXYCODONE HYDROCHLORIDE 20 MG: 5 TABLET ORAL at 15:13

## 2019-11-15 RX ADMIN — SODIUM CHLORIDE, PRESERVATIVE FREE 10 ML: 5 INJECTION INTRAVENOUS at 20:28

## 2019-11-15 RX ADMIN — OXYCODONE HYDROCHLORIDE 20 MG: 5 TABLET ORAL at 19:41

## 2019-11-15 RX ADMIN — METFORMIN HYDROCHLORIDE 500 MG: 500 TABLET ORAL at 09:04

## 2019-11-15 RX ADMIN — ENOXAPARIN SODIUM 40 MG: 40 INJECTION SUBCUTANEOUS at 09:03

## 2019-11-15 RX ADMIN — ACETAMINOPHEN 1000 MG: 500 TABLET ORAL at 09:03

## 2019-11-15 RX ADMIN — LEVOTHYROXINE SODIUM 50 MCG: 50 TABLET ORAL at 09:03

## 2019-11-15 RX ADMIN — MORPHINE SULFATE 30 MG: 30 TABLET, EXTENDED RELEASE ORAL at 20:25

## 2019-11-15 RX ADMIN — BISACODYL 5 MG: 5 TABLET, COATED ORAL at 09:03

## 2019-11-15 RX ADMIN — METFORMIN HYDROCHLORIDE 500 MG: 500 TABLET ORAL at 17:28

## 2019-11-15 RX ADMIN — PREGABALIN 150 MG: 75 CAPSULE ORAL at 19:40

## 2019-11-15 RX ADMIN — MORPHINE SULFATE 30 MG: 30 TABLET, EXTENDED RELEASE ORAL at 09:04

## 2019-11-15 RX ADMIN — PANTOPRAZOLE SODIUM 40 MG: 40 TABLET, DELAYED RELEASE ORAL at 16:10

## 2019-11-15 RX ADMIN — ACETAMINOPHEN 650 MG: 325 TABLET ORAL at 03:45

## 2019-11-15 RX ADMIN — OXYCODONE HYDROCHLORIDE 20 MG: 5 TABLET ORAL at 11:09

## 2019-11-15 RX ADMIN — DOCUSATE SODIUM 100 MG: 100 CAPSULE, LIQUID FILLED ORAL at 09:03

## 2019-11-15 ASSESSMENT — PAIN SCALES - GENERAL
PAINLEVEL_OUTOF10: 5
PAINLEVEL_OUTOF10: 9
PAINLEVEL_OUTOF10: 8
PAINLEVEL_OUTOF10: 9
PAINLEVEL_OUTOF10: 5
PAINLEVEL_OUTOF10: 7
PAINLEVEL_OUTOF10: 9
PAINLEVEL_OUTOF10: 10
PAINLEVEL_OUTOF10: 9
PAINLEVEL_OUTOF10: 10
PAINLEVEL_OUTOF10: 9
PAINLEVEL_OUTOF10: 9
PAINLEVEL_OUTOF10: 8
PAINLEVEL_OUTOF10: 9
PAINLEVEL_OUTOF10: 9
PAINLEVEL_OUTOF10: 10

## 2019-11-15 ASSESSMENT — PAIN DESCRIPTION - PAIN TYPE
TYPE: ACUTE PAIN
TYPE: SURGICAL PAIN
TYPE: ACUTE PAIN
TYPE: ACUTE PAIN;CHRONIC PAIN

## 2019-11-15 ASSESSMENT — PAIN DESCRIPTION - PROGRESSION
CLINICAL_PROGRESSION: NOT CHANGED

## 2019-11-15 ASSESSMENT — PAIN DESCRIPTION - DESCRIPTORS: DESCRIPTORS: ACHING;CONSTANT

## 2019-11-15 ASSESSMENT — PAIN DESCRIPTION - ONSET: ONSET: ON-GOING

## 2019-11-15 ASSESSMENT — PAIN DESCRIPTION - FREQUENCY: FREQUENCY: CONTINUOUS

## 2019-11-15 ASSESSMENT — PAIN DESCRIPTION - LOCATION
LOCATION: NECK;BACK
LOCATION: NECK
LOCATION: NECK;BACK
LOCATION: BACK;NECK

## 2019-11-15 ASSESSMENT — PAIN DESCRIPTION - ORIENTATION: ORIENTATION: LOWER

## 2019-11-16 VITALS
DIASTOLIC BLOOD PRESSURE: 48 MMHG | TEMPERATURE: 97.8 F | RESPIRATION RATE: 16 BRPM | OXYGEN SATURATION: 98 % | HEART RATE: 71 BPM | WEIGHT: 148 LBS | SYSTOLIC BLOOD PRESSURE: 114 MMHG | HEIGHT: 58 IN | BODY MASS INDEX: 31.07 KG/M2

## 2019-11-16 PROCEDURE — 97530 THERAPEUTIC ACTIVITIES: CPT

## 2019-11-16 PROCEDURE — 6360000002 HC RX W HCPCS: Performed by: REGISTERED NURSE

## 2019-11-16 PROCEDURE — 6370000000 HC RX 637 (ALT 250 FOR IP): Performed by: REGISTERED NURSE

## 2019-11-16 PROCEDURE — 2580000003 HC RX 258: Performed by: REGISTERED NURSE

## 2019-11-16 PROCEDURE — 97116 GAIT TRAINING THERAPY: CPT

## 2019-11-16 RX ORDER — CYCLOBENZAPRINE HCL 10 MG
10 TABLET ORAL 3 TIMES DAILY PRN
Qty: 30 TABLET | Refills: 0 | Status: SHIPPED | OUTPATIENT
Start: 2019-11-16 | End: 2019-11-26

## 2019-11-16 RX ADMIN — SODIUM CHLORIDE, PRESERVATIVE FREE 10 ML: 5 INJECTION INTRAVENOUS at 11:10

## 2019-11-16 RX ADMIN — ACETAMINOPHEN 1000 MG: 500 TABLET ORAL at 00:07

## 2019-11-16 RX ADMIN — PREGABALIN 150 MG: 75 CAPSULE ORAL at 11:09

## 2019-11-16 RX ADMIN — ENOXAPARIN SODIUM 40 MG: 40 INJECTION SUBCUTANEOUS at 11:07

## 2019-11-16 RX ADMIN — METFORMIN HYDROCHLORIDE 500 MG: 500 TABLET ORAL at 11:06

## 2019-11-16 RX ADMIN — LOSARTAN POTASSIUM 50 MG: 50 TABLET, FILM COATED ORAL at 11:08

## 2019-11-16 RX ADMIN — MORPHINE SULFATE 30 MG: 30 TABLET, EXTENDED RELEASE ORAL at 11:14

## 2019-11-16 RX ADMIN — DOCUSATE SODIUM 100 MG: 100 CAPSULE, LIQUID FILLED ORAL at 11:07

## 2019-11-16 RX ADMIN — HYDROCHLOROTHIAZIDE 12.5 MG: 12.5 CAPSULE ORAL at 13:04

## 2019-11-16 RX ADMIN — OXYCODONE HYDROCHLORIDE 20 MG: 5 TABLET ORAL at 11:05

## 2019-11-16 RX ADMIN — OXYCODONE HYDROCHLORIDE 20 MG: 5 TABLET ORAL at 17:05

## 2019-11-16 RX ADMIN — OXYCODONE HYDROCHLORIDE 20 MG: 5 TABLET ORAL at 04:53

## 2019-11-16 RX ADMIN — PANTOPRAZOLE SODIUM 40 MG: 40 TABLET, DELAYED RELEASE ORAL at 11:06

## 2019-11-16 RX ADMIN — ACETAMINOPHEN 1000 MG: 500 TABLET ORAL at 13:04

## 2019-11-16 RX ADMIN — PANTOPRAZOLE SODIUM 40 MG: 40 TABLET, DELAYED RELEASE ORAL at 17:04

## 2019-11-16 RX ADMIN — METFORMIN HYDROCHLORIDE 500 MG: 500 TABLET ORAL at 17:04

## 2019-11-16 RX ADMIN — OXYCODONE HYDROCHLORIDE 20 MG: 5 TABLET ORAL at 00:07

## 2019-11-16 RX ADMIN — ACETAMINOPHEN 1000 MG: 500 TABLET ORAL at 04:53

## 2019-11-16 RX ADMIN — BISACODYL 5 MG: 5 TABLET, COATED ORAL at 11:07

## 2019-11-16 RX ADMIN — LEVOTHYROXINE SODIUM 50 MCG: 50 TABLET ORAL at 11:08

## 2019-11-16 ASSESSMENT — PAIN DESCRIPTION - DESCRIPTORS
DESCRIPTORS: ACHING
DESCRIPTORS: ACHING
DESCRIPTORS: ACHING;CONSTANT

## 2019-11-16 ASSESSMENT — PAIN - FUNCTIONAL ASSESSMENT: PAIN_FUNCTIONAL_ASSESSMENT: PREVENTS OR INTERFERES SOME ACTIVE ACTIVITIES AND ADLS

## 2019-11-16 ASSESSMENT — PAIN SCALES - GENERAL
PAINLEVEL_OUTOF10: 10
PAINLEVEL_OUTOF10: 8
PAINLEVEL_OUTOF10: 10
PAINLEVEL_OUTOF10: 8
PAINLEVEL_OUTOF10: 10
PAINLEVEL_OUTOF10: 8

## 2019-11-16 ASSESSMENT — PAIN DESCRIPTION - PROGRESSION
CLINICAL_PROGRESSION: NOT CHANGED

## 2019-11-16 ASSESSMENT — PAIN DESCRIPTION - LOCATION
LOCATION: NECK

## 2019-11-16 ASSESSMENT — PAIN DESCRIPTION - FREQUENCY
FREQUENCY: CONTINUOUS

## 2019-11-16 ASSESSMENT — PAIN DESCRIPTION - PAIN TYPE
TYPE: SURGICAL PAIN;CHRONIC PAIN
TYPE: SURGICAL PAIN;CHRONIC PAIN
TYPE: SURGICAL PAIN;CHRONIC PAIN;ACUTE PAIN

## 2019-11-16 ASSESSMENT — PAIN DESCRIPTION - ONSET
ONSET: ON-GOING

## 2019-11-18 ENCOUNTER — TELEPHONE (OUTPATIENT)
Dept: PHARMACY | Facility: CLINIC | Age: 79
End: 2019-11-18

## 2019-11-18 DIAGNOSIS — Z79.899 ENCOUNTER FOR MEDICATION REVIEW: Primary | ICD-10-CM

## 2019-11-18 PROCEDURE — 1111F DSCHRG MED/CURRENT MED MERGE: CPT | Performed by: PHARMACIST

## 2019-11-18 RX ORDER — FLUTICASONE PROPIONATE 44 UG/1
1 AEROSOL, METERED RESPIRATORY (INHALATION) 2 TIMES DAILY
COMMUNITY
End: 2022-09-02

## 2019-11-22 ENCOUNTER — HOSPITAL ENCOUNTER (OUTPATIENT)
Dept: PAIN MANAGEMENT | Age: 79
Discharge: HOME | End: 2019-11-22
Payer: MEDICARE

## 2019-11-22 VITALS
OXYGEN SATURATION: 96 % | DIASTOLIC BLOOD PRESSURE: 65 MMHG | RESPIRATION RATE: 16 BRPM | HEART RATE: 91 BPM | SYSTOLIC BLOOD PRESSURE: 124 MMHG | HEIGHT: 58 IN | TEMPERATURE: 98.8 F | WEIGHT: 148 LBS | BODY MASS INDEX: 31.07 KG/M2

## 2019-11-22 DIAGNOSIS — M50.30 DDD (DEGENERATIVE DISC DISEASE), CERVICAL: ICD-10-CM

## 2019-11-22 DIAGNOSIS — R26.89 IMBALANCE: ICD-10-CM

## 2019-11-22 DIAGNOSIS — M54.16 LUMBAR RADICULOPATHY: ICD-10-CM

## 2019-11-22 DIAGNOSIS — G89.4 CHRONIC PAIN ASSOCIATED WITH SIGNIFICANT PSYCHOSOCIAL DYSFUNCTION: ICD-10-CM

## 2019-11-22 DIAGNOSIS — M96.1 FAILED BACK SYNDROME: ICD-10-CM

## 2019-11-22 DIAGNOSIS — M51.36 DDD (DEGENERATIVE DISC DISEASE), LUMBAR: Primary | ICD-10-CM

## 2019-11-22 DIAGNOSIS — M46.1 SACROILIITIS (HCC): ICD-10-CM

## 2019-11-22 DIAGNOSIS — Z51.81 ENCOUNTER FOR MEDICATION MONITORING: ICD-10-CM

## 2019-11-22 DIAGNOSIS — M47.816 LUMBAR SPONDYLOSIS: ICD-10-CM

## 2019-11-22 DIAGNOSIS — M96.1 FAILED BACK SYNDROME OF LUMBAR SPINE: ICD-10-CM

## 2019-11-22 DIAGNOSIS — M54.2 NECK PAIN: ICD-10-CM

## 2019-11-22 DIAGNOSIS — M48.02 SPINAL STENOSIS IN CERVICAL REGION: ICD-10-CM

## 2019-11-22 PROCEDURE — 99213 OFFICE O/P EST LOW 20 MIN: CPT

## 2019-11-22 PROCEDURE — 99213 OFFICE O/P EST LOW 20 MIN: CPT | Performed by: NURSE PRACTITIONER

## 2019-11-22 RX ORDER — OXYCODONE AND ACETAMINOPHEN 10; 325 MG/1; MG/1
1 TABLET ORAL EVERY 6 HOURS PRN
Qty: 120 TABLET | Refills: 0 | Status: SHIPPED | OUTPATIENT
Start: 2019-11-28 | End: 2019-12-19 | Stop reason: SDUPTHER

## 2019-11-22 RX ORDER — MORPHINE SULFATE 30 MG/1
30 TABLET, FILM COATED, EXTENDED RELEASE ORAL 2 TIMES DAILY
Qty: 60 TABLET | Refills: 0 | Status: SHIPPED | OUTPATIENT
Start: 2019-11-29 | End: 2019-12-19 | Stop reason: SDUPTHER

## 2019-11-22 ASSESSMENT — ENCOUNTER SYMPTOMS
CONSTIPATION: 1
COUGH: 1
BACK PAIN: 1

## 2019-12-02 ENCOUNTER — TELEPHONE (OUTPATIENT)
Dept: NEUROSURGERY | Age: 79
End: 2019-12-02

## 2019-12-02 ENCOUNTER — OFFICE VISIT (OUTPATIENT)
Dept: NEUROSURGERY | Age: 79
End: 2019-12-02

## 2019-12-02 VITALS — SYSTOLIC BLOOD PRESSURE: 106 MMHG | DIASTOLIC BLOOD PRESSURE: 49 MMHG | RESPIRATION RATE: 16 BRPM | HEART RATE: 73 BPM

## 2019-12-02 DIAGNOSIS — M47.12 CERVICAL SPONDYLOSIS WITH MYELOPATHY: Primary | ICD-10-CM

## 2019-12-02 PROCEDURE — 99024 POSTOP FOLLOW-UP VISIT: CPT | Performed by: NEUROLOGICAL SURGERY

## 2019-12-19 ENCOUNTER — HOSPITAL ENCOUNTER (OUTPATIENT)
Dept: PAIN MANAGEMENT | Age: 79
Discharge: HOME | End: 2019-12-19
Payer: MEDICARE

## 2019-12-19 VITALS
RESPIRATION RATE: 16 BRPM | OXYGEN SATURATION: 96 % | DIASTOLIC BLOOD PRESSURE: 49 MMHG | SYSTOLIC BLOOD PRESSURE: 135 MMHG | HEART RATE: 83 BPM | TEMPERATURE: 98.3 F

## 2019-12-19 DIAGNOSIS — M16.11 PRIMARY OSTEOARTHRITIS OF RIGHT HIP: ICD-10-CM

## 2019-12-19 DIAGNOSIS — M48.02 CERVICAL STENOSIS OF SPINE: ICD-10-CM

## 2019-12-19 DIAGNOSIS — F11.90 CHRONIC, CONTINUOUS USE OF OPIOIDS: ICD-10-CM

## 2019-12-19 DIAGNOSIS — G89.4 CHRONIC PAIN ASSOCIATED WITH SIGNIFICANT PSYCHOSOCIAL DYSFUNCTION: ICD-10-CM

## 2019-12-19 DIAGNOSIS — Z71.89 ENCOUNTER FOR MEDICATION COUNSELING: ICD-10-CM

## 2019-12-19 DIAGNOSIS — M47.816 LUMBAR SPONDYLOSIS: ICD-10-CM

## 2019-12-19 DIAGNOSIS — M43.06 LUMBAR SPONDYLOLYSIS: ICD-10-CM

## 2019-12-19 DIAGNOSIS — R26.89 IMBALANCE: ICD-10-CM

## 2019-12-19 DIAGNOSIS — M96.1 FAILED BACK SYNDROME OF LUMBAR SPINE: ICD-10-CM

## 2019-12-19 DIAGNOSIS — M96.1 FAILED BACK SYNDROME: ICD-10-CM

## 2019-12-19 DIAGNOSIS — M51.36 DDD (DEGENERATIVE DISC DISEASE), LUMBAR: Primary | ICD-10-CM

## 2019-12-19 DIAGNOSIS — Z51.81 ENCOUNTER FOR MEDICATION MONITORING: ICD-10-CM

## 2019-12-19 DIAGNOSIS — M46.1 SACROILIITIS (HCC): ICD-10-CM

## 2019-12-19 DIAGNOSIS — Z96.89 SPINAL CORD STIMULATOR STATUS: ICD-10-CM

## 2019-12-19 PROCEDURE — 99213 OFFICE O/P EST LOW 20 MIN: CPT | Performed by: NURSE PRACTITIONER

## 2019-12-19 PROCEDURE — 99213 OFFICE O/P EST LOW 20 MIN: CPT

## 2019-12-19 RX ORDER — MORPHINE SULFATE 30 MG/1
30 TABLET, FILM COATED, EXTENDED RELEASE ORAL 2 TIMES DAILY
Qty: 60 TABLET | Refills: 0 | Status: SHIPPED | OUTPATIENT
Start: 2019-12-29 | End: 2020-01-20 | Stop reason: SDUPTHER

## 2019-12-19 RX ORDER — OXYCODONE AND ACETAMINOPHEN 10; 325 MG/1; MG/1
1 TABLET ORAL EVERY 6 HOURS PRN
Qty: 120 TABLET | Refills: 0 | Status: SHIPPED | OUTPATIENT
Start: 2019-12-29 | End: 2020-01-20 | Stop reason: SDUPTHER

## 2019-12-19 ASSESSMENT — ENCOUNTER SYMPTOMS
RESPIRATORY NEGATIVE: 1
BACK PAIN: 1
CONSTIPATION: 1

## 2020-01-07 ENCOUNTER — HOSPITAL ENCOUNTER (OUTPATIENT)
Dept: GENERAL RADIOLOGY | Age: 80
Discharge: HOME | End: 2020-01-09
Payer: MEDICARE

## 2020-01-07 ENCOUNTER — HOSPITAL ENCOUNTER (OUTPATIENT)
Age: 80
Discharge: HOME | End: 2020-01-09
Payer: MEDICARE

## 2020-01-07 PROCEDURE — 72040 X-RAY EXAM NECK SPINE 2-3 VW: CPT

## 2020-01-08 ENCOUNTER — OFFICE VISIT (OUTPATIENT)
Dept: NEUROSURGERY | Age: 80
End: 2020-01-08

## 2020-01-08 VITALS
BODY MASS INDEX: 31.1 KG/M2 | SYSTOLIC BLOOD PRESSURE: 124 MMHG | HEART RATE: 73 BPM | WEIGHT: 148.8 LBS | DIASTOLIC BLOOD PRESSURE: 70 MMHG

## 2020-01-08 PROCEDURE — 99024 POSTOP FOLLOW-UP VISIT: CPT | Performed by: NURSE PRACTITIONER

## 2020-01-20 ENCOUNTER — HOSPITAL ENCOUNTER (OUTPATIENT)
Dept: PAIN MANAGEMENT | Age: 80
Discharge: HOME | End: 2020-01-20
Payer: MEDICARE

## 2020-01-20 VITALS
BODY MASS INDEX: 31.07 KG/M2 | SYSTOLIC BLOOD PRESSURE: 114 MMHG | OXYGEN SATURATION: 97 % | HEIGHT: 58 IN | TEMPERATURE: 99 F | WEIGHT: 148 LBS | RESPIRATION RATE: 16 BRPM | DIASTOLIC BLOOD PRESSURE: 55 MMHG | HEART RATE: 75 BPM

## 2020-01-20 PROCEDURE — 99213 OFFICE O/P EST LOW 20 MIN: CPT | Performed by: NURSE PRACTITIONER

## 2020-01-20 PROCEDURE — 99213 OFFICE O/P EST LOW 20 MIN: CPT

## 2020-01-20 RX ORDER — MORPHINE SULFATE 30 MG/1
30 TABLET, FILM COATED, EXTENDED RELEASE ORAL 2 TIMES DAILY
Qty: 60 TABLET | Refills: 0 | Status: SHIPPED | OUTPATIENT
Start: 2020-01-28 | End: 2020-02-20 | Stop reason: SDUPTHER

## 2020-01-20 RX ORDER — FLUTICASONE PROPIONATE 50 MCG
1 SPRAY, SUSPENSION (ML) NASAL DAILY
COMMUNITY
End: 2022-09-02

## 2020-01-20 RX ORDER — OXYCODONE AND ACETAMINOPHEN 10; 325 MG/1; MG/1
1 TABLET ORAL EVERY 6 HOURS PRN
Qty: 120 TABLET | Refills: 0 | Status: SHIPPED | OUTPATIENT
Start: 2020-01-28 | End: 2020-02-20 | Stop reason: SDUPTHER

## 2020-01-20 ASSESSMENT — ENCOUNTER SYMPTOMS
CONSTIPATION: 1
BACK PAIN: 1
RESPIRATORY NEGATIVE: 1

## 2020-01-20 NOTE — DISCHARGE INSTR - COC
Continuity of Care Form    Patient Name: Lázaro Hidalgo   :  1940  MRN:  [id number]    Admit date:  2020  Discharge date:  ***    Code Status Order: Prior   Advance Directives:     Admitting Physician:  No admitting provider for patient encounter. PCP: Jessi Paulino DO    Discharging Nurse: [facility] Unit/Room#: No information available for this encounter. Discharging Unit Phone Number: ***    Emergency Contact:   Extended Emergency Contact Information  Primary Emergency Contact: YuriYung  Address: [address], [address] Phone: [phone]  Relation: Spouse  Hearing or visual needs: None  Other needs: None  Preferred language: English   needed? No  Secondary Emergency Contact: Alexandrea Souza  Address: [address], [address] Phone: [phone]  Work Phone: [phone]  Mobile Phone: [phone]  Relation: Child  Hearing or visual needs: None  Other needs: None  Preferred language: English   needed?  No    Past Surgical History:  Past Surgical History:   Procedure Laterality Date    BACK SURGERY  2010    lumbar fusion    BLADDER TUMOR EXCISION  2005    CARDIOVERSION      \"8-10 years ago\"  to get \"heart into regular rhythm\"    CARPAL TUNNEL RELEASE Bilateral     CATARACT REMOVAL WITH IMPLANT Bilateral 2014, 2014    [facility]    CERVICAL FUSION  2019     ANTERIOR CERVICAL DECOMPRESSION FUSION C3-4, C4-5    CERVICAL FUSION N/A 2019    ANTERIOR CERVICAL DECOMPRESSION FUSION C3-4, C4-5 (SUPINE) DEPUY, REGULAR TABLE, MICROSCOPE, C-ARM, EVOKES (# [id number] [facility]) performed by Trenton Hobbs DO at [address], LAPAROSCOPIC N/A 2017    CHOLECYSTECTOMY LAPAROSCOPIC ROBOTIC MULTIPORT performed by Pat Elise MD at [facility]  2013    CYSTOSCOPY  2016    CYSTOSCOPY  2019    ENDOSCOPIC ULTRASOUND (LOWER) N/A 8/17/2017    ENDOSCOPIC ULTRASOUND performed by Aguilar Escobar MD at [address] (UPPER)  08/17/2017    A cursory view of the gastric mucosa was normal. The scope was then further advanced through a patent pylorus to the second portion of the duodenum. The Gallbladder was evaluated in bulb position. Thick sludge was noted. The CBD was 7.8 mm with no filling defects. The PD was 5 mm in the HOP. The pancreatic tissue was edematous in body and tail.     KNEE ARTHROSCOPY Left     LUMBAR FUSION      [place] OFFICE/OUTPT VISIT,PROCEDURE ONLY N/A 1/24/2018    SPINAL HARDWARE REMOVAL LUMBAR BONE STIMULATOR performed by Neil iLn MD at [place] [address] Bilateral 1989    SKIN BIOPSY Right 11/2015    mole right posterior shoulder    SPINE SURGERY      spinal cord stimulator    SPINE SURGERY  91-4-15    renoval of spinal cord stimulator leads and battery    PASQUALE AND BSO      TUBAL LIGATION      UVULOPALATOPHARYGOPLASTY  2006       Immunization History:   Immunization History   Administered Date(s) Administered    Pneumococcal Conjugate 13-valent (Salvador Bel) 11/04/2017       Active Problems:  Patient Active Problem List   Diagnosis Code    DDD (degenerative disc disease), lumbar M51.36    Lumbar radiculopathy M54.16    Lumbar spondylolysis M43.06    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    Chronic pain associated with significant psychosocial dysfunction G89.4    Encounter for medication monitoring Z51.81    Primary osteoarthritis of right hip M16.11    Sacroiliitis ([facility] [address] M46.1    Spinal cord stimulator status Z96.89    Acute pancreatitis K85.90    Abdominal pain, epigastric R10.13    Irritable bowel syndrome with constipation K58.1    Lumbar spondylosis M47.816    Cervical radicular pain M54.12    DDD (degenerative disc disease), cervical M50.30    Neck pain M54.2    Spinal stenosis in cervical region M48.02    Failed back SCTR:300191295}    Rehab Therapies: {THERAPEUTIC INTERVENTION:9753389399}  Weight Bearing Status/Restrictions: [facility] [employer] Weight Bearin}  Other Medical Equipment (for information only, NOT a DME order):  {EQUIPMENT:521360416}  Other Treatments: ***    Patient's personal belongings (please select all that are sent with patient):  {CHP DME Belongings:028749595}    RN SIGNATURE:  {Esignature:969070038}    CASE MANAGEMENT/SOCIAL WORK SECTION    Inpatient Status Date: ***    Readmission Risk Assessment Score:  Readmission Risk              Risk of Unplanned Readmission:        0           Discharging to Facility/ Agency   · Name:   · Address:  · Phone:  · Fax:    Dialysis Facility (if applicable)   · Name:  · Address:  · Dialysis Schedule:  · Phone:  · Fax:    / signature: {Esignature:786394925}    PHYSICIAN SECTION    Prognosis: {Prognosis:8441570951}    Condition at Discharge: [address] Patient Condition:099737577}    Rehab Potential (if transferring to Rehab): {Prognosis:8205901454}    Recommended Labs or Other Treatments After Discharge: ***    Physician Certification: I certify the above information and transfer of [place]  is necessary for the continuing treatment of the diagnosis listed and that she requires {Admit to Appropriate Level of Care:01514} for {GREATER/LESS:173400273} 30 days.      Update Admission H&P: {CHP DME Changes in RFJDO:252129564}    PHYSICIAN SIGNATURE:  {Esignature:024463776}

## 2020-01-20 NOTE — PROGRESS NOTES
Josh 89 PROGRESS NOTE      Patient here today to review Medication Agreement    Chief Complaint:  Low back pain      HPI: She c/o low back pain which has increased. She has had   history of lumbar fusion,  She had cervical fusionhis past November. She wears a cervical collar, She states has to use her walker , she states can walk better. She is in PT. for her neck. Back Pain   This is a chronic problem. The current episode started more than 1 year ago. The problem occurs constantly. The problem has been gradually worsening since onset. The pain is present in the lumbar spine. The quality of the pain is described as aching (dull). The pain does not radiate. The pain is at a severity of 8/10. The pain is severe. Exacerbated by: walking. Associated symptoms include numbness. (Left leg) Risk factors include menopause and sedentary lifestyle. She has tried analgesics and ice (laying down) for the symptoms. She states it hurts more when she walks, Her sleep is good. Patient denies any new neurological symptoms. No bowel or bladder incontinence, no weakness, and no falling. Treatment goals:  Functional status: Improve balance        Aberrancy:   Any alcoholic beverages no      Any illegal drugs   no        Analgesia: pain  8        Adverse  Effects :on senokot , BM qod     ADL;s :not active recent surgery in PT           Pill count: appropriate    Morphine equivalent dose as reported on OARRS:120  Periodic Controlled Substance Monitoring: Possible medication side effects, risk of tolerance/dependence & alternative treatments discussed., No signs of potential drug abuse or diversion identified. , Assessed functional status., Obtaining appropriate analgesic effect of treatment. (Stephanie Bae, CONSUELO - CNP)  Chronic Pain > 80 MEDD: Co-prescribed Naloxone., Obtained or confirmed \"Medication Contract\" on file.  CONSUELO Recio - CNP)  Review ofOARRS does not show any aberrant Detected    Final 04/08/2019  2:45 PM [facility]   Codeine, Urine Not Detected    Final 04/08/2019  2:45 PM [facility]   MDA, Ur Not Detected    Final 04/08/2019  2:45 PM [facility]   Diazepam, Urine Not Detected    Final 04/08/2019  2:45 PM [facility]   Ethyl Glucuronide Ur Not Detected    Final 04/08/2019  2:45 PM [facility]   Fentanyl, Ur Not Detected    Final 04/08/2019  2:45 PM [facility]   Hydrocodone, Urine Not Detected    Final 04/08/2019  2:45 PM [facility]   Hydromorphone, Urine Not Detected    Final 04/08/2019  2:45 PM [facility]   Lorazepam, Urine Not Detected    Final 04/08/2019  2:45 PM [facility]   Marijuana Metab, Ur Not Detected    Final 04/08/2019  2:45 PM [facility]   MDEA, CRYSTAL, Ur Not Detected    Final 04/08/2019  2:45 PM [facility]   MDMA, Urine Not Detected    Final 04/08/2019  2:45 PM [facility]   Meperidine Metab, Ur Not Detected    Final 04/08/2019  2:45 PM [facility]   Methadone, Urine Not Detected    Final 04/08/2019  2:45 PM [facility]   Methamphetamine, Urine Not Detected    Final 04/08/2019  2:45 PM [facility]   Methylphenidate Not Detected    Final 04/08/2019  2:45 PM [facility]   Midazolam, Urine Not Detected    Final 04/08/2019  2:45 PM [facility]   Morphine Urine Present    Final 04/08/2019  2:45 PM [facility]   Norbuprenorphine, Urine Not Detected    Final 04/08/2019  2:45 PM [facility]   Nordiazepam, Urine Not Detected    Final 04/08/2019  2:45 PM [facility]   Norfentanyl, Urine Not Detected    Final 04/08/2019  2:45 PM [facility]   NORHYDROCODONE, URINE Not Detected    Final 04/08/2019  2:45 PM [facility]   Noroxycodone, Urine Present    Final 04/08/2019  2:45 PM [facility]   NOROXYMORPHONE, URINE Present    Final 04/08/2019  2:45 PM [facility]   Oxazepam, Urine Not Detected    Final 04/08/2019  2:45 PM [facility]   Oxycodone Urine Present    Final 04/08/2019  2:45 PM [facility]   Oxymorphone, Urine Present    Final 04/08/2019  2:45 PM [facility]   PCP, Urine Not Detected    Final 04/08/2019  2:45 PM [facility]   Phentermine, Ur Not Detected    Final 04/08/2019  2:45 PM [facility]   Propoxyphene, Urine Not Detected    Final 04/08/2019  2:45 PM [facility] Tapentadol-O-Sulfate, Urine Not Detected    Final 04/08/2019  2:45 PM [facility]   Tapentadol, Urine Not Detected    Final 04/08/2019  2:45 PM [facility]   Temazepam, Urine Not Detected    Final 04/08/2019  2:45 PM [facility]   Tramadol, Urine Not Detected    Final 04/08/2019  2:45 PM [facility]   Zolpidem, Urine Not Detected    Final 04/08/2019  2:45 PM [facility]   Drugs Expected, Ur     Corrected 04/08/2019  2:45 PM [facility] [id number] 6AM. OXYCODONE [id number] 12PM    CORRECTED ON 04/08 AT 1658: PREVIOUSLY REPORTED AS MORPHINE [id number] 6AM   Creatinine, Ur 175.7  20.0 - 400.0 mg/dL Final 04/08/2019  2:45 PM [facility]   Pain Mgt Drug Panel, Hi Res, Ur See Below    Final 04/08/2019  2:45 PM [facility]   (NOTE)   Methodology: Qualitative Enzyme Immunoassay and Qualitative Liquid   Chromatography-Time of Flight-Mass Spectrometry or Tandem Mass   Spectrometry, Quantitative Spectrophotometry   The absence of expected drug(s) and/or drug metabolite(s) may   indicate non-compliance, inappropriate timing of specimen   collection relative to drug administration, poor drug absorption,   diluted/adulterated urine, or limitations of testing. The   concentration must be greater than or equal to the cutoff to be   reported as present.  If specific drug concentrations are   required, contact the laboratory within two weeks of specimen   collection to request quantification by a second analytical   technique. Interpretive questions should be directed to the   laboratory. Results based on immunoassay detection that do not match clinical   expectations should be   interpreted with caution. Confirmatory testing by mass   spectrometry for immunoassay-based results is available, if   ordered within two weeks of specimen collection. Additional   charges apply. For medical purposes only; not valid for forensic use. This test was developed and its performance characteristics   determined by [employer]. The [place]  Food and Drug  CHOLECYSTECTOMY, LAPAROSCOPIC N/A 8/18/2017    CHOLECYSTECTOMY LAPAROSCOPIC ROBOTIC MULTIPORT performed by Wagner Vila MD at [address]  1/2013    CYSTOSCOPY  11/04/2016    CYSTOSCOPY  01/04/2019    ENDOSCOPIC ULTRASOUND (LOWER) N/A 8/17/2017    ENDOSCOPIC ULTRASOUND performed by Arelis Zacarias MD at [address] (UPPER)  08/17/2017    A cursory view of the gastric mucosa was normal. The scope was then further advanced through a patent pylorus to the second portion of the duodenum. The Gallbladder was evaluated in bulb position. Thick sludge was noted. The CBD was 7.8 mm with no filling defects. The PD was 5 mm in the HOP. The pancreatic tissue was edematous in body and tail.  KNEE ARTHROSCOPY Left     LUMBAR FUSION      DC OFFICE/OUTPT VISIT,PROCEDURE ONLY N/A 1/24/2018    SPINAL HARDWARE REMOVAL LUMBAR BONE STIMULATOR performed by Yanelis Ulrich MD at [address] [facility] Bilateral 1989    SKIN BIOPSY Right 11/2015    mole right posterior shoulder    SPINE SURGERY      spinal cord stimulator    SPINE SURGERY  91-4-15    renoval of spinal cord stimulator leads and battery    PASQUALE AND BSO      TUBAL LIGATION      UVULOPALATOPHARYGOPLASTY  2006       Allergies   Allergen Reactions    Azithromycin Shortness Of Breath     Tightness in chest    Adhesive Tape      OK to use paper tape per Patient         Current Outpatient Medications:     fluticasone (FLONASE) 50 MCG/ACT nasal spray, 1 spray by Each Nostril route daily, Disp: , Rfl:     [START ON 1/28/2020] morphine (MS CONTIN) 30 MG extended release tablet, Take 1 tablet by mouth 2 times daily for 30 days. , Disp: 60 tablet, Rfl: 0    [START ON 1/28/2020] oxyCODONE-acetaminophen (PERCOCET)  MG per tablet, Take 1 tablet by mouth every 6 hours as needed for Pain for up to 30 days. , Disp: 120 tablet, Rfl: 0    fluticasone (FLOVENT HFA) 44 MCG/ACT inhaler, Inhale 1 puff into the lungs 2 times Skin:     General: Skin is warm and dry. Neurological:      Mental Status: She is alert. Deep Tendon Reflexes:      Reflex Scores:       Patellar reflexes are 1+ on the right side and 1+ on the left side. Achilles reflexes are 1+ on the right side. Comments: Ambulates with a walker   Psychiatric:         Attention and Perception: Attention normal.         Mood and Affect: Mood normal.         Behavior: Behavior normal.         Thought Content:  Thought content normal.         Cognition and Memory: Cognition normal.         Judgment: Judgment normal.           Assessment:    Problem List Items Addressed This Visit     Spinal stenosis in cervical region    Sacroiliitis (HCC)    Relevant Medications    morphine (MS CONTIN) 30 MG extended release tablet (Start on 1/28/2020)    oxyCODONE-acetaminophen (PERCOCET)  MG per tablet (Start on 1/28/2020)    Primary osteoarthritis of right hip    Relevant Medications    morphine (MS CONTIN) 30 MG extended release tablet (Start on 1/28/2020)    oxyCODONE-acetaminophen (PERCOCET)  MG per tablet (Start on 1/28/2020)    Neck pain    Lumbar spondylosis    Relevant Medications    morphine (MS CONTIN) 30 MG extended release tablet (Start on 1/28/2020)    oxyCODONE-acetaminophen (PERCOCET)  MG per tablet (Start on 1/28/2020)    Lumbar spondylolysis    Lumbar radiculopathy    Imbalance (Chronic)    Failed back syndrome of lumbar spine    Failed back syndrome    Relevant Medications    morphine (MS CONTIN) 30 MG extended release tablet (Start on 1/28/2020)    oxyCODONE-acetaminophen (PERCOCET)  MG per tablet (Start on 1/28/2020)    Encounter for medication monitoring    Encounter for medication counseling    DDD (degenerative disc disease), lumbar - Primary    Relevant Medications    morphine (MS CONTIN) 30 MG extended release tablet (Start on 1/28/2020)    oxyCODONE-acetaminophen (PERCOCET)  MG per tablet (Start on 1/28/2020) Chronic pain associated with significant psychosocial dysfunction    Relevant Medications    morphine (MS CONTIN) 30 MG extended release tablet (Start on 1/28/2020)    oxyCODONE-acetaminophen (PERCOCET)  MG per tablet (Start on 1/28/2020)    Cervical radicular pain              Treatment Plan:  DISCUSSION: Treatment options discussed withpatient and all questions answered to patient's satisfaction. Possible side effects, risk of tolerance and or dependence and alternative treatments discussed    Obtaining appropriate analgesic effect of treatment   No signs of potential drug abuse or diversion identified    [x] Ill effects of being on chronic pain medications such as sleep disturbances, respiratory depression, hormonal changes, withdrawal symptoms, chronic opioid dependence and tolerance as well as risk of taking opioids with Benzodiazepines and taking opioids along with alcohol,  werediscussed with patient. I had asked the patient to minimize medication use and utilize pain medications only for uncontrolled rest pain or pain with exertional activities. I advised patient not to self-escalate painmedications without consulting with us. At each of patient's future visits we will try to taper pain medications, while adjusting the adjunct medications, and re-evaluating for Physical Therapy to improve spinal andjoint strength. We will continue to have discussions to decrease pain medications as tolerated. Counseled patient on effects their pain medication and /or their medical condition mayhave on their  ability to drive or operate machinery. Instructed not to drive or operate machinery if drowsy     I also discussed with the patient regarding the dangers of combining narcotic pain medication with tranquilizers, alcohol or illegal drugs or taking the medication any way other than prescribed. The dangers were discussed  including respiratory depression and death.  Patient was told to tell  all  physicians regarding the medications he is getting from pain clinic. Patient is warned not to take any unprescribed medications over-the-countermedications that can depress breathing . Patient is advised to talk to the pharmacist or physicians if planning to take any over-the-counter medications before  takeing them. Patient is strongly advised to avoid tranquilizers or  relaxants, illegal drugs  or any medications that can depress breathing  Patient is also advised to tell us if there is any changes in their medications from other physicians. TREATMENT OPTIONS:     Return in 4 weeks  Medication Agreement Requirements Met  Continue Opioid therapy  Script written for  Percocet.  Ms contin  Follow up appointment made

## 2020-02-12 ENCOUNTER — APPOINTMENT (OUTPATIENT)
Dept: CT IMAGING | Age: 80
End: 2020-02-12
Payer: MEDICARE

## 2020-02-12 ENCOUNTER — HOSPITAL ENCOUNTER (EMERGENCY)
Age: 80
Discharge: HOME | End: 2020-02-12
Attending: EMERGENCY MEDICINE
Payer: MEDICARE

## 2020-02-12 VITALS
WEIGHT: 149 LBS | BODY MASS INDEX: 31.28 KG/M2 | TEMPERATURE: 98.1 F | SYSTOLIC BLOOD PRESSURE: 120 MMHG | HEIGHT: 58 IN | OXYGEN SATURATION: 99 % | DIASTOLIC BLOOD PRESSURE: 41 MMHG | HEART RATE: 62 BPM | RESPIRATION RATE: 16 BRPM

## 2020-02-12 PROCEDURE — 70450 CT HEAD/BRAIN W/O DYE: CPT

## 2020-02-12 PROCEDURE — 99284 EMERGENCY DEPT VISIT MOD MDM: CPT

## 2020-02-12 PROCEDURE — 72125 CT NECK SPINE W/O DYE: CPT

## 2020-02-12 PROCEDURE — 70486 CT MAXILLOFACIAL W/O DYE: CPT

## 2020-02-12 ASSESSMENT — PAIN SCALES - GENERAL: PAINLEVEL_OUTOF10: 2

## 2020-02-12 ASSESSMENT — PAIN DESCRIPTION - LOCATION: LOCATION: ARM

## 2020-02-12 ASSESSMENT — PAIN DESCRIPTION - PAIN TYPE: TYPE: ACUTE PAIN

## 2020-02-12 ASSESSMENT — PAIN DESCRIPTION - ORIENTATION: ORIENTATION: RIGHT

## 2020-02-12 NOTE — ED PROVIDER NOTES
[address] ED  eMERGENCY dEPARTMENT eNCOUnter      Pt Name: Clifford Barrett  MRN: [id number]  [place] 1940  Date of evaluation: 2/12/20      CHIEF COMPLAINT:   Chief Complaint   Patient presents with    Fall    Head Injury     Left eye and forehead     HISTORY OF PRESENT ILLNESS    Clifford Barrett is a 70 y.o. female who presents with  for evaluation of head injury. Pt states she tripped over a box of pictures and fell forward into a screen door. Pt states he hit left side of face and injured right arm. Pt denies loc or emesis. States she has a hematoma over forehead and black eye. Pt states it also feels like she pulled a muscle in right arm. Pain is 2/10. Pt had an anterior cervical fusion several months ago. She is in a cervical collar. Was wearing it when she fell. Pt denies neck pain, back pain, chest pain, sob, nausea, emesis, abd pain, loss of bowel or bladder control, numbness, tingling, headache, vision changes, lightheadedness or dizziness. Pt takes an ASA daily. No other complaints. REVIEW OF SYSTEMS       Head injury  Contusion  Bruising  Swelling  Arm pain  Fall         Negative in 10 essential Systems except as mentioned above and in the HPI. PAST MEDICAL HISTORY   PMH:  has a past medical history of Achilles tendonitis, Asthma, COPD (chronic obstructive pulmonary disease) ([facility]), Cough, Degenerative lumbar disc, DM (diabetes mellitus), type 2 ([address] Failed back syndrome, lumbar, Fast heart beat, Fibromyalgia, History of bladder cancer, Hypertension, Hypothyroid, Kidney stones, Lumbar radiculopathy, Lumbar spondylolysis, Osteoarthritis, Sleep apnea, Spinal stenosis in cervical region, and Wears glasses. none otherwise stated from nurses notes  Surgical History:  has a past surgical history that includes Rotator cuff repair (Bilateral, 1989); Carpal tunnel release (Bilateral); Uvulopalatopharygoplasty (2006); Cystoscopy (1/2013);  Cataract removal with implant (Bilateral, 7/22/2014, 8/5/2014); Spine surgery; Spine surgery (91-4-15); skin biopsy (Right, 11/2015); Cystocopy (11/04/2016); Knee arthroscopy (Left); bladder tumor excision (2005); lumbar fusion; christina and bso (cervix removed); Endoscopic ultrasonography, GI (N/A, 8/17/2017); Cholecystectomy, laparoscopic (N/A, 8/18/2017); endoscopic ultrasound (upper) (08/17/2017); Cardioversion; Tubal ligation; pr office/outpt visit,procedure only (N/A, 1/24/2018); back surgery (2010); Cystoscopy (01/04/2019); cervical fusion (11/14/2019); and cervical fusion (N/A, 11/14/2019). none otherwise stated from nurses notes  Social History:  reports that she has never smoked. She has never used smokeless tobacco. She reports previous alcohol use. She reports that she does not use drugs. , lives at home with others  Family History: none  Psychiatric History: none    Allergies:is allergic to azithromycin and adhesive tape. PHYSICAL EXAM     INITIAL VITALS: BP (!) 120/41   Pulse 62   Temp 98.1 °F (36.7 °C) (Oral)   Resp 16   Ht 4' 10\" (1.473 m)   Wt 149 lb (67.6 kg)   SpO2 99%   BMI 31.14 kg/m²   Physical Exam  Vitals signs and nursing note reviewed. Constitutional:       Appearance: Normal appearance. HENT:      Head: Normocephalic. Contusion and left periorbital erythema present. No raccoon eyes, Christianson's sign, abrasion, masses, right periorbital erythema or laceration. Hair is normal.      Jaw: There is normal jaw occlusion. Nose: Nose normal.      Mouth/Throat:      Mouth: Mucous membranes are moist.      Pharynx: Oropharynx is clear. Eyes:      General: No visual field deficit. Extraocular Movements: Extraocular movements intact. Pupils: Pupils are equal, round, and reactive to light. Neck:      Comments: Pt placed in cervical collar. Cardiovascular:      Rate and Rhythm: Normal rate and regular rhythm. Pulses: Normal pulses.       Heart sounds: Normal heart sounds, S1 normal and S2 speech 3   To pain 2   Never 1   Best verbal response   Oriented 5   Confused 4   Inappropriate words 3   Incomprehensible sounds 2   Silent 1   Best motor response   Obeys commands 6   Localizes pain 5   Flexion withdrawal 4   Decerebrate flexion 3   Decerebrate extension 2   No response 1   Total 15         EMERGENCY DEPARTMENT COURSE:     No orders of the defined types were placed in this encounter. Pt tripped over box and fell forward injuring left side of face and right arm. No loc or emesis. Hematoma over forehead. Left black eye. Pt Is in cervical collar from recent surgery. No cervical tenderness. No neuro deficits on exam.   Pt does not want xrays of right arm. States it feels strained. Ct head, facial bones, and cervical spine are unremarkable. Will dc home with strict return instructions. Recommend follow up with orthopedic surgeon and neurosurgeon. Discussed results and plan with the pt. They expressed appropriate understanding. Pt given close follow up, supportive care instructions and strict return instructions at the bedside. The patient has been instructed to return if the symptoms worsen or change in any way. The patient verbalized understanding. FINAL IMPRESSION:     1. Injury of head, initial encounter    2. Hematoma of scalp, initial encounter    3.  Muscle strain of right upper arm, initial encounter          DISPOSITION:  DISPOSITION Decision To Discharge      PATIENT REFERRED TO:  Jacinto Sonycharo Cata [address] [facility] [address] [facility] [address]  [place], [address]  [address]  [id number]            DISCHARGE MEDICATIONS:  Discharge Medication List as of 2/12/2020  2:04 PM          (Please note that portions of this note were completed with a voice recognition program.  Efforts were made to edit the dictations but occasionally words are mis-transcribed.)        Felipe Zurita PA-C  02/12/20 4950

## 2020-02-19 ENCOUNTER — OFFICE VISIT (OUTPATIENT)
Dept: NEUROSURGERY | Age: 80
End: 2020-02-19
Payer: MEDICARE

## 2020-02-19 ENCOUNTER — HOSPITAL ENCOUNTER (OUTPATIENT)
Dept: PAIN MANAGEMENT | Age: 80
Discharge: HOME | End: 2020-02-19
Payer: MEDICARE

## 2020-02-19 VITALS
BODY MASS INDEX: 31.07 KG/M2 | DIASTOLIC BLOOD PRESSURE: 70 MMHG | OXYGEN SATURATION: 95 % | SYSTOLIC BLOOD PRESSURE: 122 MMHG | WEIGHT: 148 LBS | HEART RATE: 64 BPM | HEIGHT: 58 IN

## 2020-02-19 PROCEDURE — 99213 OFFICE O/P EST LOW 20 MIN: CPT | Performed by: NEUROLOGICAL SURGERY

## 2020-02-19 RX ORDER — NALOXONE HYDROCHLORIDE 4 MG/.1ML
1 SPRAY NASAL PRN
COMMUNITY
Start: 2019-07-16 | End: 2020-08-27

## 2020-02-19 RX ORDER — SENNA AND DOCUSATE SODIUM 50; 8.6 MG/1; MG/1
1 TABLET, FILM COATED ORAL DAILY PRN
COMMUNITY

## 2020-02-19 NOTE — PROGRESS NOTES
Take 1 packet by mouth daily      fluticasone (FLONASE) 50 MCG/ACT nasal spray 1 spray by Each Nostril route daily      morphine (MS CONTIN) 30 MG extended release tablet Take 1 tablet by mouth 2 times daily for 30 days. 60 tablet 0    oxyCODONE-acetaminophen (PERCOCET)  MG per tablet Take 1 tablet by mouth every 6 hours as needed for Pain for up to 30 days. 120 tablet 0    fluticasone (FLOVENT HFA) 44 MCG/ACT inhaler Inhale 1 puff into the lungs 2 times daily      diclofenac sodium 1 % GEL Apply 2 g topically 2 times daily as needed for Pain      magnesium hydroxide (DULCOLAX MILK OF MAGNESIA) 400 MG/5ML suspension Take 5 mLs by mouth daily as needed for Constipation      sodium chloride (OCEAN, BABY AYR) 0.65 % nasal spray 1 spray by Nasal route as needed for Congestion      vitamin D (ERGOCALCIFEROL) 45902 units CAPS capsule Take 1 capsule by mouth once a week 4 capsule 3    pregabalin (LYRICA) 150 MG capsule Take 150 mg by mouth 2 times daily.  montelukast (SINGULAIR) 10 MG tablet Take 10 mg by mouth nightly       metFORMIN (GLUCOPHAGE) 500 MG tablet Take 500 mg by mouth 2 times daily (with meals).  telmisartan-hydrochlorothiazide (MICARDIS HCT) 40-12.5 MG per tablet Take 1 tablet by mouth daily.  levothyroxine (SYNTHROID) 50 MCG tablet Take 50 mcg by mouth Daily.  omeprazole (PRILOSEC) 20 MG capsule Take 20 mg by mouth every evening.  aspirin 81 MG tablet Take 81 mg by mouth daily. No current facility-administered medications on file prior to visit.       Social History     Tobacco Use    Smoking status: Never Smoker    Smokeless tobacco: Never Used   Substance Use Topics    Alcohol use: Not Currently     Comment: rare    Drug use: Never       Allergies   Allergen Reactions    Azithromycin Shortness Of Breath     Tightness in chest    Adhesive Tape      OK to use paper tape per Patient- 2/19/20 patient states that she has been using all types of tape with no

## 2020-02-19 NOTE — DISCHARGE INSTR - COC
syndrome M96.1    Acute pain of right knee M25.561    Encounter for medication counseling Z71.89    Chronic, continuous use of opioids F11.90    Imbalance R26.89    Cervical stenosis of spine M48.02       Isolation/Infection:   Isolation          No Isolation        Patient Infection Status     None to display          Nurse Assessment:  Last Vital Signs: There were no vitals taken for this visit. Last documented pain score (0-10 scale):    Last Weight:   Wt Readings from Last 1 Encounters:   02/12/20 149 lb (67.6 kg)     Mental Status:  {IP PT MENTAL STATUS:20030}    IV Access:  { JACINDA IV ACCESS:732847564}    Nursing Mobility/ADLs:  Walking   {P DME TSIE:375390251}  Transfer  {P DME WNLR:763389543}  Bathing  {P DME HNHW:610491376}  Dressing  {P DME MMFZ:847117698}  Toileting  {P DME HJRR:142609322}  Feeding  {[facility] DME KZLU:361573535}  Med Admin  {[facility] DME ZTRT:020457190}  Med Delivery   {[facility] MED Delivery:993605565}    Wound Care Documentation and Therapy:        Elimination:  Continence:   · Bowel: {YES / ML:04310}  · Bladder: {YES / MX:19546}  Urinary Catheter: {Urinary Catheter:288857992}   Colostomy/Ileostomy/Ileal Conduit: {YES / AD:43958}       Date of Last BM: ***  No intake or output data in the 24 hours ending 02/19/20 0700  No intake/output data recorded.     Safety Concerns:     [employer] Safety Concerns:756437767}    Impairments/Disabilities:      [employer] Impairments/Disabilities:606971327}    Nutrition Therapy:  Current Nutrition Therapy:   [employer] Diet List:271222460}    Routes of Feeding: {[facility] DME Other Feedings:489328598}  Liquids: {Slp liquid thickness:25272}  Daily Fluid Restriction: {CHP DME Yes amt example:339895574}  Last Modified Barium Swallow with Video (Video Swallowing Test): {Done Not Done ENUC:759832525}    Treatments at the Time of Hospital Discharge:   Respiratory Treatments: ***  Oxygen Therapy:  {Therapy; copd oxygen:85330}  Ventilator:    {[facility] Vent PONM:543343830}    Rehab Therapies: {THERAPEUTIC INTERVENTION:3749541008}  Weight Bearing Status/Restrictions: [employer] [facility] [facility] Weight Bearin}  Other Medical Equipment (for information only, NOT a DME order):  {EQUIPMENT:157168711}  Other Treatments: ***    Patient's personal belongings (please select all that are sent with patient):  {CHP DME Belongings:797358979}    RN SIGNATURE:  {Esignature:571388815}    CASE MANAGEMENT/SOCIAL WORK SECTION    Inpatient Status Date: ***    Readmission Risk Assessment Score:  Readmission Risk              Risk of Unplanned Readmission:        0           Discharging to Facility/ Agency   · Name:   · Address:  · Phone:  · Fax:    Dialysis Facility (if applicable)   · Name:  · Address:  · Dialysis Schedule:  · Phone:  · Fax:    / signature: {Esignature:649172069}    PHYSICIAN SECTION    Prognosis: {Prognosis:4842956637}    Condition at Discharge: [facility] Patient Condition:705678508}    Rehab Potential (if transferring to Rehab): {Prognosis:5130010039}    Recommended Labs or Other Treatments After Discharge: ***    Physician Certification: I certify the above information and transfer of Uri Rivas  is necessary for the continuing treatment of the diagnosis listed and that she requires {Admit to Appropriate Level of Care:36167} for {GREATER/LESS:324923601} 30 days.      Update Admission H&P: {CHP DME Changes in OSYSE:856881920}    PHYSICIAN SIGNATURE:  {Esignature:348954049}

## 2020-02-20 ENCOUNTER — HOSPITAL ENCOUNTER (OUTPATIENT)
Dept: PAIN MANAGEMENT | Age: 80
Discharge: HOME | End: 2020-02-20
Payer: MEDICARE

## 2020-02-20 VITALS
DIASTOLIC BLOOD PRESSURE: 62 MMHG | WEIGHT: 148 LBS | SYSTOLIC BLOOD PRESSURE: 142 MMHG | TEMPERATURE: 97.9 F | BODY MASS INDEX: 31.07 KG/M2 | OXYGEN SATURATION: 97 % | HEIGHT: 58 IN | HEART RATE: 72 BPM | RESPIRATION RATE: 16 BRPM

## 2020-02-20 PROCEDURE — 99213 OFFICE O/P EST LOW 20 MIN: CPT

## 2020-02-20 PROCEDURE — 99213 OFFICE O/P EST LOW 20 MIN: CPT | Performed by: NURSE PRACTITIONER

## 2020-02-20 RX ORDER — OXYCODONE AND ACETAMINOPHEN 10; 325 MG/1; MG/1
1 TABLET ORAL EVERY 6 HOURS PRN
Qty: 120 TABLET | Refills: 0 | Status: SHIPPED | OUTPATIENT
Start: 2020-02-28 | End: 2020-03-18 | Stop reason: SDUPTHER

## 2020-02-20 RX ORDER — MORPHINE SULFATE 30 MG/1
30 TABLET, FILM COATED, EXTENDED RELEASE ORAL 2 TIMES DAILY
Qty: 60 TABLET | Refills: 0 | Status: SHIPPED | OUTPATIENT
Start: 2020-02-28 | End: 2020-03-18 | Stop reason: SDUPTHER

## 2020-02-20 ASSESSMENT — ENCOUNTER SYMPTOMS
RESPIRATORY NEGATIVE: 1
EYES NEGATIVE: 1
BACK PAIN: 1

## 2020-02-20 NOTE — PROGRESS NOTES
radiographs January 7, 2020.       HISTORY:   ORDERING SYSTEM PROVIDED HISTORY: fall, recent neck surgery   TECHNOLOGIST PROVIDED HISTORY:   fall, recent neck surgery   Reason for Exam: fall, hematoma over left orbit, recent neck surgery, in a   c-collar   Acuity: Acute   Type of Exam: Initial       FINDINGS:   BONES/ALIGNMENT: There is no acute fracture or traumatic malalignment. Status post discectomy and anterior fusion from C3-C5 with intervertebral   spacers.       DEGENERATIVE CHANGES: Advanced multilevel degenerative disc disease and mild   multilevel facet arthropathy again demonstrated.       SOFT TISSUES: There is no prevertebral soft tissue swelling.           Impression   No acute abnormality of the cervical spine.  Unchanged appearance of anterior   cervical spine fusion. EXAMINATION:   CT OF THE FACE WITHOUT CONTRAST,  2/12/2020 1:29 pm       TECHNIQUE:   CT of the face was performed without the administration of intravenous   contrast. Multiplanar reformatted images are provided for review. Dose   modulation, iterative reconstruction, and/or weight based adjustment of the   mA/kV was utilized to reduce the radiation dose to as low as reasonably   achievable.       COMPARISON:   None       HISTORY:   ORDERING SYSTEM PROVIDED HISTORY: Fall, hematoma over left orbit. TECHNOLOGIST PROVIDED HISTORY:   Fall, hematoma over left orbit   Reason for Exam: Fall, hematoma over left orbit, recent neck surgery, in a   C-collar. Acuity: Acute   Type of Exam: Initial       FINDINGS:   FACIAL BONES:  The maxilla, pterygoid plates and zygomatic arches are intact.    The mandible is intact.  The mandibular condyles are normally situated.  The   nasal bones and maxillary nasal processes are intact.       ORBITS:  The globes appear intact.  The extraocular muscles, optic nerve   sheath complexes and lacrimal glands appear unremarkable.  No retrobulbar   hematoma or mass is seen. Vanessa Monday is mild preseptal noroxymorphone,   oxymorphone   MORPHINE : based on morphine   ________________________________________________________________   INTERPRETIVE INFORMATION: Pain Mgt Calabrese, Mass Spec/EMIT, Ur,                            Interp   Interpretation depends on accuracy and completeness of patient   medication information submitted by client. 6-Acetylmorphine, Ur Not Detected    Final 04/08/2019  2:45 PM [facility]   7-Aminoclonazepam, Urine Not Detected    Final 04/08/2019  2:45 PM [facility]   Alpha-OH-Alpraz, Urine Not Detected    Final 04/08/2019  2:45 PM [facility]   Alprazolam, Urine Not Detected    Final 04/08/2019  2:45 PM [facility]   Amphetamines, urine Not Detected    Final 04/08/2019  2:45 PM [facility]   Barbiturates, Ur Not Detected    Final 04/08/2019  2:45 PM [facility]   Benzoylecgonine, Ur Not Detected    Final 04/08/2019  2:45 PM [facility]   Buprenorphine Urine Not Detected    Final 04/08/2019  2:45 PM [facility]   Carisoprodol, Ur Not Detected    Final 04/08/2019  2:45 PM [facility]   (NOTE)   The carisoprodol immunoassay has cross-reactivity to carisoprodol   and meprobamate.     Clonazepam, Urine Not Detected    Final 04/08/2019  2:45 PM [facility]   Codeine, Urine Not Detected    Final 04/08/2019  2:45 PM [facility]   MDA, Ur Not Detected    Final 04/08/2019  2:45 PM [facility]   Diazepam, Urine Not Detected    Final 04/08/2019  2:45 PM [facility]   Ethyl Glucuronide Ur Not Detected    Final 04/08/2019  2:45 PM [facility]   Fentanyl, Ur Not Detected    Final 04/08/2019  2:45 PM [facility]   Hydrocodone, Urine Not Detected    Final 04/08/2019  2:45 PM [facility]   Hydromorphone, Urine Not Detected    Final 04/08/2019  2:45 PM [facility]   Lorazepam, Urine Not Detected    Final 04/08/2019  2:45 PM [facility]   Marijuana Metab, Ur Not Detected    Final 04/08/2019  2:45 PM [facility]   MDEA, CRYSTAL, Ur Not Detected    Final 04/08/2019  2:45 PM [facility]   MDMA, Urine Not Detected    Final 04/08/2019  2:45 PM [facility]   Meperidine Metab, Ur Not Detected    Final 04/08/2019  2:45 PM [facility]   Methadone, Urine Not Detected    Final Quantitative Spectrophotometry   The absence of expected drug(s) and/or drug metabolite(s) may   indicate non-compliance, inappropriate timing of specimen   collection relative to drug administration, poor drug absorption,   diluted/adulterated urine, or limitations of testing. The   concentration must be greater than or equal to the cutoff to be   reported as present.  If specific drug concentrations are   required, contact the laboratory within two weeks of specimen   collection to request quantification by a second analytical   technique. Interpretive questions should be directed to the   laboratory. Results based on immunoassay detection that do not match clinical   expectations should be   interpreted with caution. Confirmatory testing by mass   spectrometry for immunoassay-based results is available, if   ordered within two weeks of specimen collection. Additional   charges apply. For medical purposes only; not valid for forensic use. This test was developed and its performance characteristics   determined by [facility]. The U.S. Food and Drug   Administration has not approved or cleared this test; however, FDA   clearance or approval is not currently required for clinical use. The results are not intended to be used as the sole means for   clinical diagnosis or patient management decisions. EER Hi Res Interp Ur See Note    Final 04/08/2019  2:45 PM [facility]   (NOTE)   Access [facility] Enhanced Report using either link below:   -Direct access:   [email or web address]   -Enter Username, Password: [email or web address]   Username: [email or web address]   Password: [email or web address]   Performed by [facility],   [place] [address], [address] [facility] [phone]   www. Lisette Guerrero MD, Lab.  Director                     Past Medical History:   Diagnosis Date    Achilles tendonitis     right    Asthma     COPD (chronic obstructive pulmonary disease) (HCC)     Cough     clear phlegm    Degenerative lumbar disc     DM (diabetes mellitus), type 2 (HCC)     PCP Dr. Fer Machuca, seen 8/2019    Failed back syndrome, lumbar     Fast heart beat     Hx of \"8-10 years ago\"    Fibromyalgia     History of bladder cancer     Hypertension     Hypothyroid     Kidney stones     Lumbar radiculopathy     Lumbar spondylolysis     Osteoarthritis     Sleep apnea     uses CPAP machine nightly    Spinal stenosis in cervical region     Wears glasses        Past Surgical History:   Procedure Laterality Date    BACK SURGERY  2010    lumbar fusion    BLADDER TUMOR EXCISION  2005    CARDIOVERSION      \"8-10 years ago\"  to get \"heart into regular rhythm\"    CARPAL TUNNEL RELEASE Bilateral     CATARACT REMOVAL WITH IMPLANT Bilateral 7/22/2014, 8/5/2014    [facility]    CERVICAL FUSION  11/14/2019     ANTERIOR CERVICAL DECOMPRESSION FUSION C3-4, C4-5    CERVICAL FUSION N/A 11/14/2019    ANTERIOR CERVICAL DECOMPRESSION FUSION C3-4, C4-5 (SUPINE) DEPUY, REGULAR TABLE, MICROSCOPE, C-ARM, EVOKES (# [id number] ELIZA) performed by Yady Mack DO at [address] [place] [address] 8/18/2017    CHOLECYSTECTOMY LAPAROSCOPIC ROBOTIC MULTIPORT performed by John Betancourt MD at [address] [facility]  1/2013    CYSTOSCOPY  11/04/2016    CYSTOSCOPY  01/04/2019    ENDOSCOPIC ULTRASOUND (LOWER) N/A 8/17/2017    ENDOSCOPIC ULTRASOUND performed by Irina Pederson MD at [address] [employer] (UPPER)  08/17/2017    A cursory view of the gastric mucosa was normal. The scope was then further advanced through a patent pylorus to the second portion of the duodenum. The Gallbladder was evaluated in bulb position. Thick sludge was noted. The CBD was 7.8 mm with no filling defects. The PD was 5 mm in the HOP. The pancreatic tissue was edematous in body and tail.     KNEE ARTHROSCOPY Left     LUMBAR FUSION      [place] OFFICE/OUTPT VISIT,PROCEDURE ONLY N/A 1/24/2018    SPINAL HARDWARE REMOVAL LUMBAR BONE STIMULATOR performed by Sky Candelario MD at [address] Bilateral 1989    SKIN BIOPSY Right 11/2015    mole right posterior shoulder    SPINE SURGERY      spinal cord stimulator    SPINE SURGERY  91-4-15    renoval of spinal cord stimulator leads and battery    PASQUALE AND BSO      TUBAL LIGATION      UVULOPALATOPHARYGOPLASTY  2006       Allergies   Allergen Reactions    Azithromycin Shortness Of Breath     Tightness in chest    Adhesive Tape      OK to use paper tape per Patient- 2/19/20 patient states that she has been using all types of tape with no reaction         Current Outpatient Medications:     [START ON 2/28/2020] morphine (MS CONTIN) 30 MG extended release tablet, Take 1 tablet by mouth 2 times daily for 30 days. , Disp: 60 tablet, Rfl: 0    [START ON 2/28/2020] oxyCODONE-acetaminophen (PERCOCET)  MG per tablet, Take 1 tablet by mouth every 6 hours as needed for Pain for up to 30 days. , Disp: 120 tablet, Rfl: 0    fluticasone (FLOVENT HFA) 44 MCG/ACT inhaler, Inhale 1 puff into the lungs 2 times daily, Disp: , Rfl:     vitamin D (ERGOCALCIFEROL) 61526 units CAPS capsule, Take 1 capsule by mouth once a week, Disp: 4 capsule, Rfl: 3    pregabalin (LYRICA) 150 MG capsule, Take 150 mg by mouth 2 times daily. , Disp: , Rfl:     montelukast (SINGULAIR) 10 MG tablet, Take 10 mg by mouth nightly , Disp: , Rfl:     metFORMIN (GLUCOPHAGE) 500 MG tablet, Take 500 mg by mouth 2 times daily (with meals). , Disp: , Rfl:     telmisartan-hydrochlorothiazide (MICARDIS HCT) 40-12.5 MG per tablet, Take 1 tablet by mouth daily. , Disp: , Rfl:     levothyroxine (SYNTHROID) 50 MCG tablet, Take 50 mcg by mouth Daily. , Disp: , Rfl:     omeprazole (PRILOSEC) 20 MG capsule, Take 20 mg by mouth every evening., Disp: , Rfl:     aspirin 81 MG tablet, Take 81 mg by mouth daily. , Disp: , Rfl:     sennosides-docusate sodium (SENOKOT-S) 8.6-50 MG tablet, Take 1 tablet by mouth daily as organization: Not on file     Attends meetings of clubs or organizations: Not on file     Relationship status: Not on file    Intimate partner violence:     Fear of current or ex partner: Not on file     Emotionally abused: Not on file     Physically abused: Not on file     Forced sexual activity: Not on file   Other Topics Concern    Not on file   Social History Narrative    Not on file       Travel Screen    Have you been in contact with someone who was sick    Yes ----------   no ---x-------    unable to assess ---------    Do you have any of the following symptoms:      Abdominal pain ------  Bruising or bleeding ------ Cough  ----    Joint pain --not new------ Muscle pain -------  Rash  ------    Vomiting -------- Weakness  -------  None of these _____    Diarrhea -------  Red eye -------   Unable to assess ----------    x  Traveled outside the [address] [facility] in the last month   yes---- no-----      Location-----------------------------------  start  date  ----------- -----  end date  -        Review of Systems:  Review of Systems   Constitution: Negative. HENT: Positive for hearing loss. Eyes: Negative. Cardiovascular: Negative. Respiratory: Negative. Hematologic/Lymphatic: Negative. Musculoskeletal: Positive for back pain, falls and joint pain. Left knee   Neurological: Positive for numbness. Physical Exam:  BP (!) 142/62   Pulse 72   Temp 97.9 °F (36.6 °C) (Oral)   Resp 16   Ht 4' 10\" (1.473 m)   Wt 148 lb (67.1 kg)   SpO2 97%   BMI 30.93 kg/m²     Physical Exam  HENT:      Head: Normocephalic. Eyes:      Comments: Periorbital swelling left eye and ecchymosis   Pulmonary:      Effort: Pulmonary effort is normal.   Musculoskeletal:      Right elbow: Tenderness found. Left knee: Tenderness found. Medial joint line and lateral joint line tenderness noted. Cervical back: She exhibits decreased range of motion and tenderness.       Lumbar back: She exhibits decreased range of motion and tenderness. Comments: Cervical and lumbar scar    Cervical collar on   Skin:     General: Skin is warm and dry. Neurological:      Mental Status: She is alert. Sensory: Sensory deficit present. Gait: Gait abnormal.      Deep Tendon Reflexes:      Reflex Scores:       Patellar reflexes are 1+ on the right side and 1+ on the left side. Achilles reflexes are 1+ on the right side and 1+ on the left side. Psychiatric:         Attention and Perception: Attention normal.         Mood and Affect: Mood normal.         Speech: Speech normal.         Behavior: Behavior normal.         Thought Content:  Thought content normal.         Cognition and Memory: Cognition normal.         Judgment: Judgment normal.           Assessment:      Problem List Items Addressed This Visit     Spinal cord stimulator status    Sacroiliitis (HCC)    Relevant Medications    morphine (MS CONTIN) 30 MG extended release tablet (Start on 2/28/2020)    oxyCODONE-acetaminophen (PERCOCET)  MG per tablet (Start on 2/28/2020)    Primary osteoarthritis of right hip    Relevant Medications    morphine (MS CONTIN) 30 MG extended release tablet (Start on 2/28/2020)    oxyCODONE-acetaminophen (PERCOCET)  MG per tablet (Start on 2/28/2020)    Lumbar spondylosis    Relevant Medications    morphine (MS CONTIN) 30 MG extended release tablet (Start on 2/28/2020)    oxyCODONE-acetaminophen (PERCOCET)  MG per tablet (Start on 2/28/2020)    Lumbar spondylolysis    Lumbar radiculopathy    Imbalance (Chronic)    Failed back syndrome of lumbar spine    Failed back syndrome    Relevant Medications    morphine (MS CONTIN) 30 MG extended release tablet (Start on 2/28/2020)    oxyCODONE-acetaminophen (PERCOCET)  MG per tablet (Start on 2/28/2020)    Encounter for medication monitoring    Encounter for medication counseling    DDD (degenerative disc disease), lumbar - Primary    Relevant Medications

## 2020-02-20 NOTE — DISCHARGE INSTR - COC
Continuity of Care Form    Patient Name: Kelly Vicente   :  1940  MRN:  [id number]    Admit date:  2020  Discharge date:  ***    Code Status Order: Prior   Advance Directives:     Admitting Physician:  No admitting provider for patient encounter. PCP: Melanie Anguiano DO    Discharging Nurse: [facility] Unit/Room#: No information available for this encounter. Discharging Unit Phone Number: ***    Emergency Contact:   Extended Emergency Contact Information  Primary Emergency Contact: Yung Welch  Address: [address], [address] [facility] [address] [facility] Phone: [phone]  Relation: Spouse  Hearing or visual needs: None  Other needs: None  Preferred language: English   needed? No  Secondary Emergency Contact: Alexandrea Souza  Address: [address], [address] [facility] [address] [facility] Phone: [phone]  Work Phone: [phone]  Mobile Phone: [phone]  Relation: Child  Hearing or visual needs: None  Other needs: None  Preferred language: English   needed?  No    Past Surgical History:  Past Surgical History:   Procedure Laterality Date    BACK SURGERY  2010    lumbar fusion    BLADDER TUMOR EXCISION  2005    CARDIOVERSION      \"8-10 years ago\"  to get \"heart into regular rhythm\"    CARPAL TUNNEL RELEASE Bilateral     CATARACT REMOVAL WITH IMPLANT Bilateral 2014, 2014    [facility]    CERVICAL FUSION  2019     ANTERIOR CERVICAL DECOMPRESSION FUSION C3-4, C4-5    CERVICAL FUSION N/A 2019    ANTERIOR CERVICAL DECOMPRESSION FUSION C3-4, C4-5 (SUPINE) DEPUY, REGULAR TABLE, MICROSCOPE, C-ARM, EVOKES (# [id number] ELIZA) performed by Shivani Mccormack DO at [address] [place] [address], LAPAROSCOPIC N/A 2017    CHOLECYSTECTOMY LAPAROSCOPIC ROBOTIC MULTIPORT performed by Homa Jenkins MD at [place] [address]  2013    CYSTOSCOPY  2016    CYSTOSCOPY  2019    syndrome M96.1    Acute pain of right knee M25.561    Encounter for medication counseling Z71.89    Chronic, continuous use of opioids F11.90    Imbalance R26.89    Cervical stenosis of spine M48.02       Isolation/Infection:   Isolation          No Isolation        Patient Infection Status     None to display          Nurse Assessment:  Last Vital Signs: There were no vitals taken for this visit. Last documented pain score (0-10 scale):    Last Weight:   Wt Readings from Last 1 Encounters:   02/19/20 148 lb (67.1 kg)     Mental Status:  {IP PT MENTAL STATUS:20030}    IV Access:  { JACINDA IV ACCESS:089587735}    Nursing Mobility/ADLs:  Walking   {P DME XDKO:313963020}  Transfer  {P DME DCRO:579123427}  Bathing  {P DME WIJJ:372452856}  Dressing  {P DME CIDR:579116021}  Toileting  {P DME GYDS:053205761}  Feeding  {[facility] DME PENV:114846429}  Med Admin  {[facility] DME CNGD:224934449}  Med Delivery   {[facility] MED Delivery:701399880}    Wound Care Documentation and Therapy:        Elimination:  Continence:   · Bowel: {YES / WI:96409}  · Bladder: {YES / QR:94717}  Urinary Catheter: {Urinary Catheter:067004067}   Colostomy/Ileostomy/Ileal Conduit: {YES / EV:12726}       Date of Last BM: ***  No intake or output data in the 24 hours ending 02/20/20 1539  No intake/output data recorded.     Safety Concerns:     [address] Safety Concerns:950336138}    Impairments/Disabilities:      [address] Impairments/Disabilities:226051101}    Nutrition Therapy:  Current Nutrition Therapy:   [address] Diet List:856016835}    Routes of Feeding: {[facility] DME Other Feedings:002010692}  Liquids: {Slp liquid thickness:33452}  Daily Fluid Restriction: {CHP DME Yes amt example:875977642}  Last Modified Barium Swallow with Video (Video Swallowing Test): {Done Not Done [facility]:861785714}    Treatments at the Time of Hospital Discharge:   Respiratory Treatments: ***  Oxygen Therapy:  {Therapy; copd oxygen:65608}  Ventilator:    { CC Vent

## 2020-02-21 RX ORDER — ERGOCALCIFEROL 1.25 MG/1
50000 CAPSULE ORAL WEEKLY
Qty: 4 CAPSULE | Refills: 0 | Status: SHIPPED | OUTPATIENT
Start: 2020-02-21 | End: 2020-03-21 | Stop reason: SDUPTHER

## 2020-02-21 NOTE — TELEPHONE ENCOUNTER
E-scribe requesting refill for Vit D 50,000. Please review and e-scribe if applicable.      Last Visit Date: 2/19/2020-Ahammad    Next Visit Date:  Future Appointments   Date Time Provider Andrew Robles   2/28/2020 11:20 AM Susu Sr MD [facility]   3/18/2020  1:20 PM Ty Arguelles MD [address]   3/25/2020 11:30 AM DO Simon Peralta Neuro [facility]

## 2020-02-28 ENCOUNTER — OFFICE VISIT (OUTPATIENT)
Dept: ORTHOPEDIC SURGERY | Age: 80
End: 2020-02-28
Payer: MEDICARE

## 2020-02-28 PROCEDURE — 99213 OFFICE O/P EST LOW 20 MIN: CPT | Performed by: ORTHOPAEDIC SURGERY

## 2020-02-28 PROCEDURE — 20550 NJX 1 TENDON SHEATH/LIGAMENT: CPT | Performed by: ORTHOPAEDIC SURGERY

## 2020-02-28 PROCEDURE — 20610 DRAIN/INJ JOINT/BURSA W/O US: CPT | Performed by: ORTHOPAEDIC SURGERY

## 2020-02-28 RX ORDER — LIDOCAINE HYDROCHLORIDE 10 MG/ML
2 INJECTION, SOLUTION INFILTRATION; PERINEURAL ONCE
Status: COMPLETED | OUTPATIENT
Start: 2020-02-28 | End: 2020-02-28

## 2020-02-28 RX ORDER — BETAMETHASONE SODIUM PHOSPHATE AND BETAMETHASONE ACETATE 3; 3 MG/ML; MG/ML
12 INJECTION, SUSPENSION INTRA-ARTICULAR; INTRALESIONAL; INTRAMUSCULAR; SOFT TISSUE ONCE
Status: COMPLETED | OUTPATIENT
Start: 2020-02-28 | End: 2020-02-28

## 2020-02-28 RX ORDER — BUPIVACAINE HYDROCHLORIDE 5 MG/ML
2 INJECTION, SOLUTION PERINEURAL ONCE
Status: COMPLETED | OUTPATIENT
Start: 2020-02-28 | End: 2020-02-28

## 2020-02-28 RX ADMIN — LIDOCAINE HYDROCHLORIDE 2 ML: 10 INJECTION, SOLUTION INFILTRATION; PERINEURAL at 11:24

## 2020-02-28 RX ADMIN — BETAMETHASONE SODIUM PHOSPHATE AND BETAMETHASONE ACETATE 12 MG: 3; 3 INJECTION, SUSPENSION INTRA-ARTICULAR; INTRALESIONAL; INTRAMUSCULAR; SOFT TISSUE at 11:22

## 2020-02-28 RX ADMIN — BUPIVACAINE HYDROCHLORIDE 10 MG: 5 INJECTION, SOLUTION PERINEURAL at 11:22

## 2020-02-28 RX ADMIN — BUPIVACAINE HYDROCHLORIDE 10 MG: 5 INJECTION, SOLUTION PERINEURAL at 11:23

## 2020-02-28 NOTE — PROGRESS NOTES
Jacob Sunshine M.D.            [address] [place] [address]., [address] [facility],[address], [address] [facility]           Dept Phone: [phone]           Dept Fax:  [phone]           Armaan Guillen          Dept Phone: [phone]           Dept Fax:  [phone]      Chief Compliant:  Chief Complaint   Patient presents with    Follow-up     Rt elbow pain, hard time picking arm up        History of Present Illness:  Joann Lundberg returns today. Refer to previous clinic notes for details. This is a 70 y.o. female who presents to the clinic today for reevaluation of right elbow pain. Patient has a neck surgery scheduled at [facility]. V's with Neurosurgeon Dr. Stacey Horton on 11/14/19. She is still wearing an [address]. She had injection to the right elbow on 10/11/19 for her tennis elbow. Review of Systems   Constitutional: Negative for fever, chills, sweats, recent illness, or recent injury. Neurological: Negative for headaches, numbness, or weakness. Integumentary: Negative for rash, itching, ecchymosis, abrasions, or laceration. Musculoskeletal: Positive for Follow-up (Rt elbow pain, hard time picking arm up)       Physical Exam:  Constitutional: Patient is oriented to person, place, and time. Patient appears well-developed and well nourished. HENT: Negative otherwise noted  Head: Normocephalic and Atraumatic  Nose: Normal  Eyes: Conjunctivae and EOM are normal  Neck: Normal range of motion Neck supple. Respiratory/Cardio: Effort normal. No respiratory distress. Musculoskeletal:  Tenderness to the right elbow consistent with tennis elbow. She has valgus disposition of left knee. motion  degrees with crepitus. Neurological: Patient is alert and oriented to person, place, and time. Normal strenght. No sensory deficit.   Skin: Skin is warm and dry  Psychiatric: Behavior is normal. Thought content normal.  Nursing note and vitals reviewed. Labs and Imaging:     XR of bilateral knees standing on 6/19/19 showed moderate lateral compartment joint space narrowing. Assessment and Plan:  1. Right elbow pain        This is a 70 y.o. female who presents to the clinic today for follow up right elbow pain. She has right tennis elbow. We will go ahead and injection the right elbow. Right elbow injection: Under sterile conditions, Patient's right elbow was injected with 2ml of Lidocaine, 2ml of Marcaine, and 1ml of Celestone. Patient tolerated the procedure well. We reviewed patient's Bilateral Knee XRs from June of 2019 that showed left knee moderate joint space narrowing on the lateral compartment. We also injected patients left knee. Under sterile conditions, Patient's left knee was injected with 2ml of Lidocaine, 2ml of Marcaine, and 2ml of Celestone. Patient tolerated the procedure well. Follow up as needed. Past History:    Current Outpatient Medications:     vitamin D (ERGOCALCIFEROL) 1.25 MG (44096 UT) CAPS capsule, TAKE 1 CAPSULE BY MOUTH ONCE A WEEK, Disp: 4 capsule, Rfl: 0    morphine (MS CONTIN) 30 MG extended release tablet, Take 1 tablet by mouth 2 times daily for 30 days. , Disp: 60 tablet, Rfl: 0    oxyCODONE-acetaminophen (PERCOCET)  MG per tablet, Take 1 tablet by mouth every 6 hours as needed for Pain for up to 30 days. , Disp: 120 tablet, Rfl: 0    sennosides-docusate sodium (SENOKOT-S) 8.6-50 MG tablet, Take 1 tablet by mouth daily as needed, Disp: , Rfl:     naloxone 4 MG/0.1ML LIQD nasal spray, 1 spray by Nasal route as needed, Disp: , Rfl:     psyllium (KONSYL) 28.3 % PACK, Take 1 packet by mouth daily, Disp: , Rfl:     fluticasone (FLONASE) 50 MCG/ACT nasal spray, 1 spray by Each Nostril route daily, Disp: , Rfl:     fluticasone (FLOVENT HFA) 44 MCG/ACT inhaler, Inhale 1 puff into the lungs 2 times daily, Disp: , Rfl:    activity:     Days per week: Not on file     Minutes per session: Not on file    Stress: Not on file   Relationships    Social connections:     Talks on phone: Not on file     Gets together: Not on file     Attends Yarsani service: Not on file     Active member of club or organization: Not on file     Attends meetings of clubs or organizations: Not on file     Relationship status: Not on file    Intimate partner violence:     Fear of current or ex partner: Not on file     Emotionally abused: Not on file     Physically abused: Not on file     Forced sexual activity: Not on file   Other Topics Concern    Not on file   Social History Narrative    Not on file     Past Medical History:   Diagnosis Date    Achilles tendonitis     right    Asthma     [facility] disease[facility]     Cough     clear phlegm    Degenerative lumbar disc     DM (diabetes mellitus), type 2 ([facility]     PCP Dr. Rinku Diggs, seen 8/2019    Failed back syndrome, lumbar     Fast heart beat     Hx of \"8-10 years ago\"    Fibromyalgia     History of bladder cancer     Hypertension     Hypothyroid     Kidney stones     Lumbar radiculopathy     Lumbar spondylolysis     Osteoarthritis     Sleep apnea     uses CPAP machine nightly    Spinal stenosis in cervical region     Wears glasses      Past Surgical History:   Procedure Laterality Date    BACK SURGERY  2010    lumbar fusion    BLADDER TUMOR EXCISION  2005    CARDIOVERSION      \"8-10 years ago\"  to get \"heart into regular rhythm\"    CARPAL TUNNEL RELEASE Bilateral     CATARACT REMOVAL WITH IMPLANT Bilateral 7/22/2014, 8/5/2014    Dilma/Demian    CERVICAL FUSION  11/14/2019     ANTERIOR CERVICAL DECOMPRESSION FUSION C3-4, C4-5    CERVICAL FUSION N/A 11/14/2019    ANTERIOR CERVICAL DECOMPRESSION FUSION C3-4, C4-5 (SUPINE) DEPUY, REGULAR TABLE, MICROSCOPE, C-ARM, EVOKES (# [id number] ELIZA) performed by Tex Amaya DO at [address], transcription may have occurred.

## 2020-03-06 ENCOUNTER — HOSPITAL ENCOUNTER (OUTPATIENT)
Age: 80
Discharge: HOME | End: 2020-03-06
Payer: MEDICARE

## 2020-03-06 ENCOUNTER — HOSPITAL ENCOUNTER (OUTPATIENT)
Dept: GENERAL RADIOLOGY | Age: 80
Discharge: HOME | End: 2020-03-08
Payer: MEDICARE

## 2020-03-06 ENCOUNTER — HOSPITAL ENCOUNTER (OUTPATIENT)
Age: 80
Discharge: HOME | End: 2020-03-08
Payer: MEDICARE

## 2020-03-06 LAB
ANION GAP SERPL CALCULATED.3IONS-SCNC: 9 MMOL/L (ref 9–17)
BUN BLDV-MCNC: 29 MG/DL (ref 8–23)
BUN/CREAT BLD: ABNORMAL (ref 9–20)
CALCIUM SERPL-MCNC: 8.9 MG/DL (ref 8.6–10.4)
CHLORIDE BLD-SCNC: 100 MMOL/L (ref 98–107)
CO2: 29 MMOL/L (ref 20–31)
CREAT SERPL-MCNC: 0.86 MG/DL (ref 0.5–0.9)
GFR AFRICAN AMERICAN: >60 ML/MIN
GFR NON-AFRICAN AMERICAN: >60 ML/MIN
GFR SERPL CREATININE-BSD FRML MDRD: ABNORMAL ML/MIN/{1.73_M2}
GFR SERPL CREATININE-BSD FRML MDRD: ABNORMAL ML/MIN/{1.73_M2}
GLUCOSE BLD-MCNC: 115 MG/DL (ref 70–99)
POTASSIUM SERPL-SCNC: 4.5 MMOL/L (ref 3.7–5.3)
SODIUM BLD-SCNC: 138 MMOL/L (ref 135–144)

## 2020-03-06 PROCEDURE — 80048 BASIC METABOLIC PNL TOTAL CA: CPT

## 2020-03-06 PROCEDURE — 36415 COLL VENOUS BLD VENIPUNCTURE: CPT

## 2020-03-06 PROCEDURE — 72082 X-RAY EXAM ENTIRE SPI 2/3 VW: CPT

## 2020-03-18 ENCOUNTER — HOSPITAL ENCOUNTER (OUTPATIENT)
Dept: PAIN MANAGEMENT | Age: 80
Discharge: HOME | End: 2020-03-18
Payer: MEDICARE

## 2020-03-18 RX ORDER — MORPHINE SULFATE 30 MG/1
30 TABLET, FILM COATED, EXTENDED RELEASE ORAL 2 TIMES DAILY
Qty: 60 TABLET | Refills: 0 | Status: SHIPPED | OUTPATIENT
Start: 2020-03-29 | End: 2020-04-24 | Stop reason: SDUPTHER

## 2020-03-18 RX ORDER — OXYCODONE AND ACETAMINOPHEN 10; 325 MG/1; MG/1
1 TABLET ORAL EVERY 6 HOURS PRN
Qty: 120 TABLET | Refills: 0 | Status: SHIPPED | OUTPATIENT
Start: 2020-03-29 | End: 2020-04-24 | Stop reason: SDUPTHER

## 2020-03-20 NOTE — TELEPHONE ENCOUNTER
Patient requesting refill on Vitamin D    Last Visit Date: 2/19/2020-Ahammad     Next Visit Date:         Future Appointments   Date Time Provider Andrew Margaret   2/28/2020 11:20 AM Aruna Landrum MD [facility]   3/18/2020  1:20 PM Herbert Borrero MD [address]   3/25/2020 11:30 AM DO Simon Khalil Neuro [facility]

## 2020-03-21 RX ORDER — ERGOCALCIFEROL 1.25 MG/1
50000 CAPSULE ORAL WEEKLY
Qty: 4 CAPSULE | Refills: 0 | Status: SHIPPED | OUTPATIENT
Start: 2020-03-21 | End: 2020-04-20 | Stop reason: SDUPTHER

## 2020-04-20 RX ORDER — ERGOCALCIFEROL 1.25 MG/1
50000 CAPSULE ORAL WEEKLY
Qty: 4 CAPSULE | Refills: 0 | Status: SHIPPED | OUTPATIENT
Start: 2020-04-20 | End: 2020-05-18

## 2020-04-24 ENCOUNTER — HOSPITAL ENCOUNTER (OUTPATIENT)
Dept: PAIN MANAGEMENT | Age: 80
Discharge: HOME | End: 2020-04-24
Payer: MEDICARE

## 2020-04-24 PROCEDURE — 99213 OFFICE O/P EST LOW 20 MIN: CPT

## 2020-04-24 PROCEDURE — 99213 OFFICE O/P EST LOW 20 MIN: CPT | Performed by: NURSE PRACTITIONER

## 2020-04-24 RX ORDER — MORPHINE SULFATE 30 MG/1
30 TABLET, FILM COATED, EXTENDED RELEASE ORAL 2 TIMES DAILY
Qty: 60 TABLET | Refills: 0 | Status: SHIPPED | OUTPATIENT
Start: 2020-04-28 | End: 2020-05-26 | Stop reason: SDUPTHER

## 2020-04-24 RX ORDER — BUMETANIDE 1 MG/1
1 TABLET ORAL DAILY
COMMUNITY
Start: 2020-02-24 | End: 2022-09-03

## 2020-04-24 RX ORDER — OXYCODONE AND ACETAMINOPHEN 10; 325 MG/1; MG/1
1 TABLET ORAL EVERY 6 HOURS PRN
Qty: 120 TABLET | Refills: 0 | Status: SHIPPED | OUTPATIENT
Start: 2020-04-28 | End: 2020-05-26 | Stop reason: SDUPTHER

## 2020-04-24 ASSESSMENT — ENCOUNTER SYMPTOMS
CONSTIPATION: 1
BACK PAIN: 1
RESPIRATORY NEGATIVE: 1

## 2020-04-24 NOTE — PROGRESS NOTES
Josh [address] PROGRESS NOTE      Patient  Per telephone call  review Medication Agreement    Chief Complaint:  Low back pain      HPI: She c/o low back pain which recently increased. She states a few days ago she had  A lot of pain on her right side. The pain does not radiate. She has history of  2 lumbar surgeries. She  Has history of cervical surgery 11/2019. She still has problems with her balance. She follows with her Neurosurgeon and   SCS or surgery was discussed. No Ed visits. She states has pain right hip area when sitting down sometimes. Back Pain   This is a chronic problem. The problem occurs constantly. The problem has been gradually worsening since onset. The pain is present in the lumbar spine. The quality of the pain is described as aching (dull). The pain does not radiate. The pain is at a severity of 7/10. The pain is moderate. Worse during: in am. The symptoms are aggravated by standing (walking). Associated symptoms include dysuria. Risk factors include menopause. She has tried analgesics, ice and bed rest for the symptoms. The treatment provided mild relief. Any new diagnostic workup: [] no  [x] yes [] Xray [] CT scan [] MRI [] DEXA scan     [] Other          EXAMINATION:   TWO XRAY VIEWS SCOLIOSIS SERIES       3/6/2020 2:41 pm       COMPARISON:   None.       HISTORY:   ORDERING SYSTEM PROVIDED HISTORY: Spinal deformity   TECHNOLOGIST PROVIDED HISTORY:   scoliosis - ambulate 20ft & stand 10min prior to xrays   Reason for Exam: Recheck scoliosis after fatigued spinal deformity   Acuity: Chronic   Type of Exam: Ongoing   Additional signs and symptoms: Recheck scoliosis after fatigued spinal   deformity       FINDINGS:   13 mm levoconvex scoliosis of the lumbar spine as measured from the superior   endplate of L1 to L5.  There are laminectomies and posterior fusion hardware   noted L2 through S1.  No significant secondary curvature is noted in the   thoracic spine. therapy  Script written for ms contin, percocet  Follow up appointment made

## 2020-04-27 ENCOUNTER — TELEMEDICINE (OUTPATIENT)
Dept: NEUROSURGERY | Age: 80
End: 2020-04-27
Payer: MEDICARE

## 2020-04-27 VITALS — BODY MASS INDEX: 31.14 KG/M2 | WEIGHT: 149 LBS

## 2020-04-27 PROCEDURE — 99213 OFFICE O/P EST LOW 20 MIN: CPT | Performed by: NEUROLOGICAL SURGERY

## 2020-04-27 NOTE — PROGRESS NOTES
UVULOPALATOPHARYGOPLASTY  2006   ,   Social History     Tobacco Use    Smoking status: Never Smoker    Smokeless tobacco: Never Used   Substance Use Topics    Alcohol use: Not Currently     Comment: rare    Drug use: Never   ,   Family History   Problem Relation Age of Onset    Heart Failure Mother     Lung Cancer Mother     Other Father          pneumonia    Alzheimer's Disease Father     Lung Cancer Brother     Diabetes Paternal Grandmother     Other Daughter         Fibromyalsia    Rheum Arthritis Daughter        PHYSICAL EXAMINATION:  [ INSTRUCTIONS:  \"[x]\" Indicates a positive item  \"[]\" Indicates a negative item  -- DELETE ALL ITEMS NOT EXAMINED]  Vital Signs: (As obtained by patient/caregiver or practitioner observation)    Blood pressure-  Heart rate-    Respiratory rate-    Temperature-  Pulse oximetry-     Constitutional: [x] Appears well-developed and well-nourished [] No apparent distress      [] Abnormal-   Mental status  [x] Alert and awake  [x] Oriented to person/place/time [x]Able to follow commands      Eyes:  EOM    [x]  Normal  [] Abnormal-  Sclera  [x]  Normal  [] Abnormal -         Discharge []  None visible  [] Abnormal -    HENT:   [x] Normocephalic, atraumatic.   [] Abnormal   [] Mouth/Throat: Mucous membranes are moist.     External Ears [x] Normal  [] Abnormal-     Neck: [x] No visualized mass     Pulmonary/Chest: [x] Respiratory effort normal.  [x] No visualized signs of difficulty breathing or respiratory distress        [] Abnormal-      Musculoskeletal:   [x] Normal gait with no signs of ataxia         [] Normal range of motion of neck        [] Abnormal-       Neurological:        [x] No Facial Asymmetry (Cranial nerve 7 motor function) (limited exam to video visit)          [x] No gaze palsy        [] Abnormal-         Skin:        [x] No significant exanthematous lesions or discoloration noted on facial skin         [] Abnormal-            Psychiatric:       [x] Normal

## 2020-05-18 RX ORDER — ERGOCALCIFEROL 1.25 MG/1
CAPSULE ORAL
Qty: 4 CAPSULE | Refills: 0 | Status: SHIPPED | OUTPATIENT
Start: 2020-05-18 | End: 2020-05-20 | Stop reason: SDUPTHER

## 2020-05-20 RX ORDER — ERGOCALCIFEROL 1.25 MG/1
CAPSULE ORAL
Qty: 4 CAPSULE | Refills: 0 | Status: SHIPPED | OUTPATIENT
Start: 2020-05-20 | End: 2020-09-28 | Stop reason: ALTCHOICE

## 2020-05-26 ENCOUNTER — HOSPITAL ENCOUNTER (OUTPATIENT)
Dept: PAIN MANAGEMENT | Age: 80
Discharge: HOME | End: 2020-05-26
Payer: MEDICARE

## 2020-05-26 PROCEDURE — 99213 OFFICE O/P EST LOW 20 MIN: CPT

## 2020-05-26 PROCEDURE — 99442 PR PHYS/QHP TELEPHONE EVALUATION 11-20 MIN: CPT | Performed by: NURSE PRACTITIONER

## 2020-05-26 RX ORDER — OXYCODONE AND ACETAMINOPHEN 10; 325 MG/1; MG/1
1 TABLET ORAL EVERY 6 HOURS PRN
Qty: 120 TABLET | Refills: 0 | Status: SHIPPED | OUTPATIENT
Start: 2020-05-29 | End: 2020-06-25 | Stop reason: SDUPTHER

## 2020-05-26 RX ORDER — MORPHINE SULFATE 30 MG/1
30 TABLET, FILM COATED, EXTENDED RELEASE ORAL 2 TIMES DAILY
Qty: 60 TABLET | Refills: 0 | Status: SHIPPED | OUTPATIENT
Start: 2020-05-31 | End: 2020-06-25 | Stop reason: SDUPTHER

## 2020-05-26 ASSESSMENT — ENCOUNTER SYMPTOMS
CONSTIPATION: 1
BACK PAIN: 1

## 2020-05-26 NOTE — PROGRESS NOTES
with wide posterior decompression and fusion   L2-S1 without significant spinal stenosis at the surgical levels.       Moderate to severe spinal stenosis L1-L2.       Similar to and better shown on the previous MRI is a fluid collection in the   posterior soft tissues centered at L4 favored to represent a seroma. Treatment goals:  Functional status: reduce intensity of pain      Aberrancy:   Any alcoholic beverages no      Any illegal drugs   no      Analgesia:pain a 10      Adverse  Effects :constipation,  BM qod, takes senokot prn    ADL;s :home exercises        Pill count: appropriate states has  has  #6 ms contin and #12 percocet tabs left,has narcan at home    Morphine equivalent dose as reported on OARRS:120  Periodic Controlled Substance Monitoring: Possible medication side effects, risk of tolerance/dependence & alternative treatments discussed., No signs of potential drug abuse or diversion identified. , Assessed functional status., Obtaining appropriate analgesic effect of treatment. (Thompson Godfrey, CONSUELO - CNP)  Chronic Pain > 80 MEDD: Co-prescribed Naloxone., Obtained or confirmed \"Medication Contract\" on file. CONSUELO Mcneal - CNP)  Review ofOARRS does not show any aberrant prescription behavior. Medication is helping the patient stay active. Patient denies any side effects and reports adequate analgesia. No sign of misuse/abuse.         When was thelast UDS:  4-8-2019           Was the UDS appropriate:yes      Record/Diagnostics Review:      As above, I did review the imaging    4/12/2019 10:30 AM - Jose AlejandroAlcides billingsley Incoming Lab Results From [facility]     Component Value Ref Range & Units Status Collected Lab   Pain Management Drug Panel Interp, Urine Consistent   Final 04/08/2019  2:45 PM [facility]   (NOTE)   ________________________________________________________________   DRUGS EXPECTED:   OXYCODONE [4/8/19]   MORPHINE [4/8/19]   ________________________________________________________________ CONSISTENT with medications provided:   OXYCODONE : based on oxycodone, noroxycodone, noroxymorphone,   oxymorphone   MORPHINE : based on morphine   ________________________________________________________________   INTERPRETIVE INFORMATION: Pain Mgt Calabrese, Mass Spec/EMIT, Ur,                            Interp   Interpretation depends on accuracy and completeness of patient   medication information submitted by client. 6-Acetylmorphine, Ur Not Detected   Final 04/08/2019  2:45 PM [facility]   7-Aminoclonazepam, Urine Not Detected   Final 04/08/2019  2:45 PM [facility]   Alpha-OH-Alpraz, Urine Not Detected   Final 04/08/2019  2:45 PM [facility]   Alprazolam, Urine Not Detected   Final 04/08/2019  2:45 PM [facility]   Amphetamines, urine Not Detected   Final 04/08/2019  2:45 PM [facility]   Barbiturates, Ur Not Detected   Final 04/08/2019  2:45 PM [facility]   Benzoylecgonine, Ur Not Detected   Final 04/08/2019  2:45 PM [facility]   Buprenorphine Urine Not Detected   Final 04/08/2019  2:45 PM [facility]   Carisoprodol, Ur Not Detected   Final 04/08/2019  2:45 PM [facility]   (NOTE)   The carisoprodol immunoassay has cross-reactivity to carisoprodol   and meprobamate.     Clonazepam, Urine Not Detected   Final 04/08/2019  2:45 PM [facility]   Codeine, Urine Not Detected   Final 04/08/2019  2:45 PM [facility]   MDA, Ur Not Detected   Final 04/08/2019  2:45 PM [facility]   Diazepam, Urine Not Detected   Final 04/08/2019  2:45 PM [facility]   Ethyl Glucuronide Ur Not Detected   Final 04/08/2019  2:45 PM [facility]   Fentanyl, Ur Not Detected   Final 04/08/2019  2:45 PM [facility]   Hydrocodone, Urine Not Detected   Final 04/08/2019  2:45 PM [facility]   Hydromorphone, Urine Not Detected   Final 04/08/2019  2:45 PM [facility]   Lorazepam, Urine Not Detected   Final 04/08/2019  2:45 PM [facility]   Marijuana Metab, Ur Not Detected   Final 04/08/2019  2:45 PM [facility]   MDEA, CRYSTAL, Ur Not Detected   Final 04/08/2019  2:45 PM [facility]   MDMA, Urine Not Detected   Final 04/08/2019  2:45 PM [facility]   Meperidine Metab, Ur Not Detected   Final or Tandem Mass   Spectrometry, Quantitative Spectrophotometry   The absence of expected drug(s) and/or drug metabolite(s) may   indicate non-compliance, inappropriate timing of specimen   collection relative to drug administration, poor drug absorption,   diluted/adulterated urine, or limitations of testing. The   concentration must be greater than or equal to the cutoff to be   reported as present.  If specific drug concentrations are   required, contact the laboratory within two weeks of specimen   collection to request quantification by a second analytical   technique. Interpretive questions should be directed to the   laboratory. Results based on immunoassay detection that do not match clinical   expectations should be   interpreted with caution. Confirmatory testing by mass   spectrometry for immunoassay-based results is available, if   ordered within two weeks of specimen collection. Additional   charges apply. For medical purposes only; not valid for forensic use. This test was developed and its performance characteristics   determined by [facility]. The U.S. Food and Drug   Administration has not approved or cleared this test; however, FDA   clearance or approval is not currently required for clinical use. The results are not intended to be used as the sole means for   clinical diagnosis or patient management decisions.     EER Hi Res Interp Ur See Note   Final 04/08/2019              Past Medical History:   Diagnosis Date    Achilles tendonitis     right    Asthma     COPD (chronic obstructive pulmonary disease) (HCC)     Cough     clear phlegm    Degenerative lumbar disc     DM [facility]di[facility] 2 [facility]     PCP Dr. Miller Me, seen 8/2019    Failed back syndrome, lumbar     Fast heart beat     Hx of \"8-10 years ago\"    Fibromyalgia     History of bladder cancer     Hypertension     Hypothyroid     Kidney stones     Lumbar radiculopathy     Lumbar spondylolysis     hydroxide (DULCOLAX MILK OF MAGNESIA) 400 MG/5ML suspension, Take 5 mLs by mouth daily as needed for Constipation, Disp: , Rfl:     sodium chloride (OCEAN, BABY AYR) 0.65 % nasal spray, 1 spray by Nasal route as needed for Congestion, Disp: , Rfl:     pregabalin (LYRICA) 150 MG capsule, Take 150 mg by mouth 2 times daily.  , Disp: , Rfl:     Family History   Problem Relation Age of Onset    Heart Failure Mother     Lung Cancer Mother     Other Father          pneumonia    Alzheimer's Disease Father     Lung Cancer Brother     Diabetes Paternal Grandmother     Other Daughter         Fibromyalsia    Rheum Arthritis Daughter        Social History     Socioeconomic History    Marital status:      Spouse name: Conrado Rinne Number of children: 3    Years of education: Not on file    Highest education level: Not on file   Occupational History    Occupation: retired   Social Needs    Financial resource strain: Not on file    Food insecurity     Worry: Not on file     Inability: Not on file   [employer] needs     Medical: Not on file     Non-medical: Not on file   Tobacco Use    Smoking status: Never Smoker    Smokeless tobacco: Never Used   Substance and Sexual Activity    Alcohol use: Not Currently     Comment: rare    Drug use: Never    Sexual activity: Yes     Partners: Male   Lifestyle    Physical activity     Days per week: Not on file     Minutes per session: Not on file    Stress: Not on file   Relationships    Social connections     Talks on phone: Not on file     Gets together: Not on file     Attends Confucianism service: Not on file     Active member of club or organization: Not on file     Attends meetings of clubs or organizations: Not on file     Relationship status: Not on file    Intimate partner violence     Fear of current or ex partner: Not on file     Emotionally abused: Not on file     Physically abused: Not on file     Forced sexual activity: Not on file   Other Topics

## 2020-05-27 ENCOUNTER — TELEMEDICINE (OUTPATIENT)
Dept: NEUROSURGERY | Age: 80
End: 2020-05-27
Payer: MEDICARE

## 2020-05-27 VITALS — WEIGHT: 150 LBS | BODY MASS INDEX: 31.35 KG/M2

## 2020-05-27 PROCEDURE — 99213 OFFICE O/P EST LOW 20 MIN: CPT | Performed by: NEUROLOGICAL SURGERY

## 2020-05-27 NOTE — PROGRESS NOTES
Wears glasses    ,   Past Surgical History:   Procedure Laterality Date    BACK SURGERY  2010    lumbar fusion    BLADDER TUMOR EXCISION  2005    CARDIOVERSION      \"8-10 years ago\"  to get \"heart into regular rhythm\"    CARPAL TUNNEL RELEASE Bilateral     CATARACT REMOVAL WITH IMPLANT Bilateral 7/22/2014, 8/5/2014    [facility]    CERVICAL FUSION  11/14/2019     ANTERIOR CERVICAL DECOMPRESSION FUSION C3-4, C4-5    CERVICAL FUSION N/A 11/14/2019    ANTERIOR CERVICAL DECOMPRESSION FUSION C3-4, C4-5 (SUPINE) DEPUY, REGULAR TABLE, MICROSCOPE, C-ARM, EVOKES (# [id number] [facility]) performed by Maicol Virgen DO at [address], LAPAROSCOPIC N/A 8/18/2017    CHOLECYSTECTOMY LAPAROSCOPIC ROBOTIC MULTIPORT performed by Zuleyma Carter MD at [address]  1/2013    CYSTOSCOPY  11/04/2016    CYSTOSCOPY  01/04/2019    ENDOSCOPIC ULTRASOUND (LOWER) N/A 8/17/2017    ENDOSCOPIC ULTRASOUND performed by Francesca Reed MD at [address] (UPPER)  08/17/2017    A cursory view of the gastric mucosa was normal. The scope was then further advanced through a patent pylorus to the second portion of the duodenum. The Gallbladder was evaluated in bulb position. Thick sludge was noted. The CBD was 7.8 mm with no filling defects. The PD was 5 mm in the HOP. The pancreatic tissue was edematous in body and tail.     KNEE ARTHROSCOPY Left     LUMBAR FUSION      NC OFFICE/OUTPT VISIT,PROCEDURE ONLY N/A 1/24/2018    SPINAL HARDWARE REMOVAL LUMBAR BONE STIMULATOR performed by Rosana Deluca MD at [facility] Bilateral 1989    SKIN BIOPSY Right 11/2015    mole right posterior shoulder    SPINE SURGERY      spinal cord stimulator    SPINE SURGERY  91-4-15    renoval of spinal cord stimulator leads and battery    PASQUALE AND BSO      TUBAL LIGATION      UVULOPALATOPHARYGOPLASTY  2006   ,   Social History     Tobacco Use    Smoking status: Never Smoker    Smokeless ASSESSMENT/PLAN:    Chronic axial back pain  Spinal deformity. We will obtain new scoliosis films to evaluate for any progressive sagittal plane deformity. Otherwise we will see patient after spinal cord stimulator trial to consider implantation    Sabra Negrete is a 70 y.o. female being evaluated by a Virtual Visit (video visit) encounter to address concerns as mentioned above. A caregiver was present when appropriate. Due to this being a TeleHealth encounter (During FEIKJ-28 public health emergency), evaluation of the following organ systems was limited: Vitals/Constitutional/EENT/Resp/CV/GI//MS/Neuro/Skin/Heme-Lymph-Imm. Pursuant to the emergency declaration under the [address] [place] [address] authority and the [employer] Act, this Virtual Visit was conducted with patient's (and/or legal guardian's) consent, to reduce the patient's risk of exposure to COVID-19 and provide necessary medical care. The patient (and/or legal guardian) has also been advised to contact this office for worsening conditions or problems, and seek emergency medical treatment and/or call 911 if deemed necessary. Patient identification was verified at the start of the visit: Yes    Total time spent on this encounter: 10    Services were provided through a video synchronous discussion virtually to substitute for in-person clinic visit. Patient and provider were located at their individual homes. --Derrell Johnson DO on 5/27/2020 at 11:37 AM    An electronic signature was used to authenticate this note.

## 2020-06-05 ENCOUNTER — HOSPITAL ENCOUNTER (OUTPATIENT)
Age: 80
Discharge: HOME | End: 2020-06-07
Payer: MEDICARE

## 2020-06-05 ENCOUNTER — HOSPITAL ENCOUNTER (OUTPATIENT)
Dept: GENERAL RADIOLOGY | Age: 80
Discharge: HOME | End: 2020-06-07
Payer: MEDICARE

## 2020-06-05 PROCEDURE — 72082 X-RAY EXAM ENTIRE SPI 2/3 VW: CPT

## 2020-06-16 ENCOUNTER — HOSPITAL ENCOUNTER (OUTPATIENT)
Dept: WOMENS IMAGING | Age: 80
Discharge: HOME | End: 2020-06-18
Payer: MEDICARE

## 2020-06-16 PROCEDURE — 77063 BREAST TOMOSYNTHESIS BI: CPT

## 2020-06-25 ENCOUNTER — HOSPITAL ENCOUNTER (OUTPATIENT)
Dept: PAIN MANAGEMENT | Age: 80
Discharge: HOME | End: 2020-06-25
Payer: MEDICARE

## 2020-06-25 ENCOUNTER — TELEPHONE (OUTPATIENT)
Dept: NEUROSURGERY | Age: 80
End: 2020-06-25

## 2020-06-25 PROCEDURE — 99213 OFFICE O/P EST LOW 20 MIN: CPT

## 2020-06-25 PROCEDURE — 99443 PR PHYS/QHP TELEPHONE EVALUATION 21-30 MIN: CPT | Performed by: PAIN MEDICINE

## 2020-06-25 RX ORDER — OXYCODONE AND ACETAMINOPHEN 10; 325 MG/1; MG/1
1 TABLET ORAL EVERY 6 HOURS PRN
Qty: 120 TABLET | Refills: 0 | Status: SHIPPED | OUTPATIENT
Start: 2020-06-30 | End: 2020-07-24 | Stop reason: SDUPTHER

## 2020-06-25 RX ORDER — MORPHINE SULFATE 30 MG/1
30 TABLET, FILM COATED, EXTENDED RELEASE ORAL 2 TIMES DAILY
Qty: 60 TABLET | Refills: 0 | Status: SHIPPED | OUTPATIENT
Start: 2020-06-30 | End: 2020-07-24 | Stop reason: SDUPTHER

## 2020-06-25 ASSESSMENT — ENCOUNTER SYMPTOMS
CONSTIPATION: 1
NAUSEA: 0
PHOTOPHOBIA: 0
VOMITING: 0
EYE PAIN: 0
COUGH: 0
BACK PAIN: 1
ALLERGIC/IMMUNOLOGIC NEGATIVE: 1
ABDOMINAL PAIN: 0
SHORTNESS OF BREATH: 0

## 2020-06-25 NOTE — PROGRESS NOTES
Zaki Solo is a 70 y.o. female evaluated on 6/25/2020. Modality of virtual service provided -via  telephone   Consent:  Patient and/or health care decision maker is aware that that patient may receive a bill for this telephone service, depending on one's insurance coverage, and has provided verbal consent to proceed: Yes    Patient identification was verified at the start of the visit: Yes    Chief complaint: Zaki Solo is 70 y.o.,  female, with complaint of back pain. Patient is complaining of pain involving the low back area with pain radiating to both lower extremities. She had undergone several procedures in the past without any pain relief. She was seen in the [facility] clinic and had a spinal cord stimulator put in which was removed as it was not functioning well. She was seen by neurosurgeon here who recommended trial with spinal cord stimulator. Patient would like to discuss about the stimulator again. Patient reports there is not much change in her pain. The medications are helping her for short period of time. She uses a walker to help with ambulation. Back Pain   This is a chronic problem. The current episode started more than 1 year ago. The problem occurs constantly. The problem is unchanged. The pain is present in the lumbar spine and sacro-iliac. The quality of the pain is described as aching and shooting Paticia Hasdanny). The pain radiates to the left thigh and left foot. The pain is at a severity of 7/10 (6-9). The pain is moderate. The pain is the same all the time. The symptoms are aggravated by bending, standing, sitting and twisting (Walking, lifting, ADLs). Associated symptoms include numbness (left leg) and weakness. Pertinent negatives include no abdominal pain, chest pain, dysuria, fever, headaches or tingling. Risk factors include lack of exercise and sedentary lifestyle.         Alleviating factors:nothing   Lifestyle changes experienced with pain: Wakes from sleep, Prevents or limits ADLs, Increases w/activity. , Increases w/prolonged sitting/standing/walking  Mood changes,anxious and depressed  Patient currently unemployed. Physical therapy did not help the pain. Are you under psychological counseling at present: No  Goals for treatment include:  Decrease in pain  Enjoy daily and recreational activities, return to previous status. Patient relates current medications are helping the pain. Patient reports taking pain medications as prescribed, denies obtaining medications from different sources and denies use of illegal drugs. Patient denies side effects from medications like nausea, vomiting, constipation or drowsiness. Patient reports current activities of daily living ar possible due to medications and would like to continue them. ADVERSE MEDICATION EFFECTS:   Nausea and vomiting: no   Constipation: yes-Undercontrol-: yes  Dizziness/drowsy/sleepy--no  Urinary Retention: no    ACTIVITY/SOCIAL/EMOTIONAL:  Sleep Pattern: 5 hours per night. nightime awakenings and difficulty falling back asleep if awakened  Home Exercises: - none  Activity:not significantly changed  Emotional Issues: anxiety/ nervousness.    Currently seeing a Psychiatrist or Psychologist:  No      ABERRANT BEHAVIORS SINCE LAST VISIT  Lost rx/pills:------------------------------------------ no  Taking  medication as prescribed: ----------- yes  Urine Drug Screen ---------------------------------  yes  Recent ER visits: -------------------------------------No  Pill count is appropriate: ---------------------------yes   Refills for prescriptions appropriate:---------- yes      Past Medical History:   Diagnosis Date    Achilles tendonitis     right    Asthma     COPD (chronic obstructive pulmonary disease) (HCC)     Cough     clear phlegm    Degenerative lumbar disc     DM (diabetes mellitus), type 2 ([facility]     PCP Dr. Redd Goncalves, seen 8/2019    Failed back syndrome, lumbar     Fast heart manageable     Urine drug screens have been appropriate. No aberrant activity noted. Analgesia is achieved. Activities of daily living are possible because of medications. Safe use of medications explained to patient. PDMP Monitoring:    Last PDMP Emerald Lopez as Reviewed [facility]):  Review User Review Instant Review Result   Ze Alcala 6/25/2020 10:50 AM Reviewed PDMP [1]     Counselling/Preventive measures for pain  Control:    [x]  Spine strengthening exercises are discussed with patient in detail. [x] Ill effects of being on chronic pain medications such as sleep disturbances, hormonal changes, withdrawal symptoms,  chronic opioid dependence and tolerance were discussed with patient. I had asked the patient to minimize medication use and utilize pain medications only for uncontrolled rest pain or pain with exertional activities. I advised patient not to self escalate pain medications without consulting with us. At each of patient's future visits we will try to taper pain medications, while adjusting the adjunct medications, and re-evaluating for Physical Therapy to improve spinal and joint strength. We will continue to have discussions to decrease pain medications as tolerated. I also discussed with the patient regarding the dangers of combining narcotic pain medication with tranquilizers, alcohol or illegal drugs or taking the medication any other than prescribed. The dangers including the respiratory depression and death. Patient was told to tell  to all  physicians regarding the medications he is getting from pain clinic. Patient is warned not to take any unprescribed medications over-the-counter medications that can depress breathing . Patient is advised to talk to the pharmacist or physicians if planning to take any over-the-counter medications before  takeing them.  Patient is strongly advised to avoid tranquilizers or  Relaxants for any medications that can depress breathing or recreational me.    Return in  4 weeks  with physician / CNP  for further plan of treatment. Due to the COVID-19 pandemic and the appropriate interventions by Danielle Monsivais, our non-urgent pain management patients will not be seen in the office at this time for their protection and the protection of our staff. To offer continuity of care, their prescriptions will be escribed this month after a careful chart review and review of their OARRS report  Pursuant to the emergency declaration under the [address] and [place] [address] waiver authority and the [employer] Act, this Virtual Visit was conducted, with patient's consent, to reduce the patient's risk of exposure to COVID-19 and provide continuity of care for an established patient. Services were provided through a video synchronous discussion virtually to substitute for in-person appointment. \"  Documentation:  I communicated with the patient and/or health care decision maker about plan of care  Details of this discussion including any medical advice provided: Total Time: minutes: 21-30 minutes    I affirm this is a Patient Initiated Episode with an Established Patient who has not had a related appointment within my department in the past 7 days or scheduled within the next 24 hours.     Electronically signed by Rayna Crocker MD on 6/25/2020 at 6:57 PM

## 2020-06-25 NOTE — TELEPHONE ENCOUNTER
Patient called in stating she canceled the appointment with pain management. Patient stated she does not want the bone stimulator placed in her back. She will also like to know if she has to continue to wear the neck stimulator?

## 2020-07-08 ENCOUNTER — OFFICE VISIT (OUTPATIENT)
Dept: NEUROSURGERY | Age: 80
End: 2020-07-08
Payer: MEDICARE

## 2020-07-08 VITALS
HEIGHT: 58 IN | OXYGEN SATURATION: 97 % | HEART RATE: 86 BPM | TEMPERATURE: 97.6 F | BODY MASS INDEX: 31.28 KG/M2 | WEIGHT: 149 LBS | DIASTOLIC BLOOD PRESSURE: 48 MMHG | SYSTOLIC BLOOD PRESSURE: 85 MMHG

## 2020-07-08 PROCEDURE — 99212 OFFICE O/P EST SF 10 MIN: CPT | Performed by: NEUROLOGICAL SURGERY

## 2020-07-08 RX ORDER — ALBUTEROL SULFATE 90 UG/1
2 AEROSOL, METERED RESPIRATORY (INHALATION) EVERY 4 HOURS PRN
COMMUNITY
Start: 2020-06-08

## 2020-07-08 RX ORDER — POTASSIUM CHLORIDE 750 MG/1
10 CAPSULE, EXTENDED RELEASE ORAL DAILY
COMMUNITY
Start: 2020-06-15 | End: 2022-09-03

## 2020-07-08 NOTE — PROGRESS NOTES
Jefferson Wiggins  [facility] [place] [address] [facility] [address]  Dept: [phone]    Patient:  Emma Goldsmith  YOB: 1940  Date: 7/8/20    The patient is a 70 y.o. female who presents today for consult of the following problems:     Chief Complaint   Patient presents with    Follow-up     Spinal deformity    Results     XR Spine 06/05/20             HPI:     Emma Goldsmith is a 70 y.o. female on whom neurosurgical consultation was requested by Lissette Amaya DO for management of significant axial back pain that is stable when she sitting down or lying down when she gets up and ambulates mobilizes she gets significant exacerbation all the way up to 8-10 out of 10 axial spasmodic nonradiating pain associated with no numbness or tingling in the lower extremities or saddle anesthesia. Patient has been through multimodal approaches to therapy including physical therapy as well as injections and is currently being evaluated for stimulator. Flo Winkler       History:     Past Medical History:   Diagnosis Date    Achilles tendonitis     right    Asthma     [facility]) [facility])     Cough     clear phlegm    Degenerative lumbar disc     [facility]     PCP Dr. Steph Eubanks, seen 8/2019    Failed back syndrome, lumbar     Fast heart beat     Hx of \"8-10 years ago\"    Fibromyalgia     History of bladder cancer     Hypertension     Hypothyroid     Kidney stones     Lumbar radiculopathy     Lumbar spondylolysis     Osteoarthritis     Sleep apnea     uses CPAP machine nightly    Spinal stenosis in cervical region     Wears glasses      Past Surgical History:   Procedure Laterality Date    BACK SURGERY  2010    lumbar fusion    BLADDER TUMOR EXCISION  2005    CARDIOVERSION      \"8-10 years ago\"  to get \"heart into regular rhythm\"    CARPAL TUNNEL RELEASE Bilateral     CATARACT REMOVAL WITH IMPLANT Bilateral 7/22/2014, 8/5/2014    Raffoaravind/Demian    CERVICAL FUSION  11/14/2019     ANTERIOR CERVICAL DECOMPRESSION FUSION C3-4, C4-5    CERVICAL FUSION N/A 11/14/2019    ANTERIOR CERVICAL DECOMPRESSION FUSION C3-4, C4-5 (SUPINE) DEPUY, REGULAR TABLE, MICROSCOPE, C-ARM, EVOKES (# [id number] ELIZA) performed by Juan Ross DO at [address], LAPAROSCOPIC N/A 8/18/2017    CHOLECYSTECTOMY LAPAROSCOPIC ROBOTIC MULTIPORT performed by Frieda Cornejo MD at [address] [facility]  1/2013    CYSTOSCOPY  11/04/2016    CYSTOSCOPY  01/04/2019    ENDOSCOPIC ULTRASOUND (LOWER) N/A 8/17/2017    ENDOSCOPIC ULTRASOUND performed by Latia Mary MD at [address] (UPPER)  08/17/2017    A cursory view of the gastric mucosa was normal. The scope was then further advanced through a patent pylorus to the second portion of the duodenum. The Gallbladder was evaluated in bulb position. Thick sludge was noted. The CBD was 7.8 mm with no filling defects. The PD was 5 mm in the HOP. The pancreatic tissue was edematous in body and tail.     KNEE ARTHROSCOPY Left     LUMBAR FUSION      AK OFFICE/OUTPT VISIT,PROCEDURE ONLY N/A 1/24/2018    SPINAL HARDWARE REMOVAL LUMBAR BONE STIMULATOR performed by Roger Mckinney MD at [address] [facility] Bilateral 1989    SKIN BIOPSY Right 11/2015    mole right posterior shoulder    SPINE SURGERY      spinal cord stimulator    SPINE SURGERY  91-4-15    renoval of spinal cord stimulator leads and battery    PASQUALE AND BSO      TUBAL LIGATION      UVULOPALATOPHARYGOPLASTY  2006     Family History   Problem Relation Age of Onset    Heart Failure Mother    Linda Sher Mother     Other Father          pneumonia    Alzheimer's Disease Father     Lung Cancer Brother     Diabetes Paternal Grandmother     Other Daughter         Fibromyalsia    Rheum Arthritis Daughter      Current Outpatient Medications on File Prior to Visit   Medication Sig Dispense Refill    albuterol sulfate  (90 Base) MCG/ACT inhaler Inhale 1 puff into the lungs daily      potassium chloride (MICRO-K) 10 MEQ extended release capsule Take 1 capsule by mouth daily      morphine (MS CONTIN) 30 MG extended release tablet Take 1 tablet by mouth 2 times daily for 30 days. 60 tablet 0    oxyCODONE-acetaminophen (PERCOCET)  MG per tablet Take 1 tablet by mouth every 6 hours as needed for Pain for up to 30 days. 120 tablet 0    vitamin D (ERGOCALCIFEROL) 1.25 MG (91322 UT) CAPS capsule TAKE 1 CAPSULE BY MOUTH ONE TIME PER WEEK 4 capsule 0    bumetanide (BUMEX) 1 MG tablet       sennosides-docusate sodium (SENOKOT-S) 8.6-50 MG tablet Take 1 tablet by mouth daily as needed      naloxone 4 MG/0.1ML LIQD nasal spray 1 spray by Nasal route as needed      psyllium (KONSYL) 28.3 % PACK Take 1 packet by mouth daily      fluticasone (FLONASE) 50 MCG/ACT nasal spray 1 spray by Each Nostril route daily      fluticasone (FLOVENT HFA) 44 MCG/ACT inhaler Inhale 1 puff into the lungs 2 times daily      diclofenac sodium 1 % GEL Apply 2 g topically 2 times daily as needed for Pain      magnesium hydroxide (DULCOLAX MILK OF MAGNESIA) 400 MG/5ML suspension Take 5 mLs by mouth daily as needed for Constipation      sodium chloride (OCEAN, BABY AYR) 0.65 % nasal spray 1 spray by Nasal route as needed for Congestion      pregabalin (LYRICA) 150 MG capsule Take 150 mg by mouth 2 times daily.  montelukast (SINGULAIR) 10 MG tablet Take 10 mg by mouth nightly       metFORMIN (GLUCOPHAGE) 500 MG tablet Take 500 mg by mouth 2 times daily (with meals).  telmisartan-hydrochlorothiazide (MICARDIS HCT) 40-12.5 MG per tablet Take 1 tablet by mouth daily.  levothyroxine (SYNTHROID) 50 MCG tablet Take 50 mcg by mouth Daily.  omeprazole (PRILOSEC) 20 MG capsule Take 20 mg by mouth every evening.  aspirin 81 MG tablet Take 81 mg by mouth daily.        No current facility-administered medications on file prior to visit. Social History     Tobacco Use    Smoking status: Never Smoker    Smokeless tobacco: Never Used   Substance Use Topics    Alcohol use: Not Currently     Comment: rare    Drug use: Never       Allergies   Allergen Reactions    Azithromycin Shortness Of Breath     Tightness in chest    Adhesive Tape      OK to use paper tape per Patient- 2/19/20 patient states that she has been using all types of tape with no reaction       Review of Systems  Constitutional: Negative for activity change and appetite change. HENT: Negative for ear pain and facial swelling. Eyes: Negative for discharge and itching. Respiratory: Negative for choking and chest tightness. Cardiovascular: Negative for chest pain and leg swelling. Gastrointestinal: Negative for nausea and abdominal pain. Endocrine: Negative for cold intolerance and heat intolerance. Genitourinary: Negative for frequency and flank pain. Musculoskeletal: Negative for myalgias and joint swelling. Skin: Negative for rash and wound. Allergic/Immunologic: Negative for environmental allergies and food allergies. Hematological: Negative for adenopathy. Does not bruise/bleed easily. Psychiatric/Behavioral: Negative for self-injury. The patient is not nervous/anxious. Physical Exam:      BP (!) 85/48 (Site: Left Upper Arm, Position: Sitting, Cuff Size: Large Adult)   Pulse 86   Temp 97.6 °F (36.4 °C) (Temporal)   Ht 4' 10\" (1.473 m)   Wt 149 lb (67.6 kg)   SpO2 97%   BMI 31.14 kg/m²   Estimated body mass index is 31.14 kg/m² as calculated from the following:    Height as of this encounter: 4' 10\" (1.473 m). Weight as of this encounter: 149 lb (67.6 kg). General:  Nakia Valle is a 70y.o. year old female who appears her stated age. HEENT: Normocephalic atraumatic. Neck supple. Chest: regular rate; pulses equal  Abdomen: Soft nontender nondistended.  Normoactive bowel sounds. Ext: DP and PT pulses 2+, good cap refill  Neuro    Mentation  Appropriate affect  Registration intact  Orientation intact  3 item recall intact  Judgement intact to situation    Cranial Nerves:   Pupils equal and reactive to light  Extraocular motion intact  Face and shrug symmetric  Tongue midline  No dysarthria  v1-3 sensation symmetric, masseter tone symmetric  Hearing symmetric and intact to finger rub    Sensation:   Intact    Motor  L deltoid 5/5; R deltoid 5/5  L biceps 5/5; R biceps 5/5  L triceps 5/5; R triceps 5/5  L wrist extension 5/5; R wrist extension 5/5  L intrinsics 5/5; R intrinsics 5/5     L iliopsoas 5/5 , R iliopsoas 5/5  L quadriceps 5/5; R quadriceps 5/5  L Dorsiflexion 5/5; R dorsiflexion 5/5  L Plantarflexion 5/5; R plantarflexion 5/5  L EHL 5/5; R EHL 5/5    Reflexes  L Brachioradialis 2+/4; R brachioradialis 2+/4  L Biceps 2+/4; R Biceps 2+/4  L Triceps 2+/4; R Triceps 2+/4  L Patellar 2+/4: R Patellar 2+/4  L Achilles 2+/4; R Achilles 2+/4    hoffmans L: neg  hoffmans R: neg  Clonus L: neg  Clonus R: neg  Babinski L: up  Babinski R; up    Significant sagittal plane deformity with forward stooped posture with walker    Studies Review:     Scoliosis x-rays do show a positive sagittal balance. Assessment and Plan:      1. Spinal deformity    2. Cervical spondylosis with myelopathy          Plan: Extensive discussion again with the patient regarding options which include deformity correction which would involve 3 large surgeries including removal of hardware osteotomies, anterior interbody fixation along with posterior reduction. The other option would be a spinal cord stimulator which is a much smaller lower risk option and can be trialed. I have advised her strongly to consider this option. Followup: No follow-ups on file. Prescriptions Ordered:  No orders of the defined types were placed in this encounter.        Orders Placed:  No orders of the defined types were placed in this encounter. Electronically signed by Ora Segovia DO on 7/8/2020 at 3:45 PM    Please note that this chart was generated using voice recognition Dragon dictation software. Although every effort was made to ensure the accuracy of this automated transcription, some errors in transcription may have occurred.

## 2020-07-23 ENCOUNTER — HOSPITAL ENCOUNTER (OUTPATIENT)
Age: 80
Discharge: HOME | End: 2020-07-25
Payer: MEDICARE

## 2020-07-23 ENCOUNTER — HOSPITAL ENCOUNTER (OUTPATIENT)
Dept: GENERAL RADIOLOGY | Age: 80
Discharge: HOME | End: 2020-07-25
Payer: MEDICARE

## 2020-07-23 ENCOUNTER — HOSPITAL ENCOUNTER (OUTPATIENT)
Dept: NON INVASIVE DIAGNOSTICS | Age: 80
Discharge: HOME | End: 2020-07-23
Payer: MEDICARE

## 2020-07-23 ENCOUNTER — HOSPITAL ENCOUNTER (OUTPATIENT)
Age: 80
Discharge: HOME | End: 2020-07-23
Payer: MEDICARE

## 2020-07-23 LAB
ABSOLUTE EOS #: 0.3 K/UL (ref 0–0.4)
ABSOLUTE IMMATURE GRANULOCYTE: ABNORMAL K/UL (ref 0–0.3)
ABSOLUTE LYMPH #: 2.2 K/UL (ref 1–4.8)
ABSOLUTE MONO #: 0.5 K/UL (ref 0.1–1.3)
ALBUMIN SERPL-MCNC: 3.8 G/DL (ref 3.5–5.2)
ALBUMIN/GLOBULIN RATIO: NORMAL (ref 1–2.5)
ALP BLD-CCNC: 79 U/L (ref 35–104)
ALT SERPL-CCNC: 12 U/L (ref 5–33)
ANION GAP SERPL CALCULATED.3IONS-SCNC: 11 MMOL/L (ref 9–17)
AST SERPL-CCNC: 15 U/L
BASOPHILS # BLD: 0 % (ref 0–2)
BASOPHILS ABSOLUTE: 0 K/UL (ref 0–0.2)
BILIRUB SERPL-MCNC: 0.45 MG/DL (ref 0.3–1.2)
BILIRUBIN DIRECT: 0.13 MG/DL
BILIRUBIN, INDIRECT: 0.32 MG/DL (ref 0–1)
BNP INTERPRETATION: NORMAL
BUN BLDV-MCNC: 28 MG/DL (ref 8–23)
BUN/CREAT BLD: ABNORMAL (ref 9–20)
CALCIUM SERPL-MCNC: 9.3 MG/DL (ref 8.6–10.4)
CHLORIDE BLD-SCNC: 101 MMOL/L (ref 98–107)
CHOLESTEROL/HDL RATIO: 3.3
CHOLESTEROL: 150 MG/DL
CO2: 29 MMOL/L (ref 20–31)
CREAT SERPL-MCNC: 1.15 MG/DL (ref 0.5–0.9)
DIFFERENTIAL TYPE: ABNORMAL
EOSINOPHILS RELATIVE PERCENT: 5 % (ref 0–4)
GFR AFRICAN AMERICAN: 55 ML/MIN
GFR NON-AFRICAN AMERICAN: 46 ML/MIN
GFR SERPL CREATININE-BSD FRML MDRD: ABNORMAL ML/MIN/{1.73_M2}
GFR SERPL CREATININE-BSD FRML MDRD: ABNORMAL ML/MIN/{1.73_M2}
GLOBULIN: NORMAL G/DL (ref 1.5–3.8)
GLUCOSE BLD-MCNC: 109 MG/DL (ref 70–99)
HCT VFR BLD CALC: 32.5 % (ref 36–46)
HDLC SERPL-MCNC: 45 MG/DL
HEMOGLOBIN: 10.1 G/DL (ref 12–16)
IMMATURE GRANULOCYTES: ABNORMAL %
LDL CHOLESTEROL: 77 MG/DL (ref 0–130)
LV EF: 64 %
LVEF MODALITY: NORMAL
LYMPHOCYTES # BLD: 38 % (ref 24–44)
MCH RBC QN AUTO: 26.2 PG (ref 26–34)
MCHC RBC AUTO-ENTMCNC: 31.1 G/DL (ref 31–37)
MCV RBC AUTO: 84.4 FL (ref 80–100)
MONOCYTES # BLD: 8 % (ref 1–7)
NRBC AUTOMATED: ABNORMAL PER 100 WBC
PDW BLD-RTO: 14.4 % (ref 11.5–14.9)
PLATELET # BLD: 214 K/UL (ref 150–450)
PLATELET ESTIMATE: ABNORMAL
PMV BLD AUTO: 9.5 FL (ref 6–12)
POTASSIUM SERPL-SCNC: 4.2 MMOL/L (ref 3.7–5.3)
PRO-BNP: 198 PG/ML
RBC # BLD: 3.85 M/UL (ref 4–5.2)
RBC # BLD: ABNORMAL 10*6/UL
SEG NEUTROPHILS: 49 % (ref 36–66)
SEGMENTED NEUTROPHILS ABSOLUTE COUNT: 2.8 K/UL (ref 1.3–9.1)
SODIUM BLD-SCNC: 141 MMOL/L (ref 135–144)
THYROXINE, FREE: 1.18 NG/DL (ref 0.93–1.7)
TOTAL PROTEIN: 6.8 G/DL (ref 6.4–8.3)
TRIGL SERPL-MCNC: 139 MG/DL
TSH SERPL DL<=0.05 MIU/L-ACNC: 3.84 MIU/L (ref 0.3–5)
VITAMIN D 25-HYDROXY: 34.3 NG/ML (ref 30–100)
VLDLC SERPL CALC-MCNC: NORMAL MG/DL (ref 1–30)
WBC # BLD: 5.8 K/UL (ref 3.5–11)
WBC # BLD: ABNORMAL 10*3/UL

## 2020-07-23 PROCEDURE — 93306 TTE W/DOPPLER COMPLETE: CPT

## 2020-07-23 PROCEDURE — 80048 BASIC METABOLIC PNL TOTAL CA: CPT

## 2020-07-23 PROCEDURE — 80076 HEPATIC FUNCTION PANEL: CPT

## 2020-07-23 PROCEDURE — 85025 COMPLETE CBC W/AUTO DIFF WBC: CPT

## 2020-07-23 PROCEDURE — 84439 ASSAY OF FREE THYROXINE: CPT

## 2020-07-23 PROCEDURE — 80061 LIPID PANEL: CPT

## 2020-07-23 PROCEDURE — 71046 X-RAY EXAM CHEST 2 VIEWS: CPT

## 2020-07-23 PROCEDURE — 83880 ASSAY OF NATRIURETIC PEPTIDE: CPT

## 2020-07-23 PROCEDURE — 84443 ASSAY THYROID STIM HORMONE: CPT

## 2020-07-23 PROCEDURE — 82306 VITAMIN D 25 HYDROXY: CPT

## 2020-07-23 PROCEDURE — 36415 COLL VENOUS BLD VENIPUNCTURE: CPT

## 2020-07-24 ENCOUNTER — HOSPITAL ENCOUNTER (OUTPATIENT)
Dept: PAIN MANAGEMENT | Age: 80
Discharge: HOME | End: 2020-07-24
Payer: MEDICARE

## 2020-07-24 PROCEDURE — 99213 OFFICE O/P EST LOW 20 MIN: CPT | Performed by: NURSE PRACTITIONER

## 2020-07-24 PROCEDURE — 99213 OFFICE O/P EST LOW 20 MIN: CPT

## 2020-07-24 RX ORDER — MORPHINE SULFATE 30 MG/1
30 TABLET, FILM COATED, EXTENDED RELEASE ORAL 2 TIMES DAILY
Qty: 60 TABLET | Refills: 0 | Status: SHIPPED | OUTPATIENT
Start: 2020-07-31 | End: 2020-08-27 | Stop reason: SDUPTHER

## 2020-07-24 RX ORDER — OXYCODONE AND ACETAMINOPHEN 10; 325 MG/1; MG/1
1 TABLET ORAL EVERY 6 HOURS PRN
Qty: 120 TABLET | Refills: 0 | Status: SHIPPED | OUTPATIENT
Start: 2020-07-31 | End: 2020-08-27 | Stop reason: SDUPTHER

## 2020-07-24 ASSESSMENT — ENCOUNTER SYMPTOMS
CONSTIPATION: 0
SHORTNESS OF BREATH: 0
BACK PAIN: 1
COUGH: 0

## 2020-07-24 NOTE — PROGRESS NOTES
Patient completed a telephone visit today to review medication contract. Chief Complaint: back pain    PMH: Patient complains of back pain that started years ago. She has undergone several interventions but pain continues. She had SCS implanted in 2012 at [facility] but it was removed as it was not functioning well. She saw Dr. Ritu SYED and he strongly recommend SCS trial and she is thinking about this. He does not recommend surgery. Back Pain   This is a chronic problem. The current episode started more than 1 year ago. The problem occurs constantly. The problem is unchanged. The pain is present in the lumbar spine. The quality of the pain is described as aching and burning. The pain does not radiate. The pain is at a severity of 8/10. The pain is moderate. The symptoms are aggravated by position and standing (walking). Pertinent negatives include no chest pain or fever. She has tried analgesics and bed rest for the symptoms. The treatment provided mild relief. Patient denies any new neurological symptoms. No bowel or bladder incontinence, no weakness, and no falling. Pill count: appropriate due 7/31    Morphine equivalent: 120    Periodic Controlled Substance Monitoring: Possible medication side effects, risk of tolerance/dependence & alternative treatments discussed., No signs of potential drug abuse or diversion identified., Obtaining appropriate analgesic effect of treatment. Rafiq Martínez, APRN - CNP)  Chronic Pain > 80 MEDD: Obtained or confirmed \"Medication Contract\" on file., Co-prescribed Naloxone.  (Vielka Hamilton, APRN - CNP)      Past Medical History:   Diagnosis Date    Achilles tendonitis     right    Asthma     [facility])     Cough     clear phlegm    Degenerative lumbar disc     DM (diabetes mellitus[facility] type 2 [facility]     PCP Dr. Jose Washington, seen 8/2019    Failed back syndrome, lumbar     Fast heart beat     Hx of \"8-10 years ago\"    Fibromyalgia     History of bladder cancer     Hypertension     Hypothyroid     Kidney stones     Lumbar radiculopathy     Lumbar spondylolysis     Osteoarthritis     Sleep apnea     uses CPAP machine nightly    Spinal stenosis in cervical region     Wears glasses        Past Surgical History:   Procedure Laterality Date    BACK SURGERY  2010    lumbar fusion    BLADDER TUMOR EXCISION  2005    CARDIOVERSION      \"8-10 years ago\"  to get \"heart into regular rhythm\"    CARPAL TUNNEL RELEASE Bilateral     CATARACT REMOVAL WITH IMPLANT Bilateral 7/22/2014, 8/5/2014    Raffoaravind/Demian    CERVICAL FUSION  11/14/2019     ANTERIOR CERVICAL DECOMPRESSION FUSION C3-4, C4-5    CERVICAL FUSION N/A 11/14/2019    ANTERIOR CERVICAL DECOMPRESSION FUSION C3-4, C4-5 (SUPINE) DEPUY, REGULAR TABLE, MICROSCOPE, C-ARM, EVOKES (# [id number] [facility]) performed by Barrie Addison DO at [address], LAPAROSCOPIC N/A 8/18/2017    CHOLECYSTECTOMY LAPAROSCOPIC ROBOTIC MULTIPORT performed by Ofelia Sorto MD at [place] [address]  1/2013    CYSTOSCOPY  11/04/2016    CYSTOSCOPY  01/04/2019    ENDOSCOPIC ULTRASOUND (LOWER) N/A 8/17/2017    ENDOSCOPIC ULTRASOUND performed by Siobhan Smith MD at [address] (UPPER)  08/17/2017    A cursory view of the gastric mucosa was normal. The scope was then further advanced through a patent pylorus to the second portion of the duodenum. The Gallbladder was evaluated in bulb position. Thick sludge was noted. The CBD was 7.8 mm with no filling defects. The PD was 5 mm in the HOP. The pancreatic tissue was edematous in body and tail.     KNEE ARTHROSCOPY Left     LUMBAR FUSION      NM OFFICE/OUTPT VISIT,PROCEDURE ONLY N/A 1/24/2018    SPINAL HARDWARE REMOVAL LUMBAR BONE STIMULATOR performed by Brynn Erickson MD at [address] Bilateral 1989    SKIN BIOPSY Right 11/2015    mole right posterior shoulder    nasal spray, 1 spray by Nasal route as needed for Congestion, Disp: , Rfl:     pregabalin (LYRICA) 150 MG capsule, Take 150 mg by mouth 2 times daily. , Disp: , Rfl:     montelukast (SINGULAIR) 10 MG tablet, Take 10 mg by mouth nightly , Disp: , Rfl:     metFORMIN (GLUCOPHAGE) 500 MG tablet, Take 500 mg by mouth 2 times daily (with meals). , Disp: , Rfl:     telmisartan-hydrochlorothiazide (MICARDIS HCT) 40-12.5 MG per tablet, Take 1 tablet by mouth daily. , Disp: , Rfl:     levothyroxine (SYNTHROID) 50 MCG tablet, Take 50 mcg by mouth Daily. , Disp: , Rfl:     omeprazole (PRILOSEC) 20 MG capsule, Take 20 mg by mouth every evening., Disp: , Rfl:     aspirin 81 MG tablet, Take 81 mg by mouth daily. , Disp: , Rfl:     Family History   Problem Relation Age of Onset    Heart Failure Mother     Lung Cancer Mother     Other Father          pneumonia    Alzheimer's Disease Father     Lung Cancer Brother     Diabetes Paternal Grandmother     Other Daughter         Fibromyalsia    Rheum Arthritis Daughter        Social History     Socioeconomic History    Marital status:      Spouse name: Salvador Roach Number of children: 3    Years of education: Not on file    Highest education level: Not on file   Occupational History    Occupation: retired   Social Needs    Financial resource strain: Not on file    Food insecurity     Worry: Not on file     Inability: Not on file   [employer] needs     Medical: Not on file     Non-medical: Not on file   Tobacco Use    Smoking status: Never Smoker    Smokeless tobacco: Never Used   Substance and Sexual Activity    Alcohol use: Not Currently     Comment: rare    Drug use: Never    Sexual activity: Yes     Partners: Male   Lifestyle    Physical activity     Days per week: Not on file     Minutes per session: Not on file    Stress: Not on file   Relationships    Social connections     Talks on phone: Not on file     Gets together: Not on file     Attends Sikhism service: Not on file     Active member of club or organization: Not on file     Attends meetings of clubs or organizations: Not on file     Relationship status: Not on file    Intimate partner violence     Fear of current or ex partner: Not on file     Emotionally abused: Not on file     Physically abused: Not on file     Forced sexual activity: Not on file   Other Topics Concern    Not on file   Social History Narrative    Not on file       Review of Systems:  Review of Systems   Constitution: Negative for chills and fever. Cardiovascular: Negative for chest pain and palpitations. Respiratory: Negative for cough and shortness of breath. Musculoskeletal: Positive for back pain. Gastrointestinal: Negative for constipation. Neurological: Negative for disturbances in coordination and loss of balance. Physical Exam:  There were no vitals taken for this visit. Physical Exam  Neurological:      Mental Status: She is alert.    Psychiatric:         Mood and Affect: Mood normal.         Record/Diagnostics Review:    Last georgie 4/2019 and was appropriate     Assessment:  Problem List Items Addressed This Visit     Imbalance (Chronic)    Lumbar radiculopathy    Lumbar spondylolysis    Failed back syndrome of lumbar spine    Encounter for medication monitoring    Sacroiliitis (HCC) - Primary    Relevant Medications    morphine (MS CONTIN) 30 MG extended release tablet (Start on 7/31/2020)    oxyCODONE-acetaminophen (PERCOCET)  MG per tablet (Start on 7/31/2020)    Lumbar spondylosis    Relevant Medications    morphine (MS CONTIN) 30 MG extended release tablet (Start on 7/31/2020)    oxyCODONE-acetaminophen (PERCOCET)  MG per tablet (Start on 7/31/2020)    Failed back syndrome    Relevant Medications    morphine (MS CONTIN) 30 MG extended release tablet (Start on 7/31/2020)    oxyCODONE-acetaminophen (PERCOCET)  MG per tablet (Start on 7/31/2020)             Treatment Plan:  Patient relates current medications are helping the pain. Patient reports taking pain medications as prescribed, denies obtaining medications from different sources and denies use of illegal drugs. Patient denies side effects from medications like nausea, vomiting, constipation or drowsiness. Patient reports current activities of daily living are possible due to medications and would like to continue them. As always, we encourage daily stretching and strengthening exercises, and recommend minimizing use of pain medications unless patient cannot get through daily activities due to pain. Contract requirements met. Continue opioid therapy. Script written for percocet  Needs UDS - has COPD and is worried about COVID exposure  Pt is considering SCS trial  Follow up appointment made for 4 weeks    Dinh Hernandez is a 70 y.o. female being evaluated by a Virtual Visit (telephone visit) encounter to address concerns as mentioned above. A caregiver was present when appropriate. Due to this being a TeleHealth encounter (During [place] [address] public health emergency), evaluation of the following organ systems was limited: Vitals/Constitutional/EENT/Resp/CV/GI//MS/Neuro/Skin/Heme-Lymph-Imm. Pursuant to the emergency declaration under the [facility], [address] [place] [address] [facility] authority and the [employer] Act, this Virtual Visit was conducted with patient's (and/or legal guardian's) consent, to reduce the patient's risk of exposure to COVID-19 and provide necessary medical care. The patient (and/or legal guardian) has also been advised to contact this office for worsening conditions or problems, and seek emergency medical treatment and/or call 911 if deemed necessary. Total time spent for this encounter: 20 minutes    Services were provided through a telephone discussion virtually to substitute for in-person clinic visit.  Patient and provider were located at their individual homes. --CONSUELO Portillo CNP on 7/24/2020 at 12:27 PM    An electronic signature was used to authenticate this note.

## 2020-07-29 ENCOUNTER — OFFICE VISIT (OUTPATIENT)
Dept: ORTHOPEDIC SURGERY | Age: 80
End: 2020-07-29
Payer: MEDICARE

## 2020-07-29 VITALS — TEMPERATURE: 97.3 F

## 2020-07-29 PROCEDURE — 20610 DRAIN/INJ JOINT/BURSA W/O US: CPT | Performed by: PHYSICIAN ASSISTANT

## 2020-07-29 PROCEDURE — 99213 OFFICE O/P EST LOW 20 MIN: CPT | Performed by: PHYSICIAN ASSISTANT

## 2020-07-30 RX ORDER — LIDOCAINE HYDROCHLORIDE 10 MG/ML
4 INJECTION, SOLUTION EPIDURAL; INFILTRATION; INTRACAUDAL; PERINEURAL ONCE
Status: COMPLETED | OUTPATIENT
Start: 2020-07-30 | End: 2020-08-19

## 2020-07-30 RX ORDER — BETAMETHASONE SODIUM PHOSPHATE AND BETAMETHASONE ACETATE 3; 3 MG/ML; MG/ML
12 INJECTION, SUSPENSION INTRA-ARTICULAR; INTRALESIONAL; INTRAMUSCULAR; SOFT TISSUE ONCE
Status: COMPLETED | OUTPATIENT
Start: 2020-07-30 | End: 2020-08-19

## 2020-07-30 NOTE — PROGRESS NOTES
Patient ID: Babatunde Melara is a 70 y.o. female. Chief Complaint   Patient presents with    Follow-up     right knee         HPI  Ms. Mamie Pagan is a 17-year-old female who returns today for bilateral right greater than left knee. Patient has a history of many years of chronic bilateral knee pain however she took a fall which severely aggravated her right knee she has had several rounds of injections in both knees in the past including both corticosteroid injections and Visco injections. Most recent injection was on 6/27/2019 where patient underwent bilateral Monovisc injections. Pain is most severe to the medial aspect of both knees and is aggravated by most activity. Patient is currently ambulatory with a walker. Pain is improved with rest.  She denies any knee joint warmth, redness, fever, chills, numbness or tingling.      Past Medical History:   Diagnosis Date    Achilles tendonitis     right    Asthma     COPD [facility]ch[facility] obstructive [facility] disease) ([facility])     Cough     clear phlegm    Degenerative lumbar disc     DM [facility]diabetes mellitus[facility]     PCP Dr. Marissa Randall, seen 8/2019    Failed back syndrome, lumbar     Fast heart beat     Hx of \"8-10 years ago\"    Fibromyalgia     History of bladder cancer     Hypertension     Hypothyroid     Kidney stones     Lumbar radiculopathy     Lumbar spondylolysis     Osteoarthritis     Sleep apnea     uses CPAP machine nightly    Spinal stenosis in cervical region     Wears glasses      Past Surgical History:   Procedure Laterality Date    BACK SURGERY  2010    lumbar fusion    BLADDER TUMOR EXCISION  2005    CARDIOVERSION      \"8-10 years ago\"  to get \"heart into regular rhythm\"    CARPAL TUNNEL RELEASE Bilateral     CATARACT REMOVAL WITH IMPLANT Bilateral 7/22/2014, 8/5/2014    Dilma/Demian    CERVICAL FUSION  11/14/2019     ANTERIOR CERVICAL DECOMPRESSION FUSION C3-4, C4-5    CERVICAL FUSION N/A 11/14/2019    ANTERIOR CERVICAL DECOMPRESSION FUSION C3-4, C4-5 (SUPINE) DEPUY, REGULAR TABLE, MICROSCOPE, C-ARM, EVOKES (# [id number] [facility]) performed by Kj Auguste DO at [facility] 8/18/2017    CHOLECYSTECTOMY LAPAROSCOPIC ROBOTIC MULTIPORT performed by Marline Dubose MD at [place] [address]  1/2013    CYSTOSCOPY  11/04/2016    CYSTOSCOPY  01/04/2019    ENDOSCOPIC ULTRASOUND (LOWER) N/A 8/17/2017    ENDOSCOPIC ULTRASOUND performed by Bhanu Spencer MD at [address] (UPPER)  08/17/2017    A cursory view of the gastric mucosa was normal. The scope was then further advanced through a patent pylorus to the second portion of the duodenum. The Gallbladder was evaluated in bulb position. Thick sludge was noted. The CBD was 7.8 mm with no filling defects. The PD was 5 mm in the HOP. The pancreatic tissue was edematous in body and tail.     KNEE ARTHROSCOPY Left     LUMBAR FUSION      [place] OFFICE/OUTPT VISIT,PROCEDURE ONLY N/A 1/24/2018    SPINAL HARDWARE REMOVAL LUMBAR BONE STIMULATOR performed by France Cassidy MD at [address] Bilateral [address]    SKIN BIOPSY Right 11/2015    mole right posterior shoulder    SPINE SURGERY      spinal cord stimulator    SPINE SURGERY  91-4-15    renoval of spinal cord stimulator leads and battery    PASQUALE AND BSO      TUBAL LIGATION      UVULOPALATOPHARYGOPLASTY  2006     Family History   Problem Relation Age of Onset    Heart Failure Mother    Shaun Torresttle Mother     Other Father          pneumonia    Alzheimer's Disease Father     Lung Cancer Brother     Diabetes Paternal Grandmother     Other Daughter         Fibromyalsia    Rheum Arthritis Daughter      Social History     Occupational History    Occupation: retired   Tobacco Use    Smoking status: Never Smoker    Smokeless tobacco: Never Used   Substance and Sexual Activity    Alcohol use: Not Currently     Comment: rare    Drug use: Never    Sexual activity: Yes     Partners: Male        Review of Systems   Constitutional: Negative for chills and fever. HENT: Negative for congestion and rhinorrhea. Eyes: Negative. Respiratory: Negative for cough. Cardiovascular: Negative for chest pain and leg swelling. Gastrointestinal: Negative for nausea and vomiting. Musculoskeletal: Positive for arthralgias (Bilateral right greater than left). Skin: Negative for color change and rash. Neurological: Negative for dizziness and numbness. Physical Exam  Constitutional:       Appearance: She is well-developed. HENT:      Head: Normocephalic and atraumatic. Neck:      Musculoskeletal: Normal range of motion and neck supple. Cardiovascular:      Rate and Rhythm: Normal rate. Pulmonary:      Effort: Pulmonary effort is normal.   Abdominal:      Palpations: Abdomen is soft. Musculoskeletal:      Comments: right Knee   Swelling: Mild  Effusion: Negative  Tenderness: Medial joint line and Lateral joint line     Range of Motion:     Extension: -5    Flexion: 110     McMurrays: Negative  Anterior Lachmann: Negative  Posterior Lachmann: Negative  Anterior Drawer: Negative  Posterior Drawer: Negative  Varus Stress Test: Negative  Valgus Stress Test: Negative  Pivot Shift: Negative  Patellar Apprehension: No    Left Knee   Swelling: Mild  Effusion: Negative  Tenderness: Medial joint line     Range of Motion:     Extension: 0    Flexion: 115     McMurrays: Negative  Anterior Lachmann: Negative  Posterior Lachmann: Negative  Anterior Drawer: Negative  Posterior Drawer: Negative  Varus Stress Test: Negative  Valgus Stress Test: Negative  Pivot Shift: Negative  Patellar Apprehension: No       Skin:     General: Skin is warm and dry. Findings: No rash. Neurological:      Mental Status: She is alert and oriented to person, place, and time. Psychiatric:         Behavior: Behavior normal.         Assessment:     Encounter Diagnoses   Name Primary? injected superficially. Subsequently using a 21-gauge needle 2 cc of Celestone is injected into the both knees. A bandage is applied to the injection site. Patient tolerated procedure well. Orders Placed This Encounter   Procedures    XR KNEE RIGHT (1-2 VIEWS)     Standing Status:   Future     Number of Occurrences:   1     Standing Expiration Date:   7/29/2021    UT ARTHROCENTESIS ASPIR&/INJ MAJOR JT/BURSA W/O DENNY Elena    Please note that this chart was generated using voicerecognition Dragon dictation software. Although every effort was made to ensurethe accuracy of this automated transcription, some errors in transcription may haveoccurred.

## 2020-08-02 ASSESSMENT — ENCOUNTER SYMPTOMS
EYES NEGATIVE: 1
NAUSEA: 0
RHINORRHEA: 0
COUGH: 0
COLOR CHANGE: 0
VOMITING: 0

## 2020-08-19 RX ADMIN — LIDOCAINE HYDROCHLORIDE 4 ML: 10 INJECTION, SOLUTION EPIDURAL; INFILTRATION; INTRACAUDAL; PERINEURAL at 15:47

## 2020-08-19 RX ADMIN — BETAMETHASONE SODIUM PHOSPHATE AND BETAMETHASONE ACETATE 12 MG: 3; 3 INJECTION, SUSPENSION INTRA-ARTICULAR; INTRALESIONAL; INTRAMUSCULAR; SOFT TISSUE at 15:45

## 2020-08-19 RX ADMIN — BETAMETHASONE SODIUM PHOSPHATE AND BETAMETHASONE ACETATE 12 MG: 3; 3 INJECTION, SUSPENSION INTRA-ARTICULAR; INTRALESIONAL; INTRAMUSCULAR; SOFT TISSUE at 15:46

## 2020-08-19 RX ADMIN — LIDOCAINE HYDROCHLORIDE 4 ML: 10 INJECTION, SOLUTION EPIDURAL; INFILTRATION; INTRACAUDAL; PERINEURAL at 15:46

## 2020-08-25 ASSESSMENT — ENCOUNTER SYMPTOMS
VOMITING: 0
EYE PAIN: 0
ABDOMINAL PAIN: 0
ALLERGIC/IMMUNOLOGIC NEGATIVE: 1
BACK PAIN: 1
NAUSEA: 0
CONSTIPATION: 1
SHORTNESS OF BREATH: 0
PHOTOPHOBIA: 0
COUGH: 0

## 2020-08-25 NOTE — PROGRESS NOTES
Jose Roberto Castro is a 70 y.o. female evaluated on 8/27/2020. Modality of virtual service provided -via telephone   Consent:  Patient and/or health care decision maker is aware that that patient may receive a bill for this telephone service, depending on one's insurance coverage, and has provided verbal consent to proceed: Yes    Patient identification was verified at the start of the visit: Yes    Chief complaint: Jose Roberto Castro is 70 y.o.,  female, with  No chief complaint on file. .      Patient is complaining of pain involving the low back area with pain radiating to both lower extremities. Patient had undergone several procedures in the past including insertion of a spinal cord stimulator for pain control apparently did not help her pain. Patient reports she had that removed as it was not helping the pain. Patient reports current medications are not helping for a long time. She is not functioning well because of the severe pain. She has been evaluated by surgeon who recommended spinal cord stimulator but patient is not keen on having anything done at this time. Back Pain   This is a chronic problem. The current episode started more than 1 year ago. The problem occurs constantly. The problem has been gradually worsening since onset. The pain is present in the lumbar spine and sacro-iliac. The quality of the pain is described as aching and shooting Vane Sven). Radiates to: Worse on the left side compared to the right. The pain is at a severity of 8/10 (6-9). The pain is severe. The pain is the same all the time. The symptoms are aggravated by bending, standing, sitting and twisting (Walking, lifting, ADLs). Associated symptoms include numbness (left leg) and weakness. Pertinent negatives include no abdominal pain, chest pain, dysuria, fever, headaches or tingling. Risk factors include lack of exercise and sedentary lifestyle.          Alleviating factors:rest changing position, cold packs,  Lifestyle changes experienced with pain: Keeps awake at night, Wakes from sleep, Prevents or limits ADLs, Increases w/activity., Increases w/prolonged sitting/standing/walking  Mood changes,depressed  Patient currently unemployed. Physical therapy did not help the pain. Are you under psychological counseling at present: No  Goals for treatment include:  Decrease in pain  Enjoy daily and recreational activities, return to previous status. Patient relates current medications are helping the pain. Patient reports taking pain medications as prescribed, denies obtaining medications from different sources and denies use of illegal drugs. Patient denies side effects from medications like nausea, vomiting, constipation or drowsiness. Patient reports current activities of daily living ar possible due to medications and would like to continue them. ACTIVITY/SOCIAL/EMOTIONAL:  Sleep Pattern: 6 hours per night. difficulty falling asleep, nightime awakenings, difficulty falling back asleep if awakened and generally restful sleep  Home Exercises: daily Stretching  Activity:not significantly changed  Emotional Issues: anxiety/ nervousness.    Currently seeing a Psychiatrist or Psychologist:  No     ADVERSE MEDICATION EFFECTS:   Nausea and vomiting: no   Constipation: no-Undercontrol-: yes  Dizziness/drowsy/sleepy--not applicable  Urinary Retention: no    ABERRANT BEHAVIORS SINCE LAST VISIT  Lost rx/pills:------------------------------------------ no  Taking  medication as prescribed: ----------- yes  Urine Drug Screen ---------------------------------  yes  Recent ER visits: -------------------------------------No  Pill count is appropriate: ---------------------------yes   Refills for prescriptions appropriate:---------- yes      Past Medical History:   Diagnosis Date    Achilles tendonitis     right    Asthma     COPD [facility]chronic obstructive pulmonary disease) ([facility]     Cough     clear phlegm    file   Other Topics Concern    Not on file   Social History Narrative    Not on file       Allergies   Allergen Reactions    Azithromycin Shortness Of Breath     Tightness in chest    Adhesive Tape      OK to use paper tape per Patient- 2/19/20 patient states that she has been using all types of tape with no reaction       Current Outpatient Medications on File Prior to Encounter   Medication Sig Dispense Refill    albuterol sulfate  (90 Base) MCG/ACT inhaler Inhale 1 puff into the lungs daily      potassium chloride (MICRO-K) 10 MEQ extended release capsule Take 1 capsule by mouth daily      vitamin D (ERGOCALCIFEROL) 1.25 MG (10708 UT) CAPS capsule TAKE 1 CAPSULE BY MOUTH ONE TIME PER WEEK 4 capsule 0    bumetanide (BUMEX) 1 MG tablet       sennosides-docusate sodium (SENOKOT-S) 8.6-50 MG tablet Take 1 tablet by mouth daily as needed      psyllium (KONSYL) 28.3 % PACK Take 1 packet by mouth daily      fluticasone (FLONASE) 50 MCG/ACT nasal spray 1 spray by Each Nostril route daily      fluticasone (FLOVENT HFA) 44 MCG/ACT inhaler Inhale 1 puff into the lungs 2 times daily      diclofenac sodium 1 % GEL Apply 2 g topically 2 times daily as needed for Pain      magnesium hydroxide (DULCOLAX MILK OF MAGNESIA) 400 MG/5ML suspension Take 5 mLs by mouth daily as needed for Constipation      sodium chloride (OCEAN, BABY AYR) 0.65 % nasal spray 1 spray by Nasal route as needed for Congestion      pregabalin (LYRICA) 150 MG capsule Take 150 mg by mouth 2 times daily.  montelukast (SINGULAIR) 10 MG tablet Take 10 mg by mouth nightly       metFORMIN (GLUCOPHAGE) 500 MG tablet Take 500 mg by mouth 2 times daily (with meals).  telmisartan-hydrochlorothiazide (MICARDIS HCT) 40-12.5 MG per tablet Take 1 tablet by mouth daily.  levothyroxine (SYNTHROID) 50 MCG tablet Take 50 mcg by mouth Daily.  omeprazole (PRILOSEC) 20 MG capsule Take 20 mg by mouth every evening.       aspirin 81 MG tablet Take 81 mg by mouth daily. No current facility-administered medications on file prior to encounter. Review of Systems   Constitutional: Positive for fatigue. Negative for activity change, appetite change, fever and unexpected weight change. HENT: Negative for congestion and ear pain. Eyes: Negative for photophobia, pain and visual disturbance. Respiratory: Negative for cough and shortness of breath. Cardiovascular: Negative for chest pain and palpitations. Gastrointestinal: Positive for constipation. Negative for abdominal pain, nausea and vomiting. Endocrine: Negative. Negative for polyphagia and polyuria. Genitourinary: Negative for dysuria and hematuria. Musculoskeletal: Positive for arthralgias, back pain and gait problem. Skin: Negative for pallor and rash. Allergic/Immunologic: Negative. Neurological: Positive for weakness and numbness (left leg). Negative for tingling and headaches. Hematological: Does not bruise/bleed easily. Psychiatric/Behavioral: Negative for self-injury, sleep disturbance and suicidal ideas. The patient is not nervous/anxious.          Physical Exam       DATA:  LAB.:  7/23/2020  2:34 PM - [facility] Incoming Lab Results From Reflex     Component  Value  Ref Range & Units  Status  Collected  Lab    Vit D, 25-Hydroxy  34.3  30.0 - 100.0 ng/mL  Final  07/23/2020 10:37 AM    7/23/2020 11:42 AM - [facility] Incoming Lab Results From Reflex     Component  Value  Ref Range & Units  Status  Collected  Lab    Alb  3.8  3.5 - 5.2 g/dL  Final  07/23/2020 10:37 AM  [facility] [address] Lab    Alkaline Phosphatase  79  35 - 104 U/L  Final  07/23/2020 10:37 AM  - [address] Lab    ALT  12  5 - 33 U/L  Final  07/23/2020 10:37 AM  [facility] [address] Lab    AST  15  <32 U/L  Final  07/23/2020 10:37 AM  [facility] [address] Lab    Total Bilirubin  0.45  0.3 - 1.2 mg/dL  Final  07/23/2020 10:37 AM  [facility] [address] Lab osteoarthritis of right hip    11. Encounter for medication counseling    12. Imbalance    13. Failed back syndrome of lumbar spine    14. Encounter for medication monitoring    15. Sacroiliitis ([address]        Plan of care: We will continue current pain medications  Current medications are being tolerated without any Adverse side effects. Orders Placed This Encounter   Medications    morphine (MS CONTIN) 30 MG extended release tablet     Sig: Take 1 tablet by mouth 2 times daily for 30 days. Dispense:  60 tablet     Refill:  0     Reduce doses taken as pain becomes manageable    oxyCODONE-acetaminophen (PERCOCET)  MG per tablet     Sig: Take 1 tablet by mouth every 6 hours as needed for Pain for up to 30 days. Dispense:  120 tablet     Refill:  0     Reduce doses taken as pain becomes manageable    naloxone 4 MG/0.1ML LIQD nasal spray     Si spray by Nasal route as needed for Opioid Reversal     Dispense:  1 each     Refill:  5     Urine drug screens have been appropriate. No aberrant activity noted. Analgesia is achieved. Activities of daily living are possible because of medications. Safe use of medications explained to patient. PDMP Monitoring:    Last PDMP [employer] as Reviewed [facility]):  Review User Review Instant Review Result   Balbina Olson 2020  2:08 PM Reviewed PDMP [1]     Counselling/Preventive measures for pain  Control:    [x]  Spine strengthening exercises are discussed with patient in detail. [x] Ill effects of being on chronic pain medications such as sleep disturbances, hormonal changes, withdrawal symptoms,  chronic opioid dependence and tolerance were discussed with patient. I had asked the patient to minimize medication use and utilize pain medications only for uncontrolled rest pain or pain with exertional activities. I advised patient not to self escalate pain medications without consulting with us.   At each of patient's future visits we will try to taper pain medications, while adjusting the adjunct medications, and re-evaluating for Physical Therapy to improve spinal and joint strength. We will continue to have discussions to decrease pain medications as tolerated. I also discussed with the patient regarding the dangers of combining narcotic pain medication with tranquilizers, alcohol or illegal drugs or taking the medication any other than prescribed. The dangers including the respiratory depression and death. Patient was told to tell  to all  physicians regarding the medications he is getting from pain clinic. Patient is warned not to take any unprescribed medications over-the-counter medications that can depress breathing . Patient is advised to talk to the pharmacist or physicians if planning to take any over-the-counter medications before  takeing them. Patient is strongly advised to avoid tranquilizers or  Relaxants for any medications that can depress breathing or recreational drugs. Patient is also advised to tell us if there is any changes in his medications from other physicians. We discussed the same at today's visit and have not been to implement it, as the patient's pain is not under control with current medications. I again discussed with her the treatment options. At this time she would like to continue with her current medications. She does not want any further interventions at this time. Decision Making Process : Patient's health history and referral records thoroughly reviewed before focused physical examination and discussion with patient. I have spent 20 mins. Over 50% of today's visit is spent on examining the patient and counseling and coordinating the care. Level of complexity of date to be reviewed is Moderate. The chart date reviewed include the following: Imaging Reports. Summary of Care. Time spent reviewing with patient the below reports:   Medication safety, Treatment options.     Level of diagnosis and management options of this case is multiple: involving the following management options: Interventions as needed, medication management as appropriate, future visits, activity modification, heat/ice as needed, Urine drug screen as required. [x]The patient's questions were answered to the best of my abilities. This note was created using voice recognition software. There may be inaccuracies of transcription  that are inadvertently overlooked prior to the signature. There is any questions about the transcription please contact me. Return in  4 weeks  with physician / CNP  for further plan of treatment. Due to the COVID-19 pandemic and the appropriate interventions by Danielle Monsivais, our non-urgent pain management patients will not be seen in the office at this time for their protection and the protection of our staff. To offer continuity of care, their prescriptions will be escribed this month after a careful chart review and review of their OARRS report  Pursuant to the emergency declaration under the [address] and [place] [address] waiver authority and the [employer] Act, this Virtual Visit was conducted, with patient's consent, to reduce the patient's risk of exposure to COVID-19 and provide continuity of care for an established patient. Services were provided through a video synchronous discussion virtually to substitute for in-person appointment. \"  Documentation:  I communicated with the patient and/or health care decision maker about plan of care  Details of this discussion including any medical advice provided: Total Time: minutes: 21-30 minutes    I affirm this is a Patient Initiated Episode with an Established Patient who has not had a related appointment within my department in the past 7 days or scheduled within the next 24 hours.     Electronically signed by Brannon Rosales MD on 8/27/2020 at 2:13 PM

## 2020-08-27 ENCOUNTER — HOSPITAL ENCOUNTER (OUTPATIENT)
Dept: PAIN MANAGEMENT | Age: 80
Discharge: HOME | End: 2020-08-27
Payer: MEDICARE

## 2020-08-27 PROCEDURE — 99443 PR PHYS/QHP TELEPHONE EVALUATION 21-30 MIN: CPT | Performed by: PAIN MEDICINE

## 2020-08-27 PROCEDURE — 99213 OFFICE O/P EST LOW 20 MIN: CPT

## 2020-08-27 RX ORDER — NALOXONE HYDROCHLORIDE 4 MG/.1ML
1 SPRAY NASAL PRN
Qty: 1 EACH | Refills: 5 | Status: SHIPPED | OUTPATIENT
Start: 2020-08-27 | End: 2021-10-27 | Stop reason: SDUPTHER

## 2020-08-27 RX ORDER — OXYCODONE AND ACETAMINOPHEN 10; 325 MG/1; MG/1
1 TABLET ORAL EVERY 6 HOURS PRN
Qty: 120 TABLET | Refills: 0 | Status: SHIPPED | OUTPATIENT
Start: 2020-08-30 | End: 2020-09-28 | Stop reason: SDUPTHER

## 2020-08-27 RX ORDER — MORPHINE SULFATE 30 MG/1
30 TABLET, FILM COATED, EXTENDED RELEASE ORAL 2 TIMES DAILY
Qty: 60 TABLET | Refills: 0 | Status: SHIPPED | OUTPATIENT
Start: 2020-08-30 | End: 2020-09-28 | Stop reason: SDUPTHER

## 2020-09-15 ENCOUNTER — TELEPHONE (OUTPATIENT)
Dept: ORTHOPEDIC SURGERY | Age: 80
End: 2020-09-15

## 2020-09-15 NOTE — TELEPHONE ENCOUNTER
External physical therapy order is placed. Please fax to [facility] or have the patient pick the script up.      Thanks

## 2020-09-15 NOTE — TELEPHONE ENCOUNTER
She is calling because you had offered to send her to PT for her right tennis elbow. It is still bothering her and the home exercises aren't really helping. Can you make a script for her? She would like to go to [facility].

## 2020-09-24 NOTE — TELEPHONE ENCOUNTER
Patient called in to see why the PT order was not faxed over to [facility]. I called over and their fax number is [phone]. I printed order and will fax over.

## 2020-09-28 ENCOUNTER — HOSPITAL ENCOUNTER (OUTPATIENT)
Dept: PAIN MANAGEMENT | Age: 80
Discharge: HOME | End: 2020-09-28
Payer: MEDICARE

## 2020-09-28 PROCEDURE — 99443 PR PHYS/QHP TELEPHONE EVALUATION 21-30 MIN: CPT | Performed by: NURSE PRACTITIONER

## 2020-09-28 PROCEDURE — 99213 OFFICE O/P EST LOW 20 MIN: CPT

## 2020-09-28 RX ORDER — HYDROCHLOROTHIAZIDE 12.5 MG/1
TABLET ORAL
COMMUNITY
Start: 2020-08-21 | End: 2020-09-28

## 2020-09-28 RX ORDER — OXYCODONE AND ACETAMINOPHEN 10; 325 MG/1; MG/1
1 TABLET ORAL EVERY 6 HOURS PRN
Qty: 120 TABLET | Refills: 0 | Status: SHIPPED | OUTPATIENT
Start: 2020-09-30 | End: 2020-10-30 | Stop reason: SDUPTHER

## 2020-09-28 RX ORDER — TELMISARTAN 40 MG/1
TABLET ORAL
COMMUNITY
Start: 2020-08-21 | End: 2020-09-28

## 2020-09-28 RX ORDER — MORPHINE SULFATE 30 MG/1
30 TABLET, FILM COATED, EXTENDED RELEASE ORAL 2 TIMES DAILY
Qty: 60 TABLET | Refills: 0 | Status: SHIPPED | OUTPATIENT
Start: 2020-09-30 | End: 2020-10-30 | Stop reason: SDUPTHER

## 2020-09-28 ASSESSMENT — ENCOUNTER SYMPTOMS
BACK PAIN: 1
RESPIRATORY NEGATIVE: 1
CONSTIPATION: 1

## 2020-09-28 NOTE — PROGRESS NOTES
Josh 89 PROGRESS NOTE      Patient phone call to review Medication Agreement    Chief Complaint: back pain    She c/o low back pain which has not increased. She has history of 2 lumbar surgeries. She had cervical fusion a year ago. She was seen in Dr Pam Garcia office for right elbow pain. She had elbow injection 8 months ago which did not help,She is doing exercises at home which has not helped. She also has pain left upper arm,. She reports her sleep is good. She ambulates with a walker. She has had no Ed visits. Back Pain   This is a chronic problem. The problem occurs constantly. The problem has been gradually worsening since onset. The pain is present in the lumbar spine. The quality of the pain is described as aching (dull). The pain does not radiate. The pain is at a severity of 8/10. The pain is severe. Worse during: morning. Exacerbated by: walking, getting up. Associated symptoms include numbness and weakness. (Weakness left leg,) Risk factors include menopause and sedentary lifestyle. She has tried ice, analgesics, bed rest and NSAIDs (voltaren gel) for the symptoms. Patient denies any new neurological symptoms. No bowel or bladder incontinence, no weakness, and no falling. Any new diagnostic workup: [x] no  [] yes [] Xray [] CT scan [] MRI [] DEXA scan     [] Other                    Treatment goals:  Functional status: to stop using walker and walk straight      Aberrancy:   Any alcoholic beverages no      Any illegal drugs   no      Analgesia:8      Adverse  Effects :constipation,milk of magnesia prn, BM qod    ADL;s :some housework, not exercising        Pill count: appropriate fill date 9-    Morphine equivalent dose as reported on OARRS:120  Has narcan at home     Review ofOARRS does not show any aberrant prescription behavior. Medication is helping the patient stay active. Patient denies any side effects and reports adequate analgesia.  No sign of 7/22/2014, 8/5/2014    Dilma/Demian    CERVICAL FUSION  11/14/2019     ANTERIOR CERVICAL DECOMPRESSION FUSION C3-4, C4-5    CERVICAL FUSION N/A 11/14/2019    ANTERIOR CERVICAL DECOMPRESSION FUSION C3-4, C4-5 (SUPINE) DEPUY, REGULAR TABLE, MICROSCOPE, C-ARM, EVOKES (# [id number] ELIZA) performed by Wen Moore DO at [address], LAPAROSCOPIC N/A 8/18/2017    CHOLECYSTECTOMY LAPAROSCOPIC ROBOTIC MULTIPORT performed by Albertina Moreno MD at [address] [facility] [address]  1/2013    CYSTOSCOPY  11/04/2016    CYSTOSCOPY  01/04/2019    ENDOSCOPIC ULTRASOUND (LOWER) N/A 8/17/2017    ENDOSCOPIC ULTRASOUND performed by Rick Holbrook MD at [address] (UPPER)  08/17/2017    A cursory view of the gastric mucosa was normal. The scope was then further advanced through a patent pylorus to the second portion of the duodenum. The Gallbladder was evaluated in bulb position. Thick sludge was noted. The CBD was 7.8 mm with no filling defects. The PD was 5 mm in the HOP. The pancreatic tissue was edematous in body and tail.  KNEE ARTHROSCOPY Left     LUMBAR FUSION      CT OFFICE/OUTPT VISIT,PROCEDURE ONLY N/A 1/24/2018    SPINAL HARDWARE REMOVAL LUMBAR BONE STIMULATOR performed by Linda Gonsales MD at [address] [facility] Bilateral 1989    SKIN BIOPSY Right 11/2015    mole right posterior shoulder    SPINE SURGERY      spinal cord stimulator    SPINE SURGERY  91-4-15    renoval of spinal cord stimulator leads and battery    PASQUALE AND BSO      TUBAL LIGATION      UVULOPALATOPHARYGOPLASTY  2006       Allergies   Allergen Reactions    Azithromycin Shortness Of Breath     Tightness in chest    Adhesive Tape      OK to use paper tape per Patient- 2/19/20 patient states that she has been using all types of tape with no reaction         Current Outpatient Medications:     morphine (MS CONTIN) 30 MG extended release tablet, Take 1 tablet by mouth 2 times daily for 30 days. , Disp: 60 tablet, Rfl: 0    oxyCODONE-acetaminophen (PERCOCET)  MG per tablet, Take 1 tablet by mouth every 6 hours as needed for Pain for up to 30 days. , Disp: 120 tablet, Rfl: 0    albuterol sulfate  (90 Base) MCG/ACT inhaler, Inhale 1 puff into the lungs daily, Disp: , Rfl:     potassium chloride (MICRO-K) 10 MEQ extended release capsule, Take 1 capsule by mouth daily, Disp: , Rfl:     fluticasone (FLOVENT HFA) 44 MCG/ACT inhaler, Inhale 1 puff into the lungs 2 times daily, Disp: , Rfl:     pregabalin (LYRICA) 150 MG capsule, Take 150 mg by mouth 2 times daily. , Disp: , Rfl:     montelukast (SINGULAIR) 10 MG tablet, Take 10 mg by mouth nightly , Disp: , Rfl:     metFORMIN (GLUCOPHAGE) 500 MG tablet, Take 500 mg by mouth 2 times daily (with meals). , Disp: , Rfl:     telmisartan-hydrochlorothiazide (MICARDIS HCT) 40-12.5 MG per tablet, Take 1 tablet by mouth daily. , Disp: , Rfl:     levothyroxine (SYNTHROID) 50 MCG tablet, Take 50 mcg by mouth Daily. , Disp: , Rfl:     omeprazole (PRILOSEC) 20 MG capsule, Take 20 mg by mouth every evening., Disp: , Rfl:     aspirin 81 MG tablet, Take 81 mg by mouth daily. , Disp: , Rfl:     naloxone 4 MG/0.1ML LIQD nasal spray, 1 spray by Nasal route as needed for Opioid Reversal, Disp: 1 each, Rfl: 5    bumetanide (BUMEX) 1 MG tablet, , Disp: , Rfl:     sennosides-docusate sodium (SENOKOT-S) 8.6-50 MG tablet, Take 1 tablet by mouth daily as needed, Disp: , Rfl:     psyllium (KONSYL) 28.3 % PACK, Take 1 packet by mouth daily, Disp: , Rfl:     fluticasone (FLONASE) 50 MCG/ACT nasal spray, 1 spray by Each Nostril route daily, Disp: , Rfl:     diclofenac sodium 1 % GEL, Apply 2 g topically 2 times daily as needed for Pain, Disp: , Rfl:     magnesium hydroxide (DULCOLAX MILK OF MAGNESIA) 400 MG/5ML suspension, Take 5 mLs by mouth daily as needed for Constipation, Disp: , Rfl:     Family History   Problem Relation Age of Onset    Heart Failure Mother    Manasa Lung Cancer Mother     Other Father          pneumonia    Alzheimer's Disease Father     Lung Cancer Brother     Diabetes Paternal Grandmother     Other Daughter         Fibromyalsia    Rheum Arthritis Daughter        Social History     Socioeconomic History    Marital status:      Spouse name: Sara Matamoros Number of children: 3    Years of education: Not on file    Highest education level: Not on file   Occupational History    Occupation: retired   Social Needs    Financial resource strain: Not on file    Food insecurity     Worry: Not on file     Inability: Not on file   [employer] needs     Medical: Not on file     Non-medical: Not on file   Tobacco Use    Smoking status: Never Smoker    Smokeless tobacco: Never Used   Substance and Sexual Activity    Alcohol use: Not Currently     Comment: rare    Drug use: Never    Sexual activity: Yes     Partners: Male   Lifestyle    Physical activity     Days per week: Not on file     Minutes per session: Not on file    Stress: Not on file   Relationships    Social connections     Talks on phone: Not on file     Gets together: Not on file     Attends Jehovah's witness service: Not on file     Active member of club or organization: Not on file     Attends meetings of clubs or organizations: Not on file     Relationship status: Not on file    Intimate partner violence     Fear of current or ex partner: Not on file     Emotionally abused: Not on file     Physically abused: Not on file     Forced sexual activity: Not on file   Other Topics Concern    Not on file   Social History Narrative    Not on file         Review of Systems:  Review of Systems   Constitution: Negative. HENT: Negative. Eyes: Positive for visual disturbance. Glasses   Cardiovascular: Negative. Respiratory: Negative. Endocrine:        Diabetic,   Hematologic/Lymphatic: Bruises/bleeds easily. Skin: Negative. Musculoskeletal: Positive for back pain and joint pain. Gastrointestinal: Positive for constipation. Genitourinary: Positive for nocturia. Neurological: Positive for loss of balance, numbness and weakness. Gait issue   Psychiatric/Behavioral: Negative. Physical Exam:    Physical Exam  Skin:         Neurological:      Mental Status: She is alert and oriented to person, place, and time. Psychiatric:         Mood and Affect: Mood normal.         Thought Content: Thought content normal.           Assessment:    Problem List Items Addressed This Visit     Spinal stenosis in cervical region - Primary    Relevant Orders    DRUG SCREEN, PAIN    Spinal cord stimulator status    Sacroiliitis (HCC)    Neck pain    Lumbar spondylosis    Lumbar spondylolysis    Relevant Orders    DRUG SCREEN, PAIN    Lumbar radiculopathy    Imbalance (Chronic)    Failed back syndrome of lumbar spine    Failed back syndrome    Encounter for medication monitoring    Relevant Orders    DRUG SCREEN, PAIN    Encounter for medication counseling    Chronic, continuous use of opioids    Cervical radicular pain              Treatment Plan:  DISCUSSION: Treatment options discussed withpatient and all questions answered to patient's satisfaction. Possible side effects, risk of tolerance and or dependence and alternative treatments discussed    Obtaining appropriate analgesic effect of treatment   No signs of potential drug abuse or diversion identified    [x] Ill effects of being on chronic pain medications such as sleep disturbances, respiratory depression, hormonal changes, withdrawal symptoms, chronic opioid dependence and tolerance as well as risk of taking opioids with Benzodiazepines and taking opioids along with alcohol,  werediscussed with patient. I had asked the patient to minimize medication use and utilize pain medications only for uncontrolled rest pain or pain with exertional activities.  I advised patient not to self-escalate painmedications without consulting with us.  At each of patient's future visits we will try to taper pain medications, while adjusting the adjunct medications, and re-evaluating for Physical Therapy to improve spinal andjoint strength. We will continue to have discussions to decrease pain medications as tolerated. Counseled patient on effects their pain medication and /or their medical condition mayhave on their  ability to drive or operate machinery. Instructed not to drive or operate machinery if drowsy     I also discussed with the patient regarding the dangers of combining narcotic pain medication with tranquilizers, alcohol or illegal drugs or taking the medication any way other than prescribed. The dangers were discussed  including respiratory depression and death. Patient was told to tell  all  physicians regarding the medications he is getting from pain clinic. Patient is warned not to take any unprescribed medications over-the-countermedications that can depress breathing . Patient is advised to talk to the pharmacist or physicians if planning to take any over-the-counter medications before  takeing them. Patient is strongly advised to avoid tranquilizers or  relaxants, illegal drugs  or any medications that can depress breathing  Patient is also advised to tell us if there is any changes in their medications from other physicians.     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 UDT, instructed to go to outpt lab by tomorrow    TREATMENT OPTIONS:     UDT  Medication Agreement Requirements Met  Continue Opioid therapy  Script written for percocet, ms contin  Follow up appointment made

## 2020-09-29 ENCOUNTER — HOSPITAL ENCOUNTER (OUTPATIENT)
Age: 80
Discharge: HOME | End: 2020-09-29
Payer: MEDICARE

## 2020-09-29 PROCEDURE — 80307 DRUG TEST PRSMV CHEM ANLYZR: CPT

## 2020-10-02 LAB
6-ACETYLMORPHINE, UR: NOT DETECTED
7-AMINOCLONAZEPAM, URINE: NOT DETECTED
ALPHA-OH-ALPRAZ, URINE: NOT DETECTED
ALPRAZOLAM, URINE: NOT DETECTED
AMPHETAMINES, URINE: NOT DETECTED
BARBITURATES, URINE: NOT DETECTED
BENZOYLECGONINE, UR: NOT DETECTED
BUPRENORPHINE URINE: NOT DETECTED
CARISOPRODOL, UR: NOT DETECTED
CLONAZEPAM, URINE: NOT DETECTED
CODEINE, URINE: NOT DETECTED
CREATININE URINE: 61 MG/DL (ref 20–400)
DIAZEPAM, URINE: NOT DETECTED
DRUGS EXPECTED, UR: NORMAL
EER HI RES INTERP UR: NORMAL
ETHYL GLUCURONIDE UR: NOT DETECTED
FENTANYL URINE: NOT DETECTED
HYDROCODONE, URINE: NOT DETECTED
HYDROMORPHONE, URINE: NOT DETECTED
LORAZEPAM, URINE: NOT DETECTED
MARIJUANA METAB, UR: NOT DETECTED
MDA, UR: NOT DETECTED
MDEA, EVE, UR: NOT DETECTED
MDMA URINE: NOT DETECTED
MEPERIDINE METAB, UR: NOT DETECTED
METHADONE, URINE: NOT DETECTED
METHAMPHETAMINE, URINE: NOT DETECTED
METHYLPHENIDATE: NOT DETECTED
MIDAZOLAM, URINE: NOT DETECTED
MORPHINE URINE: PRESENT
NORBUPRENORPHINE, URINE: NOT DETECTED
NORDIAZEPAM, URINE: NOT DETECTED
NORFENTANYL, URINE: NOT DETECTED
NORHYDROCODONE, URINE: NOT DETECTED
NOROXYCODONE, URINE: PRESENT
NOROXYMORPHONE, URINE: PRESENT
OXAZEPAM, URINE: NOT DETECTED
OXYCODONE URINE: PRESENT
OXYMORPHONE, URINE: PRESENT
PAIN MANAGEMENT DRUG PANEL INTERP, URINE: NORMAL
PAIN MGT DRUG PANEL, HI RES, UR: NORMAL
PCP,URINE: NOT DETECTED
PHENTERMINE, UR: NOT DETECTED
PROPOXYPHENE, URINE: NOT DETECTED
TAPENTADOL, URINE: NOT DETECTED
TAPENTADOL-O-SULFATE, URINE: NOT DETECTED
TEMAZEPAM, URINE: NOT DETECTED
TRAMADOL, URINE: NOT DETECTED
ZOLPIDEM, URINE: NOT DETECTED

## 2020-10-30 ENCOUNTER — HOSPITAL ENCOUNTER (OUTPATIENT)
Dept: PAIN MANAGEMENT | Age: 80
Discharge: HOME | End: 2020-10-30
Payer: MEDICARE

## 2020-10-30 PROCEDURE — 99213 OFFICE O/P EST LOW 20 MIN: CPT

## 2020-10-30 PROCEDURE — 99442 PR PHYS/QHP TELEPHONE EVALUATION 11-20 MIN: CPT | Performed by: NURSE PRACTITIONER

## 2020-10-30 RX ORDER — AMOXICILLIN 500 MG/1
CAPSULE ORAL
COMMUNITY
Start: 2020-10-15 | End: 2020-10-30 | Stop reason: ALTCHOICE

## 2020-10-30 RX ORDER — OXYCODONE AND ACETAMINOPHEN 10; 325 MG/1; MG/1
1 TABLET ORAL EVERY 6 HOURS PRN
Qty: 120 TABLET | Refills: 0 | Status: SHIPPED | OUTPATIENT
Start: 2020-10-31 | End: 2020-11-20 | Stop reason: SDUPTHER

## 2020-10-30 RX ORDER — MORPHINE SULFATE 30 MG/1
30 TABLET, FILM COATED, EXTENDED RELEASE ORAL 2 TIMES DAILY
Qty: 60 TABLET | Refills: 0 | Status: SHIPPED | OUTPATIENT
Start: 2020-10-31 | End: 2020-11-20 | Stop reason: SDUPTHER

## 2020-10-30 ASSESSMENT — ENCOUNTER SYMPTOMS
RESPIRATORY NEGATIVE: 1
GASTROINTESTINAL NEGATIVE: 1
BACK PAIN: 1

## 2020-10-30 NOTE — PROGRESS NOTES
Asadtrasskrishna 89 PROGRESS NOTE      Patient phone call to   review Medication Agreement    Chief Complaint: low back pain. The pain does not radiate, She has had 2 lumbar and 1 cervical surgery. The pain is unchanged. The pain is mostly on her left side. She is in PT for right elbow pain and PT is not helping. Her sleep is good. She states able to do her housework. She does home exercises, She has issues with her balance, she uses a walker in the house., She states a few weeks ago she slid from her bed onto the floor. She has had no Ed visits. Back Pain   This is a chronic problem. The problem occurs constantly. The problem is unchanged. The pain is present in the lumbar spine. The quality of the pain is described as aching. The pain does not radiate. The pain is at a severity of 6/10. The pain is moderate. The pain is the same all the time. Exacerbated by: walking. Associated symptoms include numbness. (Legs and toes numbness) Risk factors include menopause. She has tried analgesics for the symptoms. Patient denies any new neurological symptoms. No bowel or bladder incontinence, no weakness, and no falling. Any new diagnostic workup: [] no  [] yes [] Xray [] CT scan [] MRI [] DEXA scan     [] Other         Narrative    EXAMINATION:    TWO XRAY VIEWS SCOLIOSIS SERIES         6/5/2020 2:34 pm         COMPARISON:    03/06/2020         HISTORY:    ORDERING SYSTEM PROVIDED HISTORY: Spinal deformity    TECHNOLOGIST PROVIDED HISTORY:    scoliosis    Reason for Exam: Spinal deformity. Pt has been losing balance while walking. Frequent falls. Acuity: Chronic    Type of Exam: Ongoing    Additional signs and symptoms: Spinal deformity. Pt has been losing balance    while walking.  Frequent falls.         FINDINGS:    There is mild levoconvex curvature at the thoracolumbar junction, which is    not significantly changed from the previous study.  Measurements are somewhat    limited due to suboptimal visibility of the bony landmarks in the spine. Posterior fixation hardware is present throughout the lumbar spine.  There    are laminectomy defects in the lumbar spine as well.  Fixation hardware is    also noted in the cervical spine.  Vertebral body heights are grossly    preserved.  Sagittal alignment is within normal limits and stable from    previous radiographs.  There is moderate multilevel disc space narrowing and    endplate spurring.              Impression    1.  Mild thoracolumbar levoscoliosis, not significantly changed.         2.  Unchanged fixation hardware in the cervical and lumbar spine.         3.  Moderate multilevel degenerative changes.              EXAMINATION:    TWO XRAY VIEWS SCOLIOSIS SERIES         3/6/2020 2:41 pm         COMPARISON:    None.         HISTORY:    ORDERING SYSTEM PROVIDED HISTORY: Spinal deformity    TECHNOLOGIST PROVIDED HISTORY:    scoliosis - ambulate 20ft & stand 10min prior to xrays    Reason for Exam: Recheck scoliosis after fatigued spinal deformity    Acuity: Chronic    Type of Exam: Ongoing    Additional signs and symptoms: Recheck scoliosis after fatigued spinal    deformity         FINDINGS:    13 mm levoconvex scoliosis of the lumbar spine as measured from the superior    endplate of L1 to L5.  There are laminectomies and posterior fusion hardware    noted L2 through S1.  No significant secondary curvature is noted in the    thoracic spine.  Thoracic spine demonstrates diffuse disc space narrowing and    spondylosis.  Disc spaces is noted at L2-3 and L4-5. cervical hardware also    noted.              Impression    Mild lumbar scoliosis and posterior interbody fusion.                          Treatment goals:  Functional status: walk without walker      Aberrancy:   Any alcoholic beverages no      Any illegal drugs   no      Analgesia:6      Adverse  Effects constipation, takes senekot prn:    ADL;s :in PT, does housework      Pill count: appropriate fill date 10-    Morphine equivalent dose as reported on OARRS:120 has narcan at home  Periodic Controlled Substance Monitoring: Possible medication side effects, risk of tolerance/dependence & alternative treatments discussed., No signs of potential drug abuse or diversion identified. , Assessed functional status., Obtaining appropriate analgesic effect of treatment. Nick Tellez, APRN - CNP)  Review ofOARRS does not show any aberrant prescription behavior. Medication is helping the patient stay active. Patient denies any side effects and reports adequate analgesia. No sign of misuse/abuse. 9-        Was the UDS appropriate:yes      Record/Diagnostics Review:      As above, I did review the imaging    10/2/2020  2:30 PM - Josea Lejandro, Mhpn Incoming Lab Results From [facility]     Component  Value  Ref Range & Units  Status  Collected  Lab    Pain Management Drug Panel Interp, Urine  Consistent   Final  09/29/2020  1:11 PM  [facility]    (NOTE)   ________________________________________________________________   DRUGS EXPECTED:   OXYCODONE   MORPHINE   ________________________________________________________________   CONSISTENT with medications provided:   OXYCODONE: based on oxycodone, noroxycodone, noroxymorphone,   oxymorphone   MORPHINE: based on morphine   ________________________________________________________________   INTERPRETIVE INFORMATION: Pain Mgt [place], Mass Spec/EMIT, Ur,                            Interp   Interpretation depends on accuracy and completeness of patient   medication information submitted by client.             Past Medical History:   Diagnosis Date    Achilles tendonitis     right    Asthma     COPD (chronic obstructive pulmonary disease) (HCC)     Cough     clear phlegm    Degenerative lumbar disc     DM (diabetes mellitus), type 2 ([address]     PCP Dr. Dhara Lopez, seen 8/2019    Failed back syndrome, lumbar     Fast heart beat     Hx of \"8-10 years ago\"    tablet, Take 1 tablet by mouth daily as needed, Disp: , Rfl:     psyllium (KONSYL) 28.3 % PACK, Take 1 packet by mouth daily, Disp: , Rfl:     fluticasone (FLONASE) 50 MCG/ACT nasal spray, 1 spray by Each Nostril route daily, Disp: , Rfl:     diclofenac sodium 1 % GEL, Apply 2 g topically 2 times daily as needed for Pain, Disp: , Rfl:     magnesium hydroxide (DULCOLAX MILK OF MAGNESIA) 400 MG/5ML suspension, Take 5 mLs by mouth daily as needed for Constipation, Disp: , Rfl:     Family History   Problem Relation Age of Onset    Heart Failure Mother     Lung Cancer Mother     Other Father          pneumonia    Alzheimer's Disease Father     Lung Cancer Brother     Diabetes Paternal Grandmother     Other Daughter         Fibromyalsia    Rheum Arthritis Daughter        Social History     Socioeconomic History    Marital status:      Spouse name: Nik Horton Number of children: 3    Years of education: Not on file    Highest education level: Not on file   Occupational History    Occupation: retired   Social Needs    Financial resource strain: Not on file    Food insecurity     Worry: Not on file     Inability: Not on file   [employer] needs     Medical: Not on file     Non-medical: Not on file   Tobacco Use    Smoking status: Never Smoker    Smokeless tobacco: Never Used   Substance and Sexual Activity    Alcohol use: Not Currently     Comment: rare    Drug use: Never    Sexual activity: Yes     Partners: Male   Lifestyle    Physical activity     Days per week: Not on file     Minutes per session: Not on file    Stress: Not on file   Relationships    Social connections     Talks on phone: Not on file     Gets together: Not on file     Attends Quaker service: Not on file     Active member of club or organization: Not on file     Attends meetings of clubs or organizations: Not on file     Relationship status: Not on file    Intimate partner violence     Fear of current or ex partner: Not on file     Emotionally abused: Not on file     Physically abused: Not on file     Forced sexual activity: Not on file   Other Topics Concern    Not on file   Social History Narrative    Not on file         Review of Systems:  Review of Systems   HENT: Positive for hearing loss. Eyes: Positive for visual disturbance. Glasses   Cardiovascular: Negative. Respiratory: Negative. Endocrine: Negative. Diabetic   Hematologic/Lymphatic: Bruises/bleeds easily. Skin: Negative. Musculoskeletal: Positive for back pain, falls and joint pain. Gastrointestinal: Negative. Genitourinary: Negative. Neurological: Positive for loss of balance and numbness. Uses walker   Psychiatric/Behavioral: Negative. Physical Exam:    Physical Exam  Skin:         Neurological:      Mental Status: She is alert and oriented to person, place, and time. Psychiatric:         Mood and Affect: Mood normal.         Thought Content: Thought content normal.           Assessment:      Problem List Items Addressed This Visit     Spinal stenosis in cervical region - Primary    Sacroiliitis (HCC)    Lumbar spondylosis    Lumbar radiculopathy    Imbalance (Chronic)    Failed back syndrome of lumbar spine    Failed back syndrome    DDD (degenerative disc disease), cervical    Chronic, continuous use of opioids    Cervical stenosis of spine          Treatment Plan:  DISCUSSION: Treatment options discussed withpatient and all questions answered to patient's satisfaction.      Possible side effects, risk of tolerance and or dependence and alternative treatments discussed    Obtaining appropriate analgesic effect of treatment   No signs of potential drug abuse or diversion identified    [x] Ill effects of being on chronic pain medications such as sleep disturbances, respiratory depression, hormonal changes, withdrawal symptoms, chronic opioid dependence and tolerance as well as risk of taking opioids with Benzodiazepines and taking opioids along with alcohol,  werediscussed with patient. I had asked the patient to minimize medication use and utilize pain medications only for uncontrolled rest pain or pain with exertional activities. I advised patient not to self-escalate painmedications without consulting with us. At each of patient's future visits we will try to taper pain medications, while adjusting the adjunct medications, and re-evaluating for Physical Therapy to improve spinal andjoint strength. We will continue to have discussions to decrease pain medications as tolerated. Counseled patient on effects their pain medication and /or their medical condition mayhave on their  ability to drive or operate machinery. Instructed not to drive or operate machinery if drowsy     I also discussed with the patient regarding the dangers of combining narcotic pain medication with tranquilizers, alcohol or illegal drugs or taking the medication any way other than prescribed. The dangers were discussed  including respiratory depression and death. Patient was told to tell  all  physicians regarding the medications he is getting from pain clinic. Patient is warned not to take any unprescribed medications over-the-countermedications that can depress breathing . Patient is advised to talk to the pharmacist or physicians if planning to take any over-the-counter medications before  takeing them. Patient is strongly advised to avoid tranquilizers or  relaxants, illegal drugs  or any medications that can depress breathing  Patient is also advised to tell us if there is any changes in their medications from other physicians.     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

## 2020-11-11 ENCOUNTER — OFFICE VISIT (OUTPATIENT)
Dept: ORTHOPEDIC SURGERY | Age: 80
End: 2020-11-11
Payer: MEDICARE

## 2020-11-11 VITALS — BODY MASS INDEX: 31.49 KG/M2 | WEIGHT: 150 LBS | TEMPERATURE: 97.7 F | HEIGHT: 58 IN

## 2020-11-11 PROCEDURE — 99203 OFFICE O/P NEW LOW 30 MIN: CPT | Performed by: ORTHOPAEDIC SURGERY

## 2020-11-11 NOTE — PROGRESS NOTES
Orthopedic Elbow Encounter Note     Chief complaint: Right elbow pain    HPI: Luis Peter is a 68 y.o. right-hand dominant female who presents for evaluation of her right elbow which has been hurting for several years now. She denies any precipitating trauma or injury. It is getting worse. It is mostly localized to the posterior aspect of the elbow and deep in the joint. Its usually exacerbated by use. She does describe some stiffness in the elbow. She denies having any associated numbness or tingling. Previous treatment:    NSAIDs: Ibuprofen    Injections:  None    Physical therapy: Yes    Surgeries: None    Review of Systems:     Constitution: no fever or chills   Pain level: 5/10  Musculoskeletal: As noted in the HPI   Neurologic: no neurologic symptoms    Past Medical Hx, Past Surgical Hx, Medications, Allergies, Social Hx: These were all reviewed. Please refer to Electronic Medical Records for details.      Physical Exam:     Temp 97.7 °F (36.5 °C) (Infrared)   Ht 4' 10\" (1.473 m)   Wt 150 lb (68 kg)   BMI 31.35 kg/m²    General Appearance: alert, well appearing, and in no distress  Mental Status: alert, oriented to person, place, and time  Gait: ambulates with walker    Elbows:    Skin: warm and dry, no rash or erythema  Vasculature: 2+ radial pulses bilaterally  Sensation: Intact to light touch in radial, ulnar, and median nerve distributions bilaterally    ROM: (Degrees)    Right   A  Left   A     Flexion   120  Flexion   135   Extension  40  Extension  10    Pronation  70  Pronaton  65   Supination  70  Supination  75     Crepitation  No  Crepitation  No    Muscle strength:    Right       Left    Biceps   5    Biceps   5  Triceps  5    Triceps  5  Wrist flexion  5    Wrist flexion  5  Wrist extension 5    Wrist extension 5  Intrinsics  5    Intrinsics  5    Tenderness: Posterolateral elbow   Tenderness: None    Special tests    Right    Ulnar Nerve     Left  n    Cubital tunnel bend    n  n    Tinnel's at elbow    n        Biceps  n    Bicipital tenderness    n  n    Defect at biceps insertion   n        Instability  n    LCL instability     n  n    MCL instability     n  n    Moving valgus test    n  n    Milk sign     n  n    PLRI pivot     n        Epicondylitis  n    Resisted WE Pain    n  n    Resisted WF Pain    n  n    Resisted Supination Pain   n  n    Resisted Pronation Pain   n        Arthritis  n    Clunk      n  y    Pain at extremes of motion   n  n    Pain during arc of motion   n      Imaging:  Xrays: 3 views of the right elbow obtained on 11/11/2020 were independently reviewed  Indications: Right elbow pain  Findings: Significant narrowing of the radiocapitellar joint associated with a large anterior loose body as well as lateral and posterior joint osteophytic change. Impression: Right elbow radiographs with severe degenerative changes    Impression/Plan:     Yessica Loo is a 68 y.o. old female with right elbow pain and stiffness due to underlying osteoarthritis. I had a discussion with the patient today educating her about this problem and we discussed treatment options available to her including nonoperative and operative intervention. At this time I would recommend proceeding conservatively. Consequently we discussed use of anti-inflammatories as well as cortisone injections. She is already undergoing physical therapy as noted above. She elected to continue on with her ibuprofen as she is currently taking it. Consequently I will have her follow-up my clinic as needed but she may return or call at anytime with questions under concerns.       NA = Not assessed  y = Yes  n = No

## 2020-11-11 NOTE — LETTER
11/11/2020    Terrence English 65  [place], [address]    RE: Judith Seymour    Dear Dr. More Sanchez,    Thank you for allowing me to participate in the care of Ms. Seth Horton. I had the opportunity to evaluate the patient on 11/11/2020. Attached you will find my evaluation and recommendations. Thanks again for the confidence you have expressed in me by allowing my participation in the care of your patient. I will keep you apprised of further developments in the patients treatment course as it progresses. If I can be of further assistance in any fashion, please feel free to contact me at your convenience.     Sincerely,        Sheng Herman  Shoulder and Elbow Surgery

## 2020-11-13 PROBLEM — M19.021 PRIMARY OSTEOARTHRITIS OF RIGHT ELBOW: Status: ACTIVE | Noted: 2020-11-13

## 2020-11-20 ENCOUNTER — HOSPITAL ENCOUNTER (OUTPATIENT)
Dept: PAIN MANAGEMENT | Age: 80
Discharge: HOME | End: 2020-11-20
Payer: MEDICARE

## 2020-11-20 PROCEDURE — 99442 PR PHYS/QHP TELEPHONE EVALUATION 11-20 MIN: CPT | Performed by: NURSE PRACTITIONER

## 2020-11-20 PROCEDURE — 99213 OFFICE O/P EST LOW 20 MIN: CPT

## 2020-11-20 RX ORDER — MORPHINE SULFATE 30 MG/1
30 TABLET, FILM COATED, EXTENDED RELEASE ORAL 2 TIMES DAILY
Qty: 60 TABLET | Refills: 0 | Status: SHIPPED | OUTPATIENT
Start: 2020-11-30 | End: 2020-12-15 | Stop reason: SDUPTHER

## 2020-11-20 RX ORDER — OXYCODONE AND ACETAMINOPHEN 10; 325 MG/1; MG/1
1 TABLET ORAL EVERY 6 HOURS PRN
Qty: 120 TABLET | Refills: 0 | Status: SHIPPED | OUTPATIENT
Start: 2020-11-30 | End: 2020-12-15 | Stop reason: SDUPTHER

## 2020-11-20 RX ORDER — AMOXICILLIN 500 MG/1
CAPSULE ORAL
COMMUNITY
Start: 2020-11-03 | End: 2020-11-20

## 2020-11-20 RX ORDER — CEPHALEXIN 500 MG/1
CAPSULE ORAL
COMMUNITY
Start: 2020-11-18 | End: 2020-12-15 | Stop reason: ALTCHOICE

## 2020-11-20 ASSESSMENT — ENCOUNTER SYMPTOMS
RESPIRATORY NEGATIVE: 1
GASTROINTESTINAL NEGATIVE: 1
BACK PAIN: 1

## 2020-11-20 NOTE — PROGRESS NOTES
Josh 89 PROGRESS NOTE      Patient phone call to   review Medication Agreement    Chief Complaint: low back pain    She c/o low back pain which has not changed, She has history of lumbar surgery and cervical surgery, The pain does not radiate,She states she is sleeping well. She completed PT for her low back and balance which did not help, She does home exercises. She saw Dr Eleonora Hector for right  tennis elbow. She sttaes he discussed injection and surgery. She uses a walker. She has had no Ed visits. Back Pain   This is a chronic problem. The problem occurs constantly. The problem is unchanged. The pain is present in the lumbar spine. The quality of the pain is described as aching. The pain does not radiate. The pain is at a severity of 6/10. The pain is moderate. Worse during: morning. The symptoms are aggravated by standing (walking). Associated symptoms include dysuria. Risk factors include menopause. She has tried analgesics and bed rest for the symptoms. Patient denies any new neurological symptoms. No bowel or bladder incontinence, no weakness, and no falling. Any new diagnostic workup: [] no  [] yes [] Xray [] CT scan [] MRI [] DEXA scan     [] Other            EXAMINATION:    TWO XRAY VIEWS SCOLIOSIS SERIES         6/5/2020 2:34 pm         COMPARISON:    03/06/2020         HISTORY:    ORDERING SYSTEM PROVIDED HISTORY: Spinal deformity    TECHNOLOGIST PROVIDED HISTORY:    scoliosis    Reason for Exam: Spinal deformity. Pt has been losing balance while walking. Frequent falls. Acuity: Chronic    Type of Exam: Ongoing    Additional signs and symptoms: Spinal deformity. Pt has been losing balance    while walking.  Frequent falls.         FINDINGS:    There is mild levoconvex curvature at the thoracolumbar junction, which is    not significantly changed from the previous study.  Measurements are somewhat    limited due to suboptimal visibility of the bony landmarks in the spine.    Posterior fixation hardware is present throughout the lumbar spine.  There    are laminectomy defects in the lumbar spine as well.  Fixation hardware is    also noted in the cervical spine.  Vertebral body heights are grossly    preserved.  Sagittal alignment is within normal limits and stable from    previous radiographs.  There is moderate multilevel disc space narrowing and    endplate spurring.              Impression    1.  Mild thoracolumbar levoscoliosis, not significantly changed.         2.  Unchanged fixation hardware in the cervical and lumbar spine.         3.  Moderate multilevel degenerative changes.                        Treatment goals:  Functional status: not have to use walker      Aberrancy:   Any alcoholic beverages  no     Any illegal drugs   no      Analgesia:6      Adverse  Effects :none    ADL;s :home exercises        Pill count: appropriate fill date 10-  Morphine equivalent dose as reported on [place]ARRS: 120  Periodic Controlled Substance Monitoring: Possible medication side effects, risk of tolerance/dependence & alternative treatments discussed., No signs of potential drug abuse or diversion identified. , Assessed functional status., Obtaining appropriate analgesic effect of treatment. (Yasmine Ron, APRN - CNP)  Chronic Pain > 80 MEDD: Co-prescribed Naloxone., Obtained or confirmed \"Medication Contract\" on file. Yasmine Ron, APRN - CNP)  Review of[place]ARRS does not show any aberrant prescription behavior. Medication is helping the patient stay active. Patient denies any side effects and reports adequate analgesia. No sign of misuse/abuse.         When was thelast UDS:    9-         Was the UDS appropriate:yes    Record/Diagnostics Review:      As above, I did review the imaging    10/2/2020  2:30 PM - Jose Alejandro, Mhpn Incoming Lab Results From [facility]     Component  Value  Ref Range & Units  Status  Collected  Lab    Pain Management Drug Panel Interp, Urine  Consistent   Final 09/29/2020  1:11 PM  [facility]    (NOTE)   ________________________________________________________________   DRUGS EXPECTED:   OXYCODONE   MORPHINE   ________________________________________________________________   CONSISTENT with medications provided:   OXYCODONE: based on oxycodone, noroxycodone, noroxymorphone,   oxymorphone   MORPHINE: based on morphine   ________________________________________________________________   INTERPRETIVE INFORMATION: Pain [facility] [place], [facility] Spec/EMIT,           Past Medical History:   Diagnosis Date    Achilles tendonitis     right    Asthma     COPD (chronic obstructive pulmonary disease) (HCC)     Cough     clear phlegm    Degenerative lumbar disc     [facility] 2 [facility]     PCP Dr. Padmaja Allen, seen 8/2019    Failed back syndrome, lumbar     Fast heart beat     Hx of \"8-10 years ago\"    Fibromyalgia     History of bladder cancer     Hypertension     Hypothyroid     Kidney stones     Lumbar radiculopathy     Lumbar spondylolysis     Osteoarthritis     Sleep apnea     uses CPAP machine nightly    Spinal stenosis in cervical region     Wears glasses        Past Surgical History:   Procedure Laterality Date    BACK SURGERY  2010    lumbar fusion    BLADDER TUMOR EXCISION  2005    CARDIOVERSION      \"8-10 years ago\"  to get \"heart into regular rhythm\"    CARPAL TUNNEL RELEASE Bilateral     CATARACT REMOVAL WITH IMPLANT Bilateral 7/22/2014, 8/5/2014    Raffoaravind/Demian    CERVICAL FUSION  11/14/2019     ANTERIOR CERVICAL DECOMPRESSION FUSION C3-4, C4-5    CERVICAL FUSION N/A 11/14/2019    ANTERIOR CERVICAL DECOMPRESSION FUSION C3-4, C4-5 (SUPINE) DEPUY, REGULAR TABLE, MICROSCOPE, C-ARM, EVOKES (# [id number] [facility]) performed by Shailesh Rivas DO at [address], LAPAROSCOPIC N/A 8/18/2017    CHOLECYSTECTOMY LAPAROSCOPIC ROBOTIC MULTIPORT performed by Niesha King MD at [address] [facility]   1/2013    CYSTOSCOPY  11/04/2016    CYSTOSCOPY  01/04/2019    ENDOSCOPIC ULTRASOUND (LOWER) N/A 8/17/2017    ENDOSCOPIC ULTRASOUND performed by Penny Colvin MD at [address] (UPPER)  08/17/2017    A cursory view of the gastric mucosa was normal. The scope was then further advanced through a patent pylorus to the second portion of the duodenum. The Gallbladder was evaluated in bulb position. Thick sludge was noted. The CBD was 7.8 mm with no filling defects. The PD was 5 mm in the HOP. The pancreatic tissue was edematous in body and tail.  KNEE ARTHROSCOPY Left     LUMBAR FUSION      OK OFFICE/OUTPT VISIT,PROCEDURE ONLY N/A 1/24/2018    SPINAL HARDWARE REMOVAL LUMBAR BONE STIMULATOR performed by Mayur Antonio MD at [address] Bilateral 1989    SKIN BIOPSY Right 11/2015    mole right posterior shoulder    SPINE SURGERY      spinal cord stimulator    SPINE SURGERY  91-4-15    renoval of spinal cord stimulator leads and battery    PASQUALE AND BSO      TUBAL LIGATION      UVULOPALATOPHARYGOPLASTY  2006       Allergies   Allergen Reactions    Azithromycin Shortness Of Breath     Tightness in chest    Adhesive Tape      OK to use paper tape per Patient- 2/19/20 patient states that she has been using all types of tape with no reaction         Current Outpatient Medications:     cephALEXin (KEFLEX) 500 MG capsule, , Disp: , Rfl:     morphine (MS CONTIN) 30 MG extended release tablet, Take 1 tablet by mouth 2 times daily for 30 days. , Disp: 60 tablet, Rfl: 0    oxyCODONE-acetaminophen (PERCOCET)  MG per tablet, Take 1 tablet by mouth every 6 hours as needed for Pain for up to 30 days. , Disp: 120 tablet, Rfl: 0    potassium chloride (MICRO-K) 10 MEQ extended release capsule, Take 1 capsule by mouth daily, Disp: , Rfl:     fluticasone (FLOVENT HFA) 44 MCG/ACT inhaler, Inhale 1 puff into the lungs 2 times daily, Disp: , Rfl:     pregabalin (LYRICA) 150 MG capsule, Take 150 mg by mouth 2 times daily. , Disp: , Rfl:     montelukast (SINGULAIR) 10 MG tablet, Take 10 mg by mouth nightly , Disp: , Rfl:     metFORMIN (GLUCOPHAGE) 500 MG tablet, Take 500 mg by mouth 2 times daily (with meals). , Disp: , Rfl:     telmisartan-hydrochlorothiazide (MICARDIS HCT) 40-12.5 MG per tablet, Take 1 tablet by mouth daily. , Disp: , Rfl:     levothyroxine (SYNTHROID) 50 MCG tablet, Take 50 mcg by mouth Daily. , Disp: , Rfl:     omeprazole (PRILOSEC) 20 MG capsule, Take 20 mg by mouth every evening., Disp: , Rfl:     aspirin 81 MG tablet, Take 81 mg by mouth daily. , Disp: , Rfl:     naloxone 4 MG/0.1ML LIQD nasal spray, 1 spray by Nasal route as needed for Opioid Reversal, Disp: 1 each, Rfl: 5    albuterol sulfate  (90 Base) MCG/ACT inhaler, Inhale 1 puff into the lungs daily, Disp: , Rfl:     bumetanide (BUMEX) 1 MG tablet, , Disp: , Rfl:     sennosides-docusate sodium (SENOKOT-S) 8.6-50 MG tablet, Take 1 tablet by mouth daily as needed, Disp: , Rfl:     psyllium (KONSYL) 28.3 % PACK, Take 1 packet by mouth daily, Disp: , Rfl:     fluticasone (FLONASE) 50 MCG/ACT nasal spray, 1 spray by Each Nostril route daily, Disp: , Rfl:     diclofenac sodium 1 % GEL, Apply 2 g topically 2 times daily as needed for Pain, Disp: , Rfl:     magnesium hydroxide (DULCOLAX MILK OF MAGNESIA) 400 MG/5ML suspension, Take 5 mLs by mouth daily as needed for Constipation, Disp: , Rfl:     Family History   Problem Relation Age of Onset    Heart Failure Mother     Lung Cancer Mother     Other Father          pneumonia    Alzheimer's Disease Father     Lung Cancer Brother     Diabetes Paternal Grandmother     Other Daughter         Fibromyalsia    Rheum Arthritis Daughter        Social History     Socioeconomic History    Marital status:      Spouse name: Fany Hodge Number of children: 3    Years of education: Not on file    Highest education level: Not on file   Occupational History    Occupation: retired   Social Needs    Financial resource strain: Not on file    Food insecurity     Worry: Not on file     Inability: Not on file    Transportation needs     Medical: Not on file     Non-medical: Not on file   Tobacco Use    Smoking status: Never Smoker    Smokeless tobacco: Never Used   Substance and Sexual Activity    Alcohol use: Not Currently     Comment: rare    Drug use: Never    Sexual activity: Yes     Partners: Male   Lifestyle    Physical activity     Days per week: Not on file     Minutes per session: Not on file    Stress: Not on file   Relationships    Social connections     Talks on phone: Not on file     Gets together: Not on file     Attends Jain service: Not on file     Active member of club or organization: Not on file     Attends meetings of clubs or organizations: Not on file     Relationship status: Not on file    Intimate partner violence     Fear of current or ex partner: Not on file     Emotionally abused: Not on file     Physically abused: Not on file     Forced sexual activity: Not on file   Other Topics Concern    Not on file   Social History Narrative    Not on file         Review of Systems:  Review of Systems   Constitution: Negative. HENT: Negative. Eyes:        Glasses   Cardiovascular: Negative. Respiratory: Negative. Endocrine: Negative. Hematologic/Lymphatic: Bruises/bleeds easily. Skin: Negative. Musculoskeletal: Positive for back pain and joint pain. Gastrointestinal: Negative. Genitourinary: Positive for dysuria. Neurological:        Uses headache   Psychiatric/Behavioral: Negative. Physical Exam:    Physical Exam  Skin:         Neurological:      Mental Status: She is alert and oriented to person, place, and time. Psychiatric:         Mood and Affect: Mood normal.         Thought Content:  Thought content normal.           Assessment:  Problem List Items Addressed This Visit     Spinal stenosis in cervical region - Primary Sacroiliitis (HCC)    Lumbar spondylosis    Lumbar spondylolysis    Lumbar radiculopathy    Imbalance (Chronic)    Failed back syndrome of lumbar spine    Encounter for medication monitoring    DDD (degenerative disc disease), cervical    Chronic, continuous use of opioids    Cervical radicular pain                Treatment Plan:  DISCUSSION: Treatment options discussed withpatient and all questions answered to patient's satisfaction. Possible side effects, risk of tolerance and or dependence and alternative treatments discussed    Obtaining appropriate analgesic effect of treatment   No signs of potential drug abuse or diversion identified    [x] Ill effects of being on chronic pain medications such as sleep disturbances, respiratory depression, hormonal changes, withdrawal symptoms, chronic opioid dependence and tolerance as well as risk of taking opioids with Benzodiazepines and taking opioids along with alcohol,  werediscussed with patient. I had asked the patient to minimize medication use and utilize pain medications only for uncontrolled rest pain or pain with exertional activities. I advised patient not to self-escalate painmedications without consulting with us. At each of patient's future visits we will try to taper pain medications, while adjusting the adjunct medications, and re-evaluating for Physical Therapy to improve spinal andjoint strength. We will continue to have discussions to decrease pain medications as tolerated. Counseled patient on effects their pain medication and /or their medical condition mayhave on their  ability to drive or operate machinery. Instructed not to drive or operate machinery if drowsy     I also discussed with the patient regarding the dangers of combining narcotic pain medication with tranquilizers, alcohol or illegal drugs or taking the medication any way other than prescribed. The dangers were discussed  including respiratory depression and death.  Patient was told to tell  all  physicians regarding the medications he is getting from pain clinic. Patient is warned not to take any unprescribed medications over-the-countermedications that can depress breathing . Patient is advised to talk to the pharmacist or physicians if planning to take any over-the-counter medications before  takeing them. Patient is strongly advised to avoid tranquilizers or  relaxants, illegal drugs  or any medications that can depress breathing  Patient is also advised to tell us if there is any changes in their medications from other physicians.     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

## 2020-12-15 ENCOUNTER — HOSPITAL ENCOUNTER (OUTPATIENT)
Dept: PAIN MANAGEMENT | Age: 80
Discharge: HOME | End: 2020-12-15
Payer: MEDICARE

## 2020-12-15 PROCEDURE — 99213 OFFICE O/P EST LOW 20 MIN: CPT

## 2020-12-15 PROCEDURE — 99442 PR PHYS/QHP TELEPHONE EVALUATION 11-20 MIN: CPT | Performed by: NURSE PRACTITIONER

## 2020-12-15 RX ORDER — OXYCODONE AND ACETAMINOPHEN 10; 325 MG/1; MG/1
1 TABLET ORAL EVERY 6 HOURS PRN
Qty: 120 TABLET | Refills: 0 | Status: SHIPPED | OUTPATIENT
Start: 2020-12-29 | End: 2021-01-26 | Stop reason: SDUPTHER

## 2020-12-15 RX ORDER — MORPHINE SULFATE 30 MG/1
30 TABLET, FILM COATED, EXTENDED RELEASE ORAL 2 TIMES DAILY
Qty: 60 TABLET | Refills: 0 | Status: SHIPPED | OUTPATIENT
Start: 2020-12-29 | End: 2021-01-26 | Stop reason: SDUPTHER

## 2020-12-15 ASSESSMENT — ENCOUNTER SYMPTOMS
BACK PAIN: 1
EYES NEGATIVE: 1
CONSTIPATION: 1
SHORTNESS OF BREATH: 1

## 2020-12-15 NOTE — PROGRESS NOTES
[address] [facility] PROGRESS NOTE      Patient  completed []  video visit   [x]   phone call:    13  Minutes :   to  review Medication Agreement    Location:  Provider:  working from    home   , patient at home     Chief Complaint: low back pain    She c/o low back pain which does not radiate, She has history of cervical and lumbar fusion. She states fell since her last visit, She states lost her balance. She completed pT which did not help,, No Ed visits,    Back Pain  This is a chronic problem. The problem occurs constantly. The problem is unchanged. The pain is present in the lumbar spine. The quality of the pain is described as aching (dull). The pain is at a severity of 6/10. The pain is moderate. Worse during: morning. The symptoms are aggravated by standing (too much activity). Associated symptoms include weakness. Risk factors include menopause. She has tried analgesics, ice and heat for the symptoms.      Treatment goals:  Functional status: tolerate pain      Aberrancy:   Any alcoholic beverages  no          Any illegal drugs   no      Analgesia:      6               Adverse  Effects :constipation, takes OTC medication      ADL;s :  Home exercises    Data:    When was thelast UDS:  9-           Was the UDS appropriate:yes      Record/Diagnostics Review:      As above, I did review the imaging   10/2/2020  2:30 PM - Jose Alejandro, Mhpn Incoming Lab Results From [facility]    Component Value Ref Range & Units Status Collected Lab   Pain Management Drug Panel Interp, Urine Consistent   Final 09/29/2020  1:11 PM [facility]   (NOTE)   ________________________________________________________________   DRUGS EXPECTED:   OXYCODONE   MORPHINE   ________________________________________________________________   CONSISTENT with medications provided:   OXYCODONE: based on oxycodone, noroxycodone, noroxymorphone,   oxymorphone   MORPHINE: based on morphine CERVICAL FUSION  11/14/2019     ANTERIOR CERVICAL DECOMPRESSION FUSION C3-4, C4-5    CERVICAL FUSION N/A 11/14/2019    ANTERIOR CERVICAL DECOMPRESSION FUSION C3-4, C4-5 (SUPINE) DEPUY, REGULAR TABLE, MICROSCOPE, C-ARM, EVOKES (# [id number] ELIZA) performed by Nilsa Black DO at [address], LAPAROSCOPIC N/A 8/18/2017    CHOLECYSTECTOMY LAPAROSCOPIC ROBOTIC MULTIPORT performed by Kaya Villeda MD at [address]  1/2013    CYSTOSCOPY  11/04/2016    CYSTOSCOPY  01/04/2019    ENDOSCOPIC ULTRASOUND (LOWER) N/A 8/17/2017    ENDOSCOPIC ULTRASOUND performed by Fabby Matthews MD at [address] (UPPER)  08/17/2017    A cursory view of the gastric mucosa was normal. The scope was then further advanced through a patent pylorus to the second portion of the duodenum. The Gallbladder was evaluated in bulb position. Thick sludge was noted. The CBD was 7.8 mm with no filling defects. The PD was 5 mm in the HOP. The pancreatic tissue was edematous in body and tail.  KNEE ARTHROSCOPY Left     LUMBAR FUSION      MS OFFICE/OUTPT VISIT,PROCEDURE ONLY N/A 1/24/2018    SPINAL HARDWARE REMOVAL LUMBAR BONE STIMULATOR performed by Alyssia Delarosa MD at [facility] Bilateral 1989    SKIN BIOPSY Right 11/2015    mole right posterior shoulder    SPINE SURGERY      spinal cord stimulator    SPINE SURGERY  91-4-15    renoval of spinal cord stimulator leads and battery    PASQUALE AND BSO      TUBAL LIGATION      UVULOPALATOPHARYGOPLASTY  2006       Allergies   Allergen Reactions    Azithromycin Shortness Of Breath     Tightness in chest    Adhesive Tape      OK to use paper tape per Patient- 2/19/20 patient states that she has been using all types of tape with no reaction         Current Outpatient Medications:     morphine (MS CONTIN) 30 MG extended release tablet, Take 1 tablet by mouth 2 times daily for 30 days. , Disp: 60 tablet, Rfl: 0   oxyCODONE-acetaminophen (PERCOCET)  MG per tablet, Take 1 tablet by mouth every 6 hours as needed for Pain for up to 30 days. , Disp: 120 tablet, Rfl: 0    potassium chloride (MICRO-K) 10 MEQ extended release capsule, Take 1 capsule by mouth daily, Disp: , Rfl:     fluticasone (FLOVENT HFA) 44 MCG/ACT inhaler, Inhale 1 puff into the lungs 2 times daily, Disp: , Rfl:     pregabalin (LYRICA) 150 MG capsule, Take 150 mg by mouth 2 times daily. , Disp: , Rfl:     montelukast (SINGULAIR) 10 MG tablet, Take 10 mg by mouth nightly , Disp: , Rfl:     metFORMIN (GLUCOPHAGE) 500 MG tablet, Take 500 mg by mouth 2 times daily (with meals). , Disp: , Rfl:     telmisartan-hydrochlorothiazide (MICARDIS HCT) 40-12.5 MG per tablet, Take 1 tablet by mouth daily. , Disp: , Rfl:     levothyroxine (SYNTHROID) 50 MCG tablet, Take 50 mcg by mouth Daily. , Disp: , Rfl:     omeprazole (PRILOSEC) 20 MG capsule, Take 20 mg by mouth every evening., Disp: , Rfl:     aspirin 81 MG tablet, Take 81 mg by mouth daily. , Disp: , Rfl:     naloxone 4 MG/0.1ML LIQD nasal spray, 1 spray by Nasal route as needed for Opioid Reversal, Disp: 1 each, Rfl: 5    albuterol sulfate  (90 Base) MCG/ACT inhaler, Inhale 1 puff into the lungs daily, Disp: , Rfl:     bumetanide (BUMEX) 1 MG tablet, , Disp: , Rfl:     sennosides-docusate sodium (SENOKOT-S) 8.6-50 MG tablet, Take 1 tablet by mouth daily as needed, Disp: , Rfl:     psyllium (KONSYL) 28.3 % PACK, Take 1 packet by mouth daily, Disp: , Rfl:     fluticasone (FLONASE) 50 MCG/ACT nasal spray, 1 spray by Each Nostril route daily, Disp: , Rfl:     diclofenac sodium 1 % GEL, Apply 2 g topically 2 times daily as needed for Pain, Disp: , Rfl:     magnesium hydroxide (DULCOLAX MILK OF MAGNESIA) 400 MG/5ML suspension, Take 5 mLs by mouth daily as needed for Constipation, Disp: , Rfl:     Family History   Problem Relation Age of Onset    Heart Failure Mother    Blessing Hinds Mother    [facility] Other medical condition mayhave on their  ability to drive or operate machinery. Instructed not to drive or operate machinery if drowsy     I also discussed with the patient regarding the dangers of combining narcotic pain medication with tranquilizers, alcohol or illegal drugs or taking the medication any way other than prescribed. The dangers were discussed  including respiratory depression and death. Patient was told to tell  all  physicians regarding the medications he is getting from pain clinic. Patient is warned not to take any unprescribed medications over-the-countermedications that can depress breathing . Patient is advised to talk to the pharmacist or physicians if planning to take any over-the-counter medications before  takeing them. Patient is strongly advised to avoid tranquilizers or  relaxants, illegal drugs  or any medications that can depress breathing  Patient is also advised to tell us if there is any changes in their medications from other physicians. TREATMENT OPTIONS:       Medication Agreement Requirements Met  Continue Opioid therapy  Script written for ms contin.  percocet  Follow up appointment made

## 2021-01-26 ENCOUNTER — HOSPITAL ENCOUNTER (OUTPATIENT)
Dept: PAIN MANAGEMENT | Age: 81
Discharge: HOME | End: 2021-01-26
Payer: MEDICARE

## 2021-01-26 DIAGNOSIS — M48.02 CERVICAL STENOSIS OF SPINE: ICD-10-CM

## 2021-01-26 DIAGNOSIS — M54.16 LUMBAR RADICULOPATHY: ICD-10-CM

## 2021-01-26 DIAGNOSIS — F11.90 CHRONIC, CONTINUOUS USE OF OPIOIDS: ICD-10-CM

## 2021-01-26 DIAGNOSIS — M47.816 LUMBAR SPONDYLOSIS: ICD-10-CM

## 2021-01-26 DIAGNOSIS — M96.1 FAILED BACK SYNDROME: ICD-10-CM

## 2021-01-26 DIAGNOSIS — M48.02 SPINAL STENOSIS IN CERVICAL REGION: Primary | ICD-10-CM

## 2021-01-26 DIAGNOSIS — M46.1 SACROILIITIS (HCC): ICD-10-CM

## 2021-01-26 DIAGNOSIS — M50.30 DDD (DEGENERATIVE DISC DISEASE), CERVICAL: ICD-10-CM

## 2021-01-26 DIAGNOSIS — M96.1 FAILED BACK SYNDROME OF LUMBAR SPINE: ICD-10-CM

## 2021-01-26 PROCEDURE — 99213 OFFICE O/P EST LOW 20 MIN: CPT

## 2021-01-26 PROCEDURE — 99442 PR PHYS/QHP TELEPHONE EVALUATION 11-20 MIN: CPT | Performed by: NURSE PRACTITIONER

## 2021-01-26 RX ORDER — OXYCODONE AND ACETAMINOPHEN 10; 325 MG/1; MG/1
1 TABLET ORAL EVERY 6 HOURS PRN
Qty: 120 TABLET | Refills: 0 | Status: SHIPPED | OUTPATIENT
Start: 2021-01-29 | End: 2021-02-26 | Stop reason: SDUPTHER

## 2021-01-26 RX ORDER — MORPHINE SULFATE 30 MG/1
30 TABLET, FILM COATED, EXTENDED RELEASE ORAL 2 TIMES DAILY
Qty: 60 TABLET | Refills: 0 | Status: SHIPPED | OUTPATIENT
Start: 2021-01-29 | End: 2021-02-26 | Stop reason: SDUPTHER

## 2021-01-26 ASSESSMENT — ENCOUNTER SYMPTOMS
BACK PAIN: 1
RESPIRATORY NEGATIVE: 1
CONSTIPATION: 1

## 2021-01-26 NOTE — PROGRESS NOTES
[address] [facility]IC PROGRESS NOTE      Patient  completed []  video visit   [x]   phone call:  20   Minutes :   to  review Medication Agreement    Location:  Provider:  working from    [x]    home    []   [facility] , patient at  home   Chief Complaint: back pain  She c/o  Low back pain which does not radiate, She has had 2 lumbar surgeries and a cervical fusion. She still has issues with her balance, She did PT which did not help, She ambulates with a walker. She states is not exercising. Her sleep is good. No Ed visits,    Back Pain  This is a chronic problem. The problem occurs constantly. The problem is unchanged. The pain is present in the lumbar spine. Quality: dull, nagging. The pain is at a severity of 6/10. The pain is worse during the night (morning). Exacerbated by: walking, sitting too long. Associated symptoms include numbness. (Toes) Risk factors include menopause. She has tried analgesics and ice for the symptoms. The treatment provided mild relief.        Treatment goals:  Functional status: walk straight and stand up straight       Aberrancy:   Any alcoholic beverages            Any illegal drugs         Analgesia:       6              Adverse  Effects :constipation, takes miralax or senokot  BM QOD      ADL;s :household tasks    Data:    When was thelast UDS:    9-      Was the UDS appropriate:yes      Record/Diagnostics Review:      As above, I did review the imaging     10/2/2020  2:30 PM - Jose Alejandro, Mhpn Incoming Lab Results From [facility]    Component Value Ref Range & Units Status Collected Lab   Pain Management Drug Panel Interp, Urine Consistent   Final 09/29/2020  1:11 PM [facility]   (NOTE)   ________________________________________________________________   DRUGS EXPECTED:   OXYCODONE   MORPHINE   ________________________________________________________________   CONSISTENT with medications provided:   OXYCODONE: based on oxycodone, noroxycodone, noroxymorphone, oxymorphone   MORPHINE: based on morphine   ________________________________________________________________   INTERPRETIVE INFORMATION: Pain Mgt [place], Mass Spec/EMIT, Ur,                            Interp   Interpretation depends on accuracy and completeness of patient     EXAMINATION:   TWO XRAY VIEWS SCOLIOSIS SERIES       2020 2:34 pm       COMPARISON:   2020       HISTORY:   ORDERING SYSTEM PROVIDED HISTORY: Spinal deformity   TECHNOLOGIST PROVIDED HISTORY:   scoliosis   Reason for Exam: Spinal deformity. Pt has been losing balance while walking. Frequent falls. Acuity: Chronic   Type of Exam: Ongoing   Additional signs and symptoms: Spinal deformity. Pt has been losing balance   while walking. Frequent falls.       FINDINGS:   There is mild levoconvex curvature at the thoracolumbar junction, which is   not significantly changed from the previous study.  Measurements are somewhat   limited due to suboptimal visibility of the bony landmarks in the spine.    Posterior fixation hardware is present throughout the lumbar spine.  There   are laminectomy defects in the lumbar spine as well.  Fixation hardware is   also noted in the cervical spine.  Vertebral body heights are grossly   preserved.  Sagittal alignment is within normal limits and stable from   previous radiographs.  There is moderate multilevel disc space narrowing and   endplate spurring.           Impression   1.  Mild thoracolumbar levoscoliosis, not significantly changed.       2.  Unchanged fixation hardware in the cervical and lumbar spine.       3.  Moderate multilevel degenerative changes.                         Pill count: appropriate  fill date :2021    Morphine equivalent dose as reported on [place]: 120 has narcan, states has   Periodic Controlled Substance Monitoring: Possible medication side effects, risk of tolerance/dependence & alternative treatments discussed., No signs of potential drug abuse or diversion 01/04/2019    ENDOSCOPIC ULTRASOUND (LOWER) N/A 8/17/2017    ENDOSCOPIC ULTRASOUND performed by Neelima Kaplan MD at [address] (UPPER)  08/17/2017    A cursory view of the gastric mucosa was normal. The scope was then further advanced through a patent pylorus to the second portion of the duodenum. The Gallbladder was evaluated in bulb position. Thick sludge was noted. The CBD was 7.8 mm with no filling defects. The PD was 5 mm in the HOP. The pancreatic tissue was edematous in body and tail.  KNEE ARTHROSCOPY Left     LUMBAR FUSION      MS OFFICE/OUTPT VISIT,PROCEDURE ONLY N/A 1/24/2018    SPINAL HARDWARE REMOVAL LUMBAR BONE STIMULATOR performed by Albino Urbano MD at [address] [facility] Bilateral 1989    SKIN BIOPSY Right 11/2015    mole right posterior shoulder    SPINE SURGERY      spinal cord stimulator    SPINE SURGERY  91-4-15    renoval of spinal cord stimulator leads and battery    PASQUALE AND BSO      TUBAL LIGATION      UVULOPALATOPHARYGOPLASTY  2006       Allergies   Allergen Reactions    Azithromycin Shortness Of Breath     Tightness in chest    Adhesive Tape      OK to use paper tape per Patient- 2/19/20 patient states that she has been using all types of tape with no reaction         Current Outpatient Medications:     morphine (MS CONTIN) 30 MG extended release tablet, Take 1 tablet by mouth 2 times daily for 30 days. , Disp: 60 tablet, Rfl: 0    oxyCODONE-acetaminophen (PERCOCET)  MG per tablet, Take 1 tablet by mouth every 6 hours as needed for Pain for up to 30 days. , Disp: 120 tablet, Rfl: 0    potassium chloride (MICRO-K) 10 MEQ extended release capsule, Take 1 capsule by mouth daily, Disp: , Rfl:     fluticasone (FLOVENT HFA) 44 MCG/ACT inhaler, Inhale 1 puff into the lungs 2 times daily, Disp: , Rfl:     pregabalin (LYRICA) 150 MG capsule, Take 150 mg by mouth 2 times daily.  , Disp: , Rfl:     montelukast (SINGULAIR) 10 MG tablet, Take 10 mg by mouth nightly , Disp: , Rfl:     metFORMIN (GLUCOPHAGE) 500 MG tablet, Take 500 mg by mouth 2 times daily (with meals). , Disp: , Rfl:     telmisartan-hydrochlorothiazide (MICARDIS HCT) 40-12.5 MG per tablet, Take 1 tablet by mouth daily. , Disp: , Rfl:     levothyroxine (SYNTHROID) 50 MCG tablet, Take 50 mcg by mouth Daily. , Disp: , Rfl:     omeprazole (PRILOSEC) 20 MG capsule, Take 20 mg by mouth every evening., Disp: , Rfl:     aspirin 81 MG tablet, Take 81 mg by mouth daily. , Disp: , Rfl:     naloxone 4 MG/0.1ML LIQD nasal spray, 1 spray by Nasal route as needed for Opioid Reversal, Disp: 1 each, Rfl: 5    albuterol sulfate  (90 Base) MCG/ACT inhaler, Inhale 1 puff into the lungs daily, Disp: , Rfl:     bumetanide (BUMEX) 1 MG tablet, , Disp: , Rfl:     sennosides-docusate sodium (SENOKOT-S) 8.6-50 MG tablet, Take 1 tablet by mouth daily as needed, Disp: , Rfl:     psyllium (KONSYL) 28.3 % PACK, Take 1 packet by mouth daily, Disp: , Rfl:     fluticasone (FLONASE) 50 MCG/ACT nasal spray, 1 spray by Each Nostril route daily, Disp: , Rfl:     diclofenac sodium 1 % GEL, Apply 2 g topically 2 times daily as needed for Pain, Disp: , Rfl:     magnesium hydroxide (DULCOLAX MILK OF MAGNESIA) 400 MG/5ML suspension, Take 5 mLs by mouth daily as needed for Constipation, Disp: , Rfl:     Family History   Problem Relation Age of Onset    Heart Failure Mother     Lung Cancer Mother     Other Father          pneumonia    Alzheimer's Disease Father     Lung Cancer Brother     Diabetes Paternal Grandmother     Other Daughter         Fibromyalsia    Rheum Arthritis Daughter        Social History     Socioeconomic History    Marital status:      Spouse name: Ace Bloch Number of children: 3    Years of education: Not on file    Highest education level: Not on file   Occupational History    Occupation: retired   Social Needs    Financial resource strain: Not on file   [employer] insecurity Worry: Not on file     Inability: Not on file    Transportation needs     Medical: Not on file     Non-medical: Not on file   Tobacco Use    Smoking status: Never Smoker    Smokeless tobacco: Never Used   Substance and Sexual Activity    Alcohol use: Not Currently     Comment: rare    Drug use: Never    Sexual activity: Yes     Partners: Male   Lifestyle    Physical activity     Days per week: Not on file     Minutes per session: Not on file    Stress: Not on file   Relationships    Social connections     Talks on phone: Not on file     Gets together: Not on file     Attends Latter-day service: Not on file     Active member of club or organization: Not on file     Attends meetings of clubs or organizations: Not on file     Relationship status: Not on file    Intimate partner violence     Fear of current or ex partner: Not on file     Emotionally abused: Not on file     Physically abused: Not on file     Forced sexual activity: Not on file   Other Topics Concern    Not on file   Social History Narrative    Not on file         Review of Systems:  Review of Systems   Constitution: Negative. HENT: Negative. Eyes:        Glasses   Cardiovascular: Negative. States has a leaky valve   Respiratory: Negative. Hematologic/Lymphatic: Bruises/bleeds easily. Skin: Negative. Musculoskeletal: Positive for back pain and joint pain. Gastrointestinal: Positive for constipation. Genitourinary: Negative. Neurological: Positive for loss of balance and numbness. Uses a cane         Physical Exam:  There were no vitals taken for this visit. Physical Exam  Skin:         Neurological:      Mental Status: She is alert. Psychiatric:         Mood and Affect: Mood normal.         Thought Content:  Thought content normal.           Assessment:    Problem List Items Addressed This Visit     Spinal stenosis in cervical region - Primary    Sacroiliitis ([facility] [address]    Lumbar spondylosis    Lumbar radiculopathy    Failed back syndrome of lumbar spine    DDD (degenerative disc disease), cervical    Chronic, continuous use of opioids    Cervical stenosis of spine            Treatment Plan:  DISCUSSION: Treatment options discussed withpatient and all questions answered to patient's satisfaction. Possible side effects, risk of tolerance and or dependence and alternative treatments discussed    Obtaining appropriate analgesic effect of treatment   No signs of potential drug abuse or diversion identified    [x] Ill effects of being on chronic pain medications such as sleep disturbances, respiratory depression, hormonal changes, withdrawal symptoms, chronic opioid dependence and tolerance as well as risk of taking opioids with Benzodiazepines and taking opioids along with alcohol,  werediscussed with patient. I had asked the patient to minimize medication use and utilize pain medications only for uncontrolled rest pain or pain with exertional activities. I advised patient not to self-escalate painmedications without consulting with us. At each of patient's future visits we will try to taper pain medications, while adjusting the adjunct medications, and re-evaluating for Physical Therapy to improve spinal andjoint strength. We will continue to have discussions to decrease pain medications as tolerated. Counseled patient on effects their pain medication and /or their medical condition mayhave on their  ability to drive or operate machinery. Instructed not to drive or operate machinery if drowsy     I also discussed with the patient regarding the dangers of combining narcotic pain medication with tranquilizers, alcohol or illegal drugs or taking the medication any way other than prescribed. The dangers were discussed  including respiratory depression and death. Patient was told to tell  all  physicians regarding the medications he is getting from pain clinic.  Patient is warned not to take any unprescribed medications over-the-countermedications that can depress breathing . Patient is advised to talk to the pharmacist or physicians if planning to take any over-the-counter medications before  takeing them. Patient is strongly advised to avoid tranquilizers or  relaxants, illegal drugs  or any medications that can depress breathing  Patient is also advised to tell us if there is any changes in their medications from other physicians.       1.advised to get new narcan from health dept when able to as it has   2, counseled on exercising    TREATMENT OPTIONS:       Medication Agreement Requirements Met  Continue Opioid therapy  Script written for MS marcial ibarra  Follow up appointment made

## 2021-02-26 ENCOUNTER — HOSPITAL ENCOUNTER (OUTPATIENT)
Dept: PAIN MANAGEMENT | Age: 81
Discharge: HOME | End: 2021-02-26
Payer: MEDICARE

## 2021-02-26 DIAGNOSIS — F11.90 CHRONIC, CONTINUOUS USE OF OPIOIDS: ICD-10-CM

## 2021-02-26 DIAGNOSIS — Z51.81 ENCOUNTER FOR MEDICATION MONITORING: ICD-10-CM

## 2021-02-26 DIAGNOSIS — M46.1 SACROILIITIS (HCC): ICD-10-CM

## 2021-02-26 DIAGNOSIS — M48.02 SPINAL STENOSIS IN CERVICAL REGION: Primary | ICD-10-CM

## 2021-02-26 DIAGNOSIS — M96.1 FAILED BACK SYNDROME: ICD-10-CM

## 2021-02-26 DIAGNOSIS — M50.30 DDD (DEGENERATIVE DISC DISEASE), CERVICAL: ICD-10-CM

## 2021-02-26 DIAGNOSIS — M48.02 CERVICAL STENOSIS OF SPINE: ICD-10-CM

## 2021-02-26 DIAGNOSIS — M96.1 FAILED BACK SYNDROME OF LUMBAR SPINE: ICD-10-CM

## 2021-02-26 DIAGNOSIS — M47.816 LUMBAR SPONDYLOSIS: ICD-10-CM

## 2021-02-26 PROCEDURE — 99442 PR PHYS/QHP TELEPHONE EVALUATION 11-20 MIN: CPT | Performed by: NURSE PRACTITIONER

## 2021-02-26 PROCEDURE — 99213 OFFICE O/P EST LOW 20 MIN: CPT

## 2021-02-26 RX ORDER — OXYCODONE AND ACETAMINOPHEN 10; 325 MG/1; MG/1
1 TABLET ORAL EVERY 6 HOURS PRN
Qty: 120 TABLET | Refills: 0 | Status: SHIPPED | OUTPATIENT
Start: 2021-02-28 | End: 2021-03-26 | Stop reason: SDUPTHER

## 2021-02-26 RX ORDER — MORPHINE SULFATE 30 MG/1
30 TABLET, FILM COATED, EXTENDED RELEASE ORAL 2 TIMES DAILY
Qty: 60 TABLET | Refills: 0 | Status: SHIPPED | OUTPATIENT
Start: 2021-02-28 | End: 2021-03-26 | Stop reason: SDUPTHER

## 2021-02-26 ASSESSMENT — ENCOUNTER SYMPTOMS
SHORTNESS OF BREATH: 1
BACK PAIN: 1
CONSTIPATION: 1

## 2021-02-26 NOTE — PROGRESS NOTES
Josh 89 PROGRESS NOTE      Patient  completed []  video visit   [x]   phone call:  12    Minutes :       [x]    to  review Medication Agreement    []  Follow up after procedure   []  Discuss treatment options      Location:  Provider:  working from    [x]    home    []   [address] ,   patient at  home       Chief Complaint:  Low back pain    She c/o low back pain which is unchanged, Her pain does not radiate, She has had 2 lumbar surgeries and a cervical fusion. she ambulates with a walker. Her sleep is fair, She is able to do her household tasks. She states had both covid vaccines. No Ed visits. Back Pain  This is a chronic problem. The problem occurs constantly. The problem is unchanged. The pain is present in the lumbar spine. Quality: dull aching. The pain does not radiate. The pain is at a severity of 6/10. The pain is moderate. The pain is the same all the time. Exacerbated by: walking. Associated symptoms include dysuria. Risk factors include menopause. She has tried analgesics and ice for the symptoms.          Treatment goals:  Functional status: to not to have to use walker      Aberrancy:   Any alcoholic beverages     no       Any illegal drugs   no      Analgesia:        6             Adverse  Effects :constipation, takes senokot qod      ADL;s : household tasks      Data:    9-   Was the UDS appropriate:yes      Record/Diagnostics Review:      As above, I did review the imaging       10/2/2020  2:30 PM - Jose Alejandro, Mhpn Incoming Lab Results From [facility]    Component Value Ref Range & Units Status Collected Lab   Pain Management Drug Panel Interp, Urine Consistent   Final 09/29/2020  1:11 PM [facility]   (NOTE)   ________________________________________________________________   DRUGS EXPECTED:   OXYCODONE   MORPHINE   ________________________________________________________________   CONSISTENT with medications provided:   OXYCODONE: based on oxycodone, noroxycodone, noroxymorphone,   oxymorphone   MORPHINE: based on morphine   ________________________________________________________________   INTERPRETIVE INFORMATION: Pain Mgt [place], Mass Spec/EMIT, Ur,                            Interp   Interpretation depends on accuracy and completeness of patient   medication information submitted by client. 6-Acetylmorphine, Ur Not Detected   Final 09/29/2020  1:11 PM [facility]   7-Aminoclonazepam, Urine Not Detected   Final 09/29/2020  1:11 PM [facility]   Alpha-OH-Alpraz, Urine Not Detected   Final 09/29/2020  1:11 PM [facility]   Alprazolam, Urine Not Detected   Final 09/29/2020  1:11 PM [facility]   Amphetamines, urine Not Detected   Final 09/29/2020  1:11 PM [facility]   Barbiturates, Ur Not Detected   Final 09/29/2020  1:11 PM [facility]   Benzoylecgonine, Ur Not Detected   Final 09/29/2020  1:11 PM [facility]   Buprenorphine Urine Not Detected   Final 09/29/2020  1:11 PM [facility]   Carisoprodol, Ur Not Detected   Final 09/29/2020  1:11 PM [facility]   (NOTE)   The carisoprodol immunoassay has cross-reactivity to carisoprodol   and meprobamate.     Clonazepam, Urine Not Detected   Final 09/29/2020  1:11 PM [facility]   Codeine, Urine Not Detected   Final 09/29/2020  1:11 PM [facility]   MDA, Ur Not Detected   Final 09/29/2020  1:11 PM [facility]   Diazepam, Urine Not Detected   Final 09/29/2020  1:11 PM [facility]   Ethyl Glucuronide Ur Not Detected   Final 09/29/2020  1:11 PM [facility]   Fentanyl, Ur Not Detected   Final 09/29/2020  1:11 PM [facility]   Hydrocodone, Urine Not Detected   Final 09/29/2020  1:11 PM [facility]   Hydromorphone, Urine Not Detected   Final 09/29/2020  1:11 PM [facility]   Lorazepam, Urine Not Detected   Final 09/29/2020  1:11 PM [facility]   Marijuana Metab, Ur Not Detected   Final 09/29/2020  1:11 PM [facility]   MDEA, CRYSTAL, Ur Not Detected   Final 09/29/2020  1:11 PM [facility]   MDMA, Urine Not Detected   Final 09/29/2020  1:11 PM [facility]   Meperidine Metab, Ur Not Detected   Final 09/29/2020  1:11 PM [facility]   Methadone, Urine Not Detected   Final 09/29/2020  1:11 PM [facility] Methamphetamine, Urine Not Detected   Final 09/29/2020  1:11 PM [facility]   Methylphenidate Not Detected   Final 09/29/2020  1:11 PM [facility]   Midazolam, Urine Not Detected   Final 09/29/2020  1:11 PM [facility]   Morphine Urine Present   Final 09/29/2020  1:11 PM [facility]   Norbuprenorphine, Urine Not Detected   Final 09/29/2020  1:11 PM [facility]   Nordiazepam, Urine Not Detected   Final 09/29/2020  1:11 PM [facility]   Norfentanyl, Urine Not Detected   Final 09/29/2020  1:11 PM [facility]   NORHYDROCODONE, URINE Not Detected   Final 09/29/2020  1:11 PM [facility]   Noroxycodone, Urine Present   Final 09/29/2020  1:11 PM [facility]   NOROXYMORPHONE, URINE Present   Final 09/29/2020  1:11 PM [facility]   Oxazepam, Urine Not Detected   Final 09/29/2020  1:11 PM [facility]   Oxycodone Urine Present   Final 09/29/2020  1:11 PM [facility]   Oxymorphone, Urine Present   Final 09/29/2020  1:11 PM [facility]   PCP, Urine Not Detected   Final 09/29/2020  1:11 PM [facility]   Phentermine, Ur Not Detected   Final 09/29/2020  1:11 PM [facility]   Propoxyphene, Urine Not Detected   Final 09/29/2020  1:11 PM [facility]   Tapentadol-O-Sulfate, Urine Not Detected   Final 09/29/2020  1:11 PM [facility]   Tapentadol, Urine Not Detected   Final 09/29/2020  1:11 PM [facility]   Temazepam, Urine Not Detected   Final 09/29/2020  1:11 PM [facility]   Tramadol, Urine Not Detected   Final 09/29/2020  1:11 PM [facility]   Zolpidem, Urine Not Detected   Final 09/29/2020  1:11 PM [facility]   Drugs Expected, Ur   Final 09/29/2020  1:11  [facility] [facility]   OXYCODONE,MORPHINE    Creatinine, Ur 61.0  20.0 - 400.0 mg/dL Final 09/29/2020  1:11 PM [facility]   Pain Mgt Drug Panel, Hi Res, Ur See Below   Final 09/29/2020  1:11 PM [facility]   (NOTE)   Methodology: Qualitative Enzyme Immunoassay and Qualitative Liquid   Chromatography-Time of Flight-Mass Spectrometry or Tandem Mass   Spectrometry, Quantitative Spectrophotometry   The absence of expected drug(s) and/or drug metabolite(s) may   indicate non-compliance, inappropriate timing of specimen   collection relative to drug administration, poor drug absorption,   diluted/adulterated urine, or limitations of testing. The   concentration must be greater than or equal to the cutoff to be   reported as present.  If specific drug concentrations are   required, contact the laboratory within two weeks of specimen   collection to request quantification by a second analytical   technique. Interpretive questions should be directed to the   laboratory. Results based on immunoassay detection that do not match clinical   expectations should be   interpreted with caution. Confirmatory testing by mass   spectrometry for immunoassay-based results is available, if   ordered within two weeks of specimen collection. Additional   charges apply. For medical purposes only; not valid for forensic use. This test was developed and its performance characteristics   determined by [facility]. It has not been cleared or   approved by the [employer] and Drug Administration. This test was   performed in a CLIA certified laboratory and is intended for   clinical purposes. EER Hi Res Interp Ur See Note   Final 09/29/2020  1:11 PM [facility]   (NOTE)   Access [facility] Enhanced Report using either link below:          EXAMINATION:   TWO XRAY VIEWS SCOLIOSIS SERIES       6/5/2020 2:34 pm       COMPARISON:   03/06/2020       HISTORY:   ORDERING SYSTEM PROVIDED HISTORY: Spinal deformity   TECHNOLOGIST PROVIDED HISTORY:   scoliosis   Reason for Exam: Spinal deformity. Pt has been losing balance while walking. Frequent falls. Acuity: Chronic   Type of Exam: Ongoing   Additional signs and symptoms: Spinal deformity. Pt has been losing balance   while walking. Frequent falls.       FINDINGS:   There is mild levoconvex curvature at the thoracolumbar junction, which is   not significantly changed from the previous study.  Measurements are somewhat   limited due to suboptimal visibility of the bony landmarks in the spine.    Posterior fixation hardware is present throughout the lumbar spine.  There   are laminectomy defects in the lumbar spine as well.  Fixation hardware is   also noted in the cervical spine.  Vertebral body heights are grossly   preserved.  Sagittal alignment is within normal limits and stable from   previous radiographs.  There is moderate multilevel disc space narrowing and   endplate spurring.           Impression   1.  Mild thoracolumbar levoscoliosis, not significantly changed.       2.  Unchanged fixation hardware in the cervical and lumbar spine.       3.  Moderate multilevel degenerative changes.                   Pill count: appropriate  fill date :2-    Morphine equivalent dose as reported on [place]ARRS:120 has narcan at home  Periodic Controlled Substance Monitoring: Possible medication side effects, risk of tolerance/dependence & alternative treatments discussed., No signs of potential drug abuse or diversion identified. , Assessed functional status., Obtaining appropriate analgesic effect of treatment. (Savita Brandt, APRN - CNP)  Chronic Pain > 80 MEDD: Obtained or confirmed \"Medication Contract\" on file., Co-prescribed Naloxone. Savita Brandt, APRN - CNP)  Review of[place]ARRS does not show any aberrant prescription behavior. Medication is helping the patient stay active. Patient denies any side effects and reports adequate analgesia. No sign of misuse/abuse.             Past Medical History:   Diagnosis Date    Achilles tendonitis     right    Asthma     COPD [facility] obstructive [facility] disease[facility])     Cough     clear phlegm    Degenerative lumbar disc     DM (diabetes mellitus), type 2 ([facility] [address]     PCP Dr. Azalea Cline, seen 8/2019    Failed back syndrome, lumbar     Fast heart beat     Hx of \"8-10 years ago\"    Fibromyalgia     History of bladder cancer     Hypertension     Hypothyroid     Kidney stones     Lumbar radiculopathy     Lumbar spondylolysis     Lumbar spondylosis     Osteoarthritis     Sleep apnea     uses CPAP machine nightly    Spinal stenosis in cervical region     Wears glasses        Past Surgical History:   Procedure Laterality Date    BACK SURGERY  2010    lumbar fusion    BLADDER TUMOR EXCISION  2005    CARDIOVERSION      \"8-10 years ago\"  to get \"heart into regular rhythm\"    CARPAL TUNNEL RELEASE Bilateral     CATARACT REMOVAL WITH IMPLANT Bilateral 7/22/2014, 8/5/2014    [facility]    CERVICAL FUSION  11/14/2019     ANTERIOR CERVICAL DECOMPRESSION FUSION C3-4, C4-5    CERVICAL FUSION N/A 11/14/2019    ANTERIOR CERVICAL DECOMPRESSION FUSION C3-4, C4-5 (SUPINE) DEPUY, REGULAR TABLE, MICROSCOPE, C-ARM, EVOKES (# [id number] [facility]) performed by Socorro Hopson DO at [address], LAPAROSCOPIC N/A 8/18/2017    CHOLECYSTECTOMY LAPAROSCOPIC ROBOTIC MULTIPORT performed [facility]  1/2013    CYSTOSCOPY  11/04/2016    CYSTOSCOPY  01/04/2019    ENDOSCOPIC ULTRASOUND (LOWER) N/A 8/17/2017    ENDOSCOPIC ULTRASOUND performed by Miguel Ramos MD at [address] (UPPER)  08/17/2017    A cursory view of the gastric mucosa was normal. The scope was then further advanced through a patent pylorus to the second portion of the duodenum. The Gallbladder was evaluated in bulb position. Thick sludge was noted. The CBD was 7.8 mm with no filling defects. The PD was 5 mm in the HOP. The pancreatic tissue was edematous in body and tail.     KNEE ARTHROSCOPY Left     LUMBAR FUSION      OK OFFICE/OUTPT VISIT,PROCEDURE ONLY N/A 1/24/2018    SPINAL HARDWARE REMOVAL LUMBAR BONE STIMULATOR performed by Pastora Mccarthy MD at [facility] Bilateral 1989    SKIN BIOPSY Right 11/2015    mole right posterior shoulder    SPINE SURGERY      spinal cord stimulator    SPINE SURGERY  91-4-15    renoval of spinal cord stimulator leads and battery    PASQUALE AND BSO      TUBAL LIGATION      UVULOPALATOPHARYGOPLASTY  2006       Allergies   Allergen Reactions    Azithromycin Shortness Of Breath     Tightness in chest    Adhesive Tape      OK to use paper tape per Patient- 2/19/20 patient states that she has been using all types of tape with no reaction         Current Outpatient Medications:     morphine (MS CONTIN) 30 MG extended release tablet, Take 1 tablet by mouth 2 times daily for 30 days. , Disp: 60 tablet, Rfl: 0    oxyCODONE-acetaminophen (PERCOCET)  MG per tablet, Take 1 tablet by mouth every 6 hours as needed for Pain for up to 30 days. , Disp: 120 tablet, Rfl: 0    potassium chloride (MICRO-K) 10 MEQ extended release capsule, Take 1 capsule by mouth daily, Disp: , Rfl:     fluticasone (FLOVENT HFA) 44 MCG/ACT inhaler, Inhale 1 puff into the lungs 2 times daily, Disp: , Rfl:     pregabalin (LYRICA) 150 MG capsule, Take 150 mg by mouth 2 times daily. , Disp: , Rfl:     montelukast (SINGULAIR) 10 MG tablet, Take 10 mg by mouth nightly , Disp: , Rfl:     metFORMIN (GLUCOPHAGE) 500 MG tablet, Take 500 mg by mouth 2 times daily (with meals). , Disp: , Rfl:     levothyroxine (SYNTHROID) 50 MCG tablet, Take 50 mcg by mouth Daily. , Disp: , Rfl:     omeprazole (PRILOSEC) 20 MG capsule, Take 20 mg by mouth every evening., Disp: , Rfl:     aspirin 81 MG tablet, Take 81 mg by mouth daily. , Disp: , Rfl:     naloxone 4 MG/0.1ML LIQD nasal spray, 1 spray by Nasal route as needed for Opioid Reversal, Disp: 1 each, Rfl: 5    albuterol sulfate  (90 Base) MCG/ACT inhaler, Inhale 1 puff into the lungs daily, Disp: , Rfl:     bumetanide (BUMEX) 1 MG tablet, , Disp: , Rfl:     sennosides-docusate sodium (SENOKOT-S) 8.6-50 MG tablet, Take 1 tablet by mouth daily as needed, Disp: , Rfl:     psyllium (KONSYL) 28.3 % PACK, Take 1 packet by mouth daily, Disp: , Rfl:     fluticasone (FLONASE) 50 MCG/ACT nasal spray, 1 spray by Each Nostril route daily, Disp: , Rfl:     diclofenac sodium 1 % GEL, Apply 2 g topically 2 times daily as needed for Pain, Disp: , Rfl:     magnesium hydroxide (DULCOLAX MILK OF MAGNESIA) 400 MG/5ML suspension, Take 5 mLs by mouth daily as needed for Constipation, Disp: , Rfl:     telmisartan-hydrochlorothiazide (MICARDIS HCT) 40-12.5 MG per tablet, Take 1 tablet by mouth daily. , Disp: , Rfl:     Family History   Problem Relation Age of Onset    Heart Failure Mother     Lung Cancer Mother     Other Father          pneumonia    Alzheimer's Disease Father     Lung Cancer Brother     Diabetes Paternal Grandmother     Other Daughter         Fibromyalsia    Rheum Arthritis Daughter        Social History     Socioeconomic History    Marital status:      Spouse name: Mariluz Gandara Number of children: 3    Years of education: Not on file    Highest education level: Not on file   Occupational History    Occupation: retired   Social Needs    Financial resource strain: Not on file    Food insecurity     Worry: Not on file     Inability: Not on file   [employer] needs     Medical: Not on file     Non-medical: Not on file   Tobacco Use    Smoking status: Never Smoker    Smokeless tobacco: Never Used   Substance and Sexual Activity    Alcohol use: Not Currently     Comment: rare    Drug use: Never    Sexual activity: Yes     Partners: Male   Lifestyle    Physical activity     Days per week: Not on file     Minutes per session: Not on file    Stress: Not on file   Relationships    Social connections     Talks on phone: Not on file     Gets together: Not on file     Attends Christian service: Not on file     Active member of club or organization: Not on file     Attends meetings of clubs or organizations: Not on file     Relationship status: Not on file    Intimate partner violence     Fear of current or ex partner: Not on file     Emotionally abused: Not on file     Physically abused: Not on file     Forced sexual activity: Not on file   Other Topics Concern    Not on file   Social History Narrative    Not on file Review of Systems:  Review of Systems   Constitution: Negative. HENT: Positive for hearing loss. Eyes:        Glasses   Cardiovascular: Negative. States has leaky heart valve   Respiratory: Positive for shortness of breath. Endocrine: Negative. Hematologic/Lymphatic: Bruises/bleeds easily. Skin: Positive for itching. Musculoskeletal: Positive for back pain and joint pain. Gastrointestinal: Positive for constipation. Genitourinary: Positive for dysuria. Neurological: Positive for loss of balance. Uses walker    Psychiatric/Behavioral: Negative. Physical Exam:  There were no vitals taken for this visit. Physical Exam  Skin:         Neurological:      Mental Status: She is alert and oriented to person, place, and time. Psychiatric:         Mood and Affect: Mood normal.         Thought Content: Thought content normal.           Assessment:  Problem List Items Addressed This Visit     Spinal stenosis in cervical region - Primary    Sacroiliitis ([facility] [address]    Lumbar spondylosis    Failed back syndrome of lumbar spine    Encounter for medication monitoring    DDD (degenerative disc disease), cervical    Chronic, continuous use of opioids    Cervical stenosis of spine              Treatment Plan:  DISCUSSION: Treatment options discussed withpatient and all questions answered to patient's satisfaction. Possible side effects, risk of tolerance and or dependence and alternative treatments discussed    Obtaining appropriate analgesic effect of treatment   No signs of potential drug abuse or diversion identified    [x] Ill effects of being on chronic pain medications such as sleep disturbances, respiratory depression, hormonal changes, withdrawal symptoms, chronic opioid dependence and tolerance as well as risk of taking opioids with Benzodiazepines and taking opioids along with alcohol,  werediscussed with patient.  I had asked the patient to minimize medication use and utilize pain medications only for uncontrolled rest pain or pain with exertional activities. I advised patient not to self-escalate painmedications without consulting with us. At each of patient's future visits we will try to taper pain medications, while adjusting the adjunct medications, and re-evaluating for Physical Therapy to improve spinal andjoint strength. We will continue to have discussions to decrease pain medications as tolerated. Counseled patient on effects their pain medication and /or their medical condition mayhave on their  ability to drive or operate machinery. Instructed not to drive or operate machinery if drowsy     I also discussed with the patient regarding the dangers of combining narcotic pain medication with tranquilizers, alcohol or illegal drugs or taking the medication any way other than prescribed. The dangers were discussed  including respiratory depression and death. Patient was told to tell  all  physicians regarding the medications he is getting from pain clinic. Patient is warned not to take any unprescribed medications over-the-countermedications that can depress breathing . Patient is advised to talk to the pharmacist or physicians if planning to take any over-the-counter medications before  takeing them. Patient is strongly advised to avoid tranquilizers or  relaxants, illegal drugs  or any medications that can depress breathing  Patient is also advised to tell us if there is any changes in their medications from other physicians.             TREATMENT OPTIONS:       Medication Agreement Requirements Met  Continue Opioid therapy  Script written for  Percocet, ms contin  Follow up appointment made

## 2021-03-26 ENCOUNTER — HOSPITAL ENCOUNTER (OUTPATIENT)
Dept: PAIN MANAGEMENT | Age: 81
Discharge: HOME | End: 2021-03-26
Payer: MEDICARE

## 2021-03-26 DIAGNOSIS — M46.1 SACROILIITIS (HCC): ICD-10-CM

## 2021-03-26 DIAGNOSIS — M96.1 FAILED BACK SYNDROME: ICD-10-CM

## 2021-03-26 DIAGNOSIS — M54.16 LUMBAR RADICULOPATHY: ICD-10-CM

## 2021-03-26 DIAGNOSIS — M43.06 LUMBAR SPONDYLOLYSIS: ICD-10-CM

## 2021-03-26 DIAGNOSIS — M96.1 FAILED BACK SYNDROME OF LUMBAR SPINE: ICD-10-CM

## 2021-03-26 DIAGNOSIS — Z51.81 ENCOUNTER FOR MEDICATION MONITORING: Primary | ICD-10-CM

## 2021-03-26 PROCEDURE — 99213 OFFICE O/P EST LOW 20 MIN: CPT

## 2021-03-26 PROCEDURE — 99442 PR PHYS/QHP TELEPHONE EVALUATION 11-20 MIN: CPT | Performed by: NURSE PRACTITIONER

## 2021-03-26 RX ORDER — MORPHINE SULFATE 30 MG/1
30 TABLET, FILM COATED, EXTENDED RELEASE ORAL 2 TIMES DAILY
Qty: 60 TABLET | Refills: 0 | Status: SHIPPED | OUTPATIENT
Start: 2021-03-30 | End: 2021-04-26 | Stop reason: SDUPTHER

## 2021-03-26 RX ORDER — OXYCODONE AND ACETAMINOPHEN 10; 325 MG/1; MG/1
1 TABLET ORAL EVERY 6 HOURS PRN
Qty: 120 TABLET | Refills: 0 | Status: SHIPPED | OUTPATIENT
Start: 2021-03-30 | End: 2021-04-26 | Stop reason: SDUPTHER

## 2021-03-26 ASSESSMENT — ENCOUNTER SYMPTOMS
CONSTIPATION: 0
SHORTNESS OF BREATH: 0
BACK PAIN: 1
COUGH: 0

## 2021-03-26 NOTE — PROGRESS NOTES
Patient completed a telephone visit today to review medication contract. Chief Complaint: back pain    [facility] Patient complains of back pain that started years ago. She has undergone several interventions but pain continues. She had SCS implanted in 2012 at [facility] but it was removed as it was not functioning well. She saw Dr. Leeroy SYED and he strongly recommend SCS trial. He does not recommend surgery. Back Pain  This is a chronic problem. The current episode started more than 1 year ago. The problem occurs constantly. The problem is unchanged. The pain is present in the lumbar spine. The quality of the pain is described as aching. The pain does not radiate. The pain is at a severity of 6/10. The pain is moderate. The symptoms are aggravated by position and standing (walking). Pertinent negatives include no chest pain, fever, numbness or tingling. Risk factors include sedentary lifestyle. She has tried analgesics, bed rest and ice for the symptoms. The treatment provided mild relief. Patient denies any new neurological symptoms. No bowel or bladder incontinence, no weakness, and no falling. Pill count: appropriate due 3/30    Morphine equivalent: 120    Periodic Controlled Substance Monitoring: Possible medication side effects, risk of tolerance/dependence & alternative treatments discussed., No signs of potential drug abuse or diversion identified., Obtaining appropriate analgesic effect of treatment.  (Ankit Nair, APRN - CNP)      Past Medical History:   Diagnosis Date    Achilles tendonitis     right    Asthma     COPD (chronic obstructive pulmonary disease) (HCC)     Cough     clear phlegm    Degenerative lumbar disc     DM (diabetes mellitus), type 2 ([address]     PCP Dr. Merlin Escobar, seen 8/2019    Failed back syndrome, lumbar     Fast heart beat     Hx of \"8-10 years ago\"    Fibromyalgia     History of bladder cancer     Hypertension     Hypothyroid     Kidney cord stimulator leads and battery    PASQUALE AND BSO      TUBAL LIGATION      UVULOPALATOPHARYGOPLASTY  2006       Allergies   Allergen Reactions    Azithromycin Shortness Of Breath     Tightness in chest    Adhesive Tape      OK to use paper tape per Patient- 2/19/20 patient states that she has been using all types of tape with no reaction         Current Outpatient Medications:     morphine (MS CONTIN) 30 MG extended release tablet, Take 1 tablet by mouth 2 times daily for 30 days. , Disp: 60 tablet, Rfl: 0    oxyCODONE-acetaminophen (PERCOCET)  MG per tablet, Take 1 tablet by mouth every 6 hours as needed for Pain for up to 30 days. , Disp: 120 tablet, Rfl: 0    naloxone 4 MG/0.1ML LIQD nasal spray, 1 spray by Nasal route as needed for Opioid Reversal, Disp: 1 each, Rfl: 5    albuterol sulfate  (90 Base) MCG/ACT inhaler, Inhale 1 puff into the lungs daily, Disp: , Rfl:     potassium chloride (MICRO-K) 10 MEQ extended release capsule, Take 1 capsule by mouth daily, Disp: , Rfl:     bumetanide (BUMEX) 1 MG tablet, , Disp: , Rfl:     sennosides-docusate sodium (SENOKOT-S) 8.6-50 MG tablet, Take 1 tablet by mouth daily as needed, Disp: , Rfl:     psyllium (KONSYL) 28.3 % PACK, Take 1 packet by mouth daily, Disp: , Rfl:     fluticasone (FLONASE) 50 MCG/ACT nasal spray, 1 spray by Each Nostril route daily, Disp: , Rfl:     fluticasone (FLOVENT HFA) 44 MCG/ACT inhaler, Inhale 1 puff into the lungs 2 times daily, Disp: , Rfl:     diclofenac sodium 1 % GEL, Apply 2 g topically 2 times daily as needed for Pain, Disp: , Rfl:     magnesium hydroxide (DULCOLAX MILK OF MAGNESIA) 400 MG/5ML suspension, Take 5 mLs by mouth daily as needed for Constipation, Disp: , Rfl:     pregabalin (LYRICA) 150 MG capsule, Take 150 mg by mouth 2 times daily.  , Disp: , Rfl:     montelukast (SINGULAIR) 10 MG tablet, Take 10 mg by mouth nightly , Disp: , Rfl:     metFORMIN (GLUCOPHAGE) 500 MG tablet, Take 500 mg by mouth 2 times daily (with meals). , Disp: , Rfl:     telmisartan-hydrochlorothiazide (MICARDIS HCT) 40-12.5 MG per tablet, Take 1 tablet by mouth daily. , Disp: , Rfl:     levothyroxine (SYNTHROID) 50 MCG tablet, Take 50 mcg by mouth Daily. , Disp: , Rfl:     omeprazole (PRILOSEC) 20 MG capsule, Take 20 mg by mouth every evening., Disp: , Rfl:     aspirin 81 MG tablet, Take 81 mg by mouth daily. , Disp: , Rfl:     Family History   Problem Relation Age of Onset    Heart Failure Mother     Lung Cancer Mother     Other Father          pneumonia    Alzheimer's Disease Father     Lung Cancer Brother     Diabetes Paternal Grandmother     Other Daughter         Fibromyalsia    Rheum Arthritis Daughter        Social History     Socioeconomic History    Marital status:      Spouse name: Romero Palacios Number of children: 3    Years of education: Not on file    Highest education level: Not on file   Occupational History    Occupation: retired   Social Needs    Financial resource strain: Not on file    Food insecurity     Worry: Not on file     Inability: Not on file   [employer] needs     Medical: Not on file     Non-medical: Not on file   Tobacco Use    Smoking status: Never Smoker    Smokeless tobacco: Never Used   Substance and Sexual Activity    Alcohol use: Not Currently     Comment: rare    Drug use: Never    Sexual activity: Yes     Partners: Male   Lifestyle    Physical activity     Days per week: Not on file     Minutes per session: Not on file    Stress: Not on file   Relationships    Social connections     Talks on phone: Not on file     Gets together: Not on file     Attends Bahai service: Not on file     Active member of club or organization: Not on file     Attends meetings of clubs or organizations: Not on file     Relationship status: Not on file    Intimate partner violence     Fear of current or ex partner: Not on file     Emotionally abused: Not on file     Physically abused: Not on file     Forced sexual activity: Not on file   Other Topics Concern    Not on file   Social History Narrative    Not on file       Review of Systems:  Review of Systems   Constitution: Negative for chills and fever. Cardiovascular: Negative for chest pain and palpitations. Respiratory: Negative for cough and shortness of breath. Musculoskeletal: Positive for back pain. Gastrointestinal: Negative for constipation. Neurological: Positive for loss of balance. Negative for disturbances in coordination, numbness and tingling. Uses walker    Physical Exam:  There were no vitals taken for this visit. Physical Exam  Neurological:      Mental Status: She is alert. Psychiatric:         Mood and Affect: Mood normal.         Record/Diagnostics Review:    Last georgie 10/2020 and was appropriate     ABERRANT BEHAVIORS SINCE LAST VISIT  Lost rx/pills:------------------------------------------ no  Taking  medication as prescribed: ----------- yes  Urine Drug Screen ---------------------------------  yes  Recent ER visits: -------------------------------------No  Pill count is appropriate: ---------------------------yes   Refills for prescriptions appropriate:---------- yes    Assessment:  Problem List Items Addressed This Visit     Lumbar radiculopathy    Lumbar spondylolysis    Failed back syndrome of lumbar spine    Encounter for medication monitoring - Primary    Sacroiliitis ([facility] [address]    Relevant Medications    morphine (MS CONTIN) 30 MG extended release tablet (Start on 3/30/2021)    oxyCODONE-acetaminophen (PERCOCET)  MG per tablet (Start on 3/30/2021)    Failed back syndrome    Relevant Medications    morphine (MS CONTIN) 30 MG extended release tablet (Start on 3/30/2021)    oxyCODONE-acetaminophen (PERCOCET)  MG per tablet (Start on 3/30/2021)             Treatment Plan:  Patient relates current medications are helping the pain.  Patient reports taking pain medications as prescribed,

## 2021-04-26 ENCOUNTER — HOSPITAL ENCOUNTER (OUTPATIENT)
Dept: PAIN MANAGEMENT | Age: 81
Discharge: HOME | End: 2021-04-26
Payer: MEDICARE

## 2021-04-26 DIAGNOSIS — M54.16 LUMBAR RADICULOPATHY: ICD-10-CM

## 2021-04-26 DIAGNOSIS — M50.30 DDD (DEGENERATIVE DISC DISEASE), CERVICAL: ICD-10-CM

## 2021-04-26 DIAGNOSIS — M43.06 LUMBAR SPONDYLOLYSIS: ICD-10-CM

## 2021-04-26 DIAGNOSIS — M96.1 FAILED BACK SYNDROME OF LUMBAR SPINE: ICD-10-CM

## 2021-04-26 DIAGNOSIS — M46.1 SACROILIITIS (HCC): ICD-10-CM

## 2021-04-26 DIAGNOSIS — M47.816 LUMBAR SPONDYLOSIS: ICD-10-CM

## 2021-04-26 DIAGNOSIS — M48.02 SPINAL STENOSIS IN CERVICAL REGION: Primary | ICD-10-CM

## 2021-04-26 DIAGNOSIS — F11.90 CHRONIC, CONTINUOUS USE OF OPIOIDS: ICD-10-CM

## 2021-04-26 DIAGNOSIS — M96.1 FAILED BACK SYNDROME: ICD-10-CM

## 2021-04-26 DIAGNOSIS — Z51.81 ENCOUNTER FOR MEDICATION MONITORING: ICD-10-CM

## 2021-04-26 DIAGNOSIS — M16.11 PRIMARY OSTEOARTHRITIS OF RIGHT HIP: ICD-10-CM

## 2021-04-26 DIAGNOSIS — M48.02 CERVICAL STENOSIS OF SPINE: ICD-10-CM

## 2021-04-26 PROCEDURE — 99213 OFFICE O/P EST LOW 20 MIN: CPT

## 2021-04-26 PROCEDURE — 99442 PR PHYS/QHP TELEPHONE EVALUATION 11-20 MIN: CPT | Performed by: NURSE PRACTITIONER

## 2021-04-26 RX ORDER — NITROFURANTOIN 25; 75 MG/1; MG/1
CAPSULE ORAL
COMMUNITY
Start: 2021-03-12 | End: 2021-04-26

## 2021-04-26 RX ORDER — OXYCODONE AND ACETAMINOPHEN 10; 325 MG/1; MG/1
1 TABLET ORAL EVERY 6 HOURS PRN
Qty: 120 TABLET | Refills: 0 | Status: SHIPPED | OUTPATIENT
Start: 2021-04-29 | End: 2021-05-27 | Stop reason: SDUPTHER

## 2021-04-26 RX ORDER — MORPHINE SULFATE 30 MG/1
30 TABLET, FILM COATED, EXTENDED RELEASE ORAL 2 TIMES DAILY
Qty: 60 TABLET | Refills: 0 | Status: SHIPPED | OUTPATIENT
Start: 2021-04-29 | End: 2021-05-27 | Stop reason: SDUPTHER

## 2021-04-26 ASSESSMENT — ENCOUNTER SYMPTOMS
RESPIRATORY NEGATIVE: 1
BACK PAIN: 1
CONSTIPATION: 1

## 2021-04-26 NOTE — PROGRESS NOTES
Josh 89 PROGRESS NOTE      Patient  completed []  video visit   [x]   phone call:     16    Minutes :       [x]    to  review Medication Agreement    []  Follow up after procedure   []  Discuss treatment options      Location:  Provider:  working from    [x]    home    []   [facility] [place] ,   patient at  home       Chief Complaint:   Low back pain    She c/o low back pain which does not radiate. Her pain has not changed. She has had 2 lumbar surgeries. She has history of cervical fusion. She reports her sleep is good. She uses a walker as she has problems with her balance. She has been through PT which did not help. She can still do laundry and dishes. She fell a few weeks ago, she lost her balance. She did not seek treatment. No ED visits. She had her covid vaccines. Back Pain  This is a chronic problem. The current episode started more than 1 year ago. The problem is unchanged. The pain is present in the lumbar spine. Quality: dull. The pain does not radiate. The pain is at a severity of 6/10. The pain is moderate. Worse during: morning. Exacerbated by: walking. Associated symptoms include numbness. (Left leg, and some numbness right leg) She has tried analgesics and bed rest for the symptoms.            Treatment goals:  Functional status: decrease pain    Aberrancy:   Any alcoholic beverages            Any illegal drugs         Analgesia:      6               Adverse  Effects :constipation on senokot      ADL;s :limited housework    Data:    When was thelast UDS:  9-          Was the UDS appropriate:  [x] yes []   no      Record/Diagnostics Review:      As above, I did review the imaging       10/2/2020  2:30 PM - Jose Alejandro, Alcides Incoming Lab Results From [facility]    Component Value Ref Range & Units Status Collected Lab   Pain Management Drug Panel Interp, Urine Consistent   Final 09/29/2020  1:11 PM [facility]   (NOTE) ________________________________________________________________   DRUGS EXPECTED:   OXYCODONE   MORPHINE   ________________________________________________________________   CONSISTENT with medications provided:   OXYCODONE: based on oxycodone, noroxycodone, noroxymorphone,   oxymorphone   MORPHINE: based on morphine   ________________________________________________________________   INTERPRETIVE INFORMATION: Pain Mgt [place], Mass Spec/EMIT, Ur,                            Interp   Interpretation depends on accuracy and completeness of patient   medication information submitted by client. EXAMINATION:   TWO XRAY VIEWS SCOLIOSIS SERIES       6/5/2020 2:34 pm       COMPARISON:   03/06/2020       HISTORY:   ORDERING SYSTEM PROVIDED HISTORY: Spinal deformity   TECHNOLOGIST PROVIDED HISTORY:   scoliosis   Reason for Exam: Spinal deformity. Pt has been losing balance while walking. Frequent falls. Acuity: Chronic   Type of Exam: Ongoing   Additional signs and symptoms: Spinal deformity. Pt has been losing balance   while walking. Frequent falls.       FINDINGS:   There is mild levoconvex curvature at the thoracolumbar junction, which is   not significantly changed from the previous study.  Measurements are somewhat   limited due to suboptimal visibility of the bony landmarks in the spine.    Posterior fixation hardware is present throughout the lumbar spine.  There   are laminectomy defects in the lumbar spine as well.  Fixation hardware is   also noted in the cervical spine.  Vertebral body heights are grossly   preserved.  Sagittal alignment is within normal limits and stable from   previous radiographs.  There is moderate multilevel disc space narrowing and   endplate spurring.           Impression   1.  Mild thoracolumbar levoscoliosis, not significantly changed.       2.  Unchanged fixation hardware in the cervical and lumbar spine.       3.  Moderate multilevel degenerative changes.                   Pill count: appropriate    fill date :4-  Morphine equivalent dose as reported on OARRS:142.50 has narcan at home     Review ofOARRS does not show any aberrant prescription behavior. Medication is helping the patient stay active. Patient denies any side effects and reports adequate analgesia. No sign of misuse/abuse.             Past Medical History:   Diagnosis Date    Achilles tendonitis     right    Asthma     COPD (chronic obstructive pulmonary disease) (HCC)     Cough     clear phlegm    Degenerative lumbar disc     DM (diabetes mellitus), type 2 (HCC)     PCP Dr. Mitesh James, seen 8/2019    Failed back syndrome, lumbar     Fast heart beat     Hx of \"8-10 years ago\"    Fibromyalgia     History of bladder cancer     Hypertension     Hypothyroid     Kidney stones     Lumbar radiculopathy     Lumbar spondylolysis     Lumbar spondylosis     Osteoarthritis     Sleep apnea     uses CPAP machine nightly    Spinal stenosis in cervical region     Wears glasses        Past Surgical History:   Procedure Laterality Date    BACK SURGERY  2010    lumbar fusion    BLADDER TUMOR EXCISION  2005    CARDIOVERSION      \"8-10 years ago\"  to get \"heart into regular rhythm\"    CARPAL TUNNEL RELEASE Bilateral     CATARACT REMOVAL WITH IMPLANT Bilateral 7/22/2014, 8/5/2014    [facility]    CERVICAL FUSION  11/14/2019     ANTERIOR CERVICAL DECOMPRESSION FUSION C3-4, C4-5    CERVICAL FUSION N/A 11/14/2019    ANTERIOR CERVICAL DECOMPRESSION FUSION C3-4, C4-5 (SUPINE) DEPUY, REGULAR TABLE, MICROSCOPE, C-ARM, EVOKES (# [id number] [facility]) performed by Silver Locke DO at [place] 8/18/2017    CHOLECYSTECTOMY LAPAROSCOPIC ROBOTIC MULTIPORT performed by Cherylene Brand, MD at [address]  1/2013    CYSTOSCOPY  11/04/2016    CYSTOSCOPY  01/04/2019    ENDOSCOPIC ULTRASOUND (LOWER) N/A 8/17/2017    ENDOSCOPIC ULTRASOUND performed by Devon Hale MD at [address] [place] [address] ENDOSCOPIC ULTRASOUND (UPPER)  08/17/2017    A cursory view of the gastric mucosa was normal. The scope was then further advanced through a patent pylorus to the second portion of the duodenum. The Gallbladder was evaluated in bulb position. Thick sludge was noted. The CBD was 7.8 mm with no filling defects. The PD was 5 mm in the HOP. The pancreatic tissue was edematous in body and tail.  KNEE ARTHROSCOPY Left     LUMBAR FUSION      OH OFFICE/OUTPT VISIT,PROCEDURE ONLY N/A 1/24/2018    SPINAL HARDWARE REMOVAL LUMBAR BONE STIMULATOR performed by Jenny Eller MD at [address] [place] [address] Bilateral 1989    SKIN BIOPSY Right 11/2015    mole right posterior shoulder    SPINE SURGERY      spinal cord stimulator    SPINE SURGERY  91-4-15    renoval of spinal cord stimulator leads and battery    PASQUALE AND BSO      TUBAL LIGATION      UVULOPALATOPHARYGOPLASTY  2006       Allergies   Allergen Reactions    Azithromycin Shortness Of Breath     Tightness in chest    Adhesive Tape      OK to use paper tape per Patient- 2/19/20 patient states that she has been using all types of tape with no reaction         Current Outpatient Medications:     morphine (MS CONTIN) 30 MG extended release tablet, Take 1 tablet by mouth 2 times daily for 30 days. , Disp: 60 tablet, Rfl: 0    oxyCODONE-acetaminophen (PERCOCET)  MG per tablet, Take 1 tablet by mouth every 6 hours as needed for Pain for up to 30 days. , Disp: 120 tablet, Rfl: 0    potassium chloride (MICRO-K) 10 MEQ extended release capsule, Take 1 capsule by mouth daily, Disp: , Rfl:     fluticasone (FLOVENT HFA) 44 MCG/ACT inhaler, Inhale 1 puff into the lungs 2 times daily, Disp: , Rfl:     pregabalin (LYRICA) 150 MG capsule, Take 150 mg by mouth 2 times daily. , Disp: , Rfl:     montelukast (SINGULAIR) 10 MG tablet, Take 10 mg by mouth nightly , Disp: , Rfl:     metFORMIN (GLUCOPHAGE) 500 MG tablet, Take 500 mg by mouth 2 times daily (with meals). , Disp: , Rfl:     telmisartan-hydrochlorothiazide (MICARDIS HCT) 40-12.5 MG per tablet, Take 1 tablet by mouth daily. , Disp: , Rfl:     levothyroxine (SYNTHROID) 50 MCG tablet, Take 50 mcg by mouth Daily. , Disp: , Rfl:     omeprazole (PRILOSEC) 20 MG capsule, Take 20 mg by mouth every evening., Disp: , Rfl:     aspirin 81 MG tablet, Take 81 mg by mouth daily. , Disp: , Rfl:     naloxone 4 MG/0.1ML LIQD nasal spray, 1 spray by Nasal route as needed for Opioid Reversal, Disp: 1 each, Rfl: 5    albuterol sulfate  (90 Base) MCG/ACT inhaler, Inhale 1 puff into the lungs daily, Disp: , Rfl:     bumetanide (BUMEX) 1 MG tablet, , Disp: , Rfl:     sennosides-docusate sodium (SENOKOT-S) 8.6-50 MG tablet, Take 1 tablet by mouth daily as needed, Disp: , Rfl:     psyllium (KONSYL) 28.3 % PACK, Take 1 packet by mouth daily, Disp: , Rfl:     fluticasone (FLONASE) 50 MCG/ACT nasal spray, 1 spray by Each Nostril route daily, Disp: , Rfl:     diclofenac sodium 1 % GEL, Apply 2 g topically 2 times daily as needed for Pain, Disp: , Rfl:     magnesium hydroxide (DULCOLAX MILK OF MAGNESIA) 400 MG/5ML suspension, Take 5 mLs by mouth daily as needed for Constipation, Disp: , Rfl:     Family History   Problem Relation Age of Onset    Heart Failure Mother     Lung Cancer Mother     Other Father          pneumonia    Alzheimer's Disease Father     Lung Cancer Brother     Diabetes Paternal Grandmother     Other Daughter         Fibromyalsia    Rheum Arthritis Daughter        Social History     Socioeconomic History    Marital status:      Spouse name: Yenny Almeida Number of children: 3    Years of education: Not on file    Highest education level: Not on file   Occupational History    Occupation: retired   Social Needs    Financial resource strain: Not on file    Food insecurity     Worry: Not on file     Inability: Not on file   [employer] needs     Medical: Not on file     Non-medical: Not on file Tobacco Use    Smoking status: Never Smoker    Smokeless tobacco: Never Used   Substance and Sexual Activity    Alcohol use: Not Currently     Comment: rare    Drug use: Never    Sexual activity: Yes     Partners: Male   Lifestyle    Physical activity     Days per week: Not on file     Minutes per session: Not on file    Stress: Not on file   Relationships    Social connections     Talks on phone: Not on file     Gets together: Not on file     Attends Baptism service: Not on file     Active member of club or organization: Not on file     Attends meetings of clubs or organizations: Not on file     Relationship status: Not on file    Intimate partner violence     Fear of current or ex partner: Not on file     Emotionally abused: Not on file     Physically abused: Not on file     Forced sexual activity: Not on file   Other Topics Concern    Not on file   Social History Narrative    Not on file         Review of Systems:  Review of Systems   Constitution: Negative. HENT: Positive for hearing loss. Eyes:        Glasses   Cardiovascular: Negative. Issue with valve   Respiratory: Negative. Endocrine:        Diabetic:blood sugars under control   Hematologic/Lymphatic: Negative. Skin: Negative. Musculoskeletal: Positive for back pain, falls and joint pain. Gastrointestinal: Positive for constipation. Genitourinary: Negative. Neurological: Positive for loss of balance and numbness. Psychiatric/Behavioral: Negative. Physical Exam:  There were no vitals taken for this visit. Physical Exam  Skin:         Neurological:      Mental Status: She is alert and oriented to person, place, and time. Psychiatric:         Mood and Affect: Mood normal.         Thought Content:  Thought content normal.           Assessment:      Problem List Items Addressed This Visit     Spinal stenosis in cervical region - Primary    Sacroiliitis ([facility] [address]    Primary osteoarthritis of right hip Lumbar spondylosis    Lumbar spondylolysis    Lumbar radiculopathy    Failed back syndrome of lumbar spine    Encounter for medication monitoring    DDD (degenerative disc disease), cervical    Chronic, continuous use of opioids    Cervical stenosis of spine          Treatment Plan:  DISCUSSION: Treatment options discussed withpatient and all questions answered to patient's satisfaction. Possible side effects, risk of tolerance and or dependence and alternative treatments discussed    Obtaining appropriate analgesic effect of treatment   No signs of potential drug abuse or diversion identified    [x] Ill effects of being on chronic pain medications such as sleep disturbances, respiratory depression, hormonal changes, withdrawal symptoms, chronic opioid dependence and tolerance as well as risk of taking opioids with Benzodiazepines and taking opioids along with alcohol,  werediscussed with patient. I had asked the patient to minimize medication use and utilize pain medications only for uncontrolled rest pain or pain with exertional activities. I advised patient not to self-escalate painmedications without consulting with us. At each of patient's future visits we will try to taper pain medications, while adjusting the adjunct medications, and re-evaluating for Physical Therapy to improve spinal andjoint strength. We will continue to have discussions to decrease pain medications as tolerated. Counseled patient on effects their pain medication and /or their medical condition mayhave on their  ability to drive or operate machinery. Instructed not to drive or operate machinery if drowsy     I also discussed with the patient regarding the dangers of combining narcotic pain medication with tranquilizers, alcohol or illegal drugs or taking the medication any way other than prescribed. The dangers were discussed  including respiratory depression and death.  Patient was told to tell  all  physicians regarding the medications he is getting from pain clinic. Patient is warned not to take any unprescribed medications over-the-countermedications that can depress breathing . Patient is advised to talk to the pharmacist or physicians if planning to take any over-the-counter medications before  takeing them. Patient is strongly advised to avoid tranquilizers or  relaxants, illegal drugs  or any medications that can depress breathing  Patient is also advised to tell us if there is any changes in their medications from other physicians. 1. Her narcan script has , she states will [facility] one      TREATMENT OPTIONS:       Medication Agreement Requirements Met  Continue Opioid therapy  Script written for  MS contin.  percocet  Follow up appointment made

## 2021-05-10 ENCOUNTER — HOSPITAL ENCOUNTER (OUTPATIENT)
Age: 81
Discharge: HOME | End: 2021-05-10
Payer: MEDICARE

## 2021-05-10 LAB — VITAMIN B-12: 154 PG/ML (ref 232–1245)

## 2021-05-10 PROCEDURE — 36415 COLL VENOUS BLD VENIPUNCTURE: CPT

## 2021-05-10 PROCEDURE — 82607 VITAMIN B-12: CPT

## 2021-05-26 ASSESSMENT — ENCOUNTER SYMPTOMS
PHOTOPHOBIA: 0
SHORTNESS OF BREATH: 0
ABDOMINAL PAIN: 0
COUGH: 0
ALLERGIC/IMMUNOLOGIC NEGATIVE: 1
BACK PAIN: 1
NAUSEA: 0
EYE PAIN: 0
CONSTIPATION: 1
VOMITING: 0

## 2021-05-26 NOTE — PROGRESS NOTES
Rakesh Schmidt is a 68 y.o. female evaluated on 5/27/2021. Modality of virtual service provided -via telephone   Consent:  Patient and/or health care decision maker is aware that that patient may receive a bill for this telephone service, depending on one's insurance coverage, and has provided verbal consent to proceed: Yes    Patient identification was verified at the start of the visit: Yes    Chief complaint: Rakesh Schmidt is 68 y.o.,  female, with  with chief complaint of pain involving low back. Patient is complaining of pain involving the low back as well as in the cervical region. The pain in the low back radiates to both lower extremities. Patient reports she had a surgery on her neck in January and she having problems with balance but her balance did not improve. She reports in fact it is somewhat worse. She is using a walker to help with ambulation. She reports medications are helping her pain to a certain extent but she still has some pain which is at times worse. She reports she gets episodes of severe pain that lasted a few seconds with movement in the mid lumbar region. Back Pain  This is a chronic problem. The current episode started more than 1 year ago. The problem occurs constantly. The problem has been gradually worsening since onset. The pain is present in the lumbar spine and sacro-iliac. The quality of the pain is described as aching and shooting Ritu Darting). Radiates to: Worse on the left side compared to the right. The pain is at a severity of 7/10 (5-9). The pain is severe. The pain is the same all the time. The symptoms are aggravated by bending, standing, sitting and twisting (Walking, lifting, ADLs). Associated symptoms include numbness (left leg) and weakness. Pertinent negatives include no abdominal pain, chest pain, dysuria, fever, headaches or tingling. Risk factors include lack of exercise and sedentary lifestyle.       Alleviating factors:ice and rest and NSAIDs, Aspercreme and Voltaren gel  Lifestyle changes experienced with pain: Prevents or limits ADLs, Increases w/activity.  , Increases w/prolonged sitting/standing/walking  Mood changes,none  Patient currently unemployed. Physical therapy did not help the pain. Are you under psychological counseling at present: No  Goals for treatment include:  Decrease in pain  Enjoy daily and recreational activities, return to previous status. Patient relates current medications are helping the pain. Patient reports taking pain medications as prescribed, denies obtaining medications from different sources and denies use of illegal drugs. Patient denies side effects from medications like nausea, vomiting, constipation or drowsiness. Patient reports current activities of daily living ar possible due to medications and would like to continue them. ACTIVITY/SOCIAL/EMOTIONAL:  Sleep Pattern: 7 hours per night.  generally restful sleep  Home Exercises:  no regular exercise does exercise from PT once in a while-  Activity:unchanged  Emotional Issues: normal.   Currently seeing a Psychiatrist or Psychologist:  No     ADVERSE MEDICATION EFFECTS:   Nausea and vomiting: no   Constipation: no-Undercontrol-: yes  Dizziness/drowsy/sleepy--no  Urinary Retention: no    ABERRANT BEHAVIORS SINCE LAST VISIT  Lost rx/pills:------------------------------------------ no  Taking  medication as prescribed: ----------- yes  Urine Drug Screen --------------------------------- yes             Date-----------------------------------------------10/2/2020              Results as expected ---------------------yes    Recent ER visits: -------------------------------------No  Pill count is appropriate: ---------------------------yes   Refills for prescriptions appropriate:---------- yes      Past Medical History:   Diagnosis Date    Achilles tendonitis     right    Asthma     COPD (chronic obstructive pulmonary disease) (HCC)     Cough clear phlegm    Degenerative lumbar disc     DM (diabetes mellitus), type 2 (HCC)     PCP Dr. Silvana Raymundo, seen 8/2019    Failed back syndrome, lumbar     Fast heart beat     Hx of \"8-10 years ago\"    Fibromyalgia     History of bladder cancer     Hypertension     Hypothyroid     Kidney stones     Lumbar radiculopathy     Lumbar spondylolysis     Lumbar spondylosis     Osteoarthritis     Sleep apnea     uses CPAP machine nightly    Spinal stenosis in cervical region     Wears glasses        Past Surgical History:   Procedure Laterality Date    BACK SURGERY  2010    lumbar fusion    BLADDER TUMOR EXCISION  2005    CARDIOVERSION      \"8-10 years ago\"  to get \"heart into regular rhythm\"    CARPAL TUNNEL RELEASE Bilateral     CATARACT REMOVAL WITH IMPLANT Bilateral 7/22/2014, 8/5/2014    [facility]    CERVICAL FUSION  11/14/2019     ANTERIOR CERVICAL DECOMPRESSION FUSION C3-4, C4-5    CERVICAL FUSION N/A 11/14/2019    ANTERIOR CERVICAL DECOMPRESSION FUSION C3-4, C4-5 (SUPINE) DEPUY, REGULAR TABLE, MICROSCOPE, C-ARM, EVOKES (# [id number] ELIZA) performed by Ryan Huber DO at [address] [place] [address] 8/18/2017    CHOLECYSTECTOMY LAPAROSCOPIC ROBOTIC MULTIPORT performed by Vielka Blackburn MD at [facility]  1/2013    CYSTOSCOPY  11/04/2016    CYSTOSCOPY  01/04/2019    ENDOSCOPIC ULTRASOUND (LOWER) N/A 8/17/2017    ENDOSCOPIC ULTRASOUND performed by Ovidio Bucio MD at [address] (UPPER)  08/17/2017    A cursory view of the gastric mucosa was normal. The scope was then further advanced through a patent pylorus to the second portion of the duodenum. The Gallbladder was evaluated in bulb position. Thick sludge was noted. The CBD was 7.8 mm with no filling defects. The PD was 5 mm in the HOP. The pancreatic tissue was edematous in body and tail.     KNEE ARTHROSCOPY Left     LUMBAR FUSION      [facility] OFFICE/OUTPT VISIT,PROCEDURE ONLY N/A 1/24/2018    SPINAL HARDWARE REMOVAL LUMBAR BONE STIMULATOR performed by Yen Gil MD at [address] Bilateral 1989    SKIN BIOPSY Right 11/2015    mole right posterior shoulder    SPINE SURGERY      spinal cord stimulator    SPINE SURGERY  91-4-15    renoval of spinal cord stimulator leads and battery    PASQUALE AND BSO      TUBAL LIGATION      UVULOPALATOPHARYGOPLASTY  2006       Family History   Problem Relation Age of Onset    Heart Failure Mother     Lung Cancer Mother     Other Father          pneumonia    Alzheimer's Disease Father     Lung Cancer Brother     Diabetes Paternal Grandmother     Other Daughter         Fibromyalsia    Rheum Arthritis Daughter        Social History     Socioeconomic History    Marital status:      Spouse name: Daija Jalloh Number of children: 3    Years of education: None    Highest education level: None   Occupational History    Occupation: retired   Tobacco Use    Smoking status: Never Smoker    Smokeless tobacco: Never Used   Vaping Use    Vaping Use: Never used   Substance and Sexual Activity    Alcohol use: Not Currently     Comment: rare    Drug use: Never    Sexual activity: Yes     Partners: Male   Other Topics Concern    None   Social History Narrative    None     Social Determinants of Health     Financial Resource Strain:     Difficulty of Paying Living Expenses:    Food Insecurity:     Worried About Running Out of Food in the Last Year:     Ran Out of Food in the Last Year:    Transportation Needs:     Lack of Transportation (Medical):      Lack of Transportation (Non-Medical):    Physical Activity:     Days of Exercise per Week:     Minutes of Exercise per Session:    Stress:     Feeling of Stress :    Social Connections:     Frequency of Communication with Friends and Family:     Frequency of Social Gatherings with Friends and Family:     Attends Orthodox Services:     Active Member of Clubs or Organizations:     Attends Club or Organization Meetings:     Marital Status:    Intimate Partner Violence:     Fear of Current or Ex-Partner:     Emotionally Abused:     Physically Abused:     Sexually Abused: Allergies   Allergen Reactions    Azithromycin Shortness Of Breath     Tightness in chest    Adhesive Tape      OK to use paper tape per Patient- 2/19/20 patient states that she has been using all types of tape with no reaction       Current Outpatient Medications on File Prior to Encounter   Medication Sig Dispense Refill    naloxone 4 MG/0.1ML LIQD nasal spray 1 spray by Nasal route as needed for Opioid Reversal 1 each 5    albuterol sulfate  (90 Base) MCG/ACT inhaler Inhale 1 puff into the lungs daily      potassium chloride (MICRO-K) 10 MEQ extended release capsule Take 1 capsule by mouth daily      bumetanide (BUMEX) 1 MG tablet       sennosides-docusate sodium (SENOKOT-S) 8.6-50 MG tablet Take 1 tablet by mouth daily as needed      psyllium (KONSYL) 28.3 % PACK Take 1 packet by mouth daily      fluticasone (FLONASE) 50 MCG/ACT nasal spray 1 spray by Each Nostril route daily      fluticasone (FLOVENT HFA) 44 MCG/ACT inhaler Inhale 1 puff into the lungs 2 times daily      diclofenac sodium 1 % GEL Apply 2 g topically 2 times daily as needed for Pain      magnesium hydroxide (DULCOLAX MILK OF MAGNESIA) 400 MG/5ML suspension Take 5 mLs by mouth daily as needed for Constipation      pregabalin (LYRICA) 150 MG capsule Take 150 mg by mouth 2 times daily.  montelukast (SINGULAIR) 10 MG tablet Take 10 mg by mouth nightly       metFORMIN (GLUCOPHAGE) 500 MG tablet Take 500 mg by mouth 2 times daily (with meals).  telmisartan-hydrochlorothiazide (MICARDIS HCT) 40-12.5 MG per tablet Take 1 tablet by mouth daily.  levothyroxine (SYNTHROID) 50 MCG tablet Take 50 mcg by mouth Daily.  omeprazole (PRILOSEC) 20 MG capsule Take 20 mg by mouth every evening.       morphine        X-Ray reports:  EXAMINATION:    MRI OF THE CERVICAL SPINE WITHOUT CONTRAST 4/8/2019 6:45 pm        TECHNIQUE:    Multiplanar multisequence MRI of the cervical spine was performed without the    administration of intravenous contrast.        COMPARISON:    03/29/2018        HISTORY:    ORDERING SYSTEM PROVIDED HISTORY: Spinal stenosis of lumbar region with    neurogenic claudication    TECHNOLOGIST PROVIDED HISTORY:    Ordering Physician Provided Reason for Exam: pt states neck and back pain -    chroninc, no recent injury    Acuity: Chronic    Type of Exam: Ongoing        FINDINGS:    BONES/ALIGNMENT: Vertebral body heights are maintained. Alignment is normal.    Minimal edematous degenerative changes are present at C3-4, unchanged. Marrow signal is otherwise within normal limits for age. 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 SPINAL CORD: No spinal cord signal abnormality is seen. There is no abnormal    fluid collection or mass within the spinal canal.        SOFT TISSUES: There is an incidental subcentimeter cystic left thyroid    nodule. Paraspinal soft tissues are otherwise unremarkable. C2-C3: Disc height and signal maintained. No neural foraminal narrowing or    spinal canal stenosis. C3-C4: Mild disc height loss and desiccation. Severe left and mild right    neural foraminal narrowing secondary to uncovertebral and facet hypertrophy. Moderate spinal canal stenosis secondary to disc bulge and ligamentum flavum    hypertrophy. C4-C5: Mild disc height loss and desiccation. Severe left and moderate right    neural foraminal narrowing secondary to uncovertebral and facet hypertrophy. Moderate spinal canal stenosis secondary to disc bulge and ligamentum flavum    hypertrophy. C5-C6: Mild disc height loss and desiccation. Severe left neural foraminal    narrowing secondary to uncovertebral and facet hypertrophy.   Mild right    neural foraminal narrowing secondary to uncovertebral hypertrophy. Mild    spinal canal stenosis secondary to disc bulge. C6-C7: Mild disc height loss and desiccation. Mild bilateral neural    foraminal narrowing secondary to uncovertebral hypertrophy. Mild spinal    canal stenosis secondary to disc bulge. C7-T1: Disc height and signal maintained. Mild bilateral neural foraminal    narrowing secondary to uncovertebral and facet hypertrophy. Mild spinal    canal stenosis secondary to disc bulge. T1-2: Mild disc height loss and desiccation. Mild bilateral neural foraminal    narrowing and mild spinal canal stenosis secondary to disc bulge and facet    hypertrophy. T2-3: Moderate disc height loss and desiccation. Mild bilateral neural    foraminal narrowing and mild spinal canal stenosis secondary to disc bulge    and facet hypertrophy. Impression    1. No substantial interval change since 03/29/2018 allowing for differences    in technique. 2. Spinal canal stenoses, moderate at C3-4 and C4-5 and mild at the C5-6    through T2-3 levels. 3. Multilevel neural foraminal narrowing as detailed above and greatest    involving the left C4, C5, and C6 neural foramina where it is severe. 4. Incidental subcentimeter cystic right thyroid nodule, which is likely    benign and for which no further follow-up is warranted.          EXAMINATION:    MRI OF THE THORACIC SPINE WITHOUT CONTRAST  4/8/2019 6:46 pm        TECHNIQUE:    Multiplanar multisequence MRI of the thoracic spine was performed without the    administration of intravenous contrast.        COMPARISON:    CT myelogram 03/22/2019        HISTORY:    ORDERING SYSTEM PROVIDED HISTORY: Thoracic spondylosis with myelopathy    TECHNOLOGIST PROVIDED HISTORY:    Ordering Physician Provided Reason for Exam: pt states chronic neck and mid    back pain,no recent injury    Acuity: Chronic    Type of Exam: Ongoing    Additional signs and symptoms: prev myelogram,ct        FINDINGS:    BONES/ALIGNMENT: Vertebral body heights are maintained. 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 Alignment is normal.  Laminectomies and anterior    and posterior surgical fusion changes partially visualized at L2-3. mild    edematous super endplate changes are present anteriorly at T12-L1. No    suspect osseous lesion is evident. SPINAL CORD: There is focal T2 hyperintensity in the spinal cord at the    T10-11 level. No additional cord signal abnormality is seen. There is no    abnormal fluid collection or mass within the spinal canal.        SOFT TISSUES: Paraspinal soft tissues are unremarkable. DEGENERATIVE CHANGES: Mild diffuse disc height loss and desiccation    throughout the thoracic spine. Multilevel disc bulges and facet hypertrophy    resulting in severe T10-11 and moderate T11-12 and T12-L1 spinal canal    stenosis. Additional mild spinal canal stenosis noted at T1-, T2-3, T5-6,    T6-7, and T7-8. Impression    1. Multilevel disc bulges and facet hypertrophy resulting in spinal canal    stenoses throughout the thoracic spine greatest at T10-11 where it is severe    and T11-12 and T12-L1 where it is moderate. Findings are not substantially    changed from prior CT myelogram.    2. Focal cord signal abnormality at T10-11 may reflect chronic spondylotic    myelomalacia, cord edema, or a combination thereof. CT OF THE CERVICAL SPINE WITHOUT CONTRAST 2/12/2020 1:30 pm        TECHNIQUE:    CT of the cervical spine was performed without the administration of    intravenous contrast. Multiplanar reformatted images are provided for review. Dose modulation, iterative reconstruction, and/or weight based adjustment of    the mA/kV was utilized to reduce the radiation dose to as low as reasonably    achievable. COMPARISON:    CT March 22, 2019. Cervical spine radiographs January 7, 2020.         HISTORY:    ORDERING Lori Sat 5/26/2021  5:56 AM Reviewed PDMP [1]     Counselling/Preventive measures for pain  Control:    [x]  Spine strengthening exercises are discussed with patient in detail. [x] Ill effects of being on chronic pain medications such as sleep disturbances, hormonal changes, withdrawal symptoms,  chronic opioid dependence and tolerance were discussed with patient. I had asked the patient to minimize medication use and utilize pain medications only for uncontrolled rest pain or pain with exertional activities. I advised patient not to self escalate pain medications without consulting with us. At each of patient's future visits we will try to taper pain medications, while adjusting the adjunct medications, and re-evaluating for Physical Therapy to improve spinal and joint strength. We will continue to have discussions to decrease pain medications as tolerated. I also discussed with the patient regarding the dangers of combining narcotic pain medication with tranquilizers, alcohol or illegal drugs or taking the medication any other than prescribed. The dangers including the respiratory depression and death. Patient was told to tell  to all  physicians regarding the medications he is getting from pain clinic. Patient is warned not to take any unprescribed medications over-the-counter medications that can depress breathing . Patient is advised to talk to the pharmacist or physicians if planning to take any over-the-counter medications before  takeing them. Patient is strongly advised to avoid tranquilizers or  Relaxants for any medications that can depress breathing or recreational drugs. Patient is also advised to tell us if there is any changes in his medications from other physicians. We discussed the same at today's visit and have not been to implement it, as the patient's pain is not under control with current medications.      Decision Making Process : Patient's health history and referral records thoroughly reviewed before focused physical examination and discussion with patient. I have spent 25 mins. Over 50% of today's visit is spent on examining the patient and counseling and coordinating the care. Level of complexity of date to be reviewed is Moderate. The chart date reviewed include the following: Imaging Reports. Summary of Care. Time spent reviewing with patient the below reports:   Medication safety, Treatment options. Level of diagnosis and management options of this case is multiple: involving the following management options: Interventions as needed, medication management as appropriate, future visits, activity modification, heat/ice as needed, Urine drug screen as required. [x]The patient's questions were answered to the best of my abilities. This note was created using voice recognition software. There may be inaccuracies of transcription  that are inadvertently overlooked prior to the signature. There is any questions about the transcription please contact me. Return in  4 weeks  with physician / CNP  for further plan of treatment. Due to the COVID-19 pandemic and the appropriate interventions by Danielle Monsivais, our non-urgent pain management patients will not be seen in the office at this time for their protection and the protection of our staff. To offer continuity of care, their prescriptions will be escribed this month after a careful chart review and review of their OARRS report  Pursuant to the emergency declaration under the [employer] and [employer], 1135 waiver authority and the [employer] and [employer] Act, this Virtual Visit was conducted, with patient's consent, to reduce the patient's risk of exposure to COVID-19 and provide continuity of care for an established patient.       Services were provided through a video synchronous discussion virtually to substitute for in-person appointment. \"  Documentation:  I communicated with the patient and/or health care decision maker about plan of care  Details of this discussion including any medical advice provided: Total Time: minutes: 21-30 minutes    I affirm this is a Patient Initiated Episode with an Established Patient who has not had a related appointment within my department in the past 7 days or scheduled within the next 24 hours.     Electronically signed by Mi Leblanc MD on 5/27/2021 at 8:46 AM

## 2021-05-27 ENCOUNTER — HOSPITAL ENCOUNTER (OUTPATIENT)
Dept: PAIN MANAGEMENT | Age: 81
Discharge: HOME | End: 2021-05-27
Payer: MEDICARE

## 2021-05-27 DIAGNOSIS — Z51.81 ENCOUNTER FOR MEDICATION MONITORING: ICD-10-CM

## 2021-05-27 DIAGNOSIS — M54.16 LUMBAR RADICULOPATHY: ICD-10-CM

## 2021-05-27 DIAGNOSIS — M46.1 SACROILIITIS (HCC): ICD-10-CM

## 2021-05-27 DIAGNOSIS — M43.06 LUMBAR SPONDYLOLYSIS: ICD-10-CM

## 2021-05-27 DIAGNOSIS — M48.02 SPINAL STENOSIS IN CERVICAL REGION: Primary | ICD-10-CM

## 2021-05-27 DIAGNOSIS — M96.1 FAILED BACK SYNDROME: ICD-10-CM

## 2021-05-27 DIAGNOSIS — M16.11 PRIMARY OSTEOARTHRITIS OF RIGHT HIP: ICD-10-CM

## 2021-05-27 DIAGNOSIS — M96.1 FAILED BACK SYNDROME OF LUMBAR SPINE: ICD-10-CM

## 2021-05-27 DIAGNOSIS — M50.30 DDD (DEGENERATIVE DISC DISEASE), CERVICAL: ICD-10-CM

## 2021-05-27 DIAGNOSIS — M47.816 LUMBAR SPONDYLOSIS: ICD-10-CM

## 2021-05-27 DIAGNOSIS — M48.02 CERVICAL STENOSIS OF SPINE: ICD-10-CM

## 2021-05-27 DIAGNOSIS — F11.90 CHRONIC, CONTINUOUS USE OF OPIOIDS: ICD-10-CM

## 2021-05-27 PROCEDURE — 99213 OFFICE O/P EST LOW 20 MIN: CPT

## 2021-05-27 PROCEDURE — 99214 OFFICE O/P EST MOD 30 MIN: CPT | Performed by: PAIN MEDICINE

## 2021-05-27 RX ORDER — OXYCODONE AND ACETAMINOPHEN 10; 325 MG/1; MG/1
1 TABLET ORAL EVERY 6 HOURS PRN
Qty: 120 TABLET | Refills: 0 | Status: SHIPPED | OUTPATIENT
Start: 2021-05-31 | End: 2021-06-28 | Stop reason: SDUPTHER

## 2021-05-27 RX ORDER — MORPHINE SULFATE 30 MG/1
30 TABLET, FILM COATED, EXTENDED RELEASE ORAL 2 TIMES DAILY
Qty: 60 TABLET | Refills: 0 | Status: SHIPPED | OUTPATIENT
Start: 2021-05-31 | End: 2021-06-28 | Stop reason: SDUPTHER

## 2021-05-27 ASSESSMENT — ENCOUNTER SYMPTOMS: SORE THROAT: 0

## 2021-06-28 ENCOUNTER — HOSPITAL ENCOUNTER (OUTPATIENT)
Dept: PAIN MANAGEMENT | Age: 81
Discharge: HOME | End: 2021-06-28
Payer: MEDICARE

## 2021-06-28 DIAGNOSIS — M46.1 SACROILIITIS (HCC): ICD-10-CM

## 2021-06-28 DIAGNOSIS — M43.06 LUMBAR SPONDYLOLYSIS: ICD-10-CM

## 2021-06-28 DIAGNOSIS — M54.16 LUMBAR RADICULOPATHY: ICD-10-CM

## 2021-06-28 DIAGNOSIS — F11.90 CHRONIC, CONTINUOUS USE OF OPIOIDS: ICD-10-CM

## 2021-06-28 DIAGNOSIS — M50.30 DDD (DEGENERATIVE DISC DISEASE), CERVICAL: Primary | ICD-10-CM

## 2021-06-28 DIAGNOSIS — M96.1 FAILED BACK SYNDROME: ICD-10-CM

## 2021-06-28 DIAGNOSIS — Z71.89 ENCOUNTER FOR MEDICATION COUNSELING: ICD-10-CM

## 2021-06-28 DIAGNOSIS — Z96.89 SPINAL CORD STIMULATOR STATUS: ICD-10-CM

## 2021-06-28 DIAGNOSIS — M47.816 LUMBAR SPONDYLOSIS: ICD-10-CM

## 2021-06-28 PROCEDURE — 99213 OFFICE O/P EST LOW 20 MIN: CPT

## 2021-06-28 PROCEDURE — 99442 PR PHYS/QHP TELEPHONE EVALUATION 11-20 MIN: CPT | Performed by: NURSE PRACTITIONER

## 2021-06-28 RX ORDER — MORPHINE SULFATE 30 MG/1
30 TABLET, FILM COATED, EXTENDED RELEASE ORAL 2 TIMES DAILY
Qty: 60 TABLET | Refills: 0 | Status: SHIPPED | OUTPATIENT
Start: 2021-07-01 | End: 2021-07-27 | Stop reason: SDUPTHER

## 2021-06-28 RX ORDER — OXYCODONE AND ACETAMINOPHEN 10; 325 MG/1; MG/1
1 TABLET ORAL EVERY 6 HOURS PRN
Qty: 120 TABLET | Refills: 0 | Status: SHIPPED | OUTPATIENT
Start: 2021-07-01 | End: 2021-07-27 | Stop reason: SDUPTHER

## 2021-06-28 ASSESSMENT — ENCOUNTER SYMPTOMS
BACK PAIN: 1
SHORTNESS OF BREATH: 0
COUGH: 0
CONSTIPATION: 0

## 2021-06-28 NOTE — PROGRESS NOTES
Fibromyalgia     History of bladder cancer     Hypertension     Hypothyroid     Kidney stones     Lumbar radiculopathy     Lumbar spondylolysis     Lumbar spondylosis     Osteoarthritis     Sleep apnea     uses CPAP machine nightly    Spinal stenosis in cervical region     Wears glasses        Past Surgical History:   Procedure Laterality Date    BACK SURGERY  2010    lumbar fusion    BLADDER TUMOR EXCISION  2005    CARDIOVERSION      \"8-10 years ago\"  to get \"heart into regular rhythm\"    CARPAL TUNNEL RELEASE Bilateral     CATARACT REMOVAL WITH IMPLANT Bilateral 7/22/2014, 8/5/2014    [facility]    CERVICAL FUSION  11/14/2019     ANTERIOR CERVICAL DECOMPRESSION FUSION C3-4, C4-5    CERVICAL FUSION N/A 11/14/2019    ANTERIOR CERVICAL DECOMPRESSION FUSION C3-4, C4-5 (SUPINE) DEPUY, REGULAR TABLE, MICROSCOPE, C-ARM, EVOKES (# [id number] ELIZA) performed by Elif Coulter DO at [address], LAPAROSCOPIC N/A 8/18/2017    CHOLECYSTECTOMY LAPAROSCOPIC ROBOTIC MULTIPORT performed by Luna Ro MD at [address]  1/2013    CYSTOSCOPY  11/04/2016    CYSTOSCOPY  01/04/2019    ENDOSCOPIC ULTRASOUND (LOWER) N/A 8/17/2017    ENDOSCOPIC ULTRASOUND performed by Loy Christopher MD at [address] (UPPER)  08/17/2017    A cursory view of the gastric mucosa was normal. The scope was then further advanced through a patent pylorus to the second portion of the duodenum. The Gallbladder was evaluated in bulb position. Thick sludge was noted. The CBD was 7.8 mm with no filling defects. The PD was 5 mm in the HOP. The pancreatic tissue was edematous in body and tail.     KNEE ARTHROSCOPY Left     LUMBAR FUSION      HI OFFICE/OUTPT VISIT,PROCEDURE ONLY N/A 1/24/2018    SPINAL HARDWARE REMOVAL LUMBAR BONE STIMULATOR performed by German Mccracken MD at [facility] Bilateral 1989    SKIN BIOPSY Right 11/2015    mole right posterior shoulder    SPINE SURGERY      spinal cord stimulator    SPINE SURGERY  91-4-15    renoval of spinal cord stimulator leads and battery    PASQUALE AND BSO      TUBAL LIGATION      UVULOPALATOPHARYGOPLASTY  2006       Allergies   Allergen Reactions    Azithromycin Shortness Of Breath     Tightness in chest    Adhesive Tape      OK to use paper tape per Patient- 2/19/20 patient states that she has been using all types of tape with no reaction         Current Outpatient Medications:     morphine (MS CONTIN) 30 MG extended release tablet, Take 1 tablet by mouth 2 times daily for 30 days. , Disp: 60 tablet, Rfl: 0    oxyCODONE-acetaminophen (PERCOCET)  MG per tablet, Take 1 tablet by mouth every 6 hours as needed for Pain for up to 30 days. , Disp: 120 tablet, Rfl: 0    naloxone 4 MG/0.1ML LIQD nasal spray, 1 spray by Nasal route as needed for Opioid Reversal, Disp: 1 each, Rfl: 5    albuterol sulfate  (90 Base) MCG/ACT inhaler, Inhale 1 puff into the lungs daily, Disp: , Rfl:     potassium chloride (MICRO-K) 10 MEQ extended release capsule, Take 1 capsule by mouth daily, Disp: , Rfl:     bumetanide (BUMEX) 1 MG tablet, , Disp: , Rfl:     sennosides-docusate sodium (SENOKOT-S) 8.6-50 MG tablet, Take 1 tablet by mouth daily as needed, Disp: , Rfl:     psyllium (KONSYL) 28.3 % PACK, Take 1 packet by mouth daily, Disp: , Rfl:     fluticasone (FLONASE) 50 MCG/ACT nasal spray, 1 spray by Each Nostril route daily, Disp: , Rfl:     fluticasone (FLOVENT HFA) 44 MCG/ACT inhaler, Inhale 1 puff into the lungs 2 times daily, Disp: , Rfl:     diclofenac sodium 1 % GEL, Apply 2 g topically 2 times daily as needed for Pain, Disp: , Rfl:     magnesium hydroxide (DULCOLAX MILK OF MAGNESIA) 400 MG/5ML suspension, Take 5 mLs by mouth daily as needed for Constipation, Disp: , Rfl:     pregabalin (LYRICA) 150 MG capsule, Take 150 mg by mouth 2 times daily.  , Disp: , Rfl:     montelukast (SINGULAIR) 10 MG tablet, Take 10 mg by mouth nightly , Disp: , Rfl:     metFORMIN (GLUCOPHAGE) 500 MG tablet, Take 500 mg by mouth 2 times daily (with meals). , Disp: , Rfl:     telmisartan-hydrochlorothiazide (MICARDIS HCT) 40-12.5 MG per tablet, Take 1 tablet by mouth daily. , Disp: , Rfl:     levothyroxine (SYNTHROID) 50 MCG tablet, Take 50 mcg by mouth Daily. , Disp: , Rfl:     omeprazole (PRILOSEC) 20 MG capsule, Take 20 mg by mouth every evening., Disp: , Rfl:     aspirin 81 MG tablet, Take 81 mg by mouth daily. , Disp: , Rfl:     Family History   Problem Relation Age of Onset    Heart Failure Mother     Lung Cancer Mother     Other Father          pneumonia    Alzheimer's Disease Father     Lung Cancer Brother     Diabetes Paternal Grandmother     Other Daughter         Fibromyalsia    Rheum Arthritis Daughter        Social History     Socioeconomic History    Marital status:      Spouse name: Lissette Stewart Number of children: 3    Years of education: Not on file    Highest education level: Not on file   Occupational History    Occupation: retired   Tobacco Use    Smoking status: Never Smoker    Smokeless tobacco: Never Used   Vaping Use    Vaping Use: Never used   Substance and Sexual Activity    Alcohol use: Not Currently     Comment: rare    Drug use: Never    Sexual activity: Yes     Partners: Male   Other Topics Concern    Not on file   Social History Narrative    Not on file     Social Determinants of Health     Financial Resource Strain:     Difficulty of Paying Living Expenses:    Food Insecurity:     Worried About [address] in the Last Year:     [address] in the Last Year:    Transportation Needs:     Lack of Transportation (Medical):      Lack of Transportation (Non-Medical):    Physical Activity:     Days of Exercise per Week:     Minutes of Exercise per Session:    Stress:     Feeling of Stress :    Social Connections:     Frequency of Communication with Friends and Family:     Frequency of Social Gatherings with Friends and Family:     Attends Nondenominational Services:     Active Member of Clubs or Organizations:     Attends Club or Organization Meetings:     Marital Status:    Intimate Partner Violence:     Fear of Current or Ex-Partner:     Emotionally Abused:     Physically Abused:     Sexually Abused:        Review of Systems:  Review of Systems   Constitutional: Negative for chills and fever. Cardiovascular: Negative for chest pain and palpitations. Respiratory: Negative for cough and shortness of breath. Musculoskeletal: Positive for back pain. Gastrointestinal: Negative for constipation. Neurological: Negative for disturbances in coordination, loss of balance, numbness, paresthesias and tingling. Physical Exam:  There were no vitals taken for this visit. Physical Exam  Neurological:      Mental Status: She is alert.    Psychiatric:         Mood and Affect: Mood normal.         Record/Diagnostics Review:    Last georgie 10/2020 and was appropriate     ABERRANT BEHAVIORS SINCE LAST VISIT  Lost rx/pills:------------------------------------------ no  Taking  medication as prescribed: ----------- yes  Urine Drug Screen --------------------------------- yes             Date-----------------------------------------------10/2/2020              Results as expected ---------------------yes     Recent ER visits: -------------------------------------No  Pill count is appropriate: ---------------------------yes   Refills for prescriptions appropriate:---------- yes    Assessment:  Problem List Items Addressed This Visit     Lumbar radiculopathy    Lumbar spondylolysis    Sacroiliitis (HCC)    Relevant Medications    morphine (MS CONTIN) 30 MG extended release tablet (Start on 7/1/2021)    oxyCODONE-acetaminophen (PERCOCET)  MG per tablet (Start on 7/1/2021)    Spinal cord stimulator status    Lumbar spondylosis    Relevant Medications    morphine (MS CONTIN) 30 MG extended release tablet (Start on 7/1/2021)    oxyCODONE-acetaminophen (PERCOCET)  MG per tablet (Start on 7/1/2021)    DDD (degenerative disc disease), cervical - Primary    Relevant Medications    morphine (MS CONTIN) 30 MG extended release tablet (Start on 7/1/2021)    oxyCODONE-acetaminophen (PERCOCET)  MG per tablet (Start on 7/1/2021)    Failed back syndrome    Relevant Medications    morphine (MS CONTIN) 30 MG extended release tablet (Start on 7/1/2021)    oxyCODONE-acetaminophen (PERCOCET)  MG per tablet (Start on 7/1/2021)    Encounter for medication counseling    Chronic, continuous use of opioids             Treatment Plan:  Patient relates current medications are helping the pain. Patient reports taking pain medications as prescribed, denies obtaining medications from different sources and denies use of illegal drugs. Patient denies side effects from medications like nausea, vomiting, constipation or drowsiness. Patient reports current activities of daily living are possible due to medications and would like to continue them. As always, we encourage daily stretching and strengthening exercises, and recommend minimizing use of pain medications unless patient cannot get through daily activities due to pain. Contract requirements met. Continue opioid therapy. Script written for [facility] and percocet  Follow up appointment made for 4 weeks    Braeden Keene, was evaluated through a synchronous (real-time) audio-video encounter. The patient (or guardian if applicable) is aware that this is a billable service. Verbal consent to proceed has been obtained within the past 12 months. The visit was conducted pursuant to the emergency declaration under the [address] [place] [address] and the [employer] and [email or web address] General Act. Patient identification was verified, and a caregiver was present when appropriate.  The patient was located in a state where the provider was credentialed to provide care. Total time spent for this encounter: 15 minutes    --CONSUELO Smith CNP on 6/28/2021 at 9:01 AM    An electronic signature was used to authenticate this note.

## 2021-07-15 ENCOUNTER — HOSPITAL ENCOUNTER (OUTPATIENT)
Dept: WOMENS IMAGING | Age: 81
Discharge: HOME | End: 2021-07-17
Payer: MEDICARE

## 2021-07-15 DIAGNOSIS — Z12.39 SCREENING BREAST EXAMINATION: ICD-10-CM

## 2021-07-15 PROCEDURE — 77063 BREAST TOMOSYNTHESIS BI: CPT

## 2021-07-27 ENCOUNTER — HOSPITAL ENCOUNTER (OUTPATIENT)
Dept: PAIN MANAGEMENT | Age: 81
Discharge: HOME | End: 2021-07-27
Payer: MEDICARE

## 2021-07-27 DIAGNOSIS — F11.90 CHRONIC, CONTINUOUS USE OF OPIOIDS: ICD-10-CM

## 2021-07-27 DIAGNOSIS — Z51.81 ENCOUNTER FOR MEDICATION MONITORING: ICD-10-CM

## 2021-07-27 DIAGNOSIS — M50.30 DDD (DEGENERATIVE DISC DISEASE), CERVICAL: ICD-10-CM

## 2021-07-27 DIAGNOSIS — M48.02 SPINAL STENOSIS IN CERVICAL REGION: Primary | ICD-10-CM

## 2021-07-27 DIAGNOSIS — M46.1 SACROILIITIS (HCC): ICD-10-CM

## 2021-07-27 DIAGNOSIS — M43.06 LUMBAR SPONDYLOLYSIS: ICD-10-CM

## 2021-07-27 DIAGNOSIS — G89.4 CHRONIC PAIN ASSOCIATED WITH SIGNIFICANT PSYCHOSOCIAL DYSFUNCTION: ICD-10-CM

## 2021-07-27 DIAGNOSIS — M96.1 FAILED BACK SYNDROME: ICD-10-CM

## 2021-07-27 DIAGNOSIS — M96.1 FAILED BACK SYNDROME OF LUMBAR SPINE: ICD-10-CM

## 2021-07-27 DIAGNOSIS — M54.16 LUMBAR RADICULOPATHY: ICD-10-CM

## 2021-07-27 DIAGNOSIS — M47.816 LUMBAR SPONDYLOSIS: ICD-10-CM

## 2021-07-27 PROCEDURE — 99213 OFFICE O/P EST LOW 20 MIN: CPT

## 2021-07-27 PROCEDURE — 99442 PR PHYS/QHP TELEPHONE EVALUATION 11-20 MIN: CPT | Performed by: NURSE PRACTITIONER

## 2021-07-27 RX ORDER — MORPHINE SULFATE 30 MG/1
30 TABLET, FILM COATED, EXTENDED RELEASE ORAL 2 TIMES DAILY
Qty: 60 TABLET | Refills: 0 | Status: SHIPPED | OUTPATIENT
Start: 2021-08-02 | End: 2021-08-25 | Stop reason: SDUPTHER

## 2021-07-27 RX ORDER — OXYCODONE AND ACETAMINOPHEN 10; 325 MG/1; MG/1
1 TABLET ORAL EVERY 6 HOURS PRN
Qty: 120 TABLET | Refills: 0 | Status: SHIPPED | OUTPATIENT
Start: 2021-08-02 | End: 2021-08-25 | Stop reason: SDUPTHER

## 2021-07-27 ASSESSMENT — ENCOUNTER SYMPTOMS
BACK PAIN: 1
RESPIRATORY NEGATIVE: 1
CONSTIPATION: 1

## 2021-07-27 NOTE — PROGRESS NOTES
Josh 89 PROGRESS NOTE      Patient  completed []  video visit   [x]   phone call: 20        Minutes :       [x]    to  review Medication Agreement    []  Follow up after procedure   []  Discuss treatment options      Location:  Provider:  working from    [x]    home    []   [facility] ,   patient at home       Chief Complaint: low back pain    She c/o  Low back pain which does not radiate, Her pain is unchanged. She has history of 2 lumbar surgeries and a cervical fusion. She states she pulled a muscle in her right groin getting into a truck. She is using ice and states it is getting better. She states has difficulty raising right arm, she states had tennis elbow right and had gone through PT back in January 2021. She states PT did not help. She sleeps well. She is active at home. She was going [facility] and has not been back in 2 weeks. Back Pain  This is a chronic problem. The problem occurs constantly. The problem is unchanged. The pain is present in the lumbar spine. The quality of the pain is described as aching (sharp at times right side). The pain does not radiate. The pain is at a severity of 4/10. The pain is moderate. The pain is the same all the time. Exacerbated by: movement. Associated symptoms include numbness. (Left leg,   Toes numb right foot) She has tried analgesics and ice for the symptoms.        Treatment goals:  Functional status: get off her walker    Aberrancy:   Any alcoholic beverages     no       Any illegal drugs   no      Analgesia:    4                 Adverse  Effects : constipation, takes OTC laxative      ADL;s :housework, home exercises    Data:    When was thelast UDS:  9-          Was the UDS appropriate:  [x] yes []   no      Record/Diagnostics Review:      As above, I did review the imaging     10/2/2020  2:30 PM - Jose Alejandro, Alcides Incoming Lab Results From [facility]    Component Value Ref Range & Units Status Collected Lab   Pain Management Drug Panel Interp, Urine Consistent   Final 09/29/2020  1:11 PM [facility]   (NOTE)   ________________________________________________________________   DRUGS EXPECTED:   OXYCODONE   MORPHINE   ________________________________________________________________   CONSISTENT with medications provided:   OXYCODONE: based on oxycodone, noroxycodone, noroxymorphone,   oxymorphone   MORPHINE: based on morphine   ________________________________________________________________   INTERPRETIVE INFORMATION: Pain Mgt [place], Mass Spec/EMIT, Ur,                            Interp   Interpretation depends on accuracy and completeness of patient   medication information submitted by client. EXAMINATION:   TWO XRAY VIEWS SCOLIOSIS SERIES       6/5/2020 2:34 pm       COMPARISON:   03/06/2020       HISTORY:   ORDERING SYSTEM PROVIDED HISTORY: Spinal deformity   TECHNOLOGIST PROVIDED HISTORY:   scoliosis   Reason for Exam: Spinal deformity. Pt has been losing balance while walking. Frequent falls. Acuity: Chronic   Type of Exam: Ongoing   Additional signs and symptoms: Spinal deformity. Pt has been losing balance   while walking. Frequent falls.       FINDINGS:   There is mild levoconvex curvature at the thoracolumbar junction, which is   not significantly changed from the previous study.  Measurements are somewhat   limited due to suboptimal visibility of the bony landmarks in the spine. Posterior fixation hardware is present throughout the lumbar spine.  There   are laminectomy defects in the lumbar spine as well.  Fixation hardware is   also noted in the cervical spine.  Vertebral body heights are grossly   preserved.  Sagittal alignment is within normal limits and stable from   previous radiographs.  There is moderate multilevel disc space narrowing and   endplate spurring.           Impression   1.  Mild thoracolumbar levoscoliosis, not significantly changed.       2.  Unchanged fixation hardware in the cervical and lumbar spine. REGULAR TABLE, MICROSCOPE, C-ARM, EVOKES (# [id number] [facility]) performed by Jodee Anguiano DO at [address], LAPAROSCOPIC N/A 8/18/2017    CHOLECYSTECTOMY LAPAROSCOPIC ROBOTIC MULTIPORT performed [address] MD [address]  1/2013    CYSTOSCOPY  11/04/2016    CYSTOSCOPY  01/04/2019    ENDOSCOPIC ULTRASOUND (LOWER) N/A 8/17/2017    ENDOSCOPIC ULTRASOUND performed by Robert Hawk MD at [address] (UPPER)  08/17/2017    A cursory view of the gastric mucosa was normal. The scope was then further advanced through a patent pylorus to the second portion of the duodenum. The Gallbladder was evaluated in bulb position. Thick sludge was noted. The CBD was 7.8 mm with no filling defects. The PD was 5 mm in the HOP. The pancreatic tissue was edematous in body and tail.  KNEE ARTHROSCOPY Left     LUMBAR FUSION      MT OFFICE/OUTPT VISIT,PROCEDURE ONLY N/A 1/24/2018    SPINAL HARDWARE REMOVAL LUMBAR BONE STIMULATOR performed by Lamonte Lema MD at [facility] Bilateral 1989    SKIN BIOPSY Right 11/2015    mole right posterior shoulder    SPINE SURGERY      spinal cord stimulator    SPINE SURGERY  91-4-15    renoval of spinal cord stimulator leads and battery    PASQUALE AND BSO      TUBAL LIGATION      UVULOPALATOPHARYGOPLASTY  2006       Allergies   Allergen Reactions    Azithromycin Shortness Of Breath     Tightness in chest    Adhesive Tape      OK to use paper tape per Patient- 2/19/20 patient states that she has been using all types of tape with no reaction         Current Outpatient Medications:     morphine (MS CONTIN) 30 MG extended release tablet, Take 1 tablet by mouth 2 times daily for 30 days. , Disp: 60 tablet, Rfl: 0    oxyCODONE-acetaminophen (PERCOCET)  MG per tablet, Take 1 tablet by mouth every 6 hours as needed for Pain for up to 30 days. , Disp: 120 tablet, Rfl: 0    potassium chloride (MICRO-K) 10 MEQ extended release capsule, Take 1 capsule by mouth daily, Disp: , Rfl:     fluticasone (FLOVENT HFA) 44 MCG/ACT inhaler, Inhale 1 puff into the lungs 2 times daily, Disp: , Rfl:     pregabalin (LYRICA) 150 MG capsule, Take 150 mg by mouth 2 times daily. , Disp: , Rfl:     montelukast (SINGULAIR) 10 MG tablet, Take 10 mg by mouth nightly , Disp: , Rfl:     metFORMIN (GLUCOPHAGE) 500 MG tablet, Take 500 mg by mouth 2 times daily (with meals). , Disp: , Rfl:     telmisartan-hydrochlorothiazide (MICARDIS HCT) 40-12.5 MG per tablet, Take 1 tablet by mouth daily. , Disp: , Rfl:     levothyroxine (SYNTHROID) 50 MCG tablet, Take 50 mcg by mouth Daily. , Disp: , Rfl:     omeprazole (PRILOSEC) 20 MG capsule, Take 20 mg by mouth every evening., Disp: , Rfl:     aspirin 81 MG tablet, Take 81 mg by mouth daily. , Disp: , Rfl:     naloxone 4 MG/0.1ML LIQD nasal spray, 1 spray by Nasal route as needed for Opioid Reversal, Disp: 1 each, Rfl: 5    albuterol sulfate  (90 Base) MCG/ACT inhaler, Inhale 1 puff into the lungs daily, Disp: , Rfl:     bumetanide (BUMEX) 1 MG tablet, , Disp: , Rfl:     sennosides-docusate sodium (SENOKOT-S) 8.6-50 MG tablet, Take 1 tablet by mouth daily as needed, Disp: , Rfl:     psyllium (KONSYL) 28.3 % PACK, Take 1 packet by mouth daily, Disp: , Rfl:     fluticasone (FLONASE) 50 MCG/ACT nasal spray, 1 spray by Each Nostril route daily, Disp: , Rfl:     diclofenac sodium 1 % GEL, Apply 2 g topically 2 times daily as needed for Pain, Disp: , Rfl:     magnesium hydroxide (DULCOLAX MILK OF MAGNESIA) 400 MG/5ML suspension, Take 5 mLs by mouth daily as needed for Constipation, Disp: , Rfl:     Family History   Problem Relation Age of Onset    Heart Failure Mother    Jake Brown Mother     Other Father          pneumonia    Alzheimer's Disease Father     Lung Cancer Brother     Diabetes Paternal Grandmother     Other Daughter         Fibromyalsia    Rheum Arthritis Daughter        Social History     Socioeconomic History    Marital status:      Spouse name: Manda Weir Number of children: 3    Years of education: Not on file    Highest education level: Not on file   Occupational History    Occupation: retired   Tobacco Use    Smoking status: Never Smoker    Smokeless tobacco: Never Used   Vaping Use    Vaping Use: Never used   Substance and Sexual Activity    Alcohol use: Not Currently     Comment: rare    Drug use: Never    Sexual activity: Yes     Partners: Male   Other Topics Concern    Not on file   Social History Narrative    Not on file     Social Determinants of Health     Financial Resource Strain:     Difficulty of Paying Living Expenses:    Food Insecurity:     Worried About Running Out of Food in the Last Year:     [address] in the Last Year:    Transportation Needs:     Lack of Transportation (Medical):  Lack of Transportation (Non-Medical):    Physical Activity:     Days of Exercise per Week:     Minutes of Exercise per Session:    Stress:     Feeling of Stress :    Social Connections:     Frequency of Communication with Friends and Family:     Frequency of Social Gatherings with Friends and Family:     Attends Hindu Services:     Active Member of Clubs or Organizations:     Attends Club or Organization Meetings:     Marital Status:    Intimate Partner Violence:     Fear of Current or Ex-Partner:     Emotionally Abused:     Physically Abused:     Sexually Abused:          Review of Systems:  Review of Systems   Constitutional: Negative. HENT: Negative. Eyes:        Glasses   Cardiovascular: Negative. Respiratory: Negative. Endocrine:        Diabetic:has not checked blood sugar in a week   Hematologic/Lymphatic: Bruises/bleeds easily. Skin: Negative. Musculoskeletal: Positive for back pain and joint pain. Gastrointestinal: Positive for constipation. Genitourinary: Negative. Neurological: Positive for numbness. as tolerated. Counseled patient on effects their pain medication and /or their medical condition mayhave on their  ability to drive or operate machinery. Instructed not to drive or operate machinery if drowsy     I also discussed with the patient regarding the dangers of combining narcotic pain medication with tranquilizers, alcohol or illegal drugs or taking the medication any way other than prescribed. The dangers were discussed  including respiratory depression and death. Patient was told to tell  all  physicians regarding the medications he is getting from pain clinic. Patient is warned not to take any unprescribed medications over-the-countermedications that can depress breathing . Patient is advised to talk to the pharmacist or physicians if planning to take any over-the-counter medications before  takeing them. Patient is strongly advised to avoid tranquilizers or  relaxants, illegal drugs  or any medications that can depress breathing  Patient is also advised to tell us if there is any changes in their medications from other physicians.       1. Advised her to follow up with her PCP about right arm pain    TREATMENT OPTIONS:       Medication Agreement Requirements Met  Continue Opioid therapy  Script written for  Ms continfred  Follow up appointment made

## 2021-08-25 ENCOUNTER — HOSPITAL ENCOUNTER (OUTPATIENT)
Dept: PAIN MANAGEMENT | Age: 81
Discharge: HOME | End: 2021-08-25
Payer: MEDICARE

## 2021-08-25 DIAGNOSIS — M47.816 LUMBAR SPONDYLOSIS: ICD-10-CM

## 2021-08-25 DIAGNOSIS — M16.11 PRIMARY OSTEOARTHRITIS OF RIGHT HIP: ICD-10-CM

## 2021-08-25 DIAGNOSIS — M54.16 LUMBAR RADICULOPATHY: ICD-10-CM

## 2021-08-25 DIAGNOSIS — F11.90 CHRONIC, CONTINUOUS USE OF OPIOIDS: ICD-10-CM

## 2021-08-25 DIAGNOSIS — M48.02 SPINAL STENOSIS IN CERVICAL REGION: Primary | ICD-10-CM

## 2021-08-25 DIAGNOSIS — M48.02 CERVICAL STENOSIS OF SPINE: ICD-10-CM

## 2021-08-25 DIAGNOSIS — Z71.89 ENCOUNTER FOR MEDICATION COUNSELING: ICD-10-CM

## 2021-08-25 DIAGNOSIS — M96.1 FAILED BACK SYNDROME: ICD-10-CM

## 2021-08-25 DIAGNOSIS — M96.1 FAILED BACK SYNDROME OF LUMBAR SPINE: ICD-10-CM

## 2021-08-25 DIAGNOSIS — M54.12 CERVICAL RADICULAR PAIN: ICD-10-CM

## 2021-08-25 DIAGNOSIS — M46.1 SACROILIITIS (HCC): ICD-10-CM

## 2021-08-25 PROCEDURE — 99442 PR PHYS/QHP TELEPHONE EVALUATION 11-20 MIN: CPT | Performed by: NURSE PRACTITIONER

## 2021-08-25 PROCEDURE — 99213 OFFICE O/P EST LOW 20 MIN: CPT

## 2021-08-25 RX ORDER — OXYCODONE AND ACETAMINOPHEN 10; 325 MG/1; MG/1
1 TABLET ORAL EVERY 6 HOURS PRN
Qty: 120 TABLET | Refills: 0 | Status: SHIPPED | OUTPATIENT
Start: 2021-09-01 | End: 2021-09-27 | Stop reason: SDUPTHER

## 2021-08-25 RX ORDER — MORPHINE SULFATE 30 MG/1
30 TABLET, FILM COATED, EXTENDED RELEASE ORAL 2 TIMES DAILY
Qty: 60 TABLET | Refills: 0 | Status: SHIPPED | OUTPATIENT
Start: 2021-09-01 | End: 2021-09-27 | Stop reason: SDUPTHER

## 2021-08-25 ASSESSMENT — ENCOUNTER SYMPTOMS
BACK PAIN: 1
CONSTIPATION: 1
RESPIRATORY NEGATIVE: 1

## 2021-08-25 NOTE — PROGRESS NOTES
Josh 89 PROGRESS NOTE      Patient  completed []  video visit   [x]   phone call:  15       Minutes :       [x]    to  review Medication Agreement    []  Follow up after procedure   []  Discuss treatment options      Location:  Provider:  working from    [x]    home    []   [facility] ,   patient at home       Chief Complaint:  Low back  pain      She c/o low back pain which does not radiate. She has had 2 lumbar surgeries, She states she needs a walker  In the house. She states when she walks she can not walk straight, she states is hunched over. She has history of cervical fusion. She also has joint pain. She sleeps well. She remains active around the house. Back Pain  The problem occurs constantly. The problem is unchanged. The pain is present in the lumbar spine. The quality of the pain is described as aching (dull). The pain is at a severity of 7/10. The pain is moderate. The pain is the same all the time. Exacerbated by: activity. Risk factors include menopause. She has tried analgesics, bed rest, heat and ice for the symptoms.            Treatment goals:  Functional status: not to have to use walker      Aberrancy:   Any alcoholic beverages   no         Any illegal drugs   no      Analgesia:   7                  Adverse  Effects :constipation, takes senokot or milk of magnesia  prn      ADL;s :  Household tasks      Data:    When was thelast UDS:    9-        Was the UDS appropriate:  [x] yes []   no      Record/Diagnostics Review:      As above, I did review the imaging     10/2/2020  2:30 PM - Jose Alejandro, [facility] Incoming Lab Results From [facility]    Component Value Ref Range & Units Status Collected Lab   Pain Management Drug Panel Interp, Urine Consistent   Final 09/29/2020  1:11 PM [facility]   (NOTE)   ________________________________________________________________   DRUGS EXPECTED:   OXYCODONE   MORPHINE   ________________________________________________________________ CONSISTENT with medications provided:   OXYCODONE: based on oxycodone, noroxycodone, noroxymorphone,   oxymorphone   MORPHINE: based on morphine   ________________________________________________________________   INTERPRETIVE INFORMATION: Pain Mgt [place], Mass Spec/EMIT, Ur,                            Interp   Interpretation depends on accuracy and completeness of patient   medication information submitted by client. EXAMINATION:   TWO XRAY VIEWS SCOLIOSIS SERIES       6/5/2020 2:34 pm       COMPARISON:   03/06/2020       HISTORY:   ORDERING SYSTEM PROVIDED HISTORY: Spinal deformity   TECHNOLOGIST PROVIDED HISTORY:   scoliosis   Reason for Exam: Spinal deformity. Pt has been losing balance while walking. Frequent falls. Acuity: Chronic   Type of Exam: Ongoing   Additional signs and symptoms: Spinal deformity. Pt has been losing balance   while walking. Frequent falls.       FINDINGS:   There is mild levoconvex curvature at the thoracolumbar junction, which is   not significantly changed from the previous study.  Measurements are somewhat   limited due to suboptimal visibility of the bony landmarks in the spine.    Posterior fixation hardware is present throughout the lumbar spine.  There   are laminectomy defects in the lumbar spine as well.  Fixation hardware is   also noted in the cervical spine.  Vertebral body heights are grossly   preserved.  Sagittal alignment is within normal limits and stable from   previous radiographs.  There is moderate multilevel disc space narrowing and   endplate spurring.           Impression   1.  Mild thoracolumbar levoscoliosis, not significantly changed.       2.  Unchanged fixation hardware in the cervical and lumbar spine.       3.  Moderate multilevel degenerative changes.                     Pill count: appropriate    fill date :9-1-2021  Morphine equivalent dose as reported on OARRS:118.03 has narcan at home  Periodic Controlled Substance Monitoring: Possible LAPAROSCOPIC ROBOTIC MULTIPORT performed by Oneal Feng MD at [address]  1/2013    CYSTOSCOPY  11/04/2016    CYSTOSCOPY  01/04/2019    ENDOSCOPIC ULTRASOUND (LOWER) N/A 8/17/2017    ENDOSCOPIC ULTRASOUND performed by Kiera Booth MD at [address] (UPPER)  08/17/2017    A cursory view of the gastric mucosa was normal. The scope was then further advanced through a patent pylorus to the second portion of the duodenum. The Gallbladder was evaluated in bulb position. Thick sludge was noted. The CBD was 7.8 mm with no filling defects. The PD was 5 mm in the HOP. The pancreatic tissue was edematous in body and tail.  KNEE ARTHROSCOPY Left     LUMBAR FUSION      [place] OFFICE/OUTPT VISIT,PROCEDURE ONLY N/A 1/24/2018    SPINAL HARDWARE REMOVAL LUMBAR BONE STIMULATOR performed by Alphonse Betancourt MD at [facility] Bilateral 1989    SKIN BIOPSY Right 11/2015    mole right posterior shoulder    SPINE SURGERY      spinal cord stimulator    SPINE SURGERY  91-4-15    renoval of spinal cord stimulator leads and battery    PASQUALE AND BSO      TUBAL LIGATION      UVULOPALATOPHARYGOPLASTY  2006       Allergies   Allergen Reactions    Azithromycin Shortness Of Breath     Tightness in chest    Adhesive Tape      OK to use paper tape per Patient- 2/19/20 patient states that she has been using all types of tape with no reaction         Current Outpatient Medications:     morphine (MS CONTIN) 30 MG extended release tablet, Take 1 tablet by mouth 2 times daily for 30 days. , Disp: 60 tablet, Rfl: 0    oxyCODONE-acetaminophen (PERCOCET)  MG per tablet, Take 1 tablet by mouth every 6 hours as needed for Pain for up to 30 days. , Disp: 120 tablet, Rfl: 0    potassium chloride (MICRO-K) 10 MEQ extended release capsule, Take 1 capsule by mouth daily, Disp: , Rfl:     fluticasone (FLOVENT HFA) 44 MCG/ACT inhaler, Inhale 1 puff into the lungs 2 times daily, Disp: , Rfl:   pregabalin (LYRICA) 150 MG capsule, Take 150 mg by mouth 2 times daily. , Disp: , Rfl:     montelukast (SINGULAIR) 10 MG tablet, Take 10 mg by mouth nightly , Disp: , Rfl:     metFORMIN (GLUCOPHAGE) 500 MG tablet, Take 500 mg by mouth 2 times daily (with meals). , Disp: , Rfl:     telmisartan-hydrochlorothiazide (MICARDIS HCT) 40-12.5 MG per tablet, Take 1 tablet by mouth daily. , Disp: , Rfl:     levothyroxine (SYNTHROID) 50 MCG tablet, Take 50 mcg by mouth Daily. , Disp: , Rfl:     omeprazole (PRILOSEC) 20 MG capsule, Take 20 mg by mouth every evening., Disp: , Rfl:     aspirin 81 MG tablet, Take 81 mg by mouth daily. , Disp: , Rfl:     naloxone 4 MG/0.1ML LIQD nasal spray, 1 spray by Nasal route as needed for Opioid Reversal, Disp: 1 each, Rfl: 5    albuterol sulfate  (90 Base) MCG/ACT inhaler, Inhale 1 puff into the lungs daily, Disp: , Rfl:     bumetanide (BUMEX) 1 MG tablet, , Disp: , Rfl:     sennosides-docusate sodium (SENOKOT-S) 8.6-50 MG tablet, Take 1 tablet by mouth daily as needed, Disp: , Rfl:     psyllium (KONSYL) 28.3 % PACK, Take 1 packet by mouth daily, Disp: , Rfl:     fluticasone (FLONASE) 50 MCG/ACT nasal spray, 1 spray by Each Nostril route daily, Disp: , Rfl:     diclofenac sodium 1 % GEL, Apply 2 g topically 2 times daily as needed for Pain, Disp: , Rfl:     magnesium hydroxide (DULCOLAX MILK OF MAGNESIA) 400 MG/5ML suspension, Take 5 mLs by mouth daily as needed for Constipation, Disp: , Rfl:     Family History   Problem Relation Age of Onset    Heart Failure Mother     Lung Cancer Mother     Other Father          pneumonia    Alzheimer's Disease Father     Lung Cancer Brother     Diabetes Paternal Grandmother     Other Daughter         Fibromyalsia    Rheum Arthritis Daughter        Social History     Socioeconomic History    Marital status:      Spouse name: Joy Seymour Number of children: 3    Years of education: Not on file    Highest education level: Not on file   Occupational History    Occupation: retired   Tobacco Use    Smoking status: Never Smoker    Smokeless tobacco: Never Used   Vaping Use    Vaping Use: Never used   Substance and Sexual Activity    Alcohol use: Not Currently     Comment: rare    Drug use: Never    Sexual activity: Yes     Partners: Male   Other Topics Concern    Not on file   Social History Narrative    Not on file     Social Determinants of Health     Financial Resource Strain:     Difficulty of Paying Living Expenses:    Food Insecurity:     Worried About Running Out of Food in the Last Year:     [address] in the Last Year:    Transportation Needs:     Lack of Transportation (Medical):  Lack of Transportation (Non-Medical):    Physical Activity:     Days of Exercise per Week:     Minutes of Exercise per Session:    Stress:     Feeling of Stress :    Social Connections:     Frequency of Communication with Friends and Family:     Frequency of Social Gatherings with Friends and Family:     Attends Orthodox Services:     Active Member of Clubs or Organizations:     Attends Club or Organization Meetings:     Marital Status:    Intimate Partner Violence:     Fear of Current or Ex-Partner:     Emotionally Abused:     Physically Abused:     Sexually Abused:          Review of Systems:  Review of Systems   Constitutional: Negative. HENT: Positive for hearing loss. Eyes: Positive for visual disturbance. Cardiovascular: Negative. Respiratory: Negative. Endocrine: Negative. Diabetic: does not check every day  States last hGBA1C  Was normal   Hematologic/Lymphatic: Bruises/bleeds easily. Skin: Negative. Musculoskeletal: Positive for back pain and joint pain. Gastrointestinal: Positive for constipation. Genitourinary: Negative. Neurological: Positive for loss of balance. Gait issue   Psychiatric/Behavioral: Negative.           Physical Exam:  There were no vitals taken for this visit. Physical Exam  Skin:         Neurological:      Mental Status: She is alert and oriented to person, place, and time. Psychiatric:         Mood and Affect: Mood normal.         Thought Content: Thought content normal.           Assessment:      Problem List     Spinal stenosis in cervical region - Primary    Sacroiliitis (HCC)    Relevant Medications    aspirin 81 MG tablet    morphine (MS CONTIN) 30 MG extended release tablet    oxyCODONE-acetaminophen (PERCOCET)  MG per tablet    Primary osteoarthritis of right hip    Relevant Medications    aspirin 81 MG tablet    morphine (MS CONTIN) 30 MG extended release tablet    oxyCODONE-acetaminophen (PERCOCET)  MG per tablet    Lumbar spondylosis    Relevant Medications    aspirin 81 MG tablet    morphine (MS CONTIN) 30 MG extended release tablet    oxyCODONE-acetaminophen (PERCOCET)  MG per tablet    Lumbar radiculopathy    Relevant Medications    pregabalin (LYRICA) 150 MG capsule    Failed back syndrome of lumbar spine    Encounter for medication counseling    Chronic, continuous use of opioids    Cervical stenosis of spine    Cervical radicular pain          Treatment Plan:  DISCUSSION: Treatment options discussed withpatient and all questions answered to patient's satisfaction. Possible side effects, risk of tolerance and or dependence and alternative treatments discussed    Obtaining appropriate analgesic effect of treatment   No signs of potential drug abuse or diversion identified    [x] Ill effects of being on chronic pain medications such as sleep disturbances, respiratory depression, hormonal changes, withdrawal symptoms, chronic opioid dependence and tolerance as well as risk of taking opioids with Benzodiazepines and taking opioids along with alcohol,  werediscussed with patient.  I had asked the patient to minimize medication use and utilize pain medications only for uncontrolled rest pain or pain with exertional activities. I advised patient not to self-escalate painmedications without consulting with us. At each of patient's future visits we will try to taper pain medications, while adjusting the adjunct medications, and re-evaluating for Physical Therapy to improve spinal andjoint strength. We will continue to have discussions to decrease pain medications as tolerated. Counseled patient on effects their pain medication and /or their medical condition mayhave on their  ability to drive or operate machinery. Instructed not to drive or operate machinery if drowsy     I also discussed with the patient regarding the dangers of combining narcotic pain medication with tranquilizers, alcohol or illegal drugs or taking the medication any way other than prescribed. The dangers were discussed  including respiratory depression and death. Patient was told to tell  all  physicians regarding the medications he is getting from pain clinic. Patient is warned not to take any unprescribed medications over-the-countermedications that can depress breathing . Patient is advised to talk to the pharmacist or physicians if planning to take any over-the-counter medications before  takeing them. Patient is strongly advised to avoid tranquilizers or  relaxants, illegal drugs  or any medications that can depress breathing  Patient is also advised to tell us if there is any changes in their medications from other physicians.             TREATMENT OPTIONS:       Medication Agreement Requirements Met  Continue Opioid therapy  Script written for  ms marcial Canela  Follow up appointment made

## 2021-09-27 ENCOUNTER — HOSPITAL ENCOUNTER (OUTPATIENT)
Dept: PAIN MANAGEMENT | Age: 81
Discharge: HOME | End: 2021-09-27
Payer: MEDICARE

## 2021-09-27 DIAGNOSIS — M46.1 SACROILIITIS (HCC): ICD-10-CM

## 2021-09-27 DIAGNOSIS — M16.11 PRIMARY OSTEOARTHRITIS OF RIGHT HIP: ICD-10-CM

## 2021-09-27 DIAGNOSIS — M54.16 LUMBAR RADICULOPATHY: Primary | ICD-10-CM

## 2021-09-27 DIAGNOSIS — G89.4 CHRONIC PAIN ASSOCIATED WITH SIGNIFICANT PSYCHOSOCIAL DYSFUNCTION: ICD-10-CM

## 2021-09-27 DIAGNOSIS — M48.02 CERVICAL STENOSIS OF SPINE: ICD-10-CM

## 2021-09-27 DIAGNOSIS — F11.90 CHRONIC, CONTINUOUS USE OF OPIOIDS: ICD-10-CM

## 2021-09-27 DIAGNOSIS — M48.02 SPINAL STENOSIS IN CERVICAL REGION: ICD-10-CM

## 2021-09-27 DIAGNOSIS — M96.1 FAILED BACK SYNDROME OF LUMBAR SPINE: ICD-10-CM

## 2021-09-27 DIAGNOSIS — M50.30 DDD (DEGENERATIVE DISC DISEASE), CERVICAL: ICD-10-CM

## 2021-09-27 DIAGNOSIS — Z96.89 SPINAL CORD STIMULATOR STATUS: ICD-10-CM

## 2021-09-27 DIAGNOSIS — Z51.81 ENCOUNTER FOR MEDICATION MONITORING: ICD-10-CM

## 2021-09-27 DIAGNOSIS — Z71.89 ENCOUNTER FOR MEDICATION COUNSELING: ICD-10-CM

## 2021-09-27 DIAGNOSIS — M43.06 LUMBAR SPONDYLOLYSIS: ICD-10-CM

## 2021-09-27 DIAGNOSIS — M47.816 LUMBAR SPONDYLOSIS: ICD-10-CM

## 2021-09-27 DIAGNOSIS — M96.1 FAILED BACK SYNDROME: ICD-10-CM

## 2021-09-27 PROCEDURE — 99442 PR PHYS/QHP TELEPHONE EVALUATION 11-20 MIN: CPT | Performed by: NURSE PRACTITIONER

## 2021-09-27 PROCEDURE — 99213 OFFICE O/P EST LOW 20 MIN: CPT

## 2021-09-27 RX ORDER — MORPHINE SULFATE 30 MG/1
30 TABLET, FILM COATED, EXTENDED RELEASE ORAL 2 TIMES DAILY
Qty: 60 TABLET | Refills: 0 | Status: SHIPPED | OUTPATIENT
Start: 2021-10-01 | End: 2021-10-27 | Stop reason: SDUPTHER

## 2021-09-27 RX ORDER — OXYCODONE AND ACETAMINOPHEN 10; 325 MG/1; MG/1
1 TABLET ORAL EVERY 6 HOURS PRN
Qty: 120 TABLET | Refills: 0 | Status: SHIPPED | OUTPATIENT
Start: 2021-10-01 | End: 2021-10-27 | Stop reason: SDUPTHER

## 2021-09-27 ASSESSMENT — ENCOUNTER SYMPTOMS
CONSTIPATION: 1
RESPIRATORY NEGATIVE: 1
BACK PAIN: 1

## 2021-09-27 NOTE — PROGRESS NOTES
[facility]    Component Value Ref Range & Units Status Collected Lab   Pain Management Drug Panel Interp, Urine Consistent   Final 09/29/2020  1:11 PM [facility]   (NOTE)   ________________________________________________________________   DRUGS EXPECTED:   OXYCODONE   MORPHINE   ________________________________________________________________   CONSISTENT with medications provided:   OXYCODONE: based on oxycodone, noroxycodone, noroxymorphone,   oxymorphone   MORPHINE: based on morphine   ________________________________________________________________   INTERPRETIVE INFORMATION: Pain Mgt [place], Mass Spec/EMIT, Ur,                            Interp   Interpretation depends on accuracy and completeness of patient   medication information submitted by client. 6-Acetylmorphine, Ur Not Detected   Final 09        EXAMINATION:   CT OF THE CERVICAL SPINE WITHOUT CONTRAST 2/12/2020 1:30 pm       TECHNIQUE:   CT of the cervical spine was performed without the administration of   intravenous contrast. Multiplanar reformatted images are provided for review. Dose modulation, iterative reconstruction, and/or weight based adjustment of   the mA/kV was utilized to reduce the radiation dose to as low as reasonably   achievable.       COMPARISON:   CT March 22, 2019.  Cervical spine radiographs January 7, 2020.       HISTORY:   ORDERING SYSTEM PROVIDED HISTORY: fall, recent neck surgery   TECHNOLOGIST PROVIDED HISTORY:   fall, recent neck surgery   Reason for Exam: fall, hematoma over left orbit, recent neck surgery, in a   c-collar   Acuity: Acute   Type of Exam: Initial       FINDINGS:   BONES/ALIGNMENT: There is no acute fracture or traumatic malalignment.    Status post discectomy and anterior fusion from C3-C5 with intervertebral   spacers.       DEGENERATIVE CHANGES: Advanced multilevel degenerative disc disease and mild   multilevel facet arthropathy again demonstrated.       SOFT TISSUES: There is no prevertebral soft tissue swelling.           Impression   No acute abnormality of the cervical spine.  Unchanged appearance of anterior   cervical spine fusion.               EXAMINATION:   CT OF THE LUMBAR SPINE WITH INTRATHECAL CONTRAST 3/22/2019 1:25 pm:       TECHNIQUE:   CT of the lumbar spine was performed after the administration of intrathecal   contrast with multiplanar reconstructed images provided for interpretation. Dose modulation, iterative reconstruction, and/or weight based adjustment of   the mA/kV was utilized to reduce the radiation dose to as low as reasonably   achievable.       COMPARISON:   MRI 07/07/2018       HISTORY:   ORDERING SYSTEM PROVIDED HISTORY: Failed back syndrome   TECHNOLOGIST PROVIDED HISTORY:   POST MYELOGRAM   Ordering Physician Provided Reason for Exam: FAILED BACK SYNDROME, POST   MYELOGRAM       Chronic back pain, surgery approximately five years ago.       FINDINGS:   BONES/ALIGNMENT: Slight levoconvex curvature of the lumbar spine with   advanced lumbar spondylotic changes.  Vertebral body heights are maintained. Extensive postop changes with bilateral pedicle screws and posterior fusion   rods L2-S1 with a transverse bar at the L3-L4 level.  Intervertebral disc   prostheses at L4-L5 and L2-L3.  Previous posterior decompression L2-L5. There is regional artifact from metallic hardware.  Stable mild grade 1   anterolisthesis L5 on S1.  At least moderate spinal stenosis T12-L1.       SOFT TISSUES: No paraspinal mass is seen.  Similar to the prior MRI there is   a fluid collection/probable seroma in the soft tissues posterior to the   spinal canal extending between the fusion rods from approximately L3-L5.    This area is difficult to evaluate due to artifact from hardware.  No   definite contrast is seen in the region to indicate communication with the   thecal sac.       L1-L2: Advanced disc degenerative changes with near complete loss of disc   space height and posterior disc osteophyte complex.  Facet arthropathy and   ligamentum flavum thickening.  Combination results in moderate to severe   spinal stenosis.  Moderate to severe bilateral neural foraminal stenosis.       L2-L3: Postoperative changes.  No significant spinal or neural foraminal   stenosis.       L3-L4: Previous surgery without significant spinal or neural foraminal   stenosis.       L4-L5: Prior surgery with posterior decompression.  No significant spinal or   neural foraminal stenosis visualized.       L5-S1: No significant spinal stenosis.  Mild facet arthropathy.  Previous   posterior decompression.  Mild bilateral neural foraminal stenosis.         Impression   Advanced degenerative changes with wide posterior decompression and fusion   L2-S1 without significant spinal stenosis at the surgical levels.       Moderate to severe spinal stenosis L1-L2.       Similar to and better shown on the previous MRI is a fluid collection in the   posterior soft tissues centered at L4 favored to represent a seroma.                   Pill count: appropriate    fill date :10-1-2021    Morphine equivalent dose as reported on [place]:  120  Periodic Controlled Substance Monitoring: Possible medication side effects, risk of tolerance/dependence & alternative treatments discussed., No signs of potential drug abuse or diversion identified. , Assessed functional status., Obtaining appropriate analgesic effect of treatment. Wendi Fonseca, APRN - CNP)  Review [place] does not show any aberrant prescription behavior. Medication is helping the patient stay active. Patient denies any side effects and reports adequate analgesia. No sign of misuse/abuse.             Past Medical History:   Diagnosis Date    Achilles tendonitis     right    Asthma     COPD (chronic obstructive pulmonary disease) (HCC)     Cough     clear phlegm    Degenerative lumbar disc     DM (diabetes mellitus), type 2 ([facility] [address]     PCP Dr. Rut Mei, seen 8/2019    Failed back syndrome, SKIN BIOPSY Right 11/2015    mole right posterior shoulder    SPINE SURGERY      spinal cord stimulator    SPINE SURGERY  91-4-15    renoval of spinal cord stimulator leads and battery    PASQUALE AND BSO      TUBAL LIGATION      UVULOPALATOPHARYGOPLASTY  2006       Allergies   Allergen Reactions    Azithromycin Shortness Of Breath     Tightness in chest    Adhesive Tape      OK to use paper tape per Patient- 2/19/20 patient states that she has been using all types of tape with no reaction         Current Outpatient Medications:     morphine (MS CONTIN) 30 MG extended release tablet, Take 1 tablet by mouth 2 times daily for 30 days. , Disp: 60 tablet, Rfl: 0    oxyCODONE-acetaminophen (PERCOCET)  MG per tablet, Take 1 tablet by mouth every 6 hours as needed for Pain for up to 30 days. , Disp: 120 tablet, Rfl: 0    potassium chloride (MICRO-K) 10 MEQ extended release capsule, Take 1 capsule by mouth daily, Disp: , Rfl:     pregabalin (LYRICA) 150 MG capsule, Take 150 mg by mouth 2 times daily. , Disp: , Rfl:     montelukast (SINGULAIR) 10 MG tablet, Take 10 mg by mouth nightly , Disp: , Rfl:     metFORMIN (GLUCOPHAGE) 500 MG tablet, Take 500 mg by mouth 2 times daily (with meals). , Disp: , Rfl:     telmisartan-hydrochlorothiazide (MICARDIS HCT) 40-12.5 MG per tablet, Take 1 tablet by mouth daily. , Disp: , Rfl:     levothyroxine (SYNTHROID) 50 MCG tablet, Take 50 mcg by mouth Daily. , Disp: , Rfl:     omeprazole (PRILOSEC) 20 MG capsule, Take 20 mg by mouth every evening., Disp: , Rfl:     aspirin 81 MG tablet, Take 81 mg by mouth daily. , Disp: , Rfl:     naloxone 4 MG/0.1ML LIQD nasal spray, 1 spray by Nasal route as needed for Opioid Reversal, Disp: 1 each, Rfl: 5    albuterol sulfate  (90 Base) MCG/ACT inhaler, Inhale 1 puff into the lungs daily, Disp: , Rfl:     bumetanide (BUMEX) 1 MG tablet, , Disp: , Rfl:     sennosides-docusate sodium (SENOKOT-S) 8.6-50 MG tablet, Take 1 tablet by Frequency of Communication with Friends and Family:     Frequency of Social Gatherings with Friends and Family:     Attends Orthodox Services:     Active Member of Clubs or Organizations:     Attends Club or Organization Meetings:     Marital Status:    Intimate Partner Violence:     Fear of Current or Ex-Partner:     Emotionally Abused:     Physically Abused:     Sexually Abused:          Review of Systems:  Review of Systems   Constitutional: Negative. HENT: Positive for hearing loss. Eyes: Positive for visual disturbance. Cardiovascular: Negative. Respiratory: Negative. Endocrine:        Blood sugar 139   Hematologic/Lymphatic: Bruises/bleeds easily. Skin: Negative. Musculoskeletal: Positive for back pain and joint pain. Gastrointestinal: Positive for constipation. Genitourinary: Negative. Neurological: Positive for loss of balance and numbness. Uses a walker   Psychiatric/Behavioral: Negative. Physical Exam:  There were no vitals taken for this visit. Physical Exam  Skin:         Neurological:      Mental Status: She is alert and oriented to person, place, and time. Psychiatric:         Mood and Affect: Mood normal.         Thought Content:  Thought content normal.           Assessment:    Problem List Items Addressed This Visit     Spinal stenosis in cervical region    Spinal cord stimulator status    Sacroiliitis (HCC)    Primary osteoarthritis of right hip    Lumbar spondylosis    Lumbar spondylolysis    Lumbar radiculopathy - Primary    Failed back syndrome of lumbar spine    Failed back syndrome    Encounter for medication monitoring    Encounter for medication counseling    DDD (degenerative disc disease), cervical    Chronic, continuous use of opioids    Chronic pain associated with significant psychosocial dysfunction    Cervical stenosis of spine            Treatment Plan:  DISCUSSION: Treatment options discussed withpatient and all questions answered to patient's satisfaction. Possible side effects, risk of tolerance and or dependence and alternative treatments discussed    Obtaining appropriate analgesic effect of treatment   No signs of potential drug abuse or diversion identified    [x] Ill effects of being on chronic pain medications such as sleep disturbances, respiratory depression, hormonal changes, withdrawal symptoms, chronic opioid dependence and tolerance as well as risk of taking opioids with Benzodiazepines and taking opioids along with alcohol,  werediscussed with patient. I had asked the patient to minimize medication use and utilize pain medications only for uncontrolled rest pain or pain with exertional activities. I advised patient not to self-escalate painmedications without consulting with us. At each of patient's future visits we will try to taper pain medications, while adjusting the adjunct medications, and re-evaluating for Physical Therapy to improve spinal andjoint strength. We will continue to have discussions to decrease pain medications as tolerated. Counseled patient on effects their pain medication and /or their medical condition mayhave on their  ability to drive or operate machinery. Instructed not to drive or operate machinery if drowsy     I also discussed with the patient regarding the dangers of combining narcotic pain medication with tranquilizers, alcohol or illegal drugs or taking the medication any way other than prescribed. The dangers were discussed  including respiratory depression and death. Patient was told to tell  all  physicians regarding the medications he is getting from pain clinic. Patient is warned not to take any unprescribed medications over-the-countermedications that can depress breathing . Patient is advised to talk to the pharmacist or physicians if planning to take any over-the-counter medications before  takeing them.  Patient is strongly advised to avoid tranquilizers or  relaxants, illegal drugs  or any medications that can depress breathing  Patient is also advised to tell us if there is any changes in their medications from other physicians.       1. She declines Physical therapy or injection      TREATMENT OPTIONS:       Medication Agreement Requirements Met  Continue Opioid therapy  Script written for  ms marcial Canela  Follow up appointment made

## 2021-10-20 ASSESSMENT — ENCOUNTER SYMPTOMS
VOMITING: 0
PHOTOPHOBIA: 0
ALLERGIC/IMMUNOLOGIC NEGATIVE: 1
BACK PAIN: 1
COUGH: 0
SHORTNESS OF BREATH: 0
NAUSEA: 0
CONSTIPATION: 1
ABDOMINAL PAIN: 0
EYE PAIN: 0
SORE THROAT: 0

## 2021-10-20 NOTE — PROGRESS NOTES
Ron Turner is a 68 y.o. female evaluated on 10/27/2021. Modality of virtual service provided -via  telephone   Consent:  Patient and/or health care decision maker is aware that that patient may receive a bill for this telephone service, depending on one's insurance coverage, and has provided verbal consent to proceed: Yes    Patient identification was verified at the start of the visit: Yes    Chief complaint: Ron Turner is 68 y.o.,  female, with  with chief complaint of pain involving low back    Patient is complaining of pain involving the low back area as well as in the neck. The pain in the low back radiates to both lower extremities. Patient reports that when she moves around she is feeling something crack in her back and do something moving. Patient reports her ambulation is getting difficult and she has to use a walker. Patient is having also problems with balance. Still complaining of pain involving the mid lumbar area where she has episodes of low spasms and pain that lasted for few minutes. Back Pain  This is a chronic problem. The current episode started more than 1 year ago. The problem occurs constantly. The problem has been gradually worsening since onset. The pain is present in the lumbar spine and sacro-iliac. The quality of the pain is described as aching and shooting Melissa Mayorga). Radiates to: Worse on the left side compared to the right. The pain is at a severity of 6/10 (4-9). The pain is severe. The pain is the same all the time. The symptoms are aggravated by bending, standing, sitting and twisting (Walking, lifting, ADLs). Associated symptoms include numbness (left leg) and weakness. Pertinent negatives include no abdominal pain, chest pain, dysuria, fever, headaches or tingling. Risk factors include lack of exercise and sedentary lifestyle.       Alleviating factors:ice and rest   Lifestyle changes experienced with pain: Prevents or limits ADLs, Increases w/activity. Increases w/prolonged sitting/standing/walking  Mood changes,none  Patient currently unemployed. Physical therapy did not help the pain. Are you under psychological counseling at present: No  Goals for treatment include:  Decrease in pain  Enjoy daily and recreational activities, return to previous status. Patient relates current medications are helping the pain. Patient reports taking pain medications as prescribed, denies obtaining medications from different sources and denies use of illegal drugs. Patient denies side effects from medications like nausea, vomiting, constipation or drowsiness. Patient reports current activities of daily living ar possible due to medications and would like to continue them. ACTIVITY/SOCIAL/EMOTIONAL:  Sleep Pattern: 6 hours per night.  generally restful sleep  Home Exercises:  no regular exercise  Activity:unchanged  Emotional Issues: normal.   Currently seeing a Psychiatrist or Psychologist:  No     ADVERSE MEDICATION EFFECTS:   Nausea and vomiting: no   Constipation: yes-Undercontrol-: yes Dulcolax as needed  Dizziness/drowsy/sleepy--no  Urinary Retention: no  ABERRANT BEHAVIORS SINCE LAST VISIT  Lost rx/pills:------------------------------------------ no  Taking  medication as prescribed: ----------- yes  Urine Drug Screen ---------------------------------  yes             Date------------------------------------------------- 10-2-2021              Results as expected ---------------------yes    Recent ER visits: -------------------------------------No  Pill count is appropriate: ---------------------------yes   Refills for prescriptions appropriate:---------- yes      Past Medical History:   Diagnosis Date    Achilles tendonitis     right    Asthma     COPD (chronic obstructive pulmonary disease) ([address]     Cough     clear phlegm    Degenerative lumbar disc     DM (diabetes mellitus), type 2 [facility]     PCP Dr. Praneeth Campos, seen 8/2019    Failed back syndrome, lumbar     Fast heart beat     Hx of \"8-10 years ago\"    Fibromyalgia     History of bladder cancer     Hypertension     Hypothyroid     Kidney stones     Lumbar radiculopathy     Lumbar spondylolysis     Lumbar spondylosis     Osteoarthritis     Sleep apnea     uses CPAP machine nightly    Spinal stenosis in cervical region     Wears glasses        Past Surgical History:   Procedure Laterality Date    BACK SURGERY  2010    lumbar fusion    BLADDER TUMOR EXCISION  2005    CARDIOVERSION      \"8-10 years ago\"  to get \"heart into regular rhythm\"    CARPAL TUNNEL RELEASE Bilateral     CATARACT REMOVAL WITH IMPLANT Bilateral 7/22/2014, 8/5/2014    [facility]    CERVICAL FUSION  11/14/2019     ANTERIOR CERVICAL DECOMPRESSION FUSION C3-4, C4-5    CERVICAL FUSION N/A 11/14/2019    ANTERIOR CERVICAL DECOMPRESSION FUSION C3-4, C4-5 (SUPINE) DEPUY, REGULAR TABLE, MICROSCOPE, C-ARM, EVOKES (# [id number] [facility]) performed by Trixie Hernandez DO at [address] [place] [address] 8/18/2017    CHOLECYSTECTOMY LAPAROSCOPIC ROBOTIC MULTIPORT performed by Jonny Bautista MD at [address]  1/2013    CYSTOSCOPY  11/04/2016    CYSTOSCOPY  01/04/2019    ENDOSCOPIC ULTRASOUND (LOWER) N/A 8/17/2017    ENDOSCOPIC ULTRASOUND performed by Christian Colón MD at [address] (UPPER)  08/17/2017    A cursory view of the gastric mucosa was normal. The scope was then further advanced through a patent pylorus to the second portion of the duodenum. The Gallbladder was evaluated in bulb position. Thick sludge was noted. The CBD was 7.8 mm with no filling defects. The PD was 5 mm in the HOP. The pancreatic tissue was edematous in body and tail.     KNEE ARTHROSCOPY Left     LUMBAR FUSION      MA OFFICE/OUTPT VISIT,PROCEDURE ONLY N/A 1/24/2018    SPINAL HARDWARE REMOVAL LUMBAR BONE STIMULATOR performed by Larry Moraes MD at [place] [id number] Bilateral 1989    SKIN BIOPSY Right 11/2015    mole right posterior shoulder    SPINE SURGERY      spinal cord stimulator    SPINE SURGERY  91-4-15    renoval of spinal cord stimulator leads and battery    PASQUALE AND BSO      TUBAL LIGATION      UVULOPALATOPHARYGOPLASTY  2006       Family History   Problem Relation Age of Onset    Heart Failure Mother     Lung Cancer Mother     Other Father          pneumonia    Alzheimer's Disease Father     Lung Cancer Brother     Diabetes Paternal Grandmother     Other Daughter         Fibromyalsia    Rheum Arthritis Daughter        Social History     Socioeconomic History    Marital status:      Spouse name: Sharon Lui Number of children: 3    Years of education: None    Highest education level: None   Occupational History    Occupation: retired   Tobacco Use    Smoking status: Never Smoker    Smokeless tobacco: Never Used   Vaping Use    Vaping Use: Never used   Substance and Sexual Activity    Alcohol use: Not Currently     Comment: rare    Drug use: Never    Sexual activity: Yes     Partners: Male   Other Topics Concern    None   Social History Narrative    None     Social Determinants of Health     Financial Resource Strain:     Difficulty of Paying Living Expenses:    Food Insecurity:     Worried About Running Out of Food in the Last Year:     Ran Out of Food in the Last Year:    Transportation Needs:     Lack of Transportation (Medical):      Lack of Transportation (Non-Medical):    Physical Activity:     Days of Exercise per Week:     Minutes of Exercise per Session:    Stress:     Feeling of Stress :    Social Connections:     Frequency of Communication with Friends and Family:     Frequency of Social Gatherings with Friends and Family:     Attends Baptism Services:     Active Member of Clubs or Organizations:     Attends Club or Organization Meetings:     Marital Status:    Intimate Partner Violence:     Fear of Current or Ex-Partner:  Emotionally Abused:     Physically Abused:     Sexually Abused: Allergies   Allergen Reactions    Azithromycin Shortness Of Breath     Tightness in chest    Adhesive Tape      OK to use paper tape per Patient- 2/19/20 patient states that she has been using all types of tape with no reaction       Current Outpatient Medications on File Prior to Encounter   Medication Sig Dispense Refill    albuterol sulfate  (90 Base) MCG/ACT inhaler Inhale 1 puff into the lungs daily      potassium chloride (MICRO-K) 10 MEQ extended release capsule Take 1 capsule by mouth daily      bumetanide (BUMEX) 1 MG tablet       sennosides-docusate sodium (SENOKOT-S) 8.6-50 MG tablet Take 1 tablet by mouth daily as needed      psyllium (KONSYL) 28.3 % PACK Take 1 packet by mouth daily      fluticasone (FLONASE) 50 MCG/ACT nasal spray 1 spray by Each Nostril route daily      fluticasone (FLOVENT HFA) 44 MCG/ACT inhaler Inhale 1 puff into the lungs 2 times daily      diclofenac sodium 1 % GEL Apply 2 g topically 2 times daily as needed for Pain      magnesium hydroxide (DULCOLAX MILK OF MAGNESIA) 400 MG/5ML suspension Take 5 mLs by mouth daily as needed for Constipation      pregabalin (LYRICA) 150 MG capsule Take 150 mg by mouth 2 times daily.  montelukast (SINGULAIR) 10 MG tablet Take 10 mg by mouth nightly       metFORMIN (GLUCOPHAGE) 500 MG tablet Take 500 mg by mouth 2 times daily (with meals).  telmisartan-hydrochlorothiazide (MICARDIS HCT) 40-12.5 MG per tablet Take 1 tablet by mouth daily.  levothyroxine (SYNTHROID) 50 MCG tablet Take 50 mcg by mouth Daily.  omeprazole (PRILOSEC) 20 MG capsule Take 20 mg by mouth every evening.  aspirin 81 MG tablet Take 81 mg by mouth daily. No current facility-administered medications on file prior to encounter. Review of Systems   Constitutional: Positive for fatigue.  Negative for activity change, appetite change, fever and unexpected weight change. HENT: Negative for congestion, ear pain and sore throat. Eyes: Negative for photophobia, pain and visual disturbance. Respiratory: Negative for cough and shortness of breath. Cardiovascular: Negative for chest pain and palpitations. Gastrointestinal: Positive for constipation. Negative for abdominal pain, nausea and vomiting. Endocrine: Negative. Negative for polyphagia and polyuria. Genitourinary: Negative for dysuria and hematuria. Musculoskeletal: Positive for arthralgias, back pain and gait problem. Skin: Negative for pallor and rash. Allergic/Immunologic: Negative. Neurological: Positive for weakness and numbness (left leg). Negative for tingling and headaches. Hematological: Does not bruise/bleed easily. Psychiatric/Behavioral: Negative for self-injury, sleep disturbance and suicidal ideas. The patient is not nervous/anxious. Physical Exam  Skin:     Findings: Rash is not pustular. Neurological:      Mental Status: She is alert and oriented to person, place, and time.    Psychiatric:         Mood and Affect: Mood normal.         Speech: Speech normal.        Ortho Exam     DATA:  LAB.:  10/2/2020  2:30 PM - Jose Alejandro, [facility] Incoming Lab Results From [facility]    Component Value Ref Range & Units Status Collected Lab   Pain Management Drug Panel Interp, Urine Consistent   Final 09/29/2020  1:11 PM [facility]   (NOTE)   ________________________________________________________________   DRUGS EXPECTED:   OXYCODONE   MORPHINE   ________________________________________________________________   CONSISTENT with medications provided:   OXYCODONE: based on oxycodone, noroxycodone, noroxymorphone,   oxymorphone   MORPHINE: based on morphine   ________________________________________________________________        X-Ray reports:  EXAMINATION:    MRI OF THE CERVICAL SPINE WITHOUT CONTRAST 4/8/2019 6:45 pm        TECHNIQUE:    Multiplanar multisequence MRI of the Mild bilateral neural    foraminal narrowing secondary to uncovertebral hypertrophy. Mild spinal    canal stenosis secondary to disc bulge. C7-T1: Disc height and signal maintained. Mild bilateral neural foraminal    narrowing secondary to uncovertebral and facet hypertrophy. Mild spinal    canal stenosis secondary to disc bulge. T1-2: Mild disc height loss and desiccation. Mild bilateral neural foraminal    narrowing and mild spinal canal stenosis secondary to disc bulge and facet    hypertrophy. T2-3: Moderate disc height loss and desiccation. Mild bilateral neural    foraminal narrowing and mild spinal canal stenosis secondary to disc bulge    and facet hypertrophy. Impression    1. No substantial interval change since 03/29/2018 allowing for differences    in technique. 2. Spinal canal stenoses, moderate at C3-4 and C4-5 and mild at the C5-6    through T2-3 levels. 3. Multilevel neural foraminal narrowing as detailed above and greatest    involving the left C4, C5, and C6 neural foramina where it is severe. 4. Incidental subcentimeter cystic right thyroid nodule, which is likely    benign and for which no further follow-up is warranted. EXAMINATION:    MRI OF THE THORACIC SPINE WITHOUT CONTRAST  4/8/2019 6:46 pm        TECHNIQUE:    Multiplanar multisequence MRI of the thoracic spine was performed without the    administration of intravenous contrast.        COMPARISON:    CT myelogram 03/22/2019        HISTORY:    ORDERING SYSTEM PROVIDED HISTORY: Thoracic spondylosis with myelopathy    TECHNOLOGIST PROVIDED HISTORY:    Ordering Physician Provided Reason for Exam: pt states chronic neck and mid    back pain,no recent injury    Acuity: Chronic    Type of Exam: Ongoing    Additional signs and symptoms: prev myelogram,ct        FINDINGS:    BONES/ALIGNMENT: Vertebral body heights are maintained. 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   Alignment is normal. Laminectomies and anterior    and posterior surgical fusion changes partially visualized at L2-3. mild    edematous super endplate changes are present anteriorly at T12-L1. No    suspect osseous lesion is evident. SPINAL CORD: There is focal T2 hyperintensity in the spinal cord at the    T10-11 level. No additional cord signal abnormality is seen. There is no    abnormal fluid collection or mass within the spinal canal.        SOFT TISSUES: Paraspinal soft tissues are unremarkable. DEGENERATIVE CHANGES: Mild diffuse disc height loss and desiccation    throughout the thoracic spine. Multilevel disc bulges and facet hypertrophy    resulting in severe T10-11 and moderate T11-12 and T12-L1 spinal canal    stenosis. Additional mild spinal canal stenosis noted at T1-, T2-3, T5-6,    T6-7, and T7-8. Impression    1. Multilevel disc bulges and facet hypertrophy resulting in spinal canal    stenoses throughout the thoracic spine greatest at T10-11 where it is severe    and T11-12 and T12-L1 where it is moderate. Findings are not substantially    changed from prior CT myelogram.    2. Focal cord signal abnormality at T10-11 may reflect chronic spondylotic    myelomalacia, cord edema, or a combination thereof. CT OF THE CERVICAL SPINE WITHOUT CONTRAST 2/12/2020 1:30 pm        TECHNIQUE:    CT of the cervical spine was performed without the administration of    intravenous contrast. Multiplanar reformatted images are provided for review. Dose modulation, iterative reconstruction, and/or weight based adjustment of    the mA/kV was utilized to reduce the radiation dose to as low as reasonably    achievable. COMPARISON:    CT March 22, 2019. Cervical spine radiographs January 7, 2020.         HISTORY:    ORDERING SYSTEM PROVIDED HISTORY: fall, recent neck surgery    TECHNOLOGIST PROVIDED HISTORY:    fall, recent neck surgery    Reason for Exam: fall, hematoma over left orbit, recent neck surgery, in a    c-collar    Acuity: Acute    Type of Exam: Initial        FINDINGS:    BONES/ALIGNMENT: There is no acute fracture or traumatic malalignment. Status post discectomy and anterior fusion from C3-C5 with intervertebral    spacers. DEGENERATIVE CHANGES: Advanced multilevel degenerative disc disease and mild    multilevel facet arthropathy again demonstrated. SOFT TISSUES: There is no prevertebral soft tissue swelling. Impression    No acute abnormality of the cervical spine. Unchanged appearance of anterior    cervical spine fusion. TWO XRAY VIEWS SCOLIOSIS SERIES        6/5/2020 2:34 pm        COMPARISON:    03/06/2020        HISTORY:    ORDERING SYSTEM PROVIDED HISTORY: Spinal deformity    TECHNOLOGIST PROVIDED HISTORY:    scoliosis    Reason for Exam: Spinal deformity. Pt has been losing balance while walking. Frequent falls. Acuity: Chronic    Type of Exam: Ongoing    Additional signs and symptoms: Spinal deformity. Pt has been losing balance    while walking. Frequent falls. FINDINGS:    There is mild levoconvex curvature at the thoracolumbar junction, which is    not significantly changed from the previous study. Measurements are somewhat    limited due to suboptimal visibility of the bony landmarks in the spine. Posterior fixation hardware is present throughout the lumbar spine. There    are laminectomy defects in the lumbar spine as well. Fixation hardware is    also noted in the cervical spine. Vertebral body heights are grossly    preserved. Sagittal alignment is within normal limits and stable from    previous radiographs. There is moderate multilevel disc space narrowing and    endplate spurring. Impression    1. Mild thoracolumbar levoscoliosis, not significantly changed. 2.  Unchanged fixation hardware in the cervical and lumbar spine. 3.  Moderate multilevel degenerative changes. X-Rays taken in clinic today and preliminarily reviewed by me AP bilateral knees standing and lateral view of the right knee demonstrate moderate-severe tricompartmental degenerative changes of the right knee most severe in the patellofemoral compartment. AP view of the left knee demonstrates mild medial joint space narrowing. No evidence of obvious fracture or osseous malalignment      Clinical  impression:  1. Lumbar radiculopathy    2. Chronic pain associated with significant psychosocial dysfunction    3. Sacroiliitis ([address]    4. Lumbar spondylolysis    5. Encounter for medication monitoring    6. Spinal cord stimulator status    7. Failed back syndrome of lumbar spine    8. Primary osteoarthritis of right hip    9. DDD (degenerative disc disease), cervical    10. Spinal stenosis in cervical region    11. Failed back syndrome    12. Encounter for medication counseling    13. Chronic, continuous use of opioids    14. Cervical stenosis of spine    15. Cervical radicular pain    16. Imbalance        Plan of care: We will continue current pain medications  Current medications are being tolerated without any Adverse side effects. Orders Placed This Encounter   Medications    morphine (MS CONTIN) 30 MG extended release tablet     Sig: Take 1 tablet by mouth 2 times daily for 30 days. Dispense:  60 tablet     Refill:  0     Reduce doses taken as pain becomes manageable    oxyCODONE-acetaminophen (PERCOCET)  MG per tablet     Sig: Take 1 tablet by mouth every 6 hours as needed for Pain for up to 30 days.      Dispense:  120 tablet     Refill:  0     Reduce doses taken as pain becomes manageable    naloxone 4 MG/0.1ML LIQD nasal spray     Si spray by Nasal route as needed for Opioid Reversal     Dispense:  1 each     Refill:  0    naloxone 4 MG/0.1ML LIQD nasal spray     Si spray by Nasal route as needed for Opioid Reversal     Dispense:  1 each     Refill:  5     Urine drug screens have been appropriate. No aberrant activity noted. Analgesia is achieved. Activities of daily living are possible because of medications. Safe use of medications explained to patient. PDMP Monitoring:    Last PDMP [place] as Reviewed [facility]):  Review User Review Instant Review Result   Geralynn Lefort 10/20/2021  4:26 AM Reviewed PDMP [1]     Counselling/Preventive measures for pain  Control:    [x]  Spine strengthening exercises are discussed with patient in detail. [x] Ill effects of being on chronic pain medications such as sleep disturbances, hormonal changes, withdrawal symptoms,  chronic opioid dependence and tolerance were discussed with patient. I had asked the patient to minimize medication use and utilize pain medications only for uncontrolled rest pain or pain with exertional activities. I advised patient not to self escalate pain medications without consulting with us. At each of patient's future visits we will try to taper pain medications, while adjusting the adjunct medications, and re-evaluating for Physical Therapy to improve spinal and joint strength. We will continue to have discussions to decrease pain medications as tolerated. I also discussed with the patient regarding the dangers of combining narcotic pain medication with tranquilizers, alcohol or illegal drugs or taking the medication any other than prescribed. The dangers including the respiratory depression and death. Patient was told to tell  to all  physicians regarding the medications he is getting from pain clinic. Patient is warned not to take any unprescribed medications over-the-counter medications that can depress breathing . Patient is advised to talk to the pharmacist or physicians if planning to take any over-the-counter medications before  takeing them. Patient is strongly advised to avoid tranquilizers or  Relaxants for any medications that can depress breathing or recreational drugs.  Patient is also advised to tell us if there is any changes in his medications from other physicians. We discussed the same at today's visit and have not been to implement it, as the patient's pain is not under control with current medications. He is complaining of abnormal movements sensation in the lumbar area with ambulation. Hence will obtain an x-ray of the lumbar spine with flexion and extension views to rule out any abnormal motion. We will can plan further treatment depending on the findings of the x-ray. Orders Placed This Encounter   Procedures    XR LUMBAR SPINE FLEXION AND EXTENSION ONLY     Standing Status:   Future     Standing Expiration Date:   4/27/2022       Decision Making Process : Patient's health history and referral records thoroughly reviewed before focused physical examination and discussion with patient. Over 50% of today's visit is spent on examining the patient and counseling and coordinating the care. Level of complexity of date to be reviewed is Moderate. The chart date reviewed include the following: Imaging Reports. Summary of Care. Time spent reviewing with patient the below reports:   Medication safety, Treatment options. Level of diagnosis and management options of this case is multiple: involving the following management options: Interventions as needed, medication management as appropriate, future visits, activity modification, heat/ice as needed, Urine drug screen as required. [x]The patient's questions were answered to the best of my abilities. This note was created using voice recognition software. There may be inaccuracies of transcription  that are inadvertently overlooked prior to the signature. There is any questions about the transcription please contact me. Return in  4 weeks  with physician / CNP  for further plan of treatment.   Due to the COVID-19 pandemic and the appropriate interventions by Danielle Monsivais, our non-urgent pain management patients will not be seen in the office at this time for their protection and the protection of our staff. To offer continuity of care, their prescriptions will be escribed this month after a careful chart review and review of their OARRS report  Pursuant to the emergency declaration under the [address] and [place] [address] waiver authority and the [employer] Act, this Virtual Visit was conducted, with patient's consent, to reduce the patient's risk of exposure to COVID-19 and provide continuity of care for an established patient. Services were provided through a video synchronous discussion virtually to substitute for in-person appointment. \"  Documentation:  I communicated with the patient and/or health care decision maker about plan of care  Details of this discussion including any medical advice provided: Total Time: minutes: 21-30 minutes    I affirm this is a Patient Initiated Episode with an Established Patient who has not had a related appointment within my department in the past 7 days or scheduled within the next 24 hours.     Electronically signed by Tyrone Vargas MD on 10/27/2021 at 8:40 AM

## 2021-10-27 ENCOUNTER — HOSPITAL ENCOUNTER (OUTPATIENT)
Dept: PAIN MANAGEMENT | Age: 81
Discharge: HOME | End: 2021-10-27
Payer: MEDICARE

## 2021-10-27 DIAGNOSIS — Z96.89 SPINAL CORD STIMULATOR STATUS: ICD-10-CM

## 2021-10-27 DIAGNOSIS — M54.16 LUMBAR RADICULOPATHY: Primary | ICD-10-CM

## 2021-10-27 DIAGNOSIS — Z51.81 ENCOUNTER FOR MEDICATION MONITORING: ICD-10-CM

## 2021-10-27 DIAGNOSIS — M48.02 SPINAL STENOSIS IN CERVICAL REGION: ICD-10-CM

## 2021-10-27 DIAGNOSIS — M96.1 FAILED BACK SYNDROME OF LUMBAR SPINE: ICD-10-CM

## 2021-10-27 DIAGNOSIS — R26.89 IMBALANCE: ICD-10-CM

## 2021-10-27 DIAGNOSIS — M54.12 CERVICAL RADICULAR PAIN: ICD-10-CM

## 2021-10-27 DIAGNOSIS — M48.02 CERVICAL STENOSIS OF SPINE: ICD-10-CM

## 2021-10-27 DIAGNOSIS — G89.4 CHRONIC PAIN ASSOCIATED WITH SIGNIFICANT PSYCHOSOCIAL DYSFUNCTION: ICD-10-CM

## 2021-10-27 DIAGNOSIS — M50.30 DDD (DEGENERATIVE DISC DISEASE), CERVICAL: ICD-10-CM

## 2021-10-27 DIAGNOSIS — F11.90 CHRONIC, CONTINUOUS USE OF OPIOIDS: ICD-10-CM

## 2021-10-27 DIAGNOSIS — M16.11 PRIMARY OSTEOARTHRITIS OF RIGHT HIP: ICD-10-CM

## 2021-10-27 DIAGNOSIS — Z71.89 ENCOUNTER FOR MEDICATION COUNSELING: ICD-10-CM

## 2021-10-27 DIAGNOSIS — M46.1 SACROILIITIS (HCC): ICD-10-CM

## 2021-10-27 DIAGNOSIS — M96.1 FAILED BACK SYNDROME: ICD-10-CM

## 2021-10-27 DIAGNOSIS — M43.06 LUMBAR SPONDYLOLYSIS: ICD-10-CM

## 2021-10-27 PROCEDURE — 99213 OFFICE O/P EST LOW 20 MIN: CPT

## 2021-10-27 PROCEDURE — 99214 OFFICE O/P EST MOD 30 MIN: CPT | Performed by: PAIN MEDICINE

## 2021-10-27 RX ORDER — OXYCODONE AND ACETAMINOPHEN 10; 325 MG/1; MG/1
1 TABLET ORAL EVERY 6 HOURS PRN
Qty: 120 TABLET | Refills: 0 | Status: SHIPPED | OUTPATIENT
Start: 2021-10-31 | End: 2021-11-29 | Stop reason: SDUPTHER

## 2021-10-27 RX ORDER — MORPHINE SULFATE 30 MG/1
30 TABLET, FILM COATED, EXTENDED RELEASE ORAL 2 TIMES DAILY
Qty: 60 TABLET | Refills: 0 | Status: SHIPPED | OUTPATIENT
Start: 2021-10-31 | End: 2021-11-29 | Stop reason: SDUPTHER

## 2021-10-27 RX ORDER — NALOXONE HYDROCHLORIDE 4 MG/.1ML
1 SPRAY NASAL PRN
Qty: 1 EACH | Refills: 5 | Status: SHIPPED | OUTPATIENT
Start: 2021-10-27 | End: 2022-02-25 | Stop reason: SDUPTHER

## 2021-10-27 RX ORDER — NALOXONE HYDROCHLORIDE 4 MG/.1ML
1 SPRAY NASAL PRN
Qty: 1 EACH | Refills: 0 | Status: SHIPPED | OUTPATIENT
Start: 2021-10-27 | End: 2022-02-25 | Stop reason: SDUPTHER

## 2021-10-29 ENCOUNTER — HOSPITAL ENCOUNTER (OUTPATIENT)
Dept: GENERAL RADIOLOGY | Age: 81
Discharge: HOME | End: 2021-10-31
Payer: MEDICARE

## 2021-10-29 ENCOUNTER — HOSPITAL ENCOUNTER (OUTPATIENT)
Age: 81
End: 2021-10-29
Payer: MEDICARE

## 2021-10-29 DIAGNOSIS — G89.4 CHRONIC PAIN ASSOCIATED WITH SIGNIFICANT PSYCHOSOCIAL DYSFUNCTION: ICD-10-CM

## 2021-10-29 DIAGNOSIS — M43.06 LUMBAR SPONDYLOLYSIS: ICD-10-CM

## 2021-10-29 DIAGNOSIS — M54.16 LUMBAR RADICULOPATHY: ICD-10-CM

## 2021-10-29 PROCEDURE — 72110 X-RAY EXAM L-2 SPINE 4/>VWS: CPT

## 2021-11-01 NOTE — RESULT ENCOUNTER NOTE
FINDINGS:  Mild levocurvature. Surgical hardware fuses L2 through S1 with pedicle  screws and rods in place and laminectomy defect noted. No evidence of  hardware failure or complication. Multilevel degenerative change most severe  L1-2 with loss disc height endplate spondylosis. Vertebral body axial  heights maintained.     IMPRESSION:  Postoperative change and multilevel degenerative disease as detailed.

## 2021-11-03 ENCOUNTER — OFFICE VISIT (OUTPATIENT)
Dept: NEUROSURGERY | Age: 81
End: 2021-11-03
Payer: MEDICARE

## 2021-11-03 ENCOUNTER — HOSPITAL ENCOUNTER (OUTPATIENT)
Age: 81
Discharge: HOME | End: 2021-11-05
Payer: MEDICARE

## 2021-11-03 ENCOUNTER — HOSPITAL ENCOUNTER (OUTPATIENT)
Dept: GENERAL RADIOLOGY | Age: 81
Discharge: HOME | End: 2021-11-05
Payer: MEDICARE

## 2021-11-03 VITALS
BODY MASS INDEX: 34.71 KG/M2 | WEIGHT: 150 LBS | HEART RATE: 69 BPM | SYSTOLIC BLOOD PRESSURE: 92 MMHG | DIASTOLIC BLOOD PRESSURE: 61 MMHG | OXYGEN SATURATION: 100 % | HEIGHT: 55 IN

## 2021-11-03 DIAGNOSIS — G95.9 MYELOPATHY (HCC): Primary | ICD-10-CM

## 2021-11-03 DIAGNOSIS — Q76.49 SPINAL DEFORMITY: ICD-10-CM

## 2021-11-03 DIAGNOSIS — M81.0 AGE-RELATED OSTEOPOROSIS WITHOUT CURRENT PATHOLOGICAL FRACTURE: ICD-10-CM

## 2021-11-03 PROCEDURE — 99214 OFFICE O/P EST MOD 30 MIN: CPT | Performed by: NEUROLOGICAL SURGERY

## 2021-11-03 PROCEDURE — 72082 X-RAY EXAM ENTIRE SPI 2/3 VW: CPT

## 2021-11-03 NOTE — PROGRESS NOTES
[address], [address] [facility] [address]  Dept: [phone]    Patient:  Raine Goldstein  YOB: 1940  Date: 11/3/21    The patient is a 68 y.o. female who presents today for consult of the following problems:     Chief Complaint   Patient presents with    Lower Back Pain             HPI:     Raine Goldstein is a 68 y.o. female on whom neurosurgical consultation was requested by Damian Willett DO for management of significant axial back pain. Patient also relays a significant difficulty ambulation and balance. Patient has major issues with balance where she has had multiple falls and has to rely on a walker to get around. At home she does hold onto things when she ambulates. She is significant concerned about her overall balance which has not improved significantly since her neck surgery. She does relay some numbness and tingling distal to proximal that been present for a long time related to the diabetes. Has had a couple of issues with bowel incontinence as well as urinary incontinence. Denies any saddle anesthesia. Does have significant 10 out of 10 axial back pain that has been present for some time. Is on 30 mg twice a day of morphine along with 40 mg a day of oxycodone in addition. Carolyn White       History:     Past Medical History:   Diagnosis Date    Achilles tendonitis     right    Asthma     COPD [facility]chronic obstructive [facility] disease) ([facility])     Cough     clear phlegm    Degenerative lumbar disc     DM (diabetes mellitus), type 2 ([facility] [address]     PCP Dr. Keila Sher, seen 8/2019    Failed back syndrome, lumbar     Fast heart beat     Hx of \"8-10 years ago\"    Fibromyalgia     History of bladder cancer     Hypertension     Hypothyroid     Kidney stones     Lumbar radiculopathy     Lumbar spondylolysis     Lumbar spondylosis     Osteoarthritis     Sleep apnea     uses CPAP machine nightly    Spinal stenosis in cervical region     Wears glasses      Past Surgical History:   Procedure Laterality Date    BACK SURGERY  2010    lumbar fusion    BLADDER TUMOR EXCISION  2005    CARDIOVERSION      \"8-10 years ago\"  to get \"heart into regular rhythm\"    CARPAL TUNNEL RELEASE Bilateral     CATARACT REMOVAL WITH IMPLANT Bilateral 7/22/2014, 8/5/2014    Raffoaravind/Francesnaomi    CERVICAL FUSION  11/14/2019     ANTERIOR CERVICAL DECOMPRESSION FUSION C3-4, C4-5    CERVICAL FUSION N/A 11/14/2019    ANTERIOR CERVICAL DECOMPRESSION FUSION C3-4, C4-5 (SUPINE) DEPUY, REGULAR TABLE, MICROSCOPE, C-ARM, EVOKES (# [id number] [facility]) performed by Virginia Rodriguez DO at [address], LAPAROSCOPIC N/A 8/18/2017    CHOLECYSTECTOMY LAPAROSCOPIC ROBOTIC MULTIPORT performed by Hao Darden MD at [address]  1/2013    CYSTOSCOPY  11/04/2016    CYSTOSCOPY  01/04/2019    ENDOSCOPIC ULTRASOUND (LOWER) N/A 8/17/2017    ENDOSCOPIC ULTRASOUND performed by Jim Robbins MD at [address] (UPPER)  08/17/2017    A cursory view of the gastric mucosa was normal. The scope was then further advanced through a patent pylorus to the second portion of the duodenum. The Gallbladder was evaluated in bulb position. Thick sludge was noted. The CBD was 7.8 mm with no filling defects. The PD was 5 mm in the HOP. The pancreatic tissue was edematous in body and tail.     KNEE ARTHROSCOPY Left     LUMBAR FUSION      IA OFFICE/OUTPT VISIT,PROCEDURE ONLY N/A 1/24/2018    SPINAL HARDWARE REMOVAL LUMBAR BONE STIMULATOR performed by Hong Vázquez MD at [address] Bilateral 1989    SKIN BIOPSY Right 11/2015    mole right posterior shoulder    SPINE SURGERY      spinal cord stimulator    SPINE SURGERY  91-4-15    renoval of spinal cord stimulator leads and battery    PASQUALE AND BSO      TUBAL LIGATION      UVULOPALATOPHARYGOPLASTY  2006     Family History   Problem Relation Age of Onset    Heart Failure Mother     Lung Cancer Mother     Other Father          pneumonia    Alzheimer's Disease Father     Lung Cancer Brother     Diabetes Paternal Grandmother     Other Daughter         Fibromyalsia    Rheum Arthritis Daughter      Current Outpatient Medications on File Prior to Visit   Medication Sig Dispense Refill    morphine (MS CONTIN) 30 MG extended release tablet Take 1 tablet by mouth 2 times daily for 30 days. 60 tablet 0    oxyCODONE-acetaminophen (PERCOCET)  MG per tablet Take 1 tablet by mouth every 6 hours as needed for Pain for up to 30 days. 120 tablet 0    naloxone 4 MG/0.1ML LIQD nasal spray 1 spray by Nasal route as needed for Opioid Reversal 1 each 0    naloxone 4 MG/0.1ML LIQD nasal spray 1 spray by Nasal route as needed for Opioid Reversal 1 each 5    albuterol sulfate  (90 Base) MCG/ACT inhaler Inhale 1 puff into the lungs daily      potassium chloride (MICRO-K) 10 MEQ extended release capsule Take 1 capsule by mouth daily      bumetanide (BUMEX) 1 MG tablet       sennosides-docusate sodium (SENOKOT-S) 8.6-50 MG tablet Take 1 tablet by mouth daily as needed      psyllium (KONSYL) 28.3 % PACK Take 1 packet by mouth daily      fluticasone (FLONASE) 50 MCG/ACT nasal spray 1 spray by Each Nostril route daily      fluticasone (FLOVENT HFA) 44 MCG/ACT inhaler Inhale 1 puff into the lungs 2 times daily      diclofenac sodium 1 % GEL Apply 2 g topically 2 times daily as needed for Pain      magnesium hydroxide (DULCOLAX MILK OF MAGNESIA) 400 MG/5ML suspension Take 5 mLs by mouth daily as needed for Constipation      pregabalin (LYRICA) 150 MG capsule Take 150 mg by mouth 2 times daily.  montelukast (SINGULAIR) 10 MG tablet Take 10 mg by mouth nightly       metFORMIN (GLUCOPHAGE) 500 MG tablet Take 500 mg by mouth 2 times daily (with meals).       telmisartan-hydrochlorothiazide (MICARDIS HCT) 40-12.5 MG per tablet Take 1 tablet by mouth daily.  levothyroxine (SYNTHROID) 50 MCG tablet Take 50 mcg by mouth Daily.  omeprazole (PRILOSEC) 20 MG capsule Take 20 mg by mouth every evening.  aspirin 81 MG tablet Take 81 mg by mouth daily. No current facility-administered medications on file prior to visit. Social History     Tobacco Use    Smoking status: Never Smoker    Smokeless tobacco: Never Used   Vaping Use    Vaping Use: Never used   Substance Use Topics    Alcohol use: Not Currently     Comment: rare    Drug use: Never       Allergies   Allergen Reactions    Azithromycin Shortness Of Breath     Tightness in chest    Adhesive Tape      OK to use paper tape per Patient- 2/19/20 patient states that she has been using all types of tape with no reaction       Review of Systems  Constitutional: Negative for activity change and appetite change. HENT: Negative for ear pain and facial swelling. Eyes: Negative for discharge and itching. Respiratory: Negative for choking and chest tightness. Cardiovascular: Negative for chest pain and leg swelling. Gastrointestinal: Negative for nausea and abdominal pain. Endocrine: Negative for cold intolerance and heat intolerance. Genitourinary: Negative for frequency and flank pain. Musculoskeletal: Negative for myalgias and joint swelling. Skin: Negative for rash and wound. Allergic/Immunologic: Negative for environmental allergies and food allergies. Hematological: Negative for adenopathy. Does not bruise/bleed easily. Psychiatric/Behavioral: Negative for self-injury. The patient is not nervous/anxious. Physical Exam:      BP 92/61 (Site: Left Upper Arm, Position: Sitting, Cuff Size: Medium Adult)   Pulse 69   Ht 4' 2\" (1.27 m)   Wt 150 lb (68 kg)   SpO2 100%   BMI 42.18 kg/m²   Estimated body mass index is 42.18 kg/m² as calculated from the following:    Height as of this encounter: 4' 2\" (1.27 m).     Weight as of this encounter: 150 lb (68 kg).    General:  Negro Fitzpatrick is a 68y.o. year old female who appears her stated age. HEENT: Normocephalic atraumatic. Neck supple. Chest: regular rate; pulses equal  Abdomen: Soft nontender nondistended. Normoactive bowel sounds. Ext: DP and PT pulses 2+, good cap refill  Neuro    Mentation  Appropriate affect  Registration intact  Orientation intact  3 item recall intact  Judgement intact to situation    Cranial Nerves:   Pupils equal and reactive to light  Extraocular motion intact  Face and shrug symmetric  Tongue midline  No dysarthria  v1-3 sensation symmetric, masseter tone symmetric  Hearing symmetric and intact to finger rub    Sensation:   Diminished sensation distal greater than proximal and lower extremities. Motor  L deltoid 5/5; R deltoid 5/5  L biceps 5/5; R biceps 5/5  L triceps 5/5; R triceps 5/5  L wrist extension 5/5; R wrist extension 5/5  L intrinsics 5/5; R intrinsics 5/5     L iliopsoas 5/5 , R iliopsoas 5/5  L quadriceps 5/5; R quadriceps 5/5  L Dorsiflexion 5/5; R dorsiflexion 5/5  L Plantarflexion 5/5; R plantarflexion 5/5  L EHL 5/5; R EHL 5/5    Reflexes  1 out of 4 patellar's Achilles biceps triceps no Nadeem's or clonus present. Studies Review:     X-rays show positive sagittal vertical alignment at approximate 5 mm. CT does show solid fusion from L1-S1 as well as multilevel spondylosis present. MRIs from 2000 1818 show significant thoracic level stenosis. Assessment and Plan:      1. Myelopathy ([address]    2. Age-related osteoporosis without current pathological fracture    3. Spinal deformity          Plan: Patient with progressive loss of balance and coordination as well as multiple episodes of incontinence. Would recommend repeat MRI cervical thoracic lumbar along with scoliosis films to assess for alignment. Overall extensive discussion with the patient regarding the findings on the prior imaging.   My concern at this point is that she has what appears to Standing Status:   Future     Standing Expiration Date:   11/3/2022     Scheduling Instructions:      STANDING -- AP and Lateral; Include C2 to Pelvis & both femoral heads     Order Specific Question:   Reason for exam:     Answer:   scoliosis; ONLY NEED 2 VIEW SEE COMMENTS        Electronically signed by Alessandro Watters DO on 11/3/2021 at 11:10 AM    Please note that this chart was generated using voice recognition Dragon dictation software. Although every effort was made to ensure the accuracy of this automated transcription, some errors in transcription may have occurred.

## 2021-11-29 ENCOUNTER — HOSPITAL ENCOUNTER (OUTPATIENT)
Dept: PAIN MANAGEMENT | Age: 81
Discharge: HOME | End: 2021-11-29
Payer: MEDICARE

## 2021-11-29 ENCOUNTER — HOSPITAL ENCOUNTER (OUTPATIENT)
Age: 81
Discharge: HOME | End: 2021-11-29
Payer: MEDICARE

## 2021-11-29 ENCOUNTER — HOSPITAL ENCOUNTER (OUTPATIENT)
Dept: MRI IMAGING | Age: 81
Discharge: HOME | End: 2021-12-01
Payer: MEDICARE

## 2021-11-29 ENCOUNTER — HOSPITAL ENCOUNTER (OUTPATIENT)
Dept: WOMENS IMAGING | Age: 81
Discharge: HOME | End: 2021-12-01
Payer: MEDICARE

## 2021-11-29 DIAGNOSIS — M96.1 FAILED BACK SYNDROME OF LUMBAR SPINE: ICD-10-CM

## 2021-11-29 DIAGNOSIS — Z51.81 ENCOUNTER FOR MEDICATION MONITORING: ICD-10-CM

## 2021-11-29 DIAGNOSIS — M81.0 AGE-RELATED OSTEOPOROSIS WITHOUT CURRENT PATHOLOGICAL FRACTURE: ICD-10-CM

## 2021-11-29 DIAGNOSIS — G95.9 MYELOPATHY (HCC): ICD-10-CM

## 2021-11-29 DIAGNOSIS — M96.1 FAILED BACK SYNDROME: ICD-10-CM

## 2021-11-29 DIAGNOSIS — M54.16 LUMBAR RADICULOPATHY: ICD-10-CM

## 2021-11-29 DIAGNOSIS — M48.02 SPINAL STENOSIS IN CERVICAL REGION: ICD-10-CM

## 2021-11-29 DIAGNOSIS — R26.89 IMBALANCE: ICD-10-CM

## 2021-11-29 DIAGNOSIS — M43.06 LUMBAR SPONDYLOLYSIS: ICD-10-CM

## 2021-11-29 DIAGNOSIS — M43.06 LUMBAR SPONDYLOLYSIS: Primary | ICD-10-CM

## 2021-11-29 PROCEDURE — 72148 MRI LUMBAR SPINE W/O DYE: CPT

## 2021-11-29 PROCEDURE — 77080 DXA BONE DENSITY AXIAL: CPT

## 2021-11-29 PROCEDURE — 99213 OFFICE O/P EST LOW 20 MIN: CPT

## 2021-11-29 PROCEDURE — 80307 DRUG TEST PRSMV CHEM ANLYZR: CPT

## 2021-11-29 PROCEDURE — 99213 OFFICE O/P EST LOW 20 MIN: CPT | Performed by: NURSE PRACTITIONER

## 2021-11-29 RX ORDER — MORPHINE SULFATE 30 MG/1
30 TABLET, FILM COATED, EXTENDED RELEASE ORAL 2 TIMES DAILY
Qty: 60 TABLET | Refills: 0 | Status: SHIPPED | OUTPATIENT
Start: 2021-11-30 | End: 2021-12-28 | Stop reason: SDUPTHER

## 2021-11-29 RX ORDER — OXYCODONE AND ACETAMINOPHEN 10; 325 MG/1; MG/1
1 TABLET ORAL EVERY 6 HOURS PRN
Qty: 120 TABLET | Refills: 0 | Status: SHIPPED | OUTPATIENT
Start: 2021-11-30 | End: 2021-12-28 | Stop reason: SDUPTHER

## 2021-11-29 ASSESSMENT — ENCOUNTER SYMPTOMS
CONSTIPATION: 0
BACK PAIN: 1
COUGH: 0
SHORTNESS OF BREATH: 0

## 2021-11-29 NOTE — PROGRESS NOTES
Patient completed a telephone visit today to review medication contract. Chief Complaint: back pain    [facility]  Patient complains of back pain that started years ago. She has undergone several interventions but pain continues. She had SCS implanted in 2012 at [facility] but it was removed as it was not functioning well. He does not recommend surgery.  She has started going to the [facility] to exercise - using the stationary bike - with benefit. She did see Dr. Tiffanie Booth 11/3 due to increased back pain and issues with balance  Imaging of the spine pending and she will follow up with NS on 1/3. Back Pain  This is a chronic problem. The current episode started more than 1 year ago. The problem occurs constantly. The problem has been gradually worsening since onset. The pain is present in the lumbar spine. The quality of the pain is described as aching (dull). The pain does not radiate. The pain is at a severity of 8/10. The pain is moderate. The symptoms are aggravated by position and standing (walking). Associated symptoms include weakness. Pertinent negatives include no chest pain, fever, numbness or tingling. She has tried analgesics and bed rest for the symptoms. The treatment provided moderate relief. Patient denies any new neurological symptoms. No bowel or bladder incontinence, no weakness, and no falling. Pill count: appropriate due 11/30    Morphine equivalent: 120    Periodic Controlled Substance Monitoring: Possible medication side effects, risk of tolerance/dependence & alternative treatments discussed., No signs of potential drug abuse or diversion identified., Obtaining appropriate analgesic effect of treatment.  (Damian Alston, APRN - CNP)      Past Medical History:   Diagnosis Date    Achilles tendonitis     right    Asthma     COPD [facility] obstructive [facility] disease) ([facility])     Cough     clear phlegm    Degenerative lumbar disc     DM (diabetes mellitus), type 2 ([facility] [address] PCP Dr. Alexandre Rasheed, seen 8/2019    Failed back syndrome, lumbar     Fast heart beat     Hx of \"8-10 years ago\"    Fibromyalgia     History of bladder cancer     Hypertension     Hypothyroid     Kidney stones     Lumbar radiculopathy     Lumbar spondylolysis     Lumbar spondylosis     Osteoarthritis     Sleep apnea     uses CPAP machine nightly    Spinal stenosis in cervical region     Wears glasses        Past Surgical History:   Procedure Laterality Date    BACK SURGERY  2010    lumbar fusion    BLADDER TUMOR EXCISION  2005    CARDIOVERSION      \"8-10 years ago\"  to get \"heart into regular rhythm\"    CARPAL TUNNEL RELEASE Bilateral     CATARACT REMOVAL WITH IMPLANT Bilateral 7/22/2014, 8/5/2014    [facility]    CERVICAL FUSION  11/14/2019     ANTERIOR CERVICAL DECOMPRESSION FUSION C3-4, C4-5    CERVICAL FUSION N/A 11/14/2019    ANTERIOR CERVICAL DECOMPRESSION FUSION C3-4, C4-5 (SUPINE) DEPUY, REGULAR TABLE, MICROSCOPE, C-ARM, EVOKES (# [id number] [facility]IZA) performed by Edward Briscoe DO at [facility] 8/18/2017    CHOLECYSTECTOMY LAPAROSCOPIC ROBOTIC MULTIPORT performed by Eliza Weller MD at [address] [facility]  1/2013    CYSTOSCOPY  11/04/2016    CYSTOSCOPY  01/04/2019    ENDOSCOPIC ULTRASOUND (LOWER) N/A 8/17/2017    ENDOSCOPIC ULTRASOUND performed by Josseline Negron MD at [address] (UPPER)  08/17/2017    A cursory view of the gastric mucosa was normal. The scope was then further advanced through a patent pylorus to the second portion of the duodenum. The Gallbladder was evaluated in bulb position. Thick sludge was noted. The CBD was 7.8 mm with no filling defects. The PD was 5 mm in the HOP. The pancreatic tissue was edematous in body and tail.     KNEE ARTHROSCOPY Left     LUMBAR FUSION      MA OFFICE/OUTPT VISIT,PROCEDURE ONLY N/A 1/24/2018    SPINAL HARDWARE REMOVAL LUMBAR BONE STIMULATOR performed by Karen Chapa MD at [address] Bilateral 1989    SKIN BIOPSY Right 11/2015    mole right posterior shoulder    SPINE SURGERY      spinal cord stimulator    SPINE SURGERY  91-4-15    renoval of spinal cord stimulator leads and battery    PASQUALE AND BSO      TUBAL LIGATION      UVULOPALATOPHARYGOPLASTY  2006       Allergies   Allergen Reactions    Azithromycin Shortness Of Breath     Tightness in chest    Adhesive Tape      OK to use paper tape per Patient- 2/19/20 patient states that she has been using all types of tape with no reaction         Current Outpatient Medications:     morphine (MS CONTIN) 30 MG extended release tablet, Take 1 tablet by mouth 2 times daily for 30 days. , Disp: 60 tablet, Rfl: 0    oxyCODONE-acetaminophen (PERCOCET)  MG per tablet, Take 1 tablet by mouth every 6 hours as needed for Pain for up to 30 days. , Disp: 120 tablet, Rfl: 0    naloxone 4 MG/0.1ML LIQD nasal spray, 1 spray by Nasal route as needed for Opioid Reversal, Disp: 1 each, Rfl: 0    naloxone 4 MG/0.1ML LIQD nasal spray, 1 spray by Nasal route as needed for Opioid Reversal, Disp: 1 each, Rfl: 5    albuterol sulfate  (90 Base) MCG/ACT inhaler, Inhale 1 puff into the lungs daily, Disp: , Rfl:     potassium chloride (MICRO-K) 10 MEQ extended release capsule, Take 1 capsule by mouth daily, Disp: , Rfl:     bumetanide (BUMEX) 1 MG tablet, , Disp: , Rfl:     sennosides-docusate sodium (SENOKOT-S) 8.6-50 MG tablet, Take 1 tablet by mouth daily as needed, Disp: , Rfl:     psyllium (KONSYL) 28.3 % PACK, Take 1 packet by mouth daily, Disp: , Rfl:     fluticasone (FLONASE) 50 MCG/ACT nasal spray, 1 spray by Each Nostril route daily, Disp: , Rfl:     fluticasone (FLOVENT HFA) 44 MCG/ACT inhaler, Inhale 1 puff into the lungs 2 times daily, Disp: , Rfl:     diclofenac sodium 1 % GEL, Apply 2 g topically 2 times daily as needed for Pain, Disp: , Rfl:     magnesium hydroxide (DULCOLAX MILK OF MAGNESIA) 400 MG/5ML suspension, Take 5 mLs by mouth daily as needed for Constipation, Disp: , Rfl:     pregabalin (LYRICA) 150 MG capsule, Take 150 mg by mouth 2 times daily. , Disp: , Rfl:     montelukast (SINGULAIR) 10 MG tablet, Take 10 mg by mouth nightly , Disp: , Rfl:     metFORMIN (GLUCOPHAGE) 500 MG tablet, Take 500 mg by mouth 2 times daily (with meals). , Disp: , Rfl:     telmisartan-hydrochlorothiazide (MICARDIS HCT) 40-12.5 MG per tablet, Take 1 tablet by mouth daily. , Disp: , Rfl:     levothyroxine (SYNTHROID) 50 MCG tablet, Take 50 mcg by mouth Daily. , Disp: , Rfl:     omeprazole (PRILOSEC) 20 MG capsule, Take 20 mg by mouth every evening., Disp: , Rfl:     aspirin 81 MG tablet, Take 81 mg by mouth daily. , Disp: , Rfl:     Family History   Problem Relation Age of Onset    Heart Failure Mother     Lung Cancer Mother     Other Father          pneumonia    Alzheimer's Disease Father     Lung Cancer Brother     Diabetes Paternal Grandmother     Other Daughter         Fibromyalsia    Rheum Arthritis Daughter        Social History     Socioeconomic History    Marital status:      Spouse name: Shaista Parker Number of children: 3    Years of education: Not on file    Highest education level: Not on file   Occupational History    Occupation: retired   Tobacco Use    Smoking status: Never Smoker    Smokeless tobacco: Never Used   Vaping Use    Vaping Use: Never used   Substance and Sexual Activity    Alcohol use: Not Currently     Comment: rare    Drug use: Never    Sexual activity: Yes     Partners: Male   Other Topics Concern    Not on file   Social History Narrative    Not on file     Social Determinants of Health     Financial Resource Strain:     Difficulty of Paying Living Expenses: Not on file   Food Insecurity:     Worried About [address] in the Last Year: Not on file    [address] in the Last Year: Not on file   Transportation Needs:     Lack of Transportation as expected ---------------------yes     Recent ER visits: -------------------------------------No  Pill count is appropriate: ---------------------------yes   Refills for prescriptions appropriate:---------- yes    Assessment:  Problem List Items Addressed This Visit     Imbalance (Chronic)    Lumbar radiculopathy    Relevant Orders    DRUG SCREEN, PAIN    Lumbar spondylolysis - Primary    Relevant Orders    DRUG SCREEN, PAIN    Failed back syndrome of lumbar spine    Encounter for medication monitoring    Relevant Orders    DRUG SCREEN, PAIN    Spinal stenosis in cervical region    Failed back syndrome    Relevant Medications    morphine (MS CONTIN) 30 MG extended release tablet (Start on 11/30/2021)    oxyCODONE-acetaminophen (PERCOCET)  MG per tablet (Start on 11/30/2021)             Treatment Plan:  Patient relates current medications are helping the pain. Patient reports taking pain medications as prescribed, denies obtaining medications from different sources and denies use of illegal drugs. Patient denies side effects from medications like nausea, vomiting, constipation or drowsiness. Patient reports current activities of daily living are possible due to medications and would like to continue them. As always, we encourage daily stretching and strengthening exercises, and recommend minimizing use of pain medications unless patient cannot get through daily activities due to pain. Contract requirements met. Continue opioid therapy. Script written for MSER and percocet  UDS for standard monitoring purposes  Continues to follow with NS - will complete more imaging of her back today  Follow up appointment made for 4 weeks    Sylvester Tanner, was evaluated through a synchronous (real-time) audio-video encounter. The patient (or guardian if applicable) is aware that this is a billable service. Verbal consent to proceed has been obtained within the past 12 months.  The visit was conducted pursuant to the emergency declaration under the [address] [place] [address], [address] [place] [address] authority and the [employer] and [employer] General Act. Patient identification was verified, and a caregiver was present when appropriate. The patient was located in a state where the provider was credentialed to provide care. Total time spent for this encounter: 20 minutes    --CONSUELO Johnston CNP on 11/29/2021 at 9:28 AM    An electronic signature was used to authenticate this note.

## 2021-12-02 ENCOUNTER — HOSPITAL ENCOUNTER (OUTPATIENT)
Dept: MRI IMAGING | Age: 81
Discharge: HOME | End: 2021-12-04
Payer: MEDICARE

## 2021-12-02 DIAGNOSIS — G95.9 MYELOPATHY (HCC): ICD-10-CM

## 2021-12-02 PROCEDURE — 72146 MRI CHEST SPINE W/O DYE: CPT

## 2021-12-02 PROCEDURE — 72141 MRI NECK SPINE W/O DYE: CPT

## 2021-12-03 LAB
6-ACETYLMORPHINE, UR: NOT DETECTED
7-AMINOCLONAZEPAM, URINE: NOT DETECTED
ALPHA-OH-ALPRAZ, URINE: NOT DETECTED
ALPHA-OH-MIDAZOLAM, URINE: NOT DETECTED
ALPRAZOLAM, URINE: NOT DETECTED
AMPHETAMINES, URINE: NOT DETECTED
BARBITURATES, URINE: NOT DETECTED
BENZOYLECGONINE, UR: NOT DETECTED
BUPRENORPHINE URINE: NOT DETECTED
CARISOPRODOL, UR: NOT DETECTED
CLONAZEPAM, URINE: NOT DETECTED
CODEINE, URINE: NOT DETECTED
CREATININE URINE: 124.1 MG/DL (ref 20–400)
DIAZEPAM, URINE: NOT DETECTED
DRUGS EXPECTED, UR: NORMAL
EER HI RES INTERP UR: NORMAL
ETHYL GLUCURONIDE UR: NOT DETECTED
FENTANYL URINE: NOT DETECTED
GABAPENTIN: NOT DETECTED
HYDROCODONE, URINE: NOT DETECTED
HYDROMORPHONE, URINE: PRESENT
LORAZEPAM, URINE: NOT DETECTED
MARIJUANA METAB, UR: NOT DETECTED
MDA, UR: NOT DETECTED
MDEA, EVE, UR: NOT DETECTED
MDMA URINE: NOT DETECTED
MEPERIDINE METAB, UR: NOT DETECTED
METHADONE, URINE: NOT DETECTED
METHAMPHETAMINE, URINE: NOT DETECTED
METHYLPHENIDATE: NOT DETECTED
MIDAZOLAM, URINE: NOT DETECTED
MORPHINE URINE: PRESENT
NALOXONE URINE: NOT DETECTED
NORBUPRENORPHINE, URINE: NOT DETECTED
NORDIAZEPAM, URINE: NOT DETECTED
NORFENTANYL, URINE: NOT DETECTED
NORHYDROCODONE, URINE: NOT DETECTED
NOROXYCODONE, URINE: PRESENT
NOROXYMORPHONE, URINE: PRESENT
OXAZEPAM, URINE: NOT DETECTED
OXYCODONE URINE: PRESENT
OXYMORPHONE, URINE: PRESENT
PAIN MANAGEMENT DRUG PANEL INTERP, URINE: NORMAL
PAIN MGT DRUG PANEL, HI RES, UR: NORMAL
PCP,URINE: NOT DETECTED
PHENTERMINE, UR: NOT DETECTED
PREGABALIN: PRESENT
TAPENTADOL, URINE: NOT DETECTED
TAPENTADOL-O-SULFATE, URINE: NOT DETECTED
TEMAZEPAM, URINE: NOT DETECTED
TRAMADOL, URINE: NOT DETECTED
ZOLPIDEM METABOLITE (ZCA), URINE: NOT DETECTED
ZOLPIDEM, URINE: NOT DETECTED

## 2021-12-22 ENCOUNTER — TELEPHONE (OUTPATIENT)
Dept: PAIN MANAGEMENT | Age: 81
End: 2021-12-22

## 2021-12-22 DIAGNOSIS — R26.89 IMBALANCE: ICD-10-CM

## 2021-12-22 NOTE — TELEPHONE ENCOUNTER
Patient calls [facility] and states she is going to run out of her Morphine before her appointment on 12/28. I looked in patient's chart and stated her Rx for Percocet and Morphine were both written on the 11/29 to be filled 11/30. Even if they were filled on the date written, she should still have enough to get her through to the 28th. I asked the patient if she had enough Percocet and she said yes. I then asked her to check the bottles to see when they were filled; she says 11/30. I told the patient this office cannot fill early. Patient asks what she can do. I advised try to stretch the Morphine out and only take when she really needs it. Patient asks if she can take Ibuprofen along with the Percocet and I said yes just not at the same time; at least 3 hours apart. Patient says she will look in her purse and her drawer to see if any Morphine fell out of the bottle.

## 2021-12-28 ENCOUNTER — HOSPITAL ENCOUNTER (OUTPATIENT)
Dept: PAIN MANAGEMENT | Age: 81
Discharge: HOME | End: 2021-12-28
Payer: MEDICARE

## 2021-12-28 DIAGNOSIS — Z96.89 SPINAL CORD STIMULATOR STATUS: ICD-10-CM

## 2021-12-28 DIAGNOSIS — M43.06 LUMBAR SPONDYLOLYSIS: ICD-10-CM

## 2021-12-28 DIAGNOSIS — M46.1 SACROILIITIS (HCC): ICD-10-CM

## 2021-12-28 DIAGNOSIS — M96.1 FAILED BACK SYNDROME: ICD-10-CM

## 2021-12-28 DIAGNOSIS — Z51.81 ENCOUNTER FOR MEDICATION MONITORING: ICD-10-CM

## 2021-12-28 DIAGNOSIS — M54.16 LUMBAR RADICULOPATHY: Primary | ICD-10-CM

## 2021-12-28 DIAGNOSIS — M48.02 CERVICAL STENOSIS OF SPINE: ICD-10-CM

## 2021-12-28 DIAGNOSIS — R26.89 IMBALANCE: ICD-10-CM

## 2021-12-28 DIAGNOSIS — M47.816 LUMBAR SPONDYLOSIS: ICD-10-CM

## 2021-12-28 PROCEDURE — 99213 OFFICE O/P EST LOW 20 MIN: CPT | Performed by: NURSE PRACTITIONER

## 2021-12-28 PROCEDURE — 99213 OFFICE O/P EST LOW 20 MIN: CPT

## 2021-12-28 RX ORDER — OXYCODONE AND ACETAMINOPHEN 10; 325 MG/1; MG/1
1 TABLET ORAL EVERY 6 HOURS PRN
Qty: 120 TABLET | Refills: 0 | Status: SHIPPED | OUTPATIENT
Start: 2021-12-30 | End: 2022-01-24 | Stop reason: SDUPTHER

## 2021-12-28 RX ORDER — MORPHINE SULFATE 30 MG/1
30 TABLET, FILM COATED, EXTENDED RELEASE ORAL 2 TIMES DAILY
Qty: 60 TABLET | Refills: 0 | Status: SHIPPED | OUTPATIENT
Start: 2021-12-30 | End: 2022-01-24 | Stop reason: SDUPTHER

## 2021-12-28 ASSESSMENT — ENCOUNTER SYMPTOMS
SHORTNESS OF BREATH: 0
COUGH: 0
CONSTIPATION: 0
BACK PAIN: 1

## 2021-12-28 NOTE — PROGRESS NOTES
Patient completed a telephone visit today to review medication contract. Chief Complaint: back pain    [facility]  Patient complains of back pain that started years ago. She has undergone several interventions but pain continues. She had SCS implanted in 2012 at [facility] but it was removed as it was not functioning well.  She has started going to the [facility] to exercise - using the stationary bike - with benefit. She did see Dr. Glenny Acuna 11/3 due to increased back pain and issues with balance. Imaging of the spine completed - severe stenosis at T10-11 and moderate stenosis at L1-2 -  she will follow up with NS on 1/3. She fell a couple weeks ago. She states she went to lean on her dresser and missed and fell. She states she did not need to go to ER - just had some bruising on her right hip and thigh. Back Pain  This is a chronic problem. The current episode started more than 1 year ago. The problem occurs constantly. The problem is unchanged. The pain is present in the lumbar spine and thoracic spine. The quality of the pain is described as aching. The pain does not radiate. The pain is at a severity of 5/10. The pain is moderate. The symptoms are aggravated by position and standing (walking). Associated symptoms include numbness. Pertinent negatives include no chest pain, fever, paresthesias or tingling. She has tried analgesics, bed rest and ice (topicals) for the symptoms. The treatment provided mild relief. Patient denies any new neurological symptoms. No bowel or bladder incontinence, no weakness. Pill count: appropriate due 12/30    Morphine equivalent: 120    Periodic Controlled Substance Monitoring: Possible medication side effects, risk of tolerance/dependence & alternative treatments discussed. ,No signs of potential drug abuse or diversion identified. ,Obtaining appropriate analgesic effect of treatment.  Flanagan Ledy Martínez, APRN - CNP)  Chronic Pain > 80 MEDD: Co-prescribed Jv (Bin Herr, APRN - CNP)      Past Medical History:   Diagnosis Date    Achilles tendonitis     right    Asthma     COPD (chronic obstructive pulmonary disease) (HCC)     Cough     clear phlegm    Degenerative lumbar disc     DM (diabetes mellitus), type 2 (HCC)     PCP Dr. Mindy Trotter, seen 8/2019    Failed back syndrome, lumbar     Fast heart beat     Hx of \"8-10 years ago\"    Fibromyalgia     History of bladder cancer     Hypertension     Hypothyroid     Kidney stones     Lumbar radiculopathy     Lumbar spondylolysis     Lumbar spondylosis     Osteoarthritis     Sleep apnea     uses CPAP machine nightly    Spinal stenosis in cervical region     Wears glasses        Past Surgical History:   Procedure Laterality Date    BACK SURGERY  2010    lumbar fusion    BLADDER TUMOR EXCISION  2005    CARDIOVERSION      \"8-10 years ago\"  to get \"heart into regular rhythm\"    CARPAL TUNNEL RELEASE Bilateral     CATARACT REMOVAL WITH IMPLANT Bilateral 7/22/2014, 8/5/2014    Raffoul/StFrancesrkorina    CERVICAL FUSION  11/14/2019     ANTERIOR CERVICAL DECOMPRESSION FUSION C3-4, C4-5    CERVICAL FUSION N/A 11/14/2019    ANTERIOR CERVICAL DECOMPRESSION FUSION C3-4, C4-5 (SUPINE) DEPUY, REGULAR TABLE, MICROSCOPE, C-ARM, EVOKES (# [id number] ELIZA) performed by Myron Tarango DO at [address] [place] [address] 8/18/2017    CHOLECYSTECTOMY LAPAROSCOPIC ROBOTIC MULTIPORT performed by Valeri Martino MD at [address]  1/2013    CYSTOSCOPY  11/04/2016    CYSTOSCOPY  01/04/2019    ENDOSCOPIC ULTRASOUND (LOWER) N/A 8/17/2017    ENDOSCOPIC ULTRASOUND performed by Pankaj Brown MD at [address] (UPPER)  08/17/2017    A cursory view of the gastric mucosa was normal. The scope was then further advanced through a patent pylorus to the second portion of the duodenum. The Gallbladder was evaluated in bulb position. Thick sludge was noted.  The CBD was 7.8 mm with no filling defects. The PD was 5 mm in the HOP. The pancreatic tissue was edematous in body and tail.  KNEE ARTHROSCOPY Left     LUMBAR FUSION      NC OFFICE/OUTPT VISIT,PROCEDURE ONLY N/A 1/24/2018    SPINAL HARDWARE REMOVAL LUMBAR BONE STIMULATOR performed by Liana Meyer MD at [address] Bilateral 1989    SKIN BIOPSY Right 11/2015    mole right posterior shoulder    SPINE SURGERY      spinal cord stimulator    SPINE SURGERY  91-4-15    renoval of spinal cord stimulator leads and battery    PASQUALE AND BSO      TUBAL LIGATION      UVULOPALATOPHARYGOPLASTY  2006       Allergies   Allergen Reactions    Azithromycin Shortness Of Breath     Tightness in chest    Adhesive Tape      OK to use paper tape per Patient- 2/19/20 patient states that she has been using all types of tape with no reaction         Current Outpatient Medications:     morphine (MS CONTIN) 30 MG extended release tablet, Take 1 tablet by mouth 2 times daily for 30 days. , Disp: 60 tablet, Rfl: 0    oxyCODONE-acetaminophen (PERCOCET)  MG per tablet, Take 1 tablet by mouth every 6 hours as needed for Pain for up to 30 days. , Disp: 120 tablet, Rfl: 0    naloxone 4 MG/0.1ML LIQD nasal spray, 1 spray by Nasal route as needed for Opioid Reversal, Disp: 1 each, Rfl: 0    naloxone 4 MG/0.1ML LIQD nasal spray, 1 spray by Nasal route as needed for Opioid Reversal, Disp: 1 each, Rfl: 5    albuterol sulfate  (90 Base) MCG/ACT inhaler, Inhale 1 puff into the lungs daily, Disp: , Rfl:     potassium chloride (MICRO-K) 10 MEQ extended release capsule, Take 1 capsule by mouth daily, Disp: , Rfl:     bumetanide (BUMEX) 1 MG tablet, , Disp: , Rfl:     sennosides-docusate sodium (SENOKOT-S) 8.6-50 MG tablet, Take 1 tablet by mouth daily as needed, Disp: , Rfl:     psyllium (KONSYL) 28.3 % PACK, Take 1 packet by mouth daily, Disp: , Rfl:     fluticasone (FLONASE) 50 MCG/ACT nasal spray, 1 spray by Each Nostril route daily, Disp: , Rfl:     fluticasone (FLOVENT HFA) 44 MCG/ACT inhaler, Inhale 1 puff into the lungs 2 times daily, Disp: , Rfl:     diclofenac sodium 1 % GEL, Apply 2 g topically 2 times daily as needed for Pain, Disp: , Rfl:     magnesium hydroxide (DULCOLAX MILK OF MAGNESIA) 400 MG/5ML suspension, Take 5 mLs by mouth daily as needed for Constipation, Disp: , Rfl:     pregabalin (LYRICA) 150 MG capsule, Take 150 mg by mouth 2 times daily. , Disp: , Rfl:     montelukast (SINGULAIR) 10 MG tablet, Take 10 mg by mouth nightly , Disp: , Rfl:     metFORMIN (GLUCOPHAGE) 500 MG tablet, Take 500 mg by mouth 2 times daily (with meals). , Disp: , Rfl:     telmisartan-hydrochlorothiazide (MICARDIS HCT) 40-12.5 MG per tablet, Take 1 tablet by mouth daily. , Disp: , Rfl:     levothyroxine (SYNTHROID) 50 MCG tablet, Take 50 mcg by mouth Daily. , Disp: , Rfl:     omeprazole (PRILOSEC) 20 MG capsule, Take 20 mg by mouth every evening., Disp: , Rfl:     aspirin 81 MG tablet, Take 81 mg by mouth daily. , Disp: , Rfl:     Family History   Problem Relation Age of Onset    Heart Failure Mother     Lung Cancer Mother     Other Father          pneumonia    Alzheimer's Disease Father     Lung Cancer Brother     Diabetes Paternal Grandmother     Other Daughter         Fibromyalsia    Rheum Arthritis Daughter        Social History     Socioeconomic History    Marital status:      Spouse name: Ze Rosales Number of children: 3    Years of education: Not on file    Highest education level: Not on file   Occupational History    Occupation: retired   Tobacco Use    Smoking status: Never Smoker    Smokeless tobacco: Never Used   Vaping Use    Vaping Use: Never used   Substance and Sexual Activity    Alcohol use: Not Currently     Comment: rare    Drug use: Never    Sexual activity: Yes     Partners: Male   Other Topics Concern    Not on file   Social History Narrative    Not on file     Social Determinants of Health     Financial Resource Strain:     Difficulty of Paying Living Expenses: Not on file   Food Insecurity:     Worried About Running Out of Food in the Last Year: Not on file    Jeannie of Food in the Last Year: Not on file   Transportation Needs:     Lack of Transportation (Medical): Not on file    Lack of Transportation (Non-Medical): Not on file   Physical Activity:     Days of Exercise per Week: Not on file    Minutes of Exercise per Session: Not on file   Stress:     Feeling of Stress : Not on file   Social Connections:     Frequency of Communication with Friends and Family: Not on file    Frequency of Social Gatherings with Friends and Family: Not on file    Attends [employer] Services: Not on file    Active Member of [address] [place] [address] or Organizations: Not on file    Attends Club or Organization Meetings: Not on file    Marital Status: Not on file   Intimate Partner Violence:     Fear of Current or Ex-Partner: Not on file    Emotionally Abused: Not on file    Physically Abused: Not on file    Sexually Abused: Not on file   Housing Stability:     Unable to Pay for Housing in the Last Year: Not on file    Number of [place] in the Last Year: Not on file    Unstable Housing in the Last Year: Not on file       Review of Systems:  Review of Systems   Constitutional: Negative for chills and fever. Cardiovascular: Negative for chest pain and palpitations. Respiratory: Negative for cough and shortness of breath. Musculoskeletal: Positive for back pain. Gastrointestinal: Negative for constipation. Neurological: Positive for numbness. Negative for disturbances in coordination, loss of balance, paresthesias and tingling. Physical Exam:  There were no vitals taken for this visit. Physical Exam  Neurological:      Mental Status: She is alert.    Psychiatric:         Mood and Affect: Mood normal.         Record/Diagnostics Review:    Last georgie 11/2021 and was appropriate Assessment:  Problem List Items Addressed This Visit     Imbalance (Chronic)    Lumbar radiculopathy - Primary    Lumbar spondylolysis    Encounter for medication monitoring    Sacroiliitis (HCC)    Relevant Medications    morphine (MS CONTIN) 30 MG extended release tablet (Start on 12/30/2021)    oxyCODONE-acetaminophen (PERCOCET)  MG per tablet (Start on 12/30/2021)    Spinal cord stimulator status    Lumbar spondylosis    Relevant Medications    morphine (MS CONTIN) 30 MG extended release tablet (Start on 12/30/2021)    oxyCODONE-acetaminophen (PERCOCET)  MG per tablet (Start on 12/30/2021)    Failed back syndrome    Relevant Medications    morphine (MS CONTIN) 30 MG extended release tablet (Start on 12/30/2021)    oxyCODONE-acetaminophen (PERCOCET)  MG per tablet (Start on 12/30/2021)    Cervical stenosis of spine             Treatment Plan:  Patient relates current medications are helping the pain. Patient reports taking pain medications as prescribed, denies obtaining medications from different sources and denies use of illegal drugs. Patient denies side effects from medications like nausea, vomiting, constipation or drowsiness. Patient reports current activities of daily living are possible due to medications and would like to continue them. As always, we encourage daily stretching and strengthening exercises, and recommend minimizing use of pain medications unless patient cannot get through daily activities due to pain. Contract requirements met. Continue opioid therapy. Script written for percocet and MSER  Pt will see Dr. Park Sanabria 1/3  Follow up appointment made for 4 weeks    Johann Rinaldi, was evaluated through a synchronous (real-time) audio-video encounter. The patient (or guardian if applicable) is aware that this is a billable service. Verbal consent to proceed has been obtained within the past 12 months.  The visit was conducted pursuant to the emergency declaration under the [facility], [address] [place] [address] waiver authority and the [employer] General Act. Patient identification was verified, and a caregiver was present when appropriate. The patient was located in a state where the provider was credentialed to provide care. Total time spent for this encounter: 20 minutes    --CONSUELO Anderson CNP on 12/28/2021 at 9:18 AM    An electronic signature was used to authenticate this note.

## 2022-01-06 ENCOUNTER — OFFICE VISIT (OUTPATIENT)
Dept: NEUROSURGERY | Age: 82
End: 2022-01-06
Payer: MEDICARE

## 2022-01-06 VITALS
SYSTOLIC BLOOD PRESSURE: 104 MMHG | HEIGHT: 55 IN | BODY MASS INDEX: 34.71 KG/M2 | OXYGEN SATURATION: 100 % | DIASTOLIC BLOOD PRESSURE: 48 MMHG | HEART RATE: 77 BPM | WEIGHT: 150 LBS

## 2022-01-06 DIAGNOSIS — M47.14 THORACIC MYELOPATHY: Primary | ICD-10-CM

## 2022-01-06 DIAGNOSIS — M43.9 ACQUIRED SPINAL DEFORMITY: ICD-10-CM

## 2022-01-06 DIAGNOSIS — M70.61 TROCHANTERIC BURSITIS OF RIGHT HIP: ICD-10-CM

## 2022-01-06 PROCEDURE — 99214 OFFICE O/P EST MOD 30 MIN: CPT | Performed by: NEUROLOGICAL SURGERY

## 2022-01-06 NOTE — PROGRESS NOTES
[address]  [facility] [address] [facility] [address]  Dept: [phone]    Patient:  Guru Early  YOB: 1940  Date: 1/6/22    The patient is a 80 y.o. female who presents today for consult of the following problems:     Chief Complaint   Patient presents with    Follow-up     Myelopathy             HPI:     Guru Early is a 80 y.o. female on whom neurosurgical consultation was requested by Kathie Benavidez DO for management of thoracic myelopathy as well as additional disease. The patient has had a complicated constellation of symptoms since multiple prior lumbar surgeries. She is had mainly issues with ataxia or balance problems neuropathy of her lower extremities as well as weakness. She does ambulate with a walker but states that she feels regularly unsteady. Denies any bowel bladder incontinence or saddle anesthesia at this time. Todd Johnson       History:     Past Medical History:   Diagnosis Date    Achilles tendonitis     right    Asthma     [facility]     Cough     clear phlegm    Degenerative lumbar disc     DM [facility] mellitus[facility]     PCP Dr. Charles Deshpande, seen 8/2019    Failed back syndrome, lumbar     Fast heart beat     Hx of \"8-10 years ago\"    Fibromyalgia     History of bladder cancer     Hypertension     Hypothyroid     Kidney stones     Lumbar radiculopathy     Lumbar spondylolysis     Lumbar spondylosis     Osteoarthritis     Sleep apnea     uses CPAP machine nightly    Spinal stenosis in cervical region     Wears glasses      Past Surgical History:   Procedure Laterality Date    BACK SURGERY  2010    lumbar fusion    BLADDER TUMOR EXCISION  2005    CARDIOVERSION      \"8-10 years ago\"  to get \"heart into regular rhythm\"    CARPAL TUNNEL RELEASE Bilateral     CATARACT REMOVAL WITH IMPLANT Bilateral 7/22/2014, 8/5/2014    Raffoaravind/Francesnaomi    CERVICAL FUSION  11/14/2019     ANTERIOR CERVICAL DECOMPRESSION FUSION C3-4, C4-5    CERVICAL FUSION N/A 11/14/2019    ANTERIOR CERVICAL DECOMPRESSION FUSION C3-4, C4-5 (SUPINE) DEPUY, REGULAR TABLE, MICROSCOPE, C-ARM, EVOKES (# [id number] ELIZA) performed by Gaby Cortes DO at [address], LAPAROSCOPIC N/A 8/18/2017    CHOLECYSTECTOMY LAPAROSCOPIC ROBOTIC MULTIPORT performed by Fiona Eid MD at [address] [place]  1/2013    CYSTOSCOPY  11/04/2016    CYSTOSCOPY  01/04/2019    ENDOSCOPIC ULTRASOUND (LOWER) N/A 8/17/2017    ENDOSCOPIC ULTRASOUND performed by Reshma Arias MD at [address] (UPPER)  08/17/2017    A cursory view of the gastric mucosa was normal. The scope was then further advanced through a patent pylorus to the second portion of the duodenum. The Gallbladder was evaluated in bulb position. Thick sludge was noted. The CBD was 7.8 mm with no filling defects. The PD was 5 mm in the HOP. The pancreatic tissue was edematous in body and tail.     KNEE ARTHROSCOPY Left     LUMBAR FUSION      NE OFFICE/OUTPT VISIT,PROCEDURE ONLY N/A 1/24/2018    SPINAL HARDWARE REMOVAL LUMBAR BONE STIMULATOR performed by Domingo Tanner MD [address] Bilateral 1989    SKIN BIOPSY Right 11/2015    mole right posterior shoulder    SPINE SURGERY      spinal cord stimulator    SPINE SURGERY  91-4-15    renoval of spinal cord stimulator leads and battery    PASQUALE AND BSO      TUBAL LIGATION      UVULOPALATOPHARYGOPLASTY  2006     Family History   Problem Relation Age of Onset    Heart Failure Mother    Veverly Gang Mother     Other Father          pneumonia    Alzheimer's Disease Father     Lung Cancer Brother     Diabetes Paternal Grandmother     Other Daughter         Fibromyalsia    Rheum Arthritis Daughter      Current Outpatient Medications on File Prior to Visit   Medication Sig Dispense Refill    morphine (MS CONTIN) 30 MG extended release tablet Take 1 tablet by mouth 2 times daily for 30 days. 60 tablet 0    oxyCODONE-acetaminophen (PERCOCET)  MG per tablet Take 1 tablet by mouth every 6 hours as needed for Pain for up to 30 days. 120 tablet 0    naloxone 4 MG/0.1ML LIQD nasal spray 1 spray by Nasal route as needed for Opioid Reversal 1 each 0    naloxone 4 MG/0.1ML LIQD nasal spray 1 spray by Nasal route as needed for Opioid Reversal 1 each 5    albuterol sulfate  (90 Base) MCG/ACT inhaler Inhale 1 puff into the lungs daily      potassium chloride (MICRO-K) 10 MEQ extended release capsule Take 1 capsule by mouth daily      bumetanide (BUMEX) 1 MG tablet       sennosides-docusate sodium (SENOKOT-S) 8.6-50 MG tablet Take 1 tablet by mouth daily as needed      psyllium (KONSYL) 28.3 % PACK Take 1 packet by mouth daily      fluticasone (FLONASE) 50 MCG/ACT nasal spray 1 spray by Each Nostril route daily      diclofenac sodium 1 % GEL Apply 2 g topically 2 times daily as needed for Pain      magnesium hydroxide (DULCOLAX MILK OF MAGNESIA) 400 MG/5ML suspension Take 5 mLs by mouth daily as needed for Constipation      pregabalin (LYRICA) 150 MG capsule Take 150 mg by mouth 2 times daily.  montelukast (SINGULAIR) 10 MG tablet Take 10 mg by mouth nightly       metFORMIN (GLUCOPHAGE) 500 MG tablet Take 500 mg by mouth 2 times daily (with meals).  telmisartan-hydrochlorothiazide (MICARDIS HCT) 40-12.5 MG per tablet Take 1 tablet by mouth daily.  levothyroxine (SYNTHROID) 50 MCG tablet Take 50 mcg by mouth Daily.  omeprazole (PRILOSEC) 20 MG capsule Take 20 mg by mouth every evening.  aspirin 81 MG tablet Take 81 mg by mouth daily.  fluticasone (FLOVENT HFA) 44 MCG/ACT inhaler Inhale 1 puff into the lungs 2 times daily (Patient not taking: Reported on 1/6/2022)       No current facility-administered medications on file prior to visit. Social History     Tobacco Use    Smoking status: Never Smoker    Smokeless tobacco: Never Used   Vaping Use    Vaping Use: Never used   Substance Use Topics    Alcohol use: Not Currently     Comment: rare    Drug use: Never       Allergies   Allergen Reactions    Azithromycin Shortness Of Breath     Tightness in chest    Adhesive Tape      OK to use paper tape per Patient- 2/19/20 patient states that she has been using all types of tape with no reaction       Review of Systems  ROS: Numbness ataxia. Physical Exam:      BP (!) 104/48   Pulse 77   Ht 4' 2\" (1.27 m)   Wt 150 lb (68 kg)   SpO2 100%   BMI 42.18 kg/m²   Estimated body mass index is 42.18 kg/m² as calculated from the following:    Height as of this encounter: 4' 2\" (1.27 m). Weight as of this encounter: 150 lb (68 kg). General:  Braeden Keene is a 80y.o. year old female who appears her stated age. HEENT: Normocephalic atraumatic. Neck supple. Chest: regular rate; pulses equal. Equal chest rise and fall  Abdomen: Soft nondistended.    Ext: DP equal with good capillary refill  Neuro    Mentation  Appropriate affect   oriented    Cranial Nerves:   Pupils equal and reactive to light  Extraocular motion intact  Face symmetric  No dysarthria  v1-3 sensation symmetric, masseter tone symmetric  Hearing symmetric and intact to finger rub    Sensation:   Intact    Motor  L deltoid 5/5; R deltoid 5/5  L biceps 5/5; R biceps 5/5  L triceps 5/5; R triceps 5/5  L wrist extension 5/5; R wrist extension 5/5  L intrinsics 5/5; R intrinsics 5/5     L iliopsoas 5/5 , R iliopsoas 5/5  L quadriceps 5/5; R quadriceps 5/5  L Dorsiflexion 5/5; R dorsiflexion 5/5  L Plantarflexion 5/5; R plantarflexion 5/5  L EHL 5/5; R EHL 5/5    Reflexes  1/4 reflexes    hoffmans L: neg  hoffmans R: neg  Clonus L: neg  Clonus R: neg  Babinski L: up  Babinski R; up    Studies Review:     MRI thoracic spine with multilevel central stenosis evident at L1-T12 L1 and T11-12. Also questionable at T2-3 and T1-2. Scoliosis films with overall maintained sagittal plane alignment with some positive sagittal balance. MRI cervical and lumbar otherwise unremarkable with exception of prior fusion    Assessment and Plan:      1. Thoracic myelopathy    2. Acquired spinal deformity    3. Trochanteric bursitis of right hip          Plan: Discussed the pathology with the patient on the MRI and x-rays. She has multilevel significant stenosis from approximately T10-T11 all the way to L2. At this point in order to treat the adjacent level disease in the setting of the deformity she would likely require a multilevel fusion fixation which we cannot stop at the T10-11 area due to this being the apex. In the event of adjacent level decompression with fusion she would likely have to have an extension all the way up to T3-T4 which is a fairly extensive surgery. On the other hand we could attempt a conservative approach with just decompression and no extension of hardware all the way from T11-L2 but there is the risk of progressive kyphosis which would exacerbate her spinal deformity and axial pain related to posture. Patient would like to hold off any type of major surgical invention including decompression versus decompression with fusion at this time. She is requesting conservative measures with just a brace to help with posture and ambulation. In addition she does have significant pain in the right hip greater trochanteric region so I will request pain management for her right bursa injection. Followup: No follow-ups on file. Prescriptions Ordered:  No orders of the defined types were placed in this encounter. Orders Placed:  No orders of the defined types were placed in this encounter. Electronically signed by Hussain England DO on 1/6/2022 at 2:50 PM    Please note that this chart was generated using voice recognition Dragon dictation software.   Although every effort was made to ensure the accuracy of this automated transcription, some errors in transcription may have occurred.

## 2022-01-24 ENCOUNTER — HOSPITAL ENCOUNTER (OUTPATIENT)
Age: 82
Discharge: HOME | End: 2022-01-26
Payer: MEDICARE

## 2022-01-24 ENCOUNTER — HOSPITAL ENCOUNTER (OUTPATIENT)
Dept: PAIN MANAGEMENT | Age: 82
Discharge: HOME | End: 2022-01-24
Payer: MEDICARE

## 2022-01-24 ENCOUNTER — HOSPITAL ENCOUNTER (OUTPATIENT)
Dept: GENERAL RADIOLOGY | Age: 82
Discharge: HOME | End: 2022-01-26
Payer: MEDICARE

## 2022-01-24 VITALS
OXYGEN SATURATION: 97 % | HEART RATE: 76 BPM | RESPIRATION RATE: 18 BRPM | SYSTOLIC BLOOD PRESSURE: 108 MMHG | DIASTOLIC BLOOD PRESSURE: 47 MMHG

## 2022-01-24 DIAGNOSIS — R26.89 IMBALANCE: ICD-10-CM

## 2022-01-24 DIAGNOSIS — Z51.81 ENCOUNTER FOR MEDICATION MONITORING: ICD-10-CM

## 2022-01-24 DIAGNOSIS — M96.1 FAILED BACK SYNDROME: ICD-10-CM

## 2022-01-24 DIAGNOSIS — M25.551 CHRONIC RIGHT HIP PAIN: ICD-10-CM

## 2022-01-24 DIAGNOSIS — G89.29 CHRONIC RIGHT HIP PAIN: Primary | ICD-10-CM

## 2022-01-24 DIAGNOSIS — M25.551 CHRONIC RIGHT HIP PAIN: Primary | ICD-10-CM

## 2022-01-24 DIAGNOSIS — M47.816 LUMBAR SPONDYLOSIS: ICD-10-CM

## 2022-01-24 DIAGNOSIS — F11.90 CHRONIC, CONTINUOUS USE OF OPIOIDS: ICD-10-CM

## 2022-01-24 DIAGNOSIS — M54.16 LUMBAR RADICULOPATHY: ICD-10-CM

## 2022-01-24 DIAGNOSIS — M43.06 LUMBAR SPONDYLOLYSIS: ICD-10-CM

## 2022-01-24 DIAGNOSIS — Z96.89 SPINAL CORD STIMULATOR STATUS: ICD-10-CM

## 2022-01-24 DIAGNOSIS — G89.29 CHRONIC RIGHT HIP PAIN: ICD-10-CM

## 2022-01-24 DIAGNOSIS — M48.02 SPINAL STENOSIS IN CERVICAL REGION: ICD-10-CM

## 2022-01-24 PROCEDURE — 99213 OFFICE O/P EST LOW 20 MIN: CPT | Performed by: NURSE PRACTITIONER

## 2022-01-24 PROCEDURE — 99213 OFFICE O/P EST LOW 20 MIN: CPT

## 2022-01-24 PROCEDURE — 73502 X-RAY EXAM HIP UNI 2-3 VIEWS: CPT

## 2022-01-24 RX ORDER — MORPHINE SULFATE 30 MG/1
30 TABLET, FILM COATED, EXTENDED RELEASE ORAL 2 TIMES DAILY
Qty: 60 TABLET | Refills: 0 | Status: SHIPPED | OUTPATIENT
Start: 2022-01-29 | End: 2022-01-28 | Stop reason: SDUPTHER

## 2022-01-24 RX ORDER — OXYCODONE AND ACETAMINOPHEN 10; 325 MG/1; MG/1
1 TABLET ORAL EVERY 6 HOURS PRN
Qty: 120 TABLET | Refills: 0 | Status: SHIPPED | OUTPATIENT
Start: 2022-01-29 | End: 2022-02-25 | Stop reason: SDUPTHER

## 2022-01-24 ASSESSMENT — ENCOUNTER SYMPTOMS
CONSTIPATION: 0
SHORTNESS OF BREATH: 0
BACK PAIN: 1
COUGH: 0

## 2022-01-24 NOTE — PROGRESS NOTES
Chief Complaint: back pain, right hip pain    [facility]  Patient complains of back pain that started years ago. She has undergone several interventions but pain continues. She had SCS implanted in 2012 at [facility] but it was removed as it was not functioning well.  She has started going to the [facility] to exercise - using the stationary bike - with benefit. She did see neurosurgeon. Imaging of the spine completed - severe stenosis at T10-11 and moderate stenosis at L1-2. She had a follow up with NS and discussed surgery. She would like to hold off on any surgical interventions at this time and plans to try conservative measures. Also complaining of right hip pain That started after a fall last month. She did not go to ER or see PCP after the fall. Back Pain  This is a chronic problem. The current episode started more than 1 year ago. The problem occurs constantly. The problem is unchanged. The pain is present in the lumbar spine. The quality of the pain is described as aching. The pain does not radiate. The pain is at a severity of 7/10. The pain is moderate. The symptoms are aggravated by position and standing (walking). Associated symptoms include numbness. Pertinent negatives include no chest pain, fever, paresthesias or tingling. She has tried analgesics, bed rest and ice for the symptoms. The treatment provided mild relief. Hip Pain   The incident occurred more than 1 week ago. Injury mechanism: a fall. The pain is present in the right hip. The quality of the pain is described as aching. The pain is at a severity of 9/10. The pain is severe. The pain has been worsening since onset. Associated symptoms include numbness. Pertinent negatives include no tingling. The symptoms are aggravated by movement and weight bearing. She has tried non-weight bearing, rest and ice for the symptoms. The treatment provided mild relief. Patient denies any new neurological symptoms.  No bowel or bladder incontinence, no weakness, and no falling. Pill count: appropriate due 1/29    Morphine equivalent: 120    Periodic Controlled Substance Monitoring: Possible medication side effects, risk of tolerance/dependence & alternative treatments discussed. ,No signs of potential drug abuse or diversion identified. ,Obtaining appropriate analgesic effect of treatment. Shai Martínez, APRN - CNP)  Chronic Pain > 80 MEDD: Obtained or confirmed \"Medication Contract\" on file.  (Raymundo Thad, APRN - CNP)      Past Medical History:   Diagnosis Date    Achilles tendonitis     right    Asthma     COPD (chronic obstructive pulmonary disease) (HCC)     Cough     clear phlegm    Degenerative lumbar disc     DM (diabetes mellitus), type 2 (HCC)     PCP Dr. Apolinar Andrews, seen 8/2019    Failed back syndrome, lumbar     Fast heart beat     Hx of \"8-10 years ago\"    Fibromyalgia     History of bladder cancer     Hypertension     Hypothyroid     Kidney stones     Lumbar radiculopathy     Lumbar spondylolysis     Lumbar spondylosis     Osteoarthritis     Sleep apnea     uses CPAP machine nightly    Spinal stenosis in cervical region     Wears glasses        Past Surgical History:   Procedure Laterality Date    BACK SURGERY  2010    lumbar fusion    BLADDER TUMOR EXCISION  2005    CARDIOVERSION      \"8-10 years ago\"  to get \"heart into regular rhythm\"    CARPAL TUNNEL RELEASE Bilateral     CATARACT REMOVAL WITH IMPLANT Bilateral 7/22/2014, 8/5/2014    [facility]    CERVICAL FUSION  11/14/2019     ANTERIOR CERVICAL DECOMPRESSION FUSION C3-4, C4-5    CERVICAL FUSION N/A 11/14/2019    ANTERIOR CERVICAL DECOMPRESSION FUSION C3-4, C4-5 (SUPINE) DEPUY, REGULAR TABLE, MICROSCOPE, C-ARM, EVOKES (# [id number] [facility]) performed by Cristobal Cavazos DO at [address] [place] [address] 8/18/2017    CHOLECYSTECTOMY LAPAROSCOPIC ROBOTIC MULTIPORT performed by Han De MD at [address] [place] [address] 1/2013    CYSTOSCOPY  11/04/2016    CYSTOSCOPY  01/04/2019    ENDOSCOPIC ULTRASOUND (LOWER) N/A 8/17/2017    ENDOSCOPIC ULTRASOUND performed by David Singh MD at [address] (UPPER)  08/17/2017    A cursory view of the gastric mucosa was normal. The scope was then further advanced through a patent pylorus to the second portion of the duodenum. The Gallbladder was evaluated in bulb position. Thick sludge was noted. The CBD was 7.8 mm with no filling defects. The PD was 5 mm in the HOP. The pancreatic tissue was edematous in body and tail.  KNEE ARTHROSCOPY Left     LUMBAR FUSION      SC OFFICE/OUTPT VISIT,PROCEDURE ONLY N/A 1/24/2018    SPINAL HARDWARE REMOVAL LUMBAR BONE STIMULATOR performed by Edenilson Mejía MD at [address] Bilateral 1989    SKIN BIOPSY Right 11/2015    mole right posterior shoulder    SPINE SURGERY      spinal cord stimulator    SPINE SURGERY  91-4-15    renoval of spinal cord stimulator leads and battery    PASQUALE AND BSO      TUBAL LIGATION      UVULOPALATOPHARYGOPLASTY  2006       Allergies   Allergen Reactions    Azithromycin Shortness Of Breath     Tightness in chest    Adhesive Tape      OK to use paper tape per Patient- 2/19/20 patient states that she has been using all types of tape with no reaction         Current Outpatient Medications:     morphine (MS CONTIN) 30 MG extended release tablet, Take 1 tablet by mouth 2 times daily for 30 days. , Disp: 60 tablet, Rfl: 0    oxyCODONE-acetaminophen (PERCOCET)  MG per tablet, Take 1 tablet by mouth every 6 hours as needed for Pain for up to 30 days. , Disp: 120 tablet, Rfl: 0    naloxone 4 MG/0.1ML LIQD nasal spray, 1 spray by Nasal route as needed for Opioid Reversal, Disp: 1 each, Rfl: 0    naloxone 4 MG/0.1ML LIQD nasal spray, 1 spray by Nasal route as needed for Opioid Reversal, Disp: 1 each, Rfl: 5    albuterol sulfate  (90 Base) MCG/ACT inhaler, Inhale 1 puff into the lungs daily, Disp: , Rfl:     potassium chloride (MICRO-K) 10 MEQ extended release capsule, Take 1 capsule by mouth daily, Disp: , Rfl:     bumetanide (BUMEX) 1 MG tablet, , Disp: , Rfl:     sennosides-docusate sodium (SENOKOT-S) 8.6-50 MG tablet, Take 1 tablet by mouth daily as needed, Disp: , Rfl:     psyllium (KONSYL) 28.3 % PACK, Take 1 packet by mouth daily, Disp: , Rfl:     fluticasone (FLONASE) 50 MCG/ACT nasal spray, 1 spray by Each Nostril route daily, Disp: , Rfl:     fluticasone (FLOVENT HFA) 44 MCG/ACT inhaler, Inhale 1 puff into the lungs 2 times daily (Patient not taking: Reported on 1/6/2022), Disp: , Rfl:     diclofenac sodium 1 % GEL, Apply 2 g topically 2 times daily as needed for Pain, Disp: , Rfl:     magnesium hydroxide (DULCOLAX MILK OF MAGNESIA) 400 MG/5ML suspension, Take 5 mLs by mouth daily as needed for Constipation, Disp: , Rfl:     pregabalin (LYRICA) 150 MG capsule, Take 150 mg by mouth 2 times daily. , Disp: , Rfl:     montelukast (SINGULAIR) 10 MG tablet, Take 10 mg by mouth nightly , Disp: , Rfl:     metFORMIN (GLUCOPHAGE) 500 MG tablet, Take 500 mg by mouth 2 times daily (with meals). , Disp: , Rfl:     telmisartan-hydrochlorothiazide (MICARDIS HCT) 40-12.5 MG per tablet, Take 1 tablet by mouth daily. , Disp: , Rfl:     levothyroxine (SYNTHROID) 50 MCG tablet, Take 50 mcg by mouth Daily. , Disp: , Rfl:     omeprazole (PRILOSEC) 20 MG capsule, Take 20 mg by mouth every evening., Disp: , Rfl:     aspirin 81 MG tablet, Take 81 mg by mouth daily. , Disp: , Rfl:     Family History   Problem Relation Age of Onset    Heart Failure Mother     Lung Cancer Mother     Other Father          pneumonia    Alzheimer's Disease Father     Lung Cancer Brother     Diabetes Paternal Grandmother     Other Daughter         Fibromyalsia    Rheum Arthritis Daughter        Social History     Socioeconomic History    Marital status:      Spouse name: Uma Esparza Number of children: 3    Years of education: Not on file    Highest education level: Not on file   Occupational History    Occupation: retired   Tobacco Use    Smoking status: Never Smoker    Smokeless tobacco: Never Used   Vaping Use    Vaping Use: Never used   Substance and Sexual Activity    Alcohol use: Not Currently     Comment: rare    Drug use: Never    Sexual activity: Yes     Partners: Male   Other Topics Concern    Not on file   Social History Narrative    Not on file     Social Determinants of Health     Financial Resource Strain:     Difficulty of Paying Living Expenses: Not on file   Food Insecurity:     Worried About [address] in the Last Year: Not on file    [address] in the Last Year: Not on file   Transportation Needs:     Lack of Transportation (Medical): Not on file    Lack of Transportation (Non-Medical): Not on file   Physical Activity:     Days of Exercise per Week: Not on file    Minutes of Exercise per Session: Not on file   Stress:     Feeling of Stress : Not on file   Social Connections:     Frequency of Communication with Friends and Family: Not on file    Frequency of Social Gatherings with Friends and Family: Not on file    Attends Cheondoism Services: Not on file    Active Member of [address] [place] [address] or Organizations: Not on file    Attends Club or Organization Meetings: Not on file    Marital Status: Not on file   Intimate Partner Violence:     Fear of Current or Ex-Partner: Not on file    Emotionally Abused: Not on file    Physically Abused: Not on file    Sexually Abused: Not on file   Housing Stability:     Unable to Pay for Housing in the Last Year: Not on file    Number of [place] in the Last Year: Not on file    Unstable Housing in the Last Year: Not on file       Review of Systems:  Review of Systems   Constitutional: Negative for chills and fever. Cardiovascular: Negative for chest pain and palpitations.    Respiratory: Negative for cough and shortness of breath. Musculoskeletal: Positive for back pain and joint pain. Gastrointestinal: Negative for constipation. Neurological: Positive for numbness. Negative for disturbances in coordination, loss of balance, paresthesias and tingling. Physical Exam:  BP (!) 108/47   Pulse 76   Resp 18   SpO2 97%     Physical Exam  HENT:      Head: Normocephalic. Pulmonary:      Effort: Pulmonary effort is normal.   Musculoskeletal:      Lumbar back: Tenderness present. Right hip: Tenderness present. Decreased range of motion. Neurological:      Mental Status: She is alert. Psychiatric:         Mood and Affect: Mood normal.         Behavior: Behavior normal.         Record/Diagnostics Review:    Last georgie 11/2021 and was appropriate     Assessment:  Problem List Items Addressed This Visit     Imbalance (Chronic)    Lumbar radiculopathy    Lumbar spondylolysis    Encounter for medication monitoring    Spinal cord stimulator status    Lumbar spondylosis    Relevant Medications    morphine (MS CONTIN) 30 MG extended release tablet (Start on 1/29/2022)    oxyCODONE-acetaminophen (PERCOCET)  MG per tablet (Start on 1/29/2022)    Spinal stenosis in cervical region    Failed back syndrome    Relevant Medications    morphine (MS CONTIN) 30 MG extended release tablet (Start on 1/29/2022)    oxyCODONE-acetaminophen (PERCOCET)  MG per tablet (Start on 1/29/2022)    Chronic, continuous use of opioids      Other Visit Diagnoses     Chronic right hip pain    -  Primary    Relevant Medications    morphine (MS CONTIN) 30 MG extended release tablet (Start on 1/29/2022)    oxyCODONE-acetaminophen (PERCOCET)  MG per tablet (Start on 1/29/2022)    Other Relevant Orders    XR HIP RIGHT (2-3 VIEWS)             Treatment Plan:  Patient relates current medications are helping the pain.  Patient reports taking pain medications as prescribed, denies obtaining medications from different sources and denies use of illegal drugs. Patient denies side effects from medications like nausea, vomiting, constipation or drowsiness. Patient reports current activities of daily living are possible due to medications and would like to continue them. As always, we encourage daily stretching and strengthening exercises, and recommend minimizing use of pain medications unless patient cannot get through daily activities due to pain. Contract requirements met. Continue opioid therapy.  Script written for MSER and percocet  Right hip pain is worsening following a fall on her right side last month  Right hip XR   She did see NS and discussed surgery - she declines surgery at this time  Follow up appointment made for 4 weeks

## 2022-01-28 ENCOUNTER — TELEPHONE (OUTPATIENT)
Dept: PAIN MANAGEMENT | Age: 82
End: 2022-01-28

## 2022-01-28 DIAGNOSIS — R26.89 IMBALANCE: ICD-10-CM

## 2022-01-28 DIAGNOSIS — M96.1 FAILED BACK SYNDROME: ICD-10-CM

## 2022-01-28 RX ORDER — MORPHINE SULFATE 30 MG/1
30 TABLET, FILM COATED, EXTENDED RELEASE ORAL 2 TIMES DAILY
Qty: 60 TABLET | Refills: 0 | Status: SHIPPED | OUTPATIENT
Start: 2022-01-29 | End: 2022-02-25 | Stop reason: SDUPTHER

## 2022-01-28 NOTE — TELEPHONE ENCOUNTER
Received a call from [facility] pharmacy in [place] with [place]. He informs me they do not have enough Morphine to fill this patient RX. He was able to locate the amount the patient needs at [facility] in [facility] and they have agreed to fill there with a new Rx. Patient also agrees to go to that pharmacy for her Morphine ONLY. Will add that pharmacy to this patients chart. Please change the pharmacy back after the Rx is sent to avoid sending to the wrong pharmacy with the next refill.      Will forward to [address] who saw this patient last

## 2022-02-07 ENCOUNTER — OFFICE VISIT (OUTPATIENT)
Dept: ORTHOPEDIC SURGERY | Age: 82
End: 2022-02-07
Payer: MEDICARE

## 2022-02-07 DIAGNOSIS — M25.551 CHRONIC RIGHT HIP PAIN: Primary | ICD-10-CM

## 2022-02-07 DIAGNOSIS — G89.29 CHRONIC RIGHT HIP PAIN: Primary | ICD-10-CM

## 2022-02-07 PROCEDURE — 20610 DRAIN/INJ JOINT/BURSA W/O US: CPT | Performed by: ORTHOPAEDIC SURGERY

## 2022-02-07 RX ORDER — BETAMETHASONE SODIUM PHOSPHATE AND BETAMETHASONE ACETATE 3; 3 MG/ML; MG/ML
12 INJECTION, SUSPENSION INTRA-ARTICULAR; INTRALESIONAL; INTRAMUSCULAR; SOFT TISSUE ONCE
Status: COMPLETED | OUTPATIENT
Start: 2022-02-07 | End: 2022-02-07

## 2022-02-07 RX ORDER — LIDOCAINE HYDROCHLORIDE 10 MG/ML
2 INJECTION, SOLUTION INFILTRATION; PERINEURAL ONCE
Status: COMPLETED | OUTPATIENT
Start: 2022-02-07 | End: 2022-02-07

## 2022-02-07 RX ORDER — BUPIVACAINE HYDROCHLORIDE 5 MG/ML
2 INJECTION, SOLUTION PERINEURAL ONCE
Status: COMPLETED | OUTPATIENT
Start: 2022-02-07 | End: 2022-02-07

## 2022-02-07 RX ADMIN — BETAMETHASONE SODIUM PHOSPHATE AND BETAMETHASONE ACETATE 12 MG: 3; 3 INJECTION, SUSPENSION INTRA-ARTICULAR; INTRALESIONAL; INTRAMUSCULAR; SOFT TISSUE at 15:45

## 2022-02-07 RX ADMIN — LIDOCAINE HYDROCHLORIDE 2 ML: 10 INJECTION, SOLUTION INFILTRATION; PERINEURAL at 15:46

## 2022-02-07 RX ADMIN — BUPIVACAINE HYDROCHLORIDE 10 MG: 5 INJECTION, SOLUTION PERINEURAL at 15:46

## 2022-02-07 NOTE — PROGRESS NOTES
to person, place, and time. Patient appears well-developed and well nourished. HENT: Negative otherwise noted  Head: Normocephalic and Atraumatic  Nose: Normal  Eyes: Conjunctivae and EOM are normal  Neck: Normal range of motion Neck supple. Respiratory/Cardio: Effort normal. No respiratory distress. Musculoskeletal: Physical examination notes the patient is has got mild discomfort on Stinchfield's but nothing too severe. Internal ex rotation of 2040 degrees respectively is not remarkable at all. She does have specific trochanteric tenderness that is quite exquisite and going on her IT band. No other contributory findings    Neurological: Patient is alert and oriented to person, place, and time. Normal strenght. No sensory deficit. Skin: Skin is warm and dry  Psychiatric: Behavior is normal. Thought content normal.  Nursing note and vitals reviewed. Labs and Imaging:     XR taken today:  XR PELVIS (1-2 VIEWS)    Result Date: 2/7/2022  X-rays taken today reviewed by me show standing AP of the patient's pelvis. Patient has very modest joint space narrowing of both hips. She has some very minimal cam and pincer osteophyte formation as there is no evidence for acute injury to the femoral neck or intertrochanteric area on the right side. Lumbar fusion hardware is a visible at the lowermost aspect. Orders Placed This Encounter   Procedures    XR PELVIS (1-2 VIEWS)     Standing Status:   Future     Number of Occurrences:   1     Standing Expiration Date:   2/8/2022  20610 - DRAIN/INJECT LARGE JOINT BURSA       Assessment and Plan:  1. Chronic right hip pain    2.       Trochanteric bursitis right hip    Administrations This Visit     betamethasone acetate-betamethasone sodium phosphate (CELESTONE) injection 12 mg     Admin Date  02/07/2022  15:45 Action  Given Dose  12 mg Route  Intra-artICUlar Site  Hip Right Administered By  Charity Ron LPN    Ordering Provider: Oanh Castillo MD NDC: 9409-7793-89    Lot#: [id number]    : [employer]    Patient Supplied?: No          bupivacaine (MARCAINE) 0.5 % injection 10 mg     Admin Date  02/07/2022  15:46 Action  Given Dose  10 mg Route  Intra-artICUlar Site  Hip Right Administered By  Corinne Duffy LPN    Ordering Provider: Arnoldo Benton MD    NDC: 9933-7805-44    Lot#: [id number]    : [address]    Patient Supplied?: No                This is a 80 y.o. female who presents to the clinic today for evaluation / follow up of trochanteric bursitis right hip. Past History:    Current Outpatient Medications:     morphine (MS CONTIN) 30 MG extended release tablet, Take 1 tablet by mouth 2 times daily for 30 days. , Disp: 60 tablet, Rfl: 0    oxyCODONE-acetaminophen (PERCOCET)  MG per tablet, Take 1 tablet by mouth every 6 hours as needed for Pain for up to 30 days. , Disp: 120 tablet, Rfl: 0    naloxone 4 MG/0.1ML LIQD nasal spray, 1 spray by Nasal route as needed for Opioid Reversal, Disp: 1 each, Rfl: 0    naloxone 4 MG/0.1ML LIQD nasal spray, 1 spray by Nasal route as needed for Opioid Reversal, Disp: 1 each, Rfl: 5    albuterol sulfate  (90 Base) MCG/ACT inhaler, Inhale 1 puff into the lungs daily, Disp: , Rfl:     potassium chloride (MICRO-K) 10 MEQ extended release capsule, Take 1 capsule by mouth daily, Disp: , Rfl:     bumetanide (BUMEX) 1 MG tablet, , Disp: , Rfl:     sennosides-docusate sodium (SENOKOT-S) 8.6-50 MG tablet, Take 1 tablet by mouth daily as needed, Disp: , Rfl:     psyllium (KONSYL) 28.3 % PACK, Take 1 packet by mouth daily, Disp: , Rfl:     fluticasone (FLONASE) 50 MCG/ACT nasal spray, 1 spray by Each Nostril route daily, Disp: , Rfl:     fluticasone (FLOVENT HFA) 44 MCG/ACT inhaler, Inhale 1 puff into the lungs 2 times daily , Disp: , Rfl:     diclofenac sodium 1 % GEL, Apply 2 g topically 2 times daily as needed for Pain, Disp: , Rfl:     magnesium hydroxide (DULCOLAX MILK OF Not on file   Physical Activity:     Days of Exercise per Week: Not on file    Minutes of Exercise per Session: Not on file   Stress:     Feeling of Stress : Not on file   Social Connections:     Frequency of Communication with Friends and Family: Not on file    Frequency of Social Gatherings with Friends and Family: Not on file    Attends Latter day Services: Not on file    Active Member of [address] [place] [address] or Organizations: Not on file    Attends Club or Organization Meetings: Not on file    Marital Status: Not on file   Intimate Partner Violence:     Fear of Current or Ex-Partner: Not on file    Emotionally Abused: Not on file    Physically Abused: Not on file    Sexually Abused: Not on file   Housing Stability:     Unable to Pay for Housing in the Last Year: Not on file    Number of [place] in the Last Year: Not on file    Unstable Housing in the Last Year: Not on file     Past Medical History:   Diagnosis Date    Achilles tendonitis     right    Asthma     COPD [facility] disease) [facility]     Cough     clear phlegm    Degenerative lumbar disc     DM (diabetes mellitus), type 2 ([facility]     PCP Dr. Corie Greenberg, seen 8/2019    Failed back syndrome, lumbar     Fast heart beat     Hx of \"8-10 years ago\"    Fibromyalgia     History of bladder cancer     Hypertension     Hypothyroid     Kidney stones     Lumbar radiculopathy     Lumbar spondylolysis     Lumbar spondylosis     Osteoarthritis     Sleep apnea     uses CPAP machine nightly    Spinal stenosis in cervical region     Wears glasses      Past Surgical History:   Procedure Laterality Date    BACK SURGERY  2010    lumbar fusion    BLADDER TUMOR EXCISION  2005    CARDIOVERSION      \"8-10 years ago\"  to get \"heart into regular rhythm\"    CARPAL TUNNEL RELEASE Bilateral     CATARACT REMOVAL WITH IMPLANT Bilateral 7/22/2014, 8/5/2014    [facility]    CERVICAL FUSION  11/14/2019     ANTERIOR CERVICAL DECOMPRESSION FUSION C3-4, C4-5    CERVICAL FUSION N/A 11/14/2019    ANTERIOR CERVICAL DECOMPRESSION FUSION C3-4, C4-5 (SUPINE) DEPUY, REGULAR TABLE, MICROSCOPE, C-ARM, EVOKES (# [id number] [facility]IZA) performed by Ole Parsons DO at [facility] 8/18/2017    CHOLECYSTECTOMY LAPAROSCOPIC ROBOTIC MULTIPORT performed by Fuentes Ramirez MD at [address] [place] [address]  1/2013    CYSTOSCOPY  11/04/2016    CYSTOSCOPY  01/04/2019    ENDOSCOPIC ULTRASOUND (LOWER) N/A 8/17/2017    ENDOSCOPIC ULTRASOUND performed by Fransico Rod MD at [address] (UPPER)  08/17/2017    A cursory view of the gastric mucosa was normal. The scope was then further advanced through a patent pylorus to the second portion of the duodenum. The Gallbladder was evaluated in bulb position. Thick sludge was noted. The CBD was 7.8 mm with no filling defects. The PD was 5 mm in the HOP. The pancreatic tissue was edematous in body and tail.     KNEE ARTHROSCOPY Left     LUMBAR FUSION      [place] OFFICE/OUTPT VISIT,PROCEDURE ONLY N/A 1/24/2018    SPINAL HARDWARE REMOVAL LUMBAR BONE STIMULATOR performed by Babatunde Gillis MD at [address] Bilateral [address]    SKIN BIOPSY Right 11/2015    mole right posterior shoulder    SPINE SURGERY      spinal cord stimulator    SPINE SURGERY  91-4-15    renoval of spinal cord stimulator leads and battery    PASQUALE AND BSO      TUBAL LIGATION      UVULOPALATOPHARYGOPLASTY  2006     Family History   Problem Relation Age of Onset    Heart Failure Mother    [facility] Mother     Other Father          pneumonia    Alzheimer's Disease Father     Lung Cancer Brother     Diabetes Paternal Grandmother     Other Daughter         Fibromyalsia    Rheum Arthritis Daughter    Plan  An informed verbal consent for the procedure was obtained and risks including, but not limited to: allergy to medications, injection, bleeding, stiffness of joint, recurrence of symptoms, loss of function, swelling, drainage, irrigation, need for surgery and pseudo-septic inflammation, were explained to the patient. Also, discussed was the potential for further injections, irrigation and debridement and surgery. Alternate means of treatment have also been discussed with the patient. Administrations This Visit     betamethasone acetate-betamethasone sodium phosphate (CELESTONE) injection 12 mg     Admin Date  02/07/2022  15:45 Action  Given Dose  12 mg Route  Intra-artICUlar Site  Hip Right Administered By  Miguel Guerrero LPN    Ordering Provider: Zak Grady MD    NDC: 4659-6664-06    Lot#: [id number]    : [employer]    Patient Supplied?: No          bupivacaine (MARCAINE) 0.5 % injection 10 mg     Admin Date  02/07/2022  15:46 Action  Given Dose  10 mg Route  Intra-artICUlar Site  Hip Right Administered By  Miguel Guerrero LPN    Ordering Provider: Zak Grady MD    NDC: 6092-0010-19    Lot#: [id number]    : [place]    Patient Supplied?: No          lidocaine 1 % injection 2 mL     Admin Date  02/07/2022  15:46 Action  Given Dose  2 mL Route  Intra-artICUlar Site  Hip Right Administered By  Miguel Guerrero LPN    Ordering Provider: Zak Grady MD    [address]: 4681-9921-62    Lot#: [id number]    : [id number] [facility]    Patient Supplied?: No            Under sterile conditions the patient's right greater trochanter point of maximal tenderness was injected with lidocaine 2 cc bupivacaine 2 cc and Celestone 2 cc. She tolerate procedure well    Patient was given a stretching program.  Back here at her request                  Provider Attestation:  Blanquita Barker, personally performed the services described in this documentation. All medical record entries made by the scribe were at my direction and in my presence.  I have reviewed the chart and discharge instructions and agree that the records reflect my personal performance and is accurate and complete. Alejandrina Murphy MD. 02/07/22      Please note that this chart was generated using voice recognition Dragon dictation software. Although every effort was made to ensure the accuracy of this automated transcription, some errors in transcription may have occurred.

## 2022-02-25 ENCOUNTER — HOSPITAL ENCOUNTER (OUTPATIENT)
Dept: PAIN MANAGEMENT | Age: 82
Discharge: HOME | End: 2022-02-25
Payer: MEDICARE

## 2022-02-25 VITALS
OXYGEN SATURATION: 99 % | DIASTOLIC BLOOD PRESSURE: 55 MMHG | RESPIRATION RATE: 18 BRPM | TEMPERATURE: 97.3 F | HEART RATE: 73 BPM | SYSTOLIC BLOOD PRESSURE: 134 MMHG

## 2022-02-25 DIAGNOSIS — M43.06 LUMBAR SPONDYLOLYSIS: ICD-10-CM

## 2022-02-25 DIAGNOSIS — M47.816 LUMBAR SPONDYLOSIS: ICD-10-CM

## 2022-02-25 DIAGNOSIS — M46.1 SACROILIITIS (HCC): ICD-10-CM

## 2022-02-25 DIAGNOSIS — Z51.81 ENCOUNTER FOR MEDICATION MONITORING: ICD-10-CM

## 2022-02-25 DIAGNOSIS — M50.30 DDD (DEGENERATIVE DISC DISEASE), CERVICAL: Primary | ICD-10-CM

## 2022-02-25 DIAGNOSIS — M96.1 FAILED BACK SYNDROME: ICD-10-CM

## 2022-02-25 DIAGNOSIS — M48.02 CERVICAL STENOSIS OF SPINE: ICD-10-CM

## 2022-02-25 PROCEDURE — 99213 OFFICE O/P EST LOW 20 MIN: CPT

## 2022-02-25 PROCEDURE — 99213 OFFICE O/P EST LOW 20 MIN: CPT | Performed by: NURSE PRACTITIONER

## 2022-02-25 RX ORDER — OXYCODONE AND ACETAMINOPHEN 10; 325 MG/1; MG/1
1 TABLET ORAL EVERY 6 HOURS PRN
Qty: 120 TABLET | Refills: 0 | Status: SHIPPED | OUTPATIENT
Start: 2022-02-28 | End: 2022-03-28 | Stop reason: SDUPTHER

## 2022-02-25 RX ORDER — MORPHINE SULFATE 30 MG/1
30 TABLET, FILM COATED, EXTENDED RELEASE ORAL 2 TIMES DAILY
Qty: 60 TABLET | Refills: 0 | Status: SHIPPED | OUTPATIENT
Start: 2022-02-28 | End: 2022-03-28 | Stop reason: SDUPTHER

## 2022-02-25 RX ORDER — NALOXONE HYDROCHLORIDE 4 MG/.1ML
1 SPRAY NASAL PRN
Qty: 1 EACH | Refills: 0 | Status: SHIPPED | OUTPATIENT
Start: 2022-02-25 | End: 2022-02-25 | Stop reason: SDUPTHER

## 2022-02-25 RX ORDER — NALOXONE HYDROCHLORIDE 4 MG/.1ML
1 SPRAY NASAL PRN
Qty: 1 EACH | Refills: 0 | Status: SHIPPED | OUTPATIENT
Start: 2022-02-25

## 2022-02-25 ASSESSMENT — PAIN SCALES - GENERAL: PAINLEVEL_OUTOF10: 8

## 2022-02-25 ASSESSMENT — ENCOUNTER SYMPTOMS
BACK PAIN: 1
COUGH: 0
SHORTNESS OF BREATH: 0
CONSTIPATION: 0

## 2022-02-25 ASSESSMENT — PAIN DESCRIPTION - FREQUENCY: FREQUENCY: CONTINUOUS

## 2022-02-25 ASSESSMENT — PAIN - FUNCTIONAL ASSESSMENT: PAIN_FUNCTIONAL_ASSESSMENT: PREVENTS OR INTERFERES SOME ACTIVE ACTIVITIES AND ADLS

## 2022-02-25 ASSESSMENT — PAIN DESCRIPTION - PROGRESSION: CLINICAL_PROGRESSION: NOT CHANGED

## 2022-02-25 ASSESSMENT — PAIN DESCRIPTION - DESCRIPTORS: DESCRIPTORS: ACHING;CONSTANT

## 2022-02-25 ASSESSMENT — PAIN DESCRIPTION - ORIENTATION: ORIENTATION: LOWER

## 2022-02-25 ASSESSMENT — PAIN DESCRIPTION - PAIN TYPE: TYPE: CHRONIC PAIN

## 2022-02-25 ASSESSMENT — PAIN DESCRIPTION - ONSET: ONSET: ON-GOING

## 2022-02-25 ASSESSMENT — PAIN DESCRIPTION - LOCATION: LOCATION: BACK

## 2022-02-25 NOTE — PROGRESS NOTES
Chief Complaint: back pain, right hip pain    [facility]  Patient complains of back pain that started years ago. She has undergone several interventions but pain continues. She had SCS implanted in 2012 at [facility] but it was removed as it was not functioning well.  She has started going to the [facility] to exercise - using the stationary bike - with benefit. She did see neurosurgeon. Imaging of the spine completed - severe stenosis at T10-11 and moderate stenosis at L1-2. She had a follow up with NS and discussed surgery. She would like to hold off on any surgical interventions at this time and plans to try conservative measures.      Also complaining of right hip pain That started after a fall last month. She did not go to ER or see PCP after the fall. She did see Dr. Maureen Crocker for follow up and had XR of pelvis. She had right greater trochanter injection with good relief. She had cervical fusion 11/2019 with Dr. Samreen Blair. Back Pain  This is a chronic problem. The current episode started more than 1 year ago. The problem occurs constantly. The problem is unchanged. The pain is present in the lumbar spine. The quality of the pain is described as aching. The pain does not radiate. The pain is at a severity of 8/10. The pain is moderate. The symptoms are aggravated by position and standing. Associated symptoms include numbness. Pertinent negatives include no chest pain or fever. She has tried analgesics and bed rest for the symptoms. The treatment provided mild relief. Hip Pain   The incident occurred more than 1 week ago. The pain is present in the right hip. The pain is at a severity of 1/10. The pain is mild. The pain has been improving since onset. Associated symptoms include numbness. The symptoms are aggravated by movement and weight bearing. She has tried non-weight bearing and rest for the symptoms. The treatment provided mild relief. Patient denies any new neurological symptoms.  No bowel or bladder incontinence, no weakness, and no falling. Pill count: appropriate due 2/28    Morphine equivalent: 120    Periodic Controlled Substance Monitoring: Possible medication side effects, risk of tolerance/dependence & alternative treatments discussed. ,No signs of potential drug abuse or diversion identified. ,Obtaining appropriate analgesic effect of treatment. Shan Fatemeh Martínez, APRN - CNP)  Chronic Pain > 80 MEDD: Obtained or confirmed \"Medication Contract\" on file.,Co-prescribed Naloxone.  (Hira Cates, APRN - CNP)      Past Medical History:   Diagnosis Date    Achilles tendonitis     right    Asthma     COPD [facility] disease) [facility])     Cough     clear phlegm    Degenerative lumbar disc     DM (diabetes mellitus), type 2 (HCC)     PCP Dr. Oscar Antonio, seen 8/2019    Failed back syndrome, lumbar     Fast heart beat     Hx of \"8-10 years ago\"    Fibromyalgia     History of bladder cancer     Hypertension     Hypothyroid     Kidney stones     Lumbar radiculopathy     Lumbar spondylolysis     Lumbar spondylosis     Osteoarthritis     Sleep apnea     uses CPAP machine nightly    Spinal stenosis in cervical region     Wears glasses        Past Surgical History:   Procedure Laterality Date    BACK SURGERY  2010    lumbar fusion    BLADDER TUMOR EXCISION  2005    CARDIOVERSION      \"8-10 years ago\"  to get \"heart into regular rhythm\"    CARPAL TUNNEL RELEASE Bilateral     CATARACT REMOVAL WITH IMPLANT Bilateral 7/22/2014, 8/5/2014    Raffoul/StFrancesrkorina    CERVICAL FUSION  11/14/2019     ANTERIOR CERVICAL DECOMPRESSION FUSION C3-4, C4-5    CERVICAL FUSION N/A 11/14/2019    ANTERIOR CERVICAL DECOMPRESSION FUSION C3-4, C4-5 (SUPINE) DEPUY, REGULAR TABLE, MICROSCOPE, C-ARM, EVOKES (# [id number] ELIZA) performed by Tracy Coyle DO at [address] [place] [address] 8/18/2017    CHOLECYSTECTOMY LAPAROSCOPIC ROBOTIC MULTIPORT performed by Rosalie Soriano Casey Crews MD at [address]  1/2013    CYSTOSCOPY  11/04/2016    CYSTOSCOPY  01/04/2019    ENDOSCOPIC ULTRASOUND (LOWER) N/A 8/17/2017    ENDOSCOPIC ULTRASOUND performed by Ruperto Rm MD at [address] (UPPER)  08/17/2017    A cursory view of the gastric mucosa was normal. The scope was then further advanced through a patent pylorus to the second portion of the duodenum. The Gallbladder was evaluated in bulb position. Thick sludge was noted. The CBD was 7.8 mm with no filling defects. The PD was 5 mm in the HOP. The pancreatic tissue was edematous in body and tail.  KNEE ARTHROSCOPY Left     LUMBAR FUSION      PA OFFICE/OUTPT VISIT,PROCEDURE ONLY N/A 1/24/2018    SPINAL HARDWARE REMOVAL LUMBAR BONE STIMULATOR performed by Nikko Wall MD at [address] Bilateral 1989    SKIN BIOPSY Right 11/2015    mole right posterior shoulder    SPINE SURGERY      spinal cord stimulator    SPINE SURGERY  91-4-15    renoval of spinal cord stimulator leads and battery    PASQUALE AND BSO      TUBAL LIGATION      UVULOPALATOPHARYGOPLASTY  2006       Allergies   Allergen Reactions    Azithromycin Shortness Of Breath     Tightness in chest    Adhesive Tape      OK to use paper tape per Patient- 2/19/20 patient states that she has been using all types of tape with no reaction         Current Outpatient Medications:     morphine (MS CONTIN) 30 MG extended release tablet, Take 1 tablet by mouth 2 times daily for 30 days. , Disp: 60 tablet, Rfl: 0    oxyCODONE-acetaminophen (PERCOCET)  MG per tablet, Take 1 tablet by mouth every 6 hours as needed for Pain for up to 30 days. , Disp: 120 tablet, Rfl: 0    naloxone 4 MG/0.1ML LIQD nasal spray, 1 spray by Nasal route as needed for Opioid Reversal, Disp: 1 each, Rfl: 0    naloxone 4 MG/0.1ML LIQD nasal spray, 1 spray by Nasal route as needed for Opioid Reversal, Disp: 1 each, Rfl: 5    albuterol sulfate  (90 Base) MCG/ACT inhaler, Inhale 1 puff into the lungs daily, Disp: , Rfl:     potassium chloride (MICRO-K) 10 MEQ extended release capsule, Take 1 capsule by mouth daily, Disp: , Rfl:     bumetanide (BUMEX) 1 MG tablet, , Disp: , Rfl:     sennosides-docusate sodium (SENOKOT-S) 8.6-50 MG tablet, Take 1 tablet by mouth daily as needed, Disp: , Rfl:     psyllium (KONSYL) 28.3 % PACK, Take 1 packet by mouth daily, Disp: , Rfl:     fluticasone (FLONASE) 50 MCG/ACT nasal spray, 1 spray by Each Nostril route daily, Disp: , Rfl:     fluticasone (FLOVENT HFA) 44 MCG/ACT inhaler, Inhale 1 puff into the lungs 2 times daily , Disp: , Rfl:     diclofenac sodium 1 % GEL, Apply 2 g topically 2 times daily as needed for Pain, Disp: , Rfl:     magnesium hydroxide (DULCOLAX MILK OF MAGNESIA) 400 MG/5ML suspension, Take 5 mLs by mouth daily as needed for Constipation, Disp: , Rfl:     pregabalin (LYRICA) 150 MG capsule, Take 150 mg by mouth 2 times daily. , Disp: , Rfl:     montelukast (SINGULAIR) 10 MG tablet, Take 10 mg by mouth nightly , Disp: , Rfl:     metFORMIN (GLUCOPHAGE) 500 MG tablet, Take 500 mg by mouth 2 times daily (with meals). , Disp: , Rfl:     telmisartan-hydrochlorothiazide (MICARDIS HCT) 40-12.5 MG per tablet, Take 1 tablet by mouth daily. , Disp: , Rfl:     levothyroxine (SYNTHROID) 50 MCG tablet, Take 50 mcg by mouth Daily. , Disp: , Rfl:     omeprazole (PRILOSEC) 20 MG capsule, Take 20 mg by mouth every evening., Disp: , Rfl:     aspirin 81 MG tablet, Take 81 mg by mouth daily. , Disp: , Rfl:     Family History   Problem Relation Age of Onset    Heart Failure Mother     Lung Cancer Mother     Other Father          pneumonia    Alzheimer's Disease Father     Lung Cancer Brother     Diabetes Paternal Grandmother     Other Daughter         Fibromyalsia    Rheum Arthritis Daughter        Social History     Socioeconomic History    Marital status:      Spouse name: [facility] Number of shortness of breath. Musculoskeletal: Positive for back pain and joint pain. Gastrointestinal: Negative for constipation. Neurological: Positive for numbness. Negative for disturbances in coordination and loss of balance. Physical Exam:  BP (!) 134/55   Pulse 73   Temp 97.3 °F (36.3 °C) (Skin)   Resp 18   SpO2 99%     Physical Exam  HENT:      Head: Normocephalic. Pulmonary:      Effort: Pulmonary effort is normal.   Musculoskeletal:         General: Normal range of motion. Cervical back: Normal range of motion. Lumbar back: Tenderness present. Right hip: Tenderness present. Skin:     General: Skin is warm and dry. Neurological:      Mental Status: She is alert and oriented to person, place, and time. Record/Diagnostics Review:    Last georgie 11/2021 and was appropriate     Assessment:  Problem List Items Addressed This Visit     Lumbar spondylolysis    Encounter for medication monitoring    Sacroiliitis (HCC)    Relevant Medications    morphine (MS CONTIN) 30 MG extended release tablet (Start on 2/28/2022)    oxyCODONE-acetaminophen (PERCOCET)  MG per tablet (Start on 2/28/2022)    Lumbar spondylosis    Relevant Medications    morphine (MS CONTIN) 30 MG extended release tablet (Start on 2/28/2022)    oxyCODONE-acetaminophen (PERCOCET)  MG per tablet (Start on 2/28/2022)    DDD (degenerative disc disease), cervical - Primary    Relevant Medications    morphine (MS CONTIN) 30 MG extended release tablet (Start on 2/28/2022)    oxyCODONE-acetaminophen (PERCOCET)  MG per tablet (Start on 2/28/2022)    Failed back syndrome    Relevant Medications    morphine (MS CONTIN) 30 MG extended release tablet (Start on 2/28/2022)    oxyCODONE-acetaminophen (PERCOCET)  MG per tablet (Start on 2/28/2022)    Cervical stenosis of spine             Treatment Plan:  Patient relates current medications are helping the pain.  Patient reports taking pain medications as

## 2022-02-26 ENCOUNTER — HOSPITAL ENCOUNTER (OUTPATIENT)
Age: 82
Discharge: HOME | End: 2022-02-26
Payer: MEDICARE

## 2022-02-26 LAB
ABSOLUTE EOS #: 0.1 K/UL (ref 0–0.4)
ABSOLUTE LYMPH #: 1.4 K/UL (ref 1–4.8)
ABSOLUTE MONO #: 0.3 K/UL (ref 0.1–1.3)
ALBUMIN SERPL-MCNC: 4.1 G/DL (ref 3.5–5.2)
ALP BLD-CCNC: 92 U/L (ref 35–104)
ALT SERPL-CCNC: 10 U/L (ref 5–33)
ANION GAP SERPL CALCULATED.3IONS-SCNC: 9 MMOL/L (ref 9–17)
AST SERPL-CCNC: 11 U/L
BASOPHILS # BLD: 1 % (ref 0–2)
BASOPHILS ABSOLUTE: 0 K/UL (ref 0–0.2)
BILIRUB SERPL-MCNC: 0.54 MG/DL (ref 0.3–1.2)
BILIRUBIN URINE: NEGATIVE
BUN BLDV-MCNC: 23 MG/DL (ref 8–23)
CALCIUM SERPL-MCNC: 8.9 MG/DL (ref 8.6–10.4)
CHLORIDE BLD-SCNC: 103 MMOL/L (ref 98–107)
CHOLESTEROL/HDL RATIO: 2.7
CHOLESTEROL: 178 MG/DL
CO2: 27 MMOL/L (ref 20–31)
COLOR: YELLOW
COMMENT UA: NORMAL
CREAT SERPL-MCNC: 0.92 MG/DL (ref 0.5–0.9)
CREATININE URINE: 89.2 MG/DL (ref 28–217)
EOSINOPHILS RELATIVE PERCENT: 2 % (ref 0–4)
GFR AFRICAN AMERICAN: >60 ML/MIN
GFR NON-AFRICAN AMERICAN: 59 ML/MIN
GFR SERPL CREATININE-BSD FRML MDRD: ABNORMAL ML/MIN/{1.73_M2}
GLUCOSE BLD-MCNC: 99 MG/DL (ref 70–99)
GLUCOSE URINE: NEGATIVE
HCT VFR BLD CALC: 31.3 % (ref 36–46)
HDLC SERPL-MCNC: 67 MG/DL
HEMOGLOBIN: 10 G/DL (ref 12–16)
KETONES, URINE: NEGATIVE
LDL CHOLESTEROL: 91 MG/DL (ref 0–130)
LEUKOCYTE ESTERASE, URINE: NEGATIVE
LYMPHOCYTES # BLD: 31 % (ref 24–44)
MCH RBC QN AUTO: 27.1 PG (ref 26–34)
MCHC RBC AUTO-ENTMCNC: 32.1 G/DL (ref 31–37)
MCV RBC AUTO: 84.4 FL (ref 80–100)
MICROALBUMIN/CREAT 24H UR: <12 MG/L
MICROALBUMIN/CREAT UR-RTO: NORMAL MCG/MG CREAT
MONOCYTES # BLD: 7 % (ref 1–7)
NITRITE, URINE: NEGATIVE
PDW BLD-RTO: 15.7 % (ref 11.5–14.9)
PH UA: 7.5 (ref 5–8)
PLATELET # BLD: 225 K/UL (ref 150–450)
PMV BLD AUTO: 8.8 FL (ref 6–12)
POTASSIUM SERPL-SCNC: 4.1 MMOL/L (ref 3.7–5.3)
PROTEIN UA: NEGATIVE
RBC # BLD: 3.71 M/UL (ref 4–5.2)
SEG NEUTROPHILS: 59 % (ref 36–66)
SEGMENTED NEUTROPHILS ABSOLUTE COUNT: 2.8 K/UL (ref 1.3–9.1)
SODIUM BLD-SCNC: 139 MMOL/L (ref 135–144)
SPECIFIC GRAVITY UA: 1.02 (ref 1–1.03)
THYROXINE, FREE: 1.4 NG/DL (ref 0.93–1.7)
TOTAL PROTEIN: 6.3 G/DL (ref 6.4–8.3)
TRIGL SERPL-MCNC: 98 MG/DL
TSH SERPL DL<=0.05 MIU/L-ACNC: 0.67 MIU/L (ref 0.3–5)
TURBIDITY: CLEAR
URINE HGB: NEGATIVE
UROBILINOGEN, URINE: NORMAL
WBC # BLD: 4.6 K/UL (ref 3.5–11)

## 2022-02-26 PROCEDURE — 80053 COMPREHEN METABOLIC PANEL: CPT

## 2022-02-26 PROCEDURE — 84439 ASSAY OF FREE THYROXINE: CPT

## 2022-02-26 PROCEDURE — 82570 ASSAY OF URINE CREATININE: CPT

## 2022-02-26 PROCEDURE — 85025 COMPLETE CBC W/AUTO DIFF WBC: CPT

## 2022-02-26 PROCEDURE — 82043 UR ALBUMIN QUANTITATIVE: CPT

## 2022-02-26 PROCEDURE — 81003 URINALYSIS AUTO W/O SCOPE: CPT

## 2022-02-26 PROCEDURE — 83036 HEMOGLOBIN GLYCOSYLATED A1C: CPT

## 2022-02-26 PROCEDURE — 82746 ASSAY OF FOLIC ACID SERUM: CPT

## 2022-02-26 PROCEDURE — 84443 ASSAY THYROID STIM HORMONE: CPT

## 2022-02-26 PROCEDURE — 36415 COLL VENOUS BLD VENIPUNCTURE: CPT

## 2022-02-26 PROCEDURE — 80061 LIPID PANEL: CPT

## 2022-02-26 PROCEDURE — 82306 VITAMIN D 25 HYDROXY: CPT

## 2022-02-26 PROCEDURE — 82607 VITAMIN B-12: CPT

## 2022-02-27 LAB
ESTIMATED AVERAGE GLUCOSE: 126 MG/DL
FOLATE: 11.8 NG/ML
HBA1C MFR BLD: 6 % (ref 4–6)
VITAMIN B-12: <150 PG/ML (ref 232–1245)
VITAMIN D 25-HYDROXY: 15.3 NG/ML (ref 30–100)

## 2022-03-28 ENCOUNTER — HOSPITAL ENCOUNTER (OUTPATIENT)
Dept: PAIN MANAGEMENT | Age: 82
Discharge: HOME | End: 2022-03-28
Payer: MEDICARE

## 2022-03-28 VITALS
TEMPERATURE: 97.1 F | DIASTOLIC BLOOD PRESSURE: 57 MMHG | HEIGHT: 58 IN | RESPIRATION RATE: 18 BRPM | BODY MASS INDEX: 31.07 KG/M2 | SYSTOLIC BLOOD PRESSURE: 129 MMHG | WEIGHT: 148 LBS | OXYGEN SATURATION: 97 % | HEART RATE: 72 BPM

## 2022-03-28 DIAGNOSIS — M46.1 SACROILIITIS (HCC): ICD-10-CM

## 2022-03-28 DIAGNOSIS — M96.1 FAILED BACK SYNDROME: ICD-10-CM

## 2022-03-28 DIAGNOSIS — M54.16 LUMBAR RADICULOPATHY: ICD-10-CM

## 2022-03-28 DIAGNOSIS — R26.89 IMBALANCE: Primary | ICD-10-CM

## 2022-03-28 DIAGNOSIS — Z51.81 ENCOUNTER FOR MEDICATION MONITORING: ICD-10-CM

## 2022-03-28 DIAGNOSIS — M50.30 DDD (DEGENERATIVE DISC DISEASE), CERVICAL: ICD-10-CM

## 2022-03-28 DIAGNOSIS — M43.06 LUMBAR SPONDYLOLYSIS: ICD-10-CM

## 2022-03-28 DIAGNOSIS — M48.02 CERVICAL STENOSIS OF SPINE: ICD-10-CM

## 2022-03-28 PROCEDURE — 99213 OFFICE O/P EST LOW 20 MIN: CPT

## 2022-03-28 PROCEDURE — 99213 OFFICE O/P EST LOW 20 MIN: CPT | Performed by: NURSE PRACTITIONER

## 2022-03-28 RX ORDER — MORPHINE SULFATE 30 MG/1
30 TABLET, FILM COATED, EXTENDED RELEASE ORAL 2 TIMES DAILY
Qty: 60 TABLET | Refills: 0 | Status: SHIPPED | OUTPATIENT
Start: 2022-03-30 | End: 2022-04-25 | Stop reason: SDUPTHER

## 2022-03-28 RX ORDER — OXYCODONE AND ACETAMINOPHEN 10; 325 MG/1; MG/1
1 TABLET ORAL EVERY 6 HOURS PRN
Qty: 120 TABLET | Refills: 0 | Status: SHIPPED | OUTPATIENT
Start: 2022-03-30 | End: 2022-04-25 | Stop reason: SDUPTHER

## 2022-03-28 ASSESSMENT — ENCOUNTER SYMPTOMS
SHORTNESS OF BREATH: 0
CONSTIPATION: 0
BACK PAIN: 1
BOWEL INCONTINENCE: 0
COUGH: 0

## 2022-03-28 ASSESSMENT — PAIN SCALES - GENERAL: PAINLEVEL_OUTOF10: 5

## 2022-03-28 NOTE — PROGRESS NOTES
Chief Complaint   Patient presents with    Back Pain    Medication Refill         [facility]  Patient complains of back pain that started years ago. She has undergone several interventions but pain continues. She had SCS implanted in 2012 at [facility] but it was removed as it was not functioning well.  She has started going to the [facility] to exercise - using the stationary bike - with benefit. She did see neurosurgeon. Imaging of the spine completed - severe stenosis at T10-11 and moderate stenosis at L1-2. She had a follow up with NS and discussed surgery. She would like to hold off on any surgical interventions at this time and plans to try conservative measures.      Also complaining of right hip pain That started after a fall last month. She did not go to ER or see PCP after the fall. She did see Dr. Kimmie Kaye for follow up and had XR of pelvis. She had right greater trochanter injection with good relief.      She had cervical fusion 11/2019 with Dr. Corina Leonard. Back Pain  This is a chronic problem. The current episode started more than 1 year ago. The problem occurs constantly. The problem has been gradually worsening since onset. The pain is present in the lumbar spine. The quality of the pain is described as aching. The pain does not radiate. The pain is at a severity of 5/10. The pain is moderate. The pain is worse during the day. The symptoms are aggravated by bending, sitting and standing. Associated symptoms include numbness. Pertinent negatives include no bladder incontinence, bowel incontinence, chest pain, fever, headaches, leg pain, tingling or weakness. She has tried heat, ice and analgesics (PT, injections) for the symptoms. The treatment provided mild relief. Patient denies any new neurological symptoms. No bowel or bladder incontinence, no weakness, and no falling.     Pill count: appropriate / Morphine - 5 Oxycodone - 21 due 3/30    Morphine equivalent: 120    Controlled Substance Monitoring:    Acute and Chronic Pain Monitoring:   RX Monitoring 3/28/2022   Attestation -   Acute Pain Prescriptions -   Periodic Controlled Substance Monitoring Possible medication side effects, risk of tolerance/dependence & alternative treatments discussed. ;No signs of potential drug abuse or diversion identified.;Obtaining appropriate analgesic effect of treatment. Chronic Pain > 50 MEDD Re-evaluated the status of the patient's underlying condition causing pain.;Obtained or confirmed \"Consent for Opioid Use\" on file. Chronic Pain > 80 MEDD Co-prescribed Naloxone. Chronic Pain > 120 MEDD Co-prescribed Naloxone.          Past Medical History:   Diagnosis Date    Achilles tendonitis     right    Asthma     COPD [facility]) ([facility])     Cough     clear phlegm    Degenerative lumbar disc     DM (diabetes mellitus), type 2 ([facility])     PCP Dr. Gelene Najjar, seen 8/2019    Failed back syndrome, lumbar     Fast heart beat     Hx of \"8-10 years ago\"    Fibromyalgia     History of bladder cancer     Hypertension     Hypothyroid     Kidney stones     Lumbar radiculopathy     Lumbar spondylolysis     Lumbar spondylosis     Osteoarthritis     Sleep apnea     uses CPAP machine nightly    Spinal stenosis in cervical region     Wears glasses        Past Surgical History:   Procedure Laterality Date    BACK SURGERY  2010    lumbar fusion    BLADDER TUMOR EXCISION  2005    CARDIOVERSION      \"8-10 years ago\"  to get \"heart into regular rhythm\"    CARPAL TUNNEL RELEASE Bilateral     CATARACT REMOVAL WITH IMPLANT Bilateral 7/22/2014, 8/5/2014    Dilma/Demian    CERVICAL FUSION  11/14/2019     ANTERIOR CERVICAL DECOMPRESSION FUSION C3-4, C4-5    CERVICAL FUSION N/A 11/14/2019    ANTERIOR CERVICAL DECOMPRESSION FUSION C3-4, C4-5 (SUPINE) DEPUY, REGULAR TABLE, MICROSCOPE, C-ARM, EVOKES (# [id number] ELIZA) performed by Ritika Au DO at [facility] N/A 8/18/2017    CHOLECYSTECTOMY LAPAROSCOPIC ROBOTIC MULTIPORT performed by Pito Mtz MD at [address] [place]  1/2013    CYSTOSCOPY  11/04/2016    CYSTOSCOPY  01/04/2019    ENDOSCOPIC ULTRASOUND (LOWER) N/A 8/17/2017    ENDOSCOPIC ULTRASOUND performed by David Singh MD at [address] (UPPER)  08/17/2017    A cursory view of the gastric mucosa was normal. The scope was then further advanced through a patent pylorus to the second portion of the duodenum. The Gallbladder was evaluated in bulb position. Thick sludge was noted. The CBD was 7.8 mm with no filling defects. The PD was 5 mm in the HOP. The pancreatic tissue was edematous in body and tail.  KNEE ARTHROSCOPY Left     LUMBAR FUSION      [place] OFFICE/OUTPT VISIT,PROCEDURE ONLY N/A 1/24/2018    SPINAL HARDWARE REMOVAL LUMBAR BONE STIMULATOR performed by Edenilson Mejía MD at [address] [facility] Bilateral 1989    SKIN BIOPSY Right 11/2015    mole right posterior shoulder    SPINE SURGERY      spinal cord stimulator    SPINE SURGERY  91-4-15    renoval of spinal cord stimulator leads and battery    PASQUALE AND BSO      TUBAL LIGATION      UVULOPALATOPHARYGOPLASTY  2006       Allergies   Allergen Reactions    Azithromycin Shortness Of Breath     Tightness in chest    Adhesive Tape      OK to use paper tape per Patient- 2/19/20 patient states that she has been using all types of tape with no reaction         Current Outpatient Medications:     morphine (MS CONTIN) 30 MG extended release tablet, Take 1 tablet by mouth 2 times daily for 30 days. , Disp: 60 tablet, Rfl: 0    oxyCODONE-acetaminophen (PERCOCET)  MG per tablet, Take 1 tablet by mouth every 6 hours as needed for Pain for up to 30 days. , Disp: 120 tablet, Rfl: 0    naloxone 4 MG/0.1ML LIQD nasal spray, 1 spray by Nasal route as needed for Opioid Reversal, Disp: 1 each, Rfl: 0    albuterol sulfate  (90 Base) MCG/ACT inhaler, Inhale 1 puff into the lungs daily, Disp: , Rfl:     potassium chloride (MICRO-K) 10 MEQ extended release capsule, Take 1 capsule by mouth daily, Disp: , Rfl:     bumetanide (BUMEX) 1 MG tablet, , Disp: , Rfl:     sennosides-docusate sodium (SENOKOT-S) 8.6-50 MG tablet, Take 1 tablet by mouth daily as needed, Disp: , Rfl:     psyllium (KONSYL) 28.3 % PACK, Take 1 packet by mouth daily, Disp: , Rfl:     fluticasone (FLONASE) 50 MCG/ACT nasal spray, 1 spray by Each Nostril route daily, Disp: , Rfl:     fluticasone (FLOVENT HFA) 44 MCG/ACT inhaler, Inhale 1 puff into the lungs 2 times daily , Disp: , Rfl:     diclofenac sodium 1 % GEL, Apply 2 g topically 2 times daily as needed for Pain, Disp: , Rfl:     magnesium hydroxide (DULCOLAX MILK OF MAGNESIA) 400 MG/5ML suspension, Take 5 mLs by mouth daily as needed for Constipation, Disp: , Rfl:     pregabalin (LYRICA) 150 MG capsule, Take 150 mg by mouth 2 times daily. , Disp: , Rfl:     montelukast (SINGULAIR) 10 MG tablet, Take 10 mg by mouth nightly , Disp: , Rfl:     metFORMIN (GLUCOPHAGE) 500 MG tablet, Take 500 mg by mouth 2 times daily (with meals). , Disp: , Rfl:     telmisartan-hydrochlorothiazide (MICARDIS HCT) 40-12.5 MG per tablet, Take 1 tablet by mouth daily. , Disp: , Rfl:     levothyroxine (SYNTHROID) 50 MCG tablet, Take 50 mcg by mouth Daily. , Disp: , Rfl:     omeprazole (PRILOSEC) 20 MG capsule, Take 20 mg by mouth every evening., Disp: , Rfl:     aspirin 81 MG tablet, Take 81 mg by mouth daily. , Disp: , Rfl:     Family History   Problem Relation Age of Onset    Heart Failure Mother     Lung Cancer Mother     Other Father          pneumonia    Alzheimer's Disease Father     Lung Cancer Brother     Diabetes Paternal Grandmother     Other Daughter         Fibromyalsia    Rheum Arthritis Daughter        Social History     Socioeconomic History    Marital status:      Spouse name: De Standing Number of children: 3    Years of education: Not on file    Highest education level: Not on file   Occupational History    Occupation: retired   Tobacco Use    Smoking status: Never Smoker    Smokeless tobacco: Never Used   Vaping Use    Vaping Use: Never used   Substance and Sexual Activity    Alcohol use: Not Currently     Comment: rare    Drug use: Never    Sexual activity: Yes     Partners: Male   Other Topics Concern    Not on file   Social History Narrative    Not on file     Social Determinants of Health     Financial Resource Strain:     Difficulty of Paying Living Expenses: Not on file   Food Insecurity:     Worried About [address] in the Last Year: Not on file    Jeannie of Food in the Last Year: Not on file   Transportation Needs:     Lack of Transportation (Medical): Not on file    Lack of Transportation (Non-Medical): Not on file   Physical Activity:     Days of Exercise per Week: Not on file    Minutes of Exercise per Session: Not on file   Stress:     Feeling of Stress : Not on file   Social Connections:     Frequency of Communication with Friends and Family: Not on file    Frequency of Social Gatherings with Friends and Family: Not on file    Attends [employer] Services: Not on file    Active Member of [address] [place] [address] or Organizations: Not on file    Attends Club or Organization Meetings: Not on file    Marital Status: Not on file   Intimate Partner Violence:     Fear of Current or Ex-Partner: Not on file    Emotionally Abused: Not on file    Physically Abused: Not on file    Sexually Abused: Not on file   Housing Stability:     Unable to Pay for Housing in the Last Year: Not on file    Number of [place] in the Last Year: Not on file    Unstable Housing in the Last Year: Not on file       Review of Systems:  Review of Systems   Constitutional: Negative for chills and fever. Cardiovascular: Negative for chest pain and palpitations. Respiratory: Negative for cough and shortness of breath.     Musculoskeletal: Positive for back pain. Gastrointestinal: Negative for bowel incontinence and constipation. Genitourinary: Negative for bladder incontinence. Neurological: Positive for numbness. Negative for disturbances in coordination, headaches, loss of balance, tingling and weakness. Physical Exam:  BP (!) 129/57   Pulse 72   Temp 97.1 °F (36.2 °C)   Resp 18   Ht 4' 10\" (1.473 m)   Wt 148 lb (67.1 kg)   SpO2 97%   BMI 30.93 kg/m²     Physical Exam  HENT:      Head: Normocephalic. Pulmonary:      Effort: Pulmonary effort is normal.   Musculoskeletal:         General: Normal range of motion. Cervical back: Normal range of motion. Lumbar back: Tenderness present. Skin:     General: Skin is warm and dry. Neurological:      Mental Status: She is alert and oriented to person, place, and time. Record/Diagnostics Review:    Last georgie 11/2021 and was appropriate     Assessment:  Problem List Items Addressed This Visit     Imbalance - Primary (Chronic)    Lumbar radiculopathy    Lumbar spondylolysis    Encounter for medication monitoring    Sacroiliitis (HCC)    Relevant Medications    morphine (MS CONTIN) 30 MG extended release tablet (Start on 3/30/2022)    oxyCODONE-acetaminophen (PERCOCET)  MG per tablet (Start on 3/30/2022)    DDD (degenerative disc disease), cervical    Relevant Medications    morphine (MS CONTIN) 30 MG extended release tablet (Start on 3/30/2022)    oxyCODONE-acetaminophen (PERCOCET)  MG per tablet (Start on 3/30/2022)    Failed back syndrome    Relevant Medications    morphine (MS CONTIN) 30 MG extended release tablet (Start on 3/30/2022)    oxyCODONE-acetaminophen (PERCOCET)  MG per tablet (Start on 3/30/2022)    Cervical stenosis of spine             Treatment Plan:  Patient relates current medications are helping the pain.  Patient reports taking pain medications as prescribed, denies obtaining medications from different sources and denies use of illegal drugs. Patient denies side effects from medications like nausea, vomiting, constipation or drowsiness. Patient reports current activities of daily living are possible due to medications and would like to continue them. As always, we encourage daily stretching and strengthening exercises, and recommend minimizing use of pain medications unless patient cannot get through daily activities due to pain. Contract requirements met. Continue opioid therapy. Script written for [facility] and percocet  Discussed different treatment options including continued conservative care such as physical therapy, chiropractic care, acupuncture. Discussed different interventional options such as epidural steroids or medial branch blocks. She declines - states she has had them with no relief  Also discussed neuromodulation in the form of spinal cord stimulation. She declines - states she had one in the past and it did not help and it was removed. Also discussed surgical evaluation. She has seen NS and declines surgery.    She has seen a surgeon at the [facility] was ordered but did not follow up with this  Follow up appointment made for 4 weeks

## 2022-04-13 ENCOUNTER — OFFICE VISIT (OUTPATIENT)
Dept: ORTHOPEDIC SURGERY | Age: 82
End: 2022-04-13
Payer: MEDICARE

## 2022-04-13 DIAGNOSIS — M25.561 PAIN IN BOTH KNEES, UNSPECIFIED CHRONICITY: Primary | ICD-10-CM

## 2022-04-13 DIAGNOSIS — M25.562 PAIN IN BOTH KNEES, UNSPECIFIED CHRONICITY: Primary | ICD-10-CM

## 2022-04-13 PROCEDURE — 20610 DRAIN/INJ JOINT/BURSA W/O US: CPT | Performed by: ORTHOPAEDIC SURGERY

## 2022-04-13 PROCEDURE — 99213 OFFICE O/P EST LOW 20 MIN: CPT | Performed by: ORTHOPAEDIC SURGERY

## 2022-04-13 RX ORDER — BETAMETHASONE SODIUM PHOSPHATE AND BETAMETHASONE ACETATE 3; 3 MG/ML; MG/ML
12 INJECTION, SUSPENSION INTRA-ARTICULAR; INTRALESIONAL; INTRAMUSCULAR; SOFT TISSUE ONCE
Status: COMPLETED | OUTPATIENT
Start: 2022-04-13 | End: 2022-04-13

## 2022-04-13 RX ORDER — LIDOCAINE HYDROCHLORIDE 10 MG/ML
2 INJECTION, SOLUTION INFILTRATION; PERINEURAL ONCE
Status: COMPLETED | OUTPATIENT
Start: 2022-04-13 | End: 2022-04-13

## 2022-04-13 RX ORDER — BUPIVACAINE HYDROCHLORIDE 5 MG/ML
2 INJECTION, SOLUTION PERINEURAL ONCE
Status: COMPLETED | OUTPATIENT
Start: 2022-04-13 | End: 2022-04-13

## 2022-04-13 RX ORDER — MELOXICAM 15 MG/1
15 TABLET ORAL DAILY
Qty: 30 TABLET | Refills: 3 | Status: CANCELLED | OUTPATIENT
Start: 2022-04-13

## 2022-04-13 RX ADMIN — LIDOCAINE HYDROCHLORIDE 2 ML: 10 INJECTION, SOLUTION INFILTRATION; PERINEURAL at 15:41

## 2022-04-13 RX ADMIN — BUPIVACAINE HYDROCHLORIDE 10 MG: 5 INJECTION, SOLUTION PERINEURAL at 15:40

## 2022-04-13 RX ADMIN — BETAMETHASONE SODIUM PHOSPHATE AND BETAMETHASONE ACETATE 12 MG: 3; 3 INJECTION, SUSPENSION INTRA-ARTICULAR; INTRALESIONAL; INTRAMUSCULAR; SOFT TISSUE at 15:38

## 2022-04-13 RX ADMIN — BETAMETHASONE SODIUM PHOSPHATE AND BETAMETHASONE ACETATE 12 MG: 3; 3 INJECTION, SUSPENSION INTRA-ARTICULAR; INTRALESIONAL; INTRAMUSCULAR; SOFT TISSUE at 15:39

## 2022-04-13 NOTE — PROGRESS NOTES
Betty Gamboa M.D.            [facility] [address] [facility],[address], [address] [facility]           Dept Phone: [phone]           Dept Fax:  [address]           Armaan Guillen          Dept Phone: [phone]           Dept Fax:  [phone]      Chief Compliant:  Chief Complaint   Patient presents with    Pain     both knees        History of Present Illness: This is a 80 y.o. female who presents to the clinic today for evaluation / follow up of DJD both knees. Fredis Dasilva has had degenerative joint disease of multiple joints over the years. She has had injections to the knees in the past and is here today for reevaluation. .       Review of Systems   Constitutional: Negative for fever, chills, sweats. Eyes: Negative for changes in vision, or pain. HENT: Negative for ear ache, epistaxis, or sore throat. Respiratory/Cardio: Negative for Chest pain, palpitations, SOB, or cough. Gastrointestinal: Negative for abdominal pain, N/V/D. Genitourinary: Negative for dysuria, frequency, urgency, or hematuria. Neurological: Negative for headache, numbness, or weakness. Integumentary: Negative for rash, itching, laceration, or abrasion. Musculoskeletal: Positive for Pain (both knees)       Physical Exam:  Constitutional: Patient is oriented to person, place, and time. Patient appears well-developed and well nourished. HENT: Negative otherwise noted  Head: Normocephalic and Atraumatic  Nose: Normal  Eyes: Conjunctivae and EOM are normal  Neck: Normal range of motion Neck supple. Respiratory/Cardio: Effort normal. No respiratory distress. Musculoskeletal: Physical examination both knees identical.  She has a valgus disposition of both knees she has about a 7 degree flexion fracture.   Motion about 120 degrees crepitus and grinding noted throughout ligamentously grossly intact. No significant effusion noted in either knee    Neurological: Patient is alert and oriented to person, place, and time. Normal strenght. No sensory deficit. Skin: Skin is warm and dry  Psychiatric: Behavior is normal. Thought content normal.  Nursing note and vitals reviewed. Labs and Imaging:     XR taken today:  XR KNEE LEFT (1-2 VIEWS)    Result Date: 4/13/2022  X-rays taken today reviewed by me show a standing AP and lateral views the patient's left knee. Patient has fairly significant degenerative joint disease especially the lateral compartment left knee. She has tricompartmental arthritis as well. No acute process is noted    XR KNEE RIGHT (1-2 VIEWS)    Result Date: 4/13/2022  X-rays today today reviewed by me show standing AP and lateral views the patient's right knee. She has significant degenerative joint disease with valgus gonarthrosis of the especially the lateral compartment. Lateral view also indicates significant patellofemoral disease. Orders Placed This Encounter   Procedures    XR KNEE RIGHT (1-2 VIEWS)     Standing Status:   Future     Number of Occurrences:   1     Standing Expiration Date:   4/13/2023    XR KNEE LEFT (1-2 VIEWS)     Standing Status:   Future     Number of Occurrences:   1     Standing Expiration Date:   4/13/2023    33927 - DRAIN/INJECT LARGE JOINT BURSA       Assessment and Plan:  1. Pain in both knees, unspecified chronicity    2.       Significant degenerative joint disease both knees    Administrations This Visit     betamethasone acetate-betamethasone sodium phosphate (CELESTONE) injection 12 mg     Admin Date  04/13/2022  15:38 Action  Given Dose  12 mg Route  Intra-artICUlar Site  Knee Left Administered By  Liana Nelson LPN    Ordering Provider: José Luis Green MD    NDC: 2824-7534-13    Lot#: [id number]    : [address]    Patient Supplied?: No                This is a 80 y.o. female who presents to the clinic today for evaluation / follow up of significant degenerative joint disease both knees. Past History:    Current Outpatient Medications:     morphine (MS CONTIN) 30 MG extended release tablet, Take 1 tablet by mouth 2 times daily for 30 days. , Disp: 60 tablet, Rfl: 0    oxyCODONE-acetaminophen (PERCOCET)  MG per tablet, Take 1 tablet by mouth every 6 hours as needed for Pain for up to 30 days. , Disp: 120 tablet, Rfl: 0    naloxone 4 MG/0.1ML LIQD nasal spray, 1 spray by Nasal route as needed for Opioid Reversal, Disp: 1 each, Rfl: 0    albuterol sulfate  (90 Base) MCG/ACT inhaler, Inhale 1 puff into the lungs daily, Disp: , Rfl:     potassium chloride (MICRO-K) 10 MEQ extended release capsule, Take 1 capsule by mouth daily, Disp: , Rfl:     bumetanide (BUMEX) 1 MG tablet, , Disp: , Rfl:     sennosides-docusate sodium (SENOKOT-S) 8.6-50 MG tablet, Take 1 tablet by mouth daily as needed, Disp: , Rfl:     psyllium (KONSYL) 28.3 % PACK, Take 1 packet by mouth daily, Disp: , Rfl:     fluticasone (FLONASE) 50 MCG/ACT nasal spray, 1 spray by Each Nostril route daily, Disp: , Rfl:     fluticasone (FLOVENT HFA) 44 MCG/ACT inhaler, Inhale 1 puff into the lungs 2 times daily  (Patient not taking: Reported on 3/28/2022), Disp: , Rfl:     diclofenac sodium 1 % GEL, Apply 2 g topically 2 times daily as needed for Pain, Disp: , Rfl:     magnesium hydroxide (DULCOLAX MILK OF MAGNESIA) 400 MG/5ML suspension, Take 5 mLs by mouth daily as needed for Constipation, Disp: , Rfl:     pregabalin (LYRICA) 150 MG capsule, Take 150 mg by mouth 2 times daily. , Disp: , Rfl:     montelukast (SINGULAIR) 10 MG tablet, Take 10 mg by mouth nightly , Disp: , Rfl:     metFORMIN (GLUCOPHAGE) 500 MG tablet, Take 500 mg by mouth 2 times daily (with meals). , Disp: , Rfl:     telmisartan-hydrochlorothiazide (MICARDIS HCT) 40-12.5 MG per tablet, Take 1 tablet by mouth daily. , Disp: , Rfl:     levothyroxine (SYNTHROID) 50 MCG tablet, Take 50 mcg by mouth Daily. , Disp: , Rfl:     omeprazole (PRILOSEC) 20 MG capsule, Take 20 mg by mouth every evening., Disp: , Rfl:     aspirin 81 MG tablet, Take 81 mg by mouth daily. , Disp: , Rfl:   Allergies   Allergen Reactions    Azithromycin Shortness Of Breath     Tightness in chest    Adhesive Tape      OK to use paper tape per Patient- 2/19/20 patient states that she has been using all types of tape with no reaction     Social History     Socioeconomic History    Marital status:      Spouse name: Yesika Antonio Number of children: 3    Years of education: Not on file    Highest education level: Not on file   Occupational History    Occupation: retired   Tobacco Use    Smoking status: Never Smoker    Smokeless tobacco: Never Used   Vaping Use    Vaping Use: Never used   Substance and Sexual Activity    Alcohol use: Not Currently     Comment: rare    Drug use: Never    Sexual activity: Yes     Partners: Male   Other Topics Concern    Not on file   Social History Narrative    Not on file     Social Determinants of Health     Financial Resource Strain:     Difficulty of Paying Living Expenses: Not on file   Food Insecurity:     Worried About [address] in the Last Year: Not on file    [address] in the Last Year: Not on file   Transportation Needs:     Lack of Transportation (Medical): Not on file    Lack of Transportation (Non-Medical):  Not on file   Physical Activity:     Days of Exercise per Week: Not on file    Minutes of Exercise per Session: Not on file   Stress:     Feeling of Stress : Not on file   Social Connections:     Frequency of Communication with Friends and Family: Not on file    Frequency of Social Gatherings with Friends and Family: Not on file    Attends Bahai Services: Not on file    Active Member of Clubs or Organizations: Not on file    Attends Club or Organization Meetings: Not on file    Marital Status: Not on file   Intimate Partner Violence:     Fear of Current or Ex-Partner: Not on file    Emotionally Abused: Not on file    Physically Abused: Not on file    Sexually Abused: Not on file   Housing Stability:     Unable to Pay for Housing in the Last Year: Not on file    Number of [place] in the Last Year: Not on file    Unstable Housing in the Last Year: Not on file     Past Medical History:   Diagnosis Date    Achilles tendonitis     right    Asthma     COPD (chronic obstructive pulmonary disease) ([facility] [address]     Cough     clear phlegm    Degenerative lumbar disc     DM (diabetes mellitus), type 2 ([facility] [address]     PCP Dr. Billy Burnett, seen 8/2019    Failed back syndrome, lumbar     Fast heart beat     Hx of \"8-10 years ago\"    Fibromyalgia     History of bladder cancer     Hypertension     Hypothyroid     Kidney stones     Lumbar radiculopathy     Lumbar spondylolysis     Lumbar spondylosis     Osteoarthritis     Sleep apnea     uses CPAP machine nightly    Spinal stenosis in cervical region     Wears glasses      Past Surgical History:   Procedure Laterality Date    BACK SURGERY  2010    lumbar fusion    BLADDER TUMOR EXCISION  2005    CARDIOVERSION      \"8-10 years ago\"  to get \"heart into regular rhythm\"    CARPAL TUNNEL RELEASE Bilateral     CATARACT REMOVAL WITH IMPLANT Bilateral 7/22/2014, 8/5/2014    [facility]    CERVICAL FUSION  11/14/2019     ANTERIOR CERVICAL DECOMPRESSION FUSION C3-4, C4-5    CERVICAL FUSION N/A 11/14/2019    ANTERIOR CERVICAL DECOMPRESSION FUSION C3-4, C4-5 (SUPINE) DEPUY, REGULAR TABLE, MICROSCOPE, C-ARM, EVOKES (# [id number] [facility]) performed by Warren Zendejas 82, DO at [address] [place] [address], LAPAROSCOPIC N/A 8/18/2017    CHOLECYSTECTOMY LAPAROSCOPIC ROBOTIC MULTIPORT performed by Miguel Turpin MD at [place] [address]  1/2013    CYSTOSCOPY  11/04/2016    CYSTOSCOPY  01/04/2019    ENDOSCOPIC ULTRASOUND (LOWER) N/A 8/17/2017    ENDOSCOPIC ULTRASOUND performed by Kera Blackburn MD at [address] (UPPER)  08/17/2017    A cursory view of the gastric mucosa was normal. The scope was then further advanced through a patent pylorus to the second portion of the duodenum. The Gallbladder was evaluated in bulb position. Thick sludge was noted. The CBD was 7.8 mm with no filling defects. The PD was 5 mm in the HOP. The pancreatic tissue was edematous in body and tail.  KNEE ARTHROSCOPY Left     LUMBAR FUSION      OK OFFICE/OUTPT VISIT,PROCEDURE ONLY N/A 1/24/2018    SPINAL HARDWARE REMOVAL LUMBAR BONE STIMULATOR performed by John Egan MD at [address] [facility] Bilateral 1989    SKIN BIOPSY Right 11/2015    mole right posterior shoulder    SPINE SURGERY      spinal cord stimulator    SPINE SURGERY  91-4-15    renoval of spinal cord stimulator leads and battery    PASQUALE AND BSO      TUBAL LIGATION      UVULOPALATOPHARYGOPLASTY  2006     Family History   Problem Relation Age of Onset    Heart Failure Mother    Thornell Trevorton Mother     Other Father          pneumonia    Alzheimer's Disease Father     Lung Cancer Brother     Diabetes Paternal Grandmother     Other Daughter         Fibromyalsia    Rheum Arthritis Daughter    Plan  An informed verbal consent for the procedure was obtained and risks including, but not limited to: allergy to medications, injection, bleeding, stiffness of joint, recurrence of symptoms, loss of function, swelling, drainage, irrigation, need for surgery and pseudo-septic inflammation, were explained to the patient. Also, discussed was the potential for further injections, irrigation and debridement and surgery. Alternate means of treatment have also been discussed with the patient.       Administrations This Visit     betamethasone acetate-betamethasone sodium phosphate (CELESTONE) injection 12 mg     Admin Date  04/13/2022  15:38 Action  Given Dose  12 mg Route  Intra-artICUlar Site  Knee Left Administered By  Mame Macdonald LPN    Ordering Provider: Caio Cope MD    NDC: 7111-5465-69    Lot#: [id number]    : [employer]    Patient Supplied?: No           Admin Date  04/13/2022  15:39 Action  Given Dose  12 mg Route  Intra-artICUlar Site  Knee Right Administered By  Mame Macdonald LPN    Ordering Provider: Caio Cope MD    ND: 0898-3588-42    Lot#: [id number]    : [employer]    Patient Supplied?: No          bupivacaine (MARCAINE) 0.5 % injection 10 mg     Admin Date  04/13/2022  15:40 Action  Given Dose  10 mg Route  Intra-artICUlar Site  Knee Right Administered By  Mame Macdonald LPN    Ordering Provider: Caio Cope MD    [facility]: 8780-4048-01    Lot#: [id number]    : HOSPIRA    Patient Supplied?: No    Admin Date  04/13/2022  15:40 Action  Given Dose  10 mg Route  Intra-artICUlar Site  Knee Left Administered By  Mame Macdonald LPN    Ordering Provider: Caio Cope MD    NDC: 6389-0855-29    Lot#: [id number]    : HOSPIRA    Patient Supplied?: No          lidocaine 1 % injection 2 mL     Admin Date  04/13/2022  15:41 Action  Given Dose  2 mL Route  Intra-artICUlar Site  Knee Right Administered By  Mame Macdonald LPN    Ordering Provider: Caio Cope MD    ND: 8725-5597-39    Lot#: [id number]    : WAQRX    Patient Supplied?: No    Admin Date  04/13/2022  15:41 Action  Given Dose  2 mL Route  Intra-artICUlar Site  Knee Left Administered By  Mame Macdonald LPN    Ordering Provider: Caio Cope MD    ND: 5590-7979-81    Lot#: [id number]    : Reilly Everett    Patient Supplied?: No            Under sterile conditions both knees were injected lidocaine 2 cc bupivacaine 2 cc and Celestone 2 cc. She taught procedure well    Betsyines Perez was given some postinjection instructions. Hopefully she will get good relief with this. She is not a fan of any type of surgical invention in the future.   We will see her back here per her request                    Provider Attestation:  Joshua Nobles, personally performed the services described in this documentation. All medical record entries made by the scribe were at my direction and in my presence. I have reviewed the chart and discharge instructions and agree that the records reflect my personal performance and is accurate and complete. José Luis Green MD. 04/13/22      Please note that this chart was generated using voice recognition Dragon dictation software. Although every effort was made to ensure the accuracy of this automated transcription, some errors in transcription may have occurred.

## 2022-04-25 ENCOUNTER — HOSPITAL ENCOUNTER (OUTPATIENT)
Dept: PAIN MANAGEMENT | Age: 82
Discharge: HOME | End: 2022-04-25
Payer: MEDICARE

## 2022-04-25 VITALS
DIASTOLIC BLOOD PRESSURE: 55 MMHG | HEART RATE: 69 BPM | TEMPERATURE: 97.1 F | WEIGHT: 148 LBS | HEIGHT: 58 IN | SYSTOLIC BLOOD PRESSURE: 118 MMHG | OXYGEN SATURATION: 99 % | RESPIRATION RATE: 18 BRPM | BODY MASS INDEX: 31.07 KG/M2

## 2022-04-25 DIAGNOSIS — M96.1 FAILED BACK SYNDROME: ICD-10-CM

## 2022-04-25 DIAGNOSIS — M43.06 LUMBAR SPONDYLOLYSIS: ICD-10-CM

## 2022-04-25 DIAGNOSIS — M47.816 LUMBAR SPONDYLOSIS: ICD-10-CM

## 2022-04-25 DIAGNOSIS — Z51.81 ENCOUNTER FOR MEDICATION MONITORING: ICD-10-CM

## 2022-04-25 DIAGNOSIS — M50.30 DDD (DEGENERATIVE DISC DISEASE), CERVICAL: Primary | ICD-10-CM

## 2022-04-25 DIAGNOSIS — M54.16 LUMBAR RADICULOPATHY: ICD-10-CM

## 2022-04-25 DIAGNOSIS — Z96.89 SPINAL CORD STIMULATOR STATUS: ICD-10-CM

## 2022-04-25 DIAGNOSIS — R26.89 IMBALANCE: ICD-10-CM

## 2022-04-25 DIAGNOSIS — M96.1 FAILED BACK SYNDROME OF LUMBAR SPINE: ICD-10-CM

## 2022-04-25 DIAGNOSIS — M46.1 SACROILIITIS (HCC): ICD-10-CM

## 2022-04-25 PROCEDURE — 99213 OFFICE O/P EST LOW 20 MIN: CPT

## 2022-04-25 PROCEDURE — 99213 OFFICE O/P EST LOW 20 MIN: CPT | Performed by: NURSE PRACTITIONER

## 2022-04-25 RX ORDER — MORPHINE SULFATE 30 MG/1
30 TABLET, FILM COATED, EXTENDED RELEASE ORAL DAILY
Qty: 30 TABLET | Refills: 0 | Status: SHIPPED | OUTPATIENT
Start: 2022-04-30 | End: 2022-05-26 | Stop reason: ALTCHOICE

## 2022-04-25 RX ORDER — OXYCODONE AND ACETAMINOPHEN 10; 325 MG/1; MG/1
1 TABLET ORAL EVERY 6 HOURS PRN
Qty: 120 TABLET | Refills: 0 | Status: SHIPPED | OUTPATIENT
Start: 2022-04-30 | End: 2022-05-26 | Stop reason: SDUPTHER

## 2022-04-25 RX ORDER — MORPHINE SULFATE 15 MG/1
15 TABLET, FILM COATED, EXTENDED RELEASE ORAL DAILY
Qty: 30 TABLET | Refills: 0 | Status: SHIPPED | OUTPATIENT
Start: 2022-04-25 | End: 2022-05-26 | Stop reason: SDUPTHER

## 2022-04-25 ASSESSMENT — PAIN SCALES - GENERAL: PAINLEVEL_OUTOF10: 6

## 2022-04-25 ASSESSMENT — ENCOUNTER SYMPTOMS
BOWEL INCONTINENCE: 0
BACK PAIN: 1

## 2022-04-25 NOTE — PROGRESS NOTES
Chief Complaint   Patient presents with    Back Pain    Medication Refill         [facility]   Patient complains of back pain that started years ago. She has undergone several interventions but pain continues. She had SCS implanted in 2012 at [facility] but it was removed as it was not functioning well.  She has started going to the [facility] to exercise - using the stationary bike - with benefit. She did see neurosurgeon. Imaging of the spine completed - severe stenosis at T10-11 and moderate stenosis at L1-2. She had a follow up with NS and discussed surgery. She would like to hold off on any surgical interventions at this time and plans to try conservative measures.      Also complaining of right hip pain That started after a fall last month. She did not go to ER or see PCP after the fall. She did see Dr. Poncho Reece for follow up and had XR of pelvis. She had right greater trochanter injection with good relief.      She had cervical fusion 11/2019 with Dr. Rodrigo Dorsey      Did discuss risks involved with higher dose opiates including respiratory depression and death. Did discuss starting to taper dose. Will start with morphine. Back Pain  This is a chronic problem. The current episode started more than 1 year ago. The problem occurs constantly. The problem has been gradually worsening since onset. The pain is present in the lumbar spine. The quality of the pain is described as aching. The pain radiates to the left thigh, left knee and left foot. The pain is at a severity of 6/10. The pain is the same all the time. The symptoms are aggravated by bending, sitting and standing. Associated symptoms include leg pain, numbness and weakness. Pertinent negatives include no bladder incontinence, bowel incontinence, chest pain, fever, headaches or tingling. She has tried heat and ice (PT, injections) for the symptoms. Patient denies any new neurological symptoms.  No bowel or bladder incontinence, no weakness, and no falling. Pill count: appropriate due 4/30  Morphine ER - 10  Oxycodone - 32    Morphine equivalent: 120    Controlled Substance Monitoring:    Acute and Chronic Pain Monitoring:   RX Monitoring 4/25/2022   Attestation -   Acute Pain Prescriptions -   Periodic Controlled Substance Monitoring Possible medication side effects, risk of tolerance/dependence & alternative treatments discussed. ;No signs of potential drug abuse or diversion identified.;Obtaining appropriate analgesic effect of treatment. Chronic Pain > 50 MEDD Re-evaluated the status of the patient's underlying condition causing pain. Chronic Pain > 80 MEDD -   Chronic Pain > 120 MEDD Co-prescribed Naloxone.;Obtained or confirmed \"Medication Contract\" on file.          Past Medical History:   Diagnosis Date    Achilles tendonitis     right    Asthma     COPD (chronic obstructive pulmonary disease) ([facility])     Cough     clear phlegm    Degenerative lumbar disc     DM (diabetes mellitus), type 2 ([facility])     PCP Dr. Rinku Bates, seen 8/2019    Failed back syndrome, lumbar     Fast heart beat     Hx of \"8-10 years ago\"    Fibromyalgia     History of bladder cancer     Hypertension     Hypothyroid     Kidney stones     Lumbar radiculopathy     Lumbar spondylolysis     Lumbar spondylosis     Osteoarthritis     Sleep apnea     uses CPAP machine nightly    Spinal stenosis in cervical region     Wears glasses        Past Surgical History:   Procedure Laterality Date    BACK SURGERY  2010    lumbar fusion    BLADDER TUMOR EXCISION  2005    CARDIOVERSION      \"8-10 years ago\"  to get \"heart into regular rhythm\"    CARPAL TUNNEL RELEASE Bilateral     CATARACT REMOVAL WITH IMPLANT Bilateral 7/22/2014, 8/5/2014    Dilma/Demian    CERVICAL FUSION  11/14/2019     ANTERIOR CERVICAL DECOMPRESSION FUSION C3-4, C4-5    CERVICAL FUSION N/A 11/14/2019    ANTERIOR CERVICAL DECOMPRESSION FUSION C3-4, C4-5 (SUPINE) DOROTHY, REGULAR TABLE, MICROSCOPE, C-ARM, EVOKES (# [id number] [facility]) performed by Milan Shin DO at [facility] 8/18/2017    CHOLECYSTECTOMY LAPAROSCOPIC ROBOTIC MULTIPORT performed by Donta Espana MD at [facility] 19  1/2013    CYSTOSCOPY  11/04/2016    CYSTOSCOPY  01/04/2019    ENDOSCOPIC ULTRASOUND (LOWER) N/A 8/17/2017    ENDOSCOPIC ULTRASOUND performed by Jossie Prather MD at [address] (UPPER)  08/17/2017    A cursory view of the gastric mucosa was normal. The scope was then further advanced through a patent pylorus to the second portion of the duodenum. The Gallbladder was evaluated in bulb position. Thick sludge was noted. The CBD was 7.8 mm with no filling defects. The PD was 5 mm in the HOP. The pancreatic tissue was edematous in body and tail.  KNEE ARTHROSCOPY Left     LUMBAR FUSION      MT OFFICE/OUTPT VISIT,PROCEDURE ONLY N/A 1/24/2018    SPINAL HARDWARE REMOVAL LUMBAR BONE STIMULATOR performed by Ceci White MD at [address] Bilateral 1989    SKIN BIOPSY Right 11/2015    mole right posterior shoulder    SPINE SURGERY      spinal cord stimulator    SPINE SURGERY  91-4-15    renoval of spinal cord stimulator leads and battery    PASQUALE AND BSO      TUBAL LIGATION      UVULOPALATOPHARYGOPLASTY  2006       Allergies   Allergen Reactions    Azithromycin Shortness Of Breath     Tightness in chest    Adhesive Tape      OK to use paper tape per Patient- 2/19/20 patient states that she has been using all types of tape with no reaction         Current Outpatient Medications:     morphine (MS CONTIN) 30 MG extended release tablet, Take 1 tablet by mouth 2 times daily for 30 days. , Disp: 60 tablet, Rfl: 0    oxyCODONE-acetaminophen (PERCOCET)  MG per tablet, Take 1 tablet by mouth every 6 hours as needed for Pain for up to 30 days. , Disp: 120 tablet, Rfl: 0    naloxone 4 MG/0.1ML LIQD nasal spray, 1 spray by Nasal route as needed for Opioid Reversal, Disp: 1 each, Rfl: 0    albuterol sulfate  (90 Base) MCG/ACT inhaler, Inhale 1 puff into the lungs daily, Disp: , Rfl:     potassium chloride (MICRO-K) 10 MEQ extended release capsule, Take 1 capsule by mouth daily, Disp: , Rfl:     bumetanide (BUMEX) 1 MG tablet, , Disp: , Rfl:     sennosides-docusate sodium (SENOKOT-S) 8.6-50 MG tablet, Take 1 tablet by mouth daily as needed, Disp: , Rfl:     psyllium (KONSYL) 28.3 % PACK, Take 1 packet by mouth daily, Disp: , Rfl:     fluticasone (FLONASE) 50 MCG/ACT nasal spray, 1 spray by Each Nostril route daily, Disp: , Rfl:     fluticasone (FLOVENT HFA) 44 MCG/ACT inhaler, Inhale 1 puff into the lungs 2 times daily  (Patient not taking: Reported on 3/28/2022), Disp: , Rfl:     diclofenac sodium 1 % GEL, Apply 2 g topically 2 times daily as needed for Pain, Disp: , Rfl:     magnesium hydroxide (DULCOLAX MILK OF MAGNESIA) 400 MG/5ML suspension, Take 5 mLs by mouth daily as needed for Constipation, Disp: , Rfl:     pregabalin (LYRICA) 150 MG capsule, Take 150 mg by mouth 2 times daily. , Disp: , Rfl:     montelukast (SINGULAIR) 10 MG tablet, Take 10 mg by mouth nightly , Disp: , Rfl:     metFORMIN (GLUCOPHAGE) 500 MG tablet, Take 500 mg by mouth 2 times daily (with meals). , Disp: , Rfl:     telmisartan-hydrochlorothiazide (MICARDIS HCT) 40-12.5 MG per tablet, Take 1 tablet by mouth daily. , Disp: , Rfl:     levothyroxine (SYNTHROID) 50 MCG tablet, Take 50 mcg by mouth Daily. , Disp: , Rfl:     omeprazole (PRILOSEC) 20 MG capsule, Take 20 mg by mouth every evening., Disp: , Rfl:     aspirin 81 MG tablet, Take 81 mg by mouth daily. , Disp: , Rfl:     Family History   Problem Relation Age of Onset    Heart Failure Mother    Oneita Stoddard Mother     Other Father          pneumonia    Alzheimer's Disease Father     Lung Cancer Brother     Diabetes Paternal Grandmother     Other Daughter         Fibromyalsia    Rheum Arthritis Daughter        Social History     Socioeconomic History    Marital status:      Spouse name: Regan Lara Number of children: 3    Years of education: Not on file    Highest education level: Not on file   Occupational History    Occupation: retired   Tobacco Use    Smoking status: Never Smoker    Smokeless tobacco: Never Used   Vaping Use    Vaping Use: Never used   Substance and Sexual Activity    Alcohol use: Not Currently     Comment: rare    Drug use: Never    Sexual activity: Yes     Partners: Male   Other Topics Concern    Not on file   Social History Narrative    Not on file     Social Determinants of Health     Financial Resource Strain:     Difficulty of Paying Living Expenses: Not on file   Food Insecurity:     Worried About [address] in the Last Year: Not on file    [address] [place] in the Last Year: Not on file   Transportation Needs:     Lack of Transportation (Medical): Not on file    Lack of Transportation (Non-Medical):  Not on file   Physical Activity:     Days of Exercise per Week: Not on file    Minutes of Exercise per Session: Not on file   Stress:     Feeling of Stress : Not on file   Social Connections:     Frequency of Communication with Friends and Family: Not on file    Frequency of Social Gatherings with Friends and Family: Not on file    Attends Denominational Services: Not on file    Active Member of [address] [place] [address] or Organizations: Not on file    Attends Club or Organization Meetings: Not on file    Marital Status: Not on file   Intimate Partner Violence:     Fear of Current or Ex-Partner: Not on file    Emotionally Abused: Not on file    Physically Abused: Not on file    Sexually Abused: Not on file   Housing Stability:     Unable to Pay for Housing in the Last Year: Not on file    Number of [place] in the Last Year: Not on file    Unstable Housing in the Last Year: Not on file       Review of Systems:  Review of Systems   Constitutional: Negative for fever.   Cardiovascular: Negative for chest pain and palpitations. Musculoskeletal: Positive for back pain. Gastrointestinal: Negative for bowel incontinence. Genitourinary: Negative for bladder incontinence. Neurological: Positive for numbness and weakness. Negative for disturbances in coordination, headaches, loss of balance and tingling. Physical Exam:  BP (!) 118/55   Pulse 69   Temp 97.1 °F (36.2 °C)   Resp 18   Ht 4' 10\" (1.473 m)   Wt 148 lb (67.1 kg)   SpO2 99%   BMI 30.93 kg/m²     Physical Exam  HENT:      Head: Normocephalic. Pulmonary:      Effort: Pulmonary effort is normal.   Musculoskeletal:         General: Normal range of motion. Cervical back: Normal range of motion. Tenderness present. Lumbar back: Tenderness present. Skin:     General: Skin is warm and dry. Neurological:      Mental Status: She is alert and oriented to person, place, and time.          Record/Diagnostics Review:    Last georgie 11/2021 and was appropriate     Assessment:  Problem List Items Addressed This Visit     Imbalance (Chronic)    Lumbar radiculopathy    Lumbar spondylolysis    Failed back syndrome of lumbar spine    Relevant Medications    morphine (MS CONTIN) 15 MG extended release tablet    Encounter for medication monitoring    Sacroiliitis (HCC)    Relevant Medications    morphine (MS CONTIN) 30 MG extended release tablet (Start on 4/30/2022)    oxyCODONE-acetaminophen (PERCOCET)  MG per tablet (Start on 4/30/2022)    morphine (MS CONTIN) 15 MG extended release tablet    Spinal cord stimulator status    Lumbar spondylosis    Relevant Medications    morphine (MS CONTIN) 30 MG extended release tablet (Start on 4/30/2022)    oxyCODONE-acetaminophen (PERCOCET)  MG per tablet (Start on 4/30/2022)    morphine (MS CONTIN) 15 MG extended release tablet    DDD (degenerative disc disease), cervical - Primary    Relevant Medications    morphine (MS CONTIN) 30 MG extended release tablet (Start on 4/30/2022)    oxyCODONE-acetaminophen (PERCOCET)  MG per tablet (Start on 4/30/2022)    morphine (MS CONTIN) 15 MG extended release tablet    Failed back syndrome    Relevant Medications    morphine (MS CONTIN) 30 MG extended release tablet (Start on 4/30/2022)    oxyCODONE-acetaminophen (PERCOCET)  MG per tablet (Start on 4/30/2022)             Treatment Plan:  Patient relates current medications are helping the pain. Patient reports taking pain medications as prescribed, denies obtaining medications from different sources and denies use of illegal drugs. Patient denies side effects from medications like nausea, vomiting, constipation or drowsiness. Patient reports current activities of daily living are possible due to medications and would like to continue them. As always, we encourage daily stretching and strengthening exercises, and recommend minimizing use of pain medications unless patient cannot get through daily activities due to pain. Contract requirements met. Continue opioid therapy. Script written for MSER 30  - reduce dose to 30 mg QD and 15 mg QD - consider decreasing to 15 mg BID next month and then continue to taper. Discussed different treatment options including continued conservative care such as physical therapy, chiropractic care, acupuncture. Discussed different interventional options such as epidural steroids or medial branch blocks. She has tried this with no relief. Follow up appointment made for 4 weeks    I have reviewed the chief complaint and history of present illness (including ROS and PFSH) and vital documentation by my staff and I agree with their documentation and have added where applicable.

## 2022-05-02 ENCOUNTER — OFFICE VISIT (OUTPATIENT)
Dept: ORTHOPEDIC SURGERY | Age: 82
End: 2022-05-02
Payer: MEDICARE

## 2022-05-02 VITALS — BODY MASS INDEX: 31.07 KG/M2 | HEIGHT: 58 IN | WEIGHT: 148 LBS

## 2022-05-02 DIAGNOSIS — M25.521 RIGHT ELBOW PAIN: ICD-10-CM

## 2022-05-02 DIAGNOSIS — M19.021 OSTEOARTHRITIS OF RIGHT ELBOW, UNSPECIFIED OSTEOARTHRITIS TYPE: Primary | ICD-10-CM

## 2022-05-02 PROCEDURE — 20605 DRAIN/INJ JOINT/BURSA W/O US: CPT | Performed by: ORTHOPAEDIC SURGERY

## 2022-05-02 RX ORDER — LIDOCAINE HYDROCHLORIDE 10 MG/ML
2 INJECTION, SOLUTION INFILTRATION; PERINEURAL ONCE
Status: COMPLETED | OUTPATIENT
Start: 2022-05-02 | End: 2022-05-02

## 2022-05-02 RX ORDER — TRIAMCINOLONE ACETONIDE 40 MG/ML
40 INJECTION, SUSPENSION INTRA-ARTICULAR; INTRAMUSCULAR ONCE
Status: COMPLETED | OUTPATIENT
Start: 2022-05-02 | End: 2022-05-02

## 2022-05-02 RX ADMIN — TRIAMCINOLONE ACETONIDE 40 MG: 40 INJECTION, SUSPENSION INTRA-ARTICULAR; INTRAMUSCULAR at 15:26

## 2022-05-02 RX ADMIN — LIDOCAINE HYDROCHLORIDE 2 ML: 10 INJECTION, SOLUTION INFILTRATION; PERINEURAL at 15:25

## 2022-05-02 NOTE — PROGRESS NOTES
Orthopedic Elbow Encounter Note     Chief complaint: right elbow pain    HPI: Flaquita Calvillo is a 80 y.o. right-hand-dominant female presenting for evaluation of her right elbow. Have seen her in the past and diagnosed her with severe elbow osteoarthritis. She is undergone treatment with use of anti-inflammatories and physical therapy but continues to have pain and stiffness. She denies having any weakness. She is attempted resting the elbow to no avail. Previous treatment:    NSAIDs: Ibuprofen    Injections: None    Physical therapy: Yes    Surgeries: None    Review of Systems:     Constitution: no fever or chills   Pain level: 8/10  Musculoskeletal: As noted in the HPI   Neurologic: no neurologic symptoms    Past Medical Hx, Past Surgical Hx, Medications, Allergies, Social Hx: These were all reviewed. Please refer to Electronic Medical Records for details.      Physical Exam:     Ht 4' 10\" (1.473 m)   Wt 148 lb (67.1 kg)   BMI 30.93 kg/m²    General Appearance: alert, well appearing, and in no distress  Mental Status: alert, oriented to person, place, and time  Gait: normal    Elbows:    Skin: warm and dry, no rash or erythema  Vasculature: 2+ radial pulses bilaterally  Sensation: Intact to light touch in radial, ulnar, and median nerve distributions bilaterally    ROM: (Degrees)    Right   A  Left   A     Flexion   120  Flexion   130   Extension  45  Extension  10    Pronation  75  Pronaton  75   Supination  70  Supination  70     Crepitation  No  Crepitation  No    Muscle strength:    Right       Left    Biceps   5    Biceps   5  Triceps  5    Triceps  5  Wrist flexion  5    Wrist flexion  5  Wrist extension 5    Wrist extension 5  Intrinsics  5    Intrinsics  5    Tenderness: None     Tenderness: None    Special tests    Right    Ulnar Nerve     Left  n    Cubital tunnel bend    n  n    Tinnel's at elbow    n        Biceps  n    Bicipital tenderness    n  n    Defect at biceps insertion   n        Instability  n    LCL instability     n  n    MCL instability     n  n    Moving valgus test    n  n    Milk sign     n  n    PLRI pivot     n        Epicondylitis  n    Resisted WE Pain    n  n    Resisted WF Pain    n  y    Resisted Supination Pain   n  y    Resisted Pronation Pain   n        Arthritis  y    Clunk      n  y    Pain at extremes of motion   n  n    Pain during arc of motion   n      Imaging:  Xrays: 3 views of the right elbow obtained on 5/2/2022 were independently reviewed  Indications: Right elbow pain  Findings: Moderate joint space loss associated with significant diffuse osteophytic changes and what appears to be a large loose body in the anterior aspect of the elbow. Impression: Right elbow radiographs with severe degenerative changes as outlined above. Impression/Plan:     Eugenio Benjamin is a 80 y.o. old female with right elbow pain due to underlying osteoarthritis. I had a discussion with the patient today once more educating her about this condition and discussing treatment options available to her including nonoperative and operative intervention. I recommended that we continue on managing this conservatively. Following our discussions she elected to proceed with and received a cortisone injection as outlined below. I will have her follow-up my clinic as needed but she may return or call at anytime with persistent or worsening symptoms and with any questions or concerns. Procedure: right elbow joint injection  Following an appropriate discussion with the patient regarding the risks and benefits of the procedure she consented to proceed. her right elbow was prepped using chlorhexadine solution. Using aseptic technique through the posterolateral soft spot, her right elbow joint was injected with a 3 cc mixture of 1cc 40mg/ml kenalog and 2 cc of 1% lidocaine without epinephrine. A band aid was applied to the injection site.  she tolerated the injection with no immediate adverse reactions. This note is created with the assistance of a speech recognition program.  While intending to generate adocument that actually reflects the content of the visit, the document can still have some errors including those of syntax and sound a like substitutions which may escape proof reading. It such instances, actual meaningcan be extrapolated by contextual diversion.     NA = Not assessed  y = Yes  n = No

## 2022-05-26 ENCOUNTER — HOSPITAL ENCOUNTER (OUTPATIENT)
Dept: PAIN MANAGEMENT | Age: 82
Discharge: HOME | End: 2022-05-26
Payer: MEDICARE

## 2022-05-26 VITALS — DIASTOLIC BLOOD PRESSURE: 57 MMHG | HEART RATE: 73 BPM | OXYGEN SATURATION: 98 % | SYSTOLIC BLOOD PRESSURE: 109 MMHG

## 2022-05-26 DIAGNOSIS — M50.30 DDD (DEGENERATIVE DISC DISEASE), CERVICAL: ICD-10-CM

## 2022-05-26 DIAGNOSIS — M43.06 LUMBAR SPONDYLOLYSIS: ICD-10-CM

## 2022-05-26 DIAGNOSIS — M96.1 FAILED BACK SYNDROME: ICD-10-CM

## 2022-05-26 DIAGNOSIS — M47.816 LUMBAR SPONDYLOSIS: ICD-10-CM

## 2022-05-26 DIAGNOSIS — M54.16 LUMBAR RADICULOPATHY: Primary | ICD-10-CM

## 2022-05-26 DIAGNOSIS — F11.90 CHRONIC, CONTINUOUS USE OF OPIOIDS: ICD-10-CM

## 2022-05-26 DIAGNOSIS — M96.1 FAILED BACK SYNDROME OF LUMBAR SPINE: ICD-10-CM

## 2022-05-26 PROCEDURE — 99213 OFFICE O/P EST LOW 20 MIN: CPT | Performed by: NURSE PRACTITIONER

## 2022-05-26 PROCEDURE — 99213 OFFICE O/P EST LOW 20 MIN: CPT

## 2022-05-26 RX ORDER — MORPHINE SULFATE 15 MG/1
15 TABLET, FILM COATED, EXTENDED RELEASE ORAL DAILY
Qty: 30 TABLET | Refills: 0 | Status: SHIPPED | OUTPATIENT
Start: 2022-05-26 | End: 2022-06-01 | Stop reason: SDUPTHER

## 2022-05-26 RX ORDER — OXYCODONE AND ACETAMINOPHEN 10; 325 MG/1; MG/1
1 TABLET ORAL EVERY 6 HOURS PRN
Qty: 120 TABLET | Refills: 0 | Status: SHIPPED | OUTPATIENT
Start: 2022-05-31 | End: 2022-06-30 | Stop reason: SDUPTHER

## 2022-05-26 ASSESSMENT — ENCOUNTER SYMPTOMS
CONSTIPATION: 0
SHORTNESS OF BREATH: 0
COUGH: 0
BACK PAIN: 1

## 2022-05-26 NOTE — PROGRESS NOTES
Chief Complaint   Patient presents with    Back Pain    Medication Refill     Percocet, morphine     [facility]     Patient complains of back pain that started years ago. She has undergone several interventions but pain continues and not interested in repeating d/t limited benefit. She had SCS implanted in 2012 at [facility] but it was removed as it was not functioning well.  She was going to the [facility] to exercise - using the stationary bike - with benefit before COVID. She did see neurosurgeon. Imaging of the spine completed - severe stenosis at T10-11 and moderate stenosis at L1-2. She had a follow up with NS and discussed surgery. She would like to hold off on any surgical interventions at this time and plans to try conservative measures. She had cervical fusion 11/2019 with Dr. Mele Palma     Despite of significant amount of opioid use patient continues to complain severe chronic pain issues. I have explained MME to the patient and that the CDC recommends avoiding MME over 90 with goal to be less than 50. Explained to patient that there is a higher risk for hyperalgesia, respiratory depression and accidental overdose with chronic use of high dose opioids. Assured patient we will work with them to slowly titrate to a lower dose while at the same time offering non opioid options and interventions when applicable    Back Pain  This is a chronic problem. The current episode started more than 1 year ago. The problem is unchanged. The pain is present in the lumbar spine. The quality of the pain is described as aching. The pain does not radiate. The pain is at a severity of 8/10. The pain is moderate. The pain is the same all the time. The symptoms are aggravated by bending, sitting, standing, twisting, stress and lying down. Stiffness is present all day. Pertinent negatives include no chest pain, fever or headaches.  Risk factors include menopause, obesity, poor posture, sedentary lifestyle and lack of exercise. She has tried bed rest, heat, ice, muscle relaxant, NSAIDs, walking and home exercises for the symptoms. The treatment provided mild relief. Patient denies any new neurological symptoms. No bowel or bladder incontinence, no weakness, and no falling. Pill count: Percocet 20 Morphine 30mg 5, Morphine 15mg 1 short 3 pills 5/30    Morphine equivalent: 120--105--90 after today    Controlled Substance Monitoring:    Acute and Chronic Pain Monitoring:   RX Monitoring 5/26/2022   Attestation -   Acute Pain Prescriptions -   Periodic Controlled Substance Monitoring Possible medication side effects, risk of tolerance/dependence & alternative treatments discussed. ;No signs of potential drug abuse or diversion identified. ;Assessed functional status. ;Obtaining appropriate analgesic effect of treatment. Chronic Pain > 50 MEDD -   Chronic Pain > 80 MEDD Consulted with a specialist.;Obtained or confirmed \"Medication Contract\" on file. ;Co-prescribed Naloxone. Chronic Pain > 120 MEDD -         Periodic Controlled Substance Monitoring: Possible medication side effects, risk of tolerance/dependence & alternative treatments discussed. ,No signs of potential drug abuse or diversion identified. ,Assessed functional status. ,Obtaining appropriate analgesic effect of treatment. Norman Rosenberg, CONSUELO - CNP)  Chronic Pain > 80 MEDD: Consulted with a specialist.,Obtained or confirmed \"Medication Contract\" on file.,Co-prescribed Naloxone.  CONSUELO Nichols - CNP)      Past Medical History:   Diagnosis Date    Achilles tendonitis     right    Asthma     COPD [facility]chronic obstructive pulmonary disease) ([facility])     Cough     clear phlegm    Degenerative lumbar disc     DM (diabetes mellitus), type 2 ([facility]     PCP Dr. Rayna York, seen 8/2019    Failed back syndrome, lumbar     Fast heart beat     Hx of \"8-10 years ago\"    Fibromyalgia     History of bladder cancer     Hypertension     Hypothyroid     Kidney stones     Lumbar radiculopathy     Lumbar spondylolysis     Lumbar spondylosis     Osteoarthritis     Sleep apnea     uses CPAP machine nightly    Spinal stenosis in cervical region     Wears glasses        Past Surgical History:   Procedure Laterality Date    BACK SURGERY  2010    lumbar fusion    BLADDER TUMOR EXCISION  2005    CARDIOVERSION      \"8-10 years ago\"  to get \"heart into regular rhythm\"    CARPAL TUNNEL RELEASE Bilateral     CATARACT REMOVAL WITH IMPLANT Bilateral 7/22/2014, 8/5/2014    [facility]    CERVICAL FUSION  11/14/2019     ANTERIOR CERVICAL DECOMPRESSION FUSION C3-4, C4-5    CERVICAL FUSION N/A 11/14/2019    ANTERIOR CERVICAL DECOMPRESSION FUSION C3-4, C4-5 (SUPINE) DEPUY, REGULAR TABLE, MICROSCOPE, C-ARM, EVOKES (# [id number] [facility]) performed by Jodee Anguiano DO at [address], LAPAROSCOPIC N/A 8/18/2017    CHOLECYSTECTOMY LAPAROSCOPIC ROBOTIC MULTIPORT performed by Vesta Fisher MD at [address] [place]  1/2013    CYSTOSCOPY  11/04/2016    CYSTOSCOPY  01/04/2019    ENDOSCOPIC ULTRASOUND (LOWER) N/A 8/17/2017    ENDOSCOPIC ULTRASOUND performed by Robert Hawk MD at [address] (UPPER)  08/17/2017    A cursory view of the gastric mucosa was normal. The scope was then further advanced through a patent pylorus to the second portion of the duodenum. The Gallbladder was evaluated in bulb position. Thick sludge was noted. The CBD was 7.8 mm with no filling defects. The PD was 5 mm in the HOP. The pancreatic tissue was edematous in body and tail.     KNEE ARTHROSCOPY Left     LUMBAR FUSION      MO OFFICE/OUTPT VISIT,PROCEDURE ONLY N/A 1/24/2018    SPINAL HARDWARE REMOVAL LUMBAR BONE STIMULATOR performed by Lamonte Lema MD at [address] Bilateral 1989    SKIN BIOPSY Right 11/2015    mole right posterior shoulder    SPINE SURGERY      spinal cord stimulator    SPINE SURGERY  91-4-15    renoval of spinal cord stimulator leads and battery    PASQUALE AND BSO      TUBAL LIGATION      UVULOPALATOPHARYGOPLASTY  2006       Allergies   Allergen Reactions    Azithromycin Shortness Of Breath     Tightness in chest    Adhesive Tape      OK to use paper tape per Patient- 2/19/20 patient states that she has been using all types of tape with no reaction         Current Outpatient Medications:     morphine (MS CONTIN) 15 MG extended release tablet, Take 1 tablet by mouth daily for 30 days. Take 12 hours after MSER 30 mg, Disp: 30 tablet, Rfl: 0    [START ON 5/31/2022] oxyCODONE-acetaminophen (PERCOCET)  MG per tablet, Take 1 tablet by mouth every 6 hours as needed for Pain for up to 30 days. , Disp: 120 tablet, Rfl: 0    naloxone 4 MG/0.1ML LIQD nasal spray, 1 spray by Nasal route as needed for Opioid Reversal, Disp: 1 each, Rfl: 0    albuterol sulfate  (90 Base) MCG/ACT inhaler, Inhale 1 puff into the lungs daily, Disp: , Rfl:     potassium chloride (MICRO-K) 10 MEQ extended release capsule, Take 1 capsule by mouth daily, Disp: , Rfl:     bumetanide (BUMEX) 1 MG tablet, , Disp: , Rfl:     sennosides-docusate sodium (SENOKOT-S) 8.6-50 MG tablet, Take 1 tablet by mouth daily as needed, Disp: , Rfl:     psyllium (KONSYL) 28.3 % PACK, Take 1 packet by mouth daily, Disp: , Rfl:     fluticasone (FLONASE) 50 MCG/ACT nasal spray, 1 spray by Each Nostril route daily, Disp: , Rfl:     fluticasone (FLOVENT HFA) 44 MCG/ACT inhaler, Inhale 1 puff into the lungs 2 times daily  (Patient not taking: Reported on 3/28/2022), Disp: , Rfl:     diclofenac sodium 1 % GEL, Apply 2 g topically 2 times daily as needed for Pain, Disp: , Rfl:     magnesium hydroxide (DULCOLAX MILK OF MAGNESIA) 400 MG/5ML suspension, Take 5 mLs by mouth daily as needed for Constipation, Disp: , Rfl:     pregabalin (LYRICA) 150 MG capsule, Take 150 mg by mouth 2 times daily.  , Disp: , Rfl:     montelukast (SINGULAIR) 10 MG tablet, Take 10 mg by mouth nightly , Disp: , Rfl:     metFORMIN (GLUCOPHAGE) 500 MG tablet, Take 500 mg by mouth 2 times daily (with meals). , Disp: , Rfl:     telmisartan-hydrochlorothiazide (MICARDIS HCT) 40-12.5 MG per tablet, Take 1 tablet by mouth daily. , Disp: , Rfl:     levothyroxine (SYNTHROID) 50 MCG tablet, Take 50 mcg by mouth Daily. , Disp: , Rfl:     omeprazole (PRILOSEC) 20 MG capsule, Take 20 mg by mouth every evening., Disp: , Rfl:     aspirin 81 MG tablet, Take 81 mg by mouth daily. , Disp: , Rfl:     Family History   Problem Relation Age of Onset    Heart Failure Mother     Lung Cancer Mother     Other Father          pneumonia    Alzheimer's Disease Father     Lung Cancer Brother     Diabetes Paternal Grandmother     Other Daughter         Fibromyalsia    Rheum Arthritis Daughter        Social History     Socioeconomic History    Marital status:      Spouse name: Donta Lindo Number of children: 3    Years of education: Not on file    Highest education level: Not on file   Occupational History    Occupation: retired   Tobacco Use    Smoking status: Never Smoker    Smokeless tobacco: Never Used   Vaping Use    Vaping Use: Never used   Substance and Sexual Activity    Alcohol use: Not Currently     Comment: rare    Drug use: Never    Sexual activity: Yes     Partners: Male   Other Topics Concern    Not on file   Social History Narrative    Not on file     Social Determinants of Health     Financial Resource Strain:     Difficulty of Paying Living Expenses: Not on file   Food Insecurity:     Worried About [address] in the Last Year: Not on file    [address] in the Last Year: Not on file   Transportation Needs:     Lack of Transportation (Medical): Not on file    Lack of Transportation (Non-Medical):  Not on file   Physical Activity:     Days of Exercise per Week: Not on file    Minutes of Exercise per Session: Not on file   Stress:     Feeling of Stress : Not on file   Social Connections:  Frequency of Communication with Friends and Family: Not on file    Frequency of Social Gatherings with Friends and Family: Not on file    Attends Scientologist Services: Not on file    Active Member of Clubs or Organizations: Not on file    Attends Club or Organization Meetings: Not on file    Marital Status: Not on file   Intimate Partner Violence:     Fear of Current or Ex-Partner: Not on file    Emotionally Abused: Not on file    Physically Abused: Not on file    Sexually Abused: Not on file   Housing Stability:     Unable to Pay for Housing in the Last Year: Not on file    Number of [place] in the Last Year: Not on file    Unstable Housing in the Last Year: Not on file       Review of Systems:  Review of Systems   Constitutional: Negative for chills and fever. Cardiovascular: Negative for chest pain. Respiratory: Negative for cough and shortness of breath. Musculoskeletal: Positive for back pain, muscle weakness and myalgias. Gastrointestinal: Negative for constipation. Neurological: Negative for headaches. Physical Exam:  BP (!) 109/57   Pulse 73   SpO2 98%     Physical Exam  Cardiovascular:      Rate and Rhythm: Normal rate. Pulmonary:      Effort: Pulmonary effort is normal.   Musculoskeletal:         General: Normal range of motion. Comments: Stooped posture - using walker   Skin:     General: Skin is warm and dry. Neurological:      Mental Status: She is alert and oriented to person, place, and time.          Record/Diagnostics Review:    Last georgie 11/21 and was appropriate     Assessment:  Problem List Items Addressed This Visit     Lumbar radiculopathy - Primary    Lumbar spondylolysis    Failed back syndrome of lumbar spine    Relevant Medications    morphine (MS CONTIN) 15 MG extended release tablet    DDD (degenerative disc disease), cervical    Relevant Medications    morphine (MS CONTIN) 15 MG extended release tablet    oxyCODONE-acetaminophen (PERCOCET)  MG per tablet (Start on 5/31/2022)    Chronic, continuous use of opioids      Other Visit Diagnoses     Lumbar spondylosis        Relevant Medications    morphine (MS CONTIN) 15 MG extended release tablet    oxyCODONE-acetaminophen (PERCOCET)  MG per tablet (Start on 5/31/2022)    Failed back syndrome        Relevant Medications    oxyCODONE-acetaminophen (PERCOCET)  MG per tablet (Start on 5/31/2022)             Treatment Plan:  Patient relates current medications are helping the pain. Patient reports taking pain medications as prescribed, denies obtaining medications from different sources and denies use of illegal drugs. Patient denies side effects from medications like nausea, vomiting, constipation or drowsiness. Patient reports current activities of daily living are possible due to medications and would like to continue them. As always, we encourage daily stretching and strengthening exercises, and recommend minimizing use of pain medications unless patient cannot get through daily activities due to pain. Contract requirements met. Continue opioid therapy. Script written for percocet and MS 15mg BID  MS 30mg 5 tabs wasted with pt as witness  Follow up appointment made for 4 weeks    I have reviewed the chief complaint and history of present illness (including ROS and PFSH) and vital documentation by my staff and I agree with their documentation and have added where applicable.

## 2022-05-27 ENCOUNTER — HOSPITAL ENCOUNTER (OUTPATIENT)
Age: 82
Discharge: HOME | End: 2022-05-27
Payer: MEDICARE

## 2022-05-27 LAB
ABSOLUTE EOS #: 0.1 K/UL (ref 0–0.4)
ABSOLUTE LYMPH #: 1.6 K/UL (ref 1–4.8)
ABSOLUTE MONO #: 0.4 K/UL (ref 0.1–1.3)
ABSOLUTE RETIC #: 0.04 M/UL (ref 0.02–0.1)
ALBUMIN SERPL-MCNC: 4 G/DL (ref 3.5–5.2)
ALP BLD-CCNC: 79 U/L (ref 35–104)
ALT SERPL-CCNC: 13 U/L (ref 5–33)
ANION GAP SERPL CALCULATED.3IONS-SCNC: 7 MMOL/L (ref 9–17)
AST SERPL-CCNC: 13 U/L
BASOPHILS # BLD: 0 % (ref 0–2)
BASOPHILS ABSOLUTE: 0 K/UL (ref 0–0.2)
BILIRUB SERPL-MCNC: 0.29 MG/DL (ref 0.3–1.2)
BUN BLDV-MCNC: 30 MG/DL (ref 8–23)
CALCIUM SERPL-MCNC: 9.2 MG/DL (ref 8.6–10.4)
CHLORIDE BLD-SCNC: 101 MMOL/L (ref 98–107)
CO2: 33 MMOL/L (ref 20–31)
CREAT SERPL-MCNC: 1.2 MG/DL (ref 0.5–0.9)
EOSINOPHILS RELATIVE PERCENT: 3 % (ref 0–4)
FERRITIN: 48 NG/ML (ref 13–150)
FOLATE: 13.1 NG/ML
GFR AFRICAN AMERICAN: 53 ML/MIN
GFR NON-AFRICAN AMERICAN: 43 ML/MIN
GFR SERPL CREATININE-BSD FRML MDRD: ABNORMAL ML/MIN/{1.73_M2}
GLUCOSE BLD-MCNC: 106 MG/DL (ref 70–99)
HCT VFR BLD CALC: 31.7 % (ref 36–46)
HEMOGLOBIN: 10.5 G/DL (ref 12–16)
IRON SATURATION: 16 % (ref 20–55)
IRON: 53 UG/DL (ref 37–145)
LYMPHOCYTES # BLD: 38 % (ref 24–44)
MCH RBC QN AUTO: 28.7 PG (ref 26–34)
MCHC RBC AUTO-ENTMCNC: 33 G/DL (ref 31–37)
MCV RBC AUTO: 86.8 FL (ref 80–100)
MONOCYTES # BLD: 9 % (ref 1–7)
PDW BLD-RTO: 15.8 % (ref 11.5–14.9)
PLATELET # BLD: 222 K/UL (ref 150–450)
PMV BLD AUTO: 8.3 FL (ref 6–12)
POTASSIUM SERPL-SCNC: 4.6 MMOL/L (ref 3.7–5.3)
RBC # BLD: 3.65 M/UL (ref 4–5.2)
RETIC %: 1.1 % (ref 0.5–2)
SEG NEUTROPHILS: 50 % (ref 36–66)
SEGMENTED NEUTROPHILS ABSOLUTE COUNT: 2.1 K/UL (ref 1.3–9.1)
SODIUM BLD-SCNC: 141 MMOL/L (ref 135–144)
TOTAL IRON BINDING CAPACITY: 341 UG/DL (ref 250–450)
TOTAL PROTEIN: 6.2 G/DL (ref 6.4–8.3)
UNSATURATED IRON BINDING CAPACITY: 288 UG/DL (ref 112–347)
VITAMIN B-12: 1694 PG/ML (ref 232–1245)
VITAMIN D 25-HYDROXY: 32.9 NG/ML
WBC # BLD: 4.2 K/UL (ref 3.5–11)

## 2022-05-27 PROCEDURE — 80053 COMPREHEN METABOLIC PANEL: CPT

## 2022-05-27 PROCEDURE — 36415 COLL VENOUS BLD VENIPUNCTURE: CPT

## 2022-05-27 PROCEDURE — 82746 ASSAY OF FOLIC ACID SERUM: CPT

## 2022-05-27 PROCEDURE — 83550 IRON BINDING TEST: CPT

## 2022-05-27 PROCEDURE — 85045 AUTOMATED RETICULOCYTE COUNT: CPT

## 2022-05-27 PROCEDURE — 82728 ASSAY OF FERRITIN: CPT

## 2022-05-27 PROCEDURE — 82607 VITAMIN B-12: CPT

## 2022-05-27 PROCEDURE — 82306 VITAMIN D 25 HYDROXY: CPT

## 2022-05-27 PROCEDURE — 85025 COMPLETE CBC W/AUTO DIFF WBC: CPT

## 2022-05-27 PROCEDURE — 83540 ASSAY OF IRON: CPT

## 2022-06-01 ENCOUNTER — TELEPHONE (OUTPATIENT)
Dept: PAIN MANAGEMENT | Age: 82
End: 2022-06-01

## 2022-06-01 DIAGNOSIS — M47.816 LUMBAR SPONDYLOSIS: ICD-10-CM

## 2022-06-01 DIAGNOSIS — M50.30 DDD (DEGENERATIVE DISC DISEASE), CERVICAL: ICD-10-CM

## 2022-06-01 DIAGNOSIS — M96.1 FAILED BACK SYNDROME OF LUMBAR SPINE: ICD-10-CM

## 2022-06-01 RX ORDER — MORPHINE SULFATE 15 MG/1
15 TABLET, FILM COATED, EXTENDED RELEASE ORAL 2 TIMES DAILY
Qty: 60 TABLET | Refills: 0 | Status: SHIPPED | OUTPATIENT
Start: 2022-06-10 | End: 2022-06-30 | Stop reason: SDUPTHER

## 2022-06-01 NOTE — TELEPHONE ENCOUNTER
Looking at my last note her 30mg was discontinued and 15mg was to be BID. She has enough to take twice a day until 6/10. I will send in new script for her to  that day.  Thanks

## 2022-06-01 NOTE — TELEPHONE ENCOUNTER
Received call from pt questioning script for MS Contin 15 mg, pt says there are only 30 tablets and that she takes 1 in the morning and 1 at night. Advised pt it was discussed at last appt. 5/26 we would be reducing MS Contin to 15 MG daily. Pt says she does not remember this conversation and is concerned she has been taking 2 tablets sense fill date so she will be short. Let pt know I would put a note in her chart but we would not be able to fill script early.

## 2022-06-30 ENCOUNTER — HOSPITAL ENCOUNTER (OUTPATIENT)
Dept: PAIN MANAGEMENT | Age: 82
Discharge: HOME | End: 2022-06-30
Payer: MEDICARE

## 2022-06-30 VITALS — OXYGEN SATURATION: 96 % | DIASTOLIC BLOOD PRESSURE: 48 MMHG | SYSTOLIC BLOOD PRESSURE: 90 MMHG | HEART RATE: 74 BPM

## 2022-06-30 DIAGNOSIS — M50.30 DDD (DEGENERATIVE DISC DISEASE), CERVICAL: ICD-10-CM

## 2022-06-30 DIAGNOSIS — M47.816 LUMBAR SPONDYLOSIS: ICD-10-CM

## 2022-06-30 DIAGNOSIS — M96.1 FAILED BACK SYNDROME: ICD-10-CM

## 2022-06-30 DIAGNOSIS — M96.1 FAILED BACK SYNDROME OF LUMBAR SPINE: ICD-10-CM

## 2022-06-30 PROCEDURE — 99213 OFFICE O/P EST LOW 20 MIN: CPT

## 2022-06-30 PROCEDURE — 99213 OFFICE O/P EST LOW 20 MIN: CPT | Performed by: NURSE PRACTITIONER

## 2022-06-30 RX ORDER — MORPHINE SULFATE 15 MG/1
15 TABLET, FILM COATED, EXTENDED RELEASE ORAL 2 TIMES DAILY
Qty: 60 TABLET | Refills: 0 | Status: SHIPPED | OUTPATIENT
Start: 2022-07-10 | End: 2022-07-28 | Stop reason: SDUPTHER

## 2022-06-30 RX ORDER — OXYCODONE AND ACETAMINOPHEN 10; 325 MG/1; MG/1
1 TABLET ORAL EVERY 6 HOURS PRN
Qty: 110 TABLET | Refills: 0 | Status: SHIPPED | OUTPATIENT
Start: 2022-06-30 | End: 2022-07-28 | Stop reason: SDUPTHER

## 2022-06-30 ASSESSMENT — ENCOUNTER SYMPTOMS
SHORTNESS OF BREATH: 0
BACK PAIN: 1
CONSTIPATION: 0
COUGH: 0

## 2022-06-30 NOTE — PROGRESS NOTES
Chief Complaint   Patient presents with    Back Pain    Medication Refill     Percocet, Morphine       [facility]     Patient complains of back pain that started years ago. She has undergone several interventions but pain continues. She had SCS implanted in 2012 at [facility] but it was removed as it was not functioning well.  She was going to the [facility] to exercise - using the stationary bike - with benefit before COVID. She did see neurosurgeon. Imaging of the spine completed - severe stenosis at T10-11 and moderate stenosis at L1-2. She had a follow up with NS and discussed surgery. She would like to hold off on any surgical interventions at this time and plans to try conservative measures.      She had cervical fusion 11/2019 with Dr. Daniel Atwood     Despite of significant amount of opioid use patient continues to complain severe chronic pain issues. I have explained MME to the patient and that the CDC recommends avoiding MME over 90 with goal to be less than 50. Explained to patient that there is a higher risk for hyperalgesia, respiratory depression and accidental overdose with chronic use of high dose opioids. Assured patient we will work with them to slowly titrate to a lower dose while at the same time offering non opioid options and interventions when applicable      Back Pain  This is a chronic problem. The current episode started more than 1 year ago. The problem occurs constantly. The problem is unchanged. The pain is present in the sacro-iliac, lumbar spine and gluteal. The quality of the pain is described as aching. The pain does not radiate. The pain is at a severity of 8/10. The pain is moderate. The pain is worse during the day. The symptoms are aggravated by coughing, bending, sitting, standing and twisting. Stiffness is present in the morning. Associated symptoms include numbness and weakness. Pertinent negatives include no chest pain, fever, headaches, leg pain or tingling.  She has tried bed rest, heat, ice, muscle relaxant, home exercises, walking and NSAIDs for the symptoms. The treatment provided no relief. Patient denies any new neurological symptoms. No bowel or bladder incontinence, no weakness, and no falling. Pill count: appropriate Morphine 15 7/10, percocet 0 6/29    Morphine equivalent: 120-->105-->90    Controlled Substance Monitoring:    Acute and Chronic Pain Monitoring:   RX Monitoring 6/30/2022   Attestation -   Acute Pain Prescriptions -   Periodic Controlled Substance Monitoring Possible medication side effects, risk of tolerance/dependence & alternative treatments discussed. ;No signs of potential drug abuse or diversion identified. ;Assessed functional status. ;Obtaining appropriate analgesic effect of treatment.    Chronic Pain > 50 MEDD -   Chronic Pain > 80 MEDD -   Chronic Pain > 120 MEDD -                Past Medical History:   Diagnosis Date    Achilles tendonitis     right    Asthma     COPD (chronic obstructive pulmonary disease) ([facility])     Cough     clear phlegm    Degenerative lumbar disc     DM (diabetes mellitus), type 2 ([facility]     PCP Dr. Martha Brandon, seen 8/2019    Failed back syndrome, lumbar     Fast heart beat     Hx of \"8-10 years ago\"    Fibromyalgia     History of bladder cancer     Hypertension     Hypothyroid     Kidney stones     Lumbar radiculopathy     Lumbar spondylolysis     Lumbar spondylosis     Osteoarthritis     Sleep apnea     uses CPAP machine nightly    Spinal stenosis in cervical region     Wears glasses        Past Surgical History:   Procedure Laterality Date    BACK SURGERY  2010    lumbar fusion    BLADDER TUMOR EXCISION  2005    CARDIOVERSION      \"8-10 years ago\"  to get \"heart into regular rhythm\"    CARPAL TUNNEL RELEASE Bilateral     CATARACT REMOVAL WITH IMPLANT Bilateral 7/22/2014, 8/5/2014    [facility]    CERVICAL FUSION  11/14/2019     ANTERIOR CERVICAL DECOMPRESSION FUSION C3-4, C4-5    hours as needed for Pain for up to 30 days. , Disp: 120 tablet, Rfl: 0    naloxone 4 MG/0.1ML LIQD nasal spray, 1 spray by Nasal route as needed for Opioid Reversal, Disp: 1 each, Rfl: 0    albuterol sulfate  (90 Base) MCG/ACT inhaler, Inhale 1 puff into the lungs daily, Disp: , Rfl:     potassium chloride (MICRO-K) 10 MEQ extended release capsule, Take 1 capsule by mouth daily, Disp: , Rfl:     bumetanide (BUMEX) 1 MG tablet, , Disp: , Rfl:     sennosides-docusate sodium (SENOKOT-S) 8.6-50 MG tablet, Take 1 tablet by mouth daily as needed, Disp: , Rfl:     psyllium (KONSYL) 28.3 % PACK, Take 1 packet by mouth daily, Disp: , Rfl:     fluticasone (FLONASE) 50 MCG/ACT nasal spray, 1 spray by Each Nostril route daily, Disp: , Rfl:     fluticasone (FLOVENT HFA) 44 MCG/ACT inhaler, Inhale 1 puff into the lungs 2 times daily  (Patient not taking: Reported on 3/28/2022), Disp: , Rfl:     diclofenac sodium 1 % GEL, Apply 2 g topically 2 times daily as needed for Pain, Disp: , Rfl:     magnesium hydroxide (DULCOLAX MILK OF MAGNESIA) 400 MG/5ML suspension, Take 5 mLs by mouth daily as needed for Constipation, Disp: , Rfl:     pregabalin (LYRICA) 150 MG capsule, Take 150 mg by mouth 2 times daily. , Disp: , Rfl:     montelukast (SINGULAIR) 10 MG tablet, Take 10 mg by mouth nightly , Disp: , Rfl:     metFORMIN (GLUCOPHAGE) 500 MG tablet, Take 500 mg by mouth 2 times daily (with meals). , Disp: , Rfl:     telmisartan-hydrochlorothiazide (MICARDIS HCT) 40-12.5 MG per tablet, Take 1 tablet by mouth daily. , Disp: , Rfl:     levothyroxine (SYNTHROID) 50 MCG tablet, Take 50 mcg by mouth Daily. , Disp: , Rfl:     omeprazole (PRILOSEC) 20 MG capsule, Take 20 mg by mouth every evening., Disp: , Rfl:     aspirin 81 MG tablet, Take 81 mg by mouth daily. , Disp: , Rfl:     Family History   Problem Relation Age of Onset    Heart Failure Mother     Lung Cancer Mother     Other Father          pneumonia    Alzheimer's Not on file    Unstable Housing in the Last Year: Not on file       Review of Systems:  Review of Systems   Constitutional: Negative for chills and fever. Cardiovascular: Negative for chest pain. Respiratory: Negative for cough and shortness of breath. Musculoskeletal: Positive for back pain. Gastrointestinal: Negative for constipation. Neurological: Positive for numbness and weakness. Negative for headaches and tingling. Physical Exam:  BP (!) 90/48   Pulse 74   SpO2 96%     Physical Exam  Cardiovascular:      Rate and Rhythm: Normal rate. Pulmonary:      Effort: Pulmonary effort is normal.   Musculoskeletal:         General: Normal range of motion. Comments: Stooped posture - using walker   Skin:     General: Skin is warm and dry. Neurological:      Mental Status: She is alert and oriented to person, place, and time. Record/Diagnostics Review:    Last georgie 11/21 and was appropriate     Assessment:  Problem List Items Addressed This Visit     Failed back syndrome of lumbar spine    DDD (degenerative disc disease), cervical      Other Visit Diagnoses     Failed back syndrome        Lumbar spondylosis                 Treatment Plan:  Patient relates current medications are helping the pain. Patient reports taking pain medications as prescribed, denies obtaining medications from different sources and denies use of illegal drugs. Patient denies side effects from medications like nausea, vomiting, constipation or drowsiness. Patient reports current activities of daily living are possible due to medications and would like to continue them. As always, we encourage daily stretching and strengthening exercises, and recommend minimizing use of pain medications unless patient cannot get through daily activities due to pain. Contract requirements met. Continue opioid therapy.    Script written for percocet and MS 15mg BID #110 tablets  L1-L2 LESI for worsening pain  Follow up

## 2022-07-12 ENCOUNTER — TELEPHONE (OUTPATIENT)
Dept: PAIN MANAGEMENT | Age: 82
End: 2022-07-12

## 2022-07-12 NOTE — TELEPHONE ENCOUNTER
Pt calls the office stating she started antibiotics and will not be off until 7/14, needs to resh. Procedure.  Pt will be due for medication 7/30 let her know to call in and a script would be sent, she is scheduled for f/u and next Rx refill appt. 8/22

## 2022-07-22 ENCOUNTER — HOSPITAL ENCOUNTER (OUTPATIENT)
Age: 82
Discharge: HOME | End: 2022-07-22
Payer: MEDICARE

## 2022-07-22 LAB
ANION GAP SERPL CALCULATED.3IONS-SCNC: 10 MMOL/L (ref 9–17)
BUN BLDV-MCNC: 18 MG/DL (ref 8–23)
CALCIUM SERPL-MCNC: 9.2 MG/DL (ref 8.6–10.4)
CHLORIDE BLD-SCNC: 103 MMOL/L (ref 98–107)
CO2: 28 MMOL/L (ref 20–31)
CREAT SERPL-MCNC: 0.96 MG/DL (ref 0.5–0.9)
GFR AFRICAN AMERICAN: >60 ML/MIN
GFR NON-AFRICAN AMERICAN: 56 ML/MIN
GFR SERPL CREATININE-BSD FRML MDRD: ABNORMAL ML/MIN/{1.73_M2}
GLUCOSE BLD-MCNC: 104 MG/DL (ref 70–99)
POTASSIUM SERPL-SCNC: 3.8 MMOL/L (ref 3.7–5.3)
SODIUM BLD-SCNC: 141 MMOL/L (ref 135–144)

## 2022-07-22 PROCEDURE — 36415 COLL VENOUS BLD VENIPUNCTURE: CPT

## 2022-07-22 PROCEDURE — 80048 BASIC METABOLIC PNL TOTAL CA: CPT

## 2022-07-25 ENCOUNTER — HOSPITAL ENCOUNTER (OUTPATIENT)
Dept: WOMENS IMAGING | Age: 82
Discharge: HOME | End: 2022-07-27
Payer: MEDICARE

## 2022-07-25 DIAGNOSIS — Z12.31 BREAST CANCER SCREENING BY MAMMOGRAM: ICD-10-CM

## 2022-07-25 PROCEDURE — 77063 BREAST TOMOSYNTHESIS BI: CPT

## 2022-07-27 ENCOUNTER — TELEPHONE (OUTPATIENT)
Dept: PAIN MANAGEMENT | Age: 82
End: 2022-07-27

## 2022-07-28 DIAGNOSIS — M96.1 FAILED BACK SYNDROME OF LUMBAR SPINE: ICD-10-CM

## 2022-07-28 DIAGNOSIS — M47.816 LUMBAR SPONDYLOSIS: ICD-10-CM

## 2022-07-28 DIAGNOSIS — M96.1 FAILED BACK SYNDROME: ICD-10-CM

## 2022-07-28 DIAGNOSIS — M50.30 DDD (DEGENERATIVE DISC DISEASE), CERVICAL: ICD-10-CM

## 2022-07-28 RX ORDER — MORPHINE SULFATE 15 MG/1
15 TABLET, FILM COATED, EXTENDED RELEASE ORAL 2 TIMES DAILY
Qty: 60 TABLET | Refills: 0 | Status: SHIPPED | OUTPATIENT
Start: 2022-07-28 | End: 2022-08-22 | Stop reason: SDUPTHER

## 2022-07-28 RX ORDER — OXYCODONE AND ACETAMINOPHEN 10; 325 MG/1; MG/1
1 TABLET ORAL EVERY 6 HOURS PRN
Qty: 110 TABLET | Refills: 0 | Status: SHIPPED | OUTPATIENT
Start: 2022-08-10 | End: 2022-09-16 | Stop reason: SDUPTHER

## 2022-08-01 ENCOUNTER — HOSPITAL ENCOUNTER (OUTPATIENT)
Dept: GENERAL RADIOLOGY | Age: 82
Discharge: HOME | End: 2022-08-03
Payer: MEDICARE

## 2022-08-01 ENCOUNTER — HOSPITAL ENCOUNTER (OUTPATIENT)
Dept: PAIN MANAGEMENT | Age: 82
Discharge: HOME | End: 2022-08-01
Payer: MEDICARE

## 2022-08-01 VITALS
BODY MASS INDEX: 31.07 KG/M2 | HEART RATE: 62 BPM | DIASTOLIC BLOOD PRESSURE: 64 MMHG | RESPIRATION RATE: 15 BRPM | OXYGEN SATURATION: 96 % | SYSTOLIC BLOOD PRESSURE: 110 MMHG | WEIGHT: 148 LBS | HEIGHT: 58 IN | TEMPERATURE: 97.8 F

## 2022-08-01 DIAGNOSIS — M43.06 LUMBAR SPONDYLOLYSIS: ICD-10-CM

## 2022-08-01 DIAGNOSIS — M96.1 FAILED BACK SYNDROME: Primary | ICD-10-CM

## 2022-08-01 DIAGNOSIS — M53.3 CHRONIC SI JOINT PAIN: Chronic | ICD-10-CM

## 2022-08-01 DIAGNOSIS — R52 PAIN MANAGEMENT: ICD-10-CM

## 2022-08-01 DIAGNOSIS — G89.29 CHRONIC SI JOINT PAIN: Chronic | ICD-10-CM

## 2022-08-01 PROCEDURE — 27096 INJECT SACROILIAC JOINT: CPT | Performed by: ANESTHESIOLOGY

## 2022-08-01 PROCEDURE — 6360000002 HC RX W HCPCS: Performed by: ANESTHESIOLOGY

## 2022-08-01 PROCEDURE — 3209999900 FLUORO FOR SURGICAL PROCEDURES

## 2022-08-01 PROCEDURE — 6360000004 HC RX CONTRAST MEDICATION: Performed by: ANESTHESIOLOGY

## 2022-08-01 PROCEDURE — 2500000003 HC RX 250 WO HCPCS: Performed by: ANESTHESIOLOGY

## 2022-08-01 PROCEDURE — 99152 MOD SED SAME PHYS/QHP 5/>YRS: CPT | Performed by: ANESTHESIOLOGY

## 2022-08-01 PROCEDURE — G0260 INJ FOR SACROILIAC JT ANESTH: HCPCS

## 2022-08-01 RX ORDER — FENTANYL CITRATE 50 UG/ML
INJECTION, SOLUTION INTRAMUSCULAR; INTRAVENOUS
Status: COMPLETED | OUTPATIENT
Start: 2022-08-01 | End: 2022-08-01

## 2022-08-01 RX ORDER — LIDOCAINE HYDROCHLORIDE 10 MG/ML
INJECTION, SOLUTION EPIDURAL; INFILTRATION; INTRACAUDAL; PERINEURAL
Status: COMPLETED | OUTPATIENT
Start: 2022-08-01 | End: 2022-08-01

## 2022-08-01 RX ORDER — BUPIVACAINE HYDROCHLORIDE 5 MG/ML
INJECTION, SOLUTION EPIDURAL; INTRACAUDAL
Status: COMPLETED | OUTPATIENT
Start: 2022-08-01 | End: 2022-08-01

## 2022-08-01 RX ORDER — DEXAMETHASONE SODIUM PHOSPHATE 10 MG/ML
INJECTION, SOLUTION INTRAMUSCULAR; INTRAVENOUS
Status: COMPLETED | OUTPATIENT
Start: 2022-08-01 | End: 2022-08-01

## 2022-08-01 RX ORDER — MIDAZOLAM HYDROCHLORIDE 1 MG/ML
INJECTION INTRAMUSCULAR; INTRAVENOUS
Status: COMPLETED | OUTPATIENT
Start: 2022-08-01 | End: 2022-08-01

## 2022-08-01 RX ADMIN — Medication 50 MCG: at 11:34

## 2022-08-01 RX ADMIN — BUPIVACAINE HYDROCHLORIDE 6 ML: 5 INJECTION, SOLUTION EPIDURAL; INTRACAUDAL; PERINEURAL at 11:35

## 2022-08-01 RX ADMIN — MIDAZOLAM 1 MG: 1 INJECTION INTRAMUSCULAR; INTRAVENOUS at 11:34

## 2022-08-01 RX ADMIN — LIDOCAINE HYDROCHLORIDE 2 ML: 10 INJECTION, SOLUTION EPIDURAL; INFILTRATION; INTRACAUDAL; PERINEURAL at 11:35

## 2022-08-01 RX ADMIN — IOHEXOL 1 ML: 180 INJECTION INTRAVENOUS at 11:35

## 2022-08-01 RX ADMIN — DEXAMETHASONE SODIUM PHOSPHATE 10 MG: 10 INJECTION, SOLUTION INTRAMUSCULAR; INTRAVENOUS at 11:35

## 2022-08-01 ASSESSMENT — PAIN - FUNCTIONAL ASSESSMENT
PAIN_FUNCTIONAL_ASSESSMENT: 0-10
PAIN_FUNCTIONAL_ASSESSMENT: 0-10
PAIN_FUNCTIONAL_ASSESSMENT: ACTIVITIES ARE NOT PREVENTED

## 2022-08-01 ASSESSMENT — PAIN DESCRIPTION - DESCRIPTORS
DESCRIPTORS: ACHING
DESCRIPTORS: ACHING

## 2022-08-01 NOTE — DISCHARGE INSTRUCTIONS
Discharge Instructions following Sedation or Anesthesia:  You have  received  a sedative/anesthetic therefore, you should not consume any alcoholic beverages for minimum of 12 hours. Do not drive or operate machinery for 24 hours. Do not sign legal documents for 24 hours. Dizziness, drowsiness, and unsteadiness may occur. Rest when need to. Increase diet as tolerated. Keep up on fluids if diet allows. General Instructions:  Do not take a tub bath for 72 hours after procedure (this includes hot tubs and swimming pools). You may shower, but avoid hot water to injection site. Avoid strenuous activity TODAY especially if you experience dizziness. Remove band-aid the next day. Wash off any residual iodine   Do not use heat, heating pad, or any other heating device over the injection site for 3 days after the procedure. If you experience pain after your procedure, you may continue with your current pain medication as prescribed. (DO NOT INCREASE YOUR PAIN MEDICATION WITHOUT TALKING TO DOCTOR)  Soreness and pain at injection site is common, may use ice to reduce soreness. What To Expect After A Steroid Injection  You should start to feel the effects of the steroid injection in about 48-72 hours  You may experience facial flushing, night sweats and irritability. Other common steroid related side effects are increased appetite, mood elevation, insomnia and fluid retention. These effects usually subside in a few days. Steroids used in epidural injections may cause muscle spasms for a few days. If you are diabetic, your blood sugar may be elevated after your procedure due to the steroids. You will need to monitor your blood sugar more closely while going through a series of injections (Check blood sugar at meals and bedtime for 5 days). You may require adjustment in your diabetic medications, contact your PCP office to discuss.     Call Iva Villanueva at [phone] if you experience:   Fever, chills or temperature over 100    Vomiting, Headache, persistent stiff neck, nausea, blurred vision   Difficulty in urinating or unable to urinate with 8 hours   Increase in weakness, numbness or loss of function   Increased redness, swelling or drainage at the injection site

## 2022-08-01 NOTE — H&P
Pain Pre-Op H&P Note    Trixie Harrington MD    HPI: Raquel Esparza  presents with   Chronic low back pain across midline  Onset many year ago  Failed NSAID and PT  Hx of previous lumbar fusion  Pain affect QOL  Aggravates with activity  Present at rest  No leg radiation    Past Medical History:   Diagnosis Date    Achilles tendonitis     right    Asthma     COPD (chronic obstructive pulmonary disease) (HCC)     Cough     clear phlegm    Degenerative lumbar disc     DM (diabetes mellitus), type 2 ([address]     PCP Dr. Dianne Lynn, seen 8/2019    Failed back syndrome, lumbar     Fast heart beat     Hx of \"8-10 years ago\"    Fibromyalgia     History of bladder cancer     Hypertension     Hypothyroid     Kidney stones     Lumbar radiculopathy     Lumbar spondylolysis     Lumbar spondylosis     Osteoarthritis     Sleep apnea     uses CPAP machine nightly    Spinal stenosis in cervical region     Wears glasses        Past Surgical History:   Procedure Laterality Date    BACK SURGERY  2010    lumbar fusion    BLADDER TUMOR EXCISION  2005    CARDIOVERSION      \"8-10 years ago\"  to get \"heart into regular rhythm\"    CARPAL TUNNEL RELEASE Bilateral     CATARACT REMOVAL WITH IMPLANT Bilateral 7/22/2014, 8/5/2014    Raffoul/StFrancesrles    CERVICAL FUSION  11/14/2019     ANTERIOR CERVICAL DECOMPRESSION FUSION C3-4, C4-5    CERVICAL FUSION N/A 11/14/2019    ANTERIOR CERVICAL DECOMPRESSION FUSION C3-4, C4-5 (SUPINE) DEPUY, REGULAR TABLE, MICROSCOPE, C-ARM, EVOKES (# [id number] [facility]) performed by Fransisco Clarke DO at [facility], LAPAROSCOPIC N/A 8/18/2017    CHOLECYSTECTOMY LAPAROSCOPIC ROBOTIC MULTIPORT performed by Everett Barth MD at [address]  1/2013    CYSTOSCOPY  11/04/2016    CYSTOSCOPY  01/04/2019    ENDOSCOPIC ULTRASOUND (LOWER) N/A 8/17/2017    ENDOSCOPIC ULTRASOUND performed by Evelyn Evangelista MD at [address] [place] [address] (UPPER)  08/17/2017    A cursory view of the gastric mucosa was normal. The scope was then further advanced through a patent pylorus to the second portion of the duodenum. The Gallbladder was evaluated in bulb position. Thick sludge was noted. The CBD was 7.8 mm with no filling defects. The PD was 5 mm in the HOP. The pancreatic tissue was edematous in body and tail. KNEE ARTHROSCOPY Left     LUMBAR FUSION      PA OFFICE/OUTPT VISIT,PROCEDURE ONLY N/A 1/24/2018    SPINAL HARDWARE REMOVAL LUMBAR BONE STIMULATOR performed by Tony Velazquez MD at [address] Bilateral 1989    SKIN BIOPSY Right 11/2015    mole right posterior shoulder    SPINE SURGERY      spinal cord stimulator    SPINE SURGERY  91-4-15    renoval of spinal cord stimulator leads and battery    PASQUALE AND BSO (CERVIX REMOVED)      TUBAL LIGATION      UVULOPALATOPHARYGOPLASTY  2006       Family History   Problem Relation Age of Onset    Heart Failure Mother     Lung Cancer Mother     Other Father          pneumonia    Alzheimer's Disease Father     Lung Cancer Brother     Diabetes Paternal Grandmother     Other Daughter         Fibromyalsia    Rheum Arthritis Daughter        Allergies   Allergen Reactions    Azithromycin Shortness Of Breath     Tightness in chest    Adhesive Tape      OK to use paper tape per Patient- 2/19/20 patient states that she has been using all types of tape with no reaction         Current Outpatient Medications:     [START ON 8/10/2022] oxyCODONE-acetaminophen (PERCOCET)  MG per tablet, Take 1 tablet by mouth every 6 hours as needed for Pain for up to 30 days. May have 20 extra tabs a month for severe pain no more than 4 tabs a day, Disp: 110 tablet, Rfl: 0    morphine (MS CONTIN) 15 MG extended release tablet, Take 1 tablet by mouth in the morning and 1 tablet before bedtime. Do all this for 30 days. , Disp: 60 tablet, Rfl: 0    naloxone 4 MG/0.1ML LIQD nasal spray, 1 spray by Nasal route as needed for Opioid Reversal, Disp: 1 each, Rfl: 0    albuterol sulfate HFA 108 (90 Base) MCG/ACT inhaler, Inhale 1 puff into the lungs daily, Disp: , Rfl:     potassium chloride (MICRO-K) 10 MEQ extended release capsule, Take 1 capsule by mouth daily, Disp: , Rfl:     bumetanide (BUMEX) 1 MG tablet, , Disp: , Rfl:     sennosides-docusate sodium (SENOKOT-S) 8.6-50 MG tablet, Take 1 tablet by mouth daily as needed, Disp: , Rfl:     psyllium (KONSYL) 28.3 % PACK, Take 1 packet by mouth daily, Disp: , Rfl:     fluticasone (FLONASE) 50 MCG/ACT nasal spray, 1 spray by Each Nostril route daily, Disp: , Rfl:     fluticasone (FLOVENT HFA) 44 MCG/ACT inhaler, Inhale 1 puff into the lungs 2 times daily  (Patient not taking: Reported on 3/28/2022), Disp: , Rfl:     diclofenac sodium 1 % GEL, Apply 2 g topically 2 times daily as needed for Pain, Disp: , Rfl:     magnesium hydroxide (MILK OF MAGNESIA) 400 MG/5ML suspension, Take 5 mLs by mouth daily as needed for Constipation, Disp: , Rfl:     pregabalin (LYRICA) 150 MG capsule, Take 150 mg by mouth 2 times daily. , Disp: , Rfl:     montelukast (SINGULAIR) 10 MG tablet, Take 10 mg by mouth nightly , Disp: , Rfl:     metFORMIN (GLUCOPHAGE) 500 MG tablet, Take 500 mg by mouth 2 times daily (with meals). , Disp: , Rfl:     telmisartan-hydroCHLOROthiazide (MICARDIS HCT) 40-12.5 MG per tablet, Take 1 tablet by mouth daily. , Disp: , Rfl:     levothyroxine (SYNTHROID) 50 MCG tablet, Take 50 mcg by mouth Daily. , Disp: , Rfl:     omeprazole (PRILOSEC) 20 MG capsule, Take 20 mg by mouth every evening., Disp: , Rfl:     aspirin 81 MG tablet, Take 81 mg by mouth daily. , Disp: , Rfl:     Social History     Tobacco Use    Smoking status: Never    Smokeless tobacco: Never   Substance Use Topics    Alcohol use: Not Currently     Comment: rare       Review of Systems:   Focused review of systems was performed, and negative as pertinent to diagnosis, except as stated in HPI. Physical Exam  Constitutional:       Appearance: Normal appearance.    Pulmonary:      Effort: Pulmonary effort is normal.   Neurological:      Mental Status: alert. Psychiatric:         Attention and Perception: Attention and perception normal.         Mood and Affect: Mood and affect normal.   Cardiovascular:      Rate: Normal rate. +ve TTP over bilateral SI joint  Cricket Test +ve  Gaenslen test +ve  Pelvic compression reproduces si joint pain      ASA: 3          Mallampati: 3       Patient's current physical status, medications, medical history, and HPI have been reviewed and updated as appropriate on this date: 08/01/22    Risk/Benefit(s): The risks, benefits, alternatives, and potential complications have been discussed with the patient/family and informed consent has been obtained for the procedure/sedation. Diagnosis:   1. Failed back syndrome    2. Lumbar spondylolysis    3.  Chronic SI joint pain            Plan:   Bilateral si joint injection        Whitneygaudencio Dumont MD

## 2022-08-01 NOTE — OP NOTE
Pre Op Diagnoses: BilateralSacroiliac joint pain  Post Op Diagnoses:Bilateral Sacroiliac joint pain     Procedure: BilateralSI joint steroid injection with flouro guidance     Blood Loss: None  Procedure: The Patient was seen in the preop area, chart was reviewed, informed consent was obtained. Patient was taken to procedure room and was placed in prone position. Vital signs were monitored through out the Procedure. A time out was completed. The skin over the back was prepped and draped in sterile manner. The target point was marked at the left SI joint. Skin and deep tissues were anesthetized with 1 % lidocaine. A 22 G spinal needlele was advanced under fluoroscopy guidance in AP view. Positon was confirmed by injecting small amount of contrast dye  Finally 3 ml of treatment solution containing 5 ml of 0.5 % Bupivacaine and 1 ml of Dexamethasone 10 mg was injected  The needle was removed and a Band-Aid was placed over the needle insertion site. The same procedure was then repeated on the other side with same technique, the remaining 1.5 ml of treatment solution was injected on that side. The patient's vital signs remained stable and the patient tolerated the procedure well. SEDATION NOTE:    ASA CLASSIFICATION  3  MP   CLASSIFICATION  3    Moderate intravenous conscious sedation was supervised by Dr. Carl Low  The patient was independently monitored by a Registered Nurse assigned to the Procedure Room  Monitoring included automated blood pressure, continuous EKG, Capnography and continuous pulse oximetry. The detailed Conscious Record is permanently stored in the [place] [address].      The following is the conscious sedation record;  Start Time:  1129  End times:  1139  Duration:  10 minutes  MEDS GIVEN 1 MG VERSED AND 50 MCG FENTANYL

## 2022-08-04 DIAGNOSIS — G89.29 CHRONIC SI JOINT PAIN: Primary | ICD-10-CM

## 2022-08-04 DIAGNOSIS — M53.3 CHRONIC SI JOINT PAIN: Primary | ICD-10-CM

## 2022-08-09 ENCOUNTER — TELEPHONE (OUTPATIENT)
Dept: PAIN MANAGEMENT | Age: 82
End: 2022-08-09

## 2022-08-09 NOTE — TELEPHONE ENCOUNTER
I spoke with the patient to schedule SI injection. Patient states she would like to hold off; wants to see Dr. Marta Pineda first.  Patient will call when she is ready to schedule.

## 2022-08-17 ENCOUNTER — OFFICE VISIT (OUTPATIENT)
Dept: ORTHOPEDIC SURGERY | Age: 82
End: 2022-08-17
Payer: MEDICARE

## 2022-08-17 VITALS — BODY MASS INDEX: 31.07 KG/M2 | RESPIRATION RATE: 14 BRPM | WEIGHT: 148 LBS | HEIGHT: 58 IN

## 2022-08-17 DIAGNOSIS — M25.561 PAIN IN BOTH KNEES, UNSPECIFIED CHRONICITY: Primary | ICD-10-CM

## 2022-08-17 DIAGNOSIS — M25.562 PAIN IN BOTH KNEES, UNSPECIFIED CHRONICITY: Primary | ICD-10-CM

## 2022-08-17 PROCEDURE — 99213 OFFICE O/P EST LOW 20 MIN: CPT | Performed by: ORTHOPAEDIC SURGERY

## 2022-08-17 PROCEDURE — 20610 DRAIN/INJ JOINT/BURSA W/O US: CPT | Performed by: ORTHOPAEDIC SURGERY

## 2022-08-17 PROCEDURE — 1123F ACP DISCUSS/DSCN MKR DOCD: CPT | Performed by: ORTHOPAEDIC SURGERY

## 2022-08-17 RX ORDER — BETAMETHASONE SODIUM PHOSPHATE AND BETAMETHASONE ACETATE 3; 3 MG/ML; MG/ML
12 INJECTION, SUSPENSION INTRA-ARTICULAR; INTRALESIONAL; INTRAMUSCULAR; SOFT TISSUE ONCE
Status: COMPLETED | OUTPATIENT
Start: 2022-08-17 | End: 2022-08-17

## 2022-08-17 RX ORDER — LIDOCAINE HYDROCHLORIDE 10 MG/ML
2 INJECTION, SOLUTION INFILTRATION; PERINEURAL ONCE
Status: COMPLETED | OUTPATIENT
Start: 2022-08-17 | End: 2022-08-17

## 2022-08-17 RX ORDER — LIDOCAINE HYDROCHLORIDE 10 MG/ML
4 INJECTION, SOLUTION INFILTRATION; PERINEURAL ONCE
Status: CANCELLED | OUTPATIENT
Start: 2022-08-17 | End: 2022-08-17

## 2022-08-17 RX ADMIN — BETAMETHASONE SODIUM PHOSPHATE AND BETAMETHASONE ACETATE 12 MG: 3; 3 INJECTION, SUSPENSION INTRA-ARTICULAR; INTRALESIONAL; INTRAMUSCULAR; SOFT TISSUE at 08:46

## 2022-08-17 RX ADMIN — LIDOCAINE HYDROCHLORIDE 2 ML: 10 INJECTION, SOLUTION INFILTRATION; PERINEURAL at 08:45

## 2022-08-17 NOTE — PROGRESS NOTES
Candelaria Caraballo M.D.            [facility] [place] [address] [facility],[address], [address] [facility]           Dept Phone: [phone]           Dept Fax:  [phone]           [place]ma[place] Guillen          Dept Phone: [phone]           Dept Fax:  [phone]      Chief Compliant:  Chief Complaint   Patient presents with    Knee Pain     B/L        History of Present Illness: This is a 80 y.o. female who presents to the clinic today for evaluation / follow up of bilateral knee pain. Sal Jerome is a 60-year-old female who is been followed in the past for multiple joint problems with particularly her both of her knees. She has had injections to her knees most recently on April 13, 2022. She denies any recent injury or trauma she does like to have her knees injected she does get 3 to 4 months of good relief with this. .       Review of Systems   Constitutional: Negative for fever, chills, sweats. Eyes: Negative for changes in vision, or pain. HENT: Negative for ear ache, epistaxis, or sore throat. Respiratory/Cardio: Negative for Chest pain, palpitations, SOB, or cough. Gastrointestinal: Negative for abdominal pain, N/V/D. Genitourinary: Negative for dysuria, frequency, urgency, or hematuria. Neurological: Negative for headache, numbness, or weakness. Integumentary: Negative for rash, itching, laceration, or abrasion. Musculoskeletal: Positive for Knee Pain (B/L)       Physical Exam:  Constitutional: Patient is oriented to person, place, and time. Patient appears well-developed and well nourished. HENT: Negative otherwise noted  Head: Normocephalic and Atraumatic  Nose: Normal  Eyes: Conjunctivae and EOM are normal  Neck: Normal range of motion Neck supple. Respiratory/Cardio: Effort normal. No respiratory distress.   Musculoskeletal: Physical examination remains unchanged. Please see prior dictations  Neurological: Patient is alert and oriented to person, place, and time. Normal strength. No sensory deficit. Skin: Skin is warm and dry  Psychiatric: Behavior is normal. Thought content normal.  Nursing note and vitals reviewed. Labs and Imaging:     XR taken today: No new x-rays taken today  No results found. Orders Placed This Encounter   Procedures    20610 - DRAIN/INJECT LARGE JOINT BURSA       Assessment and Plan:  1. Pain in both knees, unspecified chronicity        Administrations This Visit       betamethasone acetate-betamethasone sodium phosphate (CELESTONE) injection 12 mg       Admin Date  08/17/2022  08:46 Action  Given Dose  12 mg Route  Intra-artICUlar Site  Knee Left Administered By  Paul Gant LPN    Ordering Provider: Padmini Wise MD    NDC: 8576-6042-52    Lot#: [id number]    : [employer]    Patient Supplied?: No      Admin Date  08/17/2022  08:46 Action  Given Dose  12 mg Route  Intra-artICUlar Site  Knee Right Administered By  Paul Gant LPN    Ordering Provider: Padmini Wise MD    NDC: 9178-6951-02    Lot#: [id number]    : [employer]    Patient Supplied?: No              lidocaine 1 % injection 2 mL       Admin Date  08/17/2022  08:45 Action  Given Dose  2 mL Route  Intra-artICUlar Site  Knee Left Administered By  Paul Gant LPN    Ordering Provider: Padmini Wise MD    [address]: [id number]    Lot#: [id number]    : 1060 Original    Patient Supplied?: No      Admin Date  08/17/2022  08:45 Action  Given Dose  2 mL Route  Intra-artICUlar Site  Knee Right Administered By  Paul Gant LPN    Ordering Provider: Padmini iWse MD    . [address]: [id number]    Lot#: [id number]    : 1060 Original    Patient Supplied?: No                    This is a 80 y.o. female who presents to the clinic today for evaluation / follow up of degenerative joint disease both knees. Past History:    Current Outpatient Medications:     oxyCODONE-acetaminophen (PERCOCET)  MG per tablet, Take 1 tablet by mouth every 6 hours as needed for Pain for up to 30 days. May have 20 extra tabs a month for severe pain no more than 4 tabs a day, Disp: 110 tablet, Rfl: 0    morphine (MS CONTIN) 15 MG extended release tablet, Take 1 tablet by mouth in the morning and 1 tablet before bedtime. Do all this for 30 days. , Disp: 60 tablet, Rfl: 0    naloxone 4 MG/0.1ML LIQD nasal spray, 1 spray by Nasal route as needed for Opioid Reversal, Disp: 1 each, Rfl: 0    albuterol sulfate  (90 Base) MCG/ACT inhaler, Inhale 1 puff into the lungs daily, Disp: , Rfl:     potassium chloride (MICRO-K) 10 MEQ extended release capsule, Take 1 capsule by mouth daily, Disp: , Rfl:     bumetanide (BUMEX) 1 MG tablet, , Disp: , Rfl:     sennosides-docusate sodium (SENOKOT-S) 8.6-50 MG tablet, Take 1 tablet by mouth daily as needed, Disp: , Rfl:     psyllium (KONSYL) 28.3 % PACK, Take 1 packet by mouth daily, Disp: , Rfl:     fluticasone (FLONASE) 50 MCG/ACT nasal spray, 1 spray by Each Nostril route daily, Disp: , Rfl:     fluticasone (FLOVENT HFA) 44 MCG/ACT inhaler, Inhale 1 puff into the lungs 2 times daily  (Patient not taking: Reported on 3/28/2022), Disp: , Rfl:     diclofenac sodium 1 % GEL, Apply 2 g topically 2 times daily as needed for Pain, Disp: , Rfl:     magnesium hydroxide (MILK OF MAGNESIA) 400 MG/5ML suspension, Take 5 mLs by mouth daily as needed for Constipation, Disp: , Rfl:     pregabalin (LYRICA) 150 MG capsule, Take 150 mg by mouth 2 times daily. , Disp: , Rfl:     montelukast (SINGULAIR) 10 MG tablet, Take 10 mg by mouth nightly , Disp: , Rfl:     metFORMIN (GLUCOPHAGE) 500 MG tablet, Take 500 mg by mouth 2 times daily (with meals). , Disp: , Rfl:     telmisartan-hydroCHLOROthiazide (MICARDIS HCT) 40-12.5 MG per tablet, Take 1 tablet by mouth daily. , Disp: , Rfl:     levothyroxine (SYNTHROID) 50 MCG tablet, Take 50 mcg by mouth Daily. , Disp: , Rfl:     omeprazole (PRILOSEC) 20 MG capsule, Take 20 mg by mouth every evening., Disp: , Rfl:     aspirin 81 MG tablet, Take 81 mg by mouth daily. , Disp: , Rfl:   Allergies   Allergen Reactions    Azithromycin Shortness Of Breath     Tightness in chest    Adhesive Tape      OK to use paper tape per Patient- 2/19/20 patient states that she has been using all types of tape with no reaction     Social History     Socioeconomic History    Marital status:      Spouse name: Sherman Tao    Number of children: 3    Years of education: Not on file    Highest education level: Not on file   Occupational History    Occupation: retired   Tobacco Use    Smoking status: Never    Smokeless tobacco: Never   Vaping Use    Vaping Use: Never used   Substance and Sexual Activity    Alcohol use: Not Currently     Comment: rare    Drug use: Never    Sexual activity: Yes     Partners: Male   Other Topics Concern    Not on file   Social History Narrative    Not on file     Social Determinants of Health     Financial Resource Strain: Not on file   Food Insecurity: Not on file   Transportation Needs: Not on file   Physical Activity: Not on file   Stress: Not on file   Social Connections: Not on file   Intimate Partner Violence: Not on file   Housing Stability: Not on file     Past Medical History:   Diagnosis Date    Achilles tendonitis     right    Asthma     COPD (chronic obstructive pulmonary disease) ([facility]     Cough     clear phlegm    Degenerative lumbar disc     DM (diabetes mellitus), type 2 ([facility]     PCP Dr. Bard Burk, seen 8/2019    Failed back syndrome, lumbar     Fast heart beat     Hx of \"8-10 years ago\"    Fibromyalgia     History of bladder cancer     Hypertension     Hypothyroid     Kidney stones     Lumbar radiculopathy     Lumbar spondylolysis     Lumbar spondylosis     Osteoarthritis     Sleep apnea     uses CPAP machine nightly    Spinal stenosis in cervical region     Wears glasses      Past Surgical History:   Procedure Laterality Date    BACK SURGERY  2010    lumbar fusion    BLADDER TUMOR EXCISION  2005    CARDIOVERSION      \"8-10 years ago\"  to get \"heart into regular rhythm\"    CARPAL TUNNEL RELEASE Bilateral     CATARACT REMOVAL WITH IMPLANT Bilateral 7/22/2014, 8/5/2014    Raffoul/StFrancesrkorina    CERVICAL FUSION  11/14/2019     ANTERIOR CERVICAL DECOMPRESSION FUSION C3-4, C4-5    CERVICAL FUSION N/A 11/14/2019    ANTERIOR CERVICAL DECOMPRESSION FUSION C3-4, C4-5 (SUPINE) DEPUY, REGULAR TABLE, MICROSCOPE, C-ARM, EVOKES (# [id number] [facility]) performed by Alessandro Watters DO at [facility], LAPAROSCOPIC N/A 8/18/2017    CHOLECYSTECTOMY LAPAROSCOPIC ROBOTIC MULTIPORT performed by Leighton Alves MD at [place] [address]  1/2013    CYSTOSCOPY  11/04/2016    CYSTOSCOPY  01/04/2019    ENDOSCOPIC ULTRASOUND (LOWER) N/A 8/17/2017    ENDOSCOPIC ULTRASOUND performed by Kalia Subramanian MD at [address] [place] [address] (UPPER)  08/17/2017    A cursory view of the gastric mucosa was normal. The scope was then further advanced through a patent pylorus to the second portion of the duodenum. The Gallbladder was evaluated in bulb position. Thick sludge was noted. The CBD was 7.8 mm with no filling defects. The PD was 5 mm in the HOP. The pancreatic tissue was edematous in body and tail.     KNEE ARTHROSCOPY Left     LUMBAR FUSION      CT OFFICE/OUTPT VISIT,PROCEDURE ONLY N/A 1/24/2018    SPINAL HARDWARE REMOVAL LUMBAR BONE STIMULATOR performed by Brandon Brice MD at [address] [place] [address] Bilateral 1989    SKIN BIOPSY Right 11/2015    mole right posterior shoulder    SPINE SURGERY      spinal cord stimulator    SPINE SURGERY  91-4-15    renoval of spinal cord stimulator leads and battery    PASQUALE AND BSO (CERVIX REMOVED)      TUBAL LIGATION      UVULOPALATOPHARYGOPLASTY  2006     Family History   Problem Relation Age of Onset    Heart Failure Mother     Lung Cancer Mother Other Father          pneumonia    Alzheimer's Disease Father     Lung Cancer Brother     Diabetes Paternal Grandmother     Other Daughter         Fibromyalsia    Rheum Arthritis Daughter    Plan  An informed verbal consent for the procedure was obtained and risks including, but not limited to: allergy to medications, injection, bleeding, stiffness of joint, recurrence of symptoms, loss of function, swelling, drainage, irrigation, need for surgery and pseudo-septic inflammation, were explained to the patient. Also, discussed was the potential for further injections, irrigation and debridement and surgery. Alternate means of treatment have also been discussed with the patient.       Administrations This Visit       betamethasone acetate-betamethasone sodium phosphate (CELESTONE) injection 12 mg       Admin Date  08/17/2022  08:46 Action  Given Dose  12 mg Route  Intra-artICUlar Site  Knee Left Administered By  Kelly Johnson LPN    Ordering Provider: Elli Shields MD    NDC: 1742-3038-33    Lot#: [id number]    : [employer]    Patient Supplied?: No      Admin Date  08/17/2022  08:46 Action  Given Dose  12 mg Route  Intra-artICUlar Site  Knee Right Administered By  Kelly Johnson LPN    Ordering Provider: Elli Shields MD    NDC: 0002-3168-93    Lot#: [id number]    : [employer]    Patient Supplied?: No              lidocaine 1 % injection 2 mL       Admin Date  08/17/2022  08:45 Action  Given Dose  2 mL Route  Intra-artICUlar Site  Knee Left Administered By  Kelly Johnson LPN    Ordering Provider: MD Stacia Patel. [address]: [id number]    Lot#: [id number]    : [facility].    Patient Supplied?: No      Admin Date  08/17/2022  08:45 Action  Given Dose  2 mL Route  Intra-artICUlar Site  Knee Right Administered By  Kelly Johnson LPN    Ordering Provider: MD Stacia Patel. [address]: [phone]    Lot#: [id number]    : [facility]. Patient Supplied?: No                Plan  Under sterile conditions the patient's both knees were injected lidocaine 2 cc and Celestone 2 cc. She tolerated the procedure well. Patient is familiar with post injection instructions. We will see her back here per her request                     Provider Attestation:  Jazmin Anderson, personally performed the services described in this documentation. All medical record entries made by the scribe were at my direction and in my presence. I have reviewed the chart and discharge instructions and agree that the records reflect my personal performance and is accurate and complete. Solange Juárez MD. 08/17/22      Please note that this chart was generated using voice recognition Dragon dictation software. Although every effort was made to ensure the accuracy of this automated transcription, some errors in transcription may have occurred.

## 2022-08-22 ENCOUNTER — HOSPITAL ENCOUNTER (OUTPATIENT)
Dept: PAIN MANAGEMENT | Age: 82
Discharge: HOME | End: 2022-08-22
Payer: MEDICARE

## 2022-08-22 VITALS
OXYGEN SATURATION: 100 % | HEIGHT: 58 IN | BODY MASS INDEX: 31.07 KG/M2 | RESPIRATION RATE: 18 BRPM | HEART RATE: 73 BPM | DIASTOLIC BLOOD PRESSURE: 49 MMHG | TEMPERATURE: 97.3 F | WEIGHT: 148 LBS | SYSTOLIC BLOOD PRESSURE: 108 MMHG

## 2022-08-22 DIAGNOSIS — M96.1 FAILED BACK SYNDROME OF LUMBAR SPINE: ICD-10-CM

## 2022-08-22 DIAGNOSIS — R26.89 IMBALANCE: Primary | ICD-10-CM

## 2022-08-22 DIAGNOSIS — M96.1 FAILED BACK SYNDROME: ICD-10-CM

## 2022-08-22 DIAGNOSIS — M47.816 LUMBAR SPONDYLOSIS: ICD-10-CM

## 2022-08-22 DIAGNOSIS — M50.30 DDD (DEGENERATIVE DISC DISEASE), CERVICAL: ICD-10-CM

## 2022-08-22 PROCEDURE — 99213 OFFICE O/P EST LOW 20 MIN: CPT

## 2022-08-22 PROCEDURE — 99213 OFFICE O/P EST LOW 20 MIN: CPT | Performed by: NURSE PRACTITIONER

## 2022-08-22 RX ORDER — MORPHINE SULFATE 15 MG/1
15 TABLET, FILM COATED, EXTENDED RELEASE ORAL 2 TIMES DAILY
Qty: 60 TABLET | Refills: 0 | Status: SHIPPED | OUTPATIENT
Start: 2022-09-05 | End: 2022-10-07 | Stop reason: SDUPTHER

## 2022-08-22 ASSESSMENT — ENCOUNTER SYMPTOMS
SHORTNESS OF BREATH: 0
BACK PAIN: 1
COUGH: 0
CONSTIPATION: 0
BOWEL INCONTINENCE: 0

## 2022-08-22 NOTE — PROGRESS NOTES
Chief Complaint   Patient presents with    Back Pain    Medication Refill    Follow Up After Procedure     SIJ         [facility]   Patient complains of back pain that started years ago. She has undergone several interventions but pain continues. She had SCS implanted in 2012 at [facility] but it was removed as it was not functioning well. She was going to the [facility] to exercise - using the stationary bike - with benefit before COVID. She did see neurosurgeon. Imaging of the spine completed - severe stenosis at T10-11 and moderate stenosis at L1-2. She had a follow up with NS and discussed surgery. She would like to hold off on any surgical interventions at this time and plans to try conservative measures. She had bilateral SI joint injection 8/1/22 and reports significant relief. She states she is scheduled to repeat the procedure this month. She had cervical fusion 11/2019 with Dr. Juvencio Hammer. She continues to follow with Dr. Britt Vanegas for her knees. Back Pain  This is a chronic problem. The current episode started more than 1 year ago. The problem occurs constantly. The problem is unchanged. The pain is present in the lumbar spine. The quality of the pain is described as aching. The pain does not radiate. The pain is at a severity of 6/10. The pain is The same all the time. The symptoms are aggravated by bending, sitting and standing. Associated symptoms include numbness. Pertinent negatives include no bladder incontinence, bowel incontinence, chest pain, fever, headaches, tingling or weakness. Treatments tried: SIJ. The treatment provided mild relief. Patient denies any new neurological symptoms. No bowel or bladder incontinence, no weakness, and no falling.     Pill count: appropriate  Morphine ER 15 MG - 33  Oxycodone - 75    Morphine equivalent: 85    Controlled Substance Monitoring:    Acute and Chronic Pain Monitoring:   RX Monitoring 8/22/2022   Attestation -   Acute Pain Prescriptions -   Periodic Controlled Substance Monitoring Possible medication side effects, risk of tolerance/dependence & alternative treatments discussed. ;No signs of potential drug abuse or diversion identified.;Obtaining appropriate analgesic effect of treatment. Chronic Pain > 50 MEDD Re-evaluated the status of the patient's underlying condition causing pain. Chronic Pain > 80 MEDD Co-prescribed Naloxone.    Chronic Pain > 120 MEDD -            Past Medical History:   Diagnosis Date    Achilles tendonitis     right    Asthma     COPD (chronic obstructive pulmonary disease) (HCC)     Cough     clear phlegm    Degenerative lumbar disc     DM (diabetes mellitus), type 2 ([address]     PCP Dr. Felicia Henriquez, seen 8/2019    Failed back syndrome, lumbar     Fast heart beat     Hx of \"8-10 years ago\"    Fibromyalgia     History of bladder cancer     Hypertension     Hypothyroid     Kidney stones     Lumbar radiculopathy     Lumbar spondylolysis     Lumbar spondylosis     Osteoarthritis     Sleep apnea     uses CPAP machine nightly    Spinal stenosis in cervical region     Wears glasses        Past Surgical History:   Procedure Laterality Date    BACK SURGERY  2010    lumbar fusion    BLADDER TUMOR EXCISION  2005    CARDIOVERSION      \"8-10 years ago\"  to get \"heart into regular rhythm\"    CARPAL TUNNEL RELEASE Bilateral     CATARACT REMOVAL WITH IMPLANT Bilateral 7/22/2014, 8/5/2014    Raffoul/StFrancesrles    CERVICAL FUSION  11/14/2019     ANTERIOR CERVICAL DECOMPRESSION FUSION C3-4, C4-5    CERVICAL FUSION N/A 11/14/2019    ANTERIOR CERVICAL DECOMPRESSION FUSION C3-4, C4-5 (SUPINE) DEPUY, REGULAR TABLE, MICROSCOPE, C-ARM, EVOKES (# [id number] ELIZA) performed by [facility]  at [facility], LAPAROSCOPIC N/A 8/18/2017    CHOLECYSTECTOMY LAPAROSCOPIC ROBOTIC MULTIPORT performed by Taran Sandoval MD at [address] [place] [address]  1/2013    CYSTOSCOPY  11/04/2016    CYSTOSCOPY  01/04/2019    ENDOSCOPIC ULTRASOUND (LOWER) N/A 8/17/2017    ENDOSCOPIC ULTRASOUND performed by Usha Good MD at [address] [place] [address] (UPPER)  08/17/2017    A cursory view of the gastric mucosa was normal. The scope was then further advanced through a patent pylorus to the second portion of the duodenum. The Gallbladder was evaluated in bulb position. Thick sludge was noted. The CBD was 7.8 mm with no filling defects. The PD was 5 mm in the HOP. The pancreatic tissue was edematous in body and tail. KNEE ARTHROSCOPY Left     LUMBAR FUSION      CA OFFICE/OUTPT VISIT,PROCEDURE ONLY N/A 1/24/2018    SPINAL HARDWARE REMOVAL LUMBAR BONE STIMULATOR performed by Tony Velazquez MD at [address] Bilateral 1989    SKIN BIOPSY Right 11/2015    mole right posterior shoulder    SPINE SURGERY      spinal cord stimulator    SPINE SURGERY  91-4-15    renoval of spinal cord stimulator leads and battery    PASQUALE AND BSO (CERVIX REMOVED)      TUBAL LIGATION      UVULOPALATOPHARYGOPLASTY  2006       Allergies   Allergen Reactions    Azithromycin Shortness Of Breath     Tightness in chest    Adhesive Tape      OK to use paper tape per Patient- 2/19/20 patient states that she has been using all types of tape with no reaction         Current Outpatient Medications:     oxyCODONE-acetaminophen (PERCOCET)  MG per tablet, Take 1 tablet by mouth every 6 hours as needed for Pain for up to 30 days. May have 20 extra tabs a month for severe pain no more than 4 tabs a day, Disp: 110 tablet, Rfl: 0    morphine (MS CONTIN) 15 MG extended release tablet, Take 1 tablet by mouth in the morning and 1 tablet before bedtime. Do all this for 30 days. , Disp: 60 tablet, Rfl: 0    naloxone 4 MG/0.1ML LIQD nasal spray, 1 spray by Nasal route as needed for Opioid Reversal, Disp: 1 each, Rfl: 0    albuterol sulfate  (90 Base) MCG/ACT inhaler, Inhale 1 puff into the lungs daily, Disp: , Rfl:     potassium chloride (MICRO-K) 10 MEQ extended release capsule, Take 1 capsule by mouth daily, Disp: , Rfl:     bumetanide (BUMEX) 1 MG tablet, , Disp: , Rfl:     sennosides-docusate sodium (SENOKOT-S) 8.6-50 MG tablet, Take 1 tablet by mouth daily as needed, Disp: , Rfl:     psyllium (KONSYL) 28.3 % PACK, Take 1 packet by mouth daily, Disp: , Rfl:     fluticasone (FLONASE) 50 MCG/ACT nasal spray, 1 spray by Each Nostril route daily, Disp: , Rfl:     fluticasone (FLOVENT HFA) 44 MCG/ACT inhaler, Inhale 1 puff into the lungs 2 times daily  (Patient not taking: No sig reported), Disp: , Rfl:     diclofenac sodium 1 % GEL, Apply 2 g topically 2 times daily as needed for Pain, Disp: , Rfl:     magnesium hydroxide (MILK OF MAGNESIA) 400 MG/5ML suspension, Take 5 mLs by mouth daily as needed for Constipation, Disp: , Rfl:     pregabalin (LYRICA) 150 MG capsule, Take 150 mg by mouth 2 times daily. , Disp: , Rfl:     montelukast (SINGULAIR) 10 MG tablet, Take 10 mg by mouth nightly , Disp: , Rfl:     metFORMIN (GLUCOPHAGE) 500 MG tablet, Take 500 mg by mouth 2 times daily (with meals). , Disp: , Rfl:     telmisartan-hydroCHLOROthiazide (MICARDIS HCT) 40-12.5 MG per tablet, Take 1 tablet by mouth daily. , Disp: , Rfl:     levothyroxine (SYNTHROID) 50 MCG tablet, Take 50 mcg by mouth Daily. , Disp: , Rfl:     omeprazole (PRILOSEC) 20 MG capsule, Take 20 mg by mouth every evening., Disp: , Rfl:     aspirin 81 MG tablet, Take 81 mg by mouth daily. , Disp: , Rfl:     Family History   Problem Relation Age of Onset    Heart Failure Mother     Lung Cancer Mother     Other Father          pneumonia    Alzheimer's Disease Father     Lung Cancer Brother     Diabetes Paternal Grandmother     Other Daughter         Fibromyalsia    Rheum Arthritis Daughter        Social History     Socioeconomic History    Marital status:      Spouse name: Jesika Robert    Number of children: 3    Years of education: Not on file    Highest education level: Not on file   Occupational History    Occupation: retired Tobacco Use    Smoking status: Never    Smokeless tobacco: Never   Vaping Use    Vaping Use: Never used   Substance and Sexual Activity    Alcohol use: Not Currently     Comment: rare    Drug use: Never    Sexual activity: Yes     Partners: Male   Other Topics Concern    Not on file   Social History Narrative    Not on file     Social Determinants of Health     Financial Resource Strain: Not on file   Food Insecurity: Not on file   Transportation Needs: Not on file   Physical Activity: Not on file   Stress: Not on file   Social Connections: Not on file   Intimate Partner Violence: Not on file   Housing Stability: Not on file       Review of Systems:  Review of Systems   Constitutional: Negative for chills and fever. Cardiovascular:  Negative for chest pain and palpitations. Respiratory:  Negative for cough and shortness of breath. Musculoskeletal:  Positive for back pain and joint pain. Gastrointestinal:  Negative for bowel incontinence and constipation. Genitourinary:  Negative for bladder incontinence. Neurological:  Positive for numbness. Negative for disturbances in coordination, headaches, loss of balance, tingling and weakness. Physical Exam:  BP (!) 108/49   Pulse 73   Temp 97.3 °F (36.3 °C)   Resp 18   Ht 4' 10\" (1.473 m)   Wt 148 lb (67.1 kg)   SpO2 100%   BMI 30.93 kg/m²     Physical Exam  HENT:      Head: Normocephalic. Pulmonary:      Effort: Pulmonary effort is normal.   Musculoskeletal:         General: Normal range of motion. Cervical back: Normal range of motion. Lumbar back: Tenderness present. Skin:     General: Skin is warm and dry. Neurological:      Mental Status: She is alert and oriented to person, place, and time.        Record/Diagnostics Review:    Last georgie 11/2021 and was appropriate     Assessment:  Problem List Items Addressed This Visit       Lumbar spondylosis    Relevant Medications    morphine (MS CONTIN) 15 MG extended release tablet (Start on 9/5/2022)    Imbalance - Primary (Chronic)    Failed back syndrome    Relevant Medications    morphine (MS CONTIN) 15 MG extended release tablet (Start on 9/5/2022)    DDD (degenerative disc disease), cervical    Relevant Medications    morphine (MS CONTIN) 15 MG extended release tablet (Start on 9/5/2022)     Other Visit Diagnoses       Failed back syndrome of lumbar spine        Relevant Medications    morphine (MS CONTIN) 15 MG extended release tablet (Start on 9/5/2022)               Treatment Plan:  Patient relates current medications are helping the pain. Patient reports taking pain medications as prescribed, denies obtaining medications from different sources and denies use of illegal drugs. Patient denies side effects from medications like nausea, vomiting, constipation or drowsiness. Patient reports current activities of daily living are possible due to medications and would like to continue them. As always, we encourage daily stretching and strengthening exercises, and recommend minimizing use of pain medications unless patient cannot get through daily activities due to pain. Contract requirements met. Continue opioid therapy. Script written for Morphine - too early to fill percocet  Will reduce percocet dose to #100 tabs next month - then #90 the following month - continue to taper opioid dose  Pt is scheduled for SI joint confirmatory block next month  Follow up appointment made for 4 weeks    I have reviewed the chief complaint and history of present illness (including ROS and PFSH) and vital documentation by my staff and I agree with their documentation and have added where applicable.

## 2022-08-24 ENCOUNTER — OFFICE VISIT (OUTPATIENT)
Dept: ORTHOPEDIC SURGERY | Age: 82
End: 2022-08-24
Payer: MEDICARE

## 2022-08-24 VITALS — WEIGHT: 148 LBS | BODY MASS INDEX: 31.07 KG/M2 | HEIGHT: 58 IN | RESPIRATION RATE: 14 BRPM

## 2022-08-24 DIAGNOSIS — M19.021 PRIMARY OSTEOARTHRITIS OF RIGHT ELBOW: Primary | ICD-10-CM

## 2022-08-24 DIAGNOSIS — M19.021 OSTEOARTHRITIS OF RIGHT ELBOW, UNSPECIFIED OSTEOARTHRITIS TYPE: Primary | ICD-10-CM

## 2022-08-24 PROCEDURE — 99214 OFFICE O/P EST MOD 30 MIN: CPT | Performed by: ORTHOPAEDIC SURGERY

## 2022-08-24 PROCEDURE — 1123F ACP DISCUSS/DSCN MKR DOCD: CPT | Performed by: ORTHOPAEDIC SURGERY

## 2022-08-24 RX ORDER — DICLOFENAC SODIUM 75 MG/1
75 TABLET, DELAYED RELEASE ORAL 2 TIMES DAILY WITH MEALS
Qty: 28 TABLET | Refills: 0 | Status: SHIPPED | OUTPATIENT
Start: 2022-08-24 | End: 2022-09-03

## 2022-08-30 NOTE — PROGRESS NOTES
HPI: Ms. Kymberly Paz is a 14-year-old here today for       ORTHOPEDIC PATIENT EVALUATION      HPI / Chief Complaint  Neil Washburn is a 80 y.o. female who presents for reevaluation of her right elbow. She was seen in my clinic is over 3 months ago at which time she was diagnosed with significant osteoarthritis in this elbow. She received a cortisone injection at that time and states that it did not help. She continues to have pain and restriction in her range of motion. Past Medical History  Amy  has a past medical history of Achilles tendonitis, Asthma, COPD (chronic obstructive pulmonary disease) ([facility]), Cough, Degenerative lumbar disc, DM (diabetes mellitus), type 2 ([address] Failed back syndrome, lumbar, Fast heart beat, Fibromyalgia, History of bladder cancer, Hypertension, Hypothyroid, Kidney stones, Lumbar radiculopathy, Lumbar spondylolysis, Lumbar spondylosis, Osteoarthritis, Sleep apnea, Spinal stenosis in cervical region, and Wears glasses. Past Surgical History  Amy  has a past surgical history that includes Rotator cuff repair (Bilateral, 1989); Carpal tunnel release (Bilateral); Uvulopalatopharygoplasty (2006); Cystoscopy (1/2013); Cataract removal with implant (Bilateral, 7/22/2014, 8/5/2014); Spine surgery; Spine surgery (91-4-15); skin biopsy (Right, 11/2015); Cystocopy (11/04/2016); Knee arthroscopy (Left); bladder tumor excision (2005); lumbar fusion; Total abdominal hysterectomy w/ bilateral salpingoophorectomy; Endoscopic ultrasonography, GI (N/A, 8/17/2017); Cholecystectomy, laparoscopic (N/A, 8/18/2017); endoscopic ultrasound (upper) (08/17/2017); Cardioversion; Tubal ligation; pr office/outpt visit,procedure only (N/A, 1/24/2018); back surgery (2010); Cystoscopy (01/04/2019); cervical fusion (11/14/2019); and cervical fusion (N/A, 11/14/2019).     Current Medications  Current Outpatient Medications   Medication Sig Dispense Refill    diclofenac (VOLTAREN) 75 MG EC tablet Take 1 tablet by mouth 2 times daily (with meals) 28 tablet 0    [START ON 9/5/2022] morphine (MS CONTIN) 15 MG extended release tablet Take 1 tablet by mouth 2 times daily for 30 days. 60 tablet 0    oxyCODONE-acetaminophen (PERCOCET)  MG per tablet Take 1 tablet by mouth every 6 hours as needed for Pain for up to 30 days. May have 20 extra tabs a month for severe pain no more than 4 tabs a day 110 tablet 0    naloxone 4 MG/0.1ML LIQD nasal spray 1 spray by Nasal route as needed for Opioid Reversal 1 each 0    albuterol sulfate  (90 Base) MCG/ACT inhaler Inhale 1 puff into the lungs daily      potassium chloride (MICRO-K) 10 MEQ extended release capsule Take 1 capsule by mouth daily      bumetanide (BUMEX) 1 MG tablet       sennosides-docusate sodium (SENOKOT-S) 8.6-50 MG tablet Take 1 tablet by mouth daily as needed      psyllium (KONSYL) 28.3 % PACK Take 1 packet by mouth daily      fluticasone (FLONASE) 50 MCG/ACT nasal spray 1 spray by Each Nostril route daily      fluticasone (FLOVENT HFA) 44 MCG/ACT inhaler Inhale 1 puff into the lungs 2 times daily  (Patient not taking: No sig reported)      diclofenac sodium 1 % GEL Apply 2 g topically 2 times daily as needed for Pain      magnesium hydroxide (MILK OF MAGNESIA) 400 MG/5ML suspension Take 5 mLs by mouth daily as needed for Constipation      pregabalin (LYRICA) 150 MG capsule Take 150 mg by mouth 2 times daily. montelukast (SINGULAIR) 10 MG tablet Take 10 mg by mouth nightly       metFORMIN (GLUCOPHAGE) 500 MG tablet Take 500 mg by mouth 2 times daily (with meals). telmisartan-hydroCHLOROthiazide (MICARDIS HCT) 40-12.5 MG per tablet Take 1 tablet by mouth daily. levothyroxine (SYNTHROID) 50 MCG tablet Take 50 mcg by mouth Daily. omeprazole (PRILOSEC) 20 MG capsule Take 20 mg by mouth every evening. aspirin 81 MG tablet Take 81 mg by mouth daily. No current facility-administered medications for this visit. Allergies  Allergies have been reviewed. Amy is allergic to azithromycin and adhesive tape. Social History  Amy  reports that she has never smoked. She has never used smokeless tobacco. She reports that she does not currently use alcohol. She reports that she does not use drugs. Family History  Amy's family history includes Alzheimer's Disease in her father; Diabetes in her paternal grandmother; Heart Failure in her mother; Iqra Mayo in her brother and mother; Other in her daughter and father; Rheum Arthritis in her daughter. Review of Systems   History obtained from the patient. REVIEW OF SYSTEMS:   Constitution: negative for fever, chills, weight loss or malaise   Musculoskeletal: As noted in the HPI   Neurologic: As noted in the HPI    Physical Exam  Resp 14   Ht 4' 10\" (1.473 m)   Wt 148 lb (67.1 kg)   BMI 30.93 kg/m²    General Appearance: alert, well appearing, and in no distress  Mental Status: alert, oriented to person, place, and time  Evaluation of the right elbow and upper extremity demonstrates intact skin without warmth, erythema, or notable swelling. Sensation is grossly intact light touch in all dermatomes and she has a 2+ radial pulse with brisk capillary refill in her fingers. She lacks approximately 40 degrees of elbow extension and flexes to approximately 120 degrees. She has pain at the extremes of motion. No gross instability. Assessment and Plan  Sarah Edwards is a 80 y.o. old female with right elbow osteoarthritis. I had a discussion with the patient today educating her once more about her osteoarthritis and discussing treatment options moving forward including continued conservative management as well as surgical intervention. She would like to continue to manage this conservatively. Consequently she was placed on a 2-week course of Voltaren today. A prescription was sent to her pharmacy electronically.   I will see her back in my clinic as needed but she may return or call at anytime with persistent or worsening symptoms or with any questions or concerns. This note is created with the assistance of a speech recognition program.  While intending to generate adocument that actually reflects the content of the visit, the document can still have some errors including those of syntax and sound a like substitutions which may escape proof reading. It such instances, actual meaningcan be extrapolated by contextual diversion.

## 2022-09-02 ENCOUNTER — HOSPITAL ENCOUNTER (OUTPATIENT)
Age: 82
Setting detail: OBSERVATION
Discharge: HOME | End: 2022-09-03
Attending: EMERGENCY MEDICINE | Admitting: FAMILY MEDICINE
Payer: MEDICARE

## 2022-09-02 ENCOUNTER — APPOINTMENT (OUTPATIENT)
Dept: GENERAL RADIOLOGY | Age: 82
End: 2022-09-02
Payer: MEDICARE

## 2022-09-02 ENCOUNTER — APPOINTMENT (OUTPATIENT)
Dept: CT IMAGING | Age: 82
End: 2022-09-02
Payer: MEDICARE

## 2022-09-02 DIAGNOSIS — E86.0 DEHYDRATION: ICD-10-CM

## 2022-09-02 DIAGNOSIS — N17.9 AKI (ACUTE KIDNEY INJURY) (HCC): Primary | ICD-10-CM

## 2022-09-02 DIAGNOSIS — Z91.89 AT RISK FOR POLYPHARMACY: ICD-10-CM

## 2022-09-02 DIAGNOSIS — R42 LIGHTHEADEDNESS: ICD-10-CM

## 2022-09-02 LAB
ABSOLUTE EOS #: 0.1 K/UL (ref 0–0.4)
ABSOLUTE LYMPH #: 1.1 K/UL (ref 1–4.8)
ABSOLUTE MONO #: 0.8 K/UL (ref 0.1–1.3)
ALBUMIN SERPL-MCNC: 3 G/DL (ref 3.5–5.2)
ALP BLD-CCNC: 120 U/L (ref 35–104)
ALT SERPL-CCNC: 14 U/L (ref 5–33)
ANION GAP SERPL CALCULATED.3IONS-SCNC: 9 MMOL/L (ref 9–17)
AST SERPL-CCNC: 15 U/L
BACTERIA: NORMAL
BASOPHILS # BLD: 1 % (ref 0–2)
BASOPHILS ABSOLUTE: 0 K/UL (ref 0–0.2)
BILIRUB SERPL-MCNC: 0.3 MG/DL (ref 0.3–1.2)
BILIRUBIN URINE: NEGATIVE
BUN BLDV-MCNC: 52 MG/DL (ref 8–23)
CALCIUM SERPL-MCNC: 8.7 MG/DL (ref 8.6–10.4)
CARBOXYHEMOGLOBIN: 2.1 % (ref 0–5)
CASTS UA: NORMAL /LPF
CASTS UA: NORMAL /LPF
CHLORIDE BLD-SCNC: 99 MMOL/L (ref 98–107)
CO2: 30 MMOL/L (ref 20–31)
COLOR: ABNORMAL
CREAT SERPL-MCNC: 1.84 MG/DL (ref 0.5–0.9)
EOSINOPHILS RELATIVE PERCENT: 1 % (ref 0–4)
EPITHELIAL CELLS UA: NORMAL /HPF
GFR AFRICAN AMERICAN: 32 ML/MIN
GFR NON-AFRICAN AMERICAN: 26 ML/MIN
GFR SERPL CREATININE-BSD FRML MDRD: ABNORMAL ML/MIN/{1.73_M2}
GLUCOSE BLD-MCNC: 110 MG/DL (ref 70–99)
GLUCOSE URINE: NEGATIVE
HCO3 VENOUS: 31.1 MMOL/L (ref 24–30)
HCT VFR BLD CALC: 29 % (ref 36–46)
HEMOGLOBIN: 9.4 G/DL (ref 12–16)
KETONES, URINE: ABNORMAL
LEUKOCYTE ESTERASE, URINE: ABNORMAL
LYMPHOCYTES # BLD: 16 % (ref 24–44)
MAGNESIUM: 2.6 MG/DL (ref 1.6–2.6)
MCH RBC QN AUTO: 28.9 PG (ref 26–34)
MCHC RBC AUTO-ENTMCNC: 32.5 G/DL (ref 31–37)
MCV RBC AUTO: 88.9 FL (ref 80–100)
MONOCYTES # BLD: 12 % (ref 1–7)
NITRITE, URINE: NEGATIVE
O2 SAT, VEN: 57.4 % (ref 60–85)
PCO2, VEN: 50.3 (ref 39–55)
PDW BLD-RTO: 13.3 % (ref 11.5–14.9)
PH UA: 5 (ref 5–8)
PH VENOUS: 7.4 (ref 7.32–7.42)
PLATELET # BLD: 249 K/UL (ref 150–450)
PMV BLD AUTO: 8.2 FL (ref 6–12)
PO2, VEN: 30.2 (ref 30–50)
POSITIVE BASE EXCESS, VEN: 6.3 MMOL/L (ref 0–2)
POTASSIUM SERPL-SCNC: 4.5 MMOL/L (ref 3.7–5.3)
PROTEIN UA: ABNORMAL
RBC # BLD: 3.26 M/UL (ref 4–5.2)
RBC UA: NORMAL /HPF
SARS-COV-2, RAPID: NOT DETECTED
SEG NEUTROPHILS: 70 % (ref 36–66)
SEGMENTED NEUTROPHILS ABSOLUTE COUNT: 5.1 K/UL (ref 1.3–9.1)
SODIUM BLD-SCNC: 138 MMOL/L (ref 135–144)
SPECIFIC GRAVITY UA: 1.02 (ref 1–1.03)
SPECIMEN DESCRIPTION: NORMAL
TEXT FOR RESPIRATORY: ABNORMAL
TOTAL PROTEIN: 5.9 G/DL (ref 6.4–8.3)
TROPONIN, HIGH SENSITIVITY: 28 NG/L (ref 0–14)
TROPONIN, HIGH SENSITIVITY: 28 NG/L (ref 0–14)
TSH SERPL DL<=0.05 MIU/L-ACNC: 0.46 UIU/ML (ref 0.3–5)
TURBIDITY: CLEAR
URINE HGB: NEGATIVE
UROBILINOGEN, URINE: NORMAL
WBC # BLD: 7.1 K/UL (ref 3.5–11)
WBC UA: NORMAL /HPF

## 2022-09-02 PROCEDURE — 82800 BLOOD PH: CPT

## 2022-09-02 PROCEDURE — 36415 COLL VENOUS BLD VENIPUNCTURE: CPT

## 2022-09-02 PROCEDURE — 82805 BLOOD GASES W/O2 SATURATION: CPT

## 2022-09-02 PROCEDURE — 70450 CT HEAD/BRAIN W/O DYE: CPT

## 2022-09-02 PROCEDURE — 96361 HYDRATE IV INFUSION ADD-ON: CPT

## 2022-09-02 PROCEDURE — 85025 COMPLETE CBC W/AUTO DIFF WBC: CPT

## 2022-09-02 PROCEDURE — 80053 COMPREHEN METABOLIC PANEL: CPT

## 2022-09-02 PROCEDURE — 99285 EMERGENCY DEPT VISIT HI MDM: CPT

## 2022-09-02 PROCEDURE — 96360 HYDRATION IV INFUSION INIT: CPT

## 2022-09-02 PROCEDURE — 81001 URINALYSIS AUTO W/SCOPE: CPT

## 2022-09-02 PROCEDURE — 93005 ELECTROCARDIOGRAM TRACING: CPT | Performed by: HEALTH CARE PROVIDER

## 2022-09-02 PROCEDURE — 84443 ASSAY THYROID STIM HORMONE: CPT

## 2022-09-02 PROCEDURE — 84484 ASSAY OF TROPONIN QUANT: CPT

## 2022-09-02 PROCEDURE — 71046 X-RAY EXAM CHEST 2 VIEWS: CPT

## 2022-09-02 PROCEDURE — 83735 ASSAY OF MAGNESIUM: CPT

## 2022-09-02 PROCEDURE — G0378 HOSPITAL OBSERVATION PER HR: HCPCS

## 2022-09-02 PROCEDURE — 87635 SARS-COV-2 COVID-19 AMP PRB: CPT

## 2022-09-02 PROCEDURE — 2580000003 HC RX 258: Performed by: EMERGENCY MEDICINE

## 2022-09-02 RX ORDER — CHOLECALCIFEROL (VITAMIN D3) 125 MCG
500 CAPSULE ORAL DAILY
COMMUNITY

## 2022-09-02 RX ORDER — SODIUM CHLORIDE 9 MG/ML
INJECTION, SOLUTION INTRAVENOUS PRN
Status: DISCONTINUED | OUTPATIENT
Start: 2022-09-02 | End: 2022-09-03 | Stop reason: HOSPADM

## 2022-09-02 RX ORDER — ONDANSETRON 4 MG/1
4 TABLET, ORALLY DISINTEGRATING ORAL EVERY 8 HOURS PRN
Status: DISCONTINUED | OUTPATIENT
Start: 2022-09-02 | End: 2022-09-03 | Stop reason: HOSPADM

## 2022-09-02 RX ORDER — 0.9 % SODIUM CHLORIDE 0.9 %
1000 INTRAVENOUS SOLUTION INTRAVENOUS ONCE
Status: COMPLETED | OUTPATIENT
Start: 2022-09-02 | End: 2022-09-02

## 2022-09-02 RX ORDER — ACETAMINOPHEN 325 MG/1
650 TABLET ORAL EVERY 4 HOURS PRN
Status: DISCONTINUED | OUTPATIENT
Start: 2022-09-02 | End: 2022-09-03 | Stop reason: HOSPADM

## 2022-09-02 RX ORDER — SODIUM CHLORIDE 0.9 % (FLUSH) 0.9 %
5-40 SYRINGE (ML) INJECTION PRN
Status: DISCONTINUED | OUTPATIENT
Start: 2022-09-02 | End: 2022-09-03 | Stop reason: HOSPADM

## 2022-09-02 RX ORDER — ONDANSETRON 2 MG/ML
4 INJECTION INTRAMUSCULAR; INTRAVENOUS EVERY 6 HOURS PRN
Status: DISCONTINUED | OUTPATIENT
Start: 2022-09-02 | End: 2022-09-03 | Stop reason: HOSPADM

## 2022-09-02 RX ORDER — ENOXAPARIN SODIUM 100 MG/ML
30 INJECTION SUBCUTANEOUS DAILY
Status: DISCONTINUED | OUTPATIENT
Start: 2022-09-03 | End: 2022-09-03 | Stop reason: HOSPADM

## 2022-09-02 RX ORDER — FERROUS SULFATE 325(65) MG
325 TABLET ORAL
COMMUNITY

## 2022-09-02 RX ORDER — SODIUM CHLORIDE 0.9 % (FLUSH) 0.9 %
5-40 SYRINGE (ML) INJECTION EVERY 12 HOURS SCHEDULED
Status: DISCONTINUED | OUTPATIENT
Start: 2022-09-02 | End: 2022-09-03 | Stop reason: HOSPADM

## 2022-09-02 RX ORDER — SODIUM CHLORIDE 9 MG/ML
INJECTION, SOLUTION INTRAVENOUS CONTINUOUS
Status: DISCONTINUED | OUTPATIENT
Start: 2022-09-02 | End: 2022-09-03 | Stop reason: HOSPADM

## 2022-09-02 RX ADMIN — SODIUM CHLORIDE: 9 INJECTION, SOLUTION INTRAVENOUS at 20:02

## 2022-09-02 RX ADMIN — SODIUM CHLORIDE 1000 ML: 9 INJECTION, SOLUTION INTRAVENOUS at 17:47

## 2022-09-02 ASSESSMENT — ENCOUNTER SYMPTOMS
CONSTIPATION: 0
SORE THROAT: 0
DIARRHEA: 0
VOMITING: 0
ABDOMINAL PAIN: 0
NAUSEA: 0
SHORTNESS OF BREATH: 0

## 2022-09-02 ASSESSMENT — PAIN - FUNCTIONAL ASSESSMENT
PAIN_FUNCTIONAL_ASSESSMENT: NONE - DENIES PAIN

## 2022-09-02 NOTE — ED PROVIDER NOTES
[id number] [facility]     Pt Name: Gaby Cintron  MRN: [id number]  [place] 1940  Date of evaluation: 9/2/22       Gaby Cintron is a 80 y.o. female who presents with Dizziness and Altered Mental Status      MDM: 72-year-old female presents for complaints of fatigue and generalized weakness. On initial exam patient in no acute distress vitals are stable, no focal neurodeficits on exam, will check labs, will check CT head    Labs reviewed patient was found to have JEFF her creatinine was at 1.8 up from her baseline of approximately 1, remaining labs are unremarkable, CT head and chest x-ray were negative    Given symptoms of fatigue, and JEFF, will admit for further treatment    Results were discussed with patient, discussed plan for admission she is agreeable    Spoke with Dr. Edgar Boss who accepts admission. Patient demonstrates understanding and agreement with the plan, was given the opportunity to ask questions, and these questions were answered to the best of the provided information at this time. VS stable for transfer. This dictation was prepared using [employer] [facility] [employer] voice recognition software. Vitals:   Vitals:    09/02/22 2055 09/02/22 2140 09/02/22 2259 09/03/22 0628   BP: (!) 105/53 (!) 122/51 (!) 120/49 (!) 132/54   Pulse: 68 72 71 69   Resp: 12 23 17 18   Temp:   98.4 °F (36.9 °C) 98.6 °F (37 °C)   TempSrc:       SpO2: 96% 98% 98% 99%   Weight:       Height:             I personally saw and examined the patient. I have reviewed and agree with the resident's findings, including all diagnostic interpretations and treatment plan as written. I was present for the key portions of any procedures performed and the inclusive time noted for any critical care statement. The care is provided during an unprecedented national emergency due to the novel coronavirus, COVID 19.   [facility],   Attending Emergency Physician           [facility],

## 2022-09-02 NOTE — ED TRIAGE NOTES
Patient to emergency department with complaints of dizziness, altered mental status ongoing for a few days. Pt states she feels \"out of it\" ad reports she \"fell asleep while talking on the phone and I never do this\". Son reports the patient has had a hard time focusing and has been slurring her words. Pt ambulatory, forgetful when answering questions.

## 2022-09-02 NOTE — PROGRESS NOTES
Medication History completed:    New medications: cholecalciferol, cyanocobalamin, ferrous sulfate    Medications discontinued: magnesium hydroxide, fluticasone nasal spray, fluticasone inhaler    Changes to dosing: none    Stated allergies: As listed    Other pertinent information: Medications [facility] with [facility]. The patient's morphine was last filled on 8/6/22 for 30 days and the Percocet was last filled on 8/10/22 for 27 days.     Thank you,  Malcom Fung, PharmD, BCPS  [phone]

## 2022-09-02 NOTE — ED PROVIDER NOTES
[facility]  ED  Emergency Department Encounter  Emergency Medicine Resident     Pt Name: Debbie Pardo  MRN: [id number]  [place] 1940  Date of evaluation: 9/2/22  PCP:  Bryon Verdugo DO    CHIEF COMPLAINT       Chief Complaint   Patient presents with    Dizziness    Altered Mental Status       HISTORY OFPRESENT ILLNESS  (Location/Symptom, Timing/Onset, Context/Setting, Quality, Duration, Modifying Marva Medal.)      Debbie Pardo is a 80 y.o. female w/ PMHx mild pulmonary HTN, HTN, HLD, COPD who presents with concerns for dizziness and feeling \"out of it. \"    Patient presents with family who helps provide some history. For about 3 days, patient has been feeling some slight lightheadedness and just not feeling like herself, states that she is slurring some of her words, dropping some things recently. Denies any recent falls or head trauma, denies any recent changes to medications, in fact patient follows with pain management and they have been decreasing her opioid medication recently. Patient is ambulatory with help of walker, has been little more slow recently but no other gait issues. Patient otherwise denies any headaches, vision changes, chest pain, shortness of breath, abdominal pain, nausea or vomiting, diarrhea constipation, upper or lower extremity numbness, weakness, paresthesias. PAST MEDICAL / SURGICAL / SOCIAL / FAMILY HISTORY      has a past medical history of Achilles tendonitis, Asthma, COPD (chronic obstructive pulmonary disease) (HCC), Cough, Degenerative lumbar disc, DM (diabetes mellitus), type 2 ([address] Failed back syndrome, lumbar, Fast heart beat, Fibromyalgia, History of bladder cancer, Hypertension, Hypothyroid, Kidney stones, Lumbar radiculopathy, Lumbar spondylolysis, Lumbar spondylosis, Osteoarthritis, Sleep apnea, Spinal stenosis in cervical region, and Wears glasses.      has a past surgical history that includes Rotator cuff repair (Bilateral, 1989); Carpal tunnel release (Bilateral); Uvulopalatopharygoplasty (2006); Cystoscopy (1/2013); Cataract removal with implant (Bilateral, 7/22/2014, 8/5/2014); Spine surgery; Spine surgery (91-4-15); skin biopsy (Right, 11/2015); Cystocopy (11/04/2016); Knee arthroscopy (Left); bladder tumor excision (2005); lumbar fusion; Total abdominal hysterectomy w/ bilateral salpingoophorectomy; Endoscopic ultrasonography, GI (N/A, 8/17/2017); Cholecystectomy, laparoscopic (N/A, 8/18/2017); endoscopic ultrasound (upper) (08/17/2017); Cardioversion; Tubal ligation; pr office/outpt visit,procedure only (N/A, 1/24/2018); back surgery (2010); Cystoscopy (01/04/2019); cervical fusion (11/14/2019); and cervical fusion (N/A, 11/14/2019).     Social History     Socioeconomic History    Marital status:      Spouse name: Alicia Arteaga    Number of children: 3    Years of education: Not on file    Highest education level: Not on file   Occupational History    Occupation: retired   Tobacco Use    Smoking status: Never    Smokeless tobacco: Never   Vaping Use    Vaping Use: Never used   Substance and Sexual Activity    Alcohol use: Not Currently     Comment: rare    Drug use: Never    Sexual activity: Yes     Partners: Male   Other Topics Concern    Not on file   Social History Narrative    Not on file     Social Determinants of Health     Financial Resource Strain: Not on file   Food Insecurity: Not on file   Transportation Needs: Not on file   Physical Activity: Not on file   Stress: Not on file   Social Connections: Not on file   Intimate Partner Violence: Not on file   Housing Stability: Not on file       Family History   Problem Relation Age of Onset    Heart Failure Mother     Prabha Aponte Mother     Other Father          pneumonia    Alzheimer's Disease Father     Lung Cancer Brother     Diabetes Paternal Grandmother     Other Daughter         Fibromyalsia    Rheum Arthritis Daughter        Allergies:  Azithromycin and Adhesive tape    Home Medications:  Prior to Admission medications    Medication Sig Start Date End Date Taking? Authorizing Provider   vitamin D (CHOLECALCIFEROL) 25 MCG (1000 UT) TABS tablet Take 1,000 Units by mouth daily   Yes Historical Provider, MD   vitamin B-12 (CYANOCOBALAMIN) 500 MCG tablet Take 500 mcg by mouth daily   Yes Historical Provider, MD   ferrous sulfate (IRON 325) 325 (65 Fe) MG tablet Take 325 mg by mouth daily (with breakfast)   Yes Historical Provider, MD   diclofenac (VOLTAREN) 75 MG EC tablet Take 1 tablet by mouth 2 times daily (with meals) 8/24/22   Guido Herman MD   morphine (MS CONTIN) 15 MG extended release tablet Take 1 tablet by mouth 2 times daily for 30 days. 9/5/22 10/5/22  CONSUELO Gregory CNP   oxyCODONE-acetaminophen (PERCOCET)  MG per tablet Take 1 tablet by mouth every 6 hours as needed for Pain for up to 30 days. May have 20 extra tabs a month for severe pain no more than 4 tabs a day 8/10/22 9/9/22  CONSUELO Turner CNP   naloxone 4 MG/0.1ML LIQD nasal spray 1 spray by Nasal route as needed for Opioid Reversal 2/25/22   CONSUELO Gregory CNP   albuterol sulfate  (90 Base) MCG/ACT inhaler Inhale 2 puffs into the lungs every 4 hours as needed for Shortness of Breath 6/8/20   Historical Provider, MD   potassium chloride (MICRO-K) 10 MEQ extended release capsule Take 10 mEq by mouth daily 6/15/20   Historical Provider, MD   bumetanide (BUMEX) 1 MG tablet Take 1 mg by mouth daily 2/24/20   Historical Provider, MD   sennosides-docusate sodium (SENOKOT-S) 8.6-50 MG tablet Take 1 tablet by mouth daily as needed    Historical Provider, MD   psyllium (KONSYL) 28.3 % PACK Take 1 packet by mouth daily as needed for Constipation    Historical Provider, MD   diclofenac sodium 1 % GEL Apply 2 g topically 2 times daily as needed for Pain (knees and elbows)    Historical Provider, MD   pregabalin (LYRICA) 150 MG capsule Take 150 mg by mouth 2 times daily. Historical Provider, MD   montelukast (SINGULAIR) 10 MG tablet Take 10 mg by mouth nightly     Historical Provider, MD   metFORMIN (GLUCOPHAGE) 500 MG tablet Take 500 mg by mouth 2 times daily (with meals). Historical Provider, MD   telmisartan-hydroCHLOROthiazide (MICARDIS HCT) 40-12.5 MG per tablet Take 1 tablet by mouth daily. Historical Provider, MD   levothyroxine (SYNTHROID) 50 MCG tablet Take 50 mcg by mouth Daily. Historical Provider, MD   omeprazole (PRILOSEC) 20 MG capsule Take 20 mg by mouth Daily    Historical Provider, MD   aspirin 81 MG EC tablet Take 81 mg by mouth at bedtime    Historical Provider, MD       REVIEW OF SYSTEMS    (2-9 systems for level 4, 10 or more for level 5)      Review of Systems   Constitutional:  Negative for chills and fever. HENT:  Negative for ear pain, hearing loss and sore throat. Eyes:  Negative for visual disturbance. Respiratory:  Negative for shortness of breath. Cardiovascular:  Negative for chest pain. Gastrointestinal:  Negative for abdominal pain, constipation, diarrhea, nausea and vomiting. Genitourinary:  Negative for difficulty urinating and dysuria. Musculoskeletal:  Negative for arthralgias and myalgias. Neurological:  Positive for dizziness, weakness and light-headedness. Negative for numbness. Psychiatric/Behavioral:  Negative for agitation and confusion. PHYSICAL EXAM   (up to 7 for level 4, 8 or more for level 5)     INITIAL VITALS:    height is 4' 10\" (1.473 m) and weight is 149 lb (67.6 kg). Her oral temperature is 98.1 °F (36.7 °C). Her blood pressure is 115/51 (abnormal) and her pulse is 76. Her respiration is 18 and oxygen saturation is 100%. Physical Exam  Vitals and nursing note reviewed. Constitutional:       General: She is not in acute distress. Appearance: She is well-developed. She is not ill-appearing, toxic-appearing or diaphoretic. HENT:      Head: Normocephalic and atraumatic.       Right Ear: External ear normal.      Left Ear: External ear normal.      Nose: Nose normal.      Mouth/Throat:      Mouth: Mucous membranes are moist.   Eyes:      General: No visual field deficit or scleral icterus. Extraocular Movements: Extraocular movements intact. Right eye: No nystagmus. Left eye: No nystagmus. Conjunctiva/sclera: Conjunctivae normal.      Pupils: Pupils are equal, round, and reactive to light. Pupils are equal.      Right eye: Pupil is round and reactive. Left eye: Pupil is round and reactive. Neck:      Trachea: No tracheal deviation. Cardiovascular:      Rate and Rhythm: Regular rhythm. Tachycardia present. Heart sounds: Normal heart sounds. No murmur heard. No friction rub. No gallop. Pulmonary:      Effort: Pulmonary effort is normal. No respiratory distress. Breath sounds: Normal breath sounds. No wheezing, rhonchi or rales. Abdominal:      General: Bowel sounds are normal.      Palpations: Abdomen is soft. Tenderness: There is no abdominal tenderness. Musculoskeletal:         General: No swelling or tenderness. Normal range of motion. Cervical back: Normal range of motion and neck supple. No rigidity. Skin:     General: Skin is warm and dry. Capillary Refill: Capillary refill takes less than 2 seconds. Coloration: Skin is not cyanotic. Findings: No erythema or rash. Neurological:      Mental Status: She is alert and oriented to person, place, and time. GCS: GCS eye subscore is 4. GCS verbal subscore is 5. GCS motor subscore is 6. Cranial Nerves: No cranial nerve deficit, dysarthria or facial asymmetry. Sensory: No sensory deficit. Motor: No weakness or abnormal muscle tone.       Coordination: Coordination normal.      Gait: Gait normal.   Psychiatric:         Mood and Affect: Mood normal.         Behavior: Behavior normal.       DIFFERENTIAL  DIAGNOSIS     PLAN (LABS / IMAGING / EKG):  Orders Placed This Encounter   Procedures    COVID-19, Rapid    XR CHEST (2 VW)    CT HEAD WO CONTRAST    CBC with Auto Differential    Magnesium    Comprehensive Metabolic Panel    Urinalysis with Reflex to Culture    Troponin    TSH w/reflex to FT4    Blood Gas, Venous    Microscopic Urinalysis    Troponin    Telemetry monitoring - continuous duration    Inpatient consult to Primary Care Provider    EKG 12 Lead    Insert peripheral IV    Saline lock IV    Place in Observation Service       MEDICATIONS ORDERED:  Orders Placed This Encounter   Medications    0.9 % sodium chloride bolus    0.9 % sodium chloride infusion       DDX: AMS, electrolyte abnormality, urinary tract infection, ICH, cardiomyopathy    Initial MDM/Plan: 80 y.o. female who presents with concerns for altered mental status. The time initial examination patient was in no acute distress, heart rate 101 but otherwise afebrile, normotensive (compared to patient's blood pressure on chart review). Patient was speaking in full sentences. Physical exam as above, no cranial nerve deficits noted, sensory/motor/coordination intact at this time. Patient is on morphine and Percocet, however per history reports that has been decreased. We will plan for broad initial work-up including CBC, CMP, EKG, troponin, TSH, chest x-ray, CT head. Will reassess.     DIAGNOSTIC RESULTS / EMERGENCY DEPARTMENT COURSE / MDM     LABS:  Labs Reviewed   CBC WITH AUTO DIFFERENTIAL - Abnormal; Notable for the following components:       Result Value    RBC 3.26 (*)     Hemoglobin 9.4 (*)     Hematocrit 29.0 (*)     Seg Neutrophils 70 (*)     Lymphocytes 16 (*)     Monocytes 12 (*)     All other components within normal limits   COMPREHENSIVE METABOLIC PANEL - Abnormal; Notable for the following components:    Glucose 110 (*)     BUN 52 (*)     Creatinine 1.84 (*)     Alkaline Phosphatase 120 (*)     Total Protein 5.9 (*)     Albumin 3.0 (*)     GFR Non- 26 (*)     GFR  American 32 (*)     All other components within normal limits   URINALYSIS WITH REFLEX TO CULTURE - Abnormal; Notable for the following components:    Color, UA Dark Yellow (*)     Ketones, Urine TRACE (*)     Protein, UA 1+ (*)     Leukocyte Esterase, Urine TRACE (*)     All other components within normal limits   TROPONIN - Abnormal; Notable for the following components:    Troponin, High Sensitivity 28 (*)     All other components within normal limits   BLOOD GAS, VENOUS - Abnormal; Notable for the following components:    HCO3, Venous 31.1 (*)     Positive Base Excess, Chris 6.3 (*)     O2 Sat, Chris 57.4 (*)     All other components within normal limits   TROPONIN - Abnormal; Notable for the following components:    Troponin, High Sensitivity 28 (*)     All other components within normal limits   COVID-19, RAPID   MAGNESIUM   TSH WITH REFLEX   MICROSCOPIC URINALYSIS         RADIOLOGY:  XR CHEST (2 VW)    Result Date: 9/2/2022  EXAMINATION: TWO XRAY VIEWS OF THE CHEST 9/2/2022 4:25 pm COMPARISON: None. HISTORY: ORDERING SYSTEM PROVIDED HISTORY: altered mental status TECHNOLOGIST PROVIDED HISTORY: altered mental status Reason for Exam: altered mental status FINDINGS: Upright frontal and lateral view chest radiographs were obtained. The heart is enlarged. The mediastinal contour and pleural spaces are otherwise within normal limits. The lungs are grossly clear. There is no focal consolidation or pneumothorax. The pulmonary vascular pattern is within normal limits. Degenerative changes are present throughout the thoracic spine. There is spinal fusion hardware within the lumbar spine which is only partially imaged. Clear lungs. Cardiomegaly. No acute cardiopulmonary abnormality.      CT HEAD WO CONTRAST    Result Date: 9/2/2022  EXAMINATION: CT OF THE HEAD WITHOUT CONTRAST  9/2/2022 5:21 pm TECHNIQUE: CT of the head was performed without the administration of intravenous contrast. Automated exposure control, iterative reconstruction, and/or weight based adjustment of the mA/kV was utilized to reduce the radiation dose to as low as reasonably achievable. COMPARISON: February 12, 2020 HISTORY: ORDERING SYSTEM PROVIDED HISTORY: AMS TECHNOLOGIST PROVIDED HISTORY: AMS Decision Support Exception - unselect if not a suspected or confirmed emergency medical condition->Emergency Medical Condition (MA) Reason for Exam: Dizziness; Altered Mental Status FINDINGS: BRAIN/VENTRICLES: There is no acute intracranial hemorrhage, mass effect or midline shift. No abnormal extra-axial fluid collection. The gray-white differentiation is maintained without evidence of an acute infarct. There is prominence of the ventricles and sulci due to global parenchymal volume loss. There are nonspecific areas of hypoattenuation within the periventricular and subcortical white matter, which likely represent chronic microvascular ischemic change. ORBITS: The visualized portion of the orbits demonstrate no acute abnormality. SINUSES: The visualized paranasal sinuses and mastoid air cells demonstrate no acute abnormality. SOFT TISSUES/SKULL: No acute abnormality of the visualized skull or soft tissues. No acute disease      EKG  EKG Interpretation    Interpreted by me    Rhythm: normal sinus   Rate: normal  Axis: LAD  Ectopy: none  Conduction: normal  ST Segments: no acute change  T Waves: slight inversions v2, v3, flattening in v4  Q Waves: none    Clinical Impression: no acute changes, nonspecific EKG    All EKG's are interpreted by the Emergency Department Physician who either signs or Co-signs this chart in the absence of a cardiologist.    EMERGENCY DEPARTMENT COURSE:  ED Course as of 09/02/22 2012   Fri Sep 02, 2022   1631 Hemoglobin Quant(!): 9.4 [JS]   1717 Creatinine(!): 1.84 [JS]   1723 Troponin, High Sensitivity(!): 28 [JS]   1801 SARS-CoV-2, Rapid: Not Detected [JS]   2797 pCO2, Chris: 50.3 [JS]   1804 CTH negative for any acute process.  [JS] 1806 Patient is on both morphine and lyrica, which are both renally cleared. Patient symptoms potentially secondary to polypharmacy related to decreased clearance from these medications. We will plan to reach out for admission at this time for JEFF for dehydration and reevaluation of patient's symptoms to determine if any further imaging is needed after resolution of JEFF. [JS]   2011 Spoke with Dr. Lauryn Loco, will place patient in observation unit for gentle IV rehydration, hold diuretics, and monitor for symptom improvement. [JS]      ED Course User Index  [JS] Rivas Schmitt DO     PROCEDURES:  None    CONSULTS:  IP CONSULT TO PRIMARY CARE PROVIDER    CRITICAL CARE:  Please see attending note    FINAL IMPRESSION      1. JEFF (acute kidney injury) ([facility] [address]    2. At risk for polypharmacy    3. Dehydration    4. Lightheadedness       DISPOSITION / PLAN     DISPOSITION Admitted 09/02/2022 08:07:50 PM    PATIENTREFERRED TO:  No follow-up provider specified.     DISCHARGE MEDICATIONS:  New Prescriptions    No medications on file       Jacqui Moody DO  EmergencyMedicine Resident    (Please note that portions of this note were completed with a voice recognition program.  Efforts were made to edit the dictations but occasionally words are mis-transcribed.)      Rivas Schmitt DO  Resident  09/02/22 2013

## 2022-09-03 VITALS
HEART RATE: 69 BPM | WEIGHT: 149 LBS | TEMPERATURE: 98.6 F | RESPIRATION RATE: 18 BRPM | DIASTOLIC BLOOD PRESSURE: 54 MMHG | BODY MASS INDEX: 31.28 KG/M2 | HEIGHT: 58 IN | OXYGEN SATURATION: 99 % | SYSTOLIC BLOOD PRESSURE: 132 MMHG

## 2022-09-03 LAB
ABSOLUTE EOS #: 0.2 K/UL (ref 0–0.4)
ABSOLUTE LYMPH #: 1.2 K/UL (ref 1–4.8)
ABSOLUTE MONO #: 0.7 K/UL (ref 0.1–1.3)
ANION GAP SERPL CALCULATED.3IONS-SCNC: 8 MMOL/L (ref 9–17)
BASOPHILS # BLD: 1 % (ref 0–2)
BASOPHILS ABSOLUTE: 0 K/UL (ref 0–0.2)
BUN BLDV-MCNC: 38 MG/DL (ref 8–23)
CALCIUM SERPL-MCNC: 8.5 MG/DL (ref 8.6–10.4)
CHLORIDE BLD-SCNC: 105 MMOL/L (ref 98–107)
CO2: 28 MMOL/L (ref 20–31)
CREAT SERPL-MCNC: 1.38 MG/DL (ref 0.5–0.9)
EOSINOPHILS RELATIVE PERCENT: 3 % (ref 0–4)
GFR AFRICAN AMERICAN: 45 ML/MIN
GFR NON-AFRICAN AMERICAN: 37 ML/MIN
GFR SERPL CREATININE-BSD FRML MDRD: ABNORMAL ML/MIN/{1.73_M2}
GLUCOSE BLD-MCNC: 118 MG/DL (ref 70–99)
GLUCOSE BLD-MCNC: 212 MG/DL (ref 65–105)
GLUCOSE BLD-MCNC: 91 MG/DL (ref 65–105)
HCT VFR BLD CALC: 28.2 % (ref 36–46)
HEMOGLOBIN: 9.2 G/DL (ref 12–16)
LYMPHOCYTES # BLD: 19 % (ref 24–44)
MCH RBC QN AUTO: 28.9 PG (ref 26–34)
MCHC RBC AUTO-ENTMCNC: 32.6 G/DL (ref 31–37)
MCV RBC AUTO: 88.7 FL (ref 80–100)
MONOCYTES # BLD: 11 % (ref 1–7)
PDW BLD-RTO: 13.6 % (ref 11.5–14.9)
PLATELET # BLD: 220 K/UL (ref 150–450)
PMV BLD AUTO: 8.4 FL (ref 6–12)
POTASSIUM SERPL-SCNC: 4.7 MMOL/L (ref 3.7–5.3)
RBC # BLD: 3.18 M/UL (ref 4–5.2)
SEG NEUTROPHILS: 66 % (ref 36–66)
SEGMENTED NEUTROPHILS ABSOLUTE COUNT: 4.1 K/UL (ref 1.3–9.1)
SODIUM BLD-SCNC: 141 MMOL/L (ref 135–144)
WBC # BLD: 6.2 K/UL (ref 3.5–11)

## 2022-09-03 PROCEDURE — 80048 BASIC METABOLIC PNL TOTAL CA: CPT

## 2022-09-03 PROCEDURE — 96365 THER/PROPH/DIAG IV INF INIT: CPT

## 2022-09-03 PROCEDURE — 6360000002 HC RX W HCPCS: Performed by: FAMILY MEDICINE

## 2022-09-03 PROCEDURE — 6360000002 HC RX W HCPCS: Performed by: HEALTH CARE PROVIDER

## 2022-09-03 PROCEDURE — 36415 COLL VENOUS BLD VENIPUNCTURE: CPT

## 2022-09-03 PROCEDURE — G0378 HOSPITAL OBSERVATION PER HR: HCPCS

## 2022-09-03 PROCEDURE — 85025 COMPLETE CBC W/AUTO DIFF WBC: CPT

## 2022-09-03 PROCEDURE — 82947 ASSAY GLUCOSE BLOOD QUANT: CPT

## 2022-09-03 PROCEDURE — 6370000000 HC RX 637 (ALT 250 FOR IP): Performed by: FAMILY MEDICINE

## 2022-09-03 PROCEDURE — 96361 HYDRATE IV INFUSION ADD-ON: CPT

## 2022-09-03 PROCEDURE — 2580000003 HC RX 258: Performed by: HEALTH CARE PROVIDER

## 2022-09-03 PROCEDURE — 96372 THER/PROPH/DIAG INJ SC/IM: CPT

## 2022-09-03 PROCEDURE — 2580000003 HC RX 258: Performed by: FAMILY MEDICINE

## 2022-09-03 RX ORDER — VITAMIN B COMPLEX
1000 TABLET ORAL DAILY
Status: DISCONTINUED | OUTPATIENT
Start: 2022-09-03 | End: 2022-09-03

## 2022-09-03 RX ORDER — ASPIRIN 81 MG/1
81 TABLET ORAL DAILY
Status: DISCONTINUED | OUTPATIENT
Start: 2022-09-03 | End: 2022-09-03 | Stop reason: HOSPADM

## 2022-09-03 RX ORDER — POTASSIUM CHLORIDE 750 MG/1
10 CAPSULE, EXTENDED RELEASE ORAL DAILY
Status: DISCONTINUED | OUTPATIENT
Start: 2022-09-03 | End: 2022-09-03

## 2022-09-03 RX ORDER — CEPHALEXIN 500 MG/1
500 CAPSULE ORAL 2 TIMES DAILY
Qty: 14 CAPSULE | Refills: 0
Start: 2022-09-03 | End: 2022-09-03 | Stop reason: SDUPTHER

## 2022-09-03 RX ORDER — MORPHINE SULFATE 15 MG/1
15 TABLET, FILM COATED, EXTENDED RELEASE ORAL 2 TIMES DAILY
Status: DISCONTINUED | OUTPATIENT
Start: 2022-09-03 | End: 2022-09-03 | Stop reason: HOSPADM

## 2022-09-03 RX ORDER — BUMETANIDE 1 MG/1
1 TABLET ORAL DAILY
Status: DISCONTINUED | OUTPATIENT
Start: 2022-09-03 | End: 2022-09-03

## 2022-09-03 RX ORDER — ALBUTEROL SULFATE 90 UG/1
2 AEROSOL, METERED RESPIRATORY (INHALATION) EVERY 4 HOURS PRN
Status: DISCONTINUED | OUTPATIENT
Start: 2022-09-03 | End: 2022-09-03 | Stop reason: HOSPADM

## 2022-09-03 RX ORDER — CEPHALEXIN 500 MG/1
500 CAPSULE ORAL 2 TIMES DAILY
Qty: 14 CAPSULE | Refills: 0 | Status: SHIPPED | OUTPATIENT
Start: 2022-09-03 | End: 2022-09-10

## 2022-09-03 RX ORDER — CHOLECALCIFEROL (VITAMIN D3) 125 MCG
500 CAPSULE ORAL DAILY
Status: DISCONTINUED | OUTPATIENT
Start: 2022-09-03 | End: 2022-09-03 | Stop reason: HOSPADM

## 2022-09-03 RX ORDER — FERROUS SULFATE 325(65) MG
325 TABLET ORAL
Status: DISCONTINUED | OUTPATIENT
Start: 2022-09-03 | End: 2022-09-03 | Stop reason: HOSPADM

## 2022-09-03 RX ORDER — LEVOTHYROXINE SODIUM 0.05 MG/1
50 TABLET ORAL DAILY
Status: DISCONTINUED | OUTPATIENT
Start: 2022-09-03 | End: 2022-09-03 | Stop reason: HOSPADM

## 2022-09-03 RX ORDER — MONTELUKAST SODIUM 10 MG/1
10 TABLET ORAL NIGHTLY
Status: DISCONTINUED | OUTPATIENT
Start: 2022-09-03 | End: 2022-09-03 | Stop reason: HOSPADM

## 2022-09-03 RX ORDER — MONTELUKAST SODIUM 10 MG/1
10 TABLET ORAL NIGHTLY
Status: DISCONTINUED | OUTPATIENT
Start: 2022-09-03 | End: 2022-09-03

## 2022-09-03 RX ORDER — PREGABALIN 150 MG/1
150 CAPSULE ORAL 2 TIMES DAILY
Status: DISCONTINUED | OUTPATIENT
Start: 2022-09-03 | End: 2022-09-03

## 2022-09-03 RX ORDER — DICLOFENAC SODIUM 75 MG/1
75 TABLET, DELAYED RELEASE ORAL 2 TIMES DAILY WITH MEALS
Status: DISCONTINUED | OUTPATIENT
Start: 2022-09-03 | End: 2022-09-03

## 2022-09-03 RX ORDER — POTASSIUM CHLORIDE 750 MG/1
10 TABLET, FILM COATED, EXTENDED RELEASE ORAL
Status: DISCONTINUED | OUTPATIENT
Start: 2022-09-04 | End: 2022-09-03

## 2022-09-03 RX ORDER — CEPHALEXIN 500 MG/1
500 CAPSULE ORAL 2 TIMES DAILY
Qty: 14 CAPSULE | Refills: 0 | Status: SHIPPED | OUTPATIENT
Start: 2022-09-03 | End: 2022-09-03 | Stop reason: SDUPTHER

## 2022-09-03 RX ORDER — PANTOPRAZOLE SODIUM 40 MG/1
40 TABLET, DELAYED RELEASE ORAL
Status: DISCONTINUED | OUTPATIENT
Start: 2022-09-03 | End: 2022-09-03

## 2022-09-03 RX ORDER — ACETAMINOPHEN 160 MG
2000 TABLET,DISINTEGRATING ORAL DAILY
Status: DISCONTINUED | OUTPATIENT
Start: 2022-09-03 | End: 2022-09-03 | Stop reason: HOSPADM

## 2022-09-03 RX ORDER — FLUTICASONE PROPIONATE 50 MCG
1 SPRAY, SUSPENSION (ML) NASAL DAILY
Qty: 16 G | Refills: 1
Start: 2022-09-03

## 2022-09-03 RX ORDER — ASPIRIN 81 MG/1
81 TABLET ORAL NIGHTLY
Status: DISCONTINUED | OUTPATIENT
Start: 2022-09-04 | End: 2022-09-03

## 2022-09-03 RX ORDER — ALBUTEROL SULFATE 90 UG/1
2 AEROSOL, METERED RESPIRATORY (INHALATION) EVERY 4 HOURS PRN
Status: DISCONTINUED | OUTPATIENT
Start: 2022-09-03 | End: 2022-09-03

## 2022-09-03 RX ORDER — NALOXONE HYDROCHLORIDE 4 MG/.1ML
1 SPRAY NASAL PRN
Status: DISCONTINUED | OUTPATIENT
Start: 2022-09-03 | End: 2022-09-03 | Stop reason: RX

## 2022-09-03 RX ORDER — OMEPRAZOLE 20 MG/1
20 CAPSULE, DELAYED RELEASE ORAL
Status: DISCONTINUED | OUTPATIENT
Start: 2022-09-04 | End: 2022-09-03 | Stop reason: HOSPADM

## 2022-09-03 RX ORDER — PREGABALIN 150 MG/1
150 CAPSULE ORAL 2 TIMES DAILY
Status: DISCONTINUED | OUTPATIENT
Start: 2022-09-03 | End: 2022-09-03 | Stop reason: HOSPADM

## 2022-09-03 RX ORDER — INSULIN LISPRO 100 [IU]/ML
0-4 INJECTION, SOLUTION INTRAVENOUS; SUBCUTANEOUS NIGHTLY
Status: DISCONTINUED | OUTPATIENT
Start: 2022-09-03 | End: 2022-09-03 | Stop reason: HOSPADM

## 2022-09-03 RX ORDER — INSULIN LISPRO 100 [IU]/ML
0-4 INJECTION, SOLUTION INTRAVENOUS; SUBCUTANEOUS
Status: DISCONTINUED | OUTPATIENT
Start: 2022-09-03 | End: 2022-09-03 | Stop reason: HOSPADM

## 2022-09-03 RX ORDER — DEXTROSE MONOHYDRATE 100 MG/ML
INJECTION, SOLUTION INTRAVENOUS CONTINUOUS PRN
Status: DISCONTINUED | OUTPATIENT
Start: 2022-09-03 | End: 2022-09-03 | Stop reason: HOSPADM

## 2022-09-03 RX ORDER — SENNA AND DOCUSATE SODIUM 50; 8.6 MG/1; MG/1
1 TABLET, FILM COATED ORAL DAILY PRN
Status: DISCONTINUED | OUTPATIENT
Start: 2022-09-03 | End: 2022-09-03 | Stop reason: HOSPADM

## 2022-09-03 RX ADMIN — SODIUM CHLORIDE: 9 INJECTION, SOLUTION INTRAVENOUS at 05:35

## 2022-09-03 RX ADMIN — Medication 2000 UNITS: at 14:41

## 2022-09-03 RX ADMIN — PREGABALIN 150 MG: 150 CAPSULE ORAL at 14:37

## 2022-09-03 RX ADMIN — INSULIN LISPRO 1 UNITS: 100 INJECTION, SOLUTION INTRAVENOUS; SUBCUTANEOUS at 14:01

## 2022-09-03 RX ADMIN — MORPHINE SULFATE 15 MG: 15 TABLET, FILM COATED, EXTENDED RELEASE ORAL at 13:52

## 2022-09-03 RX ADMIN — CEFTRIAXONE SODIUM 1000 MG: 1 INJECTION, POWDER, FOR SOLUTION INTRAMUSCULAR; INTRAVENOUS at 16:00

## 2022-09-03 RX ADMIN — ASPIRIN 81 MG: 81 TABLET ORAL at 14:41

## 2022-09-03 RX ADMIN — ENOXAPARIN SODIUM 30 MG: 100 INJECTION SUBCUTANEOUS at 09:44

## 2022-09-03 ASSESSMENT — PAIN DESCRIPTION - DESCRIPTORS: DESCRIPTORS: ACHING

## 2022-09-03 ASSESSMENT — PAIN DESCRIPTION - ORIENTATION: ORIENTATION: LOWER

## 2022-09-03 ASSESSMENT — PAIN SCALES - GENERAL: PAINLEVEL_OUTOF10: 2

## 2022-09-03 ASSESSMENT — PAIN DESCRIPTION - LOCATION: LOCATION: BACK

## 2022-09-03 NOTE — PROGRESS NOTES
Admitted to room 2060 from ED per wheelchair. Oriented to room and call light. Vitals and assessment completed. No distress noted.

## 2022-09-03 NOTE — PROGRESS NOTES
09/03/22 1441   Encounter Summary   Encounter Overview/Reason  Initial Encounter   Service Provided For: Patient   Referral/Consult From: Rounding   Support System Spouse; Children   Last Encounter  09/03/22   Complexity of Encounter Low   Assessment/Intervention/Outcome   Assessment Calm;Coping; Hopeful   Intervention Active listening;Sustaining Presence/Ministry of presence   Outcome Engaged in conversation

## 2022-09-03 NOTE — PROGRESS NOTES
Patient given all home medication including 35 capsules of Lyrica, counted by Kasandra Ramsey and Mega Rodriguez RN. Discharge instructions given to patient, all questions answered. Patient aware [facility] will not resume start of care until Thursday and is okay with this. Patient discharged with all belongings. No signs of distress noted.

## 2022-09-03 NOTE — DISCHARGE INSTR - COC
Continuity of Care Form    Patient Name: Miguel Angel Stephenson   :  1940  MRN:  [id number]    Admit date:  2022  Discharge date:  9/3/22    Code Status Order: Full Code   Advance Directives:     Admitting Physician:  Pao Pagan DO  PCP: Carson Shannon DO    Discharging Nurse: [facility] Unit/Room#: -  Discharging Unit Phone Number: [phone]    Emergency Contact:   Extended Emergency Contact Information  Primary Emergency Contact: Lambert Alvarenga  Address: [address], [address] Phone: [phone]  Relation: Spouse  Hearing or visual needs: None  Other needs: None  Preferred language: English   needed? No  Secondary Emergency Contact: SouzaAlexandrea  Address: [address], [address] Phone: [phone]  Work Phone: [phone]  Mobile Phone: [phone]  Relation: Child  Hearing or visual needs: None  Other needs: None  Preferred language: English   needed?  No    Past Surgical History:  Past Surgical History:   Procedure Laterality Date    BACK SURGERY  2010    lumbar fusion    BLADDER TUMOR EXCISION  2005    CARDIOVERSION      \"8-10 years ago\"  to get \"heart into regular rhythm\"    CARPAL TUNNEL RELEASE Bilateral     CATARACT REMOVAL WITH IMPLANT Bilateral 2014, 2014    [facility]    CERVICAL FUSION  2019     ANTERIOR CERVICAL DECOMPRESSION FUSION C3-4, C4-5    CERVICAL FUSION N/A 2019    ANTERIOR CERVICAL DECOMPRESSION FUSION C3-4, C4-5 (SUPINE) DEPUY, REGULAR TABLE, MICROSCOPE, C-ARM, EVOKES (# [id number] ELIZA) performed by Virginia Rodriguez DO at [address], LAPAROSCOPIC N/A 2017    CHOLECYSTECTOMY LAPAROSCOPIC ROBOTIC MULTIPORT performed by Hao Darden MD at [address]  2013    CYSTOSCOPY  2016    CYSTOSCOPY  2019    ENDOSCOPIC ULTRASOUND (LOWER) N/A 2017 ENDOSCOPIC ULTRASOUND performed by Ryan Barbour MD at [address] [place] [address] ([facility])  08/17/2017    A cursory view of the gastric mucosa was normal. The scope was then further advanced through a patent pylorus to the second portion of the duodenum. The Gallbladder was evaluated in bulb position. Thick sludge was noted. The CBD was 7.8 mm with no filling defects. The PD was 5 mm in the HOP. The pancreatic tissue was edematous in body and tail.     KNEE ARTHROSCOPY Left     LUMBAR FUSION      ND OFFICE/OUTPT VISIT,PROCEDURE ONLY N/A 1/24/2018    SPINAL HARDWARE REMOVAL LUMBAR BONE STIMULATOR performed by Yessi Sanchez MD at [address] Bilateral 1989    SKIN BIOPSY Right 11/2015    mole right posterior shoulder    SPINE SURGERY      spinal cord stimulator    SPINE SURGERY  91-4-15    renoval of spinal cord stimulator leads and battery    PASQUALE AND BSO (CERVIX REMOVED)      TUBAL LIGATION      UVULOPALATOPHARYGOPLASTY  2006       Immunization History:   Immunization History   Administered Date(s) Administered    COVID-19, MODERNA BLUE border, Primary or Immunocompromised, (age 12y+), IM, 100 mcg/0.5mL 01/27/2021, 02/24/2021    Influenza Virus Vaccine 08/24/2020    Influenza Whole 10/07/2012    Influenza, FLUAD, (age 72 y+), Adjuvanted, 0.5mL 08/24/2020    Influenza, FLUARIX, FLULAVAL, FLUZONE (age 10 mo+) AND AFLURIA, (age 1 y+), PF, 0.5mL 08/24/2020    Influenza, High Dose (Fluzone 65 yrs and older) 11/07/2015, 11/09/2017    Influenza, Triv, 3 Years and older, IM (Afluria (5 yrs and older) 12/27/2018    Influenza, Triv, inactivated, subunit, adjuvanted, IM (Fluad 65 yrs and older) 10/12/2019    Pneumococcal Conjugate 13-valent ([place]) 11/04/2017, 08/24/2020    Pneumococcal Polysaccharide ([id number]) 03/06/2020    Zoster Live (Zostavax) 07/11/2017    Zoster Recombinant (Shingrix) 08/24/2020, 10/23/2020       Active Problems:  Patient Active Problem List   Diagnosis Code    Lumbar radiculopathy M54.16    Lumbar spondylolysis M43.06    Failed back syndrome M96.1    Chronic pain associated with significant psychosocial dysfunction G89.4    Primary osteoarthritis of right hip M16.11    Sacroiliitis (HCC) M46.1    Spinal cord stimulator status Z96.89    Acute pancreatitis K85.90    Abdominal pain, epigastric R10.13    Irritable bowel syndrome with constipation K58.1    Cervical radicular pain M54.12    DDD (degenerative disc disease), cervical M50.30    Neck pain M54.2    Spinal stenosis in cervical region M48.02    Acute pain of right knee M25.561    Chronic, continuous use of opioids F11.90    Imbalance R26.89    Cervical stenosis of spine M48.02    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    Chronic right hip pain M25.551, G89.29    Lumbar spondylosis M47.816    Chronic SI joint pain M53.3, G89.29    JEFF (acute kidney injury) ([facility] [address] N17.9       Isolation/Infection:   Isolation            No Isolation          Patient Infection Status       Infection Onset Added Last Indicated Last Indicated By Review Planned Expiration Resolved Resolved By    None active    Resolved    COVID-19 (Rule Out) 09/02/22 09/02/22 09/02/22 COVID-19, Rapid (Ordered)   09/02/22 Rule-Out Test Resulted            Nurse Assessment:  Last Vital Signs: BP (!) 132/54   Pulse 69   Temp 98.6 °F (37 °C)   Resp 18   Ht 4' 10\" (1.473 m)   Wt 149 lb (67.6 kg)   SpO2 99%   BMI 31.14 kg/m²     Last documented pain score (0-10 scale): Pain Level: 2  Last Weight:   Wt Readings from Last 1 Encounters:   09/02/22 149 lb (67.6 kg)     Mental Status:  oriented and alert    IV Access:  - None    Nursing Mobility/ADLs:  Walking   Assisted  Transfer  Independent  Bathing  Independent  Dressing  Independent  Toileting  Independent  Feeding  Independent  Med Admin  Independent  Med Delivery   whole    Wound Care Documentation and Therapy:  Incision 11/14/19 Throat (Active)   Number of days: 1024        Elimination:  Continence: Bowel: Yes  Bladder: Yes  Urinary Catheter: None   Colostomy/Ileostomy/Ileal Conduit: No       Date of Last BM:     Intake/Output Summary (Last 24 hours) at 9/3/2022 1449  Last data filed at 9/3/2022 1106  Gross per 24 hour   Intake --   Output 1350 ml   Net -1350 ml     I/O last 3 completed shifts:  In: -   Out: 500 [Urine:500]    Safety Concerns: At Risk for Falls    Impairments/Disabilities:      None    Nutrition Therapy:  Current Nutrition Therapy:   - Oral Diet:  General    Routes of Feeding: Oral  Liquids: No Restrictions  Daily Fluid Restriction: no  Last Modified Barium Swallow with Video (Video Swallowing Test): not done    Treatments at the Time of Hospital Discharge:   Respiratory Treatments: n/a    Oxygen Therapy:  is not on home oxygen therapy. Ventilator:    Cpap at night  Rehab Therapies: Physical Therapy and nursing services (start of care on Thursday 9/15 okay with patient and hospital)  Weight Bearing Status/Restrictions: No weight bearing restrictions  Other Medical Equipment (for information only, NOT a DME order):  walker  Other Treatments: n/a    Patient's personal belongings (please select all that are sent with patient):  None    RN SIGNATURE:  Electronically signed by Bonnie Beth RN on 9/3/22 at 2:57 PM EDT    CASE MANAGEMENT/SOCIAL WORK SECTION    Inpatient Status Date: 9/2/22    Readmission Risk Assessment Score:  Readmission Risk              Risk of Unplanned Readmission:  0           Discharging to Facility/ Agency   Name: [id number] [address]  Address:  Phone:  Fax:    Dialysis Facility (if applicable)   Name:  Address:  Dialysis Schedule:  Phone:  Fax:    / signature: Electronically signed by Bonnie Beth RN on 9/3/22 at 3:51 PM EDT    PHYSICIAN SECTION    Prognosis: Good    Condition at Discharge: Stable    Rehab Potential (if transferring to Rehab):  Fair    Recommended Labs or Other Treatments After Discharge: cbcd bmp in 7

## 2022-09-03 NOTE — PLAN OF CARE
Problem: Safety - Adult  Goal: Free from fall injury  9/3/2022 1457 by Ada Jackson RN  Outcome: Progressing  Note: Patient remains free of incidence/ injury. Bed remains in low position. Call light within reach. Side rails up x2. Bed alarm on.       Problem: Discharge Planning  Goal: Discharge to home or other facility with appropriate resources  9/3/2022 1457 by Ada Jackson RN  Outcome: Progressing

## 2022-09-03 NOTE — H&P
Dr. Sun Wheeler        History and Physical Examination   Admission Note    CHIEF COMPLAINT:   Chief Complaint   Patient presents with    Dizziness    Altered Mental Status       Reason for Admission: Weakness and mental status changes    History Obtained From:  Patient     HISTORY OF PRESENT ILLNESS:      The patient is a pleasant 80 y.o. female with significant medical histo ry of COPD asthma chronic back pain hypertension hypothyroid chronic pain for lumbar pain goes to pain clinic presented with weakness and some mental status changes where she knew what she wants to do and say but she felt she was somewhat confused patient states started a few days ago before she came and. And just got worse yesterday. Patient had no nausea no vomiting just some mild weakness she seen her PCP yesterday  to send her to the hospital for evaluation patient thought to have probably patient had no chest pain no shortness of breath no dizziness or passing out sensation she stated her pain clinic is trying to cut down her morphine dose actually from the dose she was taking in the past.  She is feeling much better today she has been ambulating with no dizzy or nausea or vomiting or headaches or any chest pain shortness of breath. She is eating okay she is looking forward to go home this afternoon.     Past Medical History:    Past Medical History:   Diagnosis Date    Achilles tendonitis     right    Asthma     COPD (chronic obstructive pulmonary disease) ([facility])     Cough     clear phlegm    Degenerative lumbar disc     DM (diabetes mellitus), type 2 ([address]     PCP Dr. Norman Peñaloza, seen 8/2019    Failed back syndrome, lumbar     Fast heart beat     Hx of \"8-10 years ago\"    Fibromyalgia     History of bladder cancer     Hypertension     Hypothyroid     Kidney stones     Lumbar radiculopathy     Lumbar spondylolysis     Lumbar spondylosis     Osteoarthritis     Sleep apnea     uses CPAP machine nightly    Spinal stenosis in cervical region     Wears glasses      Patient Active Problem List   Diagnosis Code    Lumbar radiculopathy M54.16    Lumbar spondylolysis M43.06    Failed back syndrome M96.1    Chronic pain associated with significant psychosocial dysfunction G89.4    Primary osteoarthritis of right hip M16.11    Sacroiliitis (HCC) M46.1    Spinal cord stimulator status Z96.89    Acute pancreatitis K85.90    Abdominal pain, epigastric R10.13    Irritable bowel syndrome with constipation K58.1    Cervical radicular pain M54.12    DDD (degenerative disc disease), cervical M50.30    Neck pain M54.2    Spinal stenosis in cervical region M48.02    Acute pain of right knee M25.561    Chronic, continuous use of opioids F11.90    Imbalance R26.89    Cervical stenosis of spine M48.02    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    Chronic right hip pain M25.551, G89.29    Lumbar spondylosis M47.816    Chronic SI joint pain M53.3, G89.29    JEFF (acute kidney injury) ([facility] [address] N17.9       Past Surgical History:       Past Surgical History:   Procedure Laterality Date    BACK SURGERY  2010    lumbar fusion    BLADDER TUMOR EXCISION  2005    CARDIOVERSION      \"8-10 years ago\"  to get \"heart into regular rhythm\"    CARPAL TUNNEL RELEASE Bilateral     CATARACT REMOVAL WITH IMPLANT Bilateral 7/22/2014, 8/5/2014    Dilma/Demian    CERVICAL FUSION  11/14/2019     ANTERIOR CERVICAL DECOMPRESSION FUSION C3-4, C4-5    CERVICAL FUSION N/A 11/14/2019    ANTERIOR CERVICAL DECOMPRESSION FUSION C3-4, C4-5 (SUPINE) DEPUY, REGULAR TABLE, MICROSCOPE, C-ARM, EVOKES (# [id number] [facility]) performed by Jaja Alanis DO at [facility], LAPAROSCOPIC N/A 8/18/2017    CHOLECYSTECTOMY LAPAROSCOPIC ROBOTIC MULTIPORT performed by Herminio Urbano MD at [address]  1/2013    CYSTOSCOPY  11/04/2016    CYSTOSCOPY  01/04/2019    ENDOSCOPIC ULTRASOUND (LOWER) N/A 8/17/2017    ENDOSCOPIC ULTRASOUND performed by Alexey Wong MD at [address] ENDOSCOPIC ULTRASOUND (UPPER)  08/17/2017    A cursory view of the gastric mucosa was normal. The scope was then further advanced through a patent pylorus to the second portion of the duodenum. The Gallbladder was evaluated in bulb position. Thick sludge was noted. The CBD was 7.8 mm with no filling defects. The PD was 5 mm in the HOP. The pancreatic tissue was edematous in body and tail.     KNEE ARTHROSCOPY Left     LUMBAR FUSION      NC OFFICE/OUTPT VISIT,PROCEDURE ONLY N/A 1/24/2018    SPINAL HARDWARE REMOVAL LUMBAR BONE STIMULATOR performed by Davon Desir MD at [facility] [address] Bilateral 1989    SKIN BIOPSY Right 11/2015    mole right posterior shoulder    SPINE SURGERY      spinal cord stimulator    SPINE SURGERY  91-4-15    renoval of spinal cord stimulator leads and battery    PASQUALE AND BSO (CERVIX REMOVED)      TUBAL LIGATION      UVULOPALATOPHARYGOPLASTY  2006       Current Medications:    Current Facility-Administered Medications   Medication Dose Route Frequency Provider Last Rate Last Admin    diclofenac sodium (VOLTAREN) 1 % gel 2 g  2 g Topical BID PRN Alfredo T Leonel, DO        ferrous sulfate (IRON 325) tablet 325 mg  325 mg Oral Daily with breakfast Alfredo T Leonel, DO        levothyroxine (SYNTHROID) tablet 50 mcg  50 mcg Oral Daily Alfredo T Leonel, DO        morphine (MS CONTIN) extended release tablet 15 mg  15 mg Oral BID Alfredo T Leonel, DO        sennosides-docusate sodium (SENOKOT-S) 8.6-50 MG tablet 1 tablet  1 tablet Oral Daily PRN Alfredo T Leonel, DO        vitamin B-12 (CYANOCOBALAMIN) tablet 500 mcg  500 mcg Oral Daily Alfredo T Leonel, DO        insulin lispro (HUMALOG) injection vial 0-4 Units  0-4 Units SubCUTAneous TID WC Alfredo T Leonel, DO        insulin lispro (HUMALOG) injection vial 0-4 Units  0-4 Units SubCUTAneous Nightly Alfredo T Leonel, DO        glucose chewable tablet 16 g  4 tablet Oral PRN Alfredo T Leonel, DO        dextrose bolus 10% 125 mL  125 mL IntraVENous PRN Alfredo T Leonel, DO        Or    dextrose bolus 10% 250 mL  250 mL IntraVENous PRN Alfredo T Leonel, DO        glucagon (rDNA) injection 1 mg  1 mg SubCUTAneous PRN Alfredo T Leonel, DO        dextrose 10 % infusion   IntraVENous Continuous PRN Alfredo T Leonel, DO        montelukast (SINGULAIR) tablet 10 mg (Patient Supplied)  10 mg Oral Nightly Alfredo T Leonel, DO        Vitamin D3 CAPS 2,000 Units (Patient Supplied)  2,000 Units Oral Daily Alfredo T Leonel, DO        bumetanide (BUMEX) tablet 1 mg (Patient Supplied)  1 mg Oral Daily Alfredo T Leonel, DO        diclofenac (VOLTAREN) EC tablet 75 mg (Patient Supplied)  75 mg Oral BID  Alfredo T Leonel, DO        metFORMIN (GLUCOPHAGE) tablet 500 mg (Patient Supplied)  500 mg Oral BID  Alfredo T Leonel, DO        [START ON 9/4/2022] omeprazole (PRILOSEC) delayed release capsule 20 mg (Patient Supplied)  20 mg Oral QAM AC Alfredo T Leonel, DO        pregabalin (LYRICA) capsule 150 mg (Patient Supplied)  150 mg Oral BID Alfredo T Leonel, DO        [START ON 9/4/2022] potassium chloride (KLOR-CON) extended release tablet 10 mEq (Patient Supplied)  10 mEq Oral Daily with breakfast Alfredo T Leonel, DO        aspirin EC tablet 81 mg (Patient Supplied)  81 mg Oral Daily Alfredo T Leonel, DO        albuterol sulfate HFA (PROVENTIL;VENTOLIN;PROAIR) 108 (90 Base) MCG/ACT inhaler 2 puff (Patient Supplied)  2 puff Inhalation Q4H PRN Alfredo T Leonel, DO        0.9 % sodium chloride infusion   IntraVENous Continuous Maicol Port,  mL/hr at 09/03/22 0535 New Bag at 09/03/22 0535    sodium chloride flush 0.9 % injection 5-40 mL  5-40 mL IntraVENous 2 times per day Maicol Port, DO        sodium chloride flush 0.9 % injection 5-40 mL  5-40 mL IntraVENous PRN Maicol Port, DO        0.9 % sodium chloride infusion   IntraVENous PRN Maicol Port, DO        enoxaparin Sodium (LOVENOX) injection 30 mg  30 mg SubCUTAneous Daily Maicol Port, DO   30 mg at 09/03/22 0944    acetaminophen (TYLENOL) tablet 650 mg  650 mg Oral Q4H PRN [place] Proffer, DO        ondansetron (ZOFRAN-ODT) disintegrating tablet 4 mg  4 mg Oral Q8H PRN [place] Proffer, DO        Or    ondansetron [facility]) injection 4 mg  4 mg IntraVENous Q6H PRN [place] Proffer, DO           Allergies:  Azithromycin and Adhesive tape    Social History:    reports that she has never smoked. She has never used smokeless tobacco. She reports that she does not currently use alcohol. She reports that she does not use drugs.     Family History:   Family History   Problem Relation Age of Onset    Heart Failure Mother     Lung Cancer Mother     Other Father          pneumonia    Alzheimer's Disease Father     Lung Cancer Brother     Diabetes Paternal Grandmother     Other Daughter         Fibromyalsia    Rheum Arthritis Daughter        REVIEW OF SYSTEMS:  RESPIRATORY:  negative for  dry cough, dyspnea, wheezing and chest pain positive for neg  GASTROINTESTINAL:  negative for nausea, vomiting, change in bowel habits, diarrhea, constipation, abdominal pain and reflux positive for   GENITOURINARY:  negative for frequency, dysuria, nocturia, urinary incontinence and hesitancy positive for   HEMATOLOGIC/LYMPHATIC:  negative for easy bruising, bleeding and swelling/edemapositive for   ENDOCRINE:  negative for weight changes, change in bowel habits and diabetic symptoms including neither polyuria nor polydipsia nor blurred vision nor foot ulcerations nor neuropathypositive for   MUSCULOSKELETAL:  negative for  myalgias, arthralgias, pain, joint swelling, stiff joints and muscle weakness positive for chronic low back pain sees pain clinic  NEUROLOGICAL:  negative for headaches, dizziness, memory problems, speech problems, visual disturbance, gait problems, weakness and numbness positive for weakness and dizziness    Vitals:  BP (!) 132/54   Pulse 69   Temp 98.6 °F (37 °C)   Resp 18   Ht 4' 10\" (1.473 m)   Wt 149 lb (67.6 kg)   SpO2 99%   BMI 31.14 kg/m²     PHYSICAL Metabolic Panel [[id number]] (Abnormal)    Collected: 09/03/22 0701    Updated: 09/03/22 0727    Specimen Source: Blood     Glucose 118 High  mg/dL    BUN 38 High  mg/dL    Creatinine 1.38 High  mg/dL    Calcium 8.5 Low  mg/dL    Sodium 141 mmol/L    Potassium 4.7 mmol/L    Chloride 105 mmol/L    CO2 28 mmol/L    Anion Gap 8 Low  mmol/L    GFR Non-African American 37 Low  mL/min    GFR  45 Low  mL/min    GFR Comment         Comment: Average GFR for 79or more years old:    76 mL/min/1.73sq m   Chronic Kidney Disease:    <60 mL/min/1.73sq m   Kidney failure:    <15 mL/min/1.73sq m               eGFR calculated using average adult body mass. Additional eGFR calculator available at:         [employer]             CBC with Auto Differential [[id number]] (Abnormal)    Collected: 09/03/22 0701    Updated: 09/03/22 0710    Specimen Source: Blood     WBC 6.2 k/uL    RBC 3.18 Low  m/uL    Hemoglobin 9.2 Low  g/dL    Hematocrit 28.2 Low  %    MCV 88.7 fL    MCH 28.9 pg    MCHC 32.6 g/dL    RDW 13.6 %    Platelets 834 k/uL    MPV 8.4 fL    Seg Neutrophils 66 %    Lymphocytes 19 Low  %    Monocytes 11 High  %    Eosinophils % 3 %    Basophils 1 %    Segs Absolute 4.10 k/uL    Absolute Lymph # 1.20 k/uL    Absolute Mono # 0.70 k/uL    Absolute Eos # 0.20 k/uL    Basophils Absolute 0.00 k/uL   POC Glucose Fingerstick [[id number]]    Collected: 09/03/22 0657    Updated: 09/03/22 0704     POC Glucose 91 mg/dL   Troponin [[id number]] (Abnormal)    Collected: 09/02/22 1745    Updated: 09/02/22 1811    Specimen Source: Blood     Troponin, High Sensitivity 28 High  ng/L    Comment:        High Sensitivity Troponin values cannot be compared with other Troponin methodologies. Patients with high levels of Biotin oral intake (i.e >5mg/day) may have falsely decreased   Troponin levels.  Samples collected within 8 hours of biotin intake may require additional   information for diagnosis. XR CHEST (2 VW) [[id number]]    Collected: 09/02/22 1738    Updated: 09/02/22 1803    Specimen Type: Chest    Narrative:     EXAMINATION:   TWO XRAY VIEWS OF THE CHEST     9/2/2022 4:25 pm     COMPARISON:   None. HISTORY:   ORDERING SYSTEM PROVIDED HISTORY: altered mental status   TECHNOLOGIST PROVIDED HISTORY:   altered mental status   Reason for Exam: altered mental status     FINDINGS:   Upright frontal and lateral view chest radiographs were obtained. The heart is enlarged. The mediastinal contour and pleural spaces are   otherwise within normal limits. The lungs are grossly clear. There is no   focal consolidation or pneumothorax. The pulmonary vascular pattern is   within normal limits. Degenerative changes are present throughout the   thoracic spine. There is spinal fusion hardware within the lumbar spine   which is only partially imaged. Impression:     Clear lungs. Cardiomegaly. No acute cardiopulmonary abnormality. CT HEAD WO CONTRAST [[id number]]    Collected: 09/02/22 1726    Updated: 09/02/22 1803    Narrative:     EXAMINATION:   CT OF THE HEAD WITHOUT CONTRAST  9/2/2022 5:21 pm     TECHNIQUE:   CT of the head was performed without the administration of intravenous   contrast. Automated exposure control, iterative reconstruction, and/or weight   based adjustment of the mA/kV was utilized to reduce the radiation dose to as   low as reasonably achievable. COMPARISON:   February 12, 2020     HISTORY:   ORDERING SYSTEM PROVIDED HISTORY: AMS   TECHNOLOGIST PROVIDED HISTORY:   AMS     Decision Support Exception - unselect if not a suspected or confirmed   emergency medical condition->Emergency Medical Condition (MA)   Reason for Exam: Dizziness; Altered Mental Status     FINDINGS:   BRAIN/VENTRICLES: There is no acute intracranial hemorrhage, mass effect or   midline shift. No abnormal extra-axial fluid collection.   The gray-white   differentiation is maintained without evidence of an acute infarct. There is   prominence of the ventricles and sulci due to global parenchymal volume loss. There are nonspecific areas of hypoattenuation within the periventricular and   subcortical white matter, which likely represent chronic microvascular   ischemic change. ORBITS: The visualized portion of the orbits demonstrate no acute abnormality. SINUSES: The visualized paranasal sinuses and mastoid air cells demonstrate   no acute abnormality. SOFT TISSUES/SKULL: No acute abnormality of the visualized skull or soft   tissues.     Impression:     No acute disease    Urinalysis with Reflex to Culture [[id number]] (Abnormal)    Collected: 09/02/22 1647    Updated: 09/02/22 1723    Specimen Source: Urine     Color, UA Dark Yellow Abnormal     Turbidity UA Clear    Glucose, Ur NEGATIVE    Bilirubin Urine NEGATIVE    Ketones, Urine TRACE Abnormal     Specific Gravity, UA 1.019    Urine Hgb NEGATIVE    pH, UA 5.0    Protein, UA 1+ Abnormal     Urobilinogen, Urine Normal    Nitrite, Urine NEGATIVE    Leukocyte Esterase, Urine TRACE Abnormal    Microscopic Urinalysis [[id number]]    Collected: 09/02/22 1647    Updated: 09/02/22 1723     WBC, UA 0 TO 2 /HPF    RBC, UA 0 TO 2 /HPF    Casts UA 3 to 5 /LPF    Casts UA HYALINE /LPF    Epithelial Cells UA 0 TO 2 /HPF    Bacteria, UA None   Blood Gas, Venous [[id number]] (Abnormal)    Collected: 09/02/22 1700    Updated: 09/02/22 1720    Specimen Source: Blood gases     pH, Chris 7.400    pCO2, Chris 50.3    pO2, Chris 30.2    HCO3, Venous 31.1 High  mmol/L    Positive Base Excess, Chris 6.3 High  mmol/L    O2 Sat, Chris 57.4 Low  %    Carboxyhemoglobin 2.1 %    Comment:        Reference Range:   Non-Smokers     0-2%   Average Smoker  2-4%   Heavy Smoker    <10%              Text for Respiratory VBG   COVID-19, Rapid [[id number]]    Collected: 09/02/22 1621    Updated: 09/02/22 1714    Specimen Source: Nasopharyngeal Swab     Specimen Description [place] NASOPHARYNGEAL SWAB    SARS-CoV-2, Rapid Not Detected    Comment:        Rapid NAAT:  The specimen is NEGATIVE for SARS-CoV-2, the novel coronavirus associated with   COVID-19. The ID NOW COVID-19 assay is designed to detect the virus that causes COVID-19 in patients   with signs and symptoms of infection who are suspected of COVID-19. An individual without symptoms of COVID-19 and who is not shedding SARS-CoV-2 virus would   expect to have a negative (not detected) result in this assay. Negative results should be treated as presumptive and, if inconsistent with clinical signs   and symptoms or necessary for patient management,   should be tested with an alternative molecular assay. Negative results do not preclude   SARS-CoV-2 infection and   should not be used as the sole basis for patient management decisions. Fact sheet for Healthcare Providers: [email or web address]   Fact sheet for Patients: [email or web address]           Methodology: Isothermal Nucleic Acid Amplification       Magnesium [[id number]]    Collected: 09/02/22 1620    Updated: 09/02/22 1657    Specimen Source: Blood     Magnesium 2.6 mg/dL   Troponin [[id number]] (Abnormal)    Collected: 09/02/22 1620    Updated: 09/02/22 1657    Specimen Source: Blood     Troponin, High Sensitivity 28 High  ng/L    Comment:        High Sensitivity Troponin values cannot be compared with other Troponin methodologies. Patients with high levels of Biotin oral intake (i.e >5mg/day) may have falsely decreased   Troponin levels. Samples collected within 8 hours of biotin intake may require additional   information for diagnosis.        TSH w/reflex to FT4 [[id number]]    Collected: 09/02/22 1620    Updated: 09/02/22 1657    Specimen Type: Blood     TSH 0.46 uIU/mL   Comprehensive Metabolic Panel [[id number]] (Abnormal)    Collected: 09/02/22 1620    Updated: 09/02/22 1657    Specimen Type: Blood Glucose 110 High  mg/dL    BUN 52 High  mg/dL    Creatinine 1.84 High  mg/dL    Calcium 8.7 mg/dL    Sodium 138 mmol/L    Potassium 4.5 mmol/L    Chloride 99 mmol/L    CO2 30 mmol/L    Anion Gap 9 mmol/L    Alkaline Phosphatase 120 High  U/L    ALT 14 U/L    AST 15 U/L    Total Bilirubin 0.3 mg/dL    Total Protein 5.9 Low  g/dL    Albumin 3.0 Low  g/dL    GFR Non-African American 26 Low  mL/min    GFR  32 Low  mL/min    GFR Comment         Comment: Average GFR for 79or more years old:    76 mL/min/1.73sq m   Chronic Kidney Disease:    <60 mL/min/1.73sq m   Kidney failure:    <15 mL/min/1.73sq m               eGFR calculated using average adult body mass.  Additional eGFR calculator available at:         [employer]             CBC with Auto Differential [[id number]] (Abnormal)    Collected: 09/02/22 1620    Updated: 09/02/22 1627    Specimen Source: Blood     WBC 7.1 k/uL    RBC 3.26 Low  m/uL    Hemoglobin 9.4 Low  g/dL    Hematocrit 29.0 Low  %    MCV 88.9 fL    MCH 28.9 pg    MCHC 32.5 g/dL    RDW 13.3 %    Platelets 991 k/uL    MPV 8.2 fL    Seg Neutrophils 70 High  %    Lymphocytes 16 Low  %    Monocytes 12 High  %    Eosinophils % 1 %    Basophils 1 %    Segs Absolute 5.10 k/uL    Absolute Lymph # 1.10 k/uL    Absolute Mono # 0.80 k/uL    Absolute Eos # 0.10 k/uL    Basophils Absolute 0.00 k/uL   EKG 12 Lead [[id number]]    Collected: 09/02/22 1600    Updated: 09/02/22 1621     Ventricular Rate 72 BPM    Atrial Rate 72 BPM    P-R Interval 184 ms    QRS Duration 80 ms    Q-T Interval 386 ms    QTc Calculation (Bazett) 422 ms    P Axis 65 degrees    R Axis -32 degrees    T Axis 13 degrees   Narrative:     Normal sinus rhythm   Left axis deviation   Abnormal ECG   When compared with ECG of 02-NOV-2017 13:52,   No significant change was found     Current IP Meds    Signed Date/Time Phone Pager   [facility], [address] [place] [address] 9/02/2022  6:01 [phone]      Reading Providers    Read Date Phone Pager   [facility] Fri Sep 2, 2022  6:01 [phone]        XR CHEST (2 VW): Patient Communication     Add Comments   Add Notifications        Radiation Dose Estimates    No radiation information found for this patient  Narrative   EXAMINATION:   TWO XRAY VIEWS OF THE CHEST       9/2/2022 4:25 pm       COMPARISON:   None. HISTORY:   ORDERING SYSTEM PROVIDED HISTORY: altered mental status   TECHNOLOGIST PROVIDED HISTORY:   altered mental status   Reason for Exam: altered mental status       FINDINGS:   Upright frontal and lateral view chest radiographs were obtained. The heart is enlarged. The mediastinal contour and pleural spaces are   otherwise within normal limits. The lungs are grossly clear. There is no   focal consolidation or pneumothorax. The pulmonary vascular pattern is   within normal limits. Degenerative changes are present throughout the   thoracic spine. There is spinal fusion hardware within the lumbar spine   which is only partially imaged. Impression   Clear lungs. Cardiomegaly. No acute cardiopulmonary abnormality.                  Order History    Open Order Details     PACS Images     Show images for XR CHEST (2 VW)    Results History Report    View Report     External Result Report    External Result Report 2022     CT HEAD WO CONTRAST Sabrina Bauer  Status: Final result     Order Providers    Authorizing Billing   DO Andrea Morillo MD            Signed by    Signed Date/Time Phone Pager   Rayna WEISS 9/02/2022  6:00 [phone]      Reading Providers    Read Date Phone Pager   Mina Coker Fri Sep 2, 2022  6:00 [phone]        CT HEAD WO CONTRAST: Patient Communication     Add Comments   Add Notifications        Radiation Dose Estimates    No radiation information found for this patient  Narrative   EXAMINATION:   CT OF THE HEAD WITHOUT CONTRAST  9/2/2022 5:21 pm       TECHNIQUE:   CT of the head was performed without the administration of intravenous   contrast. Automated exposure control, iterative reconstruction, and/or weight   based adjustment of the mA/kV was utilized to reduce the radiation dose to as   low as reasonably achievable. COMPARISON:   February 12, 2020       HISTORY:   ORDERING SYSTEM PROVIDED HISTORY: AMS   TECHNOLOGIST PROVIDED HISTORY:   AMS       Decision Support Exception - unselect if not a suspected or confirmed   emergency medical condition->Emergency Medical Condition (MA)   Reason for Exam: Dizziness; Altered Mental Status       FINDINGS:   BRAIN/VENTRICLES: There is no acute intracranial hemorrhage, mass effect or   midline shift. No abnormal extra-axial fluid collection. The gray-white   differentiation is maintained without evidence of an acute infarct. There is   prominence of the ventricles and sulci due to global parenchymal volume loss. There are nonspecific areas of hypoattenuation within the periventricular and   subcortical white matter, which likely represent chronic microvascular   ischemic change. ORBITS: The visualized portion of the orbits demonstrate no acute abnormality. SINUSES: The visualized paranasal sinuses and mastoid air cells demonstrate   no acute abnormality. SOFT TISSUES/SKULL: No acute abnormality of the visualized skull or soft   tissues.            Impression   No acute disease             Order History    Open Order Details     PACS Images     Show images for CT HEAD WO CONTRAST    Results History Report    View Report     External Result Report    External Result Report 2022     Existing Charges    Charge Line Charge Code Status Charge Trigger Charge Type   [id number]  Ct Brain W/O Contrast [[id number]] [address] [facility] Billing Imaging end exam Technical   [id number] Ct Scan,Head/Brain,W/O Contrast Matl 92833 Deleted Imaging result study Professional     Order Report     Order Details    Implants    Bone/Graft/Tissue/Human/Synth     [employer] Plus 1cc - Sn/A - Implanted  Spine Cervical  Inventory item: GRAFT BNE SUB 1CC DBM FOR ZEINA RIDGE AUG PERIODONTAL          ASSESSMENT:  Patient Active Problem List   Diagnosis Code    Lumbar radiculopathy M54.16    Lumbar spondylolysis M43.06    Failed back syndrome M96.1    Chronic pain associated with significant psychosocial dysfunction G89.4    Primary osteoarthritis of right hip M16.11    Sacroiliitis (HCC) M46.1    Spinal cord stimulator status Z96.89    Acute pancreatitis K85.90    Abdominal pain, epigastric R10.13    Irritable bowel syndrome with constipation K58.1    Cervical radicular pain M54.12    DDD (degenerative disc disease), cervical M50.30    Neck pain M54.2    Spinal stenosis in cervical region M48.02    Acute pain of right knee M25.561    Chronic, continuous use of opioids F11.90    Imbalance R26.89    Cervical stenosis of spine M48.02    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 [address] N17.9     Dehydration    Mental status changes most likely secondary above and effective medications on her    UTI    Patient clinically improved after hydration kidney function improved    PLAN:  Patient is not to take the diuretics the Bumex only take the hydrochlorothiazide out of her medications for now    She is to continue drainage and fluid at home    We will give her Rocephin 1 g IV x1    She is ready to go home and I feel she is okay to go home to follow-up with her PCP but to do blood work outpatient to make sure her labs are okay    She is to discuss the pain medication with her pain doctor and try to adjust him to the lowest possible dose that she could tolerate without side effects    She is to call if any changes.         Electronically signed by DAVONTE Wahl on 9/3/2022 at [address] [employer]

## 2022-09-03 NOTE — ED NOTES
TRANSFER - OUT REPORT:    Verbal report given to Giuliano Lindo RN on Duwaine Cancel  being transferred to Med/Surg 2060 for routine progression of patient care       Report consisted of patient's Situation, Background, Assessment and   Recommendations(SBAR). Information from the following report(s) Nurse Handoff Report, ED Encounter Summary, ED SBAR, Adult Overview and MAR was reviewed with the receiving nurse. Lines:   Peripheral IV 09/02/22 Left;Proximal;Anterior Forearm (Active)   Site Assessment Clean, dry & intact 09/02/22 1749   Line Status Blood return noted 09/02/22 1749   Phlebitis Assessment No symptoms 09/02/22 1749   Infiltration Assessment 0 09/02/22 1749   Dressing Status Clean, dry & intact 09/02/22 1749   Dressing Type Transparent 09/02/22 1749   Dressing Intervention New 09/02/22 [address] [place] [address] for questions and clarification was provided.       Patient transported with:  Sohan Singh RN  09/02/22 8947

## 2022-09-03 NOTE — PLAN OF CARE
Problem: Discharge Planning  Goal: Discharge to home or other facility with appropriate resources  9/3/2022 1740 by Farshad Humphreys RN  Outcome: Completed  9/3/2022 1457 by Farshad Humphreys RN  Outcome: Progressing  9/3/2022 0439 by Lisa Tillman RN  Outcome: Progressing     Problem: Safety - Adult  Goal: Free from fall injury  9/3/2022 1740 by Farshad Humphreys RN  Outcome: Completed  9/3/2022 1457 by Farshad Humphreys RN  Outcome: Progressing  Note: Patient remains free of incidence/ injury. Bed remains in low position. Call light within reach. Side rails up x2.   Bed alarm on.   9/3/2022 0439 by Lisa Tillman RN  Outcome: Progressing     Problem: ABCDS Injury Assessment  Goal: Absence of physical injury  9/3/2022 1740 by Farshad Humphreys RN  Outcome: Completed  9/3/2022 0439 by Lisa Tillman RN  Outcome: Progressing

## 2022-09-05 LAB
EKG ATRIAL RATE: 72 BPM
EKG P AXIS: 65 DEGREES
EKG P-R INTERVAL: 184 MS
EKG Q-T INTERVAL: 386 MS
EKG QRS DURATION: 80 MS
EKG QTC CALCULATION (BAZETT): 422 MS
EKG R AXIS: -32 DEGREES
EKG T AXIS: 13 DEGREES
EKG VENTRICULAR RATE: 72 BPM

## 2022-09-05 PROCEDURE — 93010 ELECTROCARDIOGRAM REPORT: CPT | Performed by: INTERNAL MEDICINE

## 2022-09-10 ENCOUNTER — HOSPITAL ENCOUNTER (OUTPATIENT)
Age: 82
Setting detail: SPECIMEN
Discharge: HOME | End: 2022-09-10
Payer: MEDICARE

## 2022-09-10 LAB
ANION GAP SERPL CALCULATED.3IONS-SCNC: 8 MMOL/L (ref 9–17)
BUN BLDV-MCNC: 14 MG/DL (ref 8–23)
CALCIUM SERPL-MCNC: 8.9 MG/DL (ref 8.6–10.4)
CHLORIDE BLD-SCNC: 107 MMOL/L (ref 98–107)
CO2: 28 MMOL/L (ref 20–31)
CREAT SERPL-MCNC: 0.85 MG/DL (ref 0.5–0.9)
GFR AFRICAN AMERICAN: >60 ML/MIN
GFR NON-AFRICAN AMERICAN: >60 ML/MIN
GFR SERPL CREATININE-BSD FRML MDRD: ABNORMAL ML/MIN/{1.73_M2}
GLUCOSE BLD-MCNC: 93 MG/DL (ref 70–99)
HCT VFR BLD CALC: 26.8 % (ref 36–46)
HEMOGLOBIN: 9.3 G/DL (ref 12–16)
MCH RBC QN AUTO: 30.5 PG (ref 26–34)
MCHC RBC AUTO-ENTMCNC: 34.6 G/DL (ref 31–37)
MCV RBC AUTO: 88.2 FL (ref 80–100)
PDW BLD-RTO: 13.7 % (ref 11.5–14.9)
PLATELET # BLD: 298 K/UL (ref 150–450)
PMV BLD AUTO: 8.2 FL (ref 6–12)
POTASSIUM SERPL-SCNC: 4.4 MMOL/L (ref 3.7–5.3)
RBC # BLD: 3.04 M/UL (ref 4–5.2)
SODIUM BLD-SCNC: 143 MMOL/L (ref 135–144)
WBC # BLD: 4.5 K/UL (ref 3.5–11)

## 2022-09-10 PROCEDURE — 36415 COLL VENOUS BLD VENIPUNCTURE: CPT

## 2022-09-10 PROCEDURE — 85027 COMPLETE CBC AUTOMATED: CPT

## 2022-09-10 PROCEDURE — 80048 BASIC METABOLIC PNL TOTAL CA: CPT

## 2022-09-10 NOTE — DISCHARGE SUMMARY
medication from the pain clinic but they were adjusting her meds to lower doses she says. Patient was eating and drinking and walking okay with assist of her walker and felt okay to go home was discharged home to be followed by her PCP she had UTI on urinalysis started on the antibiotic and was discharged on antibiotics as well at home  Disposition to home    Condition on discharge stable  Patient Instructions: Increase fluids and follow-up with PCP and to call if any problems    Discharge Medications:  [email or web address] with her home meds    She was placed on Keflex 500 p.o.tid  for 7 days    Activity: activity as tolerated with a cane    Diet: No diet orders on file    Follow-up with DR LEARY in 1 to 2 weeks, patient to call  for an appointment and if has any problems.       Electronically signed by Chinyere Hayes DO FAAFP on 9/10/2022 at 3:54 PM

## 2022-09-14 ENCOUNTER — HOSPITAL ENCOUNTER (OUTPATIENT)
Age: 82
Discharge: HOME | End: 2022-09-14
Payer: MEDICARE

## 2022-09-14 LAB
ABSOLUTE EOS #: 0.2 K/UL (ref 0–0.4)
ABSOLUTE LYMPH #: 1.4 K/UL (ref 1–4.8)
ABSOLUTE MONO #: 0.4 K/UL (ref 0.1–1.3)
ALBUMIN SERPL-MCNC: 3.7 G/DL (ref 3.5–5.2)
ALP BLD-CCNC: 79 U/L (ref 35–104)
ALT SERPL-CCNC: 10 U/L (ref 5–33)
ANION GAP SERPL CALCULATED.3IONS-SCNC: 7 MMOL/L (ref 9–17)
AST SERPL-CCNC: 11 U/L
BASOPHILS # BLD: 1 % (ref 0–2)
BASOPHILS ABSOLUTE: 0 K/UL (ref 0–0.2)
BILIRUB SERPL-MCNC: 0.3 MG/DL (ref 0.3–1.2)
BUN BLDV-MCNC: 19 MG/DL (ref 8–23)
CALCIUM SERPL-MCNC: 9.3 MG/DL (ref 8.6–10.4)
CHLORIDE BLD-SCNC: 106 MMOL/L (ref 98–107)
CO2: 30 MMOL/L (ref 20–31)
CREAT SERPL-MCNC: 0.83 MG/DL (ref 0.5–0.9)
EOSINOPHILS RELATIVE PERCENT: 5 % (ref 0–4)
GFR AFRICAN AMERICAN: >60 ML/MIN
GFR NON-AFRICAN AMERICAN: >60 ML/MIN
GFR SERPL CREATININE-BSD FRML MDRD: ABNORMAL ML/MIN/{1.73_M2}
GLUCOSE BLD-MCNC: 94 MG/DL (ref 70–99)
HCT VFR BLD CALC: 29.1 % (ref 36–46)
HEMOGLOBIN: 9.4 G/DL (ref 12–16)
IRON SATURATION: 16 % (ref 20–55)
IRON: 44 UG/DL (ref 37–145)
LYMPHOCYTES # BLD: 31 % (ref 24–44)
MCH RBC QN AUTO: 28.5 PG (ref 26–34)
MCHC RBC AUTO-ENTMCNC: 32.4 G/DL (ref 31–37)
MCV RBC AUTO: 87.8 FL (ref 80–100)
MONOCYTES # BLD: 9 % (ref 1–7)
PDW BLD-RTO: 13.9 % (ref 11.5–14.9)
PLATELET # BLD: 341 K/UL (ref 150–450)
PMV BLD AUTO: 7.7 FL (ref 6–12)
POTASSIUM SERPL-SCNC: 4.5 MMOL/L (ref 3.7–5.3)
RBC # BLD: 3.31 M/UL (ref 4–5.2)
SEG NEUTROPHILS: 54 % (ref 36–66)
SEGMENTED NEUTROPHILS ABSOLUTE COUNT: 2.5 K/UL (ref 1.3–9.1)
SODIUM BLD-SCNC: 143 MMOL/L (ref 135–144)
TOTAL IRON BINDING CAPACITY: 268 UG/DL (ref 250–450)
TOTAL PROTEIN: 5.9 G/DL (ref 6.4–8.3)
UNSATURATED IRON BINDING CAPACITY: 224 UG/DL (ref 112–347)
WBC # BLD: 4.5 K/UL (ref 3.5–11)

## 2022-09-14 PROCEDURE — 85025 COMPLETE CBC W/AUTO DIFF WBC: CPT

## 2022-09-14 PROCEDURE — 36415 COLL VENOUS BLD VENIPUNCTURE: CPT

## 2022-09-14 PROCEDURE — 83540 ASSAY OF IRON: CPT

## 2022-09-14 PROCEDURE — 83550 IRON BINDING TEST: CPT

## 2022-09-14 PROCEDURE — 80053 COMPREHEN METABOLIC PANEL: CPT

## 2022-09-16 ENCOUNTER — TELEPHONE (OUTPATIENT)
Dept: PAIN MANAGEMENT | Age: 82
End: 2022-09-16

## 2022-09-16 DIAGNOSIS — M96.1 FAILED BACK SYNDROME: ICD-10-CM

## 2022-09-16 RX ORDER — OXYCODONE AND ACETAMINOPHEN 10; 325 MG/1; MG/1
1 TABLET ORAL EVERY 8 HOURS PRN
Qty: 100 TABLET | Refills: 0 | Status: SHIPPED | OUTPATIENT
Start: 2022-09-16 | End: 2022-10-27 | Stop reason: SDUPTHER

## 2022-09-16 NOTE — TELEPHONE ENCOUNTER
Pt calls into the office stating she is out of medication requesting it be sent to [facility] in [address]  Will route a message to NP

## 2022-10-01 ENCOUNTER — HOSPITAL ENCOUNTER (OUTPATIENT)
Age: 82
Discharge: HOME | End: 2022-10-01
Payer: MEDICARE

## 2022-10-01 LAB
ANION GAP SERPL CALCULATED.3IONS-SCNC: 8 MMOL/L (ref 9–17)
BUN BLDV-MCNC: 24 MG/DL (ref 8–23)
CALCIUM SERPL-MCNC: 9.4 MG/DL (ref 8.6–10.4)
CHLORIDE BLD-SCNC: 103 MMOL/L (ref 98–107)
CO2: 30 MMOL/L (ref 20–31)
CREAT SERPL-MCNC: 0.99 MG/DL (ref 0.5–0.9)
GFR AFRICAN AMERICAN: >60 ML/MIN
GFR NON-AFRICAN AMERICAN: 54 ML/MIN
GFR SERPL CREATININE-BSD FRML MDRD: ABNORMAL ML/MIN/{1.73_M2}
GLUCOSE BLD-MCNC: 121 MG/DL (ref 70–99)
HCT VFR BLD CALC: 32.4 % (ref 36–46)
HEMOGLOBIN: 10.5 G/DL (ref 12–16)
POTASSIUM SERPL-SCNC: 4.8 MMOL/L (ref 3.7–5.3)
SODIUM BLD-SCNC: 141 MMOL/L (ref 135–144)

## 2022-10-01 PROCEDURE — 85014 HEMATOCRIT: CPT

## 2022-10-01 PROCEDURE — 80048 BASIC METABOLIC PNL TOTAL CA: CPT

## 2022-10-01 PROCEDURE — 85018 HEMOGLOBIN: CPT

## 2022-10-01 PROCEDURE — 36415 COLL VENOUS BLD VENIPUNCTURE: CPT

## 2022-10-03 ENCOUNTER — HOSPITAL ENCOUNTER (OUTPATIENT)
Dept: PAIN MANAGEMENT | Age: 82
Discharge: HOME | End: 2022-10-03
Payer: MEDICARE

## 2022-10-03 ENCOUNTER — HOSPITAL ENCOUNTER (OUTPATIENT)
Dept: GENERAL RADIOLOGY | Age: 82
Discharge: HOME | End: 2022-10-05
Payer: MEDICARE

## 2022-10-03 VITALS
HEIGHT: 58 IN | OXYGEN SATURATION: 100 % | RESPIRATION RATE: 17 BRPM | BODY MASS INDEX: 31.28 KG/M2 | TEMPERATURE: 98.4 F | HEART RATE: 69 BPM | DIASTOLIC BLOOD PRESSURE: 79 MMHG | SYSTOLIC BLOOD PRESSURE: 98 MMHG | WEIGHT: 149 LBS

## 2022-10-03 DIAGNOSIS — R52 PAIN MANAGEMENT: ICD-10-CM

## 2022-10-03 DIAGNOSIS — M53.3 CHRONIC SI JOINT PAIN: Primary | Chronic | ICD-10-CM

## 2022-10-03 DIAGNOSIS — G89.29 CHRONIC SI JOINT PAIN: Primary | Chronic | ICD-10-CM

## 2022-10-03 DIAGNOSIS — M96.1 FAILED BACK SYNDROME: ICD-10-CM

## 2022-10-03 PROCEDURE — 6360000002 HC RX W HCPCS: Performed by: ANESTHESIOLOGY

## 2022-10-03 PROCEDURE — G0260 INJ FOR SACROILIAC JT ANESTH: HCPCS

## 2022-10-03 PROCEDURE — 6360000004 HC RX CONTRAST MEDICATION: Performed by: ANESTHESIOLOGY

## 2022-10-03 PROCEDURE — 27096 INJECT SACROILIAC JOINT: CPT | Performed by: ANESTHESIOLOGY

## 2022-10-03 PROCEDURE — 99152 MOD SED SAME PHYS/QHP 5/>YRS: CPT | Performed by: ANESTHESIOLOGY

## 2022-10-03 PROCEDURE — 3209999900 FLUORO FOR SURGICAL PROCEDURES

## 2022-10-03 PROCEDURE — 2500000003 HC RX 250 WO HCPCS: Performed by: ANESTHESIOLOGY

## 2022-10-03 RX ORDER — FENTANYL CITRATE 50 UG/ML
INJECTION, SOLUTION INTRAMUSCULAR; INTRAVENOUS
Status: COMPLETED | OUTPATIENT
Start: 2022-10-03 | End: 2022-10-03

## 2022-10-03 RX ORDER — MIDAZOLAM HYDROCHLORIDE 1 MG/ML
INJECTION INTRAMUSCULAR; INTRAVENOUS
Status: COMPLETED | OUTPATIENT
Start: 2022-10-03 | End: 2022-10-03

## 2022-10-03 RX ORDER — BUPIVACAINE HYDROCHLORIDE 5 MG/ML
INJECTION, SOLUTION EPIDURAL; INTRACAUDAL
Status: COMPLETED | OUTPATIENT
Start: 2022-10-03 | End: 2022-10-03

## 2022-10-03 RX ORDER — LIDOCAINE HYDROCHLORIDE 10 MG/ML
INJECTION, SOLUTION EPIDURAL; INFILTRATION; INTRACAUDAL; PERINEURAL
Status: COMPLETED | OUTPATIENT
Start: 2022-10-03 | End: 2022-10-03

## 2022-10-03 RX ORDER — DEXAMETHASONE SODIUM PHOSPHATE 10 MG/ML
INJECTION, SOLUTION INTRAMUSCULAR; INTRAVENOUS
Status: COMPLETED | OUTPATIENT
Start: 2022-10-03 | End: 2022-10-03

## 2022-10-03 RX ADMIN — LIDOCAINE HYDROCHLORIDE 3 ML: 10 INJECTION, SOLUTION EPIDURAL; INFILTRATION; INTRACAUDAL; PERINEURAL at 15:34

## 2022-10-03 RX ADMIN — BUPIVACAINE HYDROCHLORIDE 5 ML: 5 INJECTION, SOLUTION EPIDURAL; INTRACAUDAL; PERINEURAL at 15:36

## 2022-10-03 RX ADMIN — DEXAMETHASONE SODIUM PHOSPHATE 10 MG: 10 INJECTION, SOLUTION INTRAMUSCULAR; INTRAVENOUS at 15:36

## 2022-10-03 RX ADMIN — FENTANYL CITRATE 25 MCG: 50 INJECTION, SOLUTION INTRAMUSCULAR; INTRAVENOUS at 15:33

## 2022-10-03 RX ADMIN — MIDAZOLAM 1 MG: 1 INJECTION INTRAMUSCULAR; INTRAVENOUS at 15:33

## 2022-10-03 RX ADMIN — IOPAMIDOL 2 ML: 408 INJECTION, SOLUTION INTRATHECAL at 15:36

## 2022-10-03 ASSESSMENT — PAIN DESCRIPTION - DESCRIPTORS: DESCRIPTORS: ACHING;STABBING

## 2022-10-03 ASSESSMENT — PAIN - FUNCTIONAL ASSESSMENT
PAIN_FUNCTIONAL_ASSESSMENT: 0-10
PAIN_FUNCTIONAL_ASSESSMENT: PREVENTS OR INTERFERES SOME ACTIVE ACTIVITIES AND ADLS
PAIN_FUNCTIONAL_ASSESSMENT: NONE - DENIES PAIN

## 2022-10-03 NOTE — DISCHARGE INSTRUCTIONS
Discharge Instructions following Sedation or Anesthesia:  You have  received  a sedative/anesthetic therefore, you should not consume any alcoholic beverages for minimum of 12 hours. Do not drive or operate machinery for 24 hours. Do not sign legal documents for 24 hours. Dizziness, drowsiness, and unsteadiness may occur. Rest when need to. Increase diet as tolerated. Keep up on fluids if diet allows. General Instructions:  Do not take a tub bath for 72 hours after procedure (this includes hot tubs and swimming pools). You may shower, but avoid hot water to injection site. Avoid strenuous activity TODAY especially if you experience dizziness. Remove band-aid the next day. Wash off any residual iodine   Do not use heat, heating pad, or any other heating device over the injection site for 3 days after the procedure. If you experience pain after your procedure, you may continue with your current pain medication as prescribed. (DO NOT INCREASE YOUR PAIN MEDICATION WITHOUT TALKING TO DOCTOR)  Soreness and pain at injection site is common, may use ice to reduce soreness. What To Expect After A Steroid Injection  You should start to feel the effects of the steroid injection in about 48-72 hours  You may experience facial flushing, night sweats and irritability. Other common steroid related side effects are increased appetite, mood elevation, insomnia and fluid retention. These effects usually subside in a few days. Steroids used in epidural injections may cause muscle spasms for a few days. If you are diabetic, your blood sugar may be elevated after your procedure due to the steroids. You will need to monitor your blood sugar more closely while going through a series of injections (Check blood sugar at meals and bedtime for 5 days). You may require adjustment in your diabetic medications, contact your PCP office to discuss.     Call Iva Villanueva at [phone] if you experience:   Fever, chills or temperature over 100    Vomiting, Headache, persistent stiff neck, nausea, blurred vision   Difficulty in urinating or unable to urinate with 8 hours   Increase in weakness, numbness or loss of function   Increased redness, swelling or drainage at the injection site

## 2022-10-03 NOTE — H&P
Pain Pre-Op H&P Note    Juany Shankar MD    HPI: Mikel Jain  presents with   Patient complains of back pain that started years ago. She has undergone several interventions but pain continues. She had SCS implanted in 2012 at [facility] but it was removed as it was not functioning well. [facility] - using the stationary bike - with benefit before COVID. She did see neurosurgeon. Imaging of the spine completed - severe stenosis at T10-11 and moderate stenosis at L1-2. She had a follow up with NS and discussed surgery. She would like to hold off on any surgical interventions at this time and plans to try conservative measures.       Past Medical History:   Diagnosis Date    Achilles tendonitis     right    Asthma     COPD (chronic obstructive pulmonary disease) ([facility])     Cough     clear phlegm    Degenerative lumbar disc     DM [facility]di[facility] mellitus[facility]     PCP Dr. Carl Nguyen, seen 8/2019    Failed back syndrome, lumbar     Fast heart beat     Hx of \"8-10 years ago\"    Fibromyalgia     History of bladder cancer     Hypertension     Hypothyroid     Kidney stones     Lumbar radiculopathy     Lumbar spondylolysis     Lumbar spondylosis     Osteoarthritis     Sleep apnea     uses CPAP machine nightly    Spinal stenosis in cervical region     Wears glasses        Past Surgical History:   Procedure Laterality Date    BACK SURGERY  2010    lumbar fusion    BLADDER TUMOR EXCISION  2005    CARDIOVERSION      \"8-10 years ago\"  to get \"heart into regular rhythm\"    CARPAL TUNNEL RELEASE Bilateral     CATARACT REMOVAL WITH IMPLANT Bilateral 7/22/2014, 8/5/2014    Raffoaravind/Demian    CERVICAL FUSION  11/14/2019     ANTERIOR CERVICAL DECOMPRESSION FUSION C3-4, C4-5    CERVICAL FUSION N/A 11/14/2019    ANTERIOR CERVICAL DECOMPRESSION FUSION C3-4, C4-5 (SUPINE) DEPUY, REGULAR TABLE, MICROSCOPE, C-ARM, EVOKES (# [id number] ELIZA) performed by Sarah Mittal DO at [facility], LAPAROSCOPIC N/A 8/18/2017    CHOLECYSTECTOMY LAPAROSCOPIC ROBOTIC MULTIPORT performed by Lion Guzman MD at [address]  1/2013    CYSTOSCOPY  11/04/2016    CYSTOSCOPY  01/04/2019    ENDOSCOPIC ULTRASOUND (LOWER) N/A 8/17/2017    ENDOSCOPIC ULTRASOUND performed by Randal Prather MD at [address] [facility] (UPPER)  08/17/2017    A cursory view of the gastric mucosa was normal. The scope was then further advanced through a patent pylorus to the second portion of the duodenum. The Gallbladder was evaluated in bulb position. Thick sludge was noted. The CBD was 7.8 mm with no filling defects. The PD was 5 mm in the HOP. The pancreatic tissue was edematous in body and tail. KNEE ARTHROSCOPY Left     LUMBAR FUSION      [facility] OFFICE/OUTPT VISIT,PROCEDURE ONLY N/A 1/24/2018    SPINAL HARDWARE REMOVAL LUMBAR BONE STIMULATOR performed by Virgilio Mcdaniel MD at [address] Bilateral 1989    SKIN BIOPSY Right 11/2015    mole right posterior shoulder    SPINE SURGERY      spinal cord stimulator    SPINE SURGERY  91-4-15    renoval of spinal cord stimulator leads and battery    PASQUALE AND BSO (CERVIX REMOVED)      TUBAL LIGATION      UVULOPALATOPHARYGOPLASTY  2006       Family History   Problem Relation Age of Onset    Heart Failure Mother     Lung Cancer Mother     Other Father          pneumonia    Alzheimer's Disease Father     Lung Cancer Brother     Diabetes Paternal Grandmother     Other Daughter         Fibromyalsia    Rheum Arthritis Daughter        Allergies   Allergen Reactions    Azithromycin Shortness Of Breath     Tightness in chest    Adhesive Tape      OK to use paper tape per Patient- 2/19/20 patient states that she has been using all types of tape with no reaction         Current Outpatient Medications:     oxyCODONE-acetaminophen (PERCOCET)  MG per tablet, Take 1 tablet by mouth every 8 hours as needed for Pain for up to 30 days.  May have 10 extra tabs a month for severe pain no more than 4 tabs a day, Disp: 100 tablet, Rfl: 0    fluticasone (FLONASE) 50 MCG/ACT nasal spray, 1 spray by Each Nostril route daily, Disp: 16 g, Rfl: 1    vitamin D (CHOLECALCIFEROL) 25 MCG (1000 UT) TABS tablet, Take 1,000 Units by mouth daily, Disp: , Rfl:     vitamin B-12 (CYANOCOBALAMIN) 500 MCG tablet, Take 500 mcg by mouth daily, Disp: , Rfl:     ferrous sulfate (IRON 325) 325 (65 Fe) MG tablet, Take 325 mg by mouth daily (with breakfast), Disp: , Rfl:     morphine (MS CONTIN) 15 MG extended release tablet, Take 1 tablet by mouth 2 times daily for 30 days. , Disp: 60 tablet, Rfl: 0    naloxone 4 MG/0.1ML LIQD nasal spray, 1 spray by Nasal route as needed for Opioid Reversal, Disp: 1 each, Rfl: 0    albuterol sulfate  (90 Base) MCG/ACT inhaler, Inhale 2 puffs into the lungs every 4 hours as needed for Shortness of Breath, Disp: , Rfl:     sennosides-docusate sodium (SENOKOT-S) 8.6-50 MG tablet, Take 1 tablet by mouth daily as needed, Disp: , Rfl:     psyllium (KONSYL) 28.3 % PACK, Take 1 packet by mouth daily as needed for Constipation, Disp: , Rfl:     diclofenac sodium 1 % GEL, Apply 2 g topically 2 times daily as needed for Pain (knees and elbows), Disp: , Rfl:     pregabalin (LYRICA) 150 MG capsule, Take 150 mg by mouth 2 times daily. , Disp: , Rfl:     montelukast (SINGULAIR) 10 MG tablet, Take 10 mg by mouth nightly , Disp: , Rfl:     metFORMIN (GLUCOPHAGE) 500 MG tablet, Take 500 mg by mouth 2 times daily (with meals). , Disp: , Rfl:     levothyroxine (SYNTHROID) 50 MCG tablet, Take 50 mcg by mouth Daily. , Disp: , Rfl:     omeprazole (PRILOSEC) 20 MG capsule, Take 20 mg by mouth Daily, Disp: , Rfl:     aspirin 81 MG EC tablet, Take 81 mg by mouth at bedtime, Disp: , Rfl:     Social History     Tobacco Use    Smoking status: Never    Smokeless tobacco: Never   Substance Use Topics    Alcohol use: Not Currently     Comment: rare       Review of Systems:   Focused review of systems was performed, and negative as pertinent to diagnosis, except as stated in HPI. Physical Exam  Constitutional:       Appearance: Normal appearance. Pulmonary:      Effort: Pulmonary effort is normal.   Neurological:      Mental Status: alert. Psychiatric:         Attention and Perception: Attention and perception normal.         Mood and Affect: Mood and affect normal.   Cardiovascular:      Rate: Normal rate. ASA: 3          Mallampati: 3       Patient's current physical status, medications, medical history, and HPI have been reviewed and updated as appropriate on this date: 10/03/22    Risk/Benefit(s): The risks, benefits, alternatives, and potential complications have been discussed with the patient/family and informed consent has been obtained for the procedure/sedation. Diagnosis:   1. Chronic SI joint pain    2.  Failed back syndrome            Plan:   Alejandrina Marinelli MD

## 2022-10-03 NOTE — OP NOTE
Pre Op Diagnoses: BilateralSacroiliac joint pain  Post Op Diagnoses:Bilateral Sacroiliac joint pain     Procedure: BilateralSI joint steroid injection with flouro guidance     Blood Loss: None  Procedure: The Patient was seen in the preop area, chart was reviewed, informed consent was obtained. Patient was taken to procedure room and was placed in prone position. Vital signs were monitored through out the Procedure. A time out was completed. The skin over the back was prepped and draped in sterile manner. The target point was marked at the left SI joint. Skin and deep tissues were anesthetized with 1 % lidocaine. A 22 G spinal needlele was advanced under fluoroscopy guidance in AP view. Positon was confirmed by injecting small amount of contrast dye  Finally 3 ml of treatment solution containing 5 ml of 0.5 % Bupivacaine and 1 ml of Dexamethasone 10 mg was injected  The needle was removed and a Band-Aid was placed over the needle insertion site. The same procedure was then repeated on the other side with same technique, the remaining 1.5 ml of treatment solution was injected on that side. The patient's vital signs remained stable and the patient tolerated the procedure well. SEDATION NOTE:    ASA CLASSIFICATION  3  MP   CLASSIFICATION  3    Moderate intravenous conscious sedation was supervised by Dr. Gopal Ann  The patient was independently monitored by a Registered Nurse assigned to the Procedure Room  Monitoring included automated blood pressure, continuous EKG, Capnography and continuous pulse oximetry. The detailed Conscious Record is permanently stored in the [place] [address].      The following is the conscious sedation record;  Start Time:  1529  End times:  1541  Duration:  12 minutes  MEDS GIVEN 1 MG VERSED AND 50 MCG FENTANYL

## 2022-10-07 ENCOUNTER — TELEPHONE (OUTPATIENT)
Dept: PAIN MANAGEMENT | Age: 82
End: 2022-10-07

## 2022-10-07 DIAGNOSIS — M50.30 DDD (DEGENERATIVE DISC DISEASE), CERVICAL: ICD-10-CM

## 2022-10-07 DIAGNOSIS — M96.1 FAILED BACK SYNDROME OF LUMBAR SPINE: ICD-10-CM

## 2022-10-07 DIAGNOSIS — M47.816 LUMBAR SPONDYLOSIS: ICD-10-CM

## 2022-10-07 RX ORDER — MORPHINE SULFATE 15 MG/1
15 TABLET, FILM COATED, EXTENDED RELEASE ORAL 2 TIMES DAILY
Qty: 60 TABLET | Refills: 0 | Status: SHIPPED | OUTPATIENT
Start: 2022-10-07 | End: 2022-10-27 | Stop reason: SDUPTHER

## 2022-10-21 ENCOUNTER — OFFICE VISIT (OUTPATIENT)
Dept: ORTHOPEDIC SURGERY | Age: 82
End: 2022-10-21
Payer: MEDICARE

## 2022-10-21 DIAGNOSIS — M25.562 PAIN IN BOTH KNEES, UNSPECIFIED CHRONICITY: Primary | ICD-10-CM

## 2022-10-21 DIAGNOSIS — M25.561 PAIN IN BOTH KNEES, UNSPECIFIED CHRONICITY: Primary | ICD-10-CM

## 2022-10-21 PROCEDURE — 1123F ACP DISCUSS/DSCN MKR DOCD: CPT | Performed by: ORTHOPAEDIC SURGERY

## 2022-10-21 PROCEDURE — 99212 OFFICE O/P EST SF 10 MIN: CPT | Performed by: ORTHOPAEDIC SURGERY

## 2022-10-21 NOTE — PROGRESS NOTES
Ni Rosario M.D.            [address] [facility] [address] [facility],[address], [address] [facility]           Dept Phone: [phone]           Dept Fax:  [address]           Armaan Guillen          Dept Phone: [phone]           Dept Fax:  [phone]      Chief Compliant:  Chief Complaint   Patient presents with    Pain     Both knees        History of Present Illness: This is a 80 y.o. female who presents to the clinic today for evaluation / follow up of lateral knee pain. Please see all prior dictations patient has significant degenerative joint disease of both knees she had been getting steroid injections in the past with these becoming less and less effective. She did get a viscosupplementation in the past that she seems felt done better for her. .       Review of Systems   Constitutional: Negative for fever, chills, sweats. Eyes: Negative for changes in vision, or pain. HENT: Negative for ear ache, epistaxis, or sore throat. Respiratory/Cardio: Negative for Chest pain, palpitations, SOB, or cough. Gastrointestinal: Negative for abdominal pain, N/V/D. Genitourinary: Negative for dysuria, frequency, urgency, or hematuria. Neurological: Negative for headache, numbness, or weakness. Integumentary: Negative for rash, itching, laceration, or abrasion. Musculoskeletal: Positive for Pain (Both knees)       Physical Exam:  Constitutional: Patient is oriented to person, place, and time. Patient appears well-developed and well nourished. HENT: Negative otherwise noted  Head: Normocephalic and Atraumatic  Nose: Normal  Eyes: Conjunctivae and EOM are normal  Neck: Normal range of motion Neck supple. Respiratory/Cardio: Effort normal. No respiratory distress.   Musculoskeletal: Examination of both knees identical motion is 5 to about 105 degrees crepitus and grinding noted throughout no obvious effusion present ligamentously grossly tact negative Shaji's. Neurological: Patient is alert and oriented to person, place, and time. Normal strength. No sensory deficit. Skin: Skin is warm and dry  Psychiatric: Behavior is normal. Thought content normal.  Nursing note and vitals reviewed. Labs and Imaging:     XR taken today: No new x-rays taken today  No results found. No orders of the defined types were placed in this encounter. Assessment and Plan:  Severe degenerative joint disease both knees        This is a 80 y.o. female who presents to the clinic today for evaluation / follow up of severe degenerative joint disease both knees. Past History:    Current Outpatient Medications:     morphine (MS CONTIN) 15 MG extended release tablet, Take 1 tablet by mouth 2 times daily for 30 days. , Disp: 60 tablet, Rfl: 0    fluticasone (FLONASE) 50 MCG/ACT nasal spray, 1 spray by Each Nostril route daily, Disp: 16 g, Rfl: 1    vitamin D (CHOLECALCIFEROL) 25 MCG (1000 UT) TABS tablet, Take 1,000 Units by mouth daily, Disp: , Rfl:     vitamin B-12 (CYANOCOBALAMIN) 500 MCG tablet, Take 500 mcg by mouth daily, Disp: , Rfl:     ferrous sulfate (IRON 325) 325 (65 Fe) MG tablet, Take 325 mg by mouth daily (with breakfast), Disp: , Rfl:     naloxone 4 MG/0.1ML LIQD nasal spray, 1 spray by Nasal route as needed for Opioid Reversal, Disp: 1 each, Rfl: 0    albuterol sulfate  (90 Base) MCG/ACT inhaler, Inhale 2 puffs into the lungs every 4 hours as needed for Shortness of Breath, Disp: , Rfl:     sennosides-docusate sodium (SENOKOT-S) 8.6-50 MG tablet, Take 1 tablet by mouth daily as needed, Disp: , Rfl:     psyllium (KONSYL) 28.3 % PACK, Take 1 packet by mouth daily as needed for Constipation, Disp: , Rfl:     diclofenac sodium 1 % GEL, Apply 2 g topically 2 times daily as needed for Pain (knees and elbows), Disp: , Rfl:     pregabalin (LYRICA) 150 MG capsule, Take 150 mg by mouth 2 times daily. , Disp: , Rfl:     montelukast (SINGULAIR) 10 MG tablet, Take 10 mg by mouth nightly , Disp: , Rfl:     metFORMIN (GLUCOPHAGE) 500 MG tablet, Take 500 mg by mouth 2 times daily (with meals). , Disp: , Rfl:     levothyroxine (SYNTHROID) 50 MCG tablet, Take 50 mcg by mouth Daily. , Disp: , Rfl:     omeprazole (PRILOSEC) 20 MG capsule, Take 20 mg by mouth Daily, Disp: , Rfl:     aspirin 81 MG EC tablet, Take 81 mg by mouth at bedtime, Disp: , Rfl:   Allergies   Allergen Reactions    Azithromycin Shortness Of Breath     Tightness in chest    Adhesive Tape      OK to use paper tape per Patient- 2/19/20 patient states that she has been using all types of tape with no reaction     Social History     Socioeconomic History    Marital status:      Spouse name: Donaciano Seip    Number of children: 3    Years of education: Not on file    Highest education level: Not on file   Occupational History    Occupation: retired   Tobacco Use    Smoking status: Never    Smokeless tobacco: Never   Vaping Use    Vaping Use: Never used   Substance and Sexual Activity    Alcohol use: Not Currently     Comment: rare    Drug use: Never    Sexual activity: Yes     Partners: Male   Other Topics Concern    Not on file   Social History Narrative    Not on file     Social Determinants of Health     Financial Resource Strain: Not on file   Food Insecurity: Not on file   Transportation Needs: Not on file   Physical Activity: Not on file   Stress: Not on file   Social Connections: Not on file   Intimate Partner Violence: Not on file   Housing Stability: Not on file     Past Medical History:   Diagnosis Date    Achilles tendonitis     right    Asthma     COPD (chronic obstructive pulmonary disease) ([facility])     Cough     clear phlegm    Degenerative lumbar disc     [facility]di[facility]     PCP Dr. Richelle Verde, seen 8/2019    Failed back syndrome, lumbar     Fast heart beat     Hx of \"8-10 years ago\"    Fibromyalgia History of bladder cancer     Hypertension     Hypothyroid     Kidney stones     Lumbar radiculopathy     Lumbar spondylolysis     Lumbar spondylosis     Osteoarthritis     Sleep apnea     uses CPAP machine nightly    Spinal stenosis in cervical region     Wears glasses      Past Surgical History:   Procedure Laterality Date    BACK SURGERY  2010    lumbar fusion    BLADDER TUMOR EXCISION  2005    CARDIOVERSION      \"8-10 years ago\"  to get \"heart into regular rhythm\"    CARPAL TUNNEL RELEASE Bilateral     CATARACT REMOVAL WITH IMPLANT Bilateral 7/22/2014, 8/5/2014    [facility]    CERVICAL FUSION  11/14/2019     ANTERIOR CERVICAL DECOMPRESSION FUSION C3-4, C4-5    CERVICAL FUSION N/A 11/14/2019    ANTERIOR CERVICAL DECOMPRESSION FUSION C3-4, C4-5 (SUPINE) DEPUY, REGULAR TABLE, MICROSCOPE, C-ARM, EVOKES (# [id number] ELIZA) performed by Roberto Carlos Strange DO at [address] [facility], LAPAROSCOPIC N/A 08/18/2017    CHOLECYSTECTOMY LAPAROSCOPIC ROBOTIC MULTIPORT [facility]   01/2013    CYSTOSCOPY  11/04/2016    CYSTOSCOPY  01/04/2019    ENDOSCOPIC ULTRASOUND (LOWER) N/A 08/17/2017    ENDOSCOPIC ULTRASOUND performed by Sabrina Duenas MD at [address] [place] [address] (UPPER)  08/17/2017    A cursory view of the gastric mucosa was normal. The scope was then further advanced through a patent pylorus to the second portion of the duodenum. The Gallbladder was evaluated in bulb position. Thick sludge was noted. The CBD was 7.8 mm with no filling defects. The PD was 5 mm in the HOP. The pancreatic tissue was edematous in body and tail.     KNEE ARTHROSCOPY Left     LUMBAR FUSION      PAIN MANAGEMENT PROCEDURE      PA OFFICE/OUTPT VISIT,PROCEDURE ONLY N/A 01/24/2018    SPINAL HARDWARE REMOVAL LUMBAR BONE STIMULATOR performed by Irivng Nielsen MD at [address] Bilateral 1989    SKIN BIOPSY Right 11/2015    mole right posterior shoulder    SPINE SURGERY      spinal cord stimulator    SPINE SURGERY  91-4-15    renoval of spinal cord stimulator leads and battery    PASQUALE AND BSO (CERVIX REMOVED)      TUBAL LIGATION      UVULOPALATOPHARYGOPLASTY  2006     Family History   Problem Relation Age of Onset    Heart Failure Mother     Lung Cancer Mother     Other Father          pneumonia    Alzheimer's Disease Father     Lung Cancer Brother     Diabetes Paternal Grandmother     Other Daughter         Fibromyalsia    Rheum Arthritis Daughter    Plan  Patient to be scheduled for viscosupplementation unfortunate she requires to have prior authorization with her insurance. We will get this taken care of and will have her follow-up when this is available. Provider Attestation:  Agustín Pulliam, personally performed the services described in this documentation. All medical record entries made by the scribe were at my direction and in my presence. I have reviewed the chart and discharge instructions and agree that the records reflect my personal performance and is accurate and complete. Chago Epps MD. 10/21/22      Please note that this chart was generated using voice recognition Dragon dictation software. Although every effort was made to ensure the accuracy of this automated transcription, some errors in transcription may have occurred.

## 2022-10-27 ENCOUNTER — HOSPITAL ENCOUNTER (OUTPATIENT)
Dept: PAIN MANAGEMENT | Age: 82
Discharge: HOME | End: 2022-10-27
Payer: MEDICARE

## 2022-10-27 VITALS
DIASTOLIC BLOOD PRESSURE: 68 MMHG | BODY MASS INDEX: 31.28 KG/M2 | HEART RATE: 82 BPM | WEIGHT: 149 LBS | OXYGEN SATURATION: 97 % | SYSTOLIC BLOOD PRESSURE: 148 MMHG | HEIGHT: 58 IN

## 2022-10-27 DIAGNOSIS — F11.90 CHRONIC, CONTINUOUS USE OF OPIOIDS: ICD-10-CM

## 2022-10-27 DIAGNOSIS — M50.30 DDD (DEGENERATIVE DISC DISEASE), CERVICAL: ICD-10-CM

## 2022-10-27 DIAGNOSIS — M96.1 FAILED BACK SYNDROME OF LUMBAR SPINE: ICD-10-CM

## 2022-10-27 DIAGNOSIS — M96.1 FAILED BACK SYNDROME: Primary | ICD-10-CM

## 2022-10-27 DIAGNOSIS — M47.816 LUMBAR SPONDYLOSIS: ICD-10-CM

## 2022-10-27 PROCEDURE — 99213 OFFICE O/P EST LOW 20 MIN: CPT | Performed by: ANESTHESIOLOGY

## 2022-10-27 PROCEDURE — 99213 OFFICE O/P EST LOW 20 MIN: CPT

## 2022-10-27 RX ORDER — MORPHINE SULFATE 15 MG/1
15 TABLET, FILM COATED, EXTENDED RELEASE ORAL 2 TIMES DAILY
Qty: 60 TABLET | Refills: 0 | Status: SHIPPED | OUTPATIENT
Start: 2022-11-07 | End: 2022-11-28 | Stop reason: SDUPTHER

## 2022-10-27 RX ORDER — OXYCODONE AND ACETAMINOPHEN 10; 325 MG/1; MG/1
1 TABLET ORAL 2 TIMES DAILY PRN
Qty: 60 TABLET | Refills: 0 | Status: SHIPPED | OUTPATIENT
Start: 2022-11-07 | End: 2022-12-01 | Stop reason: SDUPTHER

## 2022-10-27 ASSESSMENT — ENCOUNTER SYMPTOMS
NAUSEA: 0
SHORTNESS OF BREATH: 0
VOMITING: 0
DIARRHEA: 0
BACK PAIN: 1
WHEEZING: 0
CONSTIPATION: 0
COUGH: 0

## 2022-10-27 NOTE — PROGRESS NOTES
The patient is a 80 y. o.  /  female. Chief Complaint   Patient presents with    Medication Refill     Morphine         HPI      Medication Refill: Morphine Percocet     Pain score Today:  8  Adverse effects (Constipation / Nausea / Sedation / sexual Dysfunction / others) :  no  Mood: excellent  Sleep pattern and quality: good  Activity level: fair    Pill count Today: Morphine # Percocet # Pt for got both her meds today she knows to bring them next time   Last dose taken  10/27/2022  OARRS report reviewed today: yes  ER/Hospitalizations/PCP visit related to pain since last visit:no   Any legal problems e.g. DUI etc.:No  Satisfied with current management: Yes    Opioid Contract: 11/29/2021  Last Urine Dug screen dated: 11/29/2021    Hemoglobin A1C   Date Value Ref Range Status   02/26/2022 6.0 4.0 - 6.0 % Final       Past Medical History, Past Surgical History, Social History, Allergies and Medications reviewed and updated in EPIC as indicated    Family History reviewed and is noncontributory.           Past Medical History:   Diagnosis Date    Achilles tendonitis     right    Asthma     COPD (chronic obstructive pulmonary disease) ([facility])     Cough     clear phlegm    Degenerative lumbar disc     DM (diabetes mellitus), type 2 ([facility]     PCP Dr. Sharad Beatty, seen 8/2019    Failed back syndrome, lumbar     Fast heart beat     Hx of \"8-10 years ago\"    Fibromyalgia     History of bladder cancer     Hypertension     Hypothyroid     Kidney stones     Lumbar radiculopathy     Lumbar spondylolysis     Lumbar spondylosis     Osteoarthritis     Sleep apnea     uses CPAP machine nightly    Spinal stenosis in cervical region     Wears glasses         Past Surgical History:   Procedure Laterality Date    BACK SURGERY  2010    lumbar fusion    BLADDER TUMOR EXCISION  2005    CARDIOVERSION      \"8-10 years ago\"  to get \"heart into regular rhythm\"    CARPAL TUNNEL RELEASE Bilateral     CATARACT REMOVAL WITH IMPLANT Bilateral 7/22/2014, 8/5/2014    [facility]    CERVICAL FUSION  11/14/2019     ANTERIOR CERVICAL DECOMPRESSION FUSION C3-4, C4-5    CERVICAL FUSION N/A 11/14/2019    ANTERIOR CERVICAL DECOMPRESSION FUSION C3-4, C4-5 (SUPINE) DEPUY, REGULAR TABLE, MICROSCOPE, C-ARM, EVOKES (# [id number] ELIZA) performed by Nicolette Whiteside DO at [facility], LAPAROSCOPIC N/A 08/18/2017    CHOLECYSTECTOMY LAPAROSCOPIC ROBOTIC MULTIPORT [facility]  01/2013    CYSTOSCOPY  11/04/2016    CYSTOSCOPY  01/04/2019    ENDOSCOPIC ULTRASOUND (LOWER) N/A 08/17/2017    ENDOSCOPIC ULTRASOUND performed by Jena Gilbert MD at [address] [place] [address] (UPPER)  08/17/2017    A cursory view of the gastric mucosa was normal. The scope was then further advanced through a patent pylorus to the second portion of the duodenum. The Gallbladder was evaluated in bulb position. Thick sludge was noted. The CBD was 7.8 mm with no filling defects. The PD was 5 mm in the HOP. The pancreatic tissue was edematous in body and tail.     KNEE ARTHROSCOPY Left     LUMBAR FUSION      PAIN MANAGEMENT PROCEDURE      CO OFFICE/OUTPT VISIT,PROCEDURE ONLY N/A 01/24/2018    SPINAL HARDWARE REMOVAL LUMBAR BONE STIMULATOR performed by Vesta Mota MD at [facility] Bilateral 1989    SKIN BIOPSY Right 11/2015    mole right posterior shoulder    SPINE SURGERY      spinal cord stimulator    SPINE SURGERY  91-4-15    renoval of spinal cord stimulator leads and battery    PASQUALE AND BSO (CERVIX REMOVED)      TUBAL LIGATION      UVULOPALATOPHARYGOPLASTY  2006       Social History     Socioeconomic History    Marital status:      Spouse name: Alex Shay    Number of children: 3   Occupational History    Occupation: retired   Tobacco Use    Smoking status: Never    Smokeless tobacco: Never   Vaping Use    Vaping Use: Never used   Substance and Sexual Activity    Alcohol use: Not Currently Comment: rare    Drug use: Never    Sexual activity: Yes     Partners: Male       Family History   Problem Relation Age of Onset    Heart Failure Mother     Lung Cancer Mother     Other Father          pneumonia    Alzheimer's Disease Father     Lung Cancer Brother     Diabetes Paternal Grandmother     Other Daughter         Fibromyalsia    Rheum Arthritis Daughter        Allergies   Allergen Reactions    Azithromycin Shortness Of Breath     Tightness in chest    Adhesive Tape      OK to use paper tape per Patient- 2/19/20 patient states that she has been using all types of tape with no reaction       There were no vitals filed for this visit. Current Outpatient Medications   Medication Sig Dispense Refill    morphine (MS CONTIN) 15 MG extended release tablet Take 1 tablet by mouth 2 times daily for 30 days. 60 tablet 0    fluticasone (FLONASE) 50 MCG/ACT nasal spray 1 spray by Each Nostril route daily 16 g 1    vitamin D (CHOLECALCIFEROL) 25 MCG (1000 UT) TABS tablet Take 1,000 Units by mouth daily      vitamin B-12 (CYANOCOBALAMIN) 500 MCG tablet Take 500 mcg by mouth daily      ferrous sulfate (IRON 325) 325 (65 Fe) MG tablet Take 325 mg by mouth daily (with breakfast)      naloxone 4 MG/0.1ML LIQD nasal spray 1 spray by Nasal route as needed for Opioid Reversal 1 each 0    albuterol sulfate  (90 Base) MCG/ACT inhaler Inhale 2 puffs into the lungs every 4 hours as needed for Shortness of Breath      sennosides-docusate sodium (SENOKOT-S) 8.6-50 MG tablet Take 1 tablet by mouth daily as needed      psyllium (KONSYL) 28.3 % PACK Take 1 packet by mouth daily as needed for Constipation      diclofenac sodium 1 % GEL Apply 2 g topically 2 times daily as needed for Pain (knees and elbows)      pregabalin (LYRICA) 150 MG capsule Take 150 mg by mouth 2 times daily.        montelukast (SINGULAIR) 10 MG tablet Take 10 mg by mouth nightly       metFORMIN (GLUCOPHAGE) 500 MG tablet Take 500 mg by mouth 2 times daily (with meals). levothyroxine (SYNTHROID) 50 MCG tablet Take 50 mcg by mouth Daily. omeprazole (PRILOSEC) 20 MG capsule Take 20 mg by mouth Daily      aspirin 81 MG EC tablet Take 81 mg by mouth at bedtime       No current facility-administered medications for this encounter. Review of Systems   Constitutional:  Negative for chills, fatigue and fever. Respiratory:  Negative for cough, shortness of breath and wheezing. Gastrointestinal:  Negative for constipation, diarrhea, nausea and vomiting. Musculoskeletal:  Positive for back pain. Negative for gait problem, neck pain and neck stiffness. Neurological:  Negative for dizziness, weakness, numbness and headaches. Objective:  Vital signs: (most recent): There were no vitals taken for this visit. No fever. Assessment & Plan  No diagnosis found. No orders of the defined types were placed in this encounter. No orders of the defined types were placed in this encounter.            Electronically signed by Jose Blackwell MA on 10/27/2022 at 1:19 PM

## 2022-10-27 NOTE — PROGRESS NOTES
The patient is a 80 y. o.  /  female.     Chief Complaint   Patient presents with    Medication Refill     Morphine         HPI    62-year-old woman with history of chronic multisite body pain  Identified pain location in neck and back and joints  History of previous cervical and lumbar spine fusion surgery  On chronic opioid therapy  Currently taking MS Contin 15 mg twice a day along with Percocet 10 mg 1 tablet a day  Last prescription was prescribed for 3 to 4 tablet a day but she had misunderstanding and has taken 1 tablet a day reporting increased pain because of abrupt dose reduction    Discussed with patient that she can take Percocet 10 mg twice a day along with morphine  She is very happy with this change  Daily morphine equivalent at this point will become 60 MME  Continue to work in future to change it to 7.5 mg twice a day and bring the total daily MME opioid dose to less than 50    Recently had SI joint injection with the significant improvement in the lumbosacral area pain  Denies any side effect from the medication    Medication Refill: Morphine Percocet     Pain score Today:  8  Adverse effects (Constipation / Nausea / Sedation / sexual Dysfunction / others) :  no  Mood: excellent  Sleep pattern and quality: good  Activity level: fair    Pill count Today: Morphine # Percocet # Pt for got both her meds today she knows to bring them next time   Last dose taken  10/27/2022  OARRS report reviewed today: yes  ER/Hospitalizations/PCP visit related to pain since last visit:no   Any legal problems e.g. DUI etc.:No  Satisfied with current management: Yes    Opioid Contract: 11/29/2021  Last Urine Dug screen dated: 11/29/2021    Hemoglobin A1C   Date Value Ref Range Status   02/26/2022 6.0 4.0 - 6.0 % Final       Past Medical History, Past Surgical History, Social History, Allergies and Medications reviewed and updated in EPIC as indicated    Family History reviewed and is noncontributory. Past Medical History:   Diagnosis Date    Achilles tendonitis     right    Asthma     COPD (chronic obstructive pulmonary disease) ([facility])     Cough     clear phlegm    Degenerative lumbar disc     DM (diabetes mellitus), type 2 ([facility] [address]     PCP Dr. Yaya Hernandez, seen 8/2019    Failed back syndrome, lumbar     Fast heart beat     Hx of \"8-10 years ago\"    Fibromyalgia     History of bladder cancer     Hypertension     Hypothyroid     Kidney stones     Lumbar radiculopathy     Lumbar spondylolysis     Lumbar spondylosis     Osteoarthritis     Sleep apnea     uses CPAP machine nightly    Spinal stenosis in cervical region     Wears glasses         Past Surgical History:   Procedure Laterality Date    BACK SURGERY  2010    lumbar fusion    BLADDER TUMOR EXCISION  2005    CARDIOVERSION      \"8-10 years ago\"  to get \"heart into regular rhythm\"    CARPAL TUNNEL RELEASE Bilateral     CATARACT REMOVAL WITH IMPLANT Bilateral 7/22/2014, 8/5/2014    [facility]    CERVICAL FUSION  11/14/2019     ANTERIOR CERVICAL DECOMPRESSION FUSION C3-4, C4-5    CERVICAL FUSION N/A 11/14/2019    ANTERIOR CERVICAL DECOMPRESSION FUSION C3-4, C4-5 (SUPINE) DEPUY, REGULAR TABLE, MICROSCOPE, C-ARM, EVOKES (# [id number] [facility]) performed by Alissa Yates DO at [facility], LAPAROSCOPIC N/A 08/18/2017    CHOLECYSTECTOMY LAPAROSCOPIC ROBOTIC MULTIPORT [facility]   01/2013    CYSTOSCOPY  11/04/2016    CYSTOSCOPY  01/04/2019    ENDOSCOPIC ULTRASOUND (LOWER) N/A 08/17/2017    ENDOSCOPIC ULTRASOUND performed by Nicole Herbert MD at [address] [place] [address] (UPPER)  08/17/2017    A cursory view of the gastric mucosa was normal. The scope was then further advanced through a patent pylorus to the second portion of the duodenum. The Gallbladder was evaluated in bulb position. Thick sludge was noted. The CBD was 7.8 mm with no filling defects. The PD was 5 mm in the HOP. The pancreatic tissue was edematous in body and tail. KNEE ARTHROSCOPY Left     LUMBAR FUSION      PAIN MANAGEMENT PROCEDURE      OK OFFICE/OUTPT VISIT,PROCEDURE ONLY N/A 01/24/2018    SPINAL HARDWARE REMOVAL LUMBAR BONE STIMULATOR performed by Destiney Pulliam MD at [address] Bilateral 1989    SKIN BIOPSY Right 11/2015    mole right posterior shoulder    SPINE SURGERY      spinal cord stimulator    SPINE SURGERY  91-4-15    renoval of spinal cord stimulator leads and battery    PASQUALE AND BSO (CERVIX REMOVED)      TUBAL LIGATION      UVULOPALATOPHARYGOPLASTY  2006       Social History     Socioeconomic History    Marital status:      Spouse name: Maciel Frey    Number of children: 3   Occupational History    Occupation: retired   Tobacco Use    Smoking status: Never    Smokeless tobacco: Never   Vaping Use    Vaping Use: Never used   Substance and Sexual Activity    Alcohol use: Not Currently     Comment: rare    Drug use: Never    Sexual activity: Yes     Partners: Male       Family History   Problem Relation Age of Onset    Heart Failure Mother     Lung Cancer Mother     Other Father          pneumonia    Alzheimer's Disease Father     Lung Cancer Brother     Diabetes Paternal Grandmother     Other Daughter         Fibromyalsia    Rheum Arthritis Daughter        Allergies   Allergen Reactions    Azithromycin Shortness Of Breath     Tightness in chest    Adhesive Tape      OK to use paper tape per Patient- 2/19/20 patient states that she has been using all types of tape with no reaction       Vitals:    10/27/22 1325   BP: (!) 148/68   Pulse: 82   SpO2: 97%       Current Outpatient Medications   Medication Sig Dispense Refill    [START ON 11/7/2022] morphine (MS CONTIN) 15 MG extended release tablet Take 1 tablet by mouth 2 times daily for 30 days.  60 tablet 0    [START ON 11/7/2022] oxyCODONE-acetaminophen (PERCOCET)  MG per tablet Take 1 tablet by mouth 2 times daily as needed for Pain for up to 30 days. 60 tablet 0    fluticasone (FLONASE) 50 MCG/ACT nasal spray 1 spray by Each Nostril route daily 16 g 1    vitamin D (CHOLECALCIFEROL) 25 MCG (1000 UT) TABS tablet Take 1,000 Units by mouth daily      vitamin B-12 (CYANOCOBALAMIN) 500 MCG tablet Take 500 mcg by mouth daily      ferrous sulfate (IRON 325) 325 (65 Fe) MG tablet Take 325 mg by mouth daily (with breakfast)      naloxone 4 MG/0.1ML LIQD nasal spray 1 spray by Nasal route as needed for Opioid Reversal 1 each 0    albuterol sulfate  (90 Base) MCG/ACT inhaler Inhale 2 puffs into the lungs every 4 hours as needed for Shortness of Breath      sennosides-docusate sodium (SENOKOT-S) 8.6-50 MG tablet Take 1 tablet by mouth daily as needed      psyllium (KONSYL) 28.3 % PACK Take 1 packet by mouth daily as needed for Constipation      diclofenac sodium 1 % GEL Apply 2 g topically 2 times daily as needed for Pain (knees and elbows)      pregabalin (LYRICA) 150 MG capsule Take 150 mg by mouth 2 times daily. montelukast (SINGULAIR) 10 MG tablet Take 10 mg by mouth nightly       metFORMIN (GLUCOPHAGE) 500 MG tablet Take 500 mg by mouth 2 times daily (with meals). levothyroxine (SYNTHROID) 50 MCG tablet Take 50 mcg by mouth Daily. omeprazole (PRILOSEC) 20 MG capsule Take 20 mg by mouth Daily      aspirin 81 MG EC tablet Take 81 mg by mouth at bedtime       No current facility-administered medications for this encounter. Review of Systems   Constitutional:  Negative for chills, fatigue and fever. Respiratory:  Negative for cough, shortness of breath and wheezing. Gastrointestinal:  Negative for constipation, diarrhea, nausea and vomiting. Musculoskeletal:  Positive for back pain. Negative for gait problem, neck pain and neck stiffness. Neurological:  Negative for dizziness, weakness, numbness and headaches. Objective:  General Appearance: In no acute distress and in pain.     Vital signs: (most recent): Blood pressure (!) 148/68, pulse 82, height 4' 10\" (1.473 m), weight 149 lb (67.6 kg), SpO2 97 %. Vital signs are normal.  No fever. Output: Producing urine and producing stool. Lungs:  Normal effort. She is not in respiratory distress. Heart: Normal rate. Neurological: Patient is alert and oriented to person, place and time. Assessment & Plan  82-year-old woman with history of chronic multisite body pain  Identified pain location in neck and back and joints  History of previous cervical and lumbar spine fusion surgery  On chronic opioid therapy  Currently taking MS Contin 15 mg twice a day along with Percocet 10 mg 1 tablet a day  Last prescription was prescribed for 3 to 4 tablet a day but she had misunderstanding and has taken 1 tablet a day reporting increased pain because of abrupt dose reduction    Discussed with patient that she can take Percocet 10 mg twice a day along with morphine  She is very happy with this change  Daily morphine equivalent at this point will become 60 MME  Continue to work in future to change it to 7.5 mg twice a day and bring the total daily MME opioid dose to less than 50    Recently had SI joint injection with the significant improvement in the lumbosacral area pain  Denies any side effect from the medication    1. Failed back syndrome    2. Chronic, continuous use of opioids    3. Lumbar spondylosis    4. DDD (degenerative disc disease), cervical    5. Failed back syndrome of lumbar spine        No orders of the defined types were placed in this encounter. Orders Placed This Encounter   Medications    morphine (MS CONTIN) 15 MG extended release tablet     Sig: Take 1 tablet by mouth 2 times daily for 30 days. Dispense:  60 tablet     Refill:  0     Reduce doses taken as pain becomes manageable    oxyCODONE-acetaminophen (PERCOCET)  MG per tablet     Sig: Take 1 tablet by mouth 2 times daily as needed for Pain for up to 30 days.      Dispense:  60 tablet Refill:  0     Reduce doses taken as pain becomes manageable        Controlled Substance Monitoring:    Acute and Chronic Pain Monitoring:   RX Monitoring 10/27/2022   Attestation -   Acute Pain Prescriptions -   Periodic Controlled Substance Monitoring No signs of potential drug abuse or diversion identified. ;Possible medication side effects, risk of tolerance/dependence & alternative treatments discussed. ;Assessed functional status. Chronic Pain > 50 MEDD Obtained or confirmed \"Consent for Opioid Use\" on file.    Chronic Pain > 80 MEDD -   Chronic Pain > 120 MEDD -               Electronically signed by Tootie Jerez MD on 10/27/2022 at 2:02 PM

## 2022-11-07 ENCOUNTER — TELEPHONE (OUTPATIENT)
Dept: ORTHOPEDIC SURGERY | Age: 82
End: 2022-11-07

## 2022-11-07 NOTE — TELEPHONE ENCOUNTER
Prior auth completed  with [facility] (pharmacy [phone])  spoke with Tony Zimmerman Approval # [id number]  11/7/22-02/7/23. Patient notified. Scheduled patient for injections

## 2022-11-07 NOTE — TELEPHONE ENCOUNTER
Patient is calling regarding ther prior authorizations for knee injections. She states she has not heard from the office and is following up. Please contact her at earliest convenience to discuss and leave a detailed message if she is unable to answer. Thank you.

## 2022-11-14 ENCOUNTER — OFFICE VISIT (OUTPATIENT)
Dept: ORTHOPEDIC SURGERY | Age: 82
End: 2022-11-14
Payer: MEDICARE

## 2022-11-14 VITALS — HEIGHT: 58 IN | WEIGHT: 149 LBS | BODY MASS INDEX: 31.28 KG/M2 | RESPIRATION RATE: 16 BRPM

## 2022-11-14 DIAGNOSIS — M25.511 RIGHT SHOULDER PAIN, UNSPECIFIED CHRONICITY: Primary | ICD-10-CM

## 2022-11-14 PROCEDURE — 1123F ACP DISCUSS/DSCN MKR DOCD: CPT | Performed by: ORTHOPAEDIC SURGERY

## 2022-11-14 PROCEDURE — 99213 OFFICE O/P EST LOW 20 MIN: CPT | Performed by: ORTHOPAEDIC SURGERY

## 2022-11-14 NOTE — PROGRESS NOTES
ORTHOPEDIC PATIENT EVALUATION      HPI / Chief Complaint  Berl Crigler is a [de-identified] y.o. right-hand-dominant female who presents for evaluation of her right shoulder. She indicates that for the past 3 weeks she has been having pain at the medial border of her shoulder blade. Its intermittent in nature. She also has pain radiating down her arm medially and she typically notices this whenever she turns her neck. She does have a history of cervical spine issues with surgery in the past and otherwise specified. Past Medical History  Amy  has a past medical history of Achilles tendonitis, Asthma, COPD (chronic obstructive pulmonary disease) ([facility]), Cough, Degenerative lumbar disc, DM (diabetes mellitus), type 2 ([address] Failed back syndrome, lumbar, Fast heart beat, Fibromyalgia, History of bladder cancer, Hypertension, Hypothyroid, Kidney stones, Lumbar radiculopathy, Lumbar spondylolysis, Lumbar spondylosis, Osteoarthritis, Sleep apnea, Spinal stenosis in cervical region, and Wears glasses. Past Surgical History  Amy  has a past surgical history that includes Rotator cuff repair (Bilateral, 1989); Carpal tunnel release (Bilateral); Uvulopalatopharygoplasty (2006); Cystoscopy (01/2013); Cataract removal with implant (Bilateral, 7/22/2014, 8/5/2014); Spine surgery; Spine surgery (91-4-15); skin biopsy (Right, 11/2015); Cystocopy (11/04/2016); Knee arthroscopy (Left); bladder tumor excision (2005); lumbar fusion; Total abdominal hysterectomy w/ bilateral salpingoophorectomy; Endoscopic ultrasonography, GI (N/A, 08/17/2017); Cholecystectomy, laparoscopic (N/A, 08/18/2017); endoscopic ultrasound (upper) (08/17/2017); Cardioversion; Tubal ligation; pr office/outpt visit,procedure only (N/A, 01/24/2018); back surgery (2010); Cystoscopy (01/04/2019); cervical fusion (11/14/2019); cervical fusion (N/A, 11/14/2019); and Pain management procedure.     Current Medications  Current Outpatient Medications   Medication Sig Dispense Refill    morphine (MS CONTIN) 15 MG extended release tablet Take 1 tablet by mouth 2 times daily for 30 days. 60 tablet 0    oxyCODONE-acetaminophen (PERCOCET)  MG per tablet Take 1 tablet by mouth 2 times daily as needed for Pain for up to 30 days. 60 tablet 0    fluticasone (FLONASE) 50 MCG/ACT nasal spray 1 spray by Each Nostril route daily 16 g 1    vitamin D (CHOLECALCIFEROL) 25 MCG (1000 UT) TABS tablet Take 1,000 Units by mouth daily      vitamin B-12 (CYANOCOBALAMIN) 500 MCG tablet Take 500 mcg by mouth daily      ferrous sulfate (IRON 325) 325 (65 Fe) MG tablet Take 325 mg by mouth daily (with breakfast)      naloxone 4 MG/0.1ML LIQD nasal spray 1 spray by Nasal route as needed for Opioid Reversal 1 each 0    albuterol sulfate  (90 Base) MCG/ACT inhaler Inhale 2 puffs into the lungs every 4 hours as needed for Shortness of Breath      sennosides-docusate sodium (SENOKOT-S) 8.6-50 MG tablet Take 1 tablet by mouth daily as needed      psyllium (KONSYL) 28.3 % PACK Take 1 packet by mouth daily as needed for Constipation      diclofenac sodium 1 % GEL Apply 2 g topically 2 times daily as needed for Pain (knees and elbows)      pregabalin (LYRICA) 150 MG capsule Take 150 mg by mouth 2 times daily. montelukast (SINGULAIR) 10 MG tablet Take 10 mg by mouth nightly       metFORMIN (GLUCOPHAGE) 500 MG tablet Take 500 mg by mouth 2 times daily (with meals). levothyroxine (SYNTHROID) 50 MCG tablet Take 50 mcg by mouth Daily. omeprazole (PRILOSEC) 20 MG capsule Take 20 mg by mouth Daily      aspirin 81 MG EC tablet Take 81 mg by mouth at bedtime       No current facility-administered medications for this visit. Allergies  Allergies have been reviewed. Amy is allergic to azithromycin and adhesive tape. Social History  Amy  reports that she has never smoked. She has never used smokeless tobacco. She reports that she does not currently use alcohol.  She reports that she does not use drugs. Family History  Amy's family history includes Alzheimer's Disease in her father; Diabetes in her paternal grandmother; Heart Failure in her mother; Corey Ingles in her brother and mother; Other in her daughter and father; Rheum Arthritis in her daughter. Review of Systems   History obtained from the patient. REVIEW OF SYSTEMS:   Constitution: negative for fever, chills, weight loss or malaise   Musculoskeletal: As noted in the HPI   Neurologic: As noted in the HPI    Physical Exam  Resp 16   Ht 4' 10\" (1.473 m)   Wt 149 lb (67.6 kg)   BMI 31.14 kg/m²    General Appearance: alert, well appearing, and in no distress  Mental Status: alert, oriented to person, place, and time  Evaluation of patient's right shoulder and upper extremity demonstrates intact skin without warmth erythema or swelling. She has approximately 135 degrees of active shoulder elevation, 140 degrees of abduction, 10 degrees of external rotation and internal rotation to L1. She has 3/5 muscle strength with resisted external rotation and supraspinatus testing. Negative Neer's but positive Miller impingement sign. She is nontender to palpation diffusely about the shoulder. Evaluation of the cervical spine demonstrates decreased range of motion with flexion, extension as well as rotation. She does have pain localized to the medial aspect of the shoulder blade and radiating down her arm with extension of the cervical spine and rotation to either side. Imaging Studies  4 x-ray views of the right shoulder completed on 11/14/2022 reviewed independently demonstrating 2 metal anchors in the greater tuberosity suggestive of a previous rotator cuff repair. Superior humeral head migration noted with accompanying loss of the acromiohumeral interval.  Narrowing of the superior glenohumeral joint space associated with a moderately sized inferior humeral head osteophyte.     X-rays of the spine including cervical spine completed on 11/3/2021 were reviewed demonstrating an anterior C3-5 instrumented fusion. Assessment and Plan  Shay Rock is a [de-identified] y.o. old female with right shoulder pain. She does have rotator cuff tear arthropathy as outlined above but her symptoms are more consistent with a cervical spondylosis and cervical radiculopathy. I had a discussion with the patient today educating her about these conditions and recommended further evaluation by her spine specialist.  I will have her follow-up my clinic as needed but she may certainly return or call at anytime with questions or concerns. This note is created with the assistance of a speech recognition program.  While intending to generate adocument that actually reflects the content of the visit, the document can still have some errors including those of syntax and sound a like substitutions which may escape proof reading. It such instances, actual meaningcan be extrapolated by contextual diversion.

## 2022-11-23 ENCOUNTER — OFFICE VISIT (OUTPATIENT)
Dept: ORTHOPEDIC SURGERY | Age: 82
End: 2022-11-23
Payer: MEDICARE

## 2022-11-23 DIAGNOSIS — M25.561 PAIN IN BOTH KNEES, UNSPECIFIED CHRONICITY: Primary | ICD-10-CM

## 2022-11-23 DIAGNOSIS — M25.562 PAIN IN BOTH KNEES, UNSPECIFIED CHRONICITY: Primary | ICD-10-CM

## 2022-11-23 PROCEDURE — 20610 DRAIN/INJ JOINT/BURSA W/O US: CPT | Performed by: ORTHOPAEDIC SURGERY

## 2022-11-23 RX ORDER — LIDOCAINE HYDROCHLORIDE 10 MG/ML
2 INJECTION, SOLUTION INFILTRATION; PERINEURAL ONCE
Status: COMPLETED | OUTPATIENT
Start: 2022-11-23 | End: 2022-11-23

## 2022-11-23 RX ORDER — BUPIVACAINE HYDROCHLORIDE 5 MG/ML
2 INJECTION, SOLUTION PERINEURAL ONCE
Status: COMPLETED | OUTPATIENT
Start: 2022-11-23 | End: 2022-11-23

## 2022-11-23 RX ADMIN — BUPIVACAINE HYDROCHLORIDE 10 MG: 5 INJECTION, SOLUTION PERINEURAL at 15:01

## 2022-11-23 RX ADMIN — BUPIVACAINE HYDROCHLORIDE 10 MG: 5 INJECTION, SOLUTION PERINEURAL at 15:02

## 2022-11-23 RX ADMIN — LIDOCAINE HYDROCHLORIDE 2 ML: 10 INJECTION, SOLUTION INFILTRATION; PERINEURAL at 15:03

## 2022-11-23 RX ADMIN — LIDOCAINE HYDROCHLORIDE 2 ML: 10 INJECTION, SOLUTION INFILTRATION; PERINEURAL at 15:02

## 2022-11-23 NOTE — PROGRESS NOTES
Silvestre Belle M.D.            [address] [facility] [address], [address] [facility]           Dept Phone: [phone]           Dept Fax:  [phone], [place]          Dept Phone: [phone]           Dept Fax:  [phone]      Chief Compliant:  Chief Complaint   Patient presents with    Pain     Bilateral Synvisc One        History of Present Illness: This is a [de-identified] y.o. female who presents to the clinic today for evaluation / follow up of bilateral knee pain. Patient is here today for her Synvisc 1 injections. Please see prior dictations. Review of Systems   Constitutional: Negative for fever, chills, sweats. Eyes: Negative for changes in vision, or pain. HENT: Negative for ear ache, epistaxis, or sore throat. Respiratory/Cardio: Negative for Chest pain, palpitations, SOB, or cough. Gastrointestinal: Negative for abdominal pain, N/V/D. Genitourinary: Negative for dysuria, frequency, urgency, or hematuria. Neurological: Negative for headache, numbness, or weakness. Integumentary: Negative for rash, itching, laceration, or abrasion. Musculoskeletal: Positive for Pain (Bilateral Synvisc One)       Physical Exam:  Constitutional: Patient is oriented to person, place, and time. Patient appears well-developed and well nourished. HENT: Negative otherwise noted  Head: Normocephalic and Atraumatic  Nose: Normal  Eyes: Conjunctivae and EOM are normal  Neck: Normal range of motion Neck supple. Respiratory/Cardio: Effort normal. No respiratory distress. Musculoskeletal: No new examination was carried out today  Neurological: Patient is alert and oriented to person, place, and time. Normal strength. No sensory deficit.   Skin: Skin is warm and dry  Psychiatric: Behavior is normal. Thought content normal.  Nursing note and vitals reviewed. Labs and Imaging:     XR taken today: No x-rays taken out today  No results found. Orders Placed This Encounter   Procedures    20610 - DRAIN/INJECT LARGE JOINT BURSA    20610 - DRAIN/INJECT LARGE JOINT BURSA       Assessment and Plan:  1.  Pain in both knees, unspecified chronicity        Administrations This Visit       bupivacaine (MARCAINE) 0.5 % injection 10 mg       Admin Date  11/23/2022  15:01 Action  Given Dose  10 mg Route  Intra-artICUlar Site  Knee Left Administered By  Ike Rota    Ordering Provider: Marysol Lilly MD    . [facility] 47: 43742-017-69    Lot#: [id number]    : [address] [facility]    Patient Supplied?: No               Admin Date  11/23/2022  15:02 Action  Given Dose  10 mg Route  Intra-artICUlar Site  Knee Right Administered By  Ike Rota    Ordering Provider: Marysol Lilly MD    . [facility] 47: 35825-588-52    Lot#: [id number]    : [address] [facility]    Patient Supplied?: No              Hylan injection 48 mg       Admin Date  11/23/2022  15:04 Action  Given Dose  48 mg Route  Intra-artICUlar Site  Knee Left Administered By  Ike Rota    Ordering Provider: Marysol Lilly MD    NDC: [address]-4    Lot#: [id number]    : [facility]    Patient Supplied?: No               Admin Date  11/23/2022  15:05 Action  Given Dose  48 mg Route  Intra-artICUlar Site  Knee Right Administered By  Ike Rota    Ordering Provider: Marysol Lilly MD    NDC: [address]-3    Lot#: [id number]    : [facility]    Patient Supplied?: No              lidocaine 1 % injection 2 mL       Admin Date  11/23/2022  15:02 Action  Given Dose  2 mL Route  Intra-artICUlar Site  Knee Left Administered By  Kie Rota    Ordering Provider: Marysol Lilly MD    . [facility] 47: 65784-884-16    Lot#: [id number]    : [address] [facility]    Patient Supplied?: No               Admin Date  11/23/2022  15:03 Action  Given Dose  2 mL Route  Intra-artICUlar Site  Knee Right Administered By  Qiana Caputo    Ordering Provider: Kallie Chappell MD    NDC: 23029-985-07    Lot#: [id number]    : [address] [facility]    Patient Supplied?: No                    This is a [de-identified] y.o. female who presents to the clinic today for evaluation / follow up of bilateral knee DJD. Past History:    Current Outpatient Medications:     morphine (MS CONTIN) 15 MG extended release tablet, Take 1 tablet by mouth 2 times daily for 30 days. , Disp: 60 tablet, Rfl: 0    oxyCODONE-acetaminophen (PERCOCET)  MG per tablet, Take 1 tablet by mouth 2 times daily as needed for Pain for up to 30 days. , Disp: 60 tablet, Rfl: 0    fluticasone (FLONASE) 50 MCG/ACT nasal spray, 1 spray by Each Nostril route daily, Disp: 16 g, Rfl: 1    vitamin D (CHOLECALCIFEROL) 25 MCG (1000 UT) TABS tablet, Take 1,000 Units by mouth daily, Disp: , Rfl:     vitamin B-12 (CYANOCOBALAMIN) 500 MCG tablet, Take 500 mcg by mouth daily, Disp: , Rfl:     ferrous sulfate (IRON 325) 325 (65 Fe) MG tablet, Take 325 mg by mouth daily (with breakfast), Disp: , Rfl:     naloxone 4 MG/0.1ML LIQD nasal spray, 1 spray by Nasal route as needed for Opioid Reversal, Disp: 1 each, Rfl: 0    albuterol sulfate  (90 Base) MCG/ACT inhaler, Inhale 2 puffs into the lungs every 4 hours as needed for Shortness of Breath, Disp: , Rfl:     sennosides-docusate sodium (SENOKOT-S) 8.6-50 MG tablet, Take 1 tablet by mouth daily as needed, Disp: , Rfl:     psyllium (KONSYL) 28.3 % PACK, Take 1 packet by mouth daily as needed for Constipation, Disp: , Rfl:     diclofenac sodium 1 % GEL, Apply 2 g topically 2 times daily as needed for Pain (knees and elbows), Disp: , Rfl:     pregabalin (LYRICA) 150 MG capsule, Take 150 mg by mouth 2 times daily. , Disp: , Rfl:     montelukast (SINGULAIR) 10 MG tablet, Take 10 mg by mouth nightly , Disp: , Rfl:     metFORMIN (GLUCOPHAGE) 500 MG tablet, Take 500 mg by mouth 2 times daily (with meals). , Disp: , Rfl: levothyroxine (SYNTHROID) 50 MCG tablet, Take 50 mcg by mouth Daily. , Disp: , Rfl:     omeprazole (PRILOSEC) 20 MG capsule, Take 20 mg by mouth Daily, Disp: , Rfl:     aspirin 81 MG EC tablet, Take 81 mg by mouth at bedtime, Disp: , Rfl:   Allergies   Allergen Reactions    Azithromycin Shortness Of Breath     Tightness in chest    Adhesive Tape      OK to use paper tape per Patient- 2/19/20 patient states that she has been using all types of tape with no reaction     Social History     Socioeconomic History    Marital status:      Spouse name: Sj Rich    Number of children: 3    Years of education: Not on file    Highest education level: Not on file   Occupational History    Occupation: retired   Tobacco Use    Smoking status: Never    Smokeless tobacco: Never   Vaping Use    Vaping Use: Never used   Substance and Sexual Activity    Alcohol use: Not Currently     Comment: rare    Drug use: Never    Sexual activity: Yes     Partners: Male   Other Topics Concern    Not on file   Social History Narrative    Not on file     Social Determinants of Health     Financial Resource Strain: Not on file   Food Insecurity: Not on file   Transportation Needs: Not on file   Physical Activity: Not on file   Stress: Not on file   Social Connections: Not on file   Intimate Partner Violence: Not on file   Housing Stability: Not on file     Past Medical History:   Diagnosis Date    Achilles tendonitis     right    Asthma     COPD (chronic obstructive pulmonary disease) ([facility]     Cough     clear phlegm    Degenerative lumbar disc     DM (diabetes mellitus), type 2 ([facility]     PCP Dr. Gisselle Valladares, seen 8/2019    Failed back syndrome, lumbar     Fast heart beat     Hx of \"8-10 years ago\"    Fibromyalgia     History of bladder cancer     Hypertension     Hypothyroid     Kidney stones     Lumbar radiculopathy     Lumbar spondylolysis     Lumbar spondylosis     Osteoarthritis     Sleep apnea     uses CPAP machine nightly    Spinal stenosis in cervical region     Wears glasses      Past Surgical History:   Procedure Laterality Date    BACK SURGERY  2010    lumbar fusion    BLADDER TUMOR EXCISION  2005    CARDIOVERSION      \"8-10 years ago\"  to get \"heart into regular rhythm\"    CARPAL TUNNEL RELEASE Bilateral     CATARACT REMOVAL WITH IMPLANT Bilateral 7/22/2014, 8/5/2014    Raffoul/Skiprles    CERVICAL FUSION  11/14/2019     ANTERIOR CERVICAL DECOMPRESSION FUSION C3-4, C4-5    CERVICAL FUSION N/A 11/14/2019    ANTERIOR CERVICAL DECOMPRESSION FUSION C3-4, C4-5 (SUPINE) DEPUY, REGULAR TABLE, MICROSCOPE, C-ARM, EVOKES (# [id number] [facility]) performed by Pedro Sharpe DO at [facility], LAPAROSCOPIC N/A 08/18/2017    CHOLECYSTECTOMY LAPAROSCOPIC ROBOTIC MULTIPORT [facility] Reddy MD [facility]   01/2013    CYSTOSCOPY  11/04/2016    CYSTOSCOPY  01/04/2019    ENDOSCOPIC ULTRASOUND (LOWER) N/A 08/17/2017    ENDOSCOPIC ULTRASOUND performed by Muna Fuentes MD at [address] [place] [address] (UPPER)  08/17/2017    A cursory view of the gastric mucosa was normal. The scope was then further advanced through a patent pylorus to the second portion of the duodenum. The Gallbladder was evaluated in bulb position. Thick sludge was noted. The CBD was 7.8 mm with no filling defects. The PD was 5 mm in the HOP. The pancreatic tissue was edematous in body and tail.     KNEE ARTHROSCOPY Left     LUMBAR FUSION      PAIN MANAGEMENT PROCEDURE      [place] OFFICE/OUTPT VISIT,PROCEDURE ONLY N/A 01/24/2018    SPINAL HARDWARE REMOVAL LUMBAR BONE STIMULATOR performed by Collin Mcclendon MD at [facility] [address] Bilateral 1989    SKIN BIOPSY Right 11/2015    mole right posterior shoulder    SPINE SURGERY      spinal cord stimulator    SPINE SURGERY  91-4-15    renoval of spinal cord stimulator leads and battery    PASQUALE AND BSO (CERVIX REMOVED)      TUBAL LIGATION      UVULOPALATOPHARYGOPLASTY  2006     Family History   Problem Relation Age of Onset    Heart Failure Mother     Lung Cancer Mother     Other Father          pneumonia    Alzheimer's Disease Father     Lung Cancer Brother     Diabetes Paternal Grandmother     Other Daughter         Fibromyalsia    Rheum Arthritis Daughter    Plan  An informed verbal consent for the procedure was obtained and risks including, but not limited to: allergy to medications, injection, bleeding, stiffness of joint, recurrence of symptoms, loss of function, swelling, drainage, irrigation, need for surgery and pseudo-septic inflammation, were explained to the patient. Also, discussed was the potential for further injections, irrigation and debridement and surgery. Alternate means of treatment have also been discussed with the patient.       Administrations This Visit       bupivacaine (MARCAINE) 0.5 % injection 10 mg       Admin Date  11/23/2022  15:01 Action  Given Dose  10 mg Route  Intra-artICUlar Site  Knee Left Administered By  [facility]    Ordering Provider: Gamaliel Castañeda MD    . [place] 47: 32299-142-35    Lot#: [id number]    : [facility]    Patient Supplied?: No               Admin Date  11/23/2022  15:02 Action  Given Dose  10 mg Route  Intra-artICUlar Site  Knee Right Administered By  [facility]    Ordering Provider: Gamaliel Castañeda MD    NDC: 12542-971-59    Lot#: [id number]    : [facility]    Patient Supplied?: No              Hylan injection 48 mg       Admin Date  11/23/2022  15:04 Action  Given Dose  48 mg Route  Intra-artICUlar Site  Knee Left Administered By  [facility]    Ordering Provider: Gamaliel Castañeda MD    ND: [address]-0    Lot#: [id number]    : I[facility]y Ale[facility]andro    Patient Supplied?: No               Admin Date  11/23/2022  15:05 Action  Given Dose  48 mg Route  Intra-artICUlar Site  Knee Right Administered By  [facility]    Ordering Provider: Gamaliel Castañeda MD    . [place] 47: [address]-6    Lot#: [id number]    : [facility]    Patient Supplied?: No              lidocaine 1 % injection 2 mL       Admin Date  11/23/2022  15:02 Action  Given Dose  2 mL Route  Intra-artICUlar Site  Knee Left Administered By  Kae Ferrell    Ordering Provider: Zita Reich MD    NDC: 38543-288-87    Lot#: [id number]    : [facility]    Patient Supplied?: No               Admin Date  11/23/2022  15:03 Action  Given Dose  2 mL Route  Intra-artICUlar Site  Knee Right Administered By  Kae Ferrell    Ordering Provider: Zita Reich MD    [facility] 47: 14927-641-83    Lot#: [id number]    : [facility] [facility]    Patient Supplied?: No                Under sterile conditions the patient's both knees were injected with lidocaine 2 cc bupivacaine 2 cc and Synvisc 48 mg. She tolerated procedure well    Patient given post Visco instructions. Back here as needed                     Provider Attestation:  I, Zita Reich, personally performed the services described in this documentation. All medical record entries made by the scribe were at my direction and in my presence. I have reviewed the chart and discharge instructions and agree that the records reflect my personal performance and is accurate and complete. Zita Reich MD. 11/23/22      Please note that this chart was generated using voice recognition Dragon dictation software. Although every effort was made to ensure the accuracy of this automated transcription, some errors in transcription may have occurred.

## 2022-11-28 ENCOUNTER — TELEPHONE (OUTPATIENT)
Dept: PAIN MANAGEMENT | Age: 82
End: 2022-11-28

## 2022-11-28 DIAGNOSIS — M50.30 DDD (DEGENERATIVE DISC DISEASE), CERVICAL: ICD-10-CM

## 2022-11-28 DIAGNOSIS — M96.1 FAILED BACK SYNDROME OF LUMBAR SPINE: ICD-10-CM

## 2022-11-28 DIAGNOSIS — M47.816 LUMBAR SPONDYLOSIS: ICD-10-CM

## 2022-11-28 DIAGNOSIS — R26.89 IMBALANCE: Primary | ICD-10-CM

## 2022-11-28 RX ORDER — MORPHINE SULFATE 15 MG/1
15 TABLET, FILM COATED, EXTENDED RELEASE ORAL 2 TIMES DAILY
Qty: 6 TABLET | Refills: 0 | Status: SHIPPED | OUTPATIENT
Start: 2022-11-28 | End: 2022-12-01

## 2022-11-28 NOTE — TELEPHONE ENCOUNTER
Patient called in stating that pharmacy gave her partial fill of her last morphine script but when due for second part of partial script it had been 30+ days since order date, so they could not fill. Next appt 12/01/2022, Thursday this week.  Can we send in enough to get her to then or will she need to be seen first before medication is sent in?

## 2022-12-01 ENCOUNTER — HOSPITAL ENCOUNTER (OUTPATIENT)
Dept: PAIN MANAGEMENT | Age: 82
Discharge: HOME | End: 2022-12-01
Payer: MEDICARE

## 2022-12-01 VITALS
BODY MASS INDEX: 31.07 KG/M2 | WEIGHT: 148 LBS | DIASTOLIC BLOOD PRESSURE: 68 MMHG | HEART RATE: 75 BPM | OXYGEN SATURATION: 99 % | SYSTOLIC BLOOD PRESSURE: 135 MMHG | RESPIRATION RATE: 18 BRPM | HEIGHT: 58 IN

## 2022-12-01 DIAGNOSIS — R26.89 IMBALANCE: Primary | ICD-10-CM

## 2022-12-01 DIAGNOSIS — M48.02 SPINAL STENOSIS IN CERVICAL REGION: ICD-10-CM

## 2022-12-01 DIAGNOSIS — M96.1 FAILED BACK SYNDROME OF LUMBAR SPINE: ICD-10-CM

## 2022-12-01 DIAGNOSIS — F11.90 CHRONIC, CONTINUOUS USE OF OPIOIDS: ICD-10-CM

## 2022-12-01 DIAGNOSIS — M43.06 LUMBAR SPONDYLOLYSIS: ICD-10-CM

## 2022-12-01 DIAGNOSIS — M54.16 LUMBAR RADICULOPATHY: ICD-10-CM

## 2022-12-01 DIAGNOSIS — M47.816 LUMBAR SPONDYLOSIS: ICD-10-CM

## 2022-12-01 DIAGNOSIS — M46.1 SACROILIITIS (HCC): ICD-10-CM

## 2022-12-01 DIAGNOSIS — M50.30 DDD (DEGENERATIVE DISC DISEASE), CERVICAL: ICD-10-CM

## 2022-12-01 DIAGNOSIS — M96.1 FAILED BACK SYNDROME: ICD-10-CM

## 2022-12-01 PROCEDURE — G0481 DRUG TEST DEF 8-14 CLASSES: HCPCS

## 2022-12-01 PROCEDURE — 99213 OFFICE O/P EST LOW 20 MIN: CPT | Performed by: NURSE PRACTITIONER

## 2022-12-01 PROCEDURE — 80307 DRUG TEST PRSMV CHEM ANLYZR: CPT

## 2022-12-01 PROCEDURE — 99213 OFFICE O/P EST LOW 20 MIN: CPT

## 2022-12-01 RX ORDER — MORPHINE SULFATE 15 MG/1
15 TABLET, FILM COATED, EXTENDED RELEASE ORAL 2 TIMES DAILY
Qty: 60 TABLET | Refills: 0 | Status: SHIPPED | OUTPATIENT
Start: 2022-12-03 | End: 2023-01-02

## 2022-12-01 RX ORDER — OXYCODONE AND ACETAMINOPHEN 10; 325 MG/1; MG/1
1 TABLET ORAL 2 TIMES DAILY PRN
Qty: 60 TABLET | Refills: 0 | Status: SHIPPED | OUTPATIENT
Start: 2022-12-08 | End: 2023-01-07

## 2022-12-01 ASSESSMENT — ENCOUNTER SYMPTOMS
COUGH: 0
SHORTNESS OF BREATH: 0
BACK PAIN: 1
BOWEL INCONTINENCE: 0
CONSTIPATION: 0

## 2022-12-01 NOTE — PROGRESS NOTES
Chief Complaint   Patient presents with    Back Pain       [facility]    Patient complains of back pain that started years ago. She has undergone several interventions but pain continues. She had SCS implanted in 2012 at [facility] but it was removed as it was not functioning well. She was going to the [facility] to exercise - using the stationary bike - with benefit before COVID. She did see neurosurgeon. Imaging of the spine completed - severe stenosis at T10-11 and moderate stenosis at L1-2. She had a follow up with [facility] and discussed surgery. She would like to hold off on any surgical interventions at this time and plans to try conservative measures. She had bilateral SI joint injection 10/3/22 and reports significant relief. She had cervical fusion 11/2019 with Dr. Cristela Jean Baptiste. She will follow up with him on 1/29 for increased pain in the right arm. She continues to follow with Dr. Eulogio Brunson for her knees. Back Pain  This is a chronic problem. The current episode started more than 1 year ago. The problem occurs constantly. The problem is unchanged. The pain is present in the lumbar spine and gluteal. The quality of the pain is described as aching and stabbing. The pain does not radiate. The pain is at a severity of 6/10. The pain is moderate. The pain is The same all the time. The symptoms are aggravated by standing and position. Associated symptoms include numbness and weakness. Pertinent negatives include no bladder incontinence, bowel incontinence, chest pain, fever or tingling. She has tried bed rest, home exercises, heat, ice and walking for the symptoms. Patient denies any new neurological symptoms. No bowel or bladder incontinence, no weakness, and no falling.     Pill count: percocet 31, Morphine 4 due 12/7    Morphine equivalent: 60    Controlled Substance Monitoring:    Acute and Chronic Pain Monitoring:   RX Monitoring 12/1/2022   Attestation -   Acute Pain Prescriptions -   Periodic Controlled Substance Monitoring Possible medication side effects, risk of tolerance/dependence & alternative treatments discussed. ;No signs of potential drug abuse or diversion identified.;Obtaining appropriate analgesic effect of treatment.    Chronic Pain > 50 MEDD -   Chronic Pain > 80 MEDD -   Chronic Pain > 120 MEDD -            Past Medical History:   Diagnosis Date    Achilles tendonitis     right    Asthma     COPD (chronic obstructive pulmonary disease) (HCC)     Cough     clear phlegm    Degenerative lumbar disc     DM (diabetes mellitus), type 2 ([address]     PCP Dr. Kameron Melo, seen 8/2019    Failed back syndrome, lumbar     Fast heart beat     Hx of \"8-10 years ago\"    Fibromyalgia     History of bladder cancer     Hypertension     Hypothyroid     Kidney stones     Lumbar radiculopathy     Lumbar spondylolysis     Lumbar spondylosis     Osteoarthritis     Sleep apnea     uses CPAP machine nightly    Spinal stenosis in cervical region     Wears glasses        Past Surgical History:   Procedure Laterality Date    BACK SURGERY  2010    lumbar fusion    BLADDER TUMOR EXCISION  2005    CARDIOVERSION      \"8-10 years ago\"  to get \"heart into regular rhythm\"    CARPAL TUNNEL RELEASE Bilateral     CATARACT REMOVAL WITH IMPLANT Bilateral 7/22/2014, 8/5/2014    [facility]    CERVICAL FUSION  11/14/2019     ANTERIOR CERVICAL DECOMPRESSION FUSION C3-4, C4-5    CERVICAL FUSION N/A 11/14/2019    ANTERIOR CERVICAL DECOMPRESSION FUSION C3-4, C4-5 (SUPINE) DEPUY, REGULAR TABLE, MICROSCOPE, C-ARM, EVOKES (# [id number] [facility]) performed by Kimberly Fermin DO at [facility], LAPAROSCOPIC N/A 08/18/2017    CHOLECYSTECTOMY LAPAROSCOPIC ROBOTIC MULTIPORT performed by Mehul Paul MD at [address]  01/2013    CYSTOSCOPY  11/04/2016    CYSTOSCOPY  01/04/2019    ENDOSCOPIC ULTRASOUND (LOWER) N/A 08/17/2017    ENDOSCOPIC ULTRASOUND performed by Cary Andres MD at [address] [facility] (UPPER) 08/17/2017    A cursory view of the gastric mucosa was normal. The scope was then further advanced through a patent pylorus to the second portion of the duodenum. The Gallbladder was evaluated in bulb position. Thick sludge was noted. The CBD was 7.8 mm with no filling defects. The PD was 5 mm in the HOP. The pancreatic tissue was edematous in body and tail. KNEE ARTHROSCOPY Left     LUMBAR FUSION      PAIN MANAGEMENT PROCEDURE      [place] OFFICE/OUTPT VISIT,PROCEDURE ONLY N/A 01/24/2018    SPINAL HARDWARE REMOVAL LUMBAR BONE STIMULATOR performed by Divine Pedroza MD at [address] Bilateral 1989    SKIN BIOPSY Right 11/2015    mole right posterior shoulder    SPINE SURGERY      spinal cord stimulator    SPINE SURGERY  91-4-15    renoval of spinal cord stimulator leads and battery    PASQUALE AND BSO (CERVIX REMOVED)      TUBAL LIGATION      UVULOPALATOPHARYGOPLASTY  2006       Allergies   Allergen Reactions    Azithromycin Shortness Of Breath     Tightness in chest    Adhesive Tape      OK to use paper tape per Patient- 2/19/20 patient states that she has been using all types of tape with no reaction         Current Outpatient Medications:     morphine (MS CONTIN) 15 MG extended release tablet, Take 1 tablet by mouth 2 times daily for 3 days. , Disp: 6 tablet, Rfl: 0    oxyCODONE-acetaminophen (PERCOCET)  MG per tablet, Take 1 tablet by mouth 2 times daily as needed for Pain for up to 30 days. , Disp: 60 tablet, Rfl: 0    fluticasone (FLONASE) 50 MCG/ACT nasal spray, 1 spray by Each Nostril route daily, Disp: 16 g, Rfl: 1    vitamin D (CHOLECALCIFEROL) 25 MCG (1000 UT) TABS tablet, Take 1,000 Units by mouth daily, Disp: , Rfl:     vitamin B-12 (CYANOCOBALAMIN) 500 MCG tablet, Take 500 mcg by mouth daily, Disp: , Rfl:     ferrous sulfate (IRON 325) 325 (65 Fe) MG tablet, Take 325 mg by mouth daily (with breakfast), Disp: , Rfl:     naloxone 4 MG/0.1ML LIQD nasal spray, 1 spray by Nasal route as needed for Opioid Reversal, Disp: 1 each, Rfl: 0    albuterol sulfate  (90 Base) MCG/ACT inhaler, Inhale 2 puffs into the lungs every 4 hours as needed for Shortness of Breath, Disp: , Rfl:     sennosides-docusate sodium (SENOKOT-S) 8.6-50 MG tablet, Take 1 tablet by mouth daily as needed, Disp: , Rfl:     psyllium (KONSYL) 28.3 % PACK, Take 1 packet by mouth daily as needed for Constipation, Disp: , Rfl:     diclofenac sodium 1 % GEL, Apply 2 g topically 2 times daily as needed for Pain (knees and elbows), Disp: , Rfl:     pregabalin (LYRICA) 150 MG capsule, Take 150 mg by mouth 2 times daily. , Disp: , Rfl:     montelukast (SINGULAIR) 10 MG tablet, Take 10 mg by mouth nightly , Disp: , Rfl:     metFORMIN (GLUCOPHAGE) 500 MG tablet, Take 500 mg by mouth 2 times daily (with meals). , Disp: , Rfl:     levothyroxine (SYNTHROID) 50 MCG tablet, Take 50 mcg by mouth Daily. , Disp: , Rfl:     omeprazole (PRILOSEC) 20 MG capsule, Take 20 mg by mouth Daily, Disp: , Rfl:     aspirin 81 MG EC tablet, Take 81 mg by mouth at bedtime, Disp: , Rfl:     Family History   Problem Relation Age of Onset    Heart Failure Mother     Mansi Rutherford Mother     Other Father          pneumonia    Alzheimer's Disease Father     Lung Cancer Brother     Diabetes Paternal Grandmother     Other Daughter         Fibromyalsia    Rheum Arthritis Daughter        Social History     Socioeconomic History    Marital status:      Spouse name: Aida Mejia    Number of children: 3    Years of education: Not on file    Highest education level: Not on file   Occupational History    Occupation: retired   Tobacco Use    Smoking status: Never    Smokeless tobacco: Never   Vaping Use    Vaping Use: Never used   Substance and Sexual Activity    Alcohol use: Not Currently     Comment: rare    Drug use: Never    Sexual activity: Yes     Partners: Male   Other Topics Concern    Not on file   Social History Narrative    Not on file     Social Determinants of Health     Financial Resource Strain: Not on file   Food Insecurity: Not on file   Transportation Needs: Not on file   Physical Activity: Not on file   Stress: Not on file   Social Connections: Not on file   Intimate Partner Violence: Not on file   Housing Stability: Not on file       Review of Systems:  Review of Systems   Constitutional: Negative for chills and fever. Cardiovascular:  Negative for chest pain and palpitations. Respiratory:  Negative for cough and shortness of breath. Musculoskeletal:  Positive for back pain and neck pain. Gastrointestinal:  Negative for bowel incontinence and constipation. Genitourinary:  Negative for bladder incontinence. Neurological:  Positive for numbness and weakness. Negative for disturbances in coordination, loss of balance and tingling. Physical Exam:  /68   Pulse 75   Resp 18   Ht 4' 10\" (1.473 m)   Wt 148 lb (67.1 kg)   SpO2 99%   BMI 30.93 kg/m²     Physical Exam  HENT:      Head: Normocephalic. Pulmonary:      Effort: Pulmonary effort is normal.   Musculoskeletal:         General: Normal range of motion. Cervical back: Normal range of motion. Tenderness present. Lumbar back: Tenderness present. Back:    Skin:     General: Skin is warm and dry. Neurological:      Mental Status: She is alert and oriented to person, place, and time.        Record/Diagnostics Review:    Last georgie 11/2021 and was appropriate     Assessment:  Problem List Items Addressed This Visit       Lumbar spondylosis    Relevant Medications    oxyCODONE-acetaminophen (PERCOCET)  MG per tablet (Start on 12/8/2022)    morphine (MS CONTIN) 15 MG extended release tablet (Start on 12/3/2022)    Imbalance - Primary (Chronic)    Lumbar radiculopathy    Lumbar spondylolysis    Failed back syndrome    Relevant Medications    oxyCODONE-acetaminophen (PERCOCET)  MG per tablet (Start on 12/8/2022)    morphine (MS CONTIN) 15 MG extended release tablet (Start on 12/3/2022)    Sacroiliitis ([address]    Relevant Medications    oxyCODONE-acetaminophen (PERCOCET)  MG per tablet (Start on 12/8/2022)    morphine (MS CONTIN) 15 MG extended release tablet (Start on 12/3/2022)    DDD (degenerative disc disease), cervical    Relevant Medications    oxyCODONE-acetaminophen (PERCOCET)  MG per tablet (Start on 12/8/2022)    morphine (MS CONTIN) 15 MG extended release tablet (Start on 12/3/2022)    Spinal stenosis in cervical region    Chronic, continuous use of opioids    Relevant Orders    DRUG SCREEN, PAIN     Other Visit Diagnoses       Failed back syndrome of lumbar spine        Relevant Medications    morphine (MS CONTIN) 15 MG extended release tablet (Start on 12/3/2022)               Treatment Plan:  Patient relates current medications are helping the pain. Patient reports taking pain medications as prescribed, denies obtaining medications from different sources and denies use of illegal drugs. Medication risk and benefits have been discussed. Patient denies side effects from medications like nausea, vomiting, constipation or drowsiness. Patient reports current activities of daily living are possible due to medications and would like to continue them. As always, we encourage daily stretching and strengthening exercises, and recommend minimizing use of pain medications unless patient cannot get through daily activities due to pain. Due to the high risk nature of this patient's pain medication close monitoring is required. Continue current medication management, pt has been stable and compliant. Script written for percocet and MSER  Reduce dose of percocet to 7.5 mg BID next visit. Increased neck pain. She plans to go back to [facility] for evaluation on 1/30  Did discuss injections for the cervical spine but she declines - would like to see Dr. Pieter Paulson before scheduling anything.    UDS for standard monitoring purposes  Follow up appointment made for 4 weeks    I have reviewed the chief complaint and history of present illness (including ROS and PFSH) and vital documentation by my staff and I agree with their documentation and have added where applicable.

## 2022-12-05 ENCOUNTER — TELEPHONE (OUTPATIENT)
Dept: PAIN MANAGEMENT | Age: 82
End: 2022-12-05

## 2022-12-05 NOTE — TELEPHONE ENCOUNTER
Pharmacist Elmer Toussaint with [facility] calling for clarification on script being sent to pharmacy for first time. She states [facility] is strict with opioids and needs clarification before she can take and/or fill scripts for percocet and morphine. They need to know why she is switching to [facility] from [facility], why she is on a high level of opioids, has she been weaned from a higher dose, and if we plan to continue weaning her to lower doses in the future and if so, how often or soon? Please advise. Thank you! no

## 2022-12-15 ENCOUNTER — OFFICE VISIT (OUTPATIENT)
Dept: PODIATRY | Age: 82
End: 2022-12-15
Payer: MEDICARE

## 2022-12-15 VITALS — WEIGHT: 148 LBS | BODY MASS INDEX: 31.07 KG/M2 | HEIGHT: 58 IN

## 2022-12-15 DIAGNOSIS — M79.672 PAIN IN LEFT FOOT: ICD-10-CM

## 2022-12-15 DIAGNOSIS — M72.2 PLANTAR FASCIITIS OF LEFT FOOT: Primary | ICD-10-CM

## 2022-12-15 PROCEDURE — 1123F ACP DISCUSS/DSCN MKR DOCD: CPT | Performed by: PODIATRIST

## 2022-12-15 PROCEDURE — 99203 OFFICE O/P NEW LOW 30 MIN: CPT | Performed by: PODIATRIST

## 2022-12-15 ASSESSMENT — ENCOUNTER SYMPTOMS
COLOR CHANGE: 0
BACK PAIN: 0
NAUSEA: 0
SHORTNESS OF BREATH: 0
DIARRHEA: 0

## 2022-12-15 NOTE — PROGRESS NOTES
Roxie Restrepo is a [de-identified] y.o. female who presents to the office today with chief complaint of pain to the left heel. Chief Complaint   Patient presents with    Foot Pain     Left heel pain     Diabetes     Last a1c 6.0   Symptoms began about 6 month(s) ago. Patient denies injury to the left foot. Patient states that the pain is to the bottom of the left heel and the pain comes and goes. Patient denies any pain to the left heel for the last few days. Pain is rated 0 out of 10 at it's worst and is described as none. Treatments prior to today's visit include: None. Allergies   Allergen Reactions    Azithromycin Shortness Of Breath     Tightness in chest    Adhesive Tape      OK to use paper tape per Patient- 2/19/20 patient states that she has been using all types of tape with no reaction       Past Medical History:   Diagnosis Date    Achilles tendonitis     right    Asthma     COPD (chronic obstructive pulmonary disease) ([facility])     Cough     clear phlegm    Degenerative lumbar disc     DM (diabetes mellitus), type 2 ([facility]     PCP Dr. Dulce Carlson, seen 8/2019    Failed back syndrome, lumbar     Fast heart beat     Hx of \"8-10 years ago\"    Fibromyalgia     History of bladder cancer     Hypertension     Hypothyroid     Kidney stones     Lumbar radiculopathy     Lumbar spondylolysis     Lumbar spondylosis     Osteoarthritis     Sleep apnea     uses CPAP machine nightly    Spinal stenosis in cervical region     Wears glasses        Prior to Admission medications    Medication Sig Start Date End Date Taking? Authorizing Provider   oxyCODONE-acetaminophen (PERCOCET)  MG per tablet Take 1 tablet by mouth 2 times daily as needed for Pain for up to 30 days. 12/8/22 1/7/23 Yes CONSUELO Medina CNP   morphine (MS CONTIN) 15 MG extended release tablet Take 1 tablet by mouth 2 times daily for 30 days.  12/3/22 1/2/23 Yes CONSUELO Medina CNP   fluticasone (FLONASE) 50 MCG/ACT nasal spray 1 spray by Each Nostril route daily 9/3/22  Yes Alfredo Castaneda,    vitamin D (CHOLECALCIFEROL) 25 MCG (1000 UT) TABS tablet Take 1,000 Units by mouth daily   Yes Historical Provider, MD   vitamin B-12 (CYANOCOBALAMIN) 500 MCG tablet Take 500 mcg by mouth daily   Yes Historical Provider, MD   albuterol sulfate  (90 Base) MCG/ACT inhaler Inhale 2 puffs into the lungs every 4 hours as needed for Shortness of Breath 6/8/20  Yes Historical Provider, MD   sennosides-docusate sodium (SENOKOT-S) 8.6-50 MG tablet Take 1 tablet by mouth daily as needed   Yes Historical Provider, MD   psyllium (KONSYL) 28.3 % PACK Take 1 packet by mouth daily as needed for Constipation   Yes Historical Provider, MD   diclofenac sodium 1 % GEL Apply 2 g topically 2 times daily as needed for Pain (knees and elbows)   Yes Historical Provider, MD   pregabalin (LYRICA) 150 MG capsule Take 150 mg by mouth 2 times daily. Yes Historical Provider, MD   montelukast (SINGULAIR) 10 MG tablet Take 10 mg by mouth nightly    Yes Historical Provider, MD   metFORMIN (GLUCOPHAGE) 500 MG tablet Take 500 mg by mouth 2 times daily (with meals). Yes Historical Provider, MD   levothyroxine (SYNTHROID) 50 MCG tablet Take 50 mcg by mouth Daily.    Yes Historical Provider, MD   omeprazole (PRILOSEC) 20 MG capsule Take 20 mg by mouth Daily   Yes Historical Provider, MD   aspirin 81 MG EC tablet Take 81 mg by mouth at bedtime   Yes Historical Provider, MD   diclofenac (VOLTAREN) 75 MG EC tablet Take 1 tablet by mouth 2 times daily (with meals) 8/24/22 9/3/22  Abdulkadir Velasquez MD   naloxone 4 MG/0.1ML LIQD nasal spray 1 spray by Nasal route as needed for Opioid Reversal  Patient not taking: Reported on 12/15/2022 2/25/22   CONSUELO Diego CNP   potassium chloride (MICRO-K) 10 MEQ extended release capsule Take 10 mEq by mouth daily 6/15/20 9/3/22  Historical Provider, MD   bumetanide (BUMEX) 1 MG tablet Take 1 mg by mouth daily 2/24/20 9/3/22  Historical Provider, MD   telmisartan-hydroCHLOROthiazide (MICARDIS HCT) 40-12.5 MG per tablet Take 1 tablet by mouth daily. 9/3/22  Historical Provider, MD       Past Surgical History:   Procedure Laterality Date    BACK SURGERY  2010    lumbar fusion    BLADDER TUMOR EXCISION  2005    CARDIOVERSION      \"8-10 years ago\"  to get \"heart into regular rhythm\"    CARPAL TUNNEL RELEASE Bilateral     CATARACT REMOVAL WITH IMPLANT Bilateral 7/22/2014, 8/5/2014    [facility]    CERVICAL FUSION  11/14/2019     ANTERIOR CERVICAL DECOMPRESSION FUSION C3-4, C4-5    CERVICAL FUSION N/A 11/14/2019    ANTERIOR CERVICAL DECOMPRESSION FUSION C3-4, C4-5 (SUPINE) DEPUY, REGULAR TABLE, MICROSCOPE, C-ARM, EVOKES (# [id number] [facility]) performed by Cale Juarez DO at [facility], LAPAROSCOPIC N/A 08/18/2017    CHOLECYSTECTOMY LAPAROSCOPIC ROBOTIC MULTIPORT performed by Nidhi Sheldon MD at [address] [place] [address]  01/2013    CYSTOSCOPY  11/04/2016    CYSTOSCOPY  01/04/2019    ENDOSCOPIC ULTRASOUND (LOWER) N/A 08/17/2017    ENDOSCOPIC ULTRASOUND performed by Connie Gil MD at [address] [place] [address] (UPPER)  08/17/2017    A cursory view of the gastric mucosa was normal. The scope was then further advanced through a patent pylorus to the second portion of the duodenum. The Gallbladder was evaluated in bulb position. Thick sludge was noted. The CBD was 7.8 mm with no filling defects. The PD was 5 mm in the HOP. The pancreatic tissue was edematous in body and tail.     KNEE ARTHROSCOPY Left     LUMBAR FUSION      PAIN MANAGEMENT PROCEDURE      [place] OFFICE/OUTPT VISIT,PROCEDURE ONLY N/A 01/24/2018    SPINAL HARDWARE REMOVAL LUMBAR BONE STIMULATOR performed by Raj Bangura MD at [address] Bilateral 1989    SKIN BIOPSY Right 11/2015    mole right posterior shoulder    SPINE SURGERY      spinal cord stimulator    SPINE SURGERY  91-4-15    renoval of spinal cord stimulator leads and battery    [facility] AND BSO (CERVIX REMOVED)      TUBAL LIGATION      UVULOPALATOPHARYGOPLASTY  2006       Family History   Problem Relation Age of Onset    Heart Failure Mother     Lung Cancer Mother     Other Father          pneumonia    Alzheimer's Disease Father     Lung Cancer Brother     Diabetes Paternal Grandmother     Other Daughter         Fibromyalsia    Rheum Arthritis Daughter        Social History     Tobacco Use    Smoking status: Never    Smokeless tobacco: Never   Substance Use Topics    Alcohol use: Not Currently     Comment: rare       Review of Systems   Constitutional:  Negative for activity change, appetite change, chills, diaphoresis, fatigue and fever. Respiratory:  Negative for shortness of breath. Cardiovascular:  Negative for leg swelling. Gastrointestinal:  Negative for diarrhea and nausea. Endocrine: Negative for cold intolerance, heat intolerance and polyuria. Musculoskeletal:  Positive for arthralgias and gait problem. Negative for back pain, joint swelling and myalgias. Skin:  Negative for color change, pallor, rash and wound. Allergic/Immunologic: Negative for environmental allergies and food allergies. Neurological:  Negative for dizziness, weakness, light-headedness and numbness. Hematological:  Does not bruise/bleed easily. Psychiatric/Behavioral:  Negative for behavioral problems, confusion and self-injury. The patient is not nervous/anxious. Vitals: There were no vitals filed for this visit. General: AAO x 3 in NAD. Integument: There are no rashes, ulcers, or breaks in the skin noted to the bilateral lower extremities. There is no induration, subcutaneous nodules, or tightening of the skin noted to the bilateral.     Toenails 1-5 of the right foot do present with thickness, discoloration, brittleness, subungual debris. Toenails 1-5 of the left foot do present with thickness, discoloration, brittleness, subungual debris.      There is pain with palpation of toenails 1-5 of the right foot and 1-5 of the left foot. Interdigital maceration absent to web spaces 1-4, Bilateral.     There are no preulcerative lesions noted to the right foot. There are no preulcerative lesions noted to the left foot. The skin to the bilateral feet is not thin and shiny. The skin to the bilateral feet is  warm, supple, and dry. Vascular: DP pulse of the right foot is  palpable. DP pulse of the left foot is  palpable. PT pulse of the right foot is  palpable. PT pulse of the left foot is  palpable. CFT is less than 3 secs to the digits of the right foot. CFT is less than 3 secs to the digits of the left foot. There is no edema noted to the bilateral foot or ankle. There is hair growth noted to the digits of the bilateral feet. There are no varicosities noted to the right foot/ankle. There are no varicosities noted to the left foot/ankle. Erythema is absent to the bilateral feet. Neurological: Reflexes are present to the right plantar foot and to the Achilles tendon. Reflexes are present to the left plantar foot and to the Achilles tendon. Epicritic sensation is  intact to the right foot. Epicritic sensation is  intact to the left foot. Musculoskeletal:  Muscle strength is +5/5 to all four muscle groups of the right lower extremity and +5/5 to all four muscle groups of the left lower extremity. There are no areas of subluxation, dislocation, or laxity noted to either lower extremity. Range of motion to the right ankle is  free of pain or grinding. Range of motion to the left ankle is  free of pain or grinding. Range of motion to the right subtalar joint is  free of pain or grinding. Range of motion to the left subtalar joint is  free of pain or grinding. No abnormalities, asymmetries, or misalignments are seen between the extremities.     Weightbearing evaluation does reveal rearfoot eversion, medial prominence of the talar head, loss of the medial longitudinal arch height, and too many toes sign bilaterally. The lesser digits of the right foot are contracted. The lesser digits of the left foot are contracted. There is no prominence noted to the first metatarsal head without abduction of the hallux of the right foot. There is no prominence noted to the first metatarsal head without abduction of the hallux of the left foot. There is no pain with palpation to the plantar medial calcaneal tubercle of the left foot. There is no pain with palpation to the plantar central aspect of the left heel. There is no pain with palpation to the vicky pedis of the left foot. There is no pain with medial to lateral compression of the left calcaneus. Tinel's sign is negative to the left tarsal tunnel. There is no erythema, calor, or open lesion noted to the left foot or ankle. Shoe examination was performed. Biomechanical Exam: abnormal bilaterally as the patient uses a walker to aid in ambulation. Asessment: Patient is a [de-identified] y.o. female with:    Diagnosis Orders   1. Plantar fasciitis of left foot        2. Pain in left foot            Plan:  1. Clinical evaluation of the patient. 2. Patient informed that she likely has plantar fasciitis, but since she is currently not having any symptoms it is not definitive. Patient instructed on proper icing and stretching of the left foot. 3. Contact office with any questions/problems/concerns. Return if symptoms worsen or fail to improve.    12/15/2022      Guilherme Belle DPM

## 2022-12-21 ENCOUNTER — OFFICE VISIT (OUTPATIENT)
Dept: ORTHOPEDIC SURGERY | Age: 82
End: 2022-12-21
Payer: MEDICARE

## 2022-12-21 VITALS — HEIGHT: 58 IN | RESPIRATION RATE: 14 BRPM | WEIGHT: 148 LBS | BODY MASS INDEX: 31.07 KG/M2

## 2022-12-21 DIAGNOSIS — G89.29 CHRONIC SI JOINT PAIN: Chronic | ICD-10-CM

## 2022-12-21 DIAGNOSIS — M70.61 GREATER TROCHANTERIC BURSITIS OF RIGHT HIP: ICD-10-CM

## 2022-12-21 DIAGNOSIS — M53.3 CHRONIC SI JOINT PAIN: Chronic | ICD-10-CM

## 2022-12-21 DIAGNOSIS — M47.816 LUMBAR SPONDYLOSIS: Primary | ICD-10-CM

## 2022-12-21 PROCEDURE — 1123F ACP DISCUSS/DSCN MKR DOCD: CPT | Performed by: PHYSICIAN ASSISTANT

## 2022-12-21 PROCEDURE — 20610 DRAIN/INJ JOINT/BURSA W/O US: CPT | Performed by: PHYSICIAN ASSISTANT

## 2022-12-21 PROCEDURE — 99214 OFFICE O/P EST MOD 30 MIN: CPT | Performed by: PHYSICIAN ASSISTANT

## 2022-12-21 RX ORDER — BETAMETHASONE SODIUM PHOSPHATE AND BETAMETHASONE ACETATE 3; 3 MG/ML; MG/ML
12 INJECTION, SUSPENSION INTRA-ARTICULAR; INTRALESIONAL; INTRAMUSCULAR; SOFT TISSUE ONCE
Status: COMPLETED | OUTPATIENT
Start: 2022-12-21 | End: 2022-12-21

## 2022-12-21 RX ORDER — LIDOCAINE HYDROCHLORIDE 10 MG/ML
2 INJECTION, SOLUTION INFILTRATION; PERINEURAL ONCE
Status: COMPLETED | OUTPATIENT
Start: 2022-12-21 | End: 2022-12-21

## 2022-12-21 RX ORDER — BUPIVACAINE HYDROCHLORIDE 5 MG/ML
2 INJECTION, SOLUTION PERINEURAL ONCE
Status: COMPLETED | OUTPATIENT
Start: 2022-12-21 | End: 2022-12-21

## 2022-12-21 RX ADMIN — BETAMETHASONE SODIUM PHOSPHATE AND BETAMETHASONE ACETATE 12 MG: 3; 3 INJECTION, SUSPENSION INTRA-ARTICULAR; INTRALESIONAL; INTRAMUSCULAR; SOFT TISSUE at 13:45

## 2022-12-21 RX ADMIN — LIDOCAINE HYDROCHLORIDE 2 ML: 10 INJECTION, SOLUTION INFILTRATION; PERINEURAL at 13:46

## 2022-12-21 RX ADMIN — BUPIVACAINE HYDROCHLORIDE 10 MG: 5 INJECTION, SOLUTION PERINEURAL at 13:45

## 2022-12-21 NOTE — PROGRESS NOTES
[address] [facility], [address] [place], [address] [place] [address], [id number] [place] [address]           Dept Phone: [phone]           Dept Fax:  [phone] [address] [facility]           Armaan Guillen          Dept Phone: [phone]           Dept Fax:  [phone]      Chief Compliant:  Chief Complaint   Patient presents with    Back Problem     Low back pain        History of Present Illness: This is a [de-identified] y.o. female who presents to the clinic today for evaluation of had concerns including Back Problem (Low back pain). Ms. Debi Alcala is a very pleasant 82-year-old female presents for evaluation of 5-day history of lateral right hip pain. Patient reports right hip pain began Friday afternoon 12/16/2022 without preceding injury or trauma. She states pain started out of the blue localized to the right lateral hip and occasional radiation down the right leg. She does note some chronic low back pain however reports this is at her baseline. History complicated as patient with significant neurosurgical history of the lumbar spine including L2-L5 instrumented fusion as well as known severe stenosis at T10-11 and moderate stenosis at L1-L2. Patient is currently seeing pain management and has undergone SI joint injections as well as epidural steroid injections. Most recently had bilateral SI joint injections on 10/3/2022. Patient does admit she saw her neurosurgeon recently who did offer surgery for the significant stenosis of the thoracolumbar spine but she elected to proceed conservatively with injections. Patient denies any new onset numbness or tingling no fever chills. Past History:    Current Outpatient Medications:     oxyCODONE-acetaminophen (PERCOCET)  MG per tablet, Take 1 tablet by mouth 2 times daily as needed for Pain for up to 30 days. , Disp: 60 tablet, Rfl: 0    morphine (MS CONTIN) 15 MG extended release tablet, Take 1 tablet by mouth 2 times daily for 30 days. , Disp: 60 tablet, Rfl: 0    fluticasone (FLONASE) 50 MCG/ACT nasal spray, 1 spray by Each Nostril route daily, Disp: 16 g, Rfl: 1    vitamin D (CHOLECALCIFEROL) 25 MCG (1000 UT) TABS tablet, Take 1,000 Units by mouth daily, Disp: , Rfl:     vitamin B-12 (CYANOCOBALAMIN) 500 MCG tablet, Take 500 mcg by mouth daily, Disp: , Rfl:     naloxone 4 MG/0.1ML LIQD nasal spray, 1 spray by Nasal route as needed for Opioid Reversal (Patient not taking: Reported on 12/15/2022), Disp: 1 each, Rfl: 0    albuterol sulfate  (90 Base) MCG/ACT inhaler, Inhale 2 puffs into the lungs every 4 hours as needed for Shortness of Breath, Disp: , Rfl:     sennosides-docusate sodium (SENOKOT-S) 8.6-50 MG tablet, Take 1 tablet by mouth daily as needed, Disp: , Rfl:     psyllium (KONSYL) 28.3 % PACK, Take 1 packet by mouth daily as needed for Constipation, Disp: , Rfl:     diclofenac sodium 1 % GEL, Apply 2 g topically 2 times daily as needed for Pain (knees and elbows), Disp: , Rfl:     pregabalin (LYRICA) 150 MG capsule, Take 150 mg by mouth 2 times daily. , Disp: , Rfl:     montelukast (SINGULAIR) 10 MG tablet, Take 10 mg by mouth nightly , Disp: , Rfl:     metFORMIN (GLUCOPHAGE) 500 MG tablet, Take 500 mg by mouth 2 times daily (with meals). , Disp: , Rfl:     levothyroxine (SYNTHROID) 50 MCG tablet, Take 50 mcg by mouth Daily. , Disp: , Rfl:     omeprazole (PRILOSEC) 20 MG capsule, Take 20 mg by mouth Daily, Disp: , Rfl:     aspirin 81 MG EC tablet, Take 81 mg by mouth at bedtime, Disp: , Rfl:   Allergies   Allergen Reactions    Azithromycin Shortness Of Breath     Tightness in chest    Adhesive Tape      OK to use paper tape per Patient- 20 patient states that she has been using all types of tape with no reaction     Social History     Socioeconomic History    Marital status:      Spouse name: Shahida Dietrich    Number of children: 3    Years of education: Not on file    Highest education level: Not on file   Occupational History    Occupation: retired   Tobacco Use    Smoking status: Never    Smokeless tobacco: Never   Vaping Use    Vaping Use: Never used   Substance and Sexual Activity    Alcohol use: Not Currently     Comment: rare    Drug use: Never    Sexual activity: Yes     Partners: Male   Other Topics Concern    Not on file   Social History Narrative    Not on file     Social Determinants of Health     Financial Resource Strain: Not on file   Food Insecurity: Not on file   Transportation Needs: Not on file   Physical Activity: Not on file   Stress: Not on file   Social Connections: Not on file   Intimate Partner Violence: Not on file   Housing Stability: Not on file     Past Medical History:   Diagnosis Date    Achilles tendonitis     right    Asthma     COPD (chronic obstructive pulmonary disease) ([facility]     Cough     clear phlegm    Degenerative lumbar disc     DM (diabetes mellitus), type 2 ([facility]     PCP Dr. Rudolph Corea, seen 8/2019    Failed back syndrome, lumbar     Fast heart beat     Hx of \"8-10 years ago\"    Fibromyalgia     History of bladder cancer     Hypertension     Hypothyroid     Kidney stones     Lumbar radiculopathy     Lumbar spondylolysis     Lumbar spondylosis     Osteoarthritis     Sleep apnea     uses CPAP machine nightly    Spinal stenosis in cervical region     Wears glasses      Past Surgical History:   Procedure Laterality Date    BACK SURGERY  2010    lumbar fusion    BLADDER TUMOR EXCISION  2005    CARDIOVERSION      \"8-10 years ago\"  to get \"heart into regular rhythm\"    CARPAL TUNNEL RELEASE Bilateral     CATARACT REMOVAL WITH IMPLANT Bilateral 7/22/2014, 8/5/2014    Dilma/Demian    CERVICAL FUSION  11/14/2019     ANTERIOR CERVICAL DECOMPRESSION FUSION C3-4, C4-5    CERVICAL FUSION N/A 11/14/2019    ANTERIOR CERVICAL DECOMPRESSION FUSION C3-4, C4-5 (SUPINE) VELVETUY, REGULAR TABLE, MICROSCOPE, C-ARM, EVOKES (# [id number] [facility]) performed by Warren Zendejas 82, DO at [facility], LAPAROSCOPIC N/A 08/18/2017    CHOLECYSTECTOMY LAPAROSCOPIC ROBOTIC MULTIPORT performed by Yajaira Reed MD at [address]  01/2013    CYSTOSCOPY  11/04/2016    CYSTOSCOPY  01/04/2019    ENDOSCOPIC ULTRASOUND (LOWER) N/A 08/17/2017    ENDOSCOPIC ULTRASOUND performed by Corazon Dempsey MD at [address] [place] [address] (UPPER)  08/17/2017    A cursory view of the gastric mucosa was normal. The scope was then further advanced through a patent pylorus to the second portion of the duodenum. The Gallbladder was evaluated in bulb position. Thick sludge was noted. The CBD was 7.8 mm with no filling defects. The PD was 5 mm in the HOP. The pancreatic tissue was edematous in body and tail. KNEE ARTHROSCOPY Left     LUMBAR FUSION      PAIN MANAGEMENT PROCEDURE      [place] OFFICE/OUTPT VISIT,PROCEDURE ONLY N/A 01/24/2018    SPINAL HARDWARE REMOVAL LUMBAR BONE STIMULATOR performed by Shirlene Murphy MD at [address] [place] [address],[address] Bilateral 1989    SKIN BIOPSY Right 11/2015    mole right posterior shoulder    SPINE SURGERY      spinal cord stimulator    SPINE SURGERY  91-4-15    renoval of spinal cord stimulator leads and battery    PASQUALE AND BSO (CERVIX REMOVED)      TUBAL LIGATION      UVULOPALATOPHARYGOPLASTY  2006     Family History   Problem Relation Age of Onset    Heart Failure Mother     Lung Cancer Mother     Other Father          pneumonia    Alzheimer's Disease Father     Lung Cancer Brother     Diabetes Paternal Grandmother     Other Daughter         Fibromyalsia    Rheum Arthritis Daughter         Review of Systems   Constitutional: Negative for fever, chills, sweats. Eyes: Negative for changes in vision, or pain. HENT: Negative for ear ache, epistaxis, or sore throat. Respiratory/Cardio: Negative for Chest pain, palpitations, SOB, or cough. Gastrointestinal: Negative for abdominal pain, N/V/D. Genitourinary: Negative for dysuria, frequency, urgency, or hematuria. Neurological: Negative for headache, numbness, or weakness. Integumentary: Negative for rash, itching, laceration, or abrasion. Musculoskeletal: Positive for Back Problem (Low back pain)       Physical Exam:  Constitutional: Patient is oriented to person, place, and time. Patient appears well-developed and well nourished. HENT: Negative otherwise noted  Head: Normocephalic and Atraumatic  Nose: Normal  Eyes: Conjunctivae and EOM are normal  Neck: Normal range of motion Neck supple. Respiratory/Cardio: Effort normal. No respiratory distress. Musculoskeletal:    right Hip    Tenderness: Severe tenderness over the right greater trochanter. Mild tenderness over the proximal IT band. No tenderness to the anterior posterior hip       Range of Motion:      Extension: 10     Flexion: 120     Internal Rotation: 30     External Rotation: 40     Abduction: 40     Adduction:  30    Patient tolerates full range of motion of the right hip without significant pain. Muscle Strength      Abduction: 5/5     Adduction: 5/5     Flexion: 5/5         Gait: Antalgic   Will Test: Negative   Pina Test: Positive       Neurological: Patient is alert and oriented to person, place, and time. Normal strenght. No sensory deficit. Skin: Skin is warm and dry  Psychiatric: Behavior is normal. Thought content normal.  Nursing note and vitals reviewed. Labs and Imaging:       X-rays taken in clinic today and preliminarily reviewed by me 12/21/22:  AP and lateral views of the lumbar spine demonstrate L2-S1 instrumented fusion. Patient with significant degenerative changes of the thoracic low lumbar spine with complete disc height collapse. No evidence of acute fracture. Hips are visualized on AP view with minimal degenerative changes overall maintenance of bilateral hip joint space. Patient with significant DJD and narrowing of bilateral SI joints. Orders Placed This Encounter   Procedures    XR LUMBAR SPINE (2-3 VIEWS)     Standing Status:   Future     Number of Occurrences:   1     Standing Expiration Date:   12/21/2023       Assessment and Plan:  1. Greater trochanteric bursitis of right hip    2. Lumbar spondylosis    3. Chronic SI joint pain          PLAN:  Liliana Givens is a [de-identified] y.o. old female with right  hip pain that is consistent with right trochanteric bursitis. Patient with significant low back history which could be contributing to some of her right hip and right leg pain however she is significant tender the trochanteric bursal area consistent with trochanteric bursitis. We discussed treatment options available to her, including nonoperative and operative intervention. At this time I believe she will benefit from conservative management. Consequently, an extensive home exercise program was provided. We also discussed use the possibility of a cortisone injection and at this time she has elected to proceed with as described below  Would recommend continued follow-up with pain management and neurosurgery for chronic sacroiliitis as well as chronic thoracic and low back pain. I'll see her back my clinic in 4 months for reevaluation but she was encouraged to return or call earlier with questions and/or concerns. Procedure: right trochanteric bursal celestone injection  An informed verbal consent for the procedure was obtained and risks including, but not limited to: allergy to medications, injection, bleeding, stiffness of joint, recurrence of symptoms, loss of function, swelling, drainage, irrigation, need for surgery and pseudo-septic inflammation, were explained to the patient. Also, discussed was the potential for further injections, irrigation and debridement and surgery. Alternate means of treatment have also been discussed with the patient.           Following an appropriate discussion with the patient regarding the risks and benefits of the procedure she consented to proceed. her right trochanteric bursa was prepped using betadine solution. Using aseptic technique and through a lateral approach, her right trochanteric bursa was injected superficially with 4 cc of 1% lidocaine without epinephrine and subsequently with 2 cc of 6 mg/mL Celestone into the bursal space. A band aid was applied to the injection site. she tolerated the injection with no immediate adverse reactions. Electronically signed by DENNY Villa on 12/21/22 at 1:43 PM EST        Please note that this chart was generated using voice recognition Dragon dictation software. Although every effort was made to ensure the accuracy of this automated transcription, some errors in transcription may have occurred.

## 2023-01-05 ENCOUNTER — HOSPITAL ENCOUNTER (OUTPATIENT)
Dept: PAIN MANAGEMENT | Age: 83
Discharge: HOME | End: 2023-01-05
Payer: MEDICARE

## 2023-01-05 VITALS
DIASTOLIC BLOOD PRESSURE: 48 MMHG | OXYGEN SATURATION: 98 % | WEIGHT: 148 LBS | HEIGHT: 58 IN | BODY MASS INDEX: 31.07 KG/M2 | SYSTOLIC BLOOD PRESSURE: 130 MMHG | HEART RATE: 74 BPM

## 2023-01-05 DIAGNOSIS — M96.1 FAILED BACK SYNDROME OF LUMBAR SPINE: ICD-10-CM

## 2023-01-05 DIAGNOSIS — M53.3 CHRONIC SI JOINT PAIN: Chronic | ICD-10-CM

## 2023-01-05 DIAGNOSIS — Z79.891 CHRONIC USE OF OPIATE DRUG FOR THERAPEUTIC PURPOSE: Chronic | ICD-10-CM

## 2023-01-05 DIAGNOSIS — G89.29 CHRONIC SI JOINT PAIN: Chronic | ICD-10-CM

## 2023-01-05 DIAGNOSIS — M96.1 FAILED BACK SYNDROME: Primary | ICD-10-CM

## 2023-01-05 PROCEDURE — 99213 OFFICE O/P EST LOW 20 MIN: CPT

## 2023-01-05 PROCEDURE — 99214 OFFICE O/P EST MOD 30 MIN: CPT | Performed by: ANESTHESIOLOGY

## 2023-01-05 RX ORDER — OXYCODONE AND ACETAMINOPHEN 7.5; 325 MG/1; MG/1
1 TABLET ORAL 2 TIMES DAILY PRN
Qty: 60 TABLET | Refills: 0 | Status: SHIPPED | OUTPATIENT
Start: 2023-01-05 | End: 2023-02-04

## 2023-01-05 RX ORDER — MORPHINE SULFATE 15 MG/1
15 TABLET, FILM COATED, EXTENDED RELEASE ORAL 2 TIMES DAILY
Qty: 60 TABLET | Refills: 0 | Status: SHIPPED | OUTPATIENT
Start: 2023-01-05 | End: 2023-02-04

## 2023-01-05 ASSESSMENT — PAIN DESCRIPTION - PAIN TYPE: TYPE: CHRONIC PAIN

## 2023-01-05 ASSESSMENT — ENCOUNTER SYMPTOMS
SHORTNESS OF BREATH: 0
DIARRHEA: 0
VOMITING: 0
BACK PAIN: 1
NAUSEA: 0
CONSTIPATION: 0
CHEST TIGHTNESS: 0

## 2023-01-05 ASSESSMENT — PAIN SCALES - GENERAL: PAINLEVEL_OUTOF10: 7

## 2023-01-05 ASSESSMENT — PAIN DESCRIPTION - FREQUENCY: FREQUENCY: CONTINUOUS

## 2023-01-05 ASSESSMENT — PAIN DESCRIPTION - LOCATION: LOCATION: BACK

## 2023-01-05 ASSESSMENT — PAIN DESCRIPTION - DESCRIPTORS: DESCRIPTORS: ACHING

## 2023-01-05 NOTE — PROGRESS NOTES
The patient is a [de-identified] y. o.  /  female. Chief Complaint   Patient presents with    Back Pain     Med refill        Pain score Today:  7  Adverse effects (Constipation / Nausea / Sedation / sexual Dysfunction / others) : no  Mood: fair  Sleep pattern and quality: good  Activity level: good    Pill count Today: pt forgot medication  Last dose taken  1/5/2022  OARRS report reviewed today: yes  ER/Hospitalizations/PCP visit related to pain since last visit:no   Any legal problems e.g. DUI etc.:No  Satisfied with current management: Yes    Opioid Contract:12/1/2022  Last Urine Dug screen dated:12/1/2022    Hemoglobin A1C   Date Value Ref Range Status   02/26/2022 6.0 4.0 - 6.0 % Final       Past Medical History, Past Surgical History, Social History, Allergies and Medications reviewed and updated in EPIC as indicated    Family History reviewed and is noncontributory.             Past Medical History:   Diagnosis Date    Achilles tendonitis     right    Asthma     COPD (chronic obstructive pulmonary disease) ([facility])     Cough     clear phlegm    Degenerative lumbar disc     [facility]di[facility]     PCP Dr. Michi Wilson, seen 8/2019    Failed back syndrome, lumbar     Fast heart beat     Hx of \"8-10 years ago\"    Fibromyalgia     History of bladder cancer     Hypertension     Hypothyroid     Kidney stones     Lumbar radiculopathy     Lumbar spondylolysis     Lumbar spondylosis     Osteoarthritis     Sleep apnea     uses CPAP machine nightly    Spinal stenosis in cervical region     Wears glasses         Past Surgical History:   Procedure Laterality Date    BACK SURGERY  2010    lumbar fusion    BLADDER TUMOR EXCISION  2005    CARDIOVERSION      \"8-10 years ago\"  to get \"heart into regular rhythm\"    CARPAL TUNNEL RELEASE Bilateral     CATARACT REMOVAL WITH IMPLANT Bilateral 7/22/2014, 8/5/2014    Dilma/Demian    CERVICAL FUSION  11/14/2019     ANTERIOR CERVICAL DECOMPRESSION FUSION C3-4, C4-5 CERVICAL FUSION N/A 11/14/2019    ANTERIOR CERVICAL DECOMPRESSION FUSION C3-4, C4-5 (SUPINE) DEPUY, REGULAR TABLE, MICROSCOPE, C-ARM, EVOKES (# [id number] [facility]) performed by Fatoumata Langley DO at [facility], LAPAROSCOPIC N/A 08/18/2017    CHOLECYSTECTOMY LAPAROSCOPIC ROBOTIC MULTIPORT performed by Subhash Lauren MD at [address]   01/2013    CYSTOSCOPY  11/04/2016    CYSTOSCOPY  01/04/2019    ENDOSCOPIC ULTRASOUND (LOWER) N/A 08/17/2017    ENDOSCOPIC ULTRASOUND performed by Lisa Moore MD at [address] [place] [address] (UPPER)  08/17/2017    A cursory view of the gastric mucosa was normal. The scope was then further advanced through a patent pylorus to the second portion of the duodenum. The Gallbladder was evaluated in bulb position. Thick sludge was noted. The CBD was 7.8 mm with no filling defects. The PD was 5 mm in the HOP. The pancreatic tissue was edematous in body and tail.     KNEE ARTHROSCOPY Left     LUMBAR FUSION      PAIN MANAGEMENT PROCEDURE      [place] OFFICE/OUTPT VISIT,PROCEDURE ONLY N/A 01/24/2018    SPINAL HARDWARE REMOVAL LUMBAR BONE STIMULATOR performed by Mandi Catherine MD at [address] Bilateral 1989    SKIN BIOPSY Right 11/2015    mole right posterior shoulder    SPINE SURGERY      spinal cord stimulator    SPINE SURGERY  91-4-15    renoval of spinal cord stimulator leads and battery    PASQUALE AND BSO (CERVIX REMOVED)      TUBAL LIGATION      UVULOPALATOPHARYGOPLASTY  2006       Social History     Socioeconomic History    Marital status:      Spouse name: Dillon Betzaida    Number of children: 3    Years of education: None    Highest education level: None   Occupational History    Occupation: retired   Tobacco Use    Smoking status: Never    Smokeless tobacco: Never   Vaping Use    Vaping Use: Never used   Substance and Sexual Activity    Alcohol use: Not Currently     Comment: rare    Drug use: Never    Sexual activity: Yes     Partners: Male Family History   Problem Relation Age of Onset    Heart Failure Mother     Lung Cancer Mother     Other Father          pneumonia    Alzheimer's Disease Father     Lung Cancer Brother     Diabetes Paternal Grandmother     Other Daughter         Fibromyalsia    Rheum Arthritis Daughter        Allergies   Allergen Reactions    Azithromycin Shortness Of Breath     Tightness in chest    Adhesive Tape      OK to use paper tape per Patient- 2/19/20 patient states that she has been using all types of tape with no reaction       There were no vitals filed for this visit. Current Outpatient Medications   Medication Sig Dispense Refill    oxyCODONE-acetaminophen (PERCOCET)  MG per tablet Take 1 tablet by mouth 2 times daily as needed for Pain for up to 30 days. 60 tablet 0    fluticasone (FLONASE) 50 MCG/ACT nasal spray 1 spray by Each Nostril route daily 16 g 1    vitamin D (CHOLECALCIFEROL) 25 MCG (1000 UT) TABS tablet Take 1,000 Units by mouth daily      vitamin B-12 (CYANOCOBALAMIN) 500 MCG tablet Take 500 mcg by mouth daily      naloxone 4 MG/0.1ML LIQD nasal spray 1 spray by Nasal route as needed for Opioid Reversal (Patient not taking: No sig reported) 1 each 0    albuterol sulfate  (90 Base) MCG/ACT inhaler Inhale 2 puffs into the lungs every 4 hours as needed for Shortness of Breath      sennosides-docusate sodium (SENOKOT-S) 8.6-50 MG tablet Take 1 tablet by mouth daily as needed      psyllium (KONSYL) 28.3 % PACK Take 1 packet by mouth daily as needed for Constipation      diclofenac sodium 1 % GEL Apply 2 g topically 2 times daily as needed for Pain (knees and elbows)      pregabalin (LYRICA) 150 MG capsule Take 150 mg by mouth 2 times daily. montelukast (SINGULAIR) 10 MG tablet Take 10 mg by mouth nightly       metFORMIN (GLUCOPHAGE) 500 MG tablet Take 500 mg by mouth 2 times daily (with meals). levothyroxine (SYNTHROID) 50 MCG tablet Take 50 mcg by mouth Daily.       omeprazole (PRILOSEC) 20 MG capsule Take 20 mg by mouth Daily      aspirin 81 MG EC tablet Take 81 mg by mouth at bedtime       No current facility-administered medications for this encounter. Review of Systems   Constitutional:  Negative for chills, fatigue and fever. Respiratory:  Negative for chest tightness and shortness of breath. Cardiovascular:  Negative for leg swelling. Gastrointestinal:  Negative for constipation, diarrhea, nausea and vomiting. Musculoskeletal:  Positive for back pain. Neurological:  Negative for dizziness, weakness and numbness. Objective:  Vital signs: (most recent): Height 4' 10\" (1.473 m), weight 148 lb (67.1 kg). No fever. Assessment & Plan  1. Imbalance        No orders of the defined types were placed in this encounter. No orders of the defined types were placed in this encounter.            Electronically signed by Tracey Harrell on 1/5/2023 at 11:08 AM

## 2023-01-05 NOTE — PROGRESS NOTES
The patient is a [de-identified] y. o.  /  female.     Chief Complaint   Patient presents with    Back Pain     Med refill      -Is a pleasant 29-year-old female with history of chronic multisite body pain  History of previous cervical and lumbar spine extensive fusion surgeries  Continue to suffer from chronic pain  On chronic opioid therapy in this clinic for several years  We have gradually weaned her opioid with slow titration  On last visit she was prescribed MS Contin 15 mg twice a day along with Percocet 10 mg twice a day  Plan will be to decrease it to 7.5 mg Percocet twice a day and continue morphine unchanged  This will bring her dose to 45 MME of morphine a day    She finds medication helpful reports no side effect denies any illicit substance use  Automated prescription report do not show any aberrancy    For her chronic back pain issues that she had recent diagnostic work-up with MRI and was evaluated by neurosurgery  She already had an extensive surgery  Clinical examination suggested SI joint mediated pain  She had 2 sacroiliac joint injection last year with the excellent relief lasting for 2 to 3 months and then return of the pain to its baseline    Considering excellent response from 2 previous SI joint injection I will schedule her for SI joint radiofrequency ablation    May consider for neuromodulation trial in future  History of chronic neck pain with radiation down right arm  Diagnosis cervical stenosis  History of previous cervical surgery  Following up with neurosurgery  May consider for cervical epidural steroid injection in future      Pain score Today:  7  Adverse effects (Constipation / Nausea / Sedation / sexual Dysfunction / others) : no  Mood: fair  Sleep pattern and quality: good  Activity level: good    Pill count Today: pt forgot medication  Last dose taken  1/5/2022  OARRS report reviewed today: yes  ER/Hospitalizations/PCP visit related to pain since last visit:no   Any legal problems e.g. DUI etc.:No  Satisfied with current management: Yes    Opioid Contract:12/1/2022  Last Urine Dug screen dated:12/1/2022    Hemoglobin A1C   Date Value Ref Range Status   02/26/2022 6.0 4.0 - 6.0 % Final       Past Medical History, Past Surgical History, Social History, Allergies and Medications reviewed and updated in EPIC as indicated    Family History reviewed and is noncontributory.             Past Medical History:   Diagnosis Date    Achilles tendonitis     right    Asthma     COPD (chronic obstructive pulmonary disease) ([facility])     Cough     clear phlegm    Degenerative lumbar disc     DM (diabetes mellitus), type 2 ([facility] [address]     PCP Dr. Praneeth Campos, seen 8/2019    Failed back syndrome, lumbar     Fast heart beat     Hx of \"8-10 years ago\"    Fibromyalgia     History of bladder cancer     Hypertension     Hypothyroid     Kidney stones     Lumbar radiculopathy     Lumbar spondylolysis     Lumbar spondylosis     Osteoarthritis     Sleep apnea     uses CPAP machine nightly    Spinal stenosis in cervical region     Wears glasses         Past Surgical History:   Procedure Laterality Date    BACK SURGERY  2010    lumbar fusion    BLADDER TUMOR EXCISION  2005    CARDIOVERSION      \"8-10 years ago\"  to get \"heart into regular rhythm\"    CARPAL TUNNEL RELEASE Bilateral     CATARACT REMOVAL WITH IMPLANT Bilateral 7/22/2014, 8/5/2014    [facility]    CERVICAL FUSION  11/14/2019     ANTERIOR CERVICAL DECOMPRESSION FUSION C3-4, C4-5    CERVICAL FUSION N/A 11/14/2019    ANTERIOR CERVICAL DECOMPRESSION FUSION C3-4, C4-5 (SUPINE) DEPUY, REGULAR TABLE, MICROSCOPE, C-ARM, EVOKES (# [id number] [facility]) performed by Rachel Chaves DO at [facility], LAPAROSCOPIC N/A 08/18/2017    CHOLECYSTECTOMY LAPAROSCOPIC ROBOTIC MULTIPORT performed by Sheeba Cook MD at [address] [place] [address]  01/2013    CYSTOSCOPY  11/04/2016    CYSTOSCOPY  01/04/2019    ENDOSCOPIC ULTRASOUND (LOWER) N/A 08/17/2017    ENDOSCOPIC ULTRASOUND performed by Evelyn Evangelista MD at [address] [facility]  08/17/2017    A cursory view of the gastric mucosa was normal. The scope was then further advanced through a patent pylorus to the second portion of the duodenum. The Gallbladder was evaluated in bulb position. Thick sludge was noted. The CBD was 7.8 mm with no filling defects. The PD was 5 mm in the HOP. The pancreatic tissue was edematous in body and tail.     KNEE ARTHROSCOPY Left     LUMBAR FUSION      PAIN MANAGEMENT PROCEDURE      VT OFFICE/OUTPT VISIT,PROCEDURE ONLY N/A 01/24/2018    SPINAL HARDWARE REMOVAL LUMBAR BONE STIMULATOR performed by Georgi Dance, MD at [address] Bilateral 1989    SKIN BIOPSY Right 11/2015    mole right posterior shoulder    SPINE SURGERY      spinal cord stimulator    SPINE SURGERY  91-4-15    renoval of spinal cord stimulator leads and battery    PASQUALE AND BSO (CERVIX REMOVED)      TUBAL LIGATION      UVULOPALATOPHARYGOPLASTY  2006       Social History     Socioeconomic History    Marital status:      Spouse name: Alicia Arteaga    Number of children: 3    Years of education: None    Highest education level: None   Occupational History    Occupation: retired   Tobacco Use    Smoking status: Never    Smokeless tobacco: Never   Vaping Use    Vaping Use: Never used   Substance and Sexual Activity    Alcohol use: Not Currently     Comment: rare    Drug use: Never    Sexual activity: Yes     Partners: Male       Family History   Problem Relation Age of Onset    Heart Failure Mother     Lung Cancer Mother     Other Father          pneumonia    Alzheimer's Disease Father     Lung Cancer Brother     Diabetes Paternal Grandmother     Other Daughter         Fibromyalsia    Rheum Arthritis Daughter        Allergies   Allergen Reactions    Azithromycin Shortness Of Breath     Tightness in chest    Adhesive Tape      OK to use paper tape per Patient- 2/19/20 patient states that she has been using all types of tape with no reaction       Vitals:    01/05/23 1115   BP: (!) 130/48   Pulse: 74   SpO2: 98%       Current Outpatient Medications   Medication Sig Dispense Refill    morphine (MS CONTIN) 15 MG extended release tablet Take 1 tablet by mouth 2 times daily for 30 days. 60 tablet 0    oxyCODONE-acetaminophen (PERCOCET) 7.5-325 MG per tablet Take 1 tablet by mouth 2 times daily as needed for Pain for up to 30 days. Max Daily Amount: 2 tablets 60 tablet 0    oxyCODONE-acetaminophen (PERCOCET)  MG per tablet Take 1 tablet by mouth 2 times daily as needed for Pain for up to 30 days. 60 tablet 0    fluticasone (FLONASE) 50 MCG/ACT nasal spray 1 spray by Each Nostril route daily 16 g 1    vitamin D (CHOLECALCIFEROL) 25 MCG (1000 UT) TABS tablet Take 1,000 Units by mouth daily      vitamin B-12 (CYANOCOBALAMIN) 500 MCG tablet Take 500 mcg by mouth daily      naloxone 4 MG/0.1ML LIQD nasal spray 1 spray by Nasal route as needed for Opioid Reversal (Patient not taking: No sig reported) 1 each 0    albuterol sulfate  (90 Base) MCG/ACT inhaler Inhale 2 puffs into the lungs every 4 hours as needed for Shortness of Breath      sennosides-docusate sodium (SENOKOT-S) 8.6-50 MG tablet Take 1 tablet by mouth daily as needed      psyllium (KONSYL) 28.3 % PACK Take 1 packet by mouth daily as needed for Constipation      diclofenac sodium 1 % GEL Apply 2 g topically 2 times daily as needed for Pain (knees and elbows)      pregabalin (LYRICA) 150 MG capsule Take 150 mg by mouth 2 times daily. montelukast (SINGULAIR) 10 MG tablet Take 10 mg by mouth nightly       metFORMIN (GLUCOPHAGE) 500 MG tablet Take 500 mg by mouth 2 times daily (with meals). levothyroxine (SYNTHROID) 50 MCG tablet Take 50 mcg by mouth Daily.       omeprazole (PRILOSEC) 20 MG capsule Take 20 mg by mouth Daily      aspirin 81 MG EC tablet Take 81 mg by mouth at bedtime       No current facility-administered medications for this encounter. Review of Systems   Constitutional:  Negative for chills, fatigue and fever. Respiratory:  Negative for chest tightness and shortness of breath. Cardiovascular:  Negative for leg swelling. Gastrointestinal:  Negative for constipation, diarrhea, nausea and vomiting. Musculoskeletal:  Positive for back pain. Neurological:  Negative for dizziness, weakness and numbness. Objective:  Vital signs: (most recent): Blood pressure (!) 130/48, pulse 74, height 4' 10\" (1.473 m), weight 148 lb (67.1 kg), SpO2 98 %. No fever. Assessment & Plan  1. Failed back syndrome    2. Failed back syndrome of lumbar spine    3. Chronic use of opiate drug for therapeutic purpose    4. Chronic SI joint pain        Orders Placed This Encounter   Procedures    AK RADIOFREQUENCY ABLTJ NRV NRVTG SI JT W/IMG GDN        Orders Placed This Encounter   Medications    morphine (MS CONTIN) 15 MG extended release tablet     Sig: Take 1 tablet by mouth 2 times daily for 30 days. Dispense:  60 tablet     Refill:  0     Reduce doses taken as pain becomes manageable    oxyCODONE-acetaminophen (PERCOCET) 7.5-325 MG per tablet     Sig: Take 1 tablet by mouth 2 times daily as needed for Pain for up to 30 days. Max Daily Amount: 2 tablets     Dispense:  60 tablet     Refill:  0     Reduce doses taken as pain becomes manageable      Controlled Substance Monitoring:    Acute and Chronic Pain Monitoring:   RX Monitoring 1/5/2023   Attestation -   Acute Pain Prescriptions -   Periodic Controlled Substance Monitoring Possible medication side effects, risk of tolerance/dependence & alternative treatments discussed. ;No signs of potential drug abuse or diversion identified. ;Assessed functional status. ;Obtaining appropriate analgesic effect of treatment. Chronic Pain > 50 MEDD Obtained or confirmed \"Consent for Opioid Use\" on file.    Chronic Pain > 80 MEDD -   Chronic Pain > 120 MEDD -               Electronically signed by Yanira Hackett MD on 1/5/2023 at 11:49 AM

## 2023-01-23 ENCOUNTER — HOSPITAL ENCOUNTER (OUTPATIENT)
Dept: GENERAL RADIOLOGY | Age: 83
Discharge: HOME | End: 2023-01-25
Payer: MEDICARE

## 2023-01-23 ENCOUNTER — HOSPITAL ENCOUNTER (OUTPATIENT)
Dept: PAIN MANAGEMENT | Age: 83
Discharge: HOME | End: 2023-01-23
Payer: MEDICARE

## 2023-01-23 VITALS
WEIGHT: 148 LBS | SYSTOLIC BLOOD PRESSURE: 120 MMHG | RESPIRATION RATE: 18 BRPM | OXYGEN SATURATION: 94 % | DIASTOLIC BLOOD PRESSURE: 63 MMHG | BODY MASS INDEX: 31.07 KG/M2 | TEMPERATURE: 97.5 F | HEART RATE: 73 BPM | HEIGHT: 58 IN

## 2023-01-23 DIAGNOSIS — R52 PAIN MANAGEMENT: ICD-10-CM

## 2023-01-23 DIAGNOSIS — M53.3 CHRONIC SI JOINT PAIN: Primary | Chronic | ICD-10-CM

## 2023-01-23 DIAGNOSIS — G89.29 CHRONIC SI JOINT PAIN: Primary | Chronic | ICD-10-CM

## 2023-01-23 PROCEDURE — 2500000003 HC RX 250 WO HCPCS: Performed by: ANESTHESIOLOGY

## 2023-01-23 PROCEDURE — 6360000002 HC RX W HCPCS: Performed by: ANESTHESIOLOGY

## 2023-01-23 PROCEDURE — 64635 DESTROY LUMB/SAC FACET JNT: CPT

## 2023-01-23 PROCEDURE — 3209999900 FLUORO FOR SURGICAL PROCEDURES

## 2023-01-23 RX ORDER — FENTANYL CITRATE 0.05 MG/ML
INJECTION, SOLUTION INTRAMUSCULAR; INTRAVENOUS
Status: COMPLETED | OUTPATIENT
Start: 2023-01-23 | End: 2023-01-23

## 2023-01-23 RX ORDER — MIDAZOLAM HYDROCHLORIDE 1 MG/ML
INJECTION INTRAMUSCULAR; INTRAVENOUS
Status: COMPLETED | OUTPATIENT
Start: 2023-01-23 | End: 2023-01-23

## 2023-01-23 RX ORDER — LIDOCAINE HYDROCHLORIDE 10 MG/ML
INJECTION, SOLUTION EPIDURAL; INFILTRATION; INTRACAUDAL; PERINEURAL
Status: COMPLETED | OUTPATIENT
Start: 2023-01-23 | End: 2023-01-23

## 2023-01-23 RX ORDER — LIDOCAINE HYDROCHLORIDE 40 MG/ML
INJECTION, SOLUTION RETROBULBAR; TOPICAL
Status: COMPLETED | OUTPATIENT
Start: 2023-01-23 | End: 2023-01-23

## 2023-01-23 RX ADMIN — LIDOCAINE HYDROCHLORIDE 5 ML: 10 INJECTION, SOLUTION EPIDURAL; INFILTRATION; INTRACAUDAL; PERINEURAL at 14:32

## 2023-01-23 RX ADMIN — MIDAZOLAM 1 MG: 1 INJECTION INTRAMUSCULAR; INTRAVENOUS at 14:38

## 2023-01-23 RX ADMIN — MIDAZOLAM 1 MG: 1 INJECTION INTRAMUSCULAR; INTRAVENOUS at 14:32

## 2023-01-23 RX ADMIN — LIDOCAINE HYDROCHLORIDE 5 ML: 40 INJECTION, SOLUTION RETROBULBAR; TOPICAL at 14:37

## 2023-01-23 RX ADMIN — FENTANYL CITRATE 50 MCG: 0.05 INJECTION, SOLUTION INTRAMUSCULAR; INTRAVENOUS at 14:32

## 2023-01-23 RX ADMIN — FENTANYL CITRATE 50 MCG: 0.05 INJECTION, SOLUTION INTRAMUSCULAR; INTRAVENOUS at 14:38

## 2023-01-23 ASSESSMENT — PAIN DESCRIPTION - DESCRIPTORS: DESCRIPTORS: ACHING;PRESSURE

## 2023-01-23 ASSESSMENT — PAIN - FUNCTIONAL ASSESSMENT
PAIN_FUNCTIONAL_ASSESSMENT: PREVENTS OR INTERFERES WITH ALL ACTIVE AND SOME PASSIVE ACTIVITIES
PAIN_FUNCTIONAL_ASSESSMENT: 0-10
PAIN_FUNCTIONAL_ASSESSMENT: NONE - DENIES PAIN

## 2023-01-23 NOTE — OP NOTE
PREOPERATIVE DIAGNOSIS:  Chronic Bilateral-sided sacroiliac joint pain.     POSTOPERATIVE DIAGNOSIS:  Chronic Bilateral-sided sacroiliac joint pain.     PROCEDURE PERFORMED:  Cooled radiofrequency ablation of  the primary dorsal ramus of L5 and lateral  branches of S1 and S2 nerves on the Bilateral side.     ANESTHESIA:  Intravenous sedation with Versed and fentanyl.     BLOOD LOSS:  None.     COMPLICATIONS:  None.   OPERATIVE NOTE:  The patient was seen in the preoperative area.  Chart was reviewed.  She  had a diagnostic SI joint block which provided excellent short term relief  confirming sacroiliac joint pain, but the relief with the steroid was very  short term, therefore, she was scheduled today for radiofrequency ablation  of the SI joint innervation.  The risks, benefits and alternatives of the  procedure were explained to the patient and an informed consent was  obtained.  An IV access was established by the preoperative nurse.     The patient was taken to the procedure room and was placed in prone  position.  The area over the sacroiliac joint was prepped with ChloraPrep  and was draped in a sterile fashion.  Standard monitors were connected and  vital signs were monitored throughout the procedure.     Using fluoroscopy in AP view, right sacroiliac joint was identified.  With  cranial tilting right S1, S2 and S3  foramina were marked  Target spots for the lateral branches were marked just lateral to the foraminal margin.  The skin and subcutaneous tissues were anesthetized with 1% preservative free lidocaine.  Then, a 20 G 100 mm long RF long radiofrequency cannula was introduced at the targeted   spots and was advanced in coaxial technique until the bony contacts were made.  Position  was confirmed in lateral view. At each target, sensory and motor  stimulation was performed. No motor twitches were noted with motor  stimulation up to 3 volts.  T    Once the satisfactory needle position and stimulation  were obtained and confirmed,   the target nerves were anesthetized with 4% lidocaine. Then, at each site of the needle  placement, lesion was made at 85 degrees for 105 seconds.  Once the  lesions were made at each level on S1, S2 and S3  the needles were then  removed.     Then, using AP view fluoroscopy, target was marked for primary  dorsal ramus of L5 at the junction of SAP and sacral ala.  The 17-gauge  electrode was advanced to contact the bone at the target spot.  Stimulation  did not reveal any motor contraction while sensory stimulation reproduced  buttock pain.  After locally anesthetizing the nerve, the lesion was made  at the L5 level also.  The needle was then removed.     The patient tolerated the procedure well.  There were no complications.  She was monitored for appropriate time before being discharged home in a  stable condition.    SEDATION NOTE:    ASA CLASSIFICATION  3  MP   CLASSIFICATION  3    Moderate intravenous conscious sedation was supervised by Dr. Dawkins  The patient was independently monitored by a Registered Nurse assigned to the Procedure Room  Monitoring included automated blood pressure, continuous EKG, Capnography and continuous pulse oximetry.   The detailed Conscious Record is permanently stored in the Hospital Information System.     The following is the conscious sedation record;  Start Time:  1430  End times:  1451  Duration:  21 MINUTES  MEDS GIVEN 2 MG VERSED  MCG FENTANYL

## 2023-01-23 NOTE — DISCHARGE INSTRUCTIONS
Discharge Instructions following Sedation or Anesthesia:  You have  received  a sedative/anesthetic therefore, you should not consume any alcoholic beverages for minimum of 12 hours. Do not drive or operate machinery for 24 hours. Do not sign legal documents for 24 hours. Dizziness, drowsiness, and unsteadiness may occur. Rest when need to. Increase diet as tolerated. Keep up on fluids if diet allows. General Instructions:  Do not take a tub bath for 72 hours after procedure (this includes hot tubs and swimming pools). You may shower, but avoid hot water to injection site. Avoid strenuous activity TODAY especially if you experience dizziness. Remove band-aid the next day. Wash off any residual iodine   Do not use heat, heating pad, or any other heating device over the injection site for 3 days after the procedure. If you experience pain after your procedure, you may continue with your current pain medication as prescribed. (DO NOT INCREASE YOUR PAIN MEDICATION WITHOUT TALKING TO DOCTOR)  Soreness and pain at injection site is common, may use ice to reduce soreness.     [facility] at [phone] if you experience:   Fever, chills or temperature over 100    Vomiting, Headache, persistent stiff neck, nausea, blurred vision   Difficulty in urinating or unable to urinate with 8 hours   Increase in weakness, numbness or loss of function   Increased redness, swelling or drainage at the injection site

## 2023-01-30 ENCOUNTER — OFFICE VISIT (OUTPATIENT)
Dept: NEUROSURGERY | Age: 83
End: 2023-01-30
Payer: MEDICARE

## 2023-01-30 VITALS
WEIGHT: 148 LBS | TEMPERATURE: 97.2 F | RESPIRATION RATE: 22 BRPM | BODY MASS INDEX: 30.93 KG/M2 | OXYGEN SATURATION: 98 % | DIASTOLIC BLOOD PRESSURE: 60 MMHG | HEART RATE: 88 BPM | SYSTOLIC BLOOD PRESSURE: 141 MMHG

## 2023-01-30 DIAGNOSIS — M81.8 OTHER OSTEOPOROSIS WITHOUT CURRENT PATHOLOGICAL FRACTURE: ICD-10-CM

## 2023-01-30 DIAGNOSIS — S32.009K LUMBAR PSEUDOARTHROSIS: ICD-10-CM

## 2023-01-30 DIAGNOSIS — R26.0 ATAXIC GAIT: ICD-10-CM

## 2023-01-30 DIAGNOSIS — Q76.49 SPINAL DEFORMITY: ICD-10-CM

## 2023-01-30 DIAGNOSIS — M47.14 THORACIC MYELOPATHY: Primary | ICD-10-CM

## 2023-01-30 PROCEDURE — 1036F TOBACCO NON-USER: CPT | Performed by: NEUROLOGICAL SURGERY

## 2023-01-30 PROCEDURE — G8484 FLU IMMUNIZE NO ADMIN: HCPCS | Performed by: NEUROLOGICAL SURGERY

## 2023-01-30 PROCEDURE — 99214 OFFICE O/P EST MOD 30 MIN: CPT | Performed by: NEUROLOGICAL SURGERY

## 2023-01-30 PROCEDURE — 1090F PRES/ABSN URINE INCON ASSESS: CPT | Performed by: NEUROLOGICAL SURGERY

## 2023-01-30 PROCEDURE — G8417 CALC BMI ABV UP PARAM F/U: HCPCS | Performed by: NEUROLOGICAL SURGERY

## 2023-01-30 PROCEDURE — 1123F ACP DISCUSS/DSCN MKR DOCD: CPT | Performed by: NEUROLOGICAL SURGERY

## 2023-01-30 PROCEDURE — G8399 PT W/DXA RESULTS DOCUMENT: HCPCS | Performed by: NEUROLOGICAL SURGERY

## 2023-01-30 PROCEDURE — G8427 DOCREV CUR MEDS BY ELIG CLIN: HCPCS | Performed by: NEUROLOGICAL SURGERY

## 2023-01-30 NOTE — PROGRESS NOTES
[address]  [facility] [address] [facility] [address]  Dept: [phone]    Patient:  Seema Badillo  YOB: 1940  Date: 1/30/23    The patient is a [de-identified] y.o. female who presents today for consult of the following problems:     Chief Complaint   Patient presents with    Follow-up    Back Pain    Numbness     Right are numbness             HPI:     Seema Badillo is a [de-identified] y.o. female on whom neurosurgical consultation was requested by Ino Alvarez DO for management of significant right-sided medial rhomboid discomfort along with numbness in the distal forearm from the elbow to the hand along with severe pain in the right elbow. Denies any numbness of any of the digits of either hand. Denies any issues with the left upper extremity as well. Pain in the medial rhomboid has significant proved since he she saw the shoulder specialist.  She was told that it was related to her cervical spine issue but she denies any issues with dexterity or numbness of the hands. In addition that she does have significant bilateral axial pain rating into the left buttock with numbness throughout the left lower extremity in a nondermatomal fashion. Denies saddle anesthesia or bowel bladder incontinence. Yasemin Silverman       History:     Past Medical History:   Diagnosis Date    Achilles tendonitis     right    Asthma     COPD (chronic obstructive pulmonary disease) (HCC)     Cough     clear phlegm    Degenerative lumbar disc     DM (diabetes mellitus), type 2 ([address]     PCP Dr. Mihai Collazo, seen 8/2019    Failed back syndrome, lumbar     Fast heart beat     Hx of \"8-10 years ago\"    Fibromyalgia     History of bladder cancer     Hypertension     Hypothyroid     Kidney stones     Lumbar radiculopathy     Lumbar spondylolysis     Lumbar spondylosis     Osteoarthritis     Sleep apnea     uses CPAP machine nightly Spinal stenosis in cervical region     Wears glasses      Past Surgical History:   Procedure Laterality Date    BACK SURGERY  2010    lumbar fusion    BLADDER TUMOR EXCISION  2005    CARDIOVERSION      \"8-10 years ago\"  to get \"heart into regular rhythm\"    CARPAL TUNNEL RELEASE Bilateral     CATARACT REMOVAL WITH IMPLANT Bilateral 7/22/2014, 8/5/2014    [facility]    CERVICAL FUSION  11/14/2019     ANTERIOR CERVICAL DECOMPRESSION FUSION C3-4, C4-5    CERVICAL FUSION N/A 11/14/2019    ANTERIOR CERVICAL DECOMPRESSION FUSION C3-4, C4-5 (SUPINE) DEPUY, REGULAR TABLE, MICROSCOPE, C-ARM, EVOKES (# [id number] [facility]) performed by Dorisann Kehr, DO at [facility], LAPAROSCOPIC N/A 08/18/2017    CHOLECYSTECTOMY LAPAROSCOPIC ROBOTIC MULTIPORT performed by Franny Bartlett MD at [address] [place] [address]  01/2013    CYSTOSCOPY  11/04/2016    CYSTOSCOPY  01/04/2019    ENDOSCOPIC ULTRASOUND (LOWER) N/A 08/17/2017    ENDOSCOPIC ULTRASOUND performed by Julisa Alarcon MD at [address] [place] [address] (UPPER)  08/17/2017    A cursory view of the gastric mucosa was normal. The scope was then further advanced through a patent pylorus to the second portion of the duodenum. The Gallbladder was evaluated in bulb position. Thick sludge was noted. The CBD was 7.8 mm with no filling defects. The PD was 5 mm in the HOP. The pancreatic tissue was edematous in body and tail.     KNEE ARTHROSCOPY Left     LUMBAR FUSION      PAIN MANAGEMENT PROCEDURE      ND OFFICE/OUTPT VISIT,PROCEDURE ONLY N/A 01/24/2018    SPINAL HARDWARE REMOVAL LUMBAR BONE STIMULATOR performed by Chun Levy MD at [address] Bilateral 1989    SKIN BIOPSY Right 11/2015    mole right posterior shoulder    SPINE SURGERY      spinal cord stimulator    SPINE SURGERY  91-4-15    renoval of spinal cord stimulator leads and battery    PASQUALE AND BSO (CERVIX REMOVED)      TUBAL LIGATION      UVULOPALATOPHARYGOPLASTY  2006     Family History Problem Relation Age of Onset    Heart Failure Mother     Lung Cancer Mother     Other Father          pneumonia    Alzheimer's Disease Father     Lung Cancer Brother     Diabetes Paternal Grandmother     Other Daughter         Fibromyalsia    Rheum Arthritis Daughter      Current Outpatient Medications on File Prior to Visit   Medication Sig Dispense Refill    morphine (MS CONTIN) 15 MG extended release tablet Take 1 tablet by mouth 2 times daily for 30 days. 60 tablet 0    oxyCODONE-acetaminophen (PERCOCET) 7.5-325 MG per tablet Take 1 tablet by mouth 2 times daily as needed for Pain for up to 30 days. Max Daily Amount: 2 tablets 60 tablet 0    fluticasone (FLONASE) 50 MCG/ACT nasal spray 1 spray by Each Nostril route daily 16 g 1    vitamin D (CHOLECALCIFEROL) 25 MCG (1000 UT) TABS tablet Take 1,000 Units by mouth daily      vitamin B-12 (CYANOCOBALAMIN) 500 MCG tablet Take 500 mcg by mouth daily      albuterol sulfate  (90 Base) MCG/ACT inhaler Inhale 2 puffs into the lungs every 4 hours as needed for Shortness of Breath      sennosides-docusate sodium (SENOKOT-S) 8.6-50 MG tablet Take 1 tablet by mouth daily as needed      psyllium (KONSYL) 28.3 % PACK Take 1 packet by mouth daily as needed for Constipation      diclofenac sodium 1 % GEL Apply 2 g topically 2 times daily as needed for Pain (knees and elbows)      pregabalin (LYRICA) 150 MG capsule Take 150 mg by mouth 2 times daily. montelukast (SINGULAIR) 10 MG tablet Take 10 mg by mouth nightly       metFORMIN (GLUCOPHAGE) 500 MG tablet Take 500 mg by mouth 2 times daily (with meals). levothyroxine (SYNTHROID) 50 MCG tablet Take 50 mcg by mouth Daily.       omeprazole (PRILOSEC) 20 MG capsule Take 20 mg by mouth Daily      aspirin 81 MG EC tablet Take 81 mg by mouth at bedtime      [DISCONTINUED] diclofenac (VOLTAREN) 75 MG EC tablet Take 1 tablet by mouth 2 times daily (with meals) 28 tablet 0    naloxone 4 MG/0.1ML LIQD nasal spray 1 spray by Nasal route as needed for Opioid Reversal (Patient not taking: No sig reported) 1 each 0    [DISCONTINUED] potassium chloride (MICRO-K) 10 MEQ extended release capsule Take 10 mEq by mouth daily      [DISCONTINUED] bumetanide (BUMEX) 1 MG tablet Take 1 mg by mouth daily      [DISCONTINUED] telmisartan-hydroCHLOROthiazide (MICARDIS HCT) 40-12.5 MG per tablet Take 1 tablet by mouth daily. No current facility-administered medications on file prior to visit. Social History     Tobacco Use    Smoking status: Never    Smokeless tobacco: Never   Vaping Use    Vaping Use: Never used   Substance Use Topics    Alcohol use: Not Currently     Comment: rare    Drug use: Never       Allergies   Allergen Reactions    Azithromycin Shortness Of Breath     Tightness in chest    Adhesive Tape      OK to use paper tape per Patient- 2/19/20 patient states that she has been using all types of tape with no reaction       Review of Systems  ROS: Back pain numbness. Elbow pain    Physical Exam:      BP (!) 141/60   Pulse 88   Temp 97.2 °F (36.2 °C)   Resp 22   Wt 148 lb (67.1 kg)   SpO2 98%   BMI 30.93 kg/m²   Estimated body mass index is 30.93 kg/m² as calculated from the following:    Height as of 1/23/23: 4' 10\" (1.473 m). Weight as of this encounter: 148 lb (67.1 kg). General:  German Fuentes is a [de-identified]y.o. year old female who appears her stated age. HEENT: Normocephalic atraumatic. Neck supple. Chest: regular rate; pulses equal. Equal chest rise and fall  Abdomen: Soft nondistended. Ext: DP equal with good capillary refill  Neuro    Mentation  Appropriate affect   oriented    Cranial Nerves:   Pupils equal and reactive to light  Extraocular motion intact  Face symmetric  No dysarthria  v1-3 sensation symmetric, masseter tone symmetric  Hearing symmetric and intact to finger rub    Sensation:   Intact.     Motor  L deltoid 5/5; R deltoid 5/5  L biceps 5/5; R biceps 5/5  L triceps 5/5; R triceps 5/5  L wrist extension 5/5; R wrist extension 5/5  L intrinsics 5/5; R intrinsics 5/5     L iliopsoas 5/5 , R iliopsoas 5/5  L quadriceps 5/5; R quadriceps 5/5  L Dorsiflexion 5/5; R dorsiflexion 5/5  L Plantarflexion 5/5; R plantarflexion 5/5  L EHL 5/5; R EHL 5/5    Reflexes  L Brachioradialis 2+/4; R brachioradialis 2+/4  L Biceps 2+/4; R Biceps 2+/4  L Triceps 2+/4; R Triceps 2+/4  L Patellar 2+/4: R Patellar 2+/4  L Achilles 2+/4; R Achilles 2+/4    hoffmans L: neg  hoffmans R: neg  Clonus L: neg  Clonus R: neg  Babinski L: up  Babinski R; up    Studies Review:     MRI of the lumbar spine with what appears to be L1-2 significant stenosis and cephalad thoracic stenosis about appropriate axials    MRI cervical spine is significant improved central stenosis from C3-C5. No obvious foraminal disease present maintained lordosis. Assessment and Plan:      1. Thoracic myelopathy    2. Ataxic gait    3. Lumbar pseudoarthrosis    4. Spinal deformity    5. Other osteoporosis without current pathological fracture          Plan: Patient with appears to be ataxia along with significant numbness of the left leg along with postural instability. I suspect that she likely has a significant sagittal plane deformity related to progression from her prior scoliosis films. In addition there is a component of thoracic myelopathy that warrants repeat MRI of the thoracic and lumbar spine. We will obtain a CT of the lumbar spine to see where there is osseous fusion with a reason for consideration of correction surgery if appropriate. The patient is really not keen on any other surgical intervention considering the multiple surgeries she has had as well as her advanced age and risk profile. She unfortunately has failed a prior spinal cord stimulator for the lumbar region.     Regarding the upper extremity and the elbow I do not believe that there is anything neuropathic here to treat considering that she does not have any involvement of the hand and her primary issue actually appears to be right elbow pain. I have asked her to reach back out to her orthopedic surgeon and if need be we can provide a second opinion referral    Followup: No follow-ups on file. Prescriptions Ordered:  No orders of the defined types were placed in this encounter. Orders Placed:  Orders Placed This Encounter   Procedures    MRI THORACIC SPINE WO CONTRAST     Standing Status:   Future     Standing Expiration Date:   1/30/2024     Order Specific Question:   Reason for exam:     Answer:   eval stenosis    MRI LUMBAR SPINE WO CONTRAST     Standing Status:   Future     Standing Expiration Date:   1/30/2024     Order Specific Question:   Reason for exam:     Answer:   eval for stenosis    CT LUMBAR SPINE WO CONTRAST     Standing Status:   Future     Standing Expiration Date:   4/30/2023     Order Specific Question:   Reason for exam:     Answer:   eval for osseous fusion    XR SPINE ENTIRE (2-3 VIEWS)     Standing Status:   Future     Standing Expiration Date:   1/30/2024     Scheduling Instructions:      STANDING -- AP and Lateral; Include C2 to Pelvis & both femoral heads     Order Specific Question:   Reason for exam:     Answer:   scoliosis    DEXA BONE DENSITY AXIAL SKELETON     Standing Status:   Future     Standing Expiration Date:   1/30/2024     Order Specific Question:   Reason for exam:     Answer:   updated preop eval        Electronically signed by Teresa Hogan DO on 1/30/2023 at 3:26 PM    Please note that this chart was generated using voice recognition Dragon dictation software. Although every effort was made to ensure the accuracy of this automated transcription, some errors in transcription may have occurred.

## 2023-01-31 ENCOUNTER — HOSPITAL ENCOUNTER (OUTPATIENT)
Dept: PAIN MANAGEMENT | Age: 83
Discharge: HOME | End: 2023-01-31
Payer: MEDICARE

## 2023-01-31 VITALS
OXYGEN SATURATION: 94 % | RESPIRATION RATE: 20 BRPM | SYSTOLIC BLOOD PRESSURE: 121 MMHG | TEMPERATURE: 97.3 F | HEART RATE: 62 BPM | DIASTOLIC BLOOD PRESSURE: 66 MMHG

## 2023-01-31 DIAGNOSIS — M96.1 FAILED BACK SYNDROME: ICD-10-CM

## 2023-01-31 DIAGNOSIS — Z79.891 CHRONIC USE OF OPIATE DRUG FOR THERAPEUTIC PURPOSE: Chronic | ICD-10-CM

## 2023-01-31 DIAGNOSIS — M53.3 CHRONIC SI JOINT PAIN: Primary | Chronic | ICD-10-CM

## 2023-01-31 DIAGNOSIS — G89.29 CHRONIC SI JOINT PAIN: Primary | Chronic | ICD-10-CM

## 2023-01-31 DIAGNOSIS — M47.816 LUMBAR SPONDYLOSIS: ICD-10-CM

## 2023-01-31 DIAGNOSIS — M96.1 FAILED BACK SYNDROME OF LUMBAR SPINE: ICD-10-CM

## 2023-01-31 PROCEDURE — 99213 OFFICE O/P EST LOW 20 MIN: CPT | Performed by: NURSE PRACTITIONER

## 2023-01-31 PROCEDURE — 99213 OFFICE O/P EST LOW 20 MIN: CPT

## 2023-01-31 RX ORDER — MORPHINE SULFATE 15 MG/1
15 TABLET, FILM COATED, EXTENDED RELEASE ORAL 2 TIMES DAILY
Qty: 60 TABLET | Refills: 0 | Status: SHIPPED | OUTPATIENT
Start: 2023-02-05 | End: 2023-03-07

## 2023-01-31 RX ORDER — OXYCODONE AND ACETAMINOPHEN 7.5; 325 MG/1; MG/1
1 TABLET ORAL 2 TIMES DAILY PRN
Qty: 60 TABLET | Refills: 0 | Status: SHIPPED | OUTPATIENT
Start: 2023-02-10 | End: 2023-03-12

## 2023-01-31 ASSESSMENT — ENCOUNTER SYMPTOMS
CONSTIPATION: 0
SHORTNESS OF BREATH: 0
COUGH: 0
BACK PAIN: 1

## 2023-01-31 NOTE — PROGRESS NOTES
Chief Complaint   Patient presents with    Back Pain    Medication Refill         [facility]     history of chronic multisite body pain  History of previous cervical and lumbar spine extensive fusion surgeries  Continue to suffer from chronic pain  On chronic opioid therapy in this clinic for several years  We have gradually weaned her opioid with slow titration  On last visit she was prescribed MS Contin 15 mg twice a day along with Percocet 7.5mg twice a day  Back  For her chronic back pain issues that she had recent diagnostic work-up with MRI and was evaluated by neurosurgery  She already had an extensive surgery  She had SCS implanted in 2012 at [facility] but it was removed as it was not functioning well.  Clinical examination suggested SI joint mediated pain  Here today to f/u after  bilat RFA of  the primary dorsal ramus of L5 and lateral branches of S1 and S2 nerves done 1/23/23 and reports  over 50% relief of pain and improved activity but Still having lumbar pain at higher level making it hard to bend and twist      Neck  History of chronic neck pain with radiation down right arm  Diagnosis cervical stenosis  History of previous cervical surgery  Following up with neurosurgery with more imaging ordered  May consider for cervical epidural steroid injection in future    Knee  She continues to follow with Dr. Garcia for her knees with last gel injection in Dec. But does not feel it is helping. Plans to call for f/u    Right shoulder elbow  Pt has seen Dr Herman and dx with right rot. Cuff tear. No surgery planned at this time.     Back Pain  This is a chronic problem. The current episode started more than 1 year ago. The problem occurs constantly. The problem is unchanged. The pain is present in the lumbar spine. The quality of the pain is described as aching. The pain does not radiate. The pain is at a severity of 5/10. The pain is mild. The pain is The same all the time. The symptoms are aggravated  by bending, sitting, standing, position and twisting. Pertinent negatives include no chest pain, fever, headaches, numbness, tingling or weakness. She has tried bed rest, home exercises, heat, ice, walking and NSAIDs for the symptoms. Patient denies any new neurological symptoms. No bowel or bladder incontinence, no weakness, and no falling. Pill count: percocet 21, Morphine 10 2/4  percocet prescription adjusted  2/10 and MS to 2/5    Morphine equivalent: 52.5    Controlled Substance Monitoring:    Acute and Chronic Pain Monitoring:   RX Monitoring 1/31/2023   Attestation -   Acute Pain Prescriptions -   Periodic Controlled Substance Monitoring Possible medication side effects, risk of tolerance/dependence & alternative treatments discussed. ;No signs of potential drug abuse or diversion identified. ;Assessed functional status. ;Obtaining appropriate analgesic effect of treatment. Chronic Pain > 50 MEDD -   Chronic Pain > 80 MEDD -   Chronic Pain > 120 MEDD -          Periodic Controlled Substance Monitoring: Possible medication side effects, risk of tolerance/dependence & alternative treatments discussed., No signs of potential drug abuse or diversion identified. , Assessed functional status., Obtaining appropriate analgesic effect of treatment.  Cory Davis, APRN - CNP)      Past Medical History:   Diagnosis Date    Achilles tendonitis     right    Asthma     COPD (chronic obstructive pulmonary disease) ([facility])     Cough     clear phlegm    Degenerative lumbar disc     DM [facility] mellitus[facility]     PCP Dr. Ilsa Lyn, seen 8/2019    Failed back syndrome, lumbar     Fast heart beat     Hx of \"8-10 years ago\"    Fibromyalgia     History of bladder cancer     Hypertension     Hypothyroid     Kidney stones     Lumbar radiculopathy     Lumbar spondylolysis     Lumbar spondylosis     Osteoarthritis     Sleep apnea     uses CPAP machine nightly    Spinal stenosis in cervical region     Wears glasses Past Surgical History:   Procedure Laterality Date    BACK SURGERY  2010    lumbar fusion    BLADDER TUMOR EXCISION  2005    CARDIOVERSION      \"8-10 years ago\"  to get \"heart into regular rhythm\"    CARPAL TUNNEL RELEASE Bilateral     CATARACT REMOVAL WITH IMPLANT Bilateral 7/22/2014, 8/5/2014    [facility]    CERVICAL FUSION  11/14/2019     ANTERIOR CERVICAL DECOMPRESSION FUSION C3-4, C4-5    CERVICAL FUSION N/A 11/14/2019    ANTERIOR CERVICAL DECOMPRESSION FUSION C3-4, C4-5 (SUPINE) DEPUY, REGULAR TABLE, MICROSCOPE, C-ARM, EVOKES (# [id number] [facility]) performed by Nilsa Villar DO at [facility], LAPAROSCOPIC N/A 08/18/2017    CHOLECYSTECTOMY LAPAROSCOPIC ROBOTIC MULTIPORT [facility]   01/2013    CYSTOSCOPY  11/04/2016    CYSTOSCOPY  01/04/2019    ENDOSCOPIC ULTRASOUND (LOWER) N/A 08/17/2017    ENDOSCOPIC ULTRASOUND performed by Mando Garcia MD at [address] [place] [address] (UPPER)  08/17/2017    A cursory view of the gastric mucosa was normal. The scope was then further advanced through a patent pylorus to the second portion of the duodenum. The Gallbladder was evaluated in bulb position. Thick sludge was noted. The CBD was 7.8 mm with no filling defects. The PD was 5 mm in the HOP. The pancreatic tissue was edematous in body and tail.     KNEE ARTHROSCOPY Left     LUMBAR FUSION      PAIN MANAGEMENT PROCEDURE      MO OFFICE/OUTPT VISIT,PROCEDURE ONLY N/A 01/24/2018    SPINAL HARDWARE REMOVAL LUMBAR BONE STIMULATOR performed by Tere Lance MD at [address] [place] [address] Dr Bilateral 1989    SKIN BIOPSY Right 11/2015    mole right posterior shoulder    SPINE SURGERY      spinal cord stimulator    SPINE SURGERY  91-4-15    renoval of spinal cord stimulator leads and battery    PASQUALE AND BSO (CERVIX REMOVED)      TUBAL LIGATION      UVULOPALATOPHARYGOPLASTY  2006       Allergies   Allergen Reactions    Azithromycin Shortness Of Breath Tightness in chest    Adhesive Tape      OK to use paper tape per Patient- 2/19/20 patient states that she has been using all types of tape with no reaction         Current Outpatient Medications:     [START ON 2/5/2023] morphine (MS CONTIN) 15 MG extended release tablet, Take 1 tablet by mouth 2 times daily for 30 days. , Disp: 60 tablet, Rfl: 0    [START ON 2/10/2023] oxyCODONE-acetaminophen (PERCOCET) 7.5-325 MG per tablet, Take 1 tablet by mouth 2 times daily as needed for Pain for up to 30 days. Max Daily Amount: 2 tablets, Disp: 60 tablet, Rfl: 0    fluticasone (FLONASE) 50 MCG/ACT nasal spray, 1 spray by Each Nostril route daily, Disp: 16 g, Rfl: 1    vitamin D (CHOLECALCIFEROL) 25 MCG (1000 UT) TABS tablet, Take 1,000 Units by mouth daily, Disp: , Rfl:     vitamin B-12 (CYANOCOBALAMIN) 500 MCG tablet, Take 500 mcg by mouth daily, Disp: , Rfl:     naloxone 4 MG/0.1ML LIQD nasal spray, 1 spray by Nasal route as needed for Opioid Reversal (Patient not taking: No sig reported), Disp: 1 each, Rfl: 0    albuterol sulfate  (90 Base) MCG/ACT inhaler, Inhale 2 puffs into the lungs every 4 hours as needed for Shortness of Breath, Disp: , Rfl:     sennosides-docusate sodium (SENOKOT-S) 8.6-50 MG tablet, Take 1 tablet by mouth daily as needed, Disp: , Rfl:     psyllium (KONSYL) 28.3 % PACK, Take 1 packet by mouth daily as needed for Constipation, Disp: , Rfl:     diclofenac sodium 1 % GEL, Apply 2 g topically 2 times daily as needed for Pain (knees and elbows), Disp: , Rfl:     pregabalin (LYRICA) 150 MG capsule, Take 150 mg by mouth 2 times daily. , Disp: , Rfl:     montelukast (SINGULAIR) 10 MG tablet, Take 10 mg by mouth nightly , Disp: , Rfl:     metFORMIN (GLUCOPHAGE) 500 MG tablet, Take 500 mg by mouth 2 times daily (with meals). , Disp: , Rfl:     levothyroxine (SYNTHROID) 50 MCG tablet, Take 50 mcg by mouth Daily. , Disp: , Rfl:     omeprazole (PRILOSEC) 20 MG capsule, Take 20 mg by mouth Daily, Disp: , Rfl:     aspirin 81 MG EC tablet, Take 81 mg by mouth at bedtime, Disp: , Rfl:     Family History   Problem Relation Age of Onset    Heart Failure Mother     Lung Cancer Mother     Other Father          pneumonia    Alzheimer's Disease Father     Lung Cancer Brother     Diabetes Paternal Grandmother     Other Daughter         Fibromyalsia    Rheum Arthritis Daughter        Social History     Socioeconomic History    Marital status:      Spouse name: Davide Wright    Number of children: 3    Years of education: Not on file    Highest education level: Not on file   Occupational History    Occupation: retired   Tobacco Use    Smoking status: Never    Smokeless tobacco: Never   Vaping Use    Vaping Use: Never used   Substance and Sexual Activity    Alcohol use: Not Currently     Comment: rare    Drug use: Never    Sexual activity: Yes     Partners: Male   Other Topics Concern    Not on file   Social History Narrative    Not on file     Social Determinants of Health     Financial Resource Strain: Not on file   Food Insecurity: Not on file   Transportation Needs: Not on file   Physical Activity: Not on file   Stress: Not on file   Social Connections: Not on file   Intimate Partner Violence: Not on file   Housing Stability: Not on file       Review of Systems:  Review of Systems   Constitutional: Negative for chills and fever. Cardiovascular:  Negative for chest pain. Respiratory:  Negative for cough and shortness of breath. Musculoskeletal:  Positive for arthritis, back pain, joint pain, neck pain and stiffness. Gastrointestinal:  Negative for constipation. Neurological:  Negative for headaches, numbness, tingling and weakness. Physical Exam:  /66   Pulse 62   Temp 97.3 °F (36.3 °C)   Resp 20   SpO2 94%     Physical Exam  Cardiovascular:      Rate and Rhythm: Normal rate. Pulmonary:      Effort: Pulmonary effort is normal.   Musculoskeletal:      Right shoulder: Decreased range of motion. Right elbow: Decreased range of motion. Comments: Stooped posture - using walker      Skin:     General: Skin is warm and dry. Neurological:      Mental Status: She is alert and oriented to person, place, and time. Record/Diagnostics Review:    Last georgie 12/22 and was appropriate     Assessment:  Problem List Items Addressed This Visit       Chronic use of opiate drug for therapeutic purpose (Chronic)    Relevant Medications    oxyCODONE-acetaminophen (PERCOCET) 7.5-325 MG per tablet (Start on 2/10/2023)    Failed back syndrome    Relevant Medications    morphine (MS CONTIN) 15 MG extended release tablet (Start on 2/5/2023)    oxyCODONE-acetaminophen (PERCOCET) 7.5-325 MG per tablet (Start on 2/10/2023)     Other Visit Diagnoses       Failed back syndrome of lumbar spine        Relevant Medications    morphine (MS CONTIN) 15 MG extended release tablet (Start on 2/5/2023)               Treatment Plan:  Patient relates current medications are helping the pain. Patient reports taking pain medications as prescribed, denies obtaining medications from different sources and denies use of illegal drugs. Medication risk and benefits have been discussed. Patient denies side effects from medications like nausea, vomiting, constipation or drowsiness. Patient reports current activities of daily living are possible due to medications and would like to continue them. As always, we encourage daily stretching and strengthening exercises, and recommend minimizing use of pain medications unless patient cannot get through daily activities due to pain. Due to the high risk nature of this patient's pain medication close monitoring is required. Continue current medication management, pt has been stable and compliant.   Script written for MS Er and percocet  Consider LIBERTY   Follow up appointment made for 4 weeks    I have reviewed the chief complaint and history of present illness (including ROS and PFSH) and vital documentation by my staff and I agree with their documentation and have added where applicable.

## 2023-02-11 ENCOUNTER — HOSPITAL ENCOUNTER (OUTPATIENT)
Age: 83
End: 2023-02-11
Payer: MEDICARE

## 2023-02-11 ENCOUNTER — HOSPITAL ENCOUNTER (OUTPATIENT)
Dept: GENERAL RADIOLOGY | Age: 83
End: 2023-02-11
Payer: MEDICARE

## 2023-02-11 DIAGNOSIS — Q76.49 SPINAL DEFORMITY: ICD-10-CM

## 2023-02-11 PROCEDURE — 72082 X-RAY EXAM ENTIRE SPI 2/3 VW: CPT

## 2023-02-20 ENCOUNTER — HOSPITAL ENCOUNTER (OUTPATIENT)
Dept: WOMENS IMAGING | Age: 83
Discharge: HOME | End: 2023-02-22
Payer: MEDICARE

## 2023-02-20 ENCOUNTER — HOSPITAL ENCOUNTER (OUTPATIENT)
Dept: CT IMAGING | Age: 83
Discharge: HOME | End: 2023-02-22
Payer: MEDICARE

## 2023-02-20 DIAGNOSIS — M81.8 OTHER OSTEOPOROSIS WITHOUT CURRENT PATHOLOGICAL FRACTURE: ICD-10-CM

## 2023-02-20 DIAGNOSIS — S32.009K LUMBAR PSEUDOARTHROSIS: ICD-10-CM

## 2023-02-20 PROCEDURE — 72131 CT LUMBAR SPINE W/O DYE: CPT

## 2023-02-20 PROCEDURE — 77080 DXA BONE DENSITY AXIAL: CPT

## 2023-02-27 ENCOUNTER — HOSPITAL ENCOUNTER (OUTPATIENT)
Dept: MRI IMAGING | Age: 83
Discharge: HOME | End: 2023-03-01
Payer: MEDICARE

## 2023-02-27 DIAGNOSIS — R26.0 ATAXIC GAIT: ICD-10-CM

## 2023-02-27 DIAGNOSIS — M47.14 THORACIC MYELOPATHY: ICD-10-CM

## 2023-02-27 PROCEDURE — 72148 MRI LUMBAR SPINE W/O DYE: CPT

## 2023-02-27 PROCEDURE — 72146 MRI CHEST SPINE W/O DYE: CPT

## 2023-03-02 ENCOUNTER — HOSPITAL ENCOUNTER (OUTPATIENT)
Dept: PAIN MANAGEMENT | Age: 83
Discharge: HOME | End: 2023-03-02
Payer: MEDICARE

## 2023-03-02 VITALS
OXYGEN SATURATION: 95 % | HEART RATE: 70 BPM | TEMPERATURE: 97.3 F | DIASTOLIC BLOOD PRESSURE: 61 MMHG | WEIGHT: 148 LBS | BODY MASS INDEX: 31.07 KG/M2 | HEIGHT: 58 IN | RESPIRATION RATE: 20 BRPM | SYSTOLIC BLOOD PRESSURE: 127 MMHG

## 2023-03-02 DIAGNOSIS — Z79.891 CHRONIC USE OF OPIATE DRUG FOR THERAPEUTIC PURPOSE: Chronic | ICD-10-CM

## 2023-03-02 DIAGNOSIS — M48.02 SPINAL STENOSIS IN CERVICAL REGION: ICD-10-CM

## 2023-03-02 DIAGNOSIS — M96.1 FAILED BACK SYNDROME OF LUMBAR SPINE: ICD-10-CM

## 2023-03-02 DIAGNOSIS — M96.1 FAILED BACK SYNDROME: ICD-10-CM

## 2023-03-02 DIAGNOSIS — M47.816 LUMBAR SPONDYLOSIS: Primary | ICD-10-CM

## 2023-03-02 PROCEDURE — 99213 OFFICE O/P EST LOW 20 MIN: CPT | Performed by: NURSE PRACTITIONER

## 2023-03-02 PROCEDURE — 99213 OFFICE O/P EST LOW 20 MIN: CPT

## 2023-03-02 RX ORDER — OXYCODONE AND ACETAMINOPHEN 7.5; 325 MG/1; MG/1
1 TABLET ORAL 2 TIMES DAILY PRN
Qty: 60 TABLET | Refills: 0 | Status: SHIPPED | OUTPATIENT
Start: 2023-03-12 | End: 2023-04-11

## 2023-03-02 RX ORDER — MORPHINE SULFATE 15 MG/1
15 TABLET, FILM COATED, EXTENDED RELEASE ORAL 2 TIMES DAILY
Qty: 60 TABLET | Refills: 0 | Status: SHIPPED | OUTPATIENT
Start: 2023-03-07 | End: 2023-04-06

## 2023-03-02 ASSESSMENT — ENCOUNTER SYMPTOMS
COUGH: 0
BOWEL INCONTINENCE: 0
SHORTNESS OF BREATH: 0
CONSTIPATION: 0
BACK PAIN: 1

## 2023-03-02 NOTE — PROGRESS NOTES
Chief Complaint   Patient presents with    Back Pain    Medication Refill         [facility]   history of chronic multisite body pain  History of previous cervical and lumbar spine extensive fusion surgeries  Continue to suffer from chronic pain  On chronic opioid therapy in this clinic for several years  We have gradually weaned her opioid with slow titration  On last visit she was prescribed MS Contin 15 mg twice a day along with Percocet 7.5mg twice a day  Back  For her chronic back pain issues that she had recent diagnostic work-up with MRI and was evaluated by neurosurgery  She already had an extensive surgery  She had SCS implanted in 2012 at [facility] but it was removed as it was not functioning well. Clinical examination suggested SI joint mediated pain  Had bilat RFA of  the primary dorsal ramus of L5 and lateral branches of S1 and S2 nerves done 1/23/23 and reports  over 50% relief of pain and improved activity but Still having lumbar pain at higher level making it hard to bend and twist    Continue to follow with Dr. Ganesh Townsend - new imaging completed 2/27 - appt 4/26     Neck  History of chronic neck pain with radiation down right arm  Diagnosis cervical stenosis  History of previous cervical surgery  Following up with neurosurgery with more imaging ordered  May consider for cervical epidural steroid injection in future     Knee  She continues to follow with Dr. Kailey Ortiz for her knees with last gel injection in Dec. But does not feel it is helping. Plans to call for f/u     Right shoulder elbow  Pt has seen Dr Marlene Munoz and dx with right rot. Cuff tear. No surgery planned at this time. Back Pain  This is a chronic problem. The current episode started more than 1 year ago. The problem occurs constantly. The problem is unchanged. The pain is present in the lumbar spine. The quality of the pain is described as aching. The pain does not radiate. The pain is at a severity of 6/10. The pain is moderate. The pain is Worse during the day. The symptoms are aggravated by twisting, sitting and standing. Associated symptoms include numbness. Pertinent negatives include no bladder incontinence, bowel incontinence, chest pain, fever, tingling or weakness. She has tried home exercises, heat, ice, NSAIDs and chiropractic manipulation for the symptoms. The treatment provided no relief.       Patient denies any new neurological symptoms. No bowel or bladder incontinence, no weakness, and no falling.    Pill count: Percocet-11 - due 3/12  Morphine-9 due 3/7    Morphine equivalent: 52.5    Controlled Substance Monitoring:    Acute and Chronic Pain Monitoring:   RX Monitoring 3/2/2023   Attestation -   Acute Pain Prescriptions -   Periodic Controlled Substance Monitoring Possible medication side effects, risk of tolerance/dependence & alternative treatments discussed.;No signs of potential drug abuse or diversion identified.;Obtaining appropriate analgesic effect of treatment.   Chronic Pain > 50 MEDD Re-evaluated the status of the patient's underlying condition causing pain.   Chronic Pain > 80 MEDD -   Chronic Pain > 120 MEDD -            Past Medical History:   Diagnosis Date    Achilles tendonitis     right    Asthma     COPD (chronic obstructive pulmonary disease) ([facility])     Cough     clear phlegm    Degenerative lumbar disc     DM (diabetes mellitus), type 2 ([facility])     PCP Dr. Wheatley, seen 8/2019    Failed back syndrome, lumbar     Fast heart beat     Hx of \"8-10 years ago\"    Fibromyalgia     History of bladder cancer     Hypertension     Hypothyroid     Kidney stones     Lumbar radiculopathy     Lumbar spondylolysis     Lumbar spondylosis     Osteoarthritis     Sleep apnea     uses CPAP machine nightly    Spinal stenosis in cervical region     Wears glasses        Past Surgical History:   Procedure Laterality Date    BACK SURGERY  2010    lumbar fusion    BLADDER TUMOR EXCISION  2005    CARDIOVERSION      \"8-10 years ago\"   to get \"heart into regular rhythm\"    CARPAL TUNNEL RELEASE Bilateral     CATARACT REMOVAL WITH IMPLANT Bilateral 7/22/2014, 8/5/2014    Raffoul/StFrancesrles    CERVICAL FUSION  11/14/2019     ANTERIOR CERVICAL DECOMPRESSION FUSION C3-4, C4-5    CERVICAL FUSION N/A 11/14/2019    ANTERIOR CERVICAL DECOMPRESSION FUSION C3-4, C4-5 (SUPINE) DEPUY, REGULAR TABLE, MICROSCOPE, C-ARM, EVOKES (# [id number] [facility]) performed by Tanner Solorzano DO at [facility], LAPAROSCOPIC N/A 08/18/2017    CHOLECYSTECTOMY LAPAROSCOPIC ROBOTIC MULTIPORT performed by Aston Zarco MD at . [address]  01/2013    CYSTOSCOPY  11/04/2016    CYSTOSCOPY  01/04/2019    ENDOSCOPIC ULTRASOUND (LOWER) N/A 08/17/2017    ENDOSCOPIC ULTRASOUND performed by Mei Rodríguez MD at [address] [place] [address] (UPPER)  08/17/2017    A cursory view of the gastric mucosa was normal. The scope was then further advanced through a patent pylorus to the second portion of the duodenum. The Gallbladder was evaluated in bulb position. Thick sludge was noted. The CBD was 7.8 mm with no filling defects. The PD was 5 mm in the HOP. The pancreatic tissue was edematous in body and tail.     KNEE ARTHROSCOPY Left     LUMBAR FUSION      PAIN MANAGEMENT PROCEDURE      FL OFFICE/OUTPT VISIT,PROCEDURE ONLY N/A 01/24/2018    SPINAL HARDWARE REMOVAL LUMBAR BONE STIMULATOR performed by Kody Roberts MD at [facility] [address] Bilateral 1989    SKIN BIOPSY Right 11/2015    mole right posterior shoulder    SPINE SURGERY      spinal cord stimulator    SPINE SURGERY  91-4-15    renoval of spinal cord stimulator leads and battery    PASQUALE AND BSO (CERVIX REMOVED)      TUBAL LIGATION      UVULOPALATOPHARYGOPLASTY  2006       Allergies   Allergen Reactions    Azithromycin Shortness Of Breath     Tightness in chest    Adhesive Tape      OK to use paper tape per Patient- 2/19/20 patient states that she has been using all types of tape with no reaction Current Outpatient Medications:     morphine (MS CONTIN) 15 MG extended release tablet, Take 1 tablet by mouth 2 times daily for 30 days. , Disp: 60 tablet, Rfl: 0    oxyCODONE-acetaminophen (PERCOCET) 7.5-325 MG per tablet, Take 1 tablet by mouth 2 times daily as needed for Pain for up to 30 days. Max Daily Amount: 2 tablets, Disp: 60 tablet, Rfl: 0    fluticasone (FLONASE) 50 MCG/ACT nasal spray, 1 spray by Each Nostril route daily, Disp: 16 g, Rfl: 1    vitamin D (CHOLECALCIFEROL) 25 MCG (1000 UT) TABS tablet, Take 1,000 Units by mouth daily, Disp: , Rfl:     vitamin B-12 (CYANOCOBALAMIN) 500 MCG tablet, Take 500 mcg by mouth daily, Disp: , Rfl:     naloxone 4 MG/0.1ML LIQD nasal spray, 1 spray by Nasal route as needed for Opioid Reversal (Patient not taking: No sig reported), Disp: 1 each, Rfl: 0    albuterol sulfate  (90 Base) MCG/ACT inhaler, Inhale 2 puffs into the lungs every 4 hours as needed for Shortness of Breath, Disp: , Rfl:     sennosides-docusate sodium (SENOKOT-S) 8.6-50 MG tablet, Take 1 tablet by mouth daily as needed, Disp: , Rfl:     psyllium (KONSYL) 28.3 % PACK, Take 1 packet by mouth daily as needed for Constipation, Disp: , Rfl:     diclofenac sodium 1 % GEL, Apply 2 g topically 2 times daily as needed for Pain (knees and elbows), Disp: , Rfl:     pregabalin (LYRICA) 150 MG capsule, Take 150 mg by mouth 2 times daily. , Disp: , Rfl:     montelukast (SINGULAIR) 10 MG tablet, Take 10 mg by mouth nightly , Disp: , Rfl:     metFORMIN (GLUCOPHAGE) 500 MG tablet, Take 500 mg by mouth 2 times daily (with meals). , Disp: , Rfl:     levothyroxine (SYNTHROID) 50 MCG tablet, Take 50 mcg by mouth Daily. , Disp: , Rfl:     omeprazole (PRILOSEC) 20 MG capsule, Take 20 mg by mouth Daily, Disp: , Rfl:     aspirin 81 MG EC tablet, Take 81 mg by mouth at bedtime, Disp: , Rfl:     Family History   Problem Relation Age of Onset    Heart Failure Mother     Drema Right Mother     Other Father pneumonia    Alzheimer's Disease Father     Lung Cancer Brother     Diabetes Paternal Grandmother     Other Daughter         Fibromyalsia    Rheum Arthritis Daughter        Social History     Socioeconomic History    Marital status:      Spouse name: Nakia Peter    Number of children: 3    Years of education: Not on file    Highest education level: Not on file   Occupational History    Occupation: retired   Tobacco Use    Smoking status: Never    Smokeless tobacco: Never   Vaping Use    Vaping Use: Never used   Substance and Sexual Activity    Alcohol use: Not Currently     Comment: rare    Drug use: Never    Sexual activity: Yes     Partners: Male   Other Topics Concern    Not on file   Social History Narrative    Not on file     Social Determinants of Health     Financial Resource Strain: Not on file   Food Insecurity: Not on file   Transportation Needs: Not on file   Physical Activity: Not on file   Stress: Not on file   Social Connections: Not on file   Intimate Partner Violence: Not on file   Housing Stability: Not on file       Review of Systems:  Review of Systems   Constitutional: Negative for chills and fever. Cardiovascular:  Negative for chest pain and palpitations. Respiratory:  Negative for cough and shortness of breath. Musculoskeletal:  Positive for back pain, joint pain and myalgias. Gastrointestinal:  Negative for bowel incontinence and constipation. Genitourinary:  Negative for bladder incontinence. Neurological:  Positive for numbness. Negative for disturbances in coordination, loss of balance, tingling and weakness. Physical Exam:  /61   Pulse 70   Temp 97.3 °F (36.3 °C)   Resp 20   Ht 4' 10\" (1.473 m)   Wt 148 lb (67.1 kg)   SpO2 95%   BMI 30.93 kg/m²     Physical Exam  HENT:      Head: Normocephalic. Pulmonary:      Effort: Pulmonary effort is normal.   Musculoskeletal:         General: Normal range of motion. Cervical back: Normal range of motion. Lumbar back: Tenderness present. Right knee: Tenderness present. Left knee: Tenderness present. Skin:     General: Skin is warm and dry. Neurological:      Mental Status: She is alert and oriented to person, place, and time. Record/Diagnostics Review:    Last georgie 12/2022 and was appropriate     Assessment:  Problem List Items Addressed This Visit       Chronic use of opiate drug for therapeutic purpose (Chronic)    Relevant Medications    oxyCODONE-acetaminophen (PERCOCET) 7.5-325 MG per tablet (Start on 3/12/2023)    Lumbar spondylosis - Primary    Relevant Medications    oxyCODONE-acetaminophen (PERCOCET) 7.5-325 MG per tablet (Start on 3/12/2023)    morphine (MS CONTIN) 15 MG extended release tablet (Start on 3/7/2023)    Failed back syndrome    Relevant Medications    oxyCODONE-acetaminophen (PERCOCET) 7.5-325 MG per tablet (Start on 3/12/2023)    morphine (MS CONTIN) 15 MG extended release tablet (Start on 3/7/2023)    Spinal stenosis in cervical region     Other Visit Diagnoses       Failed back syndrome of lumbar spine        Relevant Medications    morphine (MS CONTIN) 15 MG extended release tablet (Start on 3/7/2023)               Treatment Plan:  Patient relates current medications are helping the pain. Patient reports taking pain medications as prescribed, denies obtaining medications from different sources and denies use of illegal drugs. Medication risk and benefits have been discussed. Patient denies side effects from medications like nausea, vomiting, constipation or drowsiness. Patient reports current activities of daily living are possible due to medications and would like to continue them. As always, we encourage daily stretching and strengthening exercises, and recommend minimizing use of pain medications unless patient cannot get through daily activities due to pain. Due to the high risk nature of this patient's pain medication close monitoring is required.    Continue current medication management, pt has been stable and compliant. Script written for MSER and percocet  Follow up appointment made for 4 weeks    I have reviewed the chief complaint and history of present illness (including ROS and PFSH) and vital documentation by my staff and I agree with their documentation and have added where applicable.

## 2023-04-04 ENCOUNTER — HOSPITAL ENCOUNTER (OUTPATIENT)
Dept: PAIN MANAGEMENT | Age: 83
Discharge: HOME | End: 2023-04-04
Payer: MEDICARE

## 2023-04-04 VITALS — HEIGHT: 58 IN | BODY MASS INDEX: 31.07 KG/M2 | WEIGHT: 148 LBS | TEMPERATURE: 97.3 F

## 2023-04-04 DIAGNOSIS — M47.816 LUMBAR SPONDYLOSIS: ICD-10-CM

## 2023-04-04 DIAGNOSIS — M53.3 CHRONIC SI JOINT PAIN: Primary | Chronic | ICD-10-CM

## 2023-04-04 DIAGNOSIS — M96.1 FAILED BACK SYNDROME OF LUMBAR SPINE: ICD-10-CM

## 2023-04-04 DIAGNOSIS — M54.16 LUMBAR RADICULOPATHY: ICD-10-CM

## 2023-04-04 DIAGNOSIS — M96.1 FAILED BACK SYNDROME: ICD-10-CM

## 2023-04-04 DIAGNOSIS — G89.29 CHRONIC SI JOINT PAIN: Primary | Chronic | ICD-10-CM

## 2023-04-04 DIAGNOSIS — Z79.891 CHRONIC USE OF OPIATE DRUG FOR THERAPEUTIC PURPOSE: Chronic | ICD-10-CM

## 2023-04-04 PROCEDURE — 99213 OFFICE O/P EST LOW 20 MIN: CPT

## 2023-04-04 PROCEDURE — 99213 OFFICE O/P EST LOW 20 MIN: CPT | Performed by: NURSE PRACTITIONER

## 2023-04-04 RX ORDER — TERBINAFINE HYDROCHLORIDE 250 MG/1
TABLET ORAL
COMMUNITY
Start: 2023-03-17

## 2023-04-04 RX ORDER — ACETAMINOPHEN 160 MG
TABLET,DISINTEGRATING ORAL DAILY
COMMUNITY
Start: 2022-12-31

## 2023-04-04 RX ORDER — GUAIFENESIN 600 MG/1
TABLET, EXTENDED RELEASE ORAL
COMMUNITY

## 2023-04-04 RX ORDER — OXYCODONE AND ACETAMINOPHEN 7.5; 325 MG/1; MG/1
1 TABLET ORAL 2 TIMES DAILY PRN
Qty: 60 TABLET | Refills: 0 | Status: SHIPPED | OUTPATIENT
Start: 2023-04-11 | End: 2023-05-11

## 2023-04-04 RX ORDER — MORPHINE SULFATE 15 MG/1
15 TABLET, FILM COATED, EXTENDED RELEASE ORAL 2 TIMES DAILY
Qty: 60 TABLET | Refills: 0 | Status: SHIPPED | OUTPATIENT
Start: 2023-04-09 | End: 2023-05-09

## 2023-04-04 RX ORDER — BUMETANIDE 1 MG/1
TABLET ORAL
COMMUNITY
Start: 2023-01-17

## 2023-04-04 ASSESSMENT — ENCOUNTER SYMPTOMS
BOWEL INCONTINENCE: 0
CONSTIPATION: 0
BACK PAIN: 1
SHORTNESS OF BREATH: 0
COUGH: 0

## 2023-04-04 NOTE — PROGRESS NOTES
BONE STIMULATOR performed by Davies Sport, MD at [address] Bilateral 1989    SKIN BIOPSY Right 11/2015    mole right posterior shoulder    SPINE SURGERY      spinal cord stimulator    SPINE SURGERY  91-4-15    renoval of spinal cord stimulator leads and battery    PASQUALE AND BSO (CERVIX REMOVED)      TUBAL LIGATION      UVULOPALATOPHARYGOPLASTY  2006       Allergies   Allergen Reactions    Azithromycin Shortness Of Breath     Tightness in chest    Adhesive Tape      OK to use paper tape per Patient- 2/19/20 patient states that she has been using all types of tape with no reaction         Current Outpatient Medications:     [START ON 4/11/2023] oxyCODONE-acetaminophen (PERCOCET) 7.5-325 MG per tablet, Take 1 tablet by mouth 2 times daily as needed for Pain for up to 30 days. Max Daily Amount: 2 tablets, Disp: 60 tablet, Rfl: 0    [START ON 4/9/2023] morphine (MS CONTIN) 15 MG extended release tablet, Take 1 tablet by mouth 2 times daily for 30 days. , Disp: 60 tablet, Rfl: 0    Cholecalciferol (VITAMIN D3) 50 MCG (2000 UT) CAPS, Take by mouth daily, Disp: , Rfl:     bumetanide (BUMEX) 1 MG tablet, , Disp: , Rfl:     terbinafine (LAMISIL) 250 MG tablet, TAKE 1 TABLET BY MOUTH EVERY DAY FOR 90 DAYS, Disp: , Rfl:     guaiFENesin (MUCINEX) 600 MG extended release tablet, 1 tablet as needed Orally every 12 hrs, Disp: , Rfl:     fluticasone (FLONASE) 50 MCG/ACT nasal spray, 1 spray by Each Nostril route daily, Disp: 16 g, Rfl: 1    vitamin D (CHOLECALCIFEROL) 25 MCG (1000 UT) TABS tablet, Take 1 tablet by mouth daily, Disp: , Rfl:     vitamin B-12 (CYANOCOBALAMIN) 500 MCG tablet, Take 1 tablet by mouth daily, Disp: , Rfl:     naloxone 4 MG/0.1ML LIQD nasal spray, 1 spray by Nasal route as needed for Opioid Reversal (Patient not taking: Reported on 12/15/2022), Disp: 1 each, Rfl: 0    albuterol sulfate  (90 Base) MCG/ACT inhaler, Inhale 2 puffs into the lungs every 4 hours as needed for Shortness of

## 2023-04-05 ENCOUNTER — OFFICE VISIT (OUTPATIENT)
Dept: ORTHOPEDIC SURGERY | Age: 83
End: 2023-04-05

## 2023-04-05 DIAGNOSIS — G89.29 CHRONIC PAIN OF BOTH KNEES: Primary | ICD-10-CM

## 2023-04-05 DIAGNOSIS — M25.561 CHRONIC PAIN OF BOTH KNEES: Primary | ICD-10-CM

## 2023-04-05 DIAGNOSIS — M25.562 CHRONIC PAIN OF BOTH KNEES: Primary | ICD-10-CM

## 2023-04-05 RX ORDER — BETAMETHASONE SODIUM PHOSPHATE AND BETAMETHASONE ACETATE 3; 3 MG/ML; MG/ML
12 INJECTION, SUSPENSION INTRA-ARTICULAR; INTRALESIONAL; INTRAMUSCULAR; SOFT TISSUE ONCE
Status: COMPLETED | OUTPATIENT
Start: 2023-04-05 | End: 2023-04-05

## 2023-04-05 RX ORDER — BUPIVACAINE HYDROCHLORIDE 2.5 MG/ML
2 INJECTION, SOLUTION EPIDURAL; INFILTRATION; INTRACAUDAL ONCE
Status: COMPLETED | OUTPATIENT
Start: 2023-04-05 | End: 2023-04-05

## 2023-04-05 RX ORDER — LIDOCAINE HYDROCHLORIDE 10 MG/ML
2 INJECTION, SOLUTION INFILTRATION; PERINEURAL ONCE
Status: COMPLETED | OUTPATIENT
Start: 2023-04-05 | End: 2023-04-05

## 2023-04-05 RX ADMIN — LIDOCAINE HYDROCHLORIDE 2 ML: 10 INJECTION, SOLUTION INFILTRATION; PERINEURAL at 10:48

## 2023-04-05 RX ADMIN — BUPIVACAINE HYDROCHLORIDE 5 MG: 2.5 INJECTION, SOLUTION EPIDURAL; INFILTRATION; INTRACAUDAL at 10:46

## 2023-04-05 RX ADMIN — BETAMETHASONE SODIUM PHOSPHATE AND BETAMETHASONE ACETATE 12 MG: 3; 3 INJECTION, SUSPENSION INTRA-ARTICULAR; INTRALESIONAL; INTRAMUSCULAR; SOFT TISSUE at 10:46

## 2023-04-05 RX ADMIN — BETAMETHASONE SODIUM PHOSPHATE AND BETAMETHASONE ACETATE 12 MG: 3; 3 INJECTION, SUSPENSION INTRA-ARTICULAR; INTRALESIONAL; INTRAMUSCULAR; SOFT TISSUE at 10:45

## 2023-04-05 RX ADMIN — BUPIVACAINE HYDROCHLORIDE 5 MG: 2.5 INJECTION, SOLUTION EPIDURAL; INFILTRATION; INTRACAUDAL at 10:47

## 2023-04-05 NOTE — PROGRESS NOTES
[id number]    Lot#: [id number]    : [address] [facility]    Patient Supplied?: No                Under sterile conditions patient's both knees were injected with lidocaine 2 cc bupivacaine 2 cc and Celestone 2 cc. She tolerated procedure well    Patient is well versed in post injection instructions. She is contact us if no relief otherwise back here on routine basis               . Provider Attestation:  Crow Has, personally performed the services described in this documentation. All medical record entries made by the scribe were at my direction and in my presence. I have reviewed the chart and discharge instructions and agree that the records reflect my personal performance and is accurate and complete. Petrona Gallegos MD. 04/05/23      Please note that this chart was generated using voice recognition Dragon dictation software. Although every effort was made to ensure the accuracy of this automated transcription, some errors in transcription may have occurred.

## 2023-05-02 ENCOUNTER — HOSPITAL ENCOUNTER (OUTPATIENT)
Dept: PAIN MANAGEMENT | Age: 83
Discharge: HOME | End: 2023-05-02
Payer: MEDICARE

## 2023-05-02 VITALS — WEIGHT: 148 LBS | BODY MASS INDEX: 31.07 KG/M2 | TEMPERATURE: 97.3 F | HEIGHT: 58 IN

## 2023-05-02 DIAGNOSIS — M96.1 FAILED BACK SYNDROME OF LUMBAR SPINE: ICD-10-CM

## 2023-05-02 DIAGNOSIS — M54.16 LUMBAR RADICULOPATHY: ICD-10-CM

## 2023-05-02 DIAGNOSIS — M54.2 NECK PAIN: ICD-10-CM

## 2023-05-02 DIAGNOSIS — M48.02 SPINAL STENOSIS IN CERVICAL REGION: ICD-10-CM

## 2023-05-02 DIAGNOSIS — Z79.891 CHRONIC USE OF OPIATE DRUG FOR THERAPEUTIC PURPOSE: Primary | Chronic | ICD-10-CM

## 2023-05-02 DIAGNOSIS — M47.816 LUMBAR SPONDYLOSIS: ICD-10-CM

## 2023-05-02 DIAGNOSIS — M96.1 FAILED BACK SYNDROME: ICD-10-CM

## 2023-05-02 DIAGNOSIS — M50.30 DDD (DEGENERATIVE DISC DISEASE), CERVICAL: ICD-10-CM

## 2023-05-02 PROCEDURE — 99213 OFFICE O/P EST LOW 20 MIN: CPT | Performed by: NURSE PRACTITIONER

## 2023-05-02 PROCEDURE — 99213 OFFICE O/P EST LOW 20 MIN: CPT

## 2023-05-02 RX ORDER — OXYCODONE AND ACETAMINOPHEN 7.5; 325 MG/1; MG/1
1 TABLET ORAL 2 TIMES DAILY PRN
Qty: 60 TABLET | Refills: 0 | Status: SHIPPED | OUTPATIENT
Start: 2023-05-12 | End: 2023-06-11

## 2023-05-02 RX ORDER — MORPHINE SULFATE 15 MG/1
15 TABLET, FILM COATED, EXTENDED RELEASE ORAL 2 TIMES DAILY
Qty: 60 TABLET | Refills: 0 | Status: SHIPPED | OUTPATIENT
Start: 2023-05-11 | End: 2023-06-10

## 2023-05-02 ASSESSMENT — ENCOUNTER SYMPTOMS
SHORTNESS OF BREATH: 0
COUGH: 0
BACK PAIN: 1
BOWEL INCONTINENCE: 0
CONSTIPATION: 0

## 2023-05-02 ASSESSMENT — PAIN SCALES - GENERAL: PAINLEVEL_OUTOF10: 7

## 2023-05-02 NOTE — PROGRESS NOTES
Chief Complaint   Patient presents with    Back Pain     Med refill         PMH     history of chronic multisite body pain  History of previous cervical and lumbar spine extensive fusion surgeries  Continue to suffer from chronic pain  On chronic opioid therapy in this clinic for several years  We have gradually weaned her opioid with slow titration  She is prescribed MS Contin 15 mg twice a day along with Percocet 7.5mg twice a day     Back  She already had an extensive surgery  She had SCS implanted in 2012 at [facility] but it was removed as it was not functioning well. Had bilat RFA of  the primary dorsal ramus of L5 and lateral branches of S1 and S2 nerves done 1/23/23 and reports  over 50% relief of pain and improved activity   Continues to follow with Dr. Angel Gerardo - appt this month   lumbar MRI 2/2023   Posterior hardware fixation from L2-S1 with a prominent postoperative seroma in the adjacent posterior paraspinal soft tissues. Multilevel degenerative change with canal stenosis at L1-2. Moderate left and severe right foraminal narrowing at L1-2. Thoracic MRI Multilevel degenerative disc disease with associated facet and ligamentous hypertrophy throughout the thoracic spine resulting in canal stenosis and foraminal narrowing bilaterally as described above. Neck  History of chronic neck pain with radiation down right arm  Diagnosis cervical stenosis  History of previous cervical surgery  Following up with neurosurgery with more imaging ordered  May consider for cervical epidural steroid injection in future  12/21 Cervcial MRI   No fracture or bony destructive lesion. 2. Status post ACDF from C3-C5. 3. Mild central canal stenoses at C3-4, C5-6, C6-7 and C7-T1. 4.  Multilevel neural foraminal stenoses, worst (moderate to severe) at the left C6-7 level         HPI:     Back Pain  This is a chronic problem. The current episode started more than 1 year ago. The problem occurs constantly.

## 2023-05-17 ENCOUNTER — OFFICE VISIT (OUTPATIENT)
Dept: NEUROSURGERY | Age: 83
End: 2023-05-17
Payer: MEDICARE

## 2023-05-17 VITALS
WEIGHT: 148 LBS | TEMPERATURE: 97.7 F | DIASTOLIC BLOOD PRESSURE: 78 MMHG | SYSTOLIC BLOOD PRESSURE: 137 MMHG | HEART RATE: 76 BPM | BODY MASS INDEX: 31.07 KG/M2 | OXYGEN SATURATION: 98 % | HEIGHT: 58 IN

## 2023-05-17 DIAGNOSIS — M81.8 OTHER OSTEOPOROSIS WITHOUT CURRENT PATHOLOGICAL FRACTURE: ICD-10-CM

## 2023-05-17 DIAGNOSIS — S32.009K LUMBAR PSEUDOARTHROSIS: ICD-10-CM

## 2023-05-17 DIAGNOSIS — Q76.49 SPINAL DEFORMITY: ICD-10-CM

## 2023-05-17 DIAGNOSIS — M47.14 THORACIC MYELOPATHY: Primary | ICD-10-CM

## 2023-05-17 PROCEDURE — 1036F TOBACCO NON-USER: CPT | Performed by: NEUROLOGICAL SURGERY

## 2023-05-17 PROCEDURE — G8399 PT W/DXA RESULTS DOCUMENT: HCPCS | Performed by: NEUROLOGICAL SURGERY

## 2023-05-17 PROCEDURE — G8417 CALC BMI ABV UP PARAM F/U: HCPCS | Performed by: NEUROLOGICAL SURGERY

## 2023-05-17 PROCEDURE — 99214 OFFICE O/P EST MOD 30 MIN: CPT | Performed by: NEUROLOGICAL SURGERY

## 2023-05-17 PROCEDURE — 1123F ACP DISCUSS/DSCN MKR DOCD: CPT | Performed by: NEUROLOGICAL SURGERY

## 2023-05-17 PROCEDURE — G8427 DOCREV CUR MEDS BY ELIG CLIN: HCPCS | Performed by: NEUROLOGICAL SURGERY

## 2023-05-17 PROCEDURE — 1090F PRES/ABSN URINE INCON ASSESS: CPT | Performed by: NEUROLOGICAL SURGERY

## 2023-05-17 NOTE — PROGRESS NOTES
[address]  [facility] [address] [facility] [address] [facility] [address]  Dept: [phone]    Patient:  Seema Badillo  YOB: 1940  Date: 5/17/23    The patient is a [de-identified] y.o. female who presents today for consult of the following problems:     Chief Complaint   Patient presents with    Neurologic Problem     Thoracic myelpoathy    Back Pain             HPI:     Seema Bdaillo is a [de-identified] y.o. female on whom neurosurgical consultation was requested by Ino Alvarez DO for management of multilevel spondylosis with significant stenosis. The patient is complaining of significant difficulty with ambulation partly due to her knee pain but otherwise due to generalized instability as well. Denies any bowel or bladder incontinence. Denies saddle anesthesia. But has not had any recent falls. Does ambulate with use of a walker and is being seen by orthopedic surgery as well as pain management. Patient is currently on 30 mg/day of morphine as well as 2 Percocet 5 mg pills per day.       History:     Past Medical History:   Diagnosis Date    Achilles tendonitis     right    Asthma     COPD (chronic obstructive pulmonary disease) ([facility])     Cough     clear phlegm    Degenerative lumbar disc     DM (diabetes mellitus), type 2 ([facility]     PCP Dr. Mihai Collazo, seen 8/2019    Failed back syndrome, lumbar     Fast heart beat     Hx of \"8-10 years ago\"    Fibromyalgia     History of bladder cancer     Hypertension     Hypothyroid     Kidney stones     Lumbar radiculopathy     Lumbar spondylolysis     Lumbar spondylosis     Osteoarthritis     Sleep apnea     uses CPAP machine nightly    Spinal stenosis in cervical region     Wears glasses      Past Surgical History:   Procedure Laterality Date    BACK SURGERY  2010    lumbar fusion    BLADDER TUMOR EXCISION  2005    CARDIOVERSION      \"8-10 years ago\"  to get [employer]

## 2023-06-01 ENCOUNTER — HOSPITAL ENCOUNTER (OUTPATIENT)
Dept: PAIN MANAGEMENT | Age: 83
Discharge: HOME | End: 2023-06-01
Payer: MEDICARE

## 2023-06-01 VITALS
HEIGHT: 58 IN | DIASTOLIC BLOOD PRESSURE: 70 MMHG | HEART RATE: 65 BPM | OXYGEN SATURATION: 96 % | WEIGHT: 148 LBS | SYSTOLIC BLOOD PRESSURE: 140 MMHG | BODY MASS INDEX: 31.07 KG/M2

## 2023-06-01 DIAGNOSIS — R26.89 IMBALANCE: Primary | Chronic | ICD-10-CM

## 2023-06-01 DIAGNOSIS — M96.1 FAILED BACK SYNDROME: ICD-10-CM

## 2023-06-01 DIAGNOSIS — M48.062 SPINAL STENOSIS OF LUMBAR REGION WITH NEUROGENIC CLAUDICATION: Chronic | ICD-10-CM

## 2023-06-01 DIAGNOSIS — M54.16 LUMBAR RADICULOPATHY: ICD-10-CM

## 2023-06-01 DIAGNOSIS — Z79.891 CHRONIC USE OF OPIATE DRUG FOR THERAPEUTIC PURPOSE: Chronic | ICD-10-CM

## 2023-06-01 DIAGNOSIS — M96.1 FAILED BACK SYNDROME OF LUMBAR SPINE: ICD-10-CM

## 2023-06-01 PROCEDURE — 99213 OFFICE O/P EST LOW 20 MIN: CPT

## 2023-06-01 RX ORDER — OXYCODONE AND ACETAMINOPHEN 7.5; 325 MG/1; MG/1
1 TABLET ORAL 2 TIMES DAILY PRN
Qty: 60 TABLET | Refills: 0 | Status: SHIPPED | OUTPATIENT
Start: 2023-06-01 | End: 2023-07-01

## 2023-06-01 RX ORDER — MORPHINE SULFATE 15 MG/1
15 TABLET, FILM COATED, EXTENDED RELEASE ORAL 2 TIMES DAILY
Qty: 60 TABLET | Refills: 0 | Status: SHIPPED | OUTPATIENT
Start: 2023-06-01 | End: 2023-07-01

## 2023-06-01 ASSESSMENT — ENCOUNTER SYMPTOMS
NAUSEA: 0
VOMITING: 0
BACK PAIN: 1
CONSTIPATION: 0

## 2023-06-01 NOTE — PROGRESS NOTES
The patient is a [de-identified] y. o.  /  female. Chief Complaint   Patient presents with    Back Pain     Percocet morphine         HPI    82-year-old female with history of chronic low back pain  History of multilevel lumbar spine decompression and fusion L2-S1  Ongoing worsening back and leg pain with worsening imbalance  Neurodiagnostic work-up revealed thoracic and upper lumbar spinal stenosis  Following up with the neurosurgery planning for a decompression thoracic and lumbar spine laminectomy surgery  On chronic opioid therapy  Currently prescribed morphine 15 mg twice a day along with oxycodone twice a day  Daily morphine equivalent 52.5  Reports no side effect  Finds the medication helpful  Denies any illicit substance use    Neurosurgery have recommended to reduce opioid dependence to avoid severe pain and difficulty to manage postop pain after spine surgery  Discussed with patient  I will provide her the same doses prescription but she will try to take only 1 tablet of oxycodone a day    Medication Refill: Percocet & Morphine    Pain score Today:  7  Adverse effects (Constipation / Nausea / Sedation / sexual Dysfunction / others) : Constipation   Mood: good  Sleep pattern and quality: good  Activity level: poor meds do not help she has to much pain even with the meds     Pill count Today:  Percocet - 26  Morphine - 22    Last dose taken  06/01/2023  OARRS report reviewed today: yes  ER/Hospitalizations/PCP visit related to pain since last visit:no   Any legal problems e.g. DUI etc.:No  Satisfied with current management: Yes    Opioid Contract: 12/01/2022  Last Urine Dug screen dated: 12/01/2022    Hemoglobin A1C   Date Value Ref Range Status   02/26/2022 6.0 4.0 - 6.0 % Final       Past Medical History, Past Surgical History, Social History, Allergies and Medications reviewed and updated in EPIC as indicated    Family History reviewed and is noncontributory.         Past Medical History:   Diagnosis

## 2023-06-26 RX ORDER — SODIUM CHLORIDE, SODIUM LACTATE, POTASSIUM CHLORIDE, CALCIUM CHLORIDE 600; 310; 30; 20 MG/100ML; MG/100ML; MG/100ML; MG/100ML
INJECTION, SOLUTION INTRAVENOUS CONTINUOUS
OUTPATIENT
Start: 2023-06-26

## 2023-06-28 ENCOUNTER — HOSPITAL ENCOUNTER (OUTPATIENT)
Dept: PREADMISSION TESTING | Age: 83
Discharge: HOME | End: 2023-06-28
Payer: MEDICARE

## 2023-06-28 VITALS
TEMPERATURE: 97.2 F | OXYGEN SATURATION: 99 % | SYSTOLIC BLOOD PRESSURE: 155 MMHG | HEIGHT: 58 IN | HEART RATE: 68 BPM | RESPIRATION RATE: 18 BRPM | DIASTOLIC BLOOD PRESSURE: 62 MMHG | BODY MASS INDEX: 31.07 KG/M2 | WEIGHT: 148 LBS

## 2023-06-28 LAB
ABO + RH BLD: NORMAL
ANION GAP SERPL CALCULATED.3IONS-SCNC: 19 MMOL/L (ref 9–17)
ARM BAND NUMBER: NORMAL
BLOOD GROUP ANTIBODIES SERPL: NEGATIVE
BUN SERPL-MCNC: 18 MG/DL (ref 8–23)
CHLORIDE SERPL-SCNC: 103 MMOL/L (ref 98–107)
CO2 SERPL-SCNC: 22 MMOL/L (ref 20–31)
CREAT SERPL-MCNC: 0.95 MG/DL (ref 0.5–0.9)
ERYTHROCYTE [DISTWIDTH] IN BLOOD BY AUTOMATED COUNT: 13.1 % (ref 11.8–14.4)
EXPIRATION DATE: NORMAL
GFR SERPL CREATININE-BSD FRML MDRD: >60 ML/MIN/1.73M2
GLUCOSE SERPL-MCNC: 87 MG/DL (ref 70–99)
HCT VFR BLD AUTO: 36.4 % (ref 36.3–47.1)
HGB BLD-MCNC: 11.7 G/DL (ref 11.9–15.1)
INR PPP: 0.9
MCH RBC QN AUTO: 29 PG (ref 25.2–33.5)
MCHC RBC AUTO-ENTMCNC: 32.1 G/DL (ref 28.4–34.8)
MCV RBC AUTO: 90.3 FL (ref 82.6–102.9)
NRBC BLD-RTO: 0 PER 100 WBC
PARTIAL THROMBOPLASTIN TIME: 25 SEC (ref 23–36.5)
PLATELET # BLD AUTO: 216 K/UL (ref 138–453)
PMV BLD AUTO: 11.6 FL (ref 8.1–13.5)
POTASSIUM SERPL-SCNC: 4.1 MMOL/L (ref 3.7–5.3)
PROTHROMBIN TIME: 12.4 SEC (ref 11.7–14.9)
RBC # BLD AUTO: 4.03 M/UL (ref 3.95–5.11)
SODIUM SERPL-SCNC: 144 MMOL/L (ref 135–144)
WBC OTHER # BLD: 5.7 K/UL (ref 3.5–11.3)

## 2023-06-28 PROCEDURE — 85610 PROTHROMBIN TIME: CPT

## 2023-06-28 PROCEDURE — 36415 COLL VENOUS BLD VENIPUNCTURE: CPT

## 2023-06-28 PROCEDURE — 86850 RBC ANTIBODY SCREEN: CPT

## 2023-06-28 PROCEDURE — 86901 BLOOD TYPING SEROLOGIC RH(D): CPT

## 2023-06-28 PROCEDURE — 86900 BLOOD TYPING SEROLOGIC ABO: CPT

## 2023-06-28 PROCEDURE — 82565 ASSAY OF CREATININE: CPT

## 2023-06-28 PROCEDURE — 80051 ELECTROLYTE PANEL: CPT

## 2023-06-28 PROCEDURE — 85730 THROMBOPLASTIN TIME PARTIAL: CPT

## 2023-06-28 PROCEDURE — 84520 ASSAY OF UREA NITROGEN: CPT

## 2023-06-28 PROCEDURE — 85027 COMPLETE CBC AUTOMATED: CPT

## 2023-06-28 PROCEDURE — 82947 ASSAY GLUCOSE BLOOD QUANT: CPT

## 2023-06-28 RX ORDER — ROSUVASTATIN CALCIUM 5 MG/1
5 TABLET, COATED ORAL DAILY
COMMUNITY
Start: 2023-06-23

## 2023-07-06 ENCOUNTER — HOSPITAL ENCOUNTER (OUTPATIENT)
Dept: PAIN MANAGEMENT | Age: 83
Discharge: HOME | End: 2023-07-06
Payer: MEDICARE

## 2023-07-06 VITALS
TEMPERATURE: 97.6 F | RESPIRATION RATE: 20 BRPM | BODY MASS INDEX: 31.07 KG/M2 | DIASTOLIC BLOOD PRESSURE: 63 MMHG | SYSTOLIC BLOOD PRESSURE: 135 MMHG | OXYGEN SATURATION: 97 % | WEIGHT: 148 LBS | HEART RATE: 66 BPM | HEIGHT: 58 IN

## 2023-07-06 DIAGNOSIS — M54.16 LUMBAR RADICULOPATHY: ICD-10-CM

## 2023-07-06 DIAGNOSIS — M46.1 SACROILIITIS (HCC): ICD-10-CM

## 2023-07-06 DIAGNOSIS — M48.02 CERVICAL STENOSIS OF SPINE: ICD-10-CM

## 2023-07-06 DIAGNOSIS — M54.12 CERVICAL RADICULAR PAIN: ICD-10-CM

## 2023-07-06 DIAGNOSIS — M50.30 DDD (DEGENERATIVE DISC DISEASE), CERVICAL: ICD-10-CM

## 2023-07-06 DIAGNOSIS — Z79.891 CHRONIC USE OF OPIATE DRUG FOR THERAPEUTIC PURPOSE: Chronic | ICD-10-CM

## 2023-07-06 DIAGNOSIS — M96.1 FAILED BACK SYNDROME OF LUMBAR SPINE: ICD-10-CM

## 2023-07-06 DIAGNOSIS — M47.816 LUMBAR SPONDYLOSIS: ICD-10-CM

## 2023-07-06 DIAGNOSIS — R26.89 IMBALANCE: Primary | Chronic | ICD-10-CM

## 2023-07-06 PROCEDURE — G0481 DRUG TEST DEF 8-14 CLASSES: HCPCS

## 2023-07-06 PROCEDURE — 99213 OFFICE O/P EST LOW 20 MIN: CPT

## 2023-07-06 PROCEDURE — 80307 DRUG TEST PRSMV CHEM ANLYZR: CPT

## 2023-07-06 PROCEDURE — 99213 OFFICE O/P EST LOW 20 MIN: CPT | Performed by: NURSE PRACTITIONER

## 2023-07-06 RX ORDER — MORPHINE SULFATE 15 MG/1
15 TABLET, FILM COATED, EXTENDED RELEASE ORAL 2 TIMES DAILY
Qty: 60 TABLET | Refills: 0 | Status: SHIPPED | OUTPATIENT
Start: 2023-07-12 | End: 2023-08-11

## 2023-07-06 ASSESSMENT — ENCOUNTER SYMPTOMS
COUGH: 0
CONSTIPATION: 0
SHORTNESS OF BREATH: 0
BACK PAIN: 1

## 2023-07-07 ENCOUNTER — TELEPHONE (OUTPATIENT)
Dept: NEUROSURGERY | Age: 83
End: 2023-07-07

## 2023-07-10 LAB
6-ACETYLMORPHINE, UR: NOT DETECTED
7-AMINOCLONAZEPAM, URINE: NOT DETECTED
ALPHA-OH-ALPRAZ, URINE: NOT DETECTED
ALPHA-OH-MIDAZOLAM, URINE: NOT DETECTED
ALPRAZOLAM, URINE: NOT DETECTED
AMPHETAMINES, URINE: NOT DETECTED
BARBITURATES, URINE: NOT DETECTED
BENZOYLECGONINE, UR: NOT DETECTED
BUPRENORPHINE URINE: NOT DETECTED
CARISOPRODOL, UR: NOT DETECTED
CLONAZEPAM, URINE: NOT DETECTED
CODEINE, URINE: NOT DETECTED
CREATININE URINE: 159.4 MG/DL (ref 20–400)
DIAZEPAM, URINE: NOT DETECTED
DRUGS EXPECTED, UR: NORMAL
EER HI RES INTERP UR: NORMAL
ETHYL GLUCURONIDE UR: NOT DETECTED
FENTANYL URINE: NOT DETECTED
GABAPENTIN: NOT DETECTED
HYDROCODONE, OPI6M: NOT DETECTED
HYDROMORPHONE, OPI3M: PRESENT
LORAZEPAM, URINE: NOT DETECTED
MARIJUANA METAB, UR: NOT DETECTED
MDA, UR: NOT DETECTED
MDEA, EVE, UR: NOT DETECTED
MDMA URINE: NOT DETECTED
MEPERIDINE METAB, UR: NOT DETECTED
METHADONE, URINE: NOT DETECTED
METHAMPHETAMINE, URINE: NOT DETECTED
METHYLPHENIDATE: NOT DETECTED
MIDAZOLAM, URINE: NOT DETECTED
MORPHINE, OPI1M: PRESENT
NALOXONE URINE: NOT DETECTED
NORBUPRENORPHINE, URINE: NOT DETECTED
NORDIAZEPAM, URINE: NOT DETECTED
NORFENTANYL, URINE: NOT DETECTED
NORHYDROCODONE, URINE: NOT DETECTED
NOROXYCODONE, URINE: PRESENT
NOROXYMORPHONE, URINE: NOT DETECTED
OXAZEPAM, URINE: NOT DETECTED
OXYCODONE URINE: PRESENT
OXYMORPHONE, URINE: PRESENT
PAIN MANAGEMENT DRUG PANEL INTERP, URINE: NORMAL
PAIN MGT DRUG PANEL, HI RES, UR: NORMAL
PCP,URINE: NOT DETECTED
PHENTERMINE, UR: NOT DETECTED
PREGABALIN: PRESENT
TAPENTADOL, URINE: NOT DETECTED
TAPENTADOL-O-SULFATE, URINE: NOT DETECTED
TEMAZEPAM, URINE: NOT DETECTED
TRAMADOL, URINE: NOT DETECTED
ZOLPIDEM METABOLITE (ZCA), URINE: NOT DETECTED
ZOLPIDEM, URINE: NOT DETECTED

## 2023-07-10 NOTE — TELEPHONE ENCOUNTER
Patient states that she isn't being treated for infection. She says that her Urologist had given it her to prevent infection. Patient had a Cystoscopy.

## 2023-07-11 ENCOUNTER — APPOINTMENT (OUTPATIENT)
Dept: GENERAL RADIOLOGY | Age: 83
End: 2023-07-11
Attending: NEUROLOGICAL SURGERY
Payer: MEDICARE

## 2023-07-11 ENCOUNTER — ANESTHESIA (OUTPATIENT)
Dept: OPERATING ROOM | Age: 83
End: 2023-07-11
Payer: MEDICARE

## 2023-07-11 ENCOUNTER — ANESTHESIA EVENT (OUTPATIENT)
Dept: OPERATING ROOM | Age: 83
End: 2023-07-11
Payer: MEDICARE

## 2023-07-11 ENCOUNTER — HOSPITAL ENCOUNTER (INPATIENT)
Age: 83
LOS: 3 days | Discharge: NURSING, REHAB OR LONG-TERM CARE | End: 2023-07-14
Attending: NEUROLOGICAL SURGERY | Admitting: NEUROLOGICAL SURGERY
Payer: MEDICARE

## 2023-07-11 DIAGNOSIS — M47.14 THORACIC MYELOPATHY: ICD-10-CM

## 2023-07-11 DIAGNOSIS — M48.062 SPINAL STENOSIS OF LUMBAR REGION WITH NEUROGENIC CLAUDICATION: Primary | Chronic | ICD-10-CM

## 2023-07-11 PROBLEM — M48.04 DEGENERATIVE THORACIC SPINAL STENOSIS: Status: ACTIVE | Noted: 2023-07-11

## 2023-07-11 LAB
GLUCOSE BLD-MCNC: 108 MG/DL (ref 74–100)
GLUCOSE BLD-MCNC: 156 MG/DL (ref 65–105)
GLUCOSE BLD-MCNC: 178 MG/DL (ref 65–105)
POTASSIUM BLD-SCNC: 3.7 MMOL/L (ref 3.5–4.5)

## 2023-07-11 PROCEDURE — 2580000003 HC RX 258: Performed by: PHYSICIAN ASSISTANT

## 2023-07-11 PROCEDURE — 6360000002 HC RX W HCPCS

## 2023-07-11 PROCEDURE — 2580000003 HC RX 258: Performed by: ANESTHESIOLOGY

## 2023-07-11 PROCEDURE — 63048 LAM FACETEC &FORAMOT EA ADDL: CPT | Performed by: NEUROLOGICAL SURGERY

## 2023-07-11 PROCEDURE — 3600000005 HC SURGERY LEVEL 5 BASE: Performed by: NEUROLOGICAL SURGERY

## 2023-07-11 PROCEDURE — 6360000002 HC RX W HCPCS: Performed by: PHYSICIAN ASSISTANT

## 2023-07-11 PROCEDURE — 00NX0ZZ RELEASE THORACIC SPINAL CORD, OPEN APPROACH: ICD-10-PCS | Performed by: NEUROLOGICAL SURGERY

## 2023-07-11 PROCEDURE — 6360000002 HC RX W HCPCS: Performed by: ANESTHESIOLOGY

## 2023-07-11 PROCEDURE — 6370000000 HC RX 637 (ALT 250 FOR IP): Performed by: PHYSICIAN ASSISTANT

## 2023-07-11 PROCEDURE — 3700000001 HC ADD 15 MINUTES (ANESTHESIA): Performed by: NEUROLOGICAL SURGERY

## 2023-07-11 PROCEDURE — 2580000003 HC RX 258

## 2023-07-11 PROCEDURE — 2720000010 HC SURG SUPPLY STERILE: Performed by: NEUROLOGICAL SURGERY

## 2023-07-11 PROCEDURE — 3700000000 HC ANESTHESIA ATTENDED CARE: Performed by: NEUROLOGICAL SURGERY

## 2023-07-11 PROCEDURE — 2500000003 HC RX 250 WO HCPCS

## 2023-07-11 PROCEDURE — 7100000000 HC PACU RECOVERY - FIRST 15 MIN: Performed by: NEUROLOGICAL SURGERY

## 2023-07-11 PROCEDURE — 6370000000 HC RX 637 (ALT 250 FOR IP): Performed by: NEUROLOGICAL SURGERY

## 2023-07-11 PROCEDURE — 84132 ASSAY OF SERUM POTASSIUM: CPT

## 2023-07-11 PROCEDURE — 3600000015 HC SURGERY LEVEL 5 ADDTL 15MIN: Performed by: NEUROLOGICAL SURGERY

## 2023-07-11 PROCEDURE — 2060000000 HC ICU INTERMEDIATE R&B

## 2023-07-11 PROCEDURE — C9399 UNCLASSIFIED DRUGS OR BIOLOG: HCPCS

## 2023-07-11 PROCEDURE — 2500000003 HC RX 250 WO HCPCS: Performed by: NEUROLOGICAL SURGERY

## 2023-07-11 PROCEDURE — 87086 URINE CULTURE/COLONY COUNT: CPT

## 2023-07-11 PROCEDURE — 2580000003 HC RX 258: Performed by: NEUROLOGICAL SURGERY

## 2023-07-11 PROCEDURE — 82947 ASSAY GLUCOSE BLOOD QUANT: CPT

## 2023-07-11 PROCEDURE — 00NY0ZZ RELEASE LUMBAR SPINAL CORD, OPEN APPROACH: ICD-10-PCS | Performed by: NEUROLOGICAL SURGERY

## 2023-07-11 PROCEDURE — 7100000001 HC PACU RECOVERY - ADDTL 15 MIN: Performed by: NEUROLOGICAL SURGERY

## 2023-07-11 PROCEDURE — 2709999900 HC NON-CHARGEABLE SUPPLY: Performed by: NEUROLOGICAL SURGERY

## 2023-07-11 PROCEDURE — 63047 LAM FACETEC & FORAMOT LUMBAR: CPT | Performed by: NEUROLOGICAL SURGERY

## 2023-07-11 RX ORDER — ALBUTEROL SULFATE 90 UG/1
2 AEROSOL, METERED RESPIRATORY (INHALATION) EVERY 4 HOURS PRN
Status: DISCONTINUED | OUTPATIENT
Start: 2023-07-11 | End: 2023-07-14 | Stop reason: HOSPADM

## 2023-07-11 RX ORDER — VITAMIN B COMPLEX
2000 TABLET ORAL DAILY
Status: DISCONTINUED | OUTPATIENT
Start: 2023-07-11 | End: 2023-07-14 | Stop reason: HOSPADM

## 2023-07-11 RX ORDER — CEFAZOLIN SODIUM 1 G/3ML
INJECTION, POWDER, FOR SOLUTION INTRAMUSCULAR; INTRAVENOUS PRN
Status: DISCONTINUED | OUTPATIENT
Start: 2023-07-11 | End: 2023-07-11 | Stop reason: SDUPTHER

## 2023-07-11 RX ORDER — ONDANSETRON 2 MG/ML
4 INJECTION INTRAMUSCULAR; INTRAVENOUS EVERY 6 HOURS PRN
Status: DISCONTINUED | OUTPATIENT
Start: 2023-07-11 | End: 2023-07-14 | Stop reason: HOSPADM

## 2023-07-11 RX ORDER — FLUTICASONE PROPIONATE 50 MCG
1 SPRAY, SUSPENSION (ML) NASAL DAILY
Status: DISCONTINUED | OUTPATIENT
Start: 2023-07-11 | End: 2023-07-14 | Stop reason: HOSPADM

## 2023-07-11 RX ORDER — ACETAMINOPHEN 325 MG/1
650 TABLET ORAL EVERY 6 HOURS
Status: DISCONTINUED | OUTPATIENT
Start: 2023-07-11 | End: 2023-07-14 | Stop reason: HOSPADM

## 2023-07-11 RX ORDER — MAGNESIUM HYDROXIDE 1200 MG/15ML
LIQUID ORAL CONTINUOUS PRN
Status: DISCONTINUED | OUTPATIENT
Start: 2023-07-11 | End: 2023-07-11 | Stop reason: HOSPADM

## 2023-07-11 RX ORDER — ONDANSETRON 2 MG/ML
INJECTION INTRAMUSCULAR; INTRAVENOUS PRN
Status: DISCONTINUED | OUTPATIENT
Start: 2023-07-11 | End: 2023-07-11 | Stop reason: SDUPTHER

## 2023-07-11 RX ORDER — SODIUM CHLORIDE 0.9 % (FLUSH) 0.9 %
5-40 SYRINGE (ML) INJECTION PRN
Status: DISCONTINUED | OUTPATIENT
Start: 2023-07-11 | End: 2023-07-11 | Stop reason: HOSPADM

## 2023-07-11 RX ORDER — EPHEDRINE SULFATE/0.9% NACL/PF 50 MG/5 ML
SYRINGE (ML) INTRAVENOUS PRN
Status: DISCONTINUED | OUTPATIENT
Start: 2023-07-11 | End: 2023-07-11 | Stop reason: SDUPTHER

## 2023-07-11 RX ORDER — FENTANYL CITRATE 50 UG/ML
INJECTION, SOLUTION INTRAMUSCULAR; INTRAVENOUS PRN
Status: DISCONTINUED | OUTPATIENT
Start: 2023-07-11 | End: 2023-07-11 | Stop reason: SDUPTHER

## 2023-07-11 RX ORDER — LIDOCAINE HYDROCHLORIDE AND EPINEPHRINE 10; 10 MG/ML; UG/ML
INJECTION, SOLUTION INFILTRATION; PERINEURAL PRN
Status: DISCONTINUED | OUTPATIENT
Start: 2023-07-11 | End: 2023-07-11 | Stop reason: HOSPADM

## 2023-07-11 RX ORDER — OXYCODONE HYDROCHLORIDE 5 MG/1
5 TABLET ORAL EVERY 4 HOURS PRN
Status: DISCONTINUED | OUTPATIENT
Start: 2023-07-11 | End: 2023-07-14 | Stop reason: HOSPADM

## 2023-07-11 RX ORDER — LEVOTHYROXINE SODIUM 0.05 MG/1
50 TABLET ORAL DAILY
Status: DISCONTINUED | OUTPATIENT
Start: 2023-07-12 | End: 2023-07-14 | Stop reason: HOSPADM

## 2023-07-11 RX ORDER — OXYCODONE HYDROCHLORIDE 5 MG/1
10 TABLET ORAL EVERY 4 HOURS PRN
Status: DISCONTINUED | OUTPATIENT
Start: 2023-07-11 | End: 2023-07-14 | Stop reason: HOSPADM

## 2023-07-11 RX ORDER — ONDANSETRON 4 MG/1
4 TABLET, ORALLY DISINTEGRATING ORAL EVERY 8 HOURS PRN
Status: DISCONTINUED | OUTPATIENT
Start: 2023-07-11 | End: 2023-07-14 | Stop reason: HOSPADM

## 2023-07-11 RX ORDER — SODIUM CHLORIDE 0.9 % (FLUSH) 0.9 %
5-40 SYRINGE (ML) INJECTION EVERY 12 HOURS SCHEDULED
Status: DISCONTINUED | OUTPATIENT
Start: 2023-07-11 | End: 2023-07-11 | Stop reason: HOSPADM

## 2023-07-11 RX ORDER — ROSUVASTATIN CALCIUM 5 MG/1
5 TABLET, COATED ORAL DAILY
Status: DISCONTINUED | OUTPATIENT
Start: 2023-07-11 | End: 2023-07-14 | Stop reason: HOSPADM

## 2023-07-11 RX ORDER — PREGABALIN 75 MG/1
150 CAPSULE ORAL 2 TIMES DAILY
Status: DISCONTINUED | OUTPATIENT
Start: 2023-07-11 | End: 2023-07-14 | Stop reason: HOSPADM

## 2023-07-11 RX ORDER — SODIUM CHLORIDE 0.9 % (FLUSH) 0.9 %
5-40 SYRINGE (ML) INJECTION PRN
Status: DISCONTINUED | OUTPATIENT
Start: 2023-07-11 | End: 2023-07-14 | Stop reason: HOSPADM

## 2023-07-11 RX ORDER — TRANEXAMIC ACID 10 MG/ML
INJECTION, SOLUTION INTRAVENOUS PRN
Status: DISCONTINUED | OUTPATIENT
Start: 2023-07-11 | End: 2023-07-11 | Stop reason: SDUPTHER

## 2023-07-11 RX ORDER — SENNA AND DOCUSATE SODIUM 50; 8.6 MG/1; MG/1
1 TABLET, FILM COATED ORAL 2 TIMES DAILY
Status: DISCONTINUED | OUTPATIENT
Start: 2023-07-11 | End: 2023-07-14 | Stop reason: HOSPADM

## 2023-07-11 RX ORDER — SODIUM CHLORIDE, SODIUM LACTATE, POTASSIUM CHLORIDE, CALCIUM CHLORIDE 600; 310; 30; 20 MG/100ML; MG/100ML; MG/100ML; MG/100ML
INJECTION, SOLUTION INTRAVENOUS CONTINUOUS PRN
Status: DISCONTINUED | OUTPATIENT
Start: 2023-07-11 | End: 2023-07-11 | Stop reason: SDUPTHER

## 2023-07-11 RX ORDER — SODIUM CHLORIDE 0.9 % (FLUSH) 0.9 %
5-40 SYRINGE (ML) INJECTION EVERY 12 HOURS SCHEDULED
Status: DISCONTINUED | OUTPATIENT
Start: 2023-07-11 | End: 2023-07-14 | Stop reason: HOSPADM

## 2023-07-11 RX ORDER — FAMOTIDINE 20 MG/1
20 TABLET, FILM COATED ORAL DAILY
Status: DISCONTINUED | OUTPATIENT
Start: 2023-07-11 | End: 2023-07-14 | Stop reason: HOSPADM

## 2023-07-11 RX ORDER — SODIUM CHLORIDE 9 MG/ML
INJECTION, SOLUTION INTRAVENOUS PRN
Status: DISCONTINUED | OUTPATIENT
Start: 2023-07-11 | End: 2023-07-11 | Stop reason: HOSPADM

## 2023-07-11 RX ORDER — CYCLOBENZAPRINE HCL 10 MG
10 TABLET ORAL 3 TIMES DAILY PRN
Status: DISCONTINUED | OUTPATIENT
Start: 2023-07-11 | End: 2023-07-12

## 2023-07-11 RX ORDER — DEXAMETHASONE SODIUM PHOSPHATE 10 MG/ML
INJECTION INTRAMUSCULAR; INTRAVENOUS PRN
Status: DISCONTINUED | OUTPATIENT
Start: 2023-07-11 | End: 2023-07-11 | Stop reason: SDUPTHER

## 2023-07-11 RX ORDER — SODIUM CHLORIDE 9 MG/ML
INJECTION, SOLUTION INTRAVENOUS PRN
Status: DISCONTINUED | OUTPATIENT
Start: 2023-07-11 | End: 2023-07-14 | Stop reason: HOSPADM

## 2023-07-11 RX ORDER — ENOXAPARIN SODIUM 100 MG/ML
40 INJECTION SUBCUTANEOUS DAILY
Status: DISCONTINUED | OUTPATIENT
Start: 2023-07-12 | End: 2023-07-14 | Stop reason: HOSPADM

## 2023-07-11 RX ORDER — SODIUM CHLORIDE, SODIUM LACTATE, POTASSIUM CHLORIDE, CALCIUM CHLORIDE 600; 310; 30; 20 MG/100ML; MG/100ML; MG/100ML; MG/100ML
INJECTION, SOLUTION INTRAVENOUS CONTINUOUS
Status: DISCONTINUED | OUTPATIENT
Start: 2023-07-11 | End: 2023-07-11 | Stop reason: HOSPADM

## 2023-07-11 RX ORDER — PROPOFOL 10 MG/ML
INJECTION, EMULSION INTRAVENOUS PRN
Status: DISCONTINUED | OUTPATIENT
Start: 2023-07-11 | End: 2023-07-11 | Stop reason: SDUPTHER

## 2023-07-11 RX ORDER — ROCURONIUM BROMIDE 10 MG/ML
INJECTION, SOLUTION INTRAVENOUS PRN
Status: DISCONTINUED | OUTPATIENT
Start: 2023-07-11 | End: 2023-07-11 | Stop reason: SDUPTHER

## 2023-07-11 RX ORDER — BISACODYL 10 MG
10 SUPPOSITORY, RECTAL RECTAL DAILY PRN
Status: DISCONTINUED | OUTPATIENT
Start: 2023-07-11 | End: 2023-07-14 | Stop reason: HOSPADM

## 2023-07-11 RX ORDER — MONTELUKAST SODIUM 10 MG/1
10 TABLET ORAL NIGHTLY
Status: DISCONTINUED | OUTPATIENT
Start: 2023-07-11 | End: 2023-07-14 | Stop reason: HOSPADM

## 2023-07-11 RX ORDER — POLYETHYLENE GLYCOL 3350 17 G/17G
17 POWDER, FOR SOLUTION ORAL DAILY
Status: DISCONTINUED | OUTPATIENT
Start: 2023-07-11 | End: 2023-07-14 | Stop reason: HOSPADM

## 2023-07-11 RX ORDER — SODIUM CHLORIDE 9 MG/ML
INJECTION, SOLUTION INTRAVENOUS CONTINUOUS
Status: DISCONTINUED | OUTPATIENT
Start: 2023-07-11 | End: 2023-07-14 | Stop reason: HOSPADM

## 2023-07-11 RX ORDER — LIDOCAINE HYDROCHLORIDE 10 MG/ML
INJECTION, SOLUTION EPIDURAL; INFILTRATION; INTRACAUDAL; PERINEURAL PRN
Status: DISCONTINUED | OUTPATIENT
Start: 2023-07-11 | End: 2023-07-11 | Stop reason: SDUPTHER

## 2023-07-11 RX ADMIN — FAMOTIDINE 20 MG: 20 TABLET, FILM COATED ORAL at 21:21

## 2023-07-11 RX ADMIN — MONTELUKAST SODIUM 10 MG: 10 TABLET, FILM COATED ORAL at 21:20

## 2023-07-11 RX ADMIN — HYDROMORPHONE HYDROCHLORIDE 0.5 MG: 1 INJECTION, SOLUTION INTRAMUSCULAR; INTRAVENOUS; SUBCUTANEOUS at 13:54

## 2023-07-11 RX ADMIN — SUGAMMADEX 300 MG: 100 INJECTION, SOLUTION INTRAVENOUS at 13:16

## 2023-07-11 RX ADMIN — HYDROMORPHONE HYDROCHLORIDE 0.25 MG: 1 INJECTION, SOLUTION INTRAMUSCULAR; INTRAVENOUS; SUBCUTANEOUS at 14:20

## 2023-07-11 RX ADMIN — Medication 2000 MG: at 21:03

## 2023-07-11 RX ADMIN — ROSUVASTATIN CALCIUM 5 MG: 5 TABLET, COATED ORAL at 21:20

## 2023-07-11 RX ADMIN — PROPOFOL 100 MG: 10 INJECTION, EMULSION INTRAVENOUS at 09:53

## 2023-07-11 RX ADMIN — METFORMIN HYDROCHLORIDE 500 MG: 500 TABLET ORAL at 21:20

## 2023-07-11 RX ADMIN — SODIUM CHLORIDE, POTASSIUM CHLORIDE, SODIUM LACTATE AND CALCIUM CHLORIDE: 600; 310; 30; 20 INJECTION, SOLUTION INTRAVENOUS at 11:47

## 2023-07-11 RX ADMIN — SODIUM CHLORIDE, PRESERVATIVE FREE 10 ML: 5 INJECTION INTRAVENOUS at 21:23

## 2023-07-11 RX ADMIN — ACETAMINOPHEN 650 MG: 325 TABLET ORAL at 21:19

## 2023-07-11 RX ADMIN — HYDROMORPHONE HYDROCHLORIDE 0.5 MG: 1 INJECTION, SOLUTION INTRAMUSCULAR; INTRAVENOUS; SUBCUTANEOUS at 14:08

## 2023-07-11 RX ADMIN — FENTANYL CITRATE 50 MCG: 0.05 INJECTION, SOLUTION INTRAMUSCULAR; INTRAVENOUS at 10:52

## 2023-07-11 RX ADMIN — DOCUSATE SODIUM 50 MG AND SENNOSIDES 8.6 MG 1 TABLET: 8.6; 5 TABLET, FILM COATED ORAL at 21:20

## 2023-07-11 RX ADMIN — SODIUM CHLORIDE: 9 INJECTION, SOLUTION INTRAVENOUS at 15:40

## 2023-07-11 RX ADMIN — TRANEXAMIC ACID 1 G: 10 INJECTION, SOLUTION INTRAVENOUS at 12:43

## 2023-07-11 RX ADMIN — ONDANSETRON 4 MG: 2 INJECTION INTRAMUSCULAR; INTRAVENOUS at 13:08

## 2023-07-11 RX ADMIN — SODIUM CHLORIDE, POTASSIUM CHLORIDE, SODIUM LACTATE AND CALCIUM CHLORIDE: 600; 310; 30; 20 INJECTION, SOLUTION INTRAVENOUS at 07:42

## 2023-07-11 RX ADMIN — CEFAZOLIN 2 G: 1 INJECTION, POWDER, FOR SOLUTION INTRAMUSCULAR; INTRAVENOUS at 10:29

## 2023-07-11 RX ADMIN — HYDROMORPHONE HYDROCHLORIDE 1 MG: 1 INJECTION, SOLUTION INTRAMUSCULAR; INTRAVENOUS; SUBCUTANEOUS at 23:18

## 2023-07-11 RX ADMIN — PREGABALIN 150 MG: 75 CAPSULE ORAL at 21:19

## 2023-07-11 RX ADMIN — Medication 2000 UNITS: at 21:21

## 2023-07-11 RX ADMIN — Medication 10 MG: at 10:14

## 2023-07-11 RX ADMIN — DEXAMETHASONE SODIUM PHOSPHATE 10 MG: 10 INJECTION INTRAMUSCULAR; INTRAVENOUS at 10:09

## 2023-07-11 RX ADMIN — OXYCODONE HYDROCHLORIDE 10 MG: 5 TABLET ORAL at 19:29

## 2023-07-11 RX ADMIN — ROCURONIUM BROMIDE 20 MG: 10 INJECTION, SOLUTION INTRAVENOUS at 12:18

## 2023-07-11 RX ADMIN — SODIUM CHLORIDE, POTASSIUM CHLORIDE, SODIUM LACTATE AND CALCIUM CHLORIDE: 600; 310; 30; 20 INJECTION, SOLUTION INTRAVENOUS at 10:04

## 2023-07-11 RX ADMIN — ROCURONIUM BROMIDE 50 MG: 10 INJECTION, SOLUTION INTRAVENOUS at 09:53

## 2023-07-11 RX ADMIN — OXYCODONE HYDROCHLORIDE 10 MG: 5 TABLET ORAL at 15:38

## 2023-07-11 RX ADMIN — HYDROMORPHONE HYDROCHLORIDE 0.25 MG: 1 INJECTION, SOLUTION INTRAMUSCULAR; INTRAVENOUS; SUBCUTANEOUS at 14:27

## 2023-07-11 RX ADMIN — ROCURONIUM BROMIDE 20 MG: 10 INJECTION, SOLUTION INTRAVENOUS at 11:28

## 2023-07-11 RX ADMIN — FENTANYL CITRATE 50 MCG: 0.05 INJECTION, SOLUTION INTRAMUSCULAR; INTRAVENOUS at 12:03

## 2023-07-11 RX ADMIN — FENTANYL CITRATE 100 MCG: 0.05 INJECTION, SOLUTION INTRAMUSCULAR; INTRAVENOUS at 09:53

## 2023-07-11 RX ADMIN — SUGAMMADEX 100 MG: 100 INJECTION, SOLUTION INTRAVENOUS at 13:18

## 2023-07-11 RX ADMIN — POLYETHYLENE GLYCOL 3350 17 G: 17 POWDER, FOR SOLUTION ORAL at 21:21

## 2023-07-11 RX ADMIN — FENTANYL CITRATE 50 MCG: 0.05 INJECTION, SOLUTION INTRAMUSCULAR; INTRAVENOUS at 10:43

## 2023-07-11 RX ADMIN — LIDOCAINE HYDROCHLORIDE 50 MG: 10 INJECTION, SOLUTION EPIDURAL; INFILTRATION; INTRACAUDAL; PERINEURAL at 09:53

## 2023-07-11 ASSESSMENT — PAIN DESCRIPTION - FREQUENCY: FREQUENCY: CONTINUOUS

## 2023-07-11 ASSESSMENT — ENCOUNTER SYMPTOMS
STRIDOR: 0
SHORTNESS OF BREATH: 0

## 2023-07-11 ASSESSMENT — PAIN DESCRIPTION - DESCRIPTORS
DESCRIPTORS: ACHING;DISCOMFORT;STABBING
DESCRIPTORS: ACHING;STABBING;DISCOMFORT
DESCRIPTORS: ACHING
DESCRIPTORS: ACHING
DESCRIPTORS: ACHING;BURNING

## 2023-07-11 ASSESSMENT — PAIN SCALES - GENERAL
PAINLEVEL_OUTOF10: 7
PAINLEVEL_OUTOF10: 8
PAINLEVEL_OUTOF10: 9
PAINLEVEL_OUTOF10: 7
PAINLEVEL_OUTOF10: 5
PAINLEVEL_OUTOF10: 7
PAINLEVEL_OUTOF10: 7
PAINLEVEL_OUTOF10: 9
PAINLEVEL_OUTOF10: 8
PAINLEVEL_OUTOF10: 9

## 2023-07-11 ASSESSMENT — PAIN DESCRIPTION - LOCATION
LOCATION: BACK;INCISION;LEG
LOCATION: BACK;GENERALIZED
LOCATION: BACK
LOCATION: BACK;GENERALIZED
LOCATION: BACK
LOCATION: BACK;GENERALIZED

## 2023-07-11 ASSESSMENT — PAIN DESCRIPTION - ORIENTATION
ORIENTATION: POSTERIOR;LOWER
ORIENTATION: MID
ORIENTATION: LOWER
ORIENTATION: MID
ORIENTATION: LEFT;RIGHT

## 2023-07-11 ASSESSMENT — LIFESTYLE VARIABLES: SMOKING_STATUS: 0

## 2023-07-11 ASSESSMENT — PAIN DESCRIPTION - PAIN TYPE
TYPE: SURGICAL PAIN

## 2023-07-11 ASSESSMENT — PAIN - FUNCTIONAL ASSESSMENT
PAIN_FUNCTIONAL_ASSESSMENT: NONE - DENIES PAIN
PAIN_FUNCTIONAL_ASSESSMENT: ACTIVITIES ARE NOT PREVENTED

## 2023-07-11 NOTE — INTERVAL H&P NOTE
Pt Name: Yolis Fletcher  MRN: [id number]  YOB: 1940  Date of evaluation: 7/11/2023    I have reviewed the patient's history and physical examination completed in pre-admission testing on 6/28/23    No changes to history or on examination today, unless noted below. Pt reports she had a recent cystoscopy with Dr. Dory Martinez and was started on prophylactic Keflex. Pt reports she had echo on 7/7/23, resulted in [address] and cardiac clearance granted. Narrative      Left Ventricle: Systolic function is normal with an ejection fraction   of 60-65%. Normal left ventricular size and systolic function with mild concentric   left ventricular hypertrophy. Dilated left atrium.      No significantly abnormal stenotic or regurgitant flows are present      BETSY MORAES, CONSUELO - CNP  7/11/23  7:59 AM

## 2023-07-11 NOTE — ANESTHESIA PRE PROCEDURE
caps      Pulmonary:normal exam    (+) sleep apnea: on CPAP,  asthma: allergic asthma,     (-) pneumonia, COPD, shortness of breath, recent URI, rhonchi, wheezes, rales, stridor, not a current smoker and no decreased breath sounds                           Cardiovascular:  Exercise tolerance: good (>4 METS),   (+) hypertension: no interval change,     (-) pacemaker, valvular problems/murmurs, past MI, CAD, CABG/stent, dysrhythmias,  angina,  CHF, orthopnea, PND,  ODEN, murmur, weak pulses,  friction rub, systolic click, carotid bruit,  JVD, peripheral edema, no pulmonary hypertension and no hyperlipidemia    ECG reviewed  Rhythm: regular  Rate: normal           Beta Blocker:  Not on Beta Blocker      ROS comment: Left ventricle is normal in size and wall thickness. Global left ventricular systolic function is normal. Calculated EF via  Ramires's method is 64 %. No obvious wall motion abnormality seen. Left atrium is moderately dilated. Right atrium is normal in size. Normal right ventricular size and function. Moderate tricuspid regurgitation. Mild pulmonic insufficiency     Neuro/Psych:   (+) neuromuscular disease:,    (-) seizures, TIA, CVA, headaches, psychiatric history and depression/anxiety            GI/Hepatic/Renal: Neg GI/Hepatic/Renal ROS       (-) hiatal hernia, GERD, PUD, hepatitis, liver disease, no renal disease, bowel prep and no morbid obesity       Endo/Other:    (+) DiabetesType II DM, , hypothyroidism: arthritis: OA., no malignancy/cancer. (-) hyperthyroidism, blood dyscrasia, no electrolyte abnormalities, no malignancy/cancer               Abdominal:             Vascular: negative vascular ROS. - PVD, DVT and PE. Other Findings:             Anesthesia Plan      general     ASA 3       Induction: intravenous. MIPS: Postoperative opioids intended and Prophylactic antiemetics administered. Anesthetic plan and risks discussed with patient.       Plan discussed with

## 2023-07-12 LAB
ALBUMIN SERPL-MCNC: 3.2 G/DL (ref 3.5–5.2)
ALBUMIN/GLOB SERPL: 1.8 {RATIO} (ref 1–2.5)
ALP SERPL-CCNC: 50 U/L (ref 35–104)
ALT SERPL-CCNC: 6 U/L (ref 5–33)
ANION GAP SERPL CALCULATED.3IONS-SCNC: 8 MMOL/L (ref 9–17)
AST SERPL-CCNC: 13 U/L
BILIRUB SERPL-MCNC: 0.3 MG/DL (ref 0.3–1.2)
BUN SERPL-MCNC: 14 MG/DL (ref 8–23)
CALCIUM SERPL-MCNC: 7.5 MG/DL (ref 8.6–10.4)
CHLORIDE SERPL-SCNC: 108 MMOL/L (ref 98–107)
CO2 SERPL-SCNC: 25 MMOL/L (ref 20–31)
CREAT SERPL-MCNC: 0.7 MG/DL (ref 0.5–0.9)
ERYTHROCYTE [DISTWIDTH] IN BLOOD BY AUTOMATED COUNT: 13.2 % (ref 11.8–14.4)
GFR SERPL CREATININE-BSD FRML MDRD: >60 ML/MIN/1.73M2
GLUCOSE BLD-MCNC: 111 MG/DL (ref 65–105)
GLUCOSE BLD-MCNC: 130 MG/DL (ref 65–105)
GLUCOSE BLD-MCNC: 145 MG/DL (ref 65–105)
GLUCOSE BLD-MCNC: 145 MG/DL (ref 65–105)
GLUCOSE SERPL-MCNC: 110 MG/DL (ref 70–99)
HCT VFR BLD AUTO: 26.7 % (ref 36.3–47.1)
HGB BLD-MCNC: 8.4 G/DL (ref 11.9–15.1)
MCH RBC QN AUTO: 29.2 PG (ref 25.2–33.5)
MCHC RBC AUTO-ENTMCNC: 31.5 G/DL (ref 28.4–34.8)
MCV RBC AUTO: 92.7 FL (ref 82.6–102.9)
MICROORGANISM SPEC CULT: NO GROWTH
NRBC BLD-RTO: 0 PER 100 WBC
PLATELET # BLD AUTO: 163 K/UL (ref 138–453)
PMV BLD AUTO: 11.5 FL (ref 8.1–13.5)
POTASSIUM SERPL-SCNC: 3.9 MMOL/L (ref 3.7–5.3)
PROT SERPL-MCNC: 5 G/DL (ref 6.4–8.3)
RBC # BLD AUTO: 2.88 M/UL (ref 3.95–5.11)
SODIUM SERPL-SCNC: 141 MMOL/L (ref 135–144)
SPECIMEN DESCRIPTION: NORMAL
WBC OTHER # BLD: 9.1 K/UL (ref 3.5–11.3)

## 2023-07-12 PROCEDURE — 97535 SELF CARE MNGMENT TRAINING: CPT

## 2023-07-12 PROCEDURE — 82947 ASSAY GLUCOSE BLOOD QUANT: CPT

## 2023-07-12 PROCEDURE — 85027 COMPLETE CBC AUTOMATED: CPT

## 2023-07-12 PROCEDURE — 97530 THERAPEUTIC ACTIVITIES: CPT

## 2023-07-12 PROCEDURE — 6360000002 HC RX W HCPCS: Performed by: PHYSICIAN ASSISTANT

## 2023-07-12 PROCEDURE — 97166 OT EVAL MOD COMPLEX 45 MIN: CPT

## 2023-07-12 PROCEDURE — 36415 COLL VENOUS BLD VENIPUNCTURE: CPT

## 2023-07-12 PROCEDURE — 6370000000 HC RX 637 (ALT 250 FOR IP): Performed by: REGISTERED NURSE

## 2023-07-12 PROCEDURE — 80053 COMPREHEN METABOLIC PANEL: CPT

## 2023-07-12 PROCEDURE — 97162 PT EVAL MOD COMPLEX 30 MIN: CPT

## 2023-07-12 PROCEDURE — 51798 US URINE CAPACITY MEASURE: CPT

## 2023-07-12 PROCEDURE — 2580000003 HC RX 258: Performed by: PHYSICIAN ASSISTANT

## 2023-07-12 PROCEDURE — 6370000000 HC RX 637 (ALT 250 FOR IP): Performed by: PHYSICIAN ASSISTANT

## 2023-07-12 PROCEDURE — 2060000000 HC ICU INTERMEDIATE R&B

## 2023-07-12 RX ORDER — METHOCARBAMOL 500 MG/1
500 TABLET, FILM COATED ORAL 4 TIMES DAILY
Status: DISCONTINUED | OUTPATIENT
Start: 2023-07-12 | End: 2023-07-14 | Stop reason: HOSPADM

## 2023-07-12 RX ADMIN — SODIUM CHLORIDE, PRESERVATIVE FREE 10 ML: 5 INJECTION INTRAVENOUS at 20:02

## 2023-07-12 RX ADMIN — Medication 2000 UNITS: at 08:56

## 2023-07-12 RX ADMIN — Medication 2000 MG: at 05:03

## 2023-07-12 RX ADMIN — HYDROMORPHONE HYDROCHLORIDE 1 MG: 1 INJECTION, SOLUTION INTRAMUSCULAR; INTRAVENOUS; SUBCUTANEOUS at 19:47

## 2023-07-12 RX ADMIN — METHOCARBAMOL TABLETS 500 MG: 500 TABLET, COATED ORAL at 17:14

## 2023-07-12 RX ADMIN — OXYCODONE HYDROCHLORIDE 10 MG: 5 TABLET ORAL at 22:14

## 2023-07-12 RX ADMIN — LEVOTHYROXINE SODIUM 50 MCG: 50 TABLET ORAL at 08:56

## 2023-07-12 RX ADMIN — OXYCODONE HYDROCHLORIDE 10 MG: 5 TABLET ORAL at 10:45

## 2023-07-12 RX ADMIN — ROSUVASTATIN CALCIUM 5 MG: 5 TABLET, COATED ORAL at 08:57

## 2023-07-12 RX ADMIN — HYDROMORPHONE HYDROCHLORIDE 1 MG: 1 INJECTION, SOLUTION INTRAMUSCULAR; INTRAVENOUS; SUBCUTANEOUS at 02:18

## 2023-07-12 RX ADMIN — METHOCARBAMOL TABLETS 500 MG: 500 TABLET, COATED ORAL at 11:42

## 2023-07-12 RX ADMIN — DOCUSATE SODIUM 50 MG AND SENNOSIDES 8.6 MG 1 TABLET: 8.6; 5 TABLET, FILM COATED ORAL at 08:56

## 2023-07-12 RX ADMIN — SODIUM CHLORIDE: 9 INJECTION, SOLUTION INTRAVENOUS at 13:17

## 2023-07-12 RX ADMIN — ENOXAPARIN SODIUM 40 MG: 40 INJECTION SUBCUTANEOUS at 08:55

## 2023-07-12 RX ADMIN — POLYETHYLENE GLYCOL 3350 17 G: 17 POWDER, FOR SOLUTION ORAL at 08:56

## 2023-07-12 RX ADMIN — SODIUM CHLORIDE: 9 INJECTION, SOLUTION INTRAVENOUS at 02:22

## 2023-07-12 RX ADMIN — MONTELUKAST SODIUM 10 MG: 10 TABLET, FILM COATED ORAL at 20:01

## 2023-07-12 RX ADMIN — METHOCARBAMOL TABLETS 500 MG: 500 TABLET, COATED ORAL at 20:01

## 2023-07-12 RX ADMIN — OXYCODONE HYDROCHLORIDE 10 MG: 5 TABLET ORAL at 05:17

## 2023-07-12 RX ADMIN — DOCUSATE SODIUM 50 MG AND SENNOSIDES 8.6 MG 1 TABLET: 8.6; 5 TABLET, FILM COATED ORAL at 20:02

## 2023-07-12 RX ADMIN — METFORMIN HYDROCHLORIDE 500 MG: 500 TABLET ORAL at 08:56

## 2023-07-12 RX ADMIN — METFORMIN HYDROCHLORIDE 500 MG: 500 TABLET ORAL at 17:14

## 2023-07-12 RX ADMIN — PREGABALIN 150 MG: 75 CAPSULE ORAL at 08:56

## 2023-07-12 RX ADMIN — HYDROMORPHONE HYDROCHLORIDE 1 MG: 1 INJECTION, SOLUTION INTRAMUSCULAR; INTRAVENOUS; SUBCUTANEOUS at 13:54

## 2023-07-12 RX ADMIN — ACETAMINOPHEN 650 MG: 325 TABLET ORAL at 08:55

## 2023-07-12 RX ADMIN — ACETAMINOPHEN 650 MG: 325 TABLET ORAL at 14:00

## 2023-07-12 RX ADMIN — HYDROMORPHONE HYDROCHLORIDE 1 MG: 1 INJECTION, SOLUTION INTRAMUSCULAR; INTRAVENOUS; SUBCUTANEOUS at 07:58

## 2023-07-12 RX ADMIN — OXYCODONE HYDROCHLORIDE 10 MG: 5 TABLET ORAL at 00:25

## 2023-07-12 RX ADMIN — FAMOTIDINE 20 MG: 20 TABLET, FILM COATED ORAL at 08:56

## 2023-07-12 RX ADMIN — OXYCODONE HYDROCHLORIDE 10 MG: 5 TABLET ORAL at 18:12

## 2023-07-12 RX ADMIN — SODIUM CHLORIDE, PRESERVATIVE FREE 5 ML: 5 INJECTION INTRAVENOUS at 08:57

## 2023-07-12 RX ADMIN — ACETAMINOPHEN 650 MG: 325 TABLET ORAL at 20:02

## 2023-07-12 RX ADMIN — PREGABALIN 150 MG: 75 CAPSULE ORAL at 20:01

## 2023-07-12 RX ADMIN — ACETAMINOPHEN 650 MG: 325 TABLET ORAL at 02:18

## 2023-07-12 ASSESSMENT — PAIN SCALES - GENERAL
PAINLEVEL_OUTOF10: 8
PAINLEVEL_OUTOF10: 10
PAINLEVEL_OUTOF10: 7
PAINLEVEL_OUTOF10: 8
PAINLEVEL_OUTOF10: 8
PAINLEVEL_OUTOF10: 9
PAINLEVEL_OUTOF10: 0
PAINLEVEL_OUTOF10: 9
PAINLEVEL_OUTOF10: 10
PAINLEVEL_OUTOF10: 8
PAINLEVEL_OUTOF10: 10
PAINLEVEL_OUTOF10: 9

## 2023-07-12 ASSESSMENT — PAIN DESCRIPTION - LOCATION
LOCATION: BACK
LOCATION: BACK
LOCATION: BACK;LEG
LOCATION: BACK
LOCATION: BACK;INCISION;LEG

## 2023-07-12 ASSESSMENT — PAIN DESCRIPTION - DESCRIPTORS
DESCRIPTORS: ACHING;DISCOMFORT
DESCRIPTORS: ACHING
DESCRIPTORS: ACHING;DISCOMFORT;TIGHTNESS
DESCRIPTORS: ACHING;DISCOMFORT
DESCRIPTORS: ACHING
DESCRIPTORS: ACHING
DESCRIPTORS: ACHING;DISCOMFORT

## 2023-07-12 ASSESSMENT — PAIN DESCRIPTION - ORIENTATION
ORIENTATION: LOWER;MID
ORIENTATION: LOWER;MID
ORIENTATION: MID;LOWER
ORIENTATION: POSTERIOR
ORIENTATION: RIGHT;LEFT;POSTERIOR
ORIENTATION: LEFT;RIGHT;POSTERIOR
ORIENTATION: MID;LOWER
ORIENTATION: MID;LOWER

## 2023-07-12 ASSESSMENT — PAIN - FUNCTIONAL ASSESSMENT
PAIN_FUNCTIONAL_ASSESSMENT: PREVENTS OR INTERFERES SOME ACTIVE ACTIVITIES AND ADLS
PAIN_FUNCTIONAL_ASSESSMENT: PREVENTS OR INTERFERES WITH ALL ACTIVE AND SOME PASSIVE ACTIVITIES
PAIN_FUNCTIONAL_ASSESSMENT: PREVENTS OR INTERFERES WITH ALL ACTIVE AND SOME PASSIVE ACTIVITIES
PAIN_FUNCTIONAL_ASSESSMENT: PREVENTS OR INTERFERES SOME ACTIVE ACTIVITIES AND ADLS

## 2023-07-12 NOTE — PLAN OF CARE
Problem: Discharge Planning  Goal: Discharge to home or other facility with appropriate resources  Outcome: Progressing  Flowsheets (Taken 7/11/2023 2000)  Discharge to home or other facility with appropriate resources: Identify barriers to discharge with patient and caregiver     Problem: Chronic Conditions and Co-morbidities  Goal: Patient's chronic conditions and co-morbidity symptoms are monitored and maintained or improved  Outcome: Progressing  Flowsheets (Taken 7/11/2023 2000)  Care Plan - Patient's Chronic Conditions and Co-Morbidity Symptoms are Monitored and Maintained or Improved: Monitor and assess patient's chronic conditions and comorbid symptoms for stability, deterioration, or improvement     Problem: Safety - Adult  Goal: Free from fall injury  Outcome: Progressing     Problem: Pain  Goal: Verbalizes/displays adequate comfort level or baseline comfort level  Outcome: Progressing     Problem: Skin/Tissue Integrity  Goal: Absence of new skin breakdown  Description: 1. Monitor for areas of redness and/or skin breakdown  2. Assess vascular access sites hourly  3. Every 4-6 hours minimum:  Change oxygen saturation probe site  4. Every 4-6 hours:  If on nasal continuous positive airway pressure, respiratory therapy assess nares and determine need for appliance change or resting period.   Outcome: Progressing     Problem: ABCDS Injury Assessment  Goal: Absence of physical injury  Outcome: Progressing

## 2023-07-12 NOTE — OP NOTE
Operative Note      Patient: Jean Pal  YOB: 1940  MRN: [id number]    Date of Procedure: 7/11/2023    Preoperative diagnosis. Thoracic myelopathy. Lumbar stenosis with neurogenic claudication. Incomplete paraplegia, adjacent level lumbar disease with stenosis    Post-Op Diagnosis: Same    Indications for procedure. 82-year-old female with multiple prior lumbar surgeries having significant difficulties with ambulation related to discoordination of the lower extremities as well as complete numbness of the left leg. Patient with multiple falls prior to the use of a walker. Relates significant axial pain as well as radiating pain down the left leg greater than the right leg. Main issue is significant ataxia discoordination and hemianesthesia of the left leg versus the right. Extensive work-up including MRIs revealed adjacent segment disease above the prior L2-3 fusion with significant L1-2 stenosis along with significant multilevel spondylosis and central stenosis from T10 all the way to L2 related to mainly facet hypertrophy and flavum hypertrophy. Patient with a sagittal plane deformity that was discussed with her. I explained to her that I would favor correction of the sagittal plane deformity in the same setting as decompression of her neural elements. However primary concern with her advanced age is osteoporosis and the likelihood that she is not a good candidate for fixation due to poor increased ability of the hardware and correction ability. Considering she was having a progressive neurological decline decision was made to proceed with decompression and avoid any type of fixation or deformity correction at this time. Extensive discussion was had with the patient regarding the findings on the imaging and plan for T10-L2 central laminectomy and lateral recess decompression as well.        Procedure  Laminectomy and partial facetectomy to decompress the lateral recess at T10, T11,

## 2023-07-12 NOTE — PROGRESS NOTES
Assessment: Patient was reclining in her chair when  visited. Family was present and open to spiritual care. When asked  how she was feeling, patient responded; \"a lot of pain but they say I am doing well. \" Patient said she was raised [employer] and a member of [employer]. Patient received sacrament of anointing of the sick. Intervention:  maintained listening presence, offered support, prayed with patient and family and reassured them that they were in good hands. Outcome: Patient and family expressed gratitude for the anointing and blessing they received. Plan: Follow up visits recommended for ongoing assessment of patient's condition and for more spiritual and emotional support.

## 2023-07-12 NOTE — PROGRESS NOTES
Physical Therapy  Facility/Department: [address] [facility]  Physical Therapy Initial Assessment    Name: Jax Robbins  : 1940  MRN: [id number]  Date of Service: 2023  T10-L1 laminectomy    Discharge Recommendations:  Patient would benefit from continued therapy after discharge   PT Equipment Recommendations  Equipment Needed: No  Other: Has walker, wheelchair, ramp      Patient Diagnosis(es): The encounter diagnosis was Thoracic myelopathy. Past Medical History:  has a past medical history of Achilles tendonitis, Asthma, COPD (chronic obstructive pulmonary disease) ([address]), Cough, Degenerative lumbar disc, DM (diabetes mellitus), type 2 ([address]), Failed back syndrome, lumbar, Fast heart beat, Fibromyalgia, History of bladder cancer, Hypertension, Hypothyroid, Kidney stones, Leaky heart valve, Lumbar radiculopathy, Lumbar spondylolysis, Lumbar spondylosis, Osteoarthritis, Pain management, Sleep apnea, Spinal stenosis in cervical region, Thoracic myelopathy, Under care of team, Wears glasses, and Wellness examination. Past Surgical History:  has a past surgical history that includes Rotator cuff repair (Bilateral, ); Carpal tunnel release (Bilateral); Uvulopalatopharygoplasty (); Cystoscopy (2013); Cataract removal with implant (Bilateral, 2014, 2014); Spine surgery; Spine surgery (91-4-15); skin biopsy (Right, 2015); Cystocopy (2016); Knee arthroscopy (Left); bladder tumor excision (); lumbar fusion; Total abdominal hysterectomy w/ bilateral salpingoophorectomy; Endoscopic ultrasonography, GI (N/A, 2017); Cholecystectomy, laparoscopic (N/A, 2017); endoscopic ultrasound (upper) (2017); Cardioversion; Tubal ligation; pr office/outpt visit,procedure only (N/A, 2018); back surgery (); Cystoscopy (2019); cervical fusion (N/A, 2019); Pain management procedure; Colonoscopy; and lumbar laminectomy (2023).     Assessment   Body Structures,

## 2023-07-12 NOTE — PLAN OF CARE
Problem: Discharge Planning  Goal: Discharge to home or other facility with appropriate resources  7/12/2023 0956 by Nilda Leger RN  Outcome: Progressing  7/12/2023 0610 by Marlene Peace RN  Outcome: Progressing  Flowsheets (Taken 7/11/2023 2000)  Discharge to home or other facility with appropriate resources: Identify barriers to discharge with patient and caregiver     Problem: Chronic Conditions and Co-morbidities  Goal: Patient's chronic conditions and co-morbidity symptoms are monitored and maintained or improved  7/12/2023 0956 by Nilda Leger RN  Outcome: Progressing  7/12/2023 0610 by Marlene Peace RN  Outcome: Progressing  Flowsheets (Taken 7/11/2023 2000)  Care Plan - Patient's Chronic Conditions and Co-Morbidity Symptoms are Monitored and Maintained or Improved: Monitor and assess patient's chronic conditions and comorbid symptoms for stability, deterioration, or improvement     Problem: Safety - Adult  Goal: Free from fall injury  7/12/2023 0956 by Nilda Leger RN  Outcome: Progressing  7/12/2023 0610 by Marlene Peace RN  Outcome: Progressing     Problem: Pain  Goal: Verbalizes/displays adequate comfort level or baseline comfort level  7/12/2023 0956 by Nilda Leger RN  Outcome: Progressing  7/12/2023 0610 by Marlene Peace RN  Outcome: Progressing     Problem: Skin/Tissue Integrity  Goal: Absence of new skin breakdown  Description: 1. Monitor for areas of redness and/or skin breakdown  2. Assess vascular access sites hourly  3. Every 4-6 hours minimum:  Change oxygen saturation probe site  4. Every 4-6 hours:  If on nasal continuous positive airway pressure, respiratory therapy assess nares and determine need for appliance change or resting period.   7/12/2023 0956 by Nilda Leger RN  Outcome: Progressing  7/12/2023 0610 by Marlene Peace RN  Outcome: Progressing     Problem: ABCDS Injury Assessment  Goal: Absence of physical injury  7/12/2023 0956 by Nilda Leger RN  Outcome:

## 2023-07-12 NOTE — PROGRESS NOTES
Occupational Therapy  Facility/Department: [address] [facility]UR[facility]  Occupational Therapy Initial Assessment    Name: Leanne Jackman  : 1940  MRN: [id number]  Date of Service: 2023    Discharge Recommendations:   Further therapy recommended at discharge. Patient Diagnosis(es): The encounter diagnosis was Thoracic myelopathy. Past Medical History:  has a past medical history of Achilles tendonitis, Asthma, COPD (chronic obstructive pulmonary disease) ([address]), Cough, Degenerative lumbar disc, DM (diabetes mellitus), type 2 ([address]), Failed back syndrome, lumbar, Fast heart beat, Fibromyalgia, History of bladder cancer, Hypertension, Hypothyroid, Kidney stones, Leaky heart valve, Lumbar radiculopathy, Lumbar spondylolysis, Lumbar spondylosis, Osteoarthritis, Pain management, Sleep apnea, Spinal stenosis in cervical region, Thoracic myelopathy, Under care of team, Wears glasses, and Wellness examination. Past Surgical History:  has a past surgical history that includes Rotator cuff repair (Bilateral, ); Carpal tunnel release (Bilateral); Uvulopalatopharygoplasty (); Cystoscopy (2013); Cataract removal with implant (Bilateral, 2014, 2014); Spine surgery; Spine surgery (91-4-15); skin biopsy (Right, 2015); Cystocopy (2016); Knee arthroscopy (Left); bladder tumor excision (); lumbar fusion; Total abdominal hysterectomy w/ bilateral salpingoophorectomy; Endoscopic ultrasonography, GI (N/A, 2017); Cholecystectomy, laparoscopic (N/A, 2017); endoscopic ultrasound (upper) (2017); Cardioversion; Tubal ligation; pr office/outpt visit,procedure only (N/A, 2018); back surgery (); Cystoscopy (2019); cervical fusion (N/A, 2019); Pain management procedure; Colonoscopy; lumbar laminectomy (2023); and Lumbar spine surgery (N/A, 2023). Assessment   Performance deficits / Impairments: Decreased functional mobility ; Decreased ADL

## 2023-07-12 NOTE — ANESTHESIA POSTPROCEDURE EVALUATION
Department of Anesthesiology  Postprocedure Note    Patient: Ana Gomez  MRN: [id number]  YOB: 1940  Date of evaluation: 7/12/2023      Procedure Summary     Date: 07/11/23 Room / Location: [address]    Anesthesia Start: 2011 Anesthesia Stop: 3233    Procedure: LUMBAR LAMINECTOMY T10-L1 (Back) Diagnosis:       Thoracic myelopathy      (THORACIC MYELOPATHY)    Surgeons: Mina Tillman DO Responsible Provider: Edgar Al MD    Anesthesia Type: general ASA Status: 3          Anesthesia Type: No value filed.     Ana Phase I: Ana Score: 8    Ana Phase II:        Anesthesia Post Evaluation    Patient location during evaluation: PACU  Patient participation: complete - patient participated  Level of consciousness: awake and alert  Pain score: 3  Airway patency: patent  Nausea & Vomiting: no nausea and no vomiting  Complications: no  Cardiovascular status: hemodynamically stable  Respiratory status: acceptable  Hydration status: euvolemic

## 2023-07-13 LAB
GLUCOSE BLD-MCNC: 120 MG/DL (ref 65–105)
GLUCOSE BLD-MCNC: 179 MG/DL (ref 65–105)
GLUCOSE BLD-MCNC: 99 MG/DL (ref 65–105)

## 2023-07-13 PROCEDURE — 2060000000 HC ICU INTERMEDIATE R&B

## 2023-07-13 PROCEDURE — 82947 ASSAY GLUCOSE BLOOD QUANT: CPT

## 2023-07-13 PROCEDURE — 6370000000 HC RX 637 (ALT 250 FOR IP): Performed by: PHYSICIAN ASSISTANT

## 2023-07-13 PROCEDURE — 2500000003 HC RX 250 WO HCPCS: Performed by: PHYSICIAN ASSISTANT

## 2023-07-13 PROCEDURE — 6370000000 HC RX 637 (ALT 250 FOR IP): Performed by: REGISTERED NURSE

## 2023-07-13 PROCEDURE — 6360000002 HC RX W HCPCS: Performed by: PHYSICIAN ASSISTANT

## 2023-07-13 PROCEDURE — 6370000000 HC RX 637 (ALT 250 FOR IP): Performed by: NURSE PRACTITIONER

## 2023-07-13 PROCEDURE — 2580000003 HC RX 258: Performed by: PHYSICIAN ASSISTANT

## 2023-07-13 PROCEDURE — 6360000002 HC RX W HCPCS: Performed by: NURSE PRACTITIONER

## 2023-07-13 PROCEDURE — APPSS15 APP SPLIT SHARED TIME 0-15 MINUTES: Performed by: NURSE PRACTITIONER

## 2023-07-13 RX ORDER — KETOROLAC TROMETHAMINE 30 MG/ML
30 INJECTION, SOLUTION INTRAMUSCULAR; INTRAVENOUS EVERY 6 HOURS
Status: DISCONTINUED | OUTPATIENT
Start: 2023-07-13 | End: 2023-07-14 | Stop reason: HOSPADM

## 2023-07-13 RX ADMIN — ACETAMINOPHEN 650 MG: 325 TABLET ORAL at 20:43

## 2023-07-13 RX ADMIN — HYDROMORPHONE HYDROCHLORIDE 1 MG: 1 INJECTION, SOLUTION INTRAMUSCULAR; INTRAVENOUS; SUBCUTANEOUS at 10:30

## 2023-07-13 RX ADMIN — METHOCARBAMOL TABLETS 500 MG: 500 TABLET, COATED ORAL at 08:35

## 2023-07-13 RX ADMIN — ROSUVASTATIN CALCIUM 5 MG: 5 TABLET, COATED ORAL at 08:35

## 2023-07-13 RX ADMIN — PREGABALIN 150 MG: 75 CAPSULE ORAL at 08:35

## 2023-07-13 RX ADMIN — Medication 2000 UNITS: at 08:34

## 2023-07-13 RX ADMIN — METFORMIN HYDROCHLORIDE 500 MG: 500 TABLET ORAL at 08:35

## 2023-07-13 RX ADMIN — SODIUM CHLORIDE, PRESERVATIVE FREE 10 ML: 5 INJECTION INTRAVENOUS at 20:45

## 2023-07-13 RX ADMIN — LEVOTHYROXINE SODIUM 50 MCG: 50 TABLET ORAL at 06:51

## 2023-07-13 RX ADMIN — METFORMIN HYDROCHLORIDE 500 MG: 500 TABLET ORAL at 18:37

## 2023-07-13 RX ADMIN — PREGABALIN 150 MG: 75 CAPSULE ORAL at 20:42

## 2023-07-13 RX ADMIN — METHOCARBAMOL TABLETS 500 MG: 500 TABLET, COATED ORAL at 12:40

## 2023-07-13 RX ADMIN — METHOCARBAMOL TABLETS 500 MG: 500 TABLET, COATED ORAL at 20:42

## 2023-07-13 RX ADMIN — FLUTICASONE PROPIONATE 1 SPRAY: 50 SPRAY, METERED NASAL at 08:36

## 2023-07-13 RX ADMIN — DOCUSATE SODIUM 50 MG AND SENNOSIDES 8.6 MG 1 TABLET: 8.6; 5 TABLET, FILM COATED ORAL at 08:35

## 2023-07-13 RX ADMIN — OXYCODONE HYDROCHLORIDE 10 MG: 5 TABLET ORAL at 10:10

## 2023-07-13 RX ADMIN — HYDROMORPHONE HYDROCHLORIDE 1 MG: 1 INJECTION, SOLUTION INTRAMUSCULAR; INTRAVENOUS; SUBCUTANEOUS at 16:45

## 2023-07-13 RX ADMIN — MAGNESIUM HYDROXIDE 30 ML: 1200 LIQUID ORAL at 09:56

## 2023-07-13 RX ADMIN — METHOCARBAMOL TABLETS 500 MG: 500 TABLET, COATED ORAL at 16:31

## 2023-07-13 RX ADMIN — KETOROLAC TROMETHAMINE 30 MG: 30 INJECTION, SOLUTION INTRAMUSCULAR; INTRAVENOUS at 16:30

## 2023-07-13 RX ADMIN — HYDROMORPHONE HYDROCHLORIDE 1 MG: 1 INJECTION, SOLUTION INTRAMUSCULAR; INTRAVENOUS; SUBCUTANEOUS at 04:19

## 2023-07-13 RX ADMIN — HYDROMORPHONE HYDROCHLORIDE 1 MG: 1 INJECTION, SOLUTION INTRAMUSCULAR; INTRAVENOUS; SUBCUTANEOUS at 07:36

## 2023-07-13 RX ADMIN — ACETAMINOPHEN 650 MG: 325 TABLET ORAL at 08:34

## 2023-07-13 RX ADMIN — ACETAMINOPHEN 650 MG: 325 TABLET ORAL at 14:52

## 2023-07-13 RX ADMIN — SODIUM CHLORIDE, PRESERVATIVE FREE 10 ML: 5 INJECTION INTRAVENOUS at 08:36

## 2023-07-13 RX ADMIN — OXYCODONE HYDROCHLORIDE 10 MG: 5 TABLET ORAL at 06:08

## 2023-07-13 RX ADMIN — SODIUM CHLORIDE: 9 INJECTION, SOLUTION INTRAVENOUS at 08:45

## 2023-07-13 RX ADMIN — MONTELUKAST SODIUM 10 MG: 10 TABLET, FILM COATED ORAL at 20:42

## 2023-07-13 RX ADMIN — POLYETHYLENE GLYCOL 3350 17 G: 17 POWDER, FOR SOLUTION ORAL at 08:35

## 2023-07-13 RX ADMIN — OXYCODONE HYDROCHLORIDE 10 MG: 5 TABLET ORAL at 18:40

## 2023-07-13 RX ADMIN — SODIUM CHLORIDE: 9 INJECTION, SOLUTION INTRAVENOUS at 18:37

## 2023-07-13 RX ADMIN — HYDROMORPHONE HYDROCHLORIDE 1 MG: 1 INJECTION, SOLUTION INTRAMUSCULAR; INTRAVENOUS; SUBCUTANEOUS at 20:47

## 2023-07-13 RX ADMIN — KETOROLAC TROMETHAMINE 30 MG: 30 INJECTION, SOLUTION INTRAMUSCULAR; INTRAVENOUS at 21:55

## 2023-07-13 RX ADMIN — ACETAMINOPHEN 650 MG: 325 TABLET ORAL at 04:19

## 2023-07-13 RX ADMIN — OXYCODONE HYDROCHLORIDE 10 MG: 5 TABLET ORAL at 14:52

## 2023-07-13 RX ADMIN — ENOXAPARIN SODIUM 40 MG: 40 INJECTION SUBCUTANEOUS at 08:34

## 2023-07-13 RX ADMIN — HYDROMORPHONE HYDROCHLORIDE 1 MG: 1 INJECTION, SOLUTION INTRAMUSCULAR; INTRAVENOUS; SUBCUTANEOUS at 13:25

## 2023-07-13 RX ADMIN — KETOROLAC TROMETHAMINE 30 MG: 30 INJECTION, SOLUTION INTRAMUSCULAR; INTRAVENOUS at 09:55

## 2023-07-13 RX ADMIN — SODIUM CHLORIDE, PRESERVATIVE FREE 10 MG: 5 INJECTION INTRAVENOUS at 08:33

## 2023-07-13 ASSESSMENT — PAIN DESCRIPTION - DESCRIPTORS
DESCRIPTORS: ACHING
DESCRIPTORS: ACHING;DISCOMFORT
DESCRIPTORS: DISCOMFORT
DESCRIPTORS: ACHING;DISCOMFORT
DESCRIPTORS: ACHING;CRUSHING;DISCOMFORT
DESCRIPTORS: ACHING
DESCRIPTORS: CRUSHING
DESCRIPTORS: ACHING

## 2023-07-13 ASSESSMENT — PAIN SCALES - GENERAL
PAINLEVEL_OUTOF10: 7
PAINLEVEL_OUTOF10: 9
PAINLEVEL_OUTOF10: 9
PAINLEVEL_OUTOF10: 5
PAINLEVEL_OUTOF10: 1
PAINLEVEL_OUTOF10: 10
PAINLEVEL_OUTOF10: 9
PAINLEVEL_OUTOF10: 7
PAINLEVEL_OUTOF10: 4
PAINLEVEL_OUTOF10: 5
PAINLEVEL_OUTOF10: 7
PAINLEVEL_OUTOF10: 6
PAINLEVEL_OUTOF10: 8

## 2023-07-13 ASSESSMENT — PAIN DESCRIPTION - LOCATION
LOCATION: BACK

## 2023-07-13 ASSESSMENT — PAIN DESCRIPTION - ORIENTATION
ORIENTATION: MID

## 2023-07-13 NOTE — PLAN OF CARE

## 2023-07-13 NOTE — DISCHARGE INSTR - COC
Continuity of Care Form    Patient Name: Lazaro Miller   :  1940  MRN:  [id number]    Admit date:  2023  Discharge date:  2023    Code Status Order: Full Code   Advance Directives:   Advance Care Flowsheet Documentation       Date/Time Healthcare Directive Type of Healthcare Directive Copy in [address] Agent's Name Healthcare Agent's Phone Number    [phone] Yes, patient has an advance directive for healthcare treatment -- No, copy requested from family -- -- --            Admitting Physician:  Riki Alvarez DO  PCP: Rehan Marcelino DO    Discharging Nurse: [facility] Unit/Room#: 0102/0102-01  Discharging Unit Phone Number: [phone]    Emergency Contact:   Extended Emergency Contact Information  Primary Emergency Contact: Helio Kelly  Address: [address] [place] [address] [employer] Phone: [phone]  Relation: Spouse  Hearing or visual needs: None  Other needs: None  Preferred language: English   needed? No  Secondary Emergency Contact: Alexandrea Souza  Address: [address] of [address] [employer] Phone: [phone]  Work Phone: [phone]  Mobile Phone: [phone]  Relation: Child  Hearing or visual needs: None  Other needs: None  Preferred language: English   needed?  No    Past Surgical History:  Past Surgical History:   Procedure Laterality Date    BACK SURGERY  2010    lumbar fusion    BLADDER TUMOR EXCISION      CARDIOVERSION      \"8-10 years ago\"  to get \"heart into regular rhythm\"    CARPAL TUNNEL RELEASE Bilateral     CATARACT REMOVAL WITH IMPLANT Bilateral 2014, 2014    [facility]    CERVICAL FUSION N/A 2019    ANTERIOR CERVICAL DECOMPRESSION FUSION C3-4, C4-5 (SUPINE) DEPUY, REGULAR TABLE, MICROSCOPE, C-ARM, EVOKES (# [id number] ELIZA) performed by Riki Alvarez DO at [address]

## 2023-07-13 NOTE — PLAN OF CARE
Output by Drain (mL) 07/11/23 0701 - 07/11/23 1900 07/11/23 1901 - 07/12/23 0700 07/12/23 0701 - 07/12/23 1900 07/12/23 1901 - 07/13/23 0700 07/13/23 0701 - 07/13/23 1807   Closed/Suction Drain Midline Back Bulb  90 35 20        Drain Removal Note    [x]subfascial Drain   []Subgaleal Drain  []Ventriculostomy Drain    Drain removed from suction, prepped with betadine and sterile towels placed to create sterile field. Drain suture cut and drain removed. A  2x2 and tegaderm placed over drain hole with hemostasis. No complications, patient tolerated procedure well.     --  Stu Witt, TREMAINE  6:07 PM EDT

## 2023-07-13 NOTE — CONSULTS
Physical Medicine & Rehabilitation  Consult Note      Admitting Physician: Lina Reece DO    Primary Care Provider: Tomi Pizano DO     Reason for Consult:  Acute Inpatient Rehabilitation    Chief Complaint: Admitted for lumbar laminectomy T12-L2    History of Present Illness:  Referring Provider is requesting an evaluation for appropriate placement upon discharge from acute care. Ms. Suad Sterling is a 82 y.o. female who was admitted to [facility] on 7/11/2023 with No chief complaint on file. 82-year-old female with low back pain with radicular for 4 years. Has had 2 back surgery without complete relief has tried physical therapy injection. Admitted for lumbar laminectomy T10-L2    Neurosurgery-thoracic myelopathy, lumbar stenosis with neurogenic claudication and incomplete paraplegia-status post lumbar laminectomy T10 L1 SCDs and Lovenox DVT prophylaxis    Sees pain management as outpatient-chronic multisite pain history of cervical lumbar spine extensive fusion surgeries chronic opiate therapy gradually wean her opioid on MS Contin 15 twice daily and Percocet 7.5 twice a day, has been cord stimulator that in 2012 findings but was removed is not functioning has had bilateral RFA's of L5 and S1-S2    Patient currently feels okay as he received multiple pain medications, still notes difficulty moving    Radiology:  MRI lumbar spine 2/27/2023  IMPRESSION:  Posterior hardware fixation from L2-S1 with a prominent postoperative seroma  in the adjacent posterior paraspinal soft tissues. Multilevel degenerative change with canal stenosis at L1-2. Moderate left and severe right foraminal narrowing at L1-2. MRI thoracic spine 2/27/2023  IMPRESSION:  Multilevel degenerative disc disease with associated facet and ligamentous  hypertrophy throughout the thoracic spine resulting in canal stenosis and  foraminal narrowing bilaterally as described above.     MRI cervical spine 12/2/2021       Review

## 2023-07-14 VITALS
OXYGEN SATURATION: 98 % | TEMPERATURE: 98.2 F | HEART RATE: 78 BPM | DIASTOLIC BLOOD PRESSURE: 57 MMHG | WEIGHT: 149.6 LBS | SYSTOLIC BLOOD PRESSURE: 134 MMHG | RESPIRATION RATE: 14 BRPM | BODY MASS INDEX: 33.65 KG/M2 | HEIGHT: 56 IN

## 2023-07-14 LAB
GLUCOSE BLD-MCNC: 115 MG/DL (ref 65–105)
GLUCOSE BLD-MCNC: 127 MG/DL (ref 65–105)

## 2023-07-14 PROCEDURE — 82947 ASSAY GLUCOSE BLOOD QUANT: CPT

## 2023-07-14 PROCEDURE — 6360000002 HC RX W HCPCS: Performed by: NURSE PRACTITIONER

## 2023-07-14 PROCEDURE — 6370000000 HC RX 637 (ALT 250 FOR IP): Performed by: REGISTERED NURSE

## 2023-07-14 PROCEDURE — 6360000002 HC RX W HCPCS: Performed by: PHYSICIAN ASSISTANT

## 2023-07-14 PROCEDURE — 2580000003 HC RX 258: Performed by: PHYSICIAN ASSISTANT

## 2023-07-14 PROCEDURE — 6370000000 HC RX 637 (ALT 250 FOR IP): Performed by: PHYSICIAN ASSISTANT

## 2023-07-14 PROCEDURE — APPSS15 APP SPLIT SHARED TIME 0-15 MINUTES: Performed by: NURSE PRACTITIONER

## 2023-07-14 RX ORDER — METHOCARBAMOL 500 MG/1
500 TABLET, FILM COATED ORAL 4 TIMES DAILY
Qty: 40 TABLET | Refills: 0 | Status: SHIPPED | OUTPATIENT
Start: 2023-07-14 | End: 2023-07-24

## 2023-07-14 RX ORDER — OXYCODONE HYDROCHLORIDE AND ACETAMINOPHEN 5; 325 MG/1; MG/1
1 TABLET ORAL EVERY 6 HOURS PRN
Qty: 28 TABLET | Refills: 0 | Status: SHIPPED | OUTPATIENT
Start: 2023-07-14 | End: 2023-07-21

## 2023-07-14 RX ORDER — POLYETHYLENE GLYCOL 3350 17 G/17G
17 POWDER, FOR SOLUTION ORAL DAILY
Qty: 30 EACH | Refills: 0 | Status: SHIPPED | OUTPATIENT
Start: 2023-07-14 | End: 2023-08-13

## 2023-07-14 RX ORDER — ASPIRIN 81 MG/1
81 TABLET ORAL NIGHTLY
Qty: 30 TABLET | Refills: 3 | Status: SHIPPED | OUTPATIENT
Start: 2023-07-17

## 2023-07-14 RX ADMIN — SODIUM CHLORIDE: 9 INJECTION, SOLUTION INTRAVENOUS at 03:21

## 2023-07-14 RX ADMIN — HYDROMORPHONE HYDROCHLORIDE 1 MG: 1 INJECTION, SOLUTION INTRAMUSCULAR; INTRAVENOUS; SUBCUTANEOUS at 08:31

## 2023-07-14 RX ADMIN — Medication 2000 UNITS: at 08:30

## 2023-07-14 RX ADMIN — FAMOTIDINE 20 MG: 20 TABLET, FILM COATED ORAL at 08:30

## 2023-07-14 RX ADMIN — ACETAMINOPHEN 650 MG: 325 TABLET ORAL at 08:31

## 2023-07-14 RX ADMIN — PREGABALIN 150 MG: 75 CAPSULE ORAL at 08:31

## 2023-07-14 RX ADMIN — KETOROLAC TROMETHAMINE 30 MG: 30 INJECTION, SOLUTION INTRAMUSCULAR; INTRAVENOUS at 10:30

## 2023-07-14 RX ADMIN — METHOCARBAMOL TABLETS 500 MG: 500 TABLET, COATED ORAL at 13:02

## 2023-07-14 RX ADMIN — SODIUM CHLORIDE, PRESERVATIVE FREE 10 ML: 5 INJECTION INTRAVENOUS at 09:32

## 2023-07-14 RX ADMIN — KETOROLAC TROMETHAMINE 30 MG: 30 INJECTION, SOLUTION INTRAMUSCULAR; INTRAVENOUS at 03:17

## 2023-07-14 RX ADMIN — LEVOTHYROXINE SODIUM 50 MCG: 50 TABLET ORAL at 08:31

## 2023-07-14 RX ADMIN — ENOXAPARIN SODIUM 40 MG: 40 INJECTION SUBCUTANEOUS at 08:31

## 2023-07-14 RX ADMIN — ROSUVASTATIN CALCIUM 5 MG: 5 TABLET, COATED ORAL at 08:31

## 2023-07-14 RX ADMIN — OXYCODONE HYDROCHLORIDE 10 MG: 5 TABLET ORAL at 05:36

## 2023-07-14 RX ADMIN — OXYCODONE HYDROCHLORIDE 10 MG: 5 TABLET ORAL at 11:45

## 2023-07-14 RX ADMIN — METFORMIN HYDROCHLORIDE 500 MG: 500 TABLET ORAL at 08:31

## 2023-07-14 RX ADMIN — ACETAMINOPHEN 650 MG: 325 TABLET ORAL at 03:16

## 2023-07-14 RX ADMIN — METHOCARBAMOL TABLETS 500 MG: 500 TABLET, COATED ORAL at 08:30

## 2023-07-14 ASSESSMENT — PAIN SCALES - GENERAL
PAINLEVEL_OUTOF10: 9
PAINLEVEL_OUTOF10: 9
PAINLEVEL_OUTOF10: 3
PAINLEVEL_OUTOF10: 9
PAINLEVEL_OUTOF10: 10
PAINLEVEL_OUTOF10: 6
PAINLEVEL_OUTOF10: 8

## 2023-07-14 NOTE — DISCHARGE INSTRUCTIONS
Lumbar Spine Surgery Discharge Instructions     Thank you for choosing [facility] and [address] for your surgical needs. The following instructions will help to ensure your comfort and that you are well prepared after your surgery. Post-Operative Visit:   The office is located at:    [facility]    [address], [address] [facility], main floor    [place], [address] [place] [address] [address]    [phone]     Please also call your primary care physician to schedule an appointment for further evaluation and care. Diet:   You may resume your regular diet. Be sure to eat a well-balanced diet. Protein promotes wound healing. Pain medication and decreased activity can cause constipation. Drink 8-10 glasses of water a day, eat fresh fruits and vegetables, and add prunes, raisins and bran cereals to your diet if you do become constipated. A stool softener taken 1-2 times a day is helpful. Dulcolax suppositories or Fleets enemas are also available without a prescription. Call our office if the problem continues. Activity and Exercise:   No driving until you are seen in the office. Avoid riding in a car for the first two weeks until you come to the office for your scheduled follow-up. Start taking short, frequent walks in the beginning. Adell, more frequent walks throughout the day are more beneficial than one long walk each day. You may gradually increase the distance; as tolerated. Your brace will help give support to your muscles while you walk. If your pain increases, you may be walking too much or too far. Try backing off for a day or two and then resume slowly. No lifting greater than 5 lbs (gallon of milk). No bending at the waist. Instead, bend at the knees and squat to pick something up. If physical therapy has been prescribed, you are not to perform range of motion, flexion, extension or lateral bending.    No baths, swimming or

## 2023-07-14 NOTE — DISCHARGE SUMMARY
Department of Neurosurgery                                            Discharge Summary       PATIENT NAME: Ana Gomez  YOB: 1940  MEDICAL RECORD NO. [id number]  DATE: 7/14/2023  PRIMARY CARE PHYSICIAN: Mitsy Graf DO  DISCHARGE DATE:  7/14/2023  1:50 PM  DISCHARGE DIAGNOSIS:   Patient Active Problem List   Diagnosis Code    Lumbar radiculopathy M54.16    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    Chronic pain associated with significant psychosocial dysfunction G89.4    Primary osteoarthritis of right hip M16.11    Sacroiliitis (HCC) M46.1    Spinal cord stimulator status Z96.89    Acute pancreatitis K85.90    Abdominal pain, epigastric R10.13    Irritable bowel syndrome with constipation K58.1    Cervical radicular pain M54.12    DDD (degenerative disc disease), cervical M50.30    Neck pain M54.2    Spinal stenosis in cervical region M48.02    Acute pain of right knee M25.561    Imbalance R26.89    Cervical stenosis of spine M48.02    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    Chronic right hip pain M25.551, G89.29    Lumbar spondylosis M47.816    Chronic SI joint pain M53.3, G89.29    JEFF (acute kidney injury) ([address]) N17.9    Chronic use of opiate drug for therapeutic purpose Z79.891    Spinal stenosis of lumbar region with neurogenic claudication M48.062    Degenerative thoracic spinal stenosis M48.04     DISPOSITION: Skilled Facility    PROCEDURES:    thoracic and lumbar laminectomy Duy Cai originally presented to the hospital on 7/11/2023  6:07 AM with thoracic myelopathy, lumbar stenosis with neurogenic claudication, incomplete paraplegia, adjacent level lumbar disease with stenosis . She was admitted and taken to the OR for neurosurgery for procedure listed above. Patient's drain was removed when found to have low output without complications. Patient tolerated all procedures well. Labs and imaging were followed daily.   At

## 2023-07-14 NOTE — CARE COORDINATION
CM called and spoke with Kathie Alarcon from [facility] and requested update on previously sent referral. Kathie Alarcon states that they will discuss patient at their 10am meeting and she will call and update CM after. Carey garcia initiated for [facility] pending Joshua Cline ID [id number].    6551- Patient has approved Fartun Mccollum for SNF approval # [id number]. Authorization is good through 7/17. Authorization letter faxed to Nora. Await discharge orders.
Discharge orders placed in Epic. 12pm transportation request faxed to [facility]. CM spoke with patient at bedside to discuss transitional planning. Patient verbalizes being agreeable with discharging to [facility] today at 12pm. PS message sent to attending requesting [address] completion. [phone]- JACINDA and signed prescriptions faxed to [facility]. CM called and spoke with Faina Rodartee from [facility] and she confirmed that they are able to accept patient with 12p transportation time.     RN to call report to [phone]      Discharge [address] Case Management Department  Written by: Mina Davis RN    Patient Name: Martina Coelho  Attending Provider: Veta Hamman, DO  Admit Date: 2023  6:07 AM  MRN: [id number]  Account: [de-identified]                     : 1940  Discharge Date: 23      Disposition: [facility]    Ruthie Garza
Facility            Potential DME:    Patient expects to discharge to: [address] [place] [address] S for transportation at discharge: [facility] [place]: [address] / Plan: [facility] / Product Type: *No Product type* /     Does insurance require precert for SNF: Yes    Potential assistance Purchasing Medications:    Meds-to-Beds request:        [facility] [address] [facility] [address] [facility] Sarah Azevedo [phone]  [address] [place] [address]  Phone: [phone] Fax: [phone]      Notes:    Factors facilitating achievement of predicted outcomes: Family support, Motivated, Cooperative, and Pleasant    Barriers to discharge: Decreased endurance and Lower extremity weakness    Additional Case Management Notes: CM spoke with patient at bedside to discuss transitional planning. Patient states that her goal is to return home w/ family at discharge. Patient states that if SNF is needed for rehab that her choice would be [facility] [address] [place] [address]. Referral sent. Await PT/OT evaluations. The Plan for Transition of Care is related to the following treatment goals of Thoracic myelopathy [M47.14]  Degenerative thoracic spinal stenosis [U86.73]    IF APPLICABLE: The Patient and/or patient representative Amy and her family were provided with a choice of provider and agrees with the discharge plan. Freedom of choice list with basic dialogue that supports the patient's individualized plan of care/goals and shares the quality data associated with the providers was provided to: Patient   Patient Representative Name:       The Patient and/or Patient Representative Agree with the Discharge Plan?  Yes    Iggy Stapleton RN  Case Management Department  Ph: [phone]

## 2023-07-14 NOTE — PLAN OF CARE
Problem: Discharge Planning  Goal: Discharge to home or other facility with appropriate resources  Outcome: Completed     Problem: Chronic Conditions and Co-morbidities  Goal: Patient's chronic conditions and co-morbidity symptoms are monitored and maintained or improved  Outcome: Completed     Problem: Safety - Adult  Goal: Free from fall injury  Outcome: Completed     Problem: Pain  Goal: Verbalizes/displays adequate comfort level or baseline comfort level  Outcome: Completed     Problem: Skin/Tissue Integrity  Goal: Absence of new skin breakdown  Description: 1. Monitor for areas of redness and/or skin breakdown  2. Assess vascular access sites hourly  3. Every 4-6 hours minimum:  Change oxygen saturation probe site  4. Every 4-6 hours:  If on nasal continuous positive airway pressure, respiratory therapy assess nares and determine need for appliance change or resting period.   Outcome: Completed     Problem: ABCDS Injury Assessment  Goal: Absence of physical injury  Outcome: Completed

## 2023-07-26 ENCOUNTER — OFFICE VISIT (OUTPATIENT)
Dept: NEUROSURGERY | Age: 83
End: 2023-07-26

## 2023-07-26 VITALS
TEMPERATURE: 97.9 F | DIASTOLIC BLOOD PRESSURE: 66 MMHG | WEIGHT: 149 LBS | BODY MASS INDEX: 33.52 KG/M2 | OXYGEN SATURATION: 95 % | SYSTOLIC BLOOD PRESSURE: 112 MMHG | HEIGHT: 56 IN | HEART RATE: 71 BPM

## 2023-07-26 DIAGNOSIS — M47.14 THORACIC MYELOPATHY: Primary | ICD-10-CM

## 2023-07-26 DIAGNOSIS — Z98.890 S/P LAMINECTOMY: ICD-10-CM

## 2023-07-26 PROCEDURE — 99024 POSTOP FOLLOW-UP VISIT: CPT | Performed by: NURSE PRACTITIONER

## 2023-07-26 NOTE — PROGRESS NOTES
[address] [place] [address] SMaggie Vale  [facility] [address], [address] [place] [address] Road [address]  Dept: [phone]    Patient:  Brad Osei  YOB: 1940  Date: 7/26/23    The patient is a 82 y.o. female who presents today for consult of the following problems:     Chief Complaint   Patient presents with    Post-Op Check     2 week post op         HPI:     Brad Osei is a 82 y.o. female who presents to the office for post-op evaluation s/p laminectomy and partial facetectomy T10, T11, T12, L1, as well as lysis of adhesions L1-L2. Incision has been healing well. Patient remains at rehab facility, [address] [place] [address] in [facility]. Has been working closely with physical and occupational therapy. Has been ambulating with use of walker and therapists. Pain has been controlled with current medication regimen. Symptoms overall improving. Reports that she feels that she is getting stronger. Strength grossly 5/5  Mildly diminished left lower extremity, otherwise intact  Incision CDI    Date of surgery: 7/11/2023    Assessment and Plan:      1. Thoracic myelopathy    2. S/P laminectomy          Plan: Incision healing well, staples removed without difficulty, patient tolerated well. Continue working with physical and occupational therapy at facility. Next postop visit in 6 weeks as scheduled. Followup: Return in about 6 weeks (around 9/6/2023), or if symptoms worsen or fail to improve. Prescriptions Ordered:  No orders of the defined types were placed in this encounter. Orders Placed:  No orders of the defined types were placed in this encounter. Electronically signed by CONSUELO Saravia CNP on 7/26/2023 at 11:41 AM    Please note that this chart was generated using voice recognition Dragon dictation software.   Although every effort was made to ensure the accuracy of this automated

## 2023-09-05 ENCOUNTER — HOSPITAL ENCOUNTER (OUTPATIENT)
Dept: PAIN MANAGEMENT | Age: 83
Discharge: HOME | End: 2023-09-05
Payer: MEDICARE

## 2023-09-05 VITALS — HEIGHT: 56 IN | WEIGHT: 149 LBS | BODY MASS INDEX: 33.52 KG/M2 | TEMPERATURE: 97.3 F

## 2023-09-05 DIAGNOSIS — M53.3 CHRONIC SI JOINT PAIN: Primary | Chronic | ICD-10-CM

## 2023-09-05 DIAGNOSIS — M96.1 FAILED BACK SYNDROME: ICD-10-CM

## 2023-09-05 DIAGNOSIS — G89.29 CHRONIC SI JOINT PAIN: Primary | Chronic | ICD-10-CM

## 2023-09-05 DIAGNOSIS — Z79.891 CHRONIC USE OF OPIATE DRUG FOR THERAPEUTIC PURPOSE: Chronic | ICD-10-CM

## 2023-09-05 DIAGNOSIS — M48.062 SPINAL STENOSIS OF LUMBAR REGION WITH NEUROGENIC CLAUDICATION: Chronic | ICD-10-CM

## 2023-09-05 DIAGNOSIS — M96.1 FAILED BACK SYNDROME OF LUMBAR SPINE: ICD-10-CM

## 2023-09-05 DIAGNOSIS — M48.04 DEGENERATIVE THORACIC SPINAL STENOSIS: ICD-10-CM

## 2023-09-05 PROCEDURE — 99213 OFFICE O/P EST LOW 20 MIN: CPT

## 2023-09-05 PROCEDURE — 99213 OFFICE O/P EST LOW 20 MIN: CPT | Performed by: NURSE PRACTITIONER

## 2023-09-05 RX ORDER — MORPHINE SULFATE 15 MG/1
15 TABLET, FILM COATED, EXTENDED RELEASE ORAL 2 TIMES DAILY
Qty: 60 TABLET | Refills: 0 | Status: SHIPPED | OUTPATIENT
Start: 2023-09-14 | End: 2023-10-14

## 2023-09-05 RX ORDER — OXYCODONE AND ACETAMINOPHEN 7.5; 325 MG/1; MG/1
1 TABLET ORAL 2 TIMES DAILY PRN
Qty: 60 TABLET | Refills: 0 | Status: SHIPPED | OUTPATIENT
Start: 2023-09-09 | End: 2023-10-09

## 2023-09-05 RX ORDER — METHOCARBAMOL 500 MG/1
500 TABLET, FILM COATED ORAL 3 TIMES DAILY
Qty: 90 TABLET | Refills: 0 | Status: SHIPPED | OUTPATIENT
Start: 2023-09-05 | End: 2023-10-05

## 2023-09-05 ASSESSMENT — ENCOUNTER SYMPTOMS
BACK PAIN: 1
BOWEL INCONTINENCE: 0
COUGH: 0
SHORTNESS OF BREATH: 0
CONSTIPATION: 0

## 2023-09-05 ASSESSMENT — PAIN SCALES - GENERAL: PAINLEVEL_OUTOF10: 9

## 2023-09-05 NOTE — PROGRESS NOTES
occurs constantly. The problem is unchanged. The pain is present in the lumbar spine and thoracic spine. The quality of the pain is described as aching, stabbing and cramping. The pain does not radiate. The pain is at a severity of 9/10. The pain is severe. The pain is The same all the time. The symptoms are aggravated by bending, sitting and standing. Pertinent negatives include no bladder incontinence, bowel incontinence, chest pain, fever, headaches, numbness, tingling or weakness. Risk factors include menopause, obesity, poor posture, sedentary lifestyle and lack of exercise. She has tried ice, heat and analgesics for the symptoms. The treatment provided mild relief. Patient denies any new neurological symptoms. No bowel or bladder incontinence, no weakness, and no falling. Pill count: appropriate  Percocet - 9 9/9  Morphine - 18 9/14    Morphine equivalent: 52.5  Controlled Substance Monitoring:    Acute and Chronic Pain Monitoring:   RX Monitoring 9/5/2023   Attestation -   Acute Pain Prescriptions -   Periodic Controlled Substance Monitoring Possible medication side effects, risk of tolerance/dependence & alternative treatments discussed. ;No signs of potential drug abuse or diversion identified. ;Assessed functional status. ;Obtaining appropriate analgesic effect of treatment. Chronic Pain > 50 MEDD -   Chronic Pain > 80 MEDD -   Chronic Pain > 120 MEDD -          Periodic Controlled Substance Monitoring: Possible medication side effects, risk of tolerance/dependence & alternative treatments discussed., No signs of potential drug abuse or diversion identified. , Assessed functional status., Obtaining appropriate analgesic effect of treatment.  Josue Morrison, APRN - CNP)      Past Medical History:   Diagnosis Date    Achilles tendonitis     right    Asthma     COPD (chronic obstructive pulmonary disease) ([facility])     Dr. Mason Sosa last appt 6/26/2023 CNP    Cough     clear phlegm

## 2023-09-11 RX ORDER — MELATONIN
Qty: 60 TABLET | OUTPATIENT
Start: 2023-09-11

## 2023-09-14 ENCOUNTER — TRANSCRIBE ORDERS (OUTPATIENT)
Dept: ADMINISTRATIVE | Age: 83
End: 2023-09-14

## 2023-09-14 DIAGNOSIS — R19.00 ABDOMINAL MASS, UNSPECIFIED ABDOMINAL LOCATION: Primary | ICD-10-CM

## 2023-09-20 ENCOUNTER — HOSPITAL ENCOUNTER (OUTPATIENT)
Dept: CT IMAGING | Age: 83
Discharge: HOME | End: 2023-09-22
Payer: MEDICARE

## 2023-09-20 DIAGNOSIS — R19.00 ABDOMINAL MASS, UNSPECIFIED ABDOMINAL LOCATION: ICD-10-CM

## 2023-09-20 LAB
EGFR, POC: >60 ML/MIN/1.73M2
POC CREATININE: 0.8 MG/DL (ref 0.51–1.19)

## 2023-09-20 PROCEDURE — 74177 CT ABD & PELVIS W/CONTRAST: CPT

## 2023-09-20 PROCEDURE — 2580000003 HC RX 258

## 2023-09-20 PROCEDURE — 82565 ASSAY OF CREATININE: CPT

## 2023-09-20 PROCEDURE — 6360000004 HC RX CONTRAST MEDICATION

## 2023-09-20 RX ORDER — SODIUM CHLORIDE 0.9 % (FLUSH) 0.9 %
10 SYRINGE (ML) INJECTION PRN
Status: DISCONTINUED | OUTPATIENT
Start: 2023-09-20 | End: 2023-09-23 | Stop reason: HOSPADM

## 2023-09-20 RX ORDER — 0.9 % SODIUM CHLORIDE 0.9 %
80 INTRAVENOUS SOLUTION INTRAVENOUS ONCE
Status: COMPLETED | OUTPATIENT
Start: 2023-09-20 | End: 2023-09-20

## 2023-09-20 RX ADMIN — SODIUM CHLORIDE 80 ML: 9 INJECTION, SOLUTION INTRAVENOUS at 15:33

## 2023-09-20 RX ADMIN — IOPAMIDOL 75 ML: 755 INJECTION, SOLUTION INTRAVENOUS at 15:23

## 2023-09-20 RX ADMIN — SODIUM CHLORIDE, PRESERVATIVE FREE 10 ML: 5 INJECTION INTRAVENOUS at 15:33

## 2023-09-25 ENCOUNTER — OFFICE VISIT (OUTPATIENT)
Dept: ORTHOPEDIC SURGERY | Age: 83
End: 2023-09-25
Payer: MEDICARE

## 2023-09-25 VITALS — BODY MASS INDEX: 33.52 KG/M2 | RESPIRATION RATE: 14 BRPM | WEIGHT: 149 LBS | HEIGHT: 56 IN

## 2023-09-25 DIAGNOSIS — M19.021 OSTEOARTHRITIS OF RIGHT ELBOW, UNSPECIFIED OSTEOARTHRITIS TYPE: Primary | ICD-10-CM

## 2023-09-25 PROCEDURE — 1123F ACP DISCUSS/DSCN MKR DOCD: CPT | Performed by: ORTHOPAEDIC SURGERY

## 2023-09-25 PROCEDURE — G8417 CALC BMI ABV UP PARAM F/U: HCPCS | Performed by: ORTHOPAEDIC SURGERY

## 2023-09-25 PROCEDURE — G8399 PT W/DXA RESULTS DOCUMENT: HCPCS | Performed by: ORTHOPAEDIC SURGERY

## 2023-09-25 PROCEDURE — 1090F PRES/ABSN URINE INCON ASSESS: CPT | Performed by: ORTHOPAEDIC SURGERY

## 2023-09-25 PROCEDURE — 99212 OFFICE O/P EST SF 10 MIN: CPT | Performed by: ORTHOPAEDIC SURGERY

## 2023-09-25 PROCEDURE — G8427 DOCREV CUR MEDS BY ELIG CLIN: HCPCS | Performed by: ORTHOPAEDIC SURGERY

## 2023-09-25 PROCEDURE — 1036F TOBACCO NON-USER: CPT | Performed by: ORTHOPAEDIC SURGERY

## 2023-09-25 RX ORDER — DICLOFENAC SODIUM 75 MG/1
75 TABLET, DELAYED RELEASE ORAL 2 TIMES DAILY WITH MEALS
Qty: 28 TABLET | Refills: 0 | Status: SHIPPED | OUTPATIENT
Start: 2023-09-25 | End: 2023-10-09

## 2023-09-25 NOTE — PROGRESS NOTES
HPI: Ms. Armando Escobar is an 82-year-old lady F seen in the past for both right shoulder and elbow pain. I believe that the time she was dealing with a cervical radiculopathy with regards to the right shoulder pain as movement of her neck reproduce the pain that she was having in her shoulder. She has since been seen by her spine surgeon. When she was evaluated by the surgeon she was not having any pain and it was his impression that her neck was not involved in any pain that she was having. At this point she started to have similar pain on the left side once again in the shoulder blade region. I did explain that likely is related to her cervical spine and probably should be reevaluated. She was also seen at the time for her right elbow and was diagnosed with osteoarthritis. She was placed on a short course of a prescription anti-inflammatory but shortly thereafter she was admitted and instructed not to take the anti-inflammatory. Her pain at this time is as previously discussed. We once again had a discussion about treatment options including cortisone injection versus prescription anti-inflammatory. Following our discussions today she has elected to proceed with an NSAID and so prescription of Voltaren was once again sent to her pharmacy to be taken twice a day with food over the course of the next 2 weeks. Should she fail to respond appropriately to this we can always bring her back in for an injection. She may return or call at anytime with questions or concerns.

## 2023-09-27 ENCOUNTER — HOSPITAL ENCOUNTER (OUTPATIENT)
Dept: WOMENS IMAGING | Age: 83
Discharge: HOME | End: 2023-09-29
Payer: MEDICARE

## 2023-09-27 DIAGNOSIS — Z12.31 VISIT FOR SCREENING MAMMOGRAM: ICD-10-CM

## 2023-09-27 PROCEDURE — 77063 BREAST TOMOSYNTHESIS BI: CPT

## 2023-10-05 ENCOUNTER — HOSPITAL ENCOUNTER (OUTPATIENT)
Dept: PAIN MANAGEMENT | Age: 83
Discharge: HOME | End: 2023-10-05
Payer: MEDICARE

## 2023-10-05 VITALS — DIASTOLIC BLOOD PRESSURE: 66 MMHG | OXYGEN SATURATION: 97 % | SYSTOLIC BLOOD PRESSURE: 153 MMHG | HEART RATE: 74 BPM

## 2023-10-05 DIAGNOSIS — M96.1 FAILED BACK SYNDROME OF LUMBAR SPINE: ICD-10-CM

## 2023-10-05 DIAGNOSIS — M96.1 FAILED BACK SYNDROME: ICD-10-CM

## 2023-10-05 DIAGNOSIS — R26.89 IMBALANCE: Primary | Chronic | ICD-10-CM

## 2023-10-05 DIAGNOSIS — Z79.891 CHRONIC USE OF OPIATE DRUG FOR THERAPEUTIC PURPOSE: Chronic | ICD-10-CM

## 2023-10-05 PROCEDURE — 99213 OFFICE O/P EST LOW 20 MIN: CPT | Performed by: NURSE PRACTITIONER

## 2023-10-05 PROCEDURE — 99213 OFFICE O/P EST LOW 20 MIN: CPT

## 2023-10-05 RX ORDER — OXYCODONE AND ACETAMINOPHEN 7.5; 325 MG/1; MG/1
1 TABLET ORAL 2 TIMES DAILY PRN
Qty: 60 TABLET | Refills: 0 | Status: SHIPPED | OUTPATIENT
Start: 2023-10-09 | End: 2023-11-08

## 2023-10-05 RX ORDER — MORPHINE SULFATE 15 MG/1
15 TABLET, FILM COATED, EXTENDED RELEASE ORAL 2 TIMES DAILY
Qty: 60 TABLET | Refills: 0 | Status: SHIPPED | OUTPATIENT
Start: 2023-10-14 | End: 2023-11-13

## 2023-10-05 ASSESSMENT — PAIN DESCRIPTION - FREQUENCY: FREQUENCY: CONTINUOUS

## 2023-10-05 ASSESSMENT — ENCOUNTER SYMPTOMS
SHORTNESS OF BREATH: 0
COUGH: 0
BOWEL INCONTINENCE: 0
CONSTIPATION: 0
BACK PAIN: 1

## 2023-10-05 ASSESSMENT — PAIN DESCRIPTION - LOCATION: LOCATION: BACK

## 2023-10-05 ASSESSMENT — PAIN SCALES - GENERAL: PAINLEVEL_OUTOF10: 7

## 2023-10-05 ASSESSMENT — PAIN DESCRIPTION - DESCRIPTORS: DESCRIPTORS: ACHING

## 2023-10-05 ASSESSMENT — PAIN DESCRIPTION - PAIN TYPE: TYPE: CHRONIC PAIN

## 2023-10-05 NOTE — PROGRESS NOTES
Chief Complaint   Patient presents with    Back Pain     [facility]     history of chronic multisite body pain  History of previous cervical thoracic and lumbar spine extensive fusion surgeries  Continues to suffer from chronic pain  On chronic opioid therapy in this clinic for several years  We have gradually weaned her opioid with slow titration  She is prescribed MS Contin 15 mg twice a day along with Percocet 7.5mg twice a day     Back  She has tried and failed SCS implanted in 2012 at [facility] but it was removed as it was not functioning well. Had bilat RFA of  the primary dorsal ramus of L5 and lateral branches of S1 and S2 nerves done 1/23/23 and reports  over 50% relief of pain and improved activity   Lumbar MRI 2/2023   Posterior hardware fixation from L2-S1 with a prominent postoperative seroma in the adjacent posterior paraspinal soft tissues. Multilevel degenerative change with canal stenosis at L1-2. Moderate left and severe right foraminal narrowing at L1-2. Thoracic MRI Multilevel degenerative disc disease with associated facet and ligamentous hypertrophy throughout the thoracic spine resulting in canal stenosis and foraminal narrowing bilaterally as described above. 7/11/23  s/p laminectomy and partial facetectomy T10, T11, T12, L1, as well as lysis of adhesions L1-L2. Pt saw NS for f/u with POC to cont PT      Neck  History of chronic neck pain with radiation down right arm  Diagnosis cervical stenosis  History of previous cervical surgery  May consider for cervical epidural steroid injection in future  12/21 Cervcial MRI   No fracture or bony destructive lesion. 2. Status post ACDF from C3-C5. 3. Mild central canal stenoses at C3-4, C5-6, C6-7 and C7-T1. 4.  Multilevel neural foraminal stenoses, worst (moderate to severe) at the left C6-7 level     Back Pain  This is a chronic problem. The current episode started more than 1 year ago. The problem occurs constantly.  The problem

## 2023-10-09 ENCOUNTER — OFFICE VISIT (OUTPATIENT)
Dept: ORTHOPEDIC SURGERY | Age: 83
End: 2023-10-09

## 2023-10-09 VITALS — HEIGHT: 56 IN | BODY MASS INDEX: 33.74 KG/M2 | WEIGHT: 150 LBS | RESPIRATION RATE: 14 BRPM

## 2023-10-09 DIAGNOSIS — M19.021 OSTEOARTHRITIS OF RIGHT ELBOW, UNSPECIFIED OSTEOARTHRITIS TYPE: Primary | ICD-10-CM

## 2023-10-09 RX ORDER — TRIAMCINOLONE ACETONIDE 40 MG/ML
40 INJECTION, SUSPENSION INTRA-ARTICULAR; INTRAMUSCULAR ONCE
Status: COMPLETED | OUTPATIENT
Start: 2023-10-09 | End: 2023-10-09

## 2023-10-09 RX ORDER — LIDOCAINE HYDROCHLORIDE 10 MG/ML
3 INJECTION, SOLUTION INFILTRATION; PERINEURAL ONCE
Status: COMPLETED | OUTPATIENT
Start: 2023-10-09 | End: 2023-10-09

## 2023-10-09 RX ADMIN — TRIAMCINOLONE ACETONIDE 40 MG: 40 INJECTION, SUSPENSION INTRA-ARTICULAR; INTRAMUSCULAR at 14:59

## 2023-10-09 RX ADMIN — LIDOCAINE HYDROCHLORIDE 3 ML: 10 INJECTION, SOLUTION INFILTRATION; PERINEURAL at 14:59

## 2023-10-09 NOTE — PROGRESS NOTES
HPI: Ms. Otis Li is an 72-year-old lady presenting today requesting an injection to her right elbow. When she was last seen in my clinic she was diagnosed with osteoarthritis in this joint. We had discussed the possibility of an injection at that time but she elected then to proceed with a prescription anti-inflammatory. We did move ahead with the injection today as outlined below. I will see her back in my clinic as needed but she was encouraged to return or call at anytime with persistent or worsening symptoms or with any questions or concerns. Procedure: right elbow joint injection  Following an appropriate discussion with the patient regarding the risks and benefits of the procedure she consented to proceed. her right elbow was prepped using chlorhexadine solution. Using aseptic technique through the posterolateral soft spot, her right elbow joint was injected with a 4 cc mixture of 1cc 40mg/ml kenalog and 3 cc of 1% lidocaine without epinephrine. A band aid was applied to the injection site. she tolerated the injection with no immediate adverse reactions.

## 2023-10-09 NOTE — TELEPHONE ENCOUNTER
LAST VISIT:   Visit date not found     Future Appointments   Date Time Provider [address]   11/7/2023  1:40 PM CONSUELO Alvraenga CNP [address] [place] [address]   11/27/2023 10:00 AM CONSUELO Valerio CNP [facility]

## 2023-11-07 ENCOUNTER — HOSPITAL ENCOUNTER (OUTPATIENT)
Dept: PAIN MANAGEMENT | Age: 83
Discharge: HOME | End: 2023-11-07
Payer: MEDICARE

## 2023-11-07 VITALS — WEIGHT: 150 LBS | TEMPERATURE: 97.3 F | HEIGHT: 56 IN | BODY MASS INDEX: 33.74 KG/M2 | RESPIRATION RATE: 20 BRPM

## 2023-11-07 DIAGNOSIS — M47.816 LUMBAR SPONDYLOSIS: ICD-10-CM

## 2023-11-07 DIAGNOSIS — M96.1 FAILED BACK SYNDROME OF LUMBAR SPINE: ICD-10-CM

## 2023-11-07 DIAGNOSIS — M53.3 CHRONIC SI JOINT PAIN: Primary | Chronic | ICD-10-CM

## 2023-11-07 DIAGNOSIS — M48.062 SPINAL STENOSIS OF LUMBAR REGION WITH NEUROGENIC CLAUDICATION: Chronic | ICD-10-CM

## 2023-11-07 DIAGNOSIS — G89.29 CHRONIC SI JOINT PAIN: Primary | Chronic | ICD-10-CM

## 2023-11-07 DIAGNOSIS — M54.16 LUMBAR RADICULOPATHY: ICD-10-CM

## 2023-11-07 DIAGNOSIS — M96.1 FAILED BACK SYNDROME: ICD-10-CM

## 2023-11-07 DIAGNOSIS — Z79.891 CHRONIC USE OF OPIATE DRUG FOR THERAPEUTIC PURPOSE: Chronic | ICD-10-CM

## 2023-11-07 PROCEDURE — 99213 OFFICE O/P EST LOW 20 MIN: CPT

## 2023-11-07 PROCEDURE — 99214 OFFICE O/P EST MOD 30 MIN: CPT | Performed by: NURSE PRACTITIONER

## 2023-11-07 RX ORDER — METHOCARBAMOL 500 MG/1
500 TABLET, FILM COATED ORAL 3 TIMES DAILY
Qty: 90 TABLET | Refills: 0 | Status: SHIPPED | OUTPATIENT
Start: 2023-11-07 | End: 2023-12-07

## 2023-11-07 RX ORDER — MORPHINE SULFATE 15 MG/1
15 TABLET, FILM COATED, EXTENDED RELEASE ORAL 2 TIMES DAILY
Qty: 60 TABLET | Refills: 0 | Status: SHIPPED | OUTPATIENT
Start: 2023-11-15 | End: 2023-12-15

## 2023-11-07 RX ORDER — OXYCODONE AND ACETAMINOPHEN 7.5; 325 MG/1; MG/1
1 TABLET ORAL 2 TIMES DAILY PRN
Qty: 60 TABLET | Refills: 0 | Status: SHIPPED | OUTPATIENT
Start: 2023-11-09 | End: 2023-12-09

## 2023-11-07 ASSESSMENT — ENCOUNTER SYMPTOMS
COUGH: 0
SHORTNESS OF BREATH: 0
BACK PAIN: 1
CONSTIPATION: 0
BOWEL INCONTINENCE: 0

## 2023-11-21 ENCOUNTER — HOSPITAL ENCOUNTER (OUTPATIENT)
Dept: MRI IMAGING | Age: 83
Discharge: HOME | End: 2023-11-23
Payer: MEDICARE

## 2023-11-21 DIAGNOSIS — R22.2 LOCALIZED SWELLING, MASS AND LUMP, TRUNK: ICD-10-CM

## 2023-11-21 LAB
EGFR, POC: >60 ML/MIN/1.73M2
POC CREATININE: 0.7 MG/DL (ref 0.51–1.19)

## 2023-11-21 PROCEDURE — 6360000004 HC RX CONTRAST MEDICATION: Performed by: NURSE PRACTITIONER

## 2023-11-21 PROCEDURE — 74183 MRI ABD W/O CNTR FLWD CNTR: CPT

## 2023-11-21 PROCEDURE — A9579 GAD-BASE MR CONTRAST NOS,1ML: HCPCS | Performed by: NURSE PRACTITIONER

## 2023-11-21 PROCEDURE — 82565 ASSAY OF CREATININE: CPT

## 2023-11-21 PROCEDURE — 2580000003 HC RX 258: Performed by: NURSE PRACTITIONER

## 2023-11-21 RX ORDER — SODIUM CHLORIDE 0.9 % (FLUSH) 0.9 %
10 SYRINGE (ML) INJECTION 2 TIMES DAILY
Status: DISCONTINUED | OUTPATIENT
Start: 2023-11-21 | End: 2023-11-24 | Stop reason: HOSPADM

## 2023-11-21 RX ADMIN — SODIUM CHLORIDE, PRESERVATIVE FREE 10 ML: 5 INJECTION INTRAVENOUS at 12:37

## 2023-11-21 RX ADMIN — GADOTERIDOL 15 ML: 279.3 INJECTION, SOLUTION INTRAVENOUS at 12:37

## 2023-11-27 ENCOUNTER — OFFICE VISIT (OUTPATIENT)
Dept: NEUROSURGERY | Age: 83
End: 2023-11-27
Payer: MEDICARE

## 2023-11-27 VITALS
TEMPERATURE: 97 F | WEIGHT: 150 LBS | OXYGEN SATURATION: 96 % | BODY MASS INDEX: 33.63 KG/M2 | SYSTOLIC BLOOD PRESSURE: 179 MMHG | DIASTOLIC BLOOD PRESSURE: 70 MMHG

## 2023-11-27 DIAGNOSIS — M47.14 THORACIC MYELOPATHY: Primary | ICD-10-CM

## 2023-11-27 DIAGNOSIS — M47.22 CERVICAL SPONDYLOSIS WITH RADICULOPATHY: ICD-10-CM

## 2023-11-27 DIAGNOSIS — S32.009K LUMBAR PSEUDOARTHROSIS: ICD-10-CM

## 2023-11-27 PROCEDURE — G8484 FLU IMMUNIZE NO ADMIN: HCPCS | Performed by: NURSE PRACTITIONER

## 2023-11-27 PROCEDURE — 99214 OFFICE O/P EST MOD 30 MIN: CPT | Performed by: NURSE PRACTITIONER

## 2023-11-27 PROCEDURE — 1036F TOBACCO NON-USER: CPT | Performed by: NURSE PRACTITIONER

## 2023-11-27 PROCEDURE — G8399 PT W/DXA RESULTS DOCUMENT: HCPCS | Performed by: NURSE PRACTITIONER

## 2023-11-27 PROCEDURE — 1123F ACP DISCUSS/DSCN MKR DOCD: CPT | Performed by: NURSE PRACTITIONER

## 2023-11-27 PROCEDURE — 1090F PRES/ABSN URINE INCON ASSESS: CPT | Performed by: NURSE PRACTITIONER

## 2023-11-27 PROCEDURE — G8417 CALC BMI ABV UP PARAM F/U: HCPCS | Performed by: NURSE PRACTITIONER

## 2023-11-27 PROCEDURE — G8427 DOCREV CUR MEDS BY ELIG CLIN: HCPCS | Performed by: NURSE PRACTITIONER

## 2023-11-27 NOTE — PROGRESS NOTES
[address] [place] [address] SMaggie Vale  [facility] [address] 2 [address] [place] [address], [address] [place] [address]  Dept: [phone]    Patient:  Brad Osei  YOB: 1940  Date: 11/27/23    The patient is a 82 y.o. female who presents today for consult of the following problems:     Chief Complaint   Patient presents with    thoracic myelopathy     3 months f/u         HPI:     Brad Osei is a 82 y.o. female who presents for follow up of thoracic laminectomy and partial facetectomy T10-L1. Patient was initially discharged to acute rehab facility, subsequently completed home physical therapy. Has not had any additional outpatient therapy. Has had a couple of falls due to losing her balance, most recently last night while trying to adjust the rug in her bathroom. Denies any new numbness, tingling or weakness to legs. Is able to walk upright with the use of a rolling walker, if she tries to walk without it she finds herself gradually forward leaning. She believes that this is due to the pain in her lower back. Does have upcoming pain management intervention planned, repeat RFA for L5, S1, S2. Also reports that she has been having intermittent numbness and tingling to her left arm, thinks it affects her middle and ring fingers, but is not sure. Symptoms present mostly during the day, depending on her position. Does not notice symptoms nocturnally. Also with pain radiating down her right upper extremity distally, from elbow to hand. Follows with orthopedic specialist for this.     History:     Past Medical History:   Diagnosis Date    Achilles tendonitis     right    Asthma     COPD (chronic obstructive pulmonary disease) ([facility])     Dr. Modesto Gardiner last appt 6/26/2023 CNP    Cough     clear phlegm    Degenerative lumbar disc     DM (diabetes mellitus), type 2 ([address] )     PCP Dr. Sebastian Heads    Failed back

## 2023-12-05 DIAGNOSIS — M47.22 CERVICAL SPONDYLOSIS WITH RADICULOPATHY: ICD-10-CM

## 2023-12-05 DIAGNOSIS — M47.14 THORACIC MYELOPATHY: Primary | ICD-10-CM

## 2023-12-06 ENCOUNTER — HOSPITAL ENCOUNTER (OUTPATIENT)
Dept: PAIN MANAGEMENT | Age: 83
Discharge: HOME | End: 2023-12-06
Payer: MEDICARE

## 2023-12-06 VITALS — BODY MASS INDEX: 33.74 KG/M2 | WEIGHT: 150 LBS | HEIGHT: 56 IN

## 2023-12-06 DIAGNOSIS — Z79.891 CHRONIC USE OF OPIATE DRUG FOR THERAPEUTIC PURPOSE: Chronic | ICD-10-CM

## 2023-12-06 DIAGNOSIS — M96.1 FAILED BACK SYNDROME OF LUMBAR SPINE: ICD-10-CM

## 2023-12-06 DIAGNOSIS — M96.1 FAILED BACK SYNDROME: ICD-10-CM

## 2023-12-06 PROCEDURE — 99213 OFFICE O/P EST LOW 20 MIN: CPT | Performed by: NURSE PRACTITIONER

## 2023-12-06 PROCEDURE — 99213 OFFICE O/P EST LOW 20 MIN: CPT

## 2023-12-06 RX ORDER — OXYCODONE AND ACETAMINOPHEN 7.5; 325 MG/1; MG/1
1 TABLET ORAL 2 TIMES DAILY PRN
Qty: 60 TABLET | Refills: 0 | Status: SHIPPED | OUTPATIENT
Start: 2023-12-09 | End: 2024-01-08

## 2023-12-06 RX ORDER — MORPHINE SULFATE 15 MG/1
15 TABLET, FILM COATED, EXTENDED RELEASE ORAL 2 TIMES DAILY
Qty: 60 TABLET | Refills: 0 | Status: SHIPPED | OUTPATIENT
Start: 2023-12-15 | End: 2024-01-14

## 2023-12-06 ASSESSMENT — ENCOUNTER SYMPTOMS
COUGH: 0
CONSTIPATION: 0
BACK PAIN: 1
BOWEL INCONTINENCE: 0
TROUBLE SWALLOWING: 0
SHORTNESS OF BREATH: 0

## 2023-12-06 ASSESSMENT — PAIN SCALES - GENERAL: PAINLEVEL_OUTOF10: 7

## 2023-12-06 NOTE — PROGRESS NOTES
Chief Complaint   Patient presents with    Back Pain    Neck Pain    Medication Refill         [facility]   history of chronic multisite body pain  History of previous cervical thoracic and lumbar spine extensive fusion surgeries  Continues to suffer from chronic pain  On chronic opioid therapy in this clinic for several years  We have gradually weaned her opioid with slow titration  She is prescribed MS Contin 15 mg twice a day along with Percocet 7.5mg twice a day     Back  She has tried and failed SCS implanted in 2012 at [facility] but it was removed as it was not functioning well. Had bilat RFA of  the primary dorsal ramus of L5 and lateral branches of S1 and S2 nerves done 1/23/23 and reported over 50% relief of pain and improved activity but feels pain is returning and would like to repeat    Lumbar MRI 2/2023   Posterior hardware fixation from L2-S1 with a prominent postoperative seroma in the adjacent posterior paraspinal soft tissues. Multilevel degenerative change with canal stenosis at L1-2. Moderate left and severe right foraminal narrowing at L1-2. Thoracic MRI Multilevel degenerative disc disease with associated facet and ligamentous hypertrophy throughout the thoracic spine resulting in canal stenosis and foraminal narrowing bilaterally as described above. 7/11/23  s/p laminectomy and partial facetectomy T10, T11, T12, L1, as well as lysis of adhesions L1-L2. Pt saw NS for f/u with POC to cont PT      Neck  History of chronic neck pain with radiation down right arm  Diagnosis cervical stenosis  History of previous cervical surgery  May consider for cervical epidural steroid injection in future  12/21 Cervcial MRI   No fracture or bony destructive lesion. 2. Status post ACDF from C3-C5. 3. Mild central canal stenoses at C3-4, C5-6, C6-7 and C7-T1. 4.  Multilevel neural foraminal stenoses, worst (moderate to severe) at the left C6-7 level     Back Pain  This is a chronic problem.

## 2024-01-02 ENCOUNTER — HOSPITAL ENCOUNTER (OUTPATIENT)
Dept: PAIN MANAGEMENT | Age: 84
Discharge: HOME | End: 2024-01-02
Payer: MEDICARE

## 2024-01-02 VITALS — BODY MASS INDEX: 34.87 KG/M2 | HEIGHT: 56 IN | WEIGHT: 155 LBS

## 2024-01-02 DIAGNOSIS — M50.30 DDD (DEGENERATIVE DISC DISEASE), CERVICAL: ICD-10-CM

## 2024-01-02 DIAGNOSIS — M53.3 CHRONIC SI JOINT PAIN: Primary | Chronic | ICD-10-CM

## 2024-01-02 DIAGNOSIS — M54.16 LUMBAR RADICULOPATHY: ICD-10-CM

## 2024-01-02 DIAGNOSIS — M96.1 FAILED BACK SYNDROME OF LUMBAR SPINE: ICD-10-CM

## 2024-01-02 DIAGNOSIS — Z79.891 CHRONIC USE OF OPIATE DRUG FOR THERAPEUTIC PURPOSE: Chronic | ICD-10-CM

## 2024-01-02 DIAGNOSIS — M48.062 SPINAL STENOSIS OF LUMBAR REGION WITH NEUROGENIC CLAUDICATION: Chronic | ICD-10-CM

## 2024-01-02 DIAGNOSIS — M96.1 FAILED BACK SYNDROME: ICD-10-CM

## 2024-01-02 DIAGNOSIS — M48.02 SPINAL STENOSIS IN CERVICAL REGION: ICD-10-CM

## 2024-01-02 DIAGNOSIS — M54.12 CERVICAL RADICULAR PAIN: ICD-10-CM

## 2024-01-02 DIAGNOSIS — G89.29 CHRONIC SI JOINT PAIN: Primary | Chronic | ICD-10-CM

## 2024-01-02 PROCEDURE — 99214 OFFICE O/P EST MOD 30 MIN: CPT | Performed by: NURSE PRACTITIONER

## 2024-01-02 PROCEDURE — 99213 OFFICE O/P EST LOW 20 MIN: CPT

## 2024-01-02 RX ORDER — METHOCARBAMOL 500 MG/1
500 TABLET, FILM COATED ORAL NIGHTLY PRN
Qty: 90 TABLET | Refills: 2 | Status: SHIPPED | OUTPATIENT
Start: 2024-01-02 | End: 2024-02-01

## 2024-01-02 RX ORDER — MORPHINE SULFATE 15 MG/1
15 TABLET, FILM COATED, EXTENDED RELEASE ORAL 2 TIMES DAILY
Qty: 60 TABLET | Refills: 0 | Status: SHIPPED | OUTPATIENT
Start: 2024-01-02 | End: 2024-02-01

## 2024-01-02 RX ORDER — OXYCODONE AND ACETAMINOPHEN 7.5; 325 MG/1; MG/1
1 TABLET ORAL 2 TIMES DAILY PRN
Qty: 60 TABLET | Refills: 0 | Status: SHIPPED | OUTPATIENT
Start: 2024-01-08 | End: 2024-02-07

## 2024-01-02 ASSESSMENT — ENCOUNTER SYMPTOMS
BACK PAIN: 1
COUGH: 0
SHORTNESS OF BREATH: 0
CONSTIPATION: 0
BOWEL INCONTINENCE: 0

## 2024-01-02 ASSESSMENT — PAIN SCALES - GENERAL: PAINLEVEL_OUTOF10: 8

## 2024-01-02 NOTE — H&P (VIEW-ONLY)
Rfl:     levothyroxine (SYNTHROID) 50 MCG tablet, Take 1 tablet by mouth Daily, Disp: , Rfl:     omeprazole (PRILOSEC) 20 MG capsule, Take 1 capsule by mouth Daily, Disp: , Rfl:     Family History   Problem Relation Age of Onset    Heart Failure Mother     Lung Cancer Mother     Other Father          pneumonia    Alzheimer's Disease Father     Lung Cancer Brother     Other Daughter         Fibromyalsia    Rheum Arthritis Daughter     Diabetes Paternal Grandmother     Breast Cancer Maternal Cousin 40    Breast Cancer Maternal Aunt 60       Social History     Socioeconomic History    Marital status:      Spouse name: Yung    Number of children: 3    Years of education: Not on file    Highest education level: Not on file   Occupational History    Occupation: retired   Tobacco Use    Smoking status: Never    Smokeless tobacco: Never   Vaping Use    Vaping Use: Never used   Substance and Sexual Activity    Alcohol use: Not Currently     Comment: rare    Drug use: Never    Sexual activity: Yes     Partners: Male   Other Topics Concern    Not on file   Social History Narrative    Not on file     Social Determinants of Health     Financial Resource Strain: Not on file   Food Insecurity: Not on file   Transportation Needs: Not on file   Physical Activity: Not on file   Stress: Not on file   Social Connections: Not on file   Intimate Partner Violence: Not on file   Housing Stability: Not on file       Review of Systems:  Review of Systems   Constitutional: Negative for chills and fever.   Cardiovascular:  Negative for chest pain.   Respiratory:  Negative for cough and shortness of breath.    Musculoskeletal:  Positive for arthritis, back pain, muscle weakness and neck pain. Negative for falls.   Gastrointestinal:  Negative for bowel incontinence and constipation.   Genitourinary:  Negative for bladder incontinence.   Neurological:  Positive for numbness and tingling. Negative for headaches and weakness.       Physical

## 2024-01-02 NOTE — PROGRESS NOTES
(MS CONTIN) 15 MG extended release tablet, Take 1 tablet by mouth 2 times daily for 30 days., Disp: 60 tablet, Rfl: 0    [START ON 1/8/2024] oxyCODONE-acetaminophen (PERCOCET) 7.5-325 MG per tablet, Take 1 tablet by mouth 2 times daily as needed for Pain for up to 30 days. Max Daily Amount: 2 tablets, Disp: 60 tablet, Rfl: 0    methocarbamol (ROBAXIN) 500 MG tablet, Take 1 tablet by mouth nightly as needed (spasms), Disp: 90 tablet, Rfl: 2    diclofenac (VOLTAREN) 75 MG EC tablet, Take 1 tablet by mouth 2 times daily (with meals) for 14 days, Disp: 28 tablet, Rfl: 0    Beclomethasone Dipropionate (QVAR IN), , Disp: , Rfl:     aspirin 81 MG EC tablet, Take 1 tablet by mouth at bedtime, Disp: 30 tablet, Rfl: 3    denosumab (PROLIA) 60 MG/ML SOSY SC injection, Inject 1 mL into the skin, Disp: , Rfl:     rosuvastatin (CRESTOR) 5 MG tablet, Take 1 tablet by mouth daily, Disp: , Rfl:     Cholecalciferol (VITAMIN D3) 50 MCG (2000 UT) CAPS, Take by mouth daily, Disp: , Rfl:     fluticasone (FLONASE) 50 MCG/ACT nasal spray, 1 spray by Each Nostril route daily, Disp: 16 g, Rfl: 1    vitamin D (CHOLECALCIFEROL) 25 MCG (1000 UT) TABS tablet, Take 1 tablet by mouth daily, Disp: , Rfl:     vitamin B-12 (CYANOCOBALAMIN) 500 MCG tablet, Take 1 tablet by mouth daily, Disp: , Rfl:     naloxone 4 MG/0.1ML LIQD nasal spray, 1 spray by Nasal route as needed for Opioid Reversal, Disp: 1 each, Rfl: 0    albuterol sulfate  (90 Base) MCG/ACT inhaler, Inhale 2 puffs into the lungs every 4 hours as needed for Shortness of Breath, Disp: , Rfl:     diclofenac sodium 1 % GEL, Apply 2 g topically 2 times daily as needed for Pain (knees and elbows), Disp: , Rfl:     pregabalin (LYRICA) 150 MG capsule, Take 1 capsule by mouth 2 times daily., Disp: , Rfl:     montelukast (SINGULAIR) 10 MG tablet, Take 1 tablet by mouth nightly, Disp: , Rfl:     metFORMIN (GLUCOPHAGE) 500 MG tablet, Take 1 tablet by mouth 2 times daily (with meals), Disp: ,

## 2024-01-05 RX ORDER — METHOCARBAMOL 500 MG/1
500 TABLET, FILM COATED ORAL 3 TIMES DAILY
Qty: 90 TABLET | Refills: 2 | OUTPATIENT
Start: 2024-01-05

## 2024-01-08 RX ORDER — MELATONIN
Qty: 60 TABLET | OUTPATIENT
Start: 2024-01-08

## 2024-01-24 ENCOUNTER — HOSPITAL ENCOUNTER (OUTPATIENT)
Dept: PAIN MANAGEMENT | Facility: CLINIC | Age: 84
Discharge: HOME | End: 2024-01-24
Payer: MEDICARE

## 2024-01-24 VITALS
SYSTOLIC BLOOD PRESSURE: 177 MMHG | HEIGHT: 56 IN | WEIGHT: 155 LBS | TEMPERATURE: 98.3 F | DIASTOLIC BLOOD PRESSURE: 78 MMHG | OXYGEN SATURATION: 97 % | BODY MASS INDEX: 34.87 KG/M2 | RESPIRATION RATE: 14 BRPM | HEART RATE: 73 BPM

## 2024-01-24 DIAGNOSIS — M46.1 SACROILIITIS (HCC): Primary | ICD-10-CM

## 2024-01-24 DIAGNOSIS — R52 PAIN MANAGEMENT: ICD-10-CM

## 2024-01-24 LAB — GLUCOSE BLD-MCNC: 117 MG/DL (ref 65–105)

## 2024-01-24 PROCEDURE — 6360000002 HC RX W HCPCS: Performed by: ANESTHESIOLOGY

## 2024-01-24 PROCEDURE — 99152 MOD SED SAME PHYS/QHP 5/>YRS: CPT | Performed by: ANESTHESIOLOGY

## 2024-01-24 PROCEDURE — 2500000003 HC RX 250 WO HCPCS: Performed by: ANESTHESIOLOGY

## 2024-01-24 PROCEDURE — 64625 RF ABLTJ NRV NRVTG SI JT: CPT

## 2024-01-24 PROCEDURE — 82947 ASSAY GLUCOSE BLOOD QUANT: CPT

## 2024-01-24 PROCEDURE — 64625 RF ABLTJ NRV NRVTG SI JT: CPT | Performed by: ANESTHESIOLOGY

## 2024-01-24 RX ORDER — FENTANYL CITRATE 50 UG/ML
INJECTION, SOLUTION INTRAMUSCULAR; INTRAVENOUS
Status: COMPLETED | OUTPATIENT
Start: 2024-01-24 | End: 2024-01-24

## 2024-01-24 RX ORDER — LIDOCAINE HYDROCHLORIDE 10 MG/ML
INJECTION, SOLUTION EPIDURAL; INFILTRATION; INTRACAUDAL; PERINEURAL
Status: COMPLETED | OUTPATIENT
Start: 2024-01-24 | End: 2024-01-24

## 2024-01-24 RX ORDER — LIDOCAINE HYDROCHLORIDE 40 MG/ML
INJECTION, SOLUTION RETROBULBAR; TOPICAL
Status: COMPLETED | OUTPATIENT
Start: 2024-01-24 | End: 2024-01-24

## 2024-01-24 RX ADMIN — LIDOCAINE HYDROCHLORIDE 5 ML: 10 INJECTION, SOLUTION EPIDURAL; INFILTRATION; INTRACAUDAL at 12:18

## 2024-01-24 RX ADMIN — LIDOCAINE HYDROCHLORIDE 5 ML: 40 SOLUTION RETROBULBAR; TOPICAL at 12:14

## 2024-01-24 RX ADMIN — LIDOCAINE HYDROCHLORIDE 5 ML: 10 INJECTION, SOLUTION EPIDURAL; INFILTRATION; INTRACAUDAL at 12:10

## 2024-01-24 RX ADMIN — FENTANYL CITRATE 50 MCG: 50 INJECTION, SOLUTION INTRAMUSCULAR; INTRAVENOUS at 12:08

## 2024-01-24 RX ADMIN — FENTANYL CITRATE 50 MCG: 50 INJECTION, SOLUTION INTRAMUSCULAR; INTRAVENOUS at 12:18

## 2024-01-24 ASSESSMENT — PAIN DESCRIPTION - DESCRIPTORS: DESCRIPTORS: DULL;ACHING

## 2024-01-24 ASSESSMENT — PAIN - FUNCTIONAL ASSESSMENT
PAIN_FUNCTIONAL_ASSESSMENT: PREVENTS OR INTERFERES WITH ALL ACTIVE AND SOME PASSIVE ACTIVITIES
PAIN_FUNCTIONAL_ASSESSMENT: 0-10

## 2024-01-24 ASSESSMENT — PAIN SCALES - GENERAL: PAINLEVEL_OUTOF10: 2

## 2024-01-24 NOTE — INTERVAL H&P NOTE
Update History & Physical    The patient's History and Physical of January 2, 2024 was reviewed with the patient and I examined the patient. There was no change. The surgical site was confirmed by the patient and me.     Plan: The risks, benefits, expected outcome, and alternative to the recommended procedure have been discussed with the patient. Patient understands and wants to proceed with the procedure.     ASA 3  MP 3    Electronically signed by Dimitrios Dawkins MD on 1/24/2024 at 12:04 PM

## 2024-01-24 NOTE — DISCHARGE INSTRUCTIONS
Discharge Instructions following Sedation or Anesthesia:  You have  received  a sedative/anesthetic therefore, you should not consume any alcoholic beverages for minimum of 12 hours.  Do not drive or operate machinery for 24 hours.  Do not sign legal documents for 24 hours.  Dizziness, drowsiness, and unsteadiness may occur.  Rest when need to.  Increase diet as tolerated.  Keep up on fluids if diet allows.      General Instructions:  Do not take a tub bath for 72 hours after procedure (this includes hot tubs and swimming pools).  You may shower, but avoid hot water to injection site.   Avoid strenuous activity TODAY especially if you experience dizziness.   Remove band-aid the next day.  Wash off any residual iodine   Do not use heat, heating pad, or any other heating device over the injection site for 3 days after the procedure.  If you experience pain after your procedure, you may continue with your current pain medication as prescribed.  (DO NOT INCREASE YOUR PAIN MEDICATION WITHOUT TALKING TO DOCTOR)  Soreness and pain at injection site is common, may use ice to reduce soreness.    Call [facility] at [phone] if you experience:   Fever, chills or temperature over 100    Vomiting, Headache, persistent stiff neck, nausea, blurred vision   Difficulty in urinating or unable to urinate with 8 hours   Increase in weakness, numbness or loss of function   Increased redness, swelling or drainage at the injection site

## 2024-01-30 ENCOUNTER — HOSPITAL ENCOUNTER (OUTPATIENT)
Dept: PAIN MANAGEMENT | Age: 84
Discharge: HOME | End: 2024-01-30
Payer: MEDICARE

## 2024-01-30 VITALS — BODY MASS INDEX: 34.87 KG/M2 | HEIGHT: 56 IN | WEIGHT: 155 LBS

## 2024-01-30 DIAGNOSIS — M96.1 FAILED BACK SYNDROME: ICD-10-CM

## 2024-01-30 DIAGNOSIS — M96.1 FAILED BACK SYNDROME OF LUMBAR SPINE: ICD-10-CM

## 2024-01-30 DIAGNOSIS — Z79.891 CHRONIC USE OF OPIATE DRUG FOR THERAPEUTIC PURPOSE: Chronic | ICD-10-CM

## 2024-01-30 DIAGNOSIS — M48.062 SPINAL STENOSIS OF LUMBAR REGION WITH NEUROGENIC CLAUDICATION: Chronic | ICD-10-CM

## 2024-01-30 DIAGNOSIS — M53.3 CHRONIC SI JOINT PAIN: Primary | Chronic | ICD-10-CM

## 2024-01-30 DIAGNOSIS — G89.29 CHRONIC SI JOINT PAIN: Primary | Chronic | ICD-10-CM

## 2024-01-30 PROCEDURE — 99214 OFFICE O/P EST MOD 30 MIN: CPT | Performed by: NURSE PRACTITIONER

## 2024-01-30 PROCEDURE — 99213 OFFICE O/P EST LOW 20 MIN: CPT

## 2024-01-30 RX ORDER — OXYCODONE AND ACETAMINOPHEN 7.5; 325 MG/1; MG/1
1 TABLET ORAL 2 TIMES DAILY PRN
Qty: 60 TABLET | Refills: 0 | Status: SHIPPED | OUTPATIENT
Start: 2024-02-07 | End: 2024-03-08

## 2024-01-30 RX ORDER — MORPHINE SULFATE 15 MG/1
15 TABLET, FILM COATED, EXTENDED RELEASE ORAL 2 TIMES DAILY
Qty: 60 TABLET | Refills: 0 | Status: SHIPPED | OUTPATIENT
Start: 2024-02-14 | End: 2024-03-15

## 2024-01-30 ASSESSMENT — ENCOUNTER SYMPTOMS
BACK PAIN: 1
BOWEL INCONTINENCE: 0

## 2024-02-29 ENCOUNTER — TELEPHONE (OUTPATIENT)
Dept: PAIN MANAGEMENT | Age: 84
End: 2024-02-29

## 2024-02-29 ENCOUNTER — HOSPITAL ENCOUNTER (OUTPATIENT)
Dept: PAIN MANAGEMENT | Age: 84
Discharge: HOME | End: 2024-02-29
Payer: MEDICARE

## 2024-02-29 VITALS — WEIGHT: 155 LBS | HEIGHT: 56 IN | BODY MASS INDEX: 34.87 KG/M2

## 2024-02-29 DIAGNOSIS — M96.1 FAILED BACK SYNDROME: ICD-10-CM

## 2024-02-29 DIAGNOSIS — M96.1 FAILED BACK SYNDROME OF LUMBAR SPINE: ICD-10-CM

## 2024-02-29 DIAGNOSIS — Z79.891 CHRONIC USE OF OPIATE DRUG FOR THERAPEUTIC PURPOSE: Chronic | ICD-10-CM

## 2024-02-29 PROCEDURE — 99213 OFFICE O/P EST LOW 20 MIN: CPT

## 2024-02-29 PROCEDURE — 99214 OFFICE O/P EST MOD 30 MIN: CPT | Performed by: ANESTHESIOLOGY

## 2024-02-29 RX ORDER — OXYCODONE AND ACETAMINOPHEN 7.5; 325 MG/1; MG/1
1 TABLET ORAL EVERY 6 HOURS PRN
Qty: 120 TABLET | Refills: 0 | Status: SHIPPED | OUTPATIENT
Start: 2024-03-07 | End: 2024-04-06

## 2024-02-29 RX ORDER — PREGABALIN 150 MG/1
150 CAPSULE ORAL 2 TIMES DAILY
Qty: 60 CAPSULE | Refills: 1 | Status: CANCELLED | OUTPATIENT
Start: 2024-02-29 | End: 2024-04-29

## 2024-02-29 RX ORDER — MORPHINE SULFATE 15 MG/1
15 TABLET, FILM COATED, EXTENDED RELEASE ORAL 2 TIMES DAILY
Qty: 60 TABLET | Refills: 0 | Status: CANCELLED | OUTPATIENT
Start: 2024-03-15 | End: 2024-04-14

## 2024-02-29 RX ORDER — OXYCODONE AND ACETAMINOPHEN 7.5; 325 MG/1; MG/1
1 TABLET ORAL 2 TIMES DAILY PRN
Qty: 60 TABLET | Refills: 0 | Status: CANCELLED | OUTPATIENT
Start: 2024-03-07 | End: 2024-04-06

## 2024-02-29 ASSESSMENT — ENCOUNTER SYMPTOMS
SHORTNESS OF BREATH: 1
EYES NEGATIVE: 1
BACK PAIN: 1
WHEEZING: 1

## 2024-02-29 NOTE — TELEPHONE ENCOUNTER
I called patient's daughter Alexandrea (verbal ok to speak with her from patient) and advised patient is to continue taking Morphine and Oxycodone the same until March 7th.  Then she is to start new Rx for Oxycodone and stop Morphine on March 7th, and patient or daughter needs to bring remaining Morphine pills to clinic for count and waste documentation.  Alexandrea verbalizes understanding and will call to make next OV (Rx due 4/6).

## 2024-02-29 NOTE — PROGRESS NOTES
The patient is a 83 y.o. /  female.    Chief Complaint   Patient presents with    Back Pain    Neck Pain        HPI    Medication Refill:  Morphine & Oxycodone    Pain score Today:  5  Adverse effects (Constipation / Nausea / Sedation / sexual Dysfunction / others) : Constipation  Mood: fair  Sleep pattern and quality: good  Activity level: fair    Pill count Today:   Oxycodone - 16  Morphine- 37    Last dose taken  02/29/2024 morning  OARRS report reviewed today: yes  ER/Hospitalizations/PCP visit related to pain since last visit:no   Any legal problems e.g. DUI etc.:No  Satisfied with current management: No    Opioid Contract: 11/07/2023  Last Urine Dug screen dated: 07/06/2023    Hemoglobin A1C   Date Value Ref Range Status   02/26/2022 6.0 4.0 - 6.0 % Final       Past Medical History, Past Surgical History, Social History, Allergies and Medications reviewed and updated in EPIC as indicated    Family History reviewed and is noncontributory.        Past Medical History:   Diagnosis Date    Achilles tendonitis     right    Asthma     COPD (chronic obstructive pulmonary disease) ([facility])     Dr. Mai Pulmonologist [place] last appt 6/26/2023 CNP    Cough     clear phlegm    Degenerative lumbar disc     DM (diabetes mellitus), type 2 ([facility])     PCP Dr. Wheatley    Failed back syndrome, lumbar     Fast heart beat     Hx of \"8-10 years ago\"    Fibromyalgia     History of bladder cancer     Dr. Arora Urology [facility] last appt 1/2023    Hypertension     no meds    Hypothyroid     Kidney stones     Leaky heart valve     Lumbar radiculopathy     Lumbar spondylolysis     Lumbar spondylosis     Osteoarthritis     Pain management     Pain Management [place] monthly visits    Sleep apnea     uses CPAP machine nightly    Spinal stenosis in cervical region     Thoracic myelopathy     Under care of team     Dr. [facility] [place] last visit 6/2023    Wears glasses     Wellness examination     Dr. Shannon

## 2024-03-07 ENCOUNTER — TELEPHONE (OUTPATIENT)
Dept: PAIN MANAGEMENT | Age: 84
End: 2024-03-07

## 2024-03-07 NOTE — TELEPHONE ENCOUNTER
Pt daughter brought in patients Morphine Er 15mg she had 22 and they were destroyed per Dr Dawkins pt daughter watched me destroy and count them before she left

## 2024-03-13 ENCOUNTER — HOSPITAL ENCOUNTER (OUTPATIENT)
Dept: GENERAL RADIOLOGY | Age: 84
Discharge: HOME | End: 2024-03-15
Payer: MEDICARE

## 2024-03-13 ENCOUNTER — HOSPITAL ENCOUNTER (OUTPATIENT)
Age: 84
Discharge: HOME | End: 2024-03-15
Payer: MEDICARE

## 2024-03-13 DIAGNOSIS — M47.812 OSTEOARTHRITIS OF CERVICAL SPINE, UNSPECIFIED SPINAL OSTEOARTHRITIS COMPLICATION STATUS: ICD-10-CM

## 2024-03-13 PROCEDURE — 72050 X-RAY EXAM NECK SPINE 4/5VWS: CPT

## 2024-04-04 ENCOUNTER — HOSPITAL ENCOUNTER (OUTPATIENT)
Dept: PAIN MANAGEMENT | Age: 84
Discharge: HOME | End: 2024-04-04
Payer: MEDICARE

## 2024-04-04 VITALS — HEIGHT: 56 IN | WEIGHT: 155 LBS | BODY MASS INDEX: 34.87 KG/M2

## 2024-04-04 DIAGNOSIS — Z79.891 CHRONIC USE OF OPIATE DRUG FOR THERAPEUTIC PURPOSE: Chronic | ICD-10-CM

## 2024-04-04 DIAGNOSIS — M96.1 FAILED BACK SYNDROME OF LUMBAR SPINE: Primary | ICD-10-CM

## 2024-04-04 DIAGNOSIS — M25.551 CHRONIC RIGHT HIP PAIN: ICD-10-CM

## 2024-04-04 DIAGNOSIS — G89.29 CHRONIC RIGHT HIP PAIN: ICD-10-CM

## 2024-04-04 DIAGNOSIS — M96.1 FAILED BACK SYNDROME: ICD-10-CM

## 2024-04-04 PROCEDURE — 99213 OFFICE O/P EST LOW 20 MIN: CPT | Performed by: ANESTHESIOLOGY

## 2024-04-04 PROCEDURE — 99213 OFFICE O/P EST LOW 20 MIN: CPT

## 2024-04-04 RX ORDER — OXYCODONE AND ACETAMINOPHEN 7.5; 325 MG/1; MG/1
1 TABLET ORAL EVERY 6 HOURS PRN
Qty: 120 TABLET | Refills: 0 | Status: SHIPPED | OUTPATIENT
Start: 2024-04-04 | End: 2024-05-04

## 2024-04-04 ASSESSMENT — ENCOUNTER SYMPTOMS
BACK PAIN: 1
EYES NEGATIVE: 1
SHORTNESS OF BREATH: 1

## 2024-04-04 ASSESSMENT — PAIN SCALES - GENERAL: PAINLEVEL_OUTOF10: 8

## 2024-04-04 NOTE — PROGRESS NOTES
The patient is a 83 y.o. /  female.    Chief Complaint   Patient presents with    Back Pain        HPI  This is a pleasant 83-year-old female with history of chronic pain involving multiple body sites  Identified pain location in neck low back hips and knees  History of previous cervical and lumbar spine extensive surgeries  She has been on chronic opioid therapy for more than 10 years  Daily morphine equivalent more than 45 MME    She was switched to oxycodone monotherapy last month  Tolerating it well  Pain is unchanged  Reports no side effects from the medication    Medication Refill:  Oxycodone    Pain score Today:  8  Adverse effects (Constipation / Nausea / Sedation / sexual Dysfunction / others) : no  Mood: fair  Sleep pattern and quality: excellent  Activity level: poor    Pill count Today: 18  Last dose taken  04/04/2024 afternoon  OARRS report reviewed today: yes  ER/Hospitalizations/PCP visit related to pain since last visit:no   Any legal problems e.g. DUI etc.:No  Satisfied with current management: No    Opioid Contract: 11/07/2023  Last Urine Dug screen dated: 07/06/2023    Hemoglobin A1C   Date Value Ref Range Status   02/26/2022 6.0 4.0 - 6.0 % Final       Past Medical History, Past Surgical History, Social History, Allergies and Medications reviewed and updated in EPIC as indicated    Family History reviewed and is noncontributory.        Past Medical History:   Diagnosis Date    Achilles tendonitis     right    Asthma     COPD (chronic obstructive pulmonary disease) ([facility])     Dr. Mai Pulmonologist [place] last appt 6/26/2023 CNP    Cough     clear phlegm    Degenerative lumbar disc     DM (diabetes mellitus), type 2 ([facility])     PCP Dr. Wheatley    Failed back syndrome, lumbar     Fast heart beat     Hx of \"8-10 years ago\"    Fibromyalgia     History of bladder cancer     Dr. Arora [facility]rology [facility] last appt 1/2023    Hypertension     no meds    Hypothyroid     Kidney stones

## 2024-04-05 NOTE — PROGRESS NOTES
NALOXONE:  Patient Assessment and Dispensing Record  [x]  [facility]     Date:  4/5/2024  Patient Name: Amy Welch            Patient Selection: Check that the following conditions are met  [x]  The patient is an individual who is experiencing or at risk of experiencing an       opioid-related overdose.  [x]  The individual receiving the information and medication is        oriented to person, place, and time and able to understand and learn the        essential components of overdose response and naloxone administration.    Indication for dispensing naloxone  []  Previous opioid intoxication or overdose.  []  History of nonmedical opioid use.   []  Initiation or cessation of methadone or buprenorphine for opioid use disorder        treatment.   [x]  Higher-dose (>50 mg morphine equivalent/day) opioid prescription.  [x]  Receiving any opioid prescription plus (select below):  Rotated from one opioid to another because of possible incomplete cross-tolerance.   Smoking, COPD, emphysema, asthma, sleep apnea, respiratory infection or other respiratory illness.   Renal dysfunction, hepatic disease, cardiac illness or HIV/AIDS.   Known or suspected concurrent alcohol use.   Concurrent benzodiazepine or other sedative prescription.   Concurrent antidepressant prescription.   Patients who may have difficulty accessing emergency medical services (distance, remoteness).   Voluntary request from a family member, friend,  or other person in a position to assist an individual who is at risk of experiencing an opioid-related overdose.      I (the undersigned) attest that:  [x]  I am authorized per hospital protocol to dispense naloxone to this patient.  [x]  The individual has been screened for contraindications/precautions and        counseled appropriately.  [x]  I have counseled the patient using the protocol approved informational pamphlet.   []  I have updated the discharge

## 2024-04-22 ENCOUNTER — HOSPITAL ENCOUNTER (OUTPATIENT)
Dept: NEUROLOGY | Age: 84
Discharge: HOME | End: 2024-04-22
Payer: MEDICARE

## 2024-04-22 DIAGNOSIS — M47.14 THORACIC MYELOPATHY: ICD-10-CM

## 2024-04-22 DIAGNOSIS — M47.22 CERVICAL SPONDYLOSIS WITH RADICULOPATHY: ICD-10-CM

## 2024-04-22 PROCEDURE — 95910 NRV CNDJ TEST 7-8 STUDIES: CPT | Performed by: PHYSICAL MEDICINE & REHABILITATION

## 2024-04-22 PROCEDURE — 95886 MUSC TEST DONE W/N TEST COMP: CPT | Performed by: PHYSICAL MEDICINE & REHABILITATION

## 2024-04-23 ENCOUNTER — HOSPITAL ENCOUNTER (OUTPATIENT)
Dept: NEUROLOGY | Age: 84
Discharge: HOME | End: 2024-04-23
Payer: MEDICARE

## 2024-04-23 PROCEDURE — 95911 NRV CNDJ TEST 9-10 STUDIES: CPT | Performed by: PHYSICAL MEDICINE & REHABILITATION

## 2024-04-23 PROCEDURE — 95886 MUSC TEST DONE W/N TEST COMP: CPT | Performed by: PHYSICAL MEDICINE & REHABILITATION

## 2024-04-29 ENCOUNTER — OFFICE VISIT (OUTPATIENT)
Dept: NEUROSURGERY | Age: 84
End: 2024-04-29
Payer: MEDICARE

## 2024-04-29 VITALS
HEIGHT: 56 IN | WEIGHT: 158 LBS | SYSTOLIC BLOOD PRESSURE: 164 MMHG | HEART RATE: 80 BPM | BODY MASS INDEX: 35.54 KG/M2 | DIASTOLIC BLOOD PRESSURE: 77 MMHG

## 2024-04-29 DIAGNOSIS — G56.03 BILATERAL CARPAL TUNNEL SYNDROME: ICD-10-CM

## 2024-04-29 DIAGNOSIS — M47.22 CERVICAL SPONDYLOSIS WITH RADICULOPATHY: Primary | ICD-10-CM

## 2024-04-29 DIAGNOSIS — M47.14 THORACIC MYELOPATHY: ICD-10-CM

## 2024-04-29 PROCEDURE — G8399 PT W/DXA RESULTS DOCUMENT: HCPCS | Performed by: NURSE PRACTITIONER

## 2024-04-29 PROCEDURE — 99214 OFFICE O/P EST MOD 30 MIN: CPT | Performed by: NURSE PRACTITIONER

## 2024-04-29 PROCEDURE — 1090F PRES/ABSN URINE INCON ASSESS: CPT | Performed by: NURSE PRACTITIONER

## 2024-04-29 PROCEDURE — G8417 CALC BMI ABV UP PARAM F/U: HCPCS | Performed by: NURSE PRACTITIONER

## 2024-04-29 PROCEDURE — 1036F TOBACCO NON-USER: CPT | Performed by: NURSE PRACTITIONER

## 2024-04-29 PROCEDURE — G8427 DOCREV CUR MEDS BY ELIG CLIN: HCPCS | Performed by: NURSE PRACTITIONER

## 2024-04-29 PROCEDURE — 1123F ACP DISCUSS/DSCN MKR DOCD: CPT | Performed by: NURSE PRACTITIONER

## 2024-04-29 NOTE — PROGRESS NOTES
[facility] NEUROSURGERY [facility]  [address]  [address], [address]  [place] [address]  Dept: [phone]    Patient:  Amy Welch  YOB: 1940  Date: 4/29/24    The patient is a 83 y.o. female who presents today for consult of the following problems:     Chief Complaint   Patient presents with    Follow-up     Thoracic myelopathy         HPI:     Amy Welch is a 83 y.o. female who presents for follow up of neck pain. Pain has been 9.5/10 on average. Localized to left side of posterior cervical spine. Described as an aching pain, with some stabbing. Radiation intermittently to left arm in C7/8 distribution. Notices it with certain positions.  Finds her hand to contract at times, spasm.    Right trigger finger.    Patient has completed course of physical therapy without significant improvement.  Has also completed EMG upper and lower extremities as well as x-ray and is here today for review.    Does follow with pain management, did undergo radiofrequency ablation for lumbar spine, this has provided quite a bit of relief for her low back pain.  Does continue to use a walker for ambulating.    History:     Past Medical History:   Diagnosis Date    Achilles tendonitis     right    Asthma     COPD (chronic obstructive pulmonary disease) ([facility])     Dr. Mai Pulmonologist [place] last appt 6/26/2023 CNP    Cough     clear phlegm    Degenerative lumbar disc     DM (diabetes mellitus), type 2 ([facility])     PCP Dr. Wheatley    Failed back syndrome, lumbar     Fast heart beat     Hx of \"8-10 years ago\"    Fibromyalgia     History of bladder cancer     Dr. Arora Urology [facility] last appt 1/2023    Hypertension     no meds    Hypothyroid     Kidney stones     Leaky heart valve     Lumbar radiculopathy     Lumbar spondylolysis     Lumbar spondylosis     Osteoarthritis     Pain management     Pain Management .

## 2024-04-30 ENCOUNTER — HOSPITAL ENCOUNTER (OUTPATIENT)
Dept: PAIN MANAGEMENT | Age: 84
Discharge: HOME | End: 2024-04-30
Payer: MEDICARE

## 2024-04-30 VITALS — HEIGHT: 56 IN | BODY MASS INDEX: 35.54 KG/M2 | WEIGHT: 158 LBS

## 2024-04-30 DIAGNOSIS — M48.062 SPINAL STENOSIS OF LUMBAR REGION WITH NEUROGENIC CLAUDICATION: Chronic | ICD-10-CM

## 2024-04-30 DIAGNOSIS — M47.816 LUMBAR SPONDYLOSIS: ICD-10-CM

## 2024-04-30 DIAGNOSIS — G89.29 CHRONIC SI JOINT PAIN: Primary | Chronic | ICD-10-CM

## 2024-04-30 DIAGNOSIS — M96.1 FAILED BACK SYNDROME OF LUMBAR SPINE: ICD-10-CM

## 2024-04-30 DIAGNOSIS — M54.16 LUMBAR RADICULOPATHY: ICD-10-CM

## 2024-04-30 DIAGNOSIS — Z79.891 CHRONIC USE OF OPIATE DRUG FOR THERAPEUTIC PURPOSE: Chronic | ICD-10-CM

## 2024-04-30 DIAGNOSIS — M53.3 CHRONIC SI JOINT PAIN: Primary | Chronic | ICD-10-CM

## 2024-04-30 DIAGNOSIS — M96.1 FAILED BACK SYNDROME: ICD-10-CM

## 2024-04-30 PROCEDURE — 99213 OFFICE O/P EST LOW 20 MIN: CPT

## 2024-04-30 PROCEDURE — 99214 OFFICE O/P EST MOD 30 MIN: CPT | Performed by: NURSE PRACTITIONER

## 2024-04-30 RX ORDER — OXYCODONE AND ACETAMINOPHEN 7.5; 325 MG/1; MG/1
1 TABLET ORAL EVERY 6 HOURS PRN
Qty: 120 TABLET | Refills: 0 | Status: SHIPPED | OUTPATIENT
Start: 2024-05-11 | End: 2024-06-10

## 2024-04-30 ASSESSMENT — ENCOUNTER SYMPTOMS
BACK PAIN: 1
CONSTIPATION: 0
COUGH: 0
SHORTNESS OF BREATH: 0
BOWEL INCONTINENCE: 0

## 2024-04-30 NOTE — PROGRESS NOTES
Disp: , Rfl:     metFORMIN (GLUCOPHAGE) 500 MG tablet, Take 1 tablet by mouth 2 times daily (with meals), Disp: , Rfl:     levothyroxine (SYNTHROID) 50 MCG tablet, Take 1 tablet by mouth Daily, Disp: , Rfl:     omeprazole (PRILOSEC) 20 MG capsule, Take 1 capsule by mouth Daily, Disp: , Rfl:     Family History   Problem Relation Age of Onset    Heart Failure Mother     Lung Cancer Mother     Other Father          pneumonia    Alzheimer's Disease Father     Lung Cancer Brother     Other Daughter         Fibromyalsia    Rheum Arthritis Daughter     Diabetes Paternal Grandmother     Breast Cancer Maternal Cousin 40    Breast Cancer Maternal Aunt 60       Social History     Socioeconomic History    Marital status:      Spouse name: Yung    Number of children: 3    Years of education: Not on file    Highest education level: Not on file   Occupational History    Occupation: retired   Tobacco Use    Smoking status: Never    Smokeless tobacco: Never   Vaping Use    Vaping Use: Never used   Substance and Sexual Activity    Alcohol use: Not Currently     Comment: rare    Drug use: Never    Sexual activity: Yes     Partners: Male   Other Topics Concern    Not on file   Social History Narrative    Not on file     Social Determinants of Health     Financial Resource Strain: Not on file   Food Insecurity: Not on file   Transportation Needs: Not on file   Physical Activity: Not on file   Stress: Not on file   Social Connections: Not on file   Intimate Partner Violence: Not on file   Housing Stability: Not on file       Review of Systems:  Review of Systems   Constitutional: Negative for chills and fever.   Cardiovascular:  Negative for chest pain.   Respiratory:  Negative for cough and shortness of breath.    Musculoskeletal:  Positive for arthralgias, arthritis, back pain, joint pain, neck pain and stiffness.   Gastrointestinal:  Negative for bowel incontinence and constipation.   Genitourinary:  Negative for bladder

## 2024-05-02 ENCOUNTER — OFFICE VISIT (OUTPATIENT)
Dept: ORTHOPEDIC SURGERY | Age: 84
End: 2024-05-02
Payer: MEDICARE

## 2024-05-02 VITALS — BODY MASS INDEX: 35.54 KG/M2 | RESPIRATION RATE: 14 BRPM | HEIGHT: 56 IN | WEIGHT: 158 LBS

## 2024-05-02 DIAGNOSIS — M25.562 CHRONIC PAIN OF BOTH KNEES: Primary | ICD-10-CM

## 2024-05-02 DIAGNOSIS — M25.561 CHRONIC PAIN OF BOTH KNEES: Primary | ICD-10-CM

## 2024-05-02 DIAGNOSIS — G89.29 CHRONIC PAIN OF BOTH KNEES: Primary | ICD-10-CM

## 2024-05-02 PROCEDURE — 99213 OFFICE O/P EST LOW 20 MIN: CPT | Performed by: PHYSICIAN ASSISTANT

## 2024-05-02 PROCEDURE — G8427 DOCREV CUR MEDS BY ELIG CLIN: HCPCS | Performed by: PHYSICIAN ASSISTANT

## 2024-05-02 PROCEDURE — 20610 DRAIN/INJ JOINT/BURSA W/O US: CPT | Performed by: PHYSICIAN ASSISTANT

## 2024-05-02 PROCEDURE — G8417 CALC BMI ABV UP PARAM F/U: HCPCS | Performed by: PHYSICIAN ASSISTANT

## 2024-05-02 PROCEDURE — 1123F ACP DISCUSS/DSCN MKR DOCD: CPT | Performed by: PHYSICIAN ASSISTANT

## 2024-05-02 PROCEDURE — 1090F PRES/ABSN URINE INCON ASSESS: CPT | Performed by: PHYSICIAN ASSISTANT

## 2024-05-02 PROCEDURE — 1036F TOBACCO NON-USER: CPT | Performed by: PHYSICIAN ASSISTANT

## 2024-05-02 PROCEDURE — G8399 PT W/DXA RESULTS DOCUMENT: HCPCS | Performed by: PHYSICIAN ASSISTANT

## 2024-05-02 RX ORDER — BETAMETHASONE SODIUM PHOSPHATE AND BETAMETHASONE ACETATE 3; 3 MG/ML; MG/ML
12 INJECTION, SUSPENSION INTRA-ARTICULAR; INTRALESIONAL; INTRAMUSCULAR; SOFT TISSUE ONCE
Status: COMPLETED | OUTPATIENT
Start: 2024-05-02 | End: 2024-05-02

## 2024-05-02 RX ORDER — LIDOCAINE HYDROCHLORIDE 10 MG/ML
2 INJECTION, SOLUTION INFILTRATION; PERINEURAL ONCE
Status: COMPLETED | OUTPATIENT
Start: 2024-05-02 | End: 2024-05-02

## 2024-05-02 RX ORDER — BUPIVACAINE HYDROCHLORIDE 2.5 MG/ML
2 INJECTION, SOLUTION INFILTRATION; PERINEURAL ONCE
Status: COMPLETED | OUTPATIENT
Start: 2024-05-02 | End: 2024-05-02

## 2024-05-02 RX ADMIN — LIDOCAINE HYDROCHLORIDE 2 ML: 10 INJECTION, SOLUTION INFILTRATION; PERINEURAL at 11:08

## 2024-05-02 RX ADMIN — BETAMETHASONE SODIUM PHOSPHATE AND BETAMETHASONE ACETATE 12 MG: 3; 3 INJECTION, SUSPENSION INTRA-ARTICULAR; INTRALESIONAL; INTRAMUSCULAR; SOFT TISSUE at 11:06

## 2024-05-02 RX ADMIN — BUPIVACAINE HYDROCHLORIDE 5 MG: 2.5 INJECTION, SOLUTION INFILTRATION; PERINEURAL at 11:08

## 2024-05-02 RX ADMIN — BETAMETHASONE SODIUM PHOSPHATE AND BETAMETHASONE ACETATE 12 MG: 3; 3 INJECTION, SUSPENSION INTRA-ARTICULAR; INTRALESIONAL; INTRAMUSCULAR; SOFT TISSUE at 11:07

## 2024-05-02 RX ADMIN — BUPIVACAINE HYDROCHLORIDE 5 MG: 2.5 INJECTION, SOLUTION INFILTRATION; PERINEURAL at 11:07

## 2024-05-02 RX ADMIN — LIDOCAINE HYDROCHLORIDE 2 ML: 10 INJECTION, SOLUTION INFILTRATION; PERINEURAL at 11:09

## 2024-05-02 NOTE — PROGRESS NOTES
laceration, or abrasion.   Musculoskeletal: Positive for Knee Pain (John knee pain)       Physical Exam:  Constitutional: Patient is oriented to person, place, and time. Patient appears well-developed and well nourished.   HENT: Negative otherwise noted  Head: Normocephalic and Atraumatic  Nose: Normal  Eyes: Conjunctivae and EOM are normal  Neck: Normal range of motion Neck supple.    Respiratory/Cardio: Effort normal. No respiratory distress.  Musculoskeletal:    knee: Bilateral    Skin: warm and dry, no rash or erythema  Vasculature: 2+ pedal pulses bilaterally  Neuro: Sensation grossly intact to light touch diffusely  Alignment: Genu valgum  Tenderness: Bilateral tenderness to lateral joint line. No tenderness to quad/patellar tendon, pes anserine bursa or posterior knee.  Effusion: minimal effusion present    ROM: (Degrees)    Right   A P   Left   A P    Extension  5 5   Extension  3 3  Flexion   115 120   Flexion   120  125    Crepitation  Yes    Crepitation  Yes      Muscle strength:    Right       Left    Flexion   5    Flexion   5  Extension  5    Extension  5  SLR   5    SLR   5    Extensor lag   n    Extensor lag  n      Special testing:    Right          Left    y    Pain with deep knee flexion   y  y    Patellar grind     y  n    Patellar apprehension    n  n    Patellar glide     n    n    Lachman     n  n    Anterior drawer    n  n    Pivot shift     n  n    Posterior drawer    n  n    Dial test     n  n    Posterolateral drawer    n  n    Posterior Sag     n  n    MCL      n  n    LCL      n    n    Medial joint line tenderness   n  y    Lateral joint line tenderness   y  n    Appley's     n      Neurological: Patient is alert and oriented to person, place, and time. Normal strenght. No sensory deficit.  Skin: Skin is warm and dry  Psychiatric: Behavior is normal. Thought content normal.  Nursing note and vitals reviewed.     Labs and Imaging:           X-rays taken in clinic today and preliminarily

## 2024-05-08 ENCOUNTER — HOSPITAL ENCOUNTER (OUTPATIENT)
Dept: MRI IMAGING | Age: 84
Discharge: HOME | End: 2024-05-10
Payer: MEDICARE

## 2024-05-08 DIAGNOSIS — M47.22 CERVICAL SPONDYLOSIS WITH RADICULOPATHY: ICD-10-CM

## 2024-05-08 PROCEDURE — 72141 MRI NECK SPINE W/O DYE: CPT

## 2024-05-20 ENCOUNTER — OFFICE VISIT (OUTPATIENT)
Dept: NEUROSURGERY | Age: 84
End: 2024-05-20
Payer: MEDICARE

## 2024-05-20 VITALS
WEIGHT: 158 LBS | HEART RATE: 87 BPM | SYSTOLIC BLOOD PRESSURE: 148 MMHG | HEIGHT: 55 IN | DIASTOLIC BLOOD PRESSURE: 71 MMHG | BODY MASS INDEX: 36.57 KG/M2

## 2024-05-20 DIAGNOSIS — M47.22 CERVICAL SPONDYLOSIS WITH RADICULOPATHY: Primary | ICD-10-CM

## 2024-05-20 DIAGNOSIS — M47.816 LUMBAR SPONDYLOSIS: ICD-10-CM

## 2024-05-20 DIAGNOSIS — Q76.49 SPINAL DEFORMITY: ICD-10-CM

## 2024-05-20 PROCEDURE — G8427 DOCREV CUR MEDS BY ELIG CLIN: HCPCS | Performed by: NURSE PRACTITIONER

## 2024-05-20 PROCEDURE — 1123F ACP DISCUSS/DSCN MKR DOCD: CPT | Performed by: NURSE PRACTITIONER

## 2024-05-20 PROCEDURE — G8399 PT W/DXA RESULTS DOCUMENT: HCPCS | Performed by: NURSE PRACTITIONER

## 2024-05-20 PROCEDURE — 99214 OFFICE O/P EST MOD 30 MIN: CPT | Performed by: NURSE PRACTITIONER

## 2024-05-20 PROCEDURE — 1036F TOBACCO NON-USER: CPT | Performed by: NURSE PRACTITIONER

## 2024-05-20 PROCEDURE — 1090F PRES/ABSN URINE INCON ASSESS: CPT | Performed by: NURSE PRACTITIONER

## 2024-05-20 PROCEDURE — G8417 CALC BMI ABV UP PARAM F/U: HCPCS | Performed by: NURSE PRACTITIONER

## 2024-05-20 NOTE — PROGRESS NOTES
PAIN MANAGEMENT PROCEDURE      MS OFFICE/OUTPT VISIT,PROCEDURE ONLY N/A 01/24/2018    SPINAL HARDWARE REMOVAL LUMBAR BONE STIMULATOR performed by Zachary Perez MD at [facility] OR    ROTATOR CUFF REPAIR Bilateral 1989    SKIN BIOPSY Right 11/2015    mole right posterior shoulder    SPINE SURGERY      spinal cord stimulator    SPINE SURGERY  91-4-15    renoval of spinal cord stimulator leads and battery    PASQUALE AND BSO (CERVIX REMOVED)      TUBAL LIGATION      UVULOPALATOPHARYGOPLASTY  2006     Family History   Problem Relation Age of Onset    Heart Failure Mother     Lung Cancer Mother     Other Father          pneumonia    Alzheimer's Disease Father     Lung Cancer Brother     Other Daughter         Fibromyalsia    Rheum Arthritis Daughter     Diabetes Paternal Grandmother     Breast Cancer Maternal Cousin 40    Breast Cancer Maternal Aunt 60     Current Outpatient Medications on File Prior to Visit   Medication Sig Dispense Refill    oxyCODONE-acetaminophen (PERCOCET) 7.5-325 MG per tablet Take 1 tablet by mouth every 6 hours as needed for Pain for up to 30 days. Max Daily Amount: 4 tablets 120 tablet 0    aspirin 81 MG EC tablet Take 1 tablet by mouth at bedtime 30 tablet 3    denosumab (PROLIA) 60 MG/ML SOSY SC injection Inject 1 mL into the skin      rosuvastatin (CRESTOR) 5 MG tablet Take 1 tablet by mouth daily      Cholecalciferol (VITAMIN D3) 50 MCG (2000 UT) CAPS Take by mouth daily      vitamin B-12 (CYANOCOBALAMIN) 500 MCG tablet Take 1 tablet by mouth daily      albuterol sulfate  (90 Base) MCG/ACT inhaler Inhale 2 puffs into the lungs every 4 hours as needed for Shortness of Breath      pregabalin (LYRICA) 150 MG capsule Take 1 capsule by mouth 2 times daily.      montelukast (SINGULAIR) 10 MG tablet Take 1 tablet by mouth nightly      metFORMIN (GLUCOPHAGE) 500 MG tablet Take 1 tablet by mouth 2 times daily (with meals)      levothyroxine (SYNTHROID) 50 MCG tablet Take 1 tablet by mouth Daily

## 2024-05-22 NOTE — ADDENDUM NOTE
Addended by: Pa King on: 1/6/2022 02:55 PM     Modules accepted: Orders Counseling and Referral of Other Preventative  (Italic type indicates deductible and co-insurance are waived)    Patient Name: Leyda Lewis  Today's Date: 5/22/2024    Health Maintenance       Date Due Completion Date    RSV Vaccine (Age 60+ and Pregnant patients) (1 - 1-dose 60+ series) Never done ---    Pneumococcal Vaccines (Age 65+) (1 of 1 - PCV) Never done ---    COVID-19 Vaccine (1 - 2023-24 season) Never done ---    Influenza Vaccine (Season Ended) 09/01/2024 10/19/2020    DEXA Scan 10/15/2024 10/15/2021    Mammogram 12/22/2024 12/22/2023    Colorectal Cancer Screening 02/06/2025 2/6/2015    TETANUS VACCINE 03/28/2027 3/28/2017    Lipid Panel 12/07/2027 12/7/2022        No orders of the defined types were placed in this encounter.    The following information is provided to all patients.  This information is to help you find resources for any of the problems found today that may be affecting your health:                  Living healthy guide: www.[facility].[place].gov      Understanding Diabetes: www.diabetes.org      Eating healthy: www.cdc.gov/healthyweight      CDC home safety checklist: www.cdc.gov/steadi/patient.html      Agency on Aging: www.goea.[place].gov      Alcoholics anonymous (AA): www.aa.org      Physical Activity: www.artur.nih.gov/iy5shtb      Tobacco use: www.quitwithusla.org

## 2024-06-06 ENCOUNTER — HOSPITAL ENCOUNTER (OUTPATIENT)
Dept: PAIN MANAGEMENT | Age: 84
Discharge: HOME | End: 2024-06-06
Payer: MEDICARE

## 2024-06-06 VITALS — BODY MASS INDEX: 36.57 KG/M2 | HEIGHT: 55 IN | WEIGHT: 158 LBS

## 2024-06-06 DIAGNOSIS — M48.02 SPINAL STENOSIS IN CERVICAL REGION: Primary | ICD-10-CM

## 2024-06-06 DIAGNOSIS — M96.1 FAILED BACK SYNDROME: ICD-10-CM

## 2024-06-06 DIAGNOSIS — Z79.891 CHRONIC USE OF OPIATE DRUG FOR THERAPEUTIC PURPOSE: Chronic | ICD-10-CM

## 2024-06-06 PROCEDURE — 99213 OFFICE O/P EST LOW 20 MIN: CPT

## 2024-06-06 PROCEDURE — 99213 OFFICE O/P EST LOW 20 MIN: CPT | Performed by: ANESTHESIOLOGY

## 2024-06-06 RX ORDER — OXYCODONE AND ACETAMINOPHEN 7.5; 325 MG/1; MG/1
1 TABLET ORAL EVERY 6 HOURS PRN
Qty: 120 TABLET | Refills: 0 | Status: SHIPPED | OUTPATIENT
Start: 2024-06-06 | End: 2024-07-06

## 2024-06-06 ASSESSMENT — ENCOUNTER SYMPTOMS
DIARRHEA: 0
VOMITING: 0
CONSTIPATION: 0
CHEST TIGHTNESS: 0
NAUSEA: 0
BACK PAIN: 1
RESPIRATORY NEGATIVE: 1
SHORTNESS OF BREATH: 0
GASTROINTESTINAL NEGATIVE: 1
SORE THROAT: 0

## 2024-06-06 ASSESSMENT — PAIN DESCRIPTION - FREQUENCY: FREQUENCY: CONTINUOUS

## 2024-06-06 ASSESSMENT — PAIN SCALES - GENERAL: PAINLEVEL_OUTOF10: 8

## 2024-06-06 ASSESSMENT — PAIN DESCRIPTION - LOCATION: LOCATION: BACK;NECK

## 2024-06-06 ASSESSMENT — PAIN DESCRIPTION - ORIENTATION: ORIENTATION: LOWER;MID

## 2024-06-06 ASSESSMENT — PAIN DESCRIPTION - PAIN TYPE: TYPE: CHRONIC PAIN

## 2024-06-06 ASSESSMENT — PAIN DESCRIPTION - DESCRIPTORS: DESCRIPTORS: ACHING

## 2024-06-06 NOTE — PROGRESS NOTES
well as lysis of adhesions L1-L2.    Pt had bilat RFA of  the primary dorsal ramus of L5 and lateral branches of S1 and S2 nerves done 1/24/24      Neck  History of chronic neck pain with radiation down right arm  Diagnosis cervical stenosis  History of previous cervical surgery  May consider for cervical epidural steroid injection in future  12/21 Cervcial MRI   No fracture or bony destructive lesion. 2. Status post ACDF from C3-C5. 3. Mild central canal stenoses at C3-4, C5-6, C6-7 and C7-T1. 4.  Multilevel neural foraminal stenoses, worst (moderate to severe) at the left C6-7 level    XR done 3/2024 Degenerative and postoperative changes  Mid cervical ACDF is evident. .    EMG 4/2024 chronic bialt C5-6 radicpathy borderlined bilat CTS and polyneuropathy   Pt saw NS NP 4/2024 with MRI ordered       Patient is here today for: Discuss Med Options   Pain level: 8  Character: aching, stabbing  Radiating: elbow to wrist   Weakness or numbness: bilateral (RIGHT MORE THAN LEFT)legs giving out  Aggravating Factors: standing, walking, pain is just there   Alleviating Factors: Pain Medication   Constant or intermitting: constant   Bladder/bowel loss: no     Chief Complaint:  Medication Refill: Percocet    Pain score Today:  8  Adverse effects (Constipation / Nausea / Sedation / sexual Dysfunction / others) : no   Mood: fair  Sleep pattern and quality: fair  Activity level: fair    Pill count Today:25  Last dose taken  6/6/2024 NOON   OARRS report reviewed today: yes  ER/Hospitalizations/PCP visit related to pain since last visit:no   Any legal problems e.g. DUI etc.:No  Satisfied with current management: No    Opioid Contract:11/07/2023  Last Urine Dug screen dated:07/06/2023    Hemoglobin A1C   Date Value Ref Range Status   02/26/2022 6.0 4.0 - 6.0 % Final       Past Medical History, Past Surgical History, Social History, Allergies and Medications reviewed and updated in EPIC as indicated    Family History reviewed and is

## 2024-07-09 ENCOUNTER — HOSPITAL ENCOUNTER (OUTPATIENT)
Dept: PAIN MANAGEMENT | Age: 84
Discharge: HOME | End: 2024-07-09
Payer: MEDICARE

## 2024-07-09 VITALS — HEIGHT: 56 IN | WEIGHT: 158 LBS | BODY MASS INDEX: 35.54 KG/M2

## 2024-07-09 DIAGNOSIS — M96.1 FAILED BACK SYNDROME OF LUMBAR SPINE: ICD-10-CM

## 2024-07-09 DIAGNOSIS — M48.02 SPINAL STENOSIS IN CERVICAL REGION: ICD-10-CM

## 2024-07-09 DIAGNOSIS — Z79.891 CHRONIC USE OF OPIATE DRUG FOR THERAPEUTIC PURPOSE: Chronic | ICD-10-CM

## 2024-07-09 DIAGNOSIS — M54.2 NECK PAIN: ICD-10-CM

## 2024-07-09 DIAGNOSIS — M50.30 DDD (DEGENERATIVE DISC DISEASE), CERVICAL: ICD-10-CM

## 2024-07-09 DIAGNOSIS — M53.3 CHRONIC SI JOINT PAIN: Primary | Chronic | ICD-10-CM

## 2024-07-09 DIAGNOSIS — G89.29 CHRONIC SI JOINT PAIN: Primary | Chronic | ICD-10-CM

## 2024-07-09 DIAGNOSIS — M54.16 LUMBAR RADICULOPATHY: ICD-10-CM

## 2024-07-09 DIAGNOSIS — M54.12 CERVICAL RADICULAR PAIN: ICD-10-CM

## 2024-07-09 DIAGNOSIS — M48.062 SPINAL STENOSIS OF LUMBAR REGION WITH NEUROGENIC CLAUDICATION: Chronic | ICD-10-CM

## 2024-07-09 DIAGNOSIS — M96.1 FAILED BACK SYNDROME: ICD-10-CM

## 2024-07-09 PROBLEM — M25.561 ACUTE PAIN OF RIGHT KNEE: Status: RESOLVED | Noted: 2018-07-05 | Resolved: 2024-07-09

## 2024-07-09 PROBLEM — N17.9 AKI (ACUTE KIDNEY INJURY) (HCC): Status: RESOLVED | Noted: 2022-09-02 | Resolved: 2024-07-09

## 2024-07-09 PROCEDURE — 99214 OFFICE O/P EST MOD 30 MIN: CPT | Performed by: NURSE PRACTITIONER

## 2024-07-09 PROCEDURE — 99213 OFFICE O/P EST LOW 20 MIN: CPT

## 2024-07-09 RX ORDER — OXYCODONE AND ACETAMINOPHEN 7.5; 325 MG/1; MG/1
1 TABLET ORAL EVERY 6 HOURS PRN
Qty: 120 TABLET | Refills: 0 | Status: SHIPPED | OUTPATIENT
Start: 2024-07-13 | End: 2024-08-12

## 2024-07-09 ASSESSMENT — PAIN SCALES - GENERAL: PAINLEVEL_OUTOF10: 6

## 2024-07-09 ASSESSMENT — ENCOUNTER SYMPTOMS
CONSTIPATION: 0
BACK PAIN: 1
SHORTNESS OF BREATH: 0
COUGH: 0
BOWEL INCONTINENCE: 0

## 2024-07-09 NOTE — PROGRESS NOTES
(SINGULAIR) 10 MG tablet, Take 1 tablet by mouth nightly, Disp: , Rfl:     metFORMIN (GLUCOPHAGE) 500 MG tablet, Take 1 tablet by mouth 2 times daily (with meals), Disp: , Rfl:     levothyroxine (SYNTHROID) 50 MCG tablet, Take 1 tablet by mouth Daily, Disp: , Rfl:     omeprazole (PRILOSEC) 20 MG capsule, Take 1 capsule by mouth Daily, Disp: , Rfl:     Family History   Problem Relation Age of Onset    Heart Failure Mother     Lung Cancer Mother     Other Father          pneumonia    Alzheimer's Disease Father     Lung Cancer Brother     Other Daughter         Fibromyalsia    Rheum Arthritis Daughter     Diabetes Paternal Grandmother     Breast Cancer Maternal Cousin 40    Breast Cancer Maternal Aunt 60       Social History     Socioeconomic History    Marital status:      Spouse name: Yung    Number of children: 3    Years of education: Not on file    Highest education level: Not on file   Occupational History    Occupation: retired   Tobacco Use    Smoking status: Never    Smokeless tobacco: Never   Vaping Use    Vaping Use: Never used   Substance and Sexual Activity    Alcohol use: Not Currently     Comment: rare    Drug use: Never    Sexual activity: Yes     Partners: Male   Other Topics Concern    Not on file   Social History Narrative    Not on file     Social Determinants of Health     Financial Resource Strain: Not on file   Food Insecurity: Not on file   Transportation Needs: Not on file   Physical Activity: Not on file   Stress: Not on file   Social Connections: Not on file   Intimate Partner Violence: Not on file   Housing Stability: Not on file       Review of Systems:  Review of Systems   Constitutional: Negative for chills and fever.   Cardiovascular:  Negative for chest pain.   Respiratory:  Negative for cough and shortness of breath.    Musculoskeletal:  Positive for arthralgias (shoulders arms and knees), arthritis, back pain, joint pain, muscle weakness and neck pain. Negative for falls.

## 2024-07-12 DIAGNOSIS — M48.062 SPINAL STENOSIS OF LUMBAR REGION WITH NEUROGENIC CLAUDICATION: Chronic | ICD-10-CM

## 2024-07-12 DIAGNOSIS — M54.16 LUMBAR RADICULOPATHY: ICD-10-CM

## 2024-07-12 DIAGNOSIS — M25.551 CHRONIC RIGHT HIP PAIN: ICD-10-CM

## 2024-07-12 DIAGNOSIS — G89.29 CHRONIC RIGHT HIP PAIN: ICD-10-CM

## 2024-07-12 DIAGNOSIS — G89.29 CHRONIC SI JOINT PAIN: Primary | Chronic | ICD-10-CM

## 2024-07-12 DIAGNOSIS — M53.3 CHRONIC SI JOINT PAIN: Primary | Chronic | ICD-10-CM

## 2024-07-16 ENCOUNTER — TELEPHONE (OUTPATIENT)
Dept: ORTHOPEDIC SURGERY | Age: 84
End: 2024-07-16

## 2024-07-16 NOTE — TELEPHONE ENCOUNTER
Amy is a candidate for bilateral TKA (especially on the right) would advise appointment with Dr. Garcia to discuss next steps since she is no longer getting relief from injections

## 2024-07-16 NOTE — TELEPHONE ENCOUNTER
Patient called office asking for your recommendation on patient's bilateral knee pain. You provided bilateral celestone injections during her last office visit on 5/2/24. She states that they maybe lasted 2 weeks but that her pain has returned and \"it feels like her knees are going to break.\" Patient wants to know if there is anything else that you can suggest.     Patient is requesting that any scripts be sent to [facility] in [place]

## 2024-07-16 NOTE — TELEPHONE ENCOUNTER
Patient called office stating that her knee pain is steadily returning since her 5/2/24 office visit with [facility]. She states that \"it feels like her knees are going to break\". While attempting to speak with patient during the phone call I realized that at some point the patient could no longer hear me when I spoke. I disconnected the call and attempted to call patient back. No answer but was able to leave a message requesting that she call the office back to discuss her knee pain issue.

## 2024-07-17 NOTE — TELEPHONE ENCOUNTER
Called and spoke the patient to let her know Raúl's response.  She is going to take some time to think about it as she isn't sure she wants to have surgery.  She will call back once she decides.

## 2024-08-08 ENCOUNTER — HOSPITAL ENCOUNTER (OUTPATIENT)
Dept: PAIN MANAGEMENT | Age: 84
Discharge: HOME | End: 2024-08-08
Payer: MEDICARE

## 2024-08-08 VITALS — BODY MASS INDEX: 35.54 KG/M2 | HEIGHT: 56 IN | WEIGHT: 158 LBS

## 2024-08-08 DIAGNOSIS — M48.062 SPINAL STENOSIS OF LUMBAR REGION WITH NEUROGENIC CLAUDICATION: Chronic | ICD-10-CM

## 2024-08-08 DIAGNOSIS — M54.16 LUMBAR RADICULOPATHY: ICD-10-CM

## 2024-08-08 DIAGNOSIS — M53.3 CHRONIC SI JOINT PAIN: Primary | Chronic | ICD-10-CM

## 2024-08-08 DIAGNOSIS — M96.1 FAILED BACK SYNDROME: ICD-10-CM

## 2024-08-08 DIAGNOSIS — G89.29 CHRONIC SI JOINT PAIN: Primary | Chronic | ICD-10-CM

## 2024-08-08 DIAGNOSIS — M54.12 CERVICAL RADICULAR PAIN: ICD-10-CM

## 2024-08-08 DIAGNOSIS — M50.30 DDD (DEGENERATIVE DISC DISEASE), CERVICAL: ICD-10-CM

## 2024-08-08 DIAGNOSIS — Z79.891 CHRONIC USE OF OPIATE DRUG FOR THERAPEUTIC PURPOSE: Chronic | ICD-10-CM

## 2024-08-08 PROCEDURE — 99214 OFFICE O/P EST MOD 30 MIN: CPT | Performed by: NURSE PRACTITIONER

## 2024-08-08 PROCEDURE — G0481 DRUG TEST DEF 8-14 CLASSES: HCPCS

## 2024-08-08 PROCEDURE — 99213 OFFICE O/P EST LOW 20 MIN: CPT

## 2024-08-08 PROCEDURE — 80307 DRUG TEST PRSMV CHEM ANLYZR: CPT

## 2024-08-08 RX ORDER — OXYCODONE AND ACETAMINOPHEN 7.5; 325 MG/1; MG/1
1 TABLET ORAL EVERY 6 HOURS PRN
Qty: 120 TABLET | Refills: 0 | Status: SHIPPED | OUTPATIENT
Start: 2024-08-12 | End: 2024-09-11

## 2024-08-08 ASSESSMENT — ENCOUNTER SYMPTOMS
SHORTNESS OF BREATH: 0
CONSTIPATION: 0
BACK PAIN: 1
COUGH: 0

## 2024-08-08 NOTE — PROGRESS NOTES
Drug use: Never    Sexual activity: Yes     Partners: Male   Other Topics Concern    Not on file   Social History Narrative    Not on file     Social Determinants of Health     Financial Resource Strain: Not on file   Food Insecurity: Not on file   Transportation Needs: Not on file   Physical Activity: Not on file   Stress: Not on file   Social Connections: Not on file   Intimate Partner Violence: Not on file   Housing Stability: Not on file       Review of Systems:  Review of Systems   Constitutional: Negative for chills and fever.   Cardiovascular:  Negative for chest pain.   Respiratory:  Negative for cough and shortness of breath.    Musculoskeletal:  Positive for arthritis, back pain, joint pain, muscle weakness, myalgias, neck pain and stiffness.   Gastrointestinal:  Negative for constipation.   Neurological:  Positive for numbness and weakness. Negative for headaches and tingling.       Physical Exam:  Ht 1.422 m (4' 8\")   Wt 71.7 kg (158 lb)   BMI 35.42 kg/m²     Physical Exam  Constitutional:       General: She is not in acute distress.     Appearance: Normal appearance. She is obese.   Pulmonary:      Effort: Pulmonary effort is normal.   Musculoskeletal:         General: Normal range of motion.      Comments: Stooped posture - using walker      Skin:     General: Skin is warm and dry.   Neurological:      Mental Status: She is alert and oriented to person, place, and time.   Psychiatric:         Mood and Affect: Mood normal.         Behavior: Behavior normal.         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

## 2024-08-13 LAB
6-ACETYLMORPHINE, UR: NOT DETECTED
7-AMINOCLONAZEPAM, URINE: NOT DETECTED
ALPHA-OH-ALPRAZ, URINE: NOT DETECTED
ALPHA-OH-MIDAZOLAM, URINE: NOT DETECTED
ALPRAZOLAM, URINE: NOT DETECTED
AMPHETAMINE, URINE: NOT DETECTED
BARBITURATES, URINE: NEGATIVE
BENZOYLECGONINE, UR: NEGATIVE
BUPRENORPHINE URINE: NOT DETECTED
CARISOPRODOL, UR: NEGATIVE
CLONAZEPAM, URINE: NOT DETECTED
CODEINE, URINE: NOT DETECTED
CREAT UR-MCNC: 161.4 MG/DL (ref 20–400)
DIAZEPAM, URINE: NOT DETECTED
DRUGS EXPECTED, UR: NORMAL
EER HI RES INTERP UR: NORMAL
ETHYL GLUCURONIDE UR: NEGATIVE
FENTANYL URINE: NOT DETECTED
GABAPENTIN: NOT DETECTED
HYDROCODONE, URINE: NOT DETECTED
HYDROMORPHONE, URINE: NOT DETECTED
LORAZEPAM, URINE: NOT DETECTED
MARIJUANA METAB, UR: NEGATIVE
MDA, URINE: NOT DETECTED
MDEA, EVE, UR: NOT DETECTED
MDMA, URINE: NOT DETECTED
MEPERIDINE METAB, UR: NOT DETECTED
METHADONE, URINE: NEGATIVE
METHAMPHETAMINE, URINE: NOT DETECTED
METHYLPHENIDATE: NOT DETECTED
MIDAZOLAM, URINE: NOT DETECTED
MORPHINE, OPI1M: NOT DETECTED
NALOXONE URINE: NOT DETECTED
NORBUPRENORPHINE, URINE: NOT DETECTED
NORDIAZEPAM, URINE: NOT DETECTED
NORFENTANYL, URINE: NOT DETECTED
NORHYDROCODONE, URINE: NOT DETECTED
NOROXYCODONE, URINE: PRESENT
NOROXYMORPHONE, URINE: PRESENT
OXAZEPAM, URINE: NOT DETECTED
OXYCODONE URINE: PRESENT
OXYMORPHONE, URINE: PRESENT
PAIN MANAGEMENT DRUG PANEL INTERP, URINE: NORMAL
PAIN MGT DRUG PANEL, HI RES, UR: NORMAL
PCP,URINE: NEGATIVE
PHENTERMINE, UR: NOT DETECTED
PREGABALIN: PRESENT
TAPENTADOL, URINE: NOT DETECTED
TAPENTADOL-O-SULFATE, URINE: NOT DETECTED
TEMAZEPAM, URINE: NOT DETECTED
TRAMADOL, URINE: NEGATIVE
ZOLPIDEM METABOLITE (ZCA), URINE: NOT DETECTED
ZOLPIDEM, URINE: NOT DETECTED

## 2024-09-05 ENCOUNTER — HOSPITAL ENCOUNTER (OUTPATIENT)
Dept: PAIN MANAGEMENT | Age: 84
Discharge: HOME | End: 2024-09-05
Payer: MEDICARE

## 2024-09-05 VITALS — WEIGHT: 158 LBS | HEIGHT: 56 IN | BODY MASS INDEX: 35.54 KG/M2

## 2024-09-05 DIAGNOSIS — Z79.891 CHRONIC USE OF OPIATE DRUG FOR THERAPEUTIC PURPOSE: Chronic | ICD-10-CM

## 2024-09-05 DIAGNOSIS — M54.12 CERVICAL RADICULAR PAIN: Primary | ICD-10-CM

## 2024-09-05 DIAGNOSIS — M48.02 CERVICAL SPINAL STENOSIS: ICD-10-CM

## 2024-09-05 DIAGNOSIS — M96.1 FAILED BACK SYNDROME: ICD-10-CM

## 2024-09-05 PROCEDURE — 99213 OFFICE O/P EST LOW 20 MIN: CPT

## 2024-09-05 PROCEDURE — 99214 OFFICE O/P EST MOD 30 MIN: CPT | Performed by: ANESTHESIOLOGY

## 2024-09-05 RX ORDER — OXYCODONE AND ACETAMINOPHEN 7.5; 325 MG/1; MG/1
1 TABLET ORAL EVERY 6 HOURS PRN
Qty: 120 TABLET | Refills: 0 | Status: SHIPPED | OUTPATIENT
Start: 2024-09-05 | End: 2024-10-05

## 2024-09-05 ASSESSMENT — ENCOUNTER SYMPTOMS
GASTROINTESTINAL NEGATIVE: 1
BACK PAIN: 1
RESPIRATORY NEGATIVE: 1

## 2024-09-05 NOTE — PROGRESS NOTES
(GLUCOPHAGE) 500 MG tablet Take 1 tablet by mouth 2 times daily (with meals)      levothyroxine (SYNTHROID) 50 MCG tablet Take 1 tablet by mouth Daily      omeprazole (PRILOSEC) 20 MG capsule Take 1 capsule by mouth Daily       No current facility-administered medications for this encounter.       Review of Systems   Constitutional: Negative.  Negative for fever.   HENT: Negative.     Respiratory: Negative.     Gastrointestinal: Negative.    Genitourinary: Negative.    Musculoskeletal:  Positive for back pain and gait problem.         Objective:  General Appearance:  Uncomfortable, in pain, well-appearing and in no acute distress.    Vital signs: (most recent): Height 1.422 m (4' 8\"), weight 71.7 kg (158 lb).  Vital signs are normal.  No fever.    Output: Producing urine and producing stool.    HEENT: Normal HEENT exam.    Lungs:  Normal effort and normal respiratory rate.  She is not in respiratory distress.    Heart: Normal rate.    Extremities: Normal range of motion.  There is no deformity.    Neurological: Patient is alert and oriented to person, place and time.  Normal strength.  Patient has normal coordination.    Pupils:  Pupils are equal, round, and reactive to light.  Pupils are equal.   Skin:  Warm and dry.  No rash or cyanosis.     Assessment & Plan    MRI OF THE CERVICAL SPINE WITHOUT CONTRAST 5/8/2024 7:08 pm       C3-C4: There is a disc osteophyte complex with uncovertebral and facet   hypertrophy.  There is canal stenosis measuring 9 mm in AP dimension.  There   is mild right and severe left foraminal narrowing.     C4-C5: There is uncovertebral and facet hypertrophy.  There is no canal   stenosis.  There is moderate to severe bilateral foraminal narrowing.     C5-C6: There is a disc osteophyte complex with uncovertebral and facet   hypertrophy.  There is canal stenosis measuring 9 mm in AP dimension.  There   is mild right and moderate to severe left foraminal narrowing.     C6-C7: There is a disc

## 2024-10-02 ENCOUNTER — HOSPITAL ENCOUNTER (OUTPATIENT)
Dept: PAIN MANAGEMENT | Facility: CLINIC | Age: 84
Discharge: HOME | End: 2024-10-02
Payer: MEDICARE

## 2024-10-02 VITALS
HEART RATE: 66 BPM | BODY MASS INDEX: 35.54 KG/M2 | RESPIRATION RATE: 11 BRPM | OXYGEN SATURATION: 98 % | WEIGHT: 158 LBS | HEIGHT: 56 IN | SYSTOLIC BLOOD PRESSURE: 156 MMHG | DIASTOLIC BLOOD PRESSURE: 67 MMHG | TEMPERATURE: 98 F

## 2024-10-02 DIAGNOSIS — R52 PAIN MANAGEMENT: ICD-10-CM

## 2024-10-02 DIAGNOSIS — M54.12 CERVICAL RADICULAR PAIN: Primary | ICD-10-CM

## 2024-10-02 LAB — GLUCOSE BLD-MCNC: 106 MG/DL (ref 65–105)

## 2024-10-02 PROCEDURE — 2500000003 HC RX 250 WO HCPCS: Performed by: ANESTHESIOLOGY

## 2024-10-02 PROCEDURE — 99152 MOD SED SAME PHYS/QHP 5/>YRS: CPT | Performed by: ANESTHESIOLOGY

## 2024-10-02 PROCEDURE — 62321 NJX INTERLAMINAR CRV/THRC: CPT

## 2024-10-02 PROCEDURE — 82947 ASSAY GLUCOSE BLOOD QUANT: CPT

## 2024-10-02 PROCEDURE — 2580000003 HC RX 258: Performed by: ANESTHESIOLOGY

## 2024-10-02 PROCEDURE — 6360000004 HC RX CONTRAST MEDICATION: Performed by: ANESTHESIOLOGY

## 2024-10-02 PROCEDURE — 62321 NJX INTERLAMINAR CRV/THRC: CPT | Performed by: ANESTHESIOLOGY

## 2024-10-02 PROCEDURE — 6360000002 HC RX W HCPCS: Performed by: ANESTHESIOLOGY

## 2024-10-02 RX ORDER — MIDAZOLAM HYDROCHLORIDE 2 MG/2ML
INJECTION, SOLUTION INTRAMUSCULAR; INTRAVENOUS
Status: COMPLETED | OUTPATIENT
Start: 2024-10-02 | End: 2024-10-02

## 2024-10-02 RX ORDER — FENTANYL CITRATE 50 UG/ML
INJECTION, SOLUTION INTRAMUSCULAR; INTRAVENOUS
Status: COMPLETED | OUTPATIENT
Start: 2024-10-02 | End: 2024-10-02

## 2024-10-02 RX ORDER — SODIUM CHLORIDE 0.9 % (FLUSH) 0.9 %
SYRINGE (ML) INJECTION
Status: COMPLETED | OUTPATIENT
Start: 2024-10-02 | End: 2024-10-02

## 2024-10-02 RX ORDER — DEXAMETHASONE SODIUM PHOSPHATE 10 MG/ML
INJECTION, SOLUTION INTRAMUSCULAR; INTRAVENOUS
Status: COMPLETED | OUTPATIENT
Start: 2024-10-02 | End: 2024-10-02

## 2024-10-02 RX ORDER — LIDOCAINE HYDROCHLORIDE 10 MG/ML
INJECTION, SOLUTION EPIDURAL; INFILTRATION; INTRACAUDAL; PERINEURAL
Status: COMPLETED | OUTPATIENT
Start: 2024-10-02 | End: 2024-10-02

## 2024-10-02 RX ADMIN — IOHEXOL 2 ML: 180 INJECTION INTRAVENOUS at 13:04

## 2024-10-02 RX ADMIN — LIDOCAINE HYDROCHLORIDE 5 ML: 10 INJECTION, SOLUTION EPIDURAL; INFILTRATION; INTRACAUDAL at 13:04

## 2024-10-02 RX ADMIN — FENTANYL CITRATE 50 MCG: 50 INJECTION, SOLUTION INTRAMUSCULAR; INTRAVENOUS at 13:03

## 2024-10-02 RX ADMIN — Medication 5 ML: at 13:05

## 2024-10-02 RX ADMIN — DEXAMETHASONE SODIUM PHOSPHATE 10 MG: 10 INJECTION, SOLUTION INTRAMUSCULAR; INTRAVENOUS at 13:05

## 2024-10-02 RX ADMIN — MIDAZOLAM HYDROCHLORIDE 1 MG: 1 INJECTION, SOLUTION INTRAMUSCULAR; INTRAVENOUS at 13:03

## 2024-10-02 ASSESSMENT — PAIN SCALES - GENERAL: PAINLEVEL_OUTOF10: 2

## 2024-10-02 ASSESSMENT — PAIN - FUNCTIONAL ASSESSMENT
PAIN_FUNCTIONAL_ASSESSMENT: PREVENTS OR INTERFERES SOME ACTIVE ACTIVITIES AND ADLS
PAIN_FUNCTIONAL_ASSESSMENT: 0-10

## 2024-10-02 ASSESSMENT — PAIN DESCRIPTION - DESCRIPTORS: DESCRIPTORS: NUMBNESS;TINGLING;STABBING

## 2024-10-02 NOTE — OP NOTE
Preoperative Diagnosis: Cervical disc herniation and radiculitis  Postoperative Diagnosis:  Cervical disc herniation and radiculitis    Procedure Performed:  Cervical epidural steroid injection under fluoroscopy guidance    BLOOD LOSS: NONE    Procedure:      The Patient was seen in the preop area, chart was reviewed, informed consent was obtained. Patient was taken to procedure room and was placed in prone position. Vital signs were monitored through out the  Procedure. A time out was completed. The site was prepped and draped in sterile manner.     The target point was marked at The interlaminar space at C7/T1 . Skin and deep tissues were anesthetized with 1 % lidocaine.A  19-gauge Tuohy epidural needlele was advanced  under fluoroscopy guidance in AP view. Epidural space was identified using RYAN technique. A 19 G catheter was threaded up in epidural space for 2 levels. Position was confirmed in Lateral view.   Then after negative aspiration contrast dye was injected with live fluoroscopy in AP views that showed  spread of the contrast in the epidural space  and no vascular runoff or intrathecal spread. Finally 5 ml of treatment solution containing 4 ml of PF NS and 1 ml of dexamethasone 10 mg / ml was injected.  The needle was removed and a Band-Aid was placed over the needle  insertion site.  The patient's vital signs remained stable and the patient tolerated the procedure well.      SEDATION NOTE:    ASA CLASSIFICATION  3  MP   CLASSIFICATION  3    Moderate intravenous conscious sedation was supervised by Dr. Dawkins  The patient was independently monitored by a Registered Nurse assigned to the Procedure Room  Monitoring included automated blood pressure, continuous EKG, Capnography and continuous pulse oximetry.   The detailed Conscious Record is permanently stored in the Hospital Information System.     The following is the conscious sedation record;  Start Time:  1256  End times:  1308  Duration:  12

## 2024-10-02 NOTE — INTERVAL H&P NOTE
Update History & Physical    The patient's History and Physical of September 5, 2024 was reviewed with the patient and I examined the patient. There was no change. The surgical site was confirmed by the patient and me.     Plan: The risks, benefits, expected outcome, and alternative to the recommended procedure have been discussed with the patient. Patient understands and wants to proceed with the procedure.     ASA 3  MP 3    Electronically signed by Dimitrios Dawkins MD on 10/2/2024 at 12:42 PM

## 2024-10-02 NOTE — DISCHARGE INSTRUCTIONS

## 2024-10-10 ENCOUNTER — HOSPITAL ENCOUNTER (OUTPATIENT)
Dept: PAIN MANAGEMENT | Age: 84
Discharge: HOME | End: 2024-10-10
Payer: MEDICARE

## 2024-10-10 VITALS — BODY MASS INDEX: 35.54 KG/M2 | HEIGHT: 56 IN | WEIGHT: 158 LBS

## 2024-10-10 DIAGNOSIS — M48.062 SPINAL STENOSIS OF LUMBAR REGION WITH NEUROGENIC CLAUDICATION: Chronic | ICD-10-CM

## 2024-10-10 DIAGNOSIS — M54.16 LUMBAR RADICULOPATHY: ICD-10-CM

## 2024-10-10 DIAGNOSIS — M96.1 FAILED BACK SYNDROME OF LUMBAR SPINE: ICD-10-CM

## 2024-10-10 DIAGNOSIS — M48.02 SPINAL STENOSIS IN CERVICAL REGION: ICD-10-CM

## 2024-10-10 DIAGNOSIS — Z79.891 CHRONIC USE OF OPIATE DRUG FOR THERAPEUTIC PURPOSE: Primary | Chronic | ICD-10-CM

## 2024-10-10 PROCEDURE — 99214 OFFICE O/P EST MOD 30 MIN: CPT | Performed by: ANESTHESIOLOGY

## 2024-10-10 PROCEDURE — 99213 OFFICE O/P EST LOW 20 MIN: CPT

## 2024-10-10 RX ORDER — OXYCODONE AND ACETAMINOPHEN 7.5; 325 MG/1; MG/1
1 TABLET ORAL EVERY 6 HOURS PRN
Qty: 120 TABLET | Refills: 0 | Status: SHIPPED | OUTPATIENT
Start: 2024-10-10 | End: 2024-11-09

## 2024-10-10 ASSESSMENT — ENCOUNTER SYMPTOMS
BACK PAIN: 1
SORE THROAT: 0
DIARRHEA: 0
RESPIRATORY NEGATIVE: 1
VOMITING: 0
GASTROINTESTINAL NEGATIVE: 1
CHEST TIGHTNESS: 0
SHORTNESS OF BREATH: 0
CONSTIPATION: 0
NAUSEA: 0

## 2024-10-10 ASSESSMENT — PAIN DESCRIPTION - LOCATION: LOCATION: NECK

## 2024-10-10 ASSESSMENT — PAIN DESCRIPTION - ORIENTATION: ORIENTATION: LEFT;RIGHT

## 2024-10-10 ASSESSMENT — PAIN DESCRIPTION - DESCRIPTORS: DESCRIPTORS: ACHING

## 2024-10-10 ASSESSMENT — PAIN DESCRIPTION - PAIN TYPE: TYPE: CHRONIC PAIN

## 2024-10-10 ASSESSMENT — PAIN DESCRIPTION - FREQUENCY: FREQUENCY: CONTINUOUS

## 2024-10-10 NOTE — PROGRESS NOTES
and vomiting.   Musculoskeletal:  Positive for back pain, gait problem, neck pain and neck stiffness. Negative for joint swelling.   Neurological:  Positive for weakness and numbness.         Objective:  General Appearance:  Uncomfortable and in pain.    Vital signs: (most recent): Height 1.422 m (4' 8\"), weight 71.7 kg (158 lb).  Vital signs are normal.  No fever.    Output: Producing urine and producing stool.    HEENT: Normal HEENT exam.    Lungs:  Normal effort and normal respiratory rate.  She is not in respiratory distress.    Heart: Normal rate.    Extremities: Normal range of motion.  There is no deformity.    Neurological: Patient is alert and oriented to person, place and time.  Normal strength.  Patient has normal coordination.    Pupils:  Pupils are equal, round, and reactive to light.  Pupils are equal.   Skin:  Warm and dry.  No rash or cyanosis.     Assessment & Plan    This is a 83-year-old female with history of chronic pain involving multiple body sites including neck arm elbow hip knee low back     She is on chronic opioid therapy  Currently taking oxycodone 7.5 mg 4 times a day  Daily morphine equaling 45  Reports no side effect  Finds the medication helpful  Denies any illicit substance use  Automated prescription report reviewed  No sign or symptom of any aberrancy     Last month visit patient reported index pain is neck and left arm pain  Imaging showed cervical spinal stenosis  We did a cervical epidural steroid injection  She reported 80% plus improvement in neck and arm pain    With regard to chronic right knee pain she is considered a surgical candidate for his severe knee arthritis but comorbidities increases the risk of surgery  She want to try peripheral nerve stimulation  Will discuss it further on next visit     Medication Refill: Percocet  1. Chronic use of opiate drug for therapeutic purpose    2. Lumbar radiculopathy    3. Spinal stenosis in cervical region    4. Spinal stenosis of

## 2024-10-14 ENCOUNTER — HOSPITAL ENCOUNTER (OUTPATIENT)
Dept: WOMENS IMAGING | Age: 84
Discharge: HOME | End: 2024-10-16
Payer: MEDICARE

## 2024-10-14 DIAGNOSIS — Z12.31 ENCOUNTER FOR SCREENING MAMMOGRAM FOR BREAST CANCER: ICD-10-CM

## 2024-10-14 PROCEDURE — 77063 BREAST TOMOSYNTHESIS BI: CPT

## 2024-10-30 ENCOUNTER — HOSPITAL ENCOUNTER (OUTPATIENT)
Dept: PAIN MANAGEMENT | Age: 84
Discharge: HOME | End: 2024-10-30
Payer: MEDICARE

## 2024-10-30 VITALS — BODY MASS INDEX: 35.54 KG/M2 | WEIGHT: 158 LBS | HEIGHT: 56 IN

## 2024-10-30 DIAGNOSIS — M48.02 SPINAL STENOSIS IN CERVICAL REGION: Primary | ICD-10-CM

## 2024-10-30 DIAGNOSIS — M54.12 CERVICAL RADICULAR PAIN: ICD-10-CM

## 2024-10-30 DIAGNOSIS — M50.30 DDD (DEGENERATIVE DISC DISEASE), CERVICAL: ICD-10-CM

## 2024-10-30 DIAGNOSIS — M48.062 SPINAL STENOSIS OF LUMBAR REGION WITH NEUROGENIC CLAUDICATION: Chronic | ICD-10-CM

## 2024-10-30 DIAGNOSIS — Z79.891 CHRONIC USE OF OPIATE DRUG FOR THERAPEUTIC PURPOSE: Chronic | ICD-10-CM

## 2024-10-30 DIAGNOSIS — M54.16 LUMBAR RADICULOPATHY: ICD-10-CM

## 2024-10-30 DIAGNOSIS — M96.1 FAILED BACK SYNDROME OF LUMBAR SPINE: ICD-10-CM

## 2024-10-30 DIAGNOSIS — M19.021 PRIMARY OSTEOARTHRITIS OF RIGHT ELBOW: ICD-10-CM

## 2024-10-30 DIAGNOSIS — M16.11 PRIMARY OSTEOARTHRITIS OF RIGHT HIP: ICD-10-CM

## 2024-10-30 DIAGNOSIS — M47.816 LUMBAR SPONDYLOSIS: ICD-10-CM

## 2024-10-30 DIAGNOSIS — M48.04 DEGENERATIVE THORACIC SPINAL STENOSIS: ICD-10-CM

## 2024-10-30 PROCEDURE — 99213 OFFICE O/P EST LOW 20 MIN: CPT | Performed by: NURSE PRACTITIONER

## 2024-10-30 PROCEDURE — 99213 OFFICE O/P EST LOW 20 MIN: CPT

## 2024-10-30 RX ORDER — OXYCODONE AND ACETAMINOPHEN 7.5; 325 MG/1; MG/1
1 TABLET ORAL EVERY 6 HOURS PRN
Qty: 120 TABLET | Refills: 0 | Status: SHIPPED | OUTPATIENT
Start: 2024-11-11 | End: 2024-12-11

## 2024-10-30 ASSESSMENT — PAIN SCALES - GENERAL: PAINLEVEL_OUTOF10: 2

## 2024-10-30 ASSESSMENT — ENCOUNTER SYMPTOMS
COUGH: 0
CONSTIPATION: 0
TROUBLE SWALLOWING: 1
SHORTNESS OF BREATH: 0
BACK PAIN: 1

## 2024-10-30 ASSESSMENT — PAIN DESCRIPTION - LOCATION: LOCATION: NECK

## 2024-10-30 NOTE — PROGRESS NOTES
Chief Complaint   Patient presents with    Back Pain    Medication Refill         PMH     Chronic pain involving multiple body sites. History of previous cervical thoracic and lumbar spine extensive fusion surgeries  She states she has generalized all over the body pain related to arthritis   On chronic opioid therapy in this clinic for several years  We have gradually weaned her opioid with slow titration  She is now prescribed Percocet 7.5mg 4 times a day  Back  She has tried and failed SCS implanted in 2012 at [facility] but it was removed as it was not functioning well.  Lumbar MRI 2/2023 Posterior hardware fixation from L2-S1 with a prominent postoperative seroma in the adjacent posterior paraspinal soft tissues.  Multilevel degenerative change with canal stenosis at L1-2.  Moderate left and severe right foraminal narrowing at L1-2.   Thoracic MRI Multilevel degenerative disc disease with associated facet and ligamentous hypertrophy throughout the thoracic spine resulting in canal stenosis and foraminal narrowing bilaterally as described above.   EMG 4/2024 chronic radiculopathy L2-3 to S1   7/11/23  s/p laminectomy and partial facetectomy T10, T11, T12, L1, as well as lysis of adhesions L1-L2.    Pt had bilat RFA of  the primary dorsal ramus of L5 and lateral branches of S1 and S2 nerves done 1/24/24 with limited relief   Neck  History of chronic neck pain with radiation down right arm  Cervical spine MRI with foraminal stenosis left-sided at C6-C7 level  Had recent neurosurgical evaluation with no plan for surgery  History of previous cervical surgery  Cervical MRI 5/2024  anterior fixation from C3-C5 without complication. The vertebral body heights are maintained. There is multilevel degenerative disc disease There is disc space narrowing in the lower cervical spine.  There is no spondylolisthesis.  XR done 3/2024 Degenerative and postoperative changes  Mid cervical ACDF is evident. .    EMG

## 2024-12-05 ENCOUNTER — HOSPITAL ENCOUNTER (OUTPATIENT)
Dept: PAIN MANAGEMENT | Age: 84
Discharge: HOME | End: 2024-12-05
Payer: MEDICARE

## 2024-12-05 VITALS — HEIGHT: 58 IN | BODY MASS INDEX: 32.32 KG/M2 | WEIGHT: 154 LBS

## 2024-12-05 DIAGNOSIS — M54.16 LUMBAR RADICULOPATHY: ICD-10-CM

## 2024-12-05 DIAGNOSIS — M96.1 FAILED BACK SYNDROME OF LUMBAR SPINE: ICD-10-CM

## 2024-12-05 DIAGNOSIS — M47.816 LUMBAR SPONDYLOSIS: ICD-10-CM

## 2024-12-05 DIAGNOSIS — M17.0 PRIMARY OSTEOARTHRITIS OF BOTH KNEES: ICD-10-CM

## 2024-12-05 DIAGNOSIS — Z79.891 CHRONIC USE OF OPIATE DRUG FOR THERAPEUTIC PURPOSE: Primary | Chronic | ICD-10-CM

## 2024-12-05 DIAGNOSIS — M48.02 SPINAL STENOSIS IN CERVICAL REGION: ICD-10-CM

## 2024-12-05 PROCEDURE — 99213 OFFICE O/P EST LOW 20 MIN: CPT

## 2024-12-05 PROCEDURE — 99214 OFFICE O/P EST MOD 30 MIN: CPT | Performed by: ANESTHESIOLOGY

## 2024-12-05 RX ORDER — OXYCODONE AND ACETAMINOPHEN 7.5; 325 MG/1; MG/1
1 TABLET ORAL EVERY 6 HOURS PRN
Qty: 120 TABLET | Refills: 0 | Status: SHIPPED | OUTPATIENT
Start: 2024-12-11 | End: 2025-01-10

## 2024-12-05 RX ORDER — PREGABALIN 150 MG/1
150 CAPSULE ORAL 2 TIMES DAILY
Qty: 60 CAPSULE | Refills: 1 | Status: SHIPPED | OUTPATIENT
Start: 2024-12-05 | End: 2025-02-03

## 2024-12-05 ASSESSMENT — PAIN SCALES - GENERAL: PAINLEVEL_OUTOF10: 6

## 2024-12-05 ASSESSMENT — ENCOUNTER SYMPTOMS
SHORTNESS OF BREATH: 1
EYES NEGATIVE: 1

## 2024-12-05 NOTE — PROGRESS NOTES
The patient is a 84 y.o. /  female.    Chief Complaint   Patient presents with    Back Pain        HPI  Chronic pain involving neck low back and knees  Had recent cervical epidural injection in October for flareup of cervical radiculopathy with good outcome    Main issue today is knee pain  Chronic going on for many years diagnosed with knee arthritis requesting knee steroid injection    On multimodal medication regimen  Currently taking Percocet opioid  Daily morphine equaling 45 reports no side effect  Finds medication helpful no sign or symptom of any aberrancy  Refill ordered    Medication Refill:   Percocet    Pain score Today:  6  Adverse effects (Constipation / Nausea / Sedation / sexual Dysfunction / others) :  no  Mood: fair  Sleep pattern and quality: good  Activity level: poor    Pill count Today: 25  Last dose taken  12/05/2024 at noon  OARRS report reviewed today: yes  ER/Hospitalizations/PCP visit related to pain since last visit:no   Any legal problems e.g. DUI etc.:No  Satisfied with current management: No    Opioid Contract: 12/05/2024  Last Urine Dug screen dated: 08/08/2024    Hemoglobin A1C   Date Value Ref Range Status   02/26/2022 6.0 4.0 - 6.0 % Final       Past Medical History, Past Surgical History, Social History, Allergies and Medications reviewed and updated in EPIC as indicated    Family History reviewed and is noncontributory.        Past Medical History:   Diagnosis Date    Abdominal pain, epigastric     Achilles tendonitis     right    Acute pain of right knee 07/05/2018    Acute pancreatitis     JEFF (acute kidney injury) ([facility]) 09/02/2022    Asthma     COPD (chronic obstructive pulmonary disease) ([facility])     Dr. Mai Pulmonologist Kehinde last appt 6/26/2023 CNP    Cough     clear phlegm    Degenerative lumbar disc     DM (diabetes mellitus), type 2 ([facility])     PCP Dr. Wheatley    Failed back syndrome, lumbar     Fast heart beat     Hx of \"8-10 years ago\"    Fibromyalgia

## 2025-01-08 ENCOUNTER — HOSPITAL ENCOUNTER (OUTPATIENT)
Dept: PAIN MANAGEMENT | Age: 85
Discharge: HOME | End: 2025-01-08
Payer: MEDICARE

## 2025-01-08 VITALS — BODY MASS INDEX: 31.91 KG/M2 | WEIGHT: 152 LBS | HEIGHT: 58 IN

## 2025-01-08 DIAGNOSIS — R26.89 IMBALANCE: Primary | Chronic | ICD-10-CM

## 2025-01-08 DIAGNOSIS — M48.02 SPINAL STENOSIS IN CERVICAL REGION: ICD-10-CM

## 2025-01-08 DIAGNOSIS — M47.816 LUMBAR SPONDYLOSIS: ICD-10-CM

## 2025-01-08 DIAGNOSIS — M16.11 PRIMARY OSTEOARTHRITIS OF RIGHT HIP: ICD-10-CM

## 2025-01-08 DIAGNOSIS — M50.30 DDD (DEGENERATIVE DISC DISEASE), CERVICAL: ICD-10-CM

## 2025-01-08 DIAGNOSIS — M48.062 SPINAL STENOSIS OF LUMBAR REGION WITH NEUROGENIC CLAUDICATION: Chronic | ICD-10-CM

## 2025-01-08 DIAGNOSIS — M54.16 LUMBAR RADICULOPATHY: ICD-10-CM

## 2025-01-08 DIAGNOSIS — Z79.891 CHRONIC USE OF OPIATE DRUG FOR THERAPEUTIC PURPOSE: Chronic | ICD-10-CM

## 2025-01-08 DIAGNOSIS — M96.1 FAILED BACK SYNDROME OF LUMBAR SPINE: ICD-10-CM

## 2025-01-08 PROCEDURE — 99213 OFFICE O/P EST LOW 20 MIN: CPT

## 2025-01-08 PROCEDURE — 99213 OFFICE O/P EST LOW 20 MIN: CPT | Performed by: NURSE PRACTITIONER

## 2025-01-08 RX ORDER — OXYCODONE AND ACETAMINOPHEN 7.5; 325 MG/1; MG/1
1 TABLET ORAL EVERY 6 HOURS PRN
Qty: 120 TABLET | Refills: 0 | Status: SHIPPED | OUTPATIENT
Start: 2025-01-10 | End: 2025-02-09

## 2025-01-08 ASSESSMENT — ENCOUNTER SYMPTOMS
BACK PAIN: 1
BOWEL INCONTINENCE: 0

## 2025-01-08 ASSESSMENT — PAIN SCALES - GENERAL: PAINLEVEL_OUTOF10: 8

## 2025-01-08 NOTE — PROGRESS NOTES
Chief Complaint   Patient presents with    Back Pain     Med refill         PMH     Chronic pain involving multiple body sites. History of previous cervical thoracic and lumbar spine extensive fusion surgeries  She states she has generalized all over the body pain related to arthritis   On chronic opioid therapy in this clinic for several years  We have gradually weaned her opioid with slow titration  She is now prescribed Percocet 7.5mg 4 times a day  Back  She has tried and failed SCS implanted in 2012 at [facility] but it was removed as it was not functioning well.  Lumbar MRI 2/2023 Posterior hardware fixation from L2-S1 with a prominent postoperative seroma in the adjacent posterior paraspinal soft tissues.  Multilevel degenerative change with canal stenosis at L1-2.  Moderate left and severe right foraminal narrowing at L1-2.   Thoracic MRI Multilevel degenerative disc disease with associated facet and ligamentous hypertrophy throughout the thoracic spine resulting in canal stenosis and foraminal narrowing bilaterally as described above.   EMG 4/2024 chronic radiculopathy L2-3 to S1   7/11/23  s/p laminectomy and partial facetectomy T10, T11, T12, L1, as well as lysis of adhesions L1-L2.    Pt had bilat RFA of  the primary dorsal ramus of L5 and lateral branches of S1 and S2 nerves done 1/24/24 with limited relief   Neck  History of chronic neck pain with radiation down right arm  Cervical spine MRI with foraminal stenosis left-sided at C6-C7 level  Had recent neurosurgical evaluation with no plan for surgery  History of previous cervical surgery  Cervical MRI 5/2024  anterior fixation from C3-C5 without complication. The vertebral body heights are maintained. There is multilevel degenerative disc disease There is disc space narrowing in the lower cervical spine.  There is no spondylolisthesis.  XR done 3/2024 Degenerative and postoperative changes  Mid cervical ACDF is evident. .    EMG

## 2025-01-08 NOTE — H&P (VIEW-ONLY)
activity: Yes     Partners: Male   Other Topics Concern    Not on file   Social History Narrative    Not on file     Social Determinants of Health     Financial Resource Strain: Not on file   Food Insecurity: No Food Insecurity (12/3/2024)    Received from [facility]    Hunger Screening     Within the past 12 months we worried whether our food would run out before we got money to buy more.: Never True     Within the past 12 months the food we bought just didn't last and we didn't have money to get more.: Never True   Transportation Needs: Not on file   Physical Activity: Not on file   Stress: Not on file   Social Connections: Not on file   Intimate Partner Violence: Not on file   Housing Stability: Not on file       Review of Systems:  Review of Systems   Constitutional: Negative for fever.   Cardiovascular:  Negative for chest pain.   Musculoskeletal:  Positive for back pain.   Gastrointestinal:  Negative for bowel incontinence.   Genitourinary:  Negative for bladder incontinence.   Neurological:  Negative for headaches, numbness, tingling and weakness.       Physical Exam:  Ht 1.473 m (4' 10\")   Wt 68.9 kg (152 lb)   BMI 31.77 kg/m²     Physical Exam  HENT:      Head: Normocephalic.   Pulmonary:      Effort: Pulmonary effort is normal.   Musculoskeletal:         General: Normal range of motion.      Cervical back: Normal range of motion.      Lumbar back: Tenderness present.   Skin:     General: Skin is warm and dry.   Neurological:      Mental Status: She is alert and oriented to person, place, and time.         Record/Diagnostics Review:    Last georgie 8/2024 and was appropriate     Assessment:  Problem List Items Addressed This Visit       Chronic use of opiate drug for therapeutic purpose (Chronic)    Lumbar spondylosis    Imbalance - Primary (Chronic)    Spinal stenosis of lumbar region with neurogenic claudication (Chronic)    Lumbar radiculopathy    Failed back syndrome of lumbar spine

## 2025-02-05 ENCOUNTER — HOSPITAL ENCOUNTER (OUTPATIENT)
Dept: PAIN MANAGEMENT | Facility: CLINIC | Age: 85
Discharge: HOME | End: 2025-02-05
Payer: MEDICARE

## 2025-02-05 VITALS
HEIGHT: 58 IN | OXYGEN SATURATION: 96 % | BODY MASS INDEX: 31.91 KG/M2 | SYSTOLIC BLOOD PRESSURE: 182 MMHG | TEMPERATURE: 97 F | HEART RATE: 66 BPM | RESPIRATION RATE: 12 BRPM | DIASTOLIC BLOOD PRESSURE: 83 MMHG | WEIGHT: 152 LBS

## 2025-02-05 DIAGNOSIS — R52 PAIN MANAGEMENT: ICD-10-CM

## 2025-02-05 DIAGNOSIS — M17.0 PRIMARY OSTEOARTHRITIS OF BOTH KNEES: Primary | ICD-10-CM

## 2025-02-05 LAB — GLUCOSE BLD-MCNC: 115 MG/DL (ref 65–105)

## 2025-02-05 PROCEDURE — 20610 DRAIN/INJ JOINT/BURSA W/O US: CPT

## 2025-02-05 PROCEDURE — 77002 NEEDLE LOCALIZATION BY XRAY: CPT | Performed by: ANESTHESIOLOGY

## 2025-02-05 PROCEDURE — 6360000004 HC RX CONTRAST MEDICATION: Performed by: ANESTHESIOLOGY

## 2025-02-05 PROCEDURE — 82947 ASSAY GLUCOSE BLOOD QUANT: CPT

## 2025-02-05 PROCEDURE — 6360000002 HC RX W HCPCS: Performed by: ANESTHESIOLOGY

## 2025-02-05 PROCEDURE — 77002 NEEDLE LOCALIZATION BY XRAY: CPT

## 2025-02-05 PROCEDURE — 20610 DRAIN/INJ JOINT/BURSA W/O US: CPT | Performed by: ANESTHESIOLOGY

## 2025-02-05 RX ORDER — LIDOCAINE HYDROCHLORIDE 10 MG/ML
INJECTION, SOLUTION EPIDURAL; INFILTRATION; INTRACAUDAL; PERINEURAL
Status: COMPLETED | OUTPATIENT
Start: 2025-02-05 | End: 2025-02-05

## 2025-02-05 RX ORDER — BUDESONIDE, GLYCOPYRROLATE, AND FORMOTEROL FUMARATE 160; 9; 4.8 UG/1; UG/1; UG/1
1 AEROSOL, METERED RESPIRATORY (INHALATION) DAILY
COMMUNITY

## 2025-02-05 RX ORDER — BUPIVACAINE HYDROCHLORIDE 5 MG/ML
INJECTION, SOLUTION EPIDURAL; INTRACAUDAL
Status: COMPLETED | OUTPATIENT
Start: 2025-02-05 | End: 2025-02-05

## 2025-02-05 RX ORDER — TRIAMCINOLONE ACETONIDE 40 MG/ML
INJECTION, SUSPENSION INTRA-ARTICULAR; INTRAMUSCULAR
Status: COMPLETED | OUTPATIENT
Start: 2025-02-05 | End: 2025-02-05

## 2025-02-05 RX ADMIN — IOHEXOL 3 ML: 180 INJECTION INTRAVENOUS at 11:17

## 2025-02-05 RX ADMIN — BUPIVACAINE HYDROCHLORIDE 9 ML: 5 INJECTION, SOLUTION EPIDURAL; INTRACAUDAL; PERINEURAL at 11:18

## 2025-02-05 RX ADMIN — TRIAMCINOLONE ACETONIDE 40 MG: 40 INJECTION, SUSPENSION INTRA-ARTICULAR; INTRAMUSCULAR at 11:18

## 2025-02-05 RX ADMIN — LIDOCAINE HYDROCHLORIDE 5 ML: 10 INJECTION, SOLUTION EPIDURAL; INFILTRATION; INTRACAUDAL at 11:15

## 2025-02-05 ASSESSMENT — PAIN DESCRIPTION - PAIN TYPE: TYPE: CHRONIC PAIN

## 2025-02-05 ASSESSMENT — PAIN DESCRIPTION - LOCATION: LOCATION: KNEE

## 2025-02-05 ASSESSMENT — PAIN DESCRIPTION - FREQUENCY: FREQUENCY: CONTINUOUS

## 2025-02-05 ASSESSMENT — PAIN DESCRIPTION - ONSET: ONSET: ON-GOING

## 2025-02-05 ASSESSMENT — PAIN - FUNCTIONAL ASSESSMENT: PAIN_FUNCTIONAL_ASSESSMENT: PREVENTS OR INTERFERES WITH MANY ACTIVE NOT PASSIVE ACTIVITIES

## 2025-02-05 ASSESSMENT — PAIN DESCRIPTION - DESCRIPTORS: DESCRIPTORS: ACHING;DULL

## 2025-02-05 ASSESSMENT — PAIN SCALES - GENERAL: PAINLEVEL_OUTOF10: 8

## 2025-02-05 ASSESSMENT — PAIN DESCRIPTION - ORIENTATION: ORIENTATION: RIGHT;LEFT

## 2025-02-05 NOTE — INTERVAL H&P NOTE
Update History & Physical    The patient's History and Physical of January 8, 2025 was reviewed with the patient and I examined the patient. There was no change. The surgical site was confirmed by the patient and me.     Plan: The risks, benefits, expected outcome, and alternative to the recommended procedure have been discussed with the patient. Patient understands and wants to proceed with the procedure.     ASA 3  MP 3    Electronically signed by Dimitrios Dawkins MD on 2/5/2025 at 10:55 AM

## 2025-02-05 NOTE — DISCHARGE INSTRUCTIONS

## 2025-02-05 NOTE — OP NOTE
Preoperative Diagnosis: Osteoarthritis of the Bilateral knee.      Postoperative Diagnosis: Osteoarthritis of the Bilateral knee.    Procedure Performed:  Injection of Bilateral knee with floroscopy Guidance.    Indication for the Procedure:  The patient has Bilateral knee pain not responding to conservative treatment diagnosed with Knee OA  The procedure and the risks were discussed with the patient and an informed consent was obtained.    Procedure:  The patient was placed in supine position. Skin over the Bilateral knee was prepped with chlor prep and draped in sterile manner.  Using fluoroscopy Guidance , skin entry point was marked along the joint margin..    Then skin and deep tissues were infiltrated with 1  ml of 1% lidocaine. A 22-gauge  spinal needle was advanced with floro in to joint pace.  Aspiration was -ve.  Small amount of contrast dye was injected that showed intraarticular spread.  Finally 3 ml of treatment solution from treatment mixture containing  5 ml of 0.5% Bupivacaine and 1 ml of Kenalog 40 mg was injected into the joint.   The needle was removed and a Band-Aid was placed over the needle insertion site.  The same procedure was performed on the other side and remaining 3 ml of treatment solution was injected there.

## 2025-02-07 ENCOUNTER — HOSPITAL ENCOUNTER (OUTPATIENT)
Dept: PAIN MANAGEMENT | Age: 85
Discharge: HOME | End: 2025-02-07
Payer: MEDICARE

## 2025-02-07 VITALS — HEIGHT: 58 IN | BODY MASS INDEX: 31.91 KG/M2 | WEIGHT: 152 LBS

## 2025-02-07 DIAGNOSIS — M96.1 FAILED BACK SYNDROME OF LUMBAR SPINE: ICD-10-CM

## 2025-02-07 DIAGNOSIS — M53.3 CHRONIC SI JOINT PAIN: Chronic | ICD-10-CM

## 2025-02-07 DIAGNOSIS — M50.30 DDD (DEGENERATIVE DISC DISEASE), CERVICAL: ICD-10-CM

## 2025-02-07 DIAGNOSIS — R26.89 IMBALANCE: Primary | Chronic | ICD-10-CM

## 2025-02-07 DIAGNOSIS — M48.02 SPINAL STENOSIS IN CERVICAL REGION: ICD-10-CM

## 2025-02-07 DIAGNOSIS — Z79.891 CHRONIC USE OF OPIATE DRUG FOR THERAPEUTIC PURPOSE: Chronic | ICD-10-CM

## 2025-02-07 DIAGNOSIS — M54.16 LUMBAR RADICULOPATHY: ICD-10-CM

## 2025-02-07 DIAGNOSIS — G89.29 CHRONIC SI JOINT PAIN: Chronic | ICD-10-CM

## 2025-02-07 DIAGNOSIS — M48.04 DEGENERATIVE THORACIC SPINAL STENOSIS: ICD-10-CM

## 2025-02-07 DIAGNOSIS — M17.0 PRIMARY OSTEOARTHRITIS OF BOTH KNEES: ICD-10-CM

## 2025-02-07 PROCEDURE — 99213 OFFICE O/P EST LOW 20 MIN: CPT

## 2025-02-07 PROCEDURE — 99213 OFFICE O/P EST LOW 20 MIN: CPT | Performed by: NURSE PRACTITIONER

## 2025-02-07 RX ORDER — OXYCODONE AND ACETAMINOPHEN 7.5; 325 MG/1; MG/1
1 TABLET ORAL EVERY 6 HOURS PRN
Qty: 120 TABLET | Refills: 0 | Status: SHIPPED | OUTPATIENT
Start: 2025-02-09 | End: 2025-03-11

## 2025-02-07 ASSESSMENT — ENCOUNTER SYMPTOMS
BACK PAIN: 1
CONSTIPATION: 0
SHORTNESS OF BREATH: 0
COUGH: 0

## 2025-02-07 NOTE — PROGRESS NOTES
Chief Complaint   Patient presents with    Back Pain     Med refill         PMH     Chronic pain involving multiple body sites. History of previous cervical thoracic and lumbar spine extensive fusion surgeries  She states she has generalized all over the body pain related to arthritis   On chronic opioid therapy in this clinic for several years  We have gradually weaned her opioid with slow titration  She is now prescribed Percocet 7.5mg 4 times a day  Back  She has tried and failed SCS implanted in 2012 at [facility] but it was removed as it was not functioning well.  Lumbar MRI 2/2023 Posterior hardware fixation from L2-S1 with a prominent postoperative seroma in the adjacent posterior paraspinal soft tissues.  Multilevel degenerative change with canal stenosis at L1-2.  Moderate left and severe right foraminal narrowing at L1-2.   Thoracic MRI Multilevel degenerative disc disease with associated facet and ligamentous hypertrophy throughout the thoracic spine resulting in canal stenosis and foraminal narrowing bilaterally as described above.   EMG 4/2024 chronic radiculopathy L2-3 to S1   7/11/23  s/p laminectomy and partial facetectomy T10, T11, T12, L1, as well as lysis of adhesions L1-L2.    Pt had bilat RFA of  the primary dorsal ramus of L5 and lateral branches of S1 and S2 nerves done 1/24/24 with limited relief   Neck  History of chronic neck pain with radiation down right arm  Cervical spine MRI with foraminal stenosis left-sided at C6-C7 level  Had recent neurosurgical evaluation with no plan for surgery  History of previous cervical surgery  Cervical MRI 5/2024  anterior fixation from C3-C5 without complication. The vertebral body heights are maintained. There is multilevel degenerative disc disease There is disc space narrowing in the lower cervical spine.  There is no spondylolisthesis.  XR done 3/2024 Degenerative and postoperative changes  Mid cervical ACDF is evident. .    EMG

## 2025-03-06 ENCOUNTER — HOSPITAL ENCOUNTER (OUTPATIENT)
Dept: PAIN MANAGEMENT | Age: 85
Discharge: HOME | End: 2025-03-06
Payer: MEDICARE

## 2025-03-06 VITALS — BODY MASS INDEX: 31.91 KG/M2 | HEIGHT: 58 IN | WEIGHT: 152 LBS

## 2025-03-06 DIAGNOSIS — M96.1 FAILED BACK SYNDROME OF LUMBAR SPINE: ICD-10-CM

## 2025-03-06 DIAGNOSIS — Z79.891 CHRONIC USE OF OPIATE DRUG FOR THERAPEUTIC PURPOSE: Primary | Chronic | ICD-10-CM

## 2025-03-06 DIAGNOSIS — M54.16 LUMBAR RADICULOPATHY: ICD-10-CM

## 2025-03-06 DIAGNOSIS — M48.02 SPINAL STENOSIS IN CERVICAL REGION: ICD-10-CM

## 2025-03-06 DIAGNOSIS — M17.0 PRIMARY OSTEOARTHRITIS OF BOTH KNEES: ICD-10-CM

## 2025-03-06 DIAGNOSIS — M16.11 PRIMARY OSTEOARTHRITIS OF RIGHT HIP: ICD-10-CM

## 2025-03-06 DIAGNOSIS — M96.1 FAILED BACK SYNDROME: ICD-10-CM

## 2025-03-06 PROCEDURE — 99213 OFFICE O/P EST LOW 20 MIN: CPT | Performed by: ANESTHESIOLOGY

## 2025-03-06 PROCEDURE — 99213 OFFICE O/P EST LOW 20 MIN: CPT

## 2025-03-06 RX ORDER — PREGABALIN 150 MG/1
150 CAPSULE ORAL 2 TIMES DAILY
Qty: 60 CAPSULE | Refills: 1 | Status: SHIPPED | OUTPATIENT
Start: 2025-03-06 | End: 2025-03-06

## 2025-03-06 RX ORDER — OXYCODONE AND ACETAMINOPHEN 7.5; 325 MG/1; MG/1
1 TABLET ORAL EVERY 6 HOURS PRN
Qty: 120 TABLET | Refills: 0 | Status: SHIPPED | OUTPATIENT
Start: 2025-03-11 | End: 2025-03-06

## 2025-03-06 RX ORDER — PREGABALIN 150 MG/1
150 CAPSULE ORAL 2 TIMES DAILY
Qty: 60 CAPSULE | Refills: 1 | Status: SHIPPED | OUTPATIENT
Start: 2025-03-06 | End: 2025-05-05

## 2025-03-06 RX ORDER — OXYCODONE AND ACETAMINOPHEN 7.5; 325 MG/1; MG/1
1 TABLET ORAL EVERY 6 HOURS PRN
Qty: 120 TABLET | Refills: 0 | Status: SHIPPED | OUTPATIENT
Start: 2025-03-11 | End: 2025-04-10

## 2025-03-06 ASSESSMENT — ENCOUNTER SYMPTOMS
RESPIRATORY NEGATIVE: 1
EYES NEGATIVE: 1
BACK PAIN: 1

## 2025-03-06 NOTE — PROGRESS NOTES
doses taken as pain becomes manageable    DISCONTD: pregabalin (LYRICA) 150 MG capsule     Sig: Take 1 capsule by mouth 2 times daily for 60 days. Max Daily Amount: 300 mg     Dispense:  60 capsule     Refill:  1    pregabalin (LYRICA) 150 MG capsule     Sig: Take 1 capsule by mouth 2 times daily for 60 days. Max Daily Amount: 300 mg     Dispense:  60 capsule     Refill:  1    oxyCODONE-acetaminophen (PERCOCET) 7.5-325 MG per tablet     Sig: Take 1 tablet by mouth every 6 hours as needed for Pain for up to 30 days. Max Daily Amount: 4 tablets     Dispense:  120 tablet     Refill:  0     Reduce doses taken as pain becomes manageable      Controlled Substance Monitoring:    Acute and Chronic Pain Monitoring:   RX Monitoring Periodic Controlled Substance Monitoring Chronic Pain > 50 MEDD   3/6/2025   4:18 PM Possible medication side effects, risk of tolerance/dependence & alternative treatments discussed.;No signs of potential drug abuse or diversion identified.;Obtaining appropriate analgesic effect of treatment. Obtained or confirmed \"Consent for Opioid Use\" on file.               Electronically signed by Dimitrios Dawkins MD on 3/6/2025 at 4:55 PM

## 2025-04-09 ENCOUNTER — HOSPITAL ENCOUNTER (OUTPATIENT)
Dept: PAIN MANAGEMENT | Age: 85
Discharge: HOME | End: 2025-04-09
Payer: MEDICARE

## 2025-04-09 VITALS — BODY MASS INDEX: 31.91 KG/M2 | HEIGHT: 58 IN | WEIGHT: 152 LBS

## 2025-04-09 DIAGNOSIS — M54.16 LUMBAR RADICULOPATHY: ICD-10-CM

## 2025-04-09 DIAGNOSIS — Z79.891 CHRONIC USE OF OPIATE DRUG FOR THERAPEUTIC PURPOSE: Chronic | ICD-10-CM

## 2025-04-09 DIAGNOSIS — M96.1 FAILED BACK SYNDROME OF LUMBAR SPINE: ICD-10-CM

## 2025-04-09 DIAGNOSIS — M53.3 CHRONIC SI JOINT PAIN: Chronic | ICD-10-CM

## 2025-04-09 DIAGNOSIS — M16.11 PRIMARY OSTEOARTHRITIS OF RIGHT HIP: ICD-10-CM

## 2025-04-09 DIAGNOSIS — M47.816 LUMBAR SPONDYLOSIS: ICD-10-CM

## 2025-04-09 DIAGNOSIS — M48.02 SPINAL STENOSIS IN CERVICAL REGION: ICD-10-CM

## 2025-04-09 DIAGNOSIS — G89.29 CHRONIC SI JOINT PAIN: Chronic | ICD-10-CM

## 2025-04-09 DIAGNOSIS — M25.551 CHRONIC RIGHT HIP PAIN: ICD-10-CM

## 2025-04-09 DIAGNOSIS — M50.30 DDD (DEGENERATIVE DISC DISEASE), CERVICAL: ICD-10-CM

## 2025-04-09 DIAGNOSIS — R26.89 IMBALANCE: Primary | Chronic | ICD-10-CM

## 2025-04-09 DIAGNOSIS — M19.021 PRIMARY OSTEOARTHRITIS OF RIGHT ELBOW: ICD-10-CM

## 2025-04-09 DIAGNOSIS — G89.29 CHRONIC RIGHT HIP PAIN: ICD-10-CM

## 2025-04-09 PROCEDURE — 99213 OFFICE O/P EST LOW 20 MIN: CPT | Performed by: NURSE PRACTITIONER

## 2025-04-09 PROCEDURE — 99213 OFFICE O/P EST LOW 20 MIN: CPT

## 2025-04-09 RX ORDER — OXYCODONE AND ACETAMINOPHEN 7.5; 325 MG/1; MG/1
1 TABLET ORAL EVERY 6 HOURS PRN
Qty: 120 TABLET | Refills: 0 | Status: SHIPPED | OUTPATIENT
Start: 2025-04-12 | End: 2025-05-12

## 2025-04-09 ASSESSMENT — PAIN SCALES - GENERAL: PAINLEVEL_OUTOF10: 6

## 2025-04-09 ASSESSMENT — ENCOUNTER SYMPTOMS
BOWEL INCONTINENCE: 0
SHORTNESS OF BREATH: 0
COUGH: 0
CONSTIPATION: 0
BACK PAIN: 1

## 2025-04-09 NOTE — PROGRESS NOTES
Chief Complaint   Patient presents with    Back Pain     Med refill         PMH     Chronic pain involving multiple body sites. History of previous cervical thoracic and lumbar spine extensive fusion surgeries  She states she has generalized all over the body pain related to arthritis   On chronic opioid therapy in this clinic for several years  We have gradually weaned her opioid with slow titration  She is now prescribed Percocet 7.5mg 4 times a day  Back  She has tried and failed SCS implanted in 2012 at [facility] but it was removed as it was not functioning well.  Lumbar MRI 2/2023 Posterior hardware fixation from L2-S1 with a prominent postoperative seroma in the adjacent posterior paraspinal soft tissues.  Multilevel degenerative change with canal stenosis at L1-2.  Moderate left and severe right foraminal narrowing at L1-2.   Thoracic MRI Multilevel degenerative disc disease with associated facet and ligamentous hypertrophy throughout the thoracic spine resulting in canal stenosis and foraminal narrowing bilaterally as described above.   EMG 4/2024 chronic radiculopathy L2-3 to S1   7/11/23  s/p laminectomy and partial facetectomy T10, T11, T12, L1, as well as lysis of adhesions L1-L2.    Pt had bilat RFA of  the primary dorsal ramus of L5 and lateral branches of S1 and S2 nerves done 1/24/24 with limited relief   Neck  History of chronic neck pain with radiation down right arm  Cervical spine MRI with foraminal stenosis left-sided at C6-C7 level  Had recent neurosurgical evaluation with no plan for surgery  History of previous cervical surgery  Cervical MRI 5/2024  anterior fixation from C3-C5 without complication. The vertebral body heights are maintained. There is multilevel degenerative disc disease There is disc space narrowing in the lower cervical spine.  There is no spondylolisthesis.  XR done 3/2024 Degenerative and postoperative changes  Mid cervical ACDF is evident. .    EMG

## 2025-04-24 ENCOUNTER — OFFICE VISIT (OUTPATIENT)
Age: 85
End: 2025-04-24
Payer: MEDICARE

## 2025-04-24 VITALS
WEIGHT: 153 LBS | BODY MASS INDEX: 32.12 KG/M2 | DIASTOLIC BLOOD PRESSURE: 72 MMHG | HEIGHT: 58 IN | SYSTOLIC BLOOD PRESSURE: 162 MMHG | HEART RATE: 59 BPM | TEMPERATURE: 98.2 F | OXYGEN SATURATION: 98 %

## 2025-04-24 DIAGNOSIS — R10.9 LEFT SIDED ABDOMINAL PAIN: Primary | ICD-10-CM

## 2025-04-24 DIAGNOSIS — R07.9 LEFT-SIDED CHEST PAIN: ICD-10-CM

## 2025-04-24 DIAGNOSIS — Z91.81 HISTORY OF FALL: ICD-10-CM

## 2025-04-24 DIAGNOSIS — Z87.19 HISTORY OF PANCREATITIS: ICD-10-CM

## 2025-04-24 PROCEDURE — G8427 DOCREV CUR MEDS BY ELIG CLIN: HCPCS | Performed by: NURSE PRACTITIONER

## 2025-04-24 PROCEDURE — 99214 OFFICE O/P EST MOD 30 MIN: CPT | Performed by: NURSE PRACTITIONER

## 2025-04-24 PROCEDURE — G8417 CALC BMI ABV UP PARAM F/U: HCPCS | Performed by: NURSE PRACTITIONER

## 2025-04-24 PROCEDURE — G8399 PT W/DXA RESULTS DOCUMENT: HCPCS | Performed by: NURSE PRACTITIONER

## 2025-04-24 PROCEDURE — 1123F ACP DISCUSS/DSCN MKR DOCD: CPT | Performed by: NURSE PRACTITIONER

## 2025-04-24 PROCEDURE — 1036F TOBACCO NON-USER: CPT | Performed by: NURSE PRACTITIONER

## 2025-04-24 PROCEDURE — 1090F PRES/ABSN URINE INCON ASSESS: CPT | Performed by: NURSE PRACTITIONER

## 2025-04-24 RX ORDER — LIDOCAINE 50 MG/G
1 PATCH TOPICAL DAILY
Qty: 30 PATCH | Refills: 0 | Status: SHIPPED | OUTPATIENT
Start: 2025-04-24

## 2025-04-24 ASSESSMENT — ENCOUNTER SYMPTOMS
RHINORRHEA: 0
COUGH: 0
SHORTNESS OF BREATH: 0
SORE THROAT: 0

## 2025-04-24 NOTE — PATIENT INSTRUCTIONS
Complete X-ray, lab work- no appointment needed.  Check ultrasound.  Please call [phone] to schedule your testing.   Try Lidoderm patches, abdominal binder, heating pad.

## 2025-04-24 NOTE — PROGRESS NOTES
stimulator    SPINE SURGERY  91-4-15    renoval of spinal cord stimulator leads and battery    PASQUALE AND BSO (CERVIX REMOVED)      TUBAL LIGATION      UVULOPALATOPHARYGOPLASTY  2006       ALLERGIES     Allergies   Allergen Reactions    Azithromycin Shortness Of Breath     Tightness in chest    Wound Dressing Adhesive      OK to use paper tape per Patient- 2/19/20 patient states that she has been using all types of tape with no reaction    Other Reaction(s): Unknown    Adhesive Tape Itching     OK to use paper tape per Patient- 2/19/20 patient states that she has been using all types of tape with no reaction       FAMILY HISTORY   family history includes Alzheimer's Disease in her father; Breast Cancer (age of onset: 40) in her maternal cousin; Breast Cancer (age of onset: 60) in her maternal aunt; Diabetes in her paternal grandmother; Heart Failure in her mother; Lung Cancer in her brother and mother; Other in her daughter and father; Rheum Arthritis in her daughter.    SOCIAL HISTORY    reports that she has never smoked. She has never used smokeless tobacco. She reports that she does not currently use alcohol. She reports that she does not use drugs.    MEDICATIONS     Current Outpatient Medications:     lidocaine (LIDODERM) 5 %, Place 1 patch onto the skin daily 12 hours on, 12 hours off left abdomen., Disp: 30 patch, Rfl: 0    oxyCODONE-acetaminophen (PERCOCET) 7.5-325 MG per tablet, Take 1 tablet by mouth every 6 hours as needed for Pain for up to 30 days. Max Daily Amount: 4 tablets, Disp: 120 tablet, Rfl: 0    pregabalin (LYRICA) 150 MG capsule, Take 1 capsule by mouth 2 times daily for 60 days. Max Daily Amount: 300 mg, Disp: 60 capsule, Rfl: 1    Budeson-Glycopyrrol-Formoterol (BREZTRI AEROSPHERE) 160-9-4.8 MCG/ACT AERO, Inhale 1 puff into the lungs daily, Disp: , Rfl:     Tens Unit MISC, by Does not apply route, Disp: 1 each, Rfl: 0    aspirin 81 MG EC tablet, Take 1 tablet by mouth at bedtime, Disp: 30

## 2025-05-07 ENCOUNTER — HOSPITAL ENCOUNTER (OUTPATIENT)
Dept: ULTRASOUND IMAGING | Age: 85
Discharge: HOME | End: 2025-05-09
Payer: MEDICARE

## 2025-05-07 ENCOUNTER — HOSPITAL ENCOUNTER (OUTPATIENT)
Dept: PAIN MANAGEMENT | Age: 85
Discharge: HOME | End: 2025-05-07
Payer: MEDICARE

## 2025-05-07 ENCOUNTER — TELEPHONE (OUTPATIENT)
Dept: PAIN MANAGEMENT | Age: 85
End: 2025-05-07

## 2025-05-07 ENCOUNTER — HOSPITAL ENCOUNTER (OUTPATIENT)
Dept: GENERAL RADIOLOGY | Age: 85
Discharge: HOME | End: 2025-05-09
Payer: MEDICARE

## 2025-05-07 ENCOUNTER — HOSPITAL ENCOUNTER (OUTPATIENT)
Age: 85
End: 2025-05-07
Payer: MEDICARE

## 2025-05-07 ENCOUNTER — HOSPITAL ENCOUNTER (OUTPATIENT)
Age: 85
Discharge: HOME | End: 2025-05-07
Payer: MEDICARE

## 2025-05-07 VITALS — BODY MASS INDEX: 32.12 KG/M2 | HEIGHT: 58 IN | WEIGHT: 153 LBS

## 2025-05-07 DIAGNOSIS — M17.0 PRIMARY OSTEOARTHRITIS OF BOTH KNEES: ICD-10-CM

## 2025-05-07 DIAGNOSIS — R10.9 LEFT SIDED ABDOMINAL PAIN: ICD-10-CM

## 2025-05-07 DIAGNOSIS — M96.1 FAILED BACK SYNDROME OF LUMBAR SPINE: ICD-10-CM

## 2025-05-07 DIAGNOSIS — R26.89 IMBALANCE: Primary | Chronic | ICD-10-CM

## 2025-05-07 DIAGNOSIS — R07.9 LEFT-SIDED CHEST PAIN: ICD-10-CM

## 2025-05-07 DIAGNOSIS — M54.12 CERVICAL RADICULAR PAIN: ICD-10-CM

## 2025-05-07 DIAGNOSIS — M53.3 CHRONIC SI JOINT PAIN: Chronic | ICD-10-CM

## 2025-05-07 DIAGNOSIS — M48.02 SPINAL STENOSIS IN CERVICAL REGION: ICD-10-CM

## 2025-05-07 DIAGNOSIS — Z79.891 CHRONIC USE OF OPIATE DRUG FOR THERAPEUTIC PURPOSE: Chronic | ICD-10-CM

## 2025-05-07 DIAGNOSIS — Z91.81 HISTORY OF FALL: ICD-10-CM

## 2025-05-07 DIAGNOSIS — G89.29 CHRONIC RIGHT HIP PAIN: ICD-10-CM

## 2025-05-07 DIAGNOSIS — M48.062 SPINAL STENOSIS OF LUMBAR REGION WITH NEUROGENIC CLAUDICATION: Chronic | ICD-10-CM

## 2025-05-07 DIAGNOSIS — Z87.19 HISTORY OF PANCREATITIS: ICD-10-CM

## 2025-05-07 DIAGNOSIS — M47.816 LUMBAR SPONDYLOSIS: ICD-10-CM

## 2025-05-07 DIAGNOSIS — M54.16 LUMBAR RADICULOPATHY: ICD-10-CM

## 2025-05-07 DIAGNOSIS — G89.29 CHRONIC SI JOINT PAIN: Chronic | ICD-10-CM

## 2025-05-07 DIAGNOSIS — M25.551 CHRONIC RIGHT HIP PAIN: ICD-10-CM

## 2025-05-07 LAB
ALBUMIN SERPL-MCNC: 4.2 G/DL (ref 3.5–5.2)
ALP SERPL-CCNC: 67 U/L (ref 35–104)
ALT SERPL-CCNC: 21 U/L (ref 10–35)
AMYLASE SERPL-CCNC: 44 U/L (ref 28–100)
ANION GAP SERPL CALCULATED.3IONS-SCNC: 10 MMOL/L (ref 9–16)
AST SERPL-CCNC: 24 U/L (ref 10–35)
BASOPHILS # BLD: 0 K/UL (ref 0–0.2)
BASOPHILS NFR BLD: 1 % (ref 0–2)
BILIRUB DIRECT SERPL-MCNC: 0.2 MG/DL (ref 0–0.3)
BILIRUB INDIRECT SERPL-MCNC: 0.2 MG/DL (ref 0–1)
BILIRUB SERPL-MCNC: 0.4 MG/DL (ref 0–1.2)
BUN SERPL-MCNC: 22 MG/DL (ref 8–23)
CALCIUM SERPL-MCNC: 9.2 MG/DL (ref 8.6–10.4)
CHLORIDE SERPL-SCNC: 104 MMOL/L (ref 98–107)
CO2 SERPL-SCNC: 28 MMOL/L (ref 20–31)
CREAT SERPL-MCNC: 0.8 MG/DL (ref 0.7–1.2)
EOSINOPHIL # BLD: 0.1 K/UL (ref 0–0.4)
EOSINOPHILS RELATIVE PERCENT: 2 % (ref 0–4)
ERYTHROCYTE [DISTWIDTH] IN BLOOD BY AUTOMATED COUNT: 15.6 % (ref 11.5–14.9)
GFR, ESTIMATED: 74 ML/MIN/1.73M2
GLUCOSE SERPL-MCNC: 103 MG/DL (ref 74–99)
HCT VFR BLD AUTO: 31.8 % (ref 36–46)
HGB BLD-MCNC: 10.4 G/DL (ref 12–16)
LIPASE SERPL-CCNC: 28 U/L (ref 13–60)
LYMPHOCYTES NFR BLD: 1.7 K/UL (ref 1–4.8)
LYMPHOCYTES RELATIVE PERCENT: 29 % (ref 24–44)
MCH RBC QN AUTO: 26 PG (ref 26–34)
MCHC RBC AUTO-ENTMCNC: 32.7 G/DL (ref 31–37)
MCV RBC AUTO: 79.3 FL (ref 80–100)
MONOCYTES NFR BLD: 0.5 K/UL (ref 0.1–1.3)
MONOCYTES NFR BLD: 8 % (ref 1–7)
NEUTROPHILS NFR BLD: 60 % (ref 36–66)
NEUTS SEG NFR BLD: 3.6 K/UL (ref 1.3–9.1)
PLATELET # BLD AUTO: 156 K/UL (ref 150–450)
PMV BLD AUTO: 9 FL (ref 6–12)
POTASSIUM SERPL-SCNC: 4.3 MMOL/L (ref 3.7–5.3)
PROT SERPL-MCNC: 6.6 G/DL (ref 6.6–8.7)
RBC # BLD AUTO: 4.01 M/UL (ref 4–5.2)
SODIUM SERPL-SCNC: 142 MMOL/L (ref 136–145)
WBC OTHER # BLD: 5.9 K/UL (ref 3.5–11)

## 2025-05-07 PROCEDURE — G0480 DRUG TEST DEF 1-7 CLASSES: HCPCS

## 2025-05-07 PROCEDURE — 80076 HEPATIC FUNCTION PANEL: CPT

## 2025-05-07 PROCEDURE — 80048 BASIC METABOLIC PNL TOTAL CA: CPT

## 2025-05-07 PROCEDURE — 71101 X-RAY EXAM UNILAT RIBS/CHEST: CPT

## 2025-05-07 PROCEDURE — 99214 OFFICE O/P EST MOD 30 MIN: CPT | Performed by: NURSE PRACTITIONER

## 2025-05-07 PROCEDURE — 85025 COMPLETE CBC W/AUTO DIFF WBC: CPT

## 2025-05-07 PROCEDURE — 83690 ASSAY OF LIPASE: CPT

## 2025-05-07 PROCEDURE — 76705 ECHO EXAM OF ABDOMEN: CPT

## 2025-05-07 PROCEDURE — 36415 COLL VENOUS BLD VENIPUNCTURE: CPT

## 2025-05-07 PROCEDURE — 80307 DRUG TEST PRSMV CHEM ANLYZR: CPT

## 2025-05-07 PROCEDURE — 82150 ASSAY OF AMYLASE: CPT

## 2025-05-07 PROCEDURE — 99213 OFFICE O/P EST LOW 20 MIN: CPT

## 2025-05-07 RX ORDER — PREGABALIN 150 MG/1
150 CAPSULE ORAL 2 TIMES DAILY
Qty: 60 CAPSULE | Refills: 2 | Status: SHIPPED | OUTPATIENT
Start: 2025-05-07 | End: 2025-08-05

## 2025-05-07 RX ORDER — OXYCODONE AND ACETAMINOPHEN 7.5; 325 MG/1; MG/1
1 TABLET ORAL EVERY 6 HOURS PRN
Qty: 120 TABLET | Refills: 0 | Status: SHIPPED | OUTPATIENT
Start: 2025-05-12 | End: 2025-06-11

## 2025-05-07 ASSESSMENT — ENCOUNTER SYMPTOMS
COUGH: 0
SHORTNESS OF BREATH: 0
BOWEL INCONTINENCE: 0
CONSTIPATION: 0
BACK PAIN: 1

## 2025-05-07 ASSESSMENT — PAIN SCALES - GENERAL: PAINLEVEL_OUTOF10: 6

## 2025-05-07 NOTE — PROGRESS NOTES
Chief Complaint   Patient presents with    Back Pain    Medication Refill     Percocet  5/12/25  Lyrica         [facility]     Chronic pain involving multiple body sites. History of previous cervical thoracic and lumbar spine extensive fusion surgeries  She states she has generalized all over the body pain related to arthritis   On chronic opioid therapy in this clinic for several years  We have gradually weaned her opioid with slow titration  She is now prescribed Percocet 7.5mg 4 times a day  Back  She has tried and failed SCS implanted in 2012 at [facility] but it was removed as it was not functioning well.  Lumbar MRI 2/2023 Posterior hardware fixation from L2-S1 with a prominent postoperative seroma in the adjacent posterior paraspinal soft tissues.  Multilevel degenerative change with canal stenosis at L1-2.  Moderate left and severe right foraminal narrowing at L1-2.   Thoracic MRI Multilevel degenerative disc disease with associated facet and ligamentous hypertrophy throughout the thoracic spine resulting in canal stenosis and foraminal narrowing bilaterally as described above.   EMG 4/2024 chronic radiculopathy L2-3 to S1   7/11/23  s/p laminectomy and partial facetectomy T10, T11, T12, L1, as well as lysis of adhesions L1-L2.    Pt had bilat RFA of  the primary dorsal ramus of L5 and lateral branches of S1 and S2 nerves done 1/24/24 with limited relief   Neck  History of chronic neck pain with radiation down right arm  Cervical spine MRI with foraminal stenosis left-sided at C6-C7 level  Had recent neurosurgical evaluation with no plan for surgery  History of previous cervical surgery  Cervical MRI 5/2024  anterior fixation from C3-C5 without complication. The vertebral body heights are maintained. There is multilevel degenerative disc disease There is disc space narrowing in the lower cervical spine.  There is no spondylolisthesis.  XR done 3/2024 Degenerative and postoperative changes  Mid

## 2025-05-08 ENCOUNTER — RESULTS FOLLOW-UP (OUTPATIENT)
Dept: SURGERY | Age: 85
End: 2025-05-08

## 2025-05-12 LAB
6-ACETYLMORPHINE, UR: NOT DETECTED
7-AMINOCLONAZEPAM, URINE: NOT DETECTED
ALPHA-OH-ALPRAZ, URINE: NOT DETECTED
ALPHA-OH-MIDAZOLAM, URINE: NOT DETECTED
ALPRAZOLAM, URINE: NOT DETECTED
AMPHETAMINE, URINE: NOT DETECTED
BARBITURATES, URINE: NEGATIVE
BENZOYLECGONINE, UR: NEGATIVE
BUPRENORPHINE URINE: NOT DETECTED
CARISOPRODOL, UR: NEGATIVE
CLONAZEPAM, URINE: NOT DETECTED
CODEINE, URINE: NOT DETECTED
CREAT UR-MCNC: 90.4 MG/DL (ref 20–400)
DIAZEPAM, URINE: NOT DETECTED
DRUGS EXPECTED, UR: NORMAL
EER HI RES INTERP UR: NORMAL
ETHYL GLUCURONIDE UR: NEGATIVE
FENTANYL URINE: NOT DETECTED
GABAPENTIN: NOT DETECTED
HYDROCODONE, URINE: NOT DETECTED
HYDROMORPHONE, URINE: NOT DETECTED
LORAZEPAM, URINE: NOT DETECTED
MARIJUANA METAB, UR: NEGATIVE
MDA, URINE: NOT DETECTED
MDEA, EVE, UR: NOT DETECTED
MDMA, URINE: NOT DETECTED
MEPERIDINE METAB, UR: NOT DETECTED
METHADONE, URINE: NEGATIVE
METHAMPHETAMINE, URINE: NOT DETECTED
METHYLPHENIDATE: NOT DETECTED
MIDAZOLAM, URINE: NOT DETECTED
MORPHINE, OPI1M: NOT DETECTED
NALOXONE URINE: NOT DETECTED
NORBUPRENORPHINE, URINE: NOT DETECTED
NORDIAZEPAM, URINE: NOT DETECTED
NORFENTANYL, URINE: NOT DETECTED
NORHYDROCODONE, URINE: NOT DETECTED
NOROXYCODONE, URINE: PRESENT
NOROXYMORPHONE, URINE: PRESENT
OXAZEPAM, URINE: NOT DETECTED
OXYCODONE URINE: PRESENT
OXYMORPHONE, URINE: PRESENT
PAIN MANAGEMENT DRUG PANEL INTERP, URINE: NORMAL
PAIN MGT DRUG PANEL, HI RES, UR: NORMAL
PCP,URINE: NEGATIVE
PHENTERMINE, UR: NOT DETECTED
PREGABALIN: PRESENT
TAPENTADOL, URINE: NOT DETECTED
TAPENTADOL-O-SULFATE, URINE: NOT DETECTED
TEMAZEPAM, URINE: NOT DETECTED
TRAMADOL, URINE: NEGATIVE
ZOLPIDEM METABOLITE (ZCA), URINE: NOT DETECTED
ZOLPIDEM, URINE: NOT DETECTED

## 2025-05-30 ENCOUNTER — OFFICE VISIT (OUTPATIENT)
Dept: ORTHOPEDIC SURGERY | Age: 85
End: 2025-05-30

## 2025-05-30 ENCOUNTER — HOSPITAL ENCOUNTER (EMERGENCY)
Age: 85
Discharge: HOME | End: 2025-05-30
Attending: STUDENT IN AN ORGANIZED HEALTH CARE EDUCATION/TRAINING PROGRAM
Payer: MEDICARE

## 2025-05-30 VITALS
SYSTOLIC BLOOD PRESSURE: 179 MMHG | DIASTOLIC BLOOD PRESSURE: 63 MMHG | WEIGHT: 153 LBS | HEIGHT: 63 IN | HEART RATE: 74 BPM | BODY MASS INDEX: 27.11 KG/M2 | RESPIRATION RATE: 18 BRPM | TEMPERATURE: 98 F | OXYGEN SATURATION: 96 %

## 2025-05-30 VITALS — RESPIRATION RATE: 14 BRPM | BODY MASS INDEX: 32.12 KG/M2 | WEIGHT: 153 LBS | HEIGHT: 58 IN

## 2025-05-30 DIAGNOSIS — R73.9 HYPERGLYCEMIA: Primary | ICD-10-CM

## 2025-05-30 DIAGNOSIS — M19.021 OSTEOARTHRITIS OF RIGHT ELBOW, UNSPECIFIED OSTEOARTHRITIS TYPE: Primary | ICD-10-CM

## 2025-05-30 LAB
CHP ED QC CHECK: NORMAL
GLUCOSE BLD-MCNC: 204 MG/DL
GLUCOSE BLD-MCNC: 204 MG/DL (ref 65–105)
GLUCOSE BLD-MCNC: 221 MG/DL (ref 65–105)

## 2025-05-30 PROCEDURE — 82947 ASSAY GLUCOSE BLOOD QUANT: CPT

## 2025-05-30 PROCEDURE — 99282 EMERGENCY DEPT VISIT SF MDM: CPT

## 2025-05-30 RX ORDER — TRIAMCINOLONE ACETONIDE 40 MG/ML
40 INJECTION, SUSPENSION INTRA-ARTICULAR; INTRAMUSCULAR ONCE
Status: COMPLETED | OUTPATIENT
Start: 2025-05-30 | End: 2025-05-30

## 2025-05-30 RX ORDER — LIDOCAINE HYDROCHLORIDE 10 MG/ML
3 INJECTION, SOLUTION INFILTRATION; PERINEURAL ONCE
Status: COMPLETED | OUTPATIENT
Start: 2025-05-30 | End: 2025-05-30

## 2025-05-30 RX ADMIN — LIDOCAINE HYDROCHLORIDE 3 ML: 10 INJECTION, SOLUTION INFILTRATION; PERINEURAL at 11:35

## 2025-05-30 RX ADMIN — TRIAMCINOLONE ACETONIDE 40 MG: 40 INJECTION, SUSPENSION INTRA-ARTICULAR; INTRAMUSCULAR at 11:44

## 2025-05-30 ASSESSMENT — LIFESTYLE VARIABLES
HOW OFTEN DO YOU HAVE A DRINK CONTAINING ALCOHOL: NEVER
HOW MANY STANDARD DRINKS CONTAINING ALCOHOL DO YOU HAVE ON A TYPICAL DAY: PATIENT DOES NOT DRINK

## 2025-05-30 ASSESSMENT — PAIN - FUNCTIONAL ASSESSMENT: PAIN_FUNCTIONAL_ASSESSMENT: NONE - DENIES PAIN

## 2025-05-30 NOTE — PROGRESS NOTES
HPI: Ms. Welch is an 84-year-old lady here today for reevaluation of her right elbow.  I have seen her in the past for this issue and she was diagnosed with advanced elbow joint osteoarthritis.  When she was last seen for this issue on 10/9/2023 she received a cortisone injection which worked well for her.  At this time she is having recurrent pain that occurs with and without activity but is certainly worse with movement and use.  She does feel stiff as well.     On exam today she lacks approximately 40 degrees of elbow extension and flexes to 120 degrees.  Elbow is stable to varus valgus stress applied across the joint.  Tender to palpation along the medial aspect of the elbow.  2+ radial pulse with brisk cap refill in the fingers and sensation is grossly intact light touch in all dermatomes.    Imaging studies:  3 x-ray views of the right elbow completed on 5/30/2025 were reviewed independently demonstrating advanced osteoarthritic changes involving the radiocapitellar and ulnohumeral articular surfaces as well as posteriorly in the olecranon fossa.  No obvious fracture, dislocation or subluxation.    Impression and plan: Ms. Welch is an 84-year-old lady with advanced right elbow osteoarthritis.  I did have a discussion with the patient and her daughter educating them once again about the condition of her elbow and we discussed treatment options available to her including continued conservative management as well as surgical intervention.  Following discussions today she would like a repeat cortisone injection which was administered as outlined below.  I will see her back in my clinic as needed but she was certainly encouraged to return or call at anytime with persistent or worsening symptoms or with any questions or concerns.    Procedure: right elbow joint injection  Following an appropriate discussion with the patient regarding the risks and benefits of the procedure she consented to proceed. her right

## 2025-05-31 NOTE — ED PROVIDER NOTES
[facility] EMERGENCY DEPARTMENT  Emergency Department  Faculty Attestation       I performed a history and physical examination of the patient and discussed management with the resident. I reviewed the resident’s note and agree with the documented findings including all diagnostic interpretations and plan of care. Any areas of disagreement are noted on the chart. I was personally present for the key portions of any procedures. I have documented in the chart those procedures where I was not present during the key portions. I have reviewed the emergency nurses triage note. I agree with the chief complaint, past medical history, past surgical history, allergies, medications, social and family history as documented unless otherwise noted below. Documentation of the HPI, Physical Exam and Medical Decision Making performed by [email or web address] is based on my personal performance of the HPI, PE and MDM. For Physician Assistant/ Nurse Practitioner cases/documentation I have personally evaluated this patient and have completed at least one if not all key elements of the E/M (history, physical exam, and MDM). Additional findings are as noted.    Pertinent Comments     Primary Care Physician: Shiva Wheatley, DO    History: This is a 84 y.o. female who presents to the Emergency Department with complaint of    Chief Complaint   Patient presents with    Hyperglycemia     Pt received injections today for their arthritis and now having high blood sugar. Pt states they were advised by their primary to be seen in ER         Physical:    ED Triage Vitals [05/30/25 2205]   BP Systolic BP Percentile Diastolic BP Percentile Temp Temp Source Pulse Respirations SpO2   (!) 179/63 -- -- 98 °F (36.7 °C) Oral 74 18 96 %      Height Weight - Scale         1.6 m (5' 3\") 69.4 kg (153 lb)              RADIOLOGY:  XR ELBOW RIGHT (MIN 3 VIEWS)  Result Date: 5/30/2025  3 x-ray views of the right elbow completed on 5/30/2025 were reviewed

## 2025-05-31 NOTE — DISCHARGE INSTRUCTIONS
Please continue to take your blood sugars least twice a day  If you find that they are elevated, please consume some fluids, and recheck it again in an hour or 2.  If it is persistently elevated, please return to the emergency department

## 2025-05-31 NOTE — ED PROVIDER NOTES
[facility] EMERGENCY DEPARTMENT  Emergency Department Encounter  Emergency Medicine Resident     Pt Name:Amy Welch  MRN: [id number]  Birthdate 1940  Date of evaluation: 5/31/25  PCP:  Freddie Quinones MD  Note Started: 1:20 AM EDT      CHIEF COMPLAINT       Chief Complaint   Patient presents with    Hyperglycemia     Pt received injections today for their arthritis and now having high blood sugar. Pt states they were advised by their primary to be seen in ER       HISTORY OF PRESENT ILLNESS  (Location/Symptom, Timing/Onset, Context/Setting, Quality, Duration, Modifying Factors, Severity.)      Amy Welch is a 84 y.o. female who presents with concern for elevated blood glucose levels.  Patient got an injection in her right elbow today by orthopedic doctor and was told to monitor her glucose levels.  History of diabetes on only oral medication.  States she checked it this afternoon and it was over 400 and she got concerned.  Patient is otherwise asymptomatic.  No dry mouth, increased thirst or increased urination.  Denies any fevers, chest pain, shortness of breath, abdominal pain, nausea or vomiting.  No urinary symptoms.  Patient also had a fall that was mechanical approximately 3 weeks ago and now has some right lower back pain.  Has been using lidocaine patch for this which has significantly helped.  Denies hitting her head or loss of consciousness at the time.  No loss of bowel or bladder control.  No saddle anesthesia.  No numbness or tingling in her legs.  Patient uses a walker for ambulation.    PAST MEDICAL / SURGICAL / SOCIAL / FAMILY HISTORY      has a past medical history of Abdominal pain, epigastric, Achilles tendonitis, Acute pain of right knee, Acute pancreatitis, JEFF (acute kidney injury), Asthma, COPD (chronic obstructive pulmonary disease) ([facility]), Cough, Degenerative lumbar disc, DM (diabetes mellitus), type 2 (HCC), Failed back syndrome, lumbar, Fast heart beat,  membranes are moist.   Eyes:      Extraocular Movements: Extraocular movements intact.      Conjunctiva/sclera: Conjunctivae normal.      Pupils: Pupils are equal, round, and reactive to light.   Neck:      Comments: No tenderness in the cervical spine.  No step-offs or deformities  Cardiovascular:      Rate and Rhythm: Normal rate and regular rhythm.      Pulses: Normal pulses.   Pulmonary:      Effort: Pulmonary effort is normal. No respiratory distress.      Breath sounds: Normal breath sounds. No wheezing.   Abdominal:      General: There is no distension.      Palpations: Abdomen is soft.      Tenderness: There is no abdominal tenderness. There is no guarding or rebound.   Musculoskeletal:         General: No swelling or tenderness.      Cervical back: Neck supple.      Right lower leg: No edema.      Left lower leg: No edema.   Skin:     General: Skin is warm.      Capillary Refill: Capillary refill takes less than 2 seconds.   Neurological:      Mental Status: She is alert and oriented to person, place, and time.      Comments: No tenderness in the thoracic or lumbar spine.  No step-offs or deformities.  Mild tenderness in the right lower back.       DDX/DIAGNOSTIC RESULTS / EMERGENCY DEPARTMENT COURSE / MDM     Medical Decision Making  84-year-old female presenting with concerns for elevated blood glucose in the 400s after an injection in her right elbow by orthopedic doctor.  Patient is asymptomatic.  On physical exam vital signs are stable patient is afebrile.  Initial glucose was 221.  Physical exam is completely benign with mild lower back pain on palpation but no midline tenderness.  Given the patient's blood glucose is now in the 200s and she is a known diabetic on oral medication will plan for evaluation for the next half an hour.  Repeat point-of-care glucose.  If remains in the same general area or decreased slightly we will plan for discharge.  If elevated in the 300s or 400s will plan for fluids

## 2025-06-13 ENCOUNTER — HOSPITAL ENCOUNTER (OUTPATIENT)
Dept: PAIN MANAGEMENT | Age: 85
Discharge: HOME | End: 2025-06-13
Payer: MEDICARE

## 2025-06-13 VITALS — BODY MASS INDEX: 27.11 KG/M2 | WEIGHT: 153 LBS | HEIGHT: 63 IN

## 2025-06-13 DIAGNOSIS — M96.1 FAILED BACK SYNDROME OF LUMBAR SPINE: ICD-10-CM

## 2025-06-13 DIAGNOSIS — G89.29 CHRONIC RIGHT HIP PAIN: ICD-10-CM

## 2025-06-13 DIAGNOSIS — M54.12 CERVICAL RADICULAR PAIN: ICD-10-CM

## 2025-06-13 DIAGNOSIS — G89.29 CHRONIC SI JOINT PAIN: Chronic | ICD-10-CM

## 2025-06-13 DIAGNOSIS — M48.062 SPINAL STENOSIS OF LUMBAR REGION WITH NEUROGENIC CLAUDICATION: Chronic | ICD-10-CM

## 2025-06-13 DIAGNOSIS — M16.11 PRIMARY OSTEOARTHRITIS OF RIGHT HIP: ICD-10-CM

## 2025-06-13 DIAGNOSIS — M48.02 SPINAL STENOSIS IN CERVICAL REGION: ICD-10-CM

## 2025-06-13 DIAGNOSIS — Z79.891 CHRONIC USE OF OPIATE DRUG FOR THERAPEUTIC PURPOSE: Chronic | ICD-10-CM

## 2025-06-13 DIAGNOSIS — M25.551 CHRONIC RIGHT HIP PAIN: ICD-10-CM

## 2025-06-13 DIAGNOSIS — M48.04 DEGENERATIVE THORACIC SPINAL STENOSIS: ICD-10-CM

## 2025-06-13 DIAGNOSIS — M54.16 LUMBAR RADICULOPATHY: ICD-10-CM

## 2025-06-13 DIAGNOSIS — R26.89 IMBALANCE: Primary | Chronic | ICD-10-CM

## 2025-06-13 DIAGNOSIS — Z96.89 SPINAL CORD STIMULATOR STATUS: ICD-10-CM

## 2025-06-13 DIAGNOSIS — M53.3 CHRONIC SI JOINT PAIN: Chronic | ICD-10-CM

## 2025-06-13 PROCEDURE — 99213 OFFICE O/P EST LOW 20 MIN: CPT

## 2025-06-13 PROCEDURE — 99213 OFFICE O/P EST LOW 20 MIN: CPT | Performed by: NURSE PRACTITIONER

## 2025-06-13 RX ORDER — OXYCODONE AND ACETAMINOPHEN 7.5; 325 MG/1; MG/1
1 TABLET ORAL EVERY 6 HOURS PRN
Qty: 120 TABLET | Refills: 0 | Status: SHIPPED | OUTPATIENT
Start: 2025-06-13 | End: 2025-07-13

## 2025-06-13 ASSESSMENT — ENCOUNTER SYMPTOMS
COUGH: 0
CONSTIPATION: 0
SHORTNESS OF BREATH: 0
BACK PAIN: 1

## 2025-06-13 NOTE — PROGRESS NOTES
condition and opioid medication may have on the patient’s ability to safely operate a vehicle. Pt verbalized understanding.     Due to the high risk nature of this patient's pain medication close monitoring is required.   Continue current medication management, pt has been stable and compliant.  Script written for percocet  Offered physical therapy and she declines - states she has trouble getting transportation  Follow up appointment made for 4 weeks    I have reviewed the chief complaint and history of present illness (including ROS and PFSH) and vital documentation by my staff and I agree with their documentation and have added where applicable.

## 2025-06-21 ENCOUNTER — HOSPITAL ENCOUNTER (OUTPATIENT)
Age: 85
Discharge: HOME | End: 2025-06-21
Payer: MEDICARE

## 2025-06-21 LAB
CHOLEST SERPL-MCNC: 115 MG/DL (ref 0–199)
CHOLESTEROL/HDL RATIO: 2.1
HDLC SERPL-MCNC: 56 MG/DL
LDLC SERPL CALC-MCNC: 39 MG/DL (ref 0–100)
TRIGL SERPL-MCNC: 99 MG/DL (ref 0–149)

## 2025-06-21 PROCEDURE — 36415 COLL VENOUS BLD VENIPUNCTURE: CPT

## 2025-06-21 PROCEDURE — 80061 LIPID PANEL: CPT

## 2025-07-11 ENCOUNTER — HOSPITAL ENCOUNTER (OUTPATIENT)
Dept: PAIN MANAGEMENT | Age: 85
Discharge: HOME | End: 2025-07-11
Payer: MEDICARE

## 2025-07-11 VITALS — HEIGHT: 63 IN | BODY MASS INDEX: 27.11 KG/M2 | WEIGHT: 153 LBS

## 2025-07-11 DIAGNOSIS — M47.816 LUMBAR SPONDYLOSIS: ICD-10-CM

## 2025-07-11 DIAGNOSIS — M53.3 CHRONIC SI JOINT PAIN: Chronic | ICD-10-CM

## 2025-07-11 DIAGNOSIS — M54.12 CERVICAL RADICULAR PAIN: ICD-10-CM

## 2025-07-11 DIAGNOSIS — G89.29 CHRONIC SI JOINT PAIN: Chronic | ICD-10-CM

## 2025-07-11 DIAGNOSIS — M96.1 FAILED BACK SYNDROME OF LUMBAR SPINE: ICD-10-CM

## 2025-07-11 DIAGNOSIS — M25.551 CHRONIC RIGHT HIP PAIN: ICD-10-CM

## 2025-07-11 DIAGNOSIS — Z79.891 CHRONIC USE OF OPIATE DRUG FOR THERAPEUTIC PURPOSE: Chronic | ICD-10-CM

## 2025-07-11 DIAGNOSIS — R26.89 IMBALANCE: Primary | Chronic | ICD-10-CM

## 2025-07-11 DIAGNOSIS — G89.29 CHRONIC RIGHT HIP PAIN: ICD-10-CM

## 2025-07-11 DIAGNOSIS — M25.551 PAIN OF RIGHT HIP: ICD-10-CM

## 2025-07-11 DIAGNOSIS — M48.02 SPINAL STENOSIS IN CERVICAL REGION: ICD-10-CM

## 2025-07-11 DIAGNOSIS — M54.16 LUMBAR RADICULOPATHY: ICD-10-CM

## 2025-07-11 PROCEDURE — 99214 OFFICE O/P EST MOD 30 MIN: CPT | Performed by: NURSE PRACTITIONER

## 2025-07-11 PROCEDURE — 99213 OFFICE O/P EST LOW 20 MIN: CPT

## 2025-07-11 RX ORDER — OXYCODONE AND ACETAMINOPHEN 7.5; 325 MG/1; MG/1
1 TABLET ORAL EVERY 6 HOURS PRN
Qty: 120 TABLET | Refills: 0 | Status: SHIPPED | OUTPATIENT
Start: 2025-07-13 | End: 2025-08-12

## 2025-07-11 ASSESSMENT — ENCOUNTER SYMPTOMS
BACK PAIN: 1
SHORTNESS OF BREATH: 0
CONSTIPATION: 0
COUGH: 0

## 2025-07-11 NOTE — PROGRESS NOTES
Chief Complaint   Patient presents with    Back Pain     Med refill         PM     Chronic pain involving multiple body sites. History of previous cervical thoracic and lumbar spine extensive fusion surgeries  She states she has generalized all over the body pain related to arthritis   On chronic opioid therapy in this clinic for several years  We have gradually weaned her opioid with slow titration  She is now prescribed Percocet 7.5mg 4 times a day     Back  She has tried and failed SCS implanted in 2012 at [facility] but it was removed as it was not functioning well.  Lumbar MRI 2/2023 Posterior hardware fixation from L2-S1 with a prominent postoperative seroma in the adjacent posterior paraspinal soft tissues.  Multilevel degenerative change with canal stenosis at L1-2.  Moderate left and severe right foraminal narrowing at L1-2.   Thoracic MRI Multilevel degenerative disc disease with associated facet and ligamentous hypertrophy throughout the thoracic spine resulting in canal stenosis and foraminal narrowing bilaterally as described above.   EMG 4/2024 chronic radiculopathy L2-3 to S1   7/11/23  s/p laminectomy and partial facetectomy T10, T11, T12, L1, as well as lysis of adhesions L1-L2.    Pt had bilat RFA of  the primary dorsal ramus of L5 and lateral branches of S1 and S2 nerves done 1/24/24 with limited relief     She feel about 2 months ago at home and states her back pain is worsening since that time. She has pain in the right hip and leg as well. She states the pain can be quite severe at times and only relieved when she lays down.      Neck  History of chronic neck pain with radiation down right arm  Cervical spine MRI with foraminal stenosis left-sided at C6-C7 level  Had recent neurosurgical evaluation with no plan for surgery  History of previous cervical surgery  Cervical MRI 5/2024  anterior fixation from C3-C5 without complication. The vertebral body heights are maintained.

## 2025-07-14 DIAGNOSIS — Z79.891 CHRONIC USE OF OPIATE DRUG FOR THERAPEUTIC PURPOSE: Chronic | ICD-10-CM

## 2025-07-14 DIAGNOSIS — M54.16 LUMBAR RADICULOPATHY: ICD-10-CM

## 2025-07-14 DIAGNOSIS — M48.02 SPINAL STENOSIS IN CERVICAL REGION: ICD-10-CM

## 2025-07-14 DIAGNOSIS — M96.1 FAILED BACK SYNDROME OF LUMBAR SPINE: ICD-10-CM

## 2025-07-14 RX ORDER — OXYCODONE AND ACETAMINOPHEN 7.5; 325 MG/1; MG/1
1 TABLET ORAL EVERY 6 HOURS PRN
Qty: 120 TABLET | Refills: 0 | Status: SHIPPED | OUTPATIENT
Start: 2025-07-14 | End: 2025-08-13

## 2025-08-08 ENCOUNTER — HOSPITAL ENCOUNTER (OUTPATIENT)
Dept: MRI IMAGING | Age: 85
Discharge: HOME | End: 2025-08-10
Payer: MEDICARE

## 2025-08-08 ENCOUNTER — HOSPITAL ENCOUNTER (OUTPATIENT)
Dept: GENERAL RADIOLOGY | Age: 85
Discharge: HOME | End: 2025-08-10
Payer: MEDICARE

## 2025-08-08 DIAGNOSIS — M47.816 LUMBAR SPONDYLOSIS: ICD-10-CM

## 2025-08-08 DIAGNOSIS — M54.16 LUMBAR RADICULOPATHY: ICD-10-CM

## 2025-08-08 DIAGNOSIS — M25.551 PAIN OF RIGHT HIP: ICD-10-CM

## 2025-08-08 PROCEDURE — 73502 X-RAY EXAM HIP UNI 2-3 VIEWS: CPT

## 2025-08-08 PROCEDURE — 72148 MRI LUMBAR SPINE W/O DYE: CPT

## 2025-08-11 ENCOUNTER — HOSPITAL ENCOUNTER (OUTPATIENT)
Dept: PAIN MANAGEMENT | Age: 85
Discharge: HOME | End: 2025-08-11
Payer: MEDICARE

## 2025-08-11 VITALS — HEIGHT: 58 IN | BODY MASS INDEX: 32.12 KG/M2 | WEIGHT: 153 LBS

## 2025-08-11 DIAGNOSIS — M48.02 SPINAL STENOSIS IN CERVICAL REGION: ICD-10-CM

## 2025-08-11 DIAGNOSIS — G89.29 CHRONIC RIGHT HIP PAIN: ICD-10-CM

## 2025-08-11 DIAGNOSIS — M48.062 SPINAL STENOSIS OF LUMBAR REGION WITH NEUROGENIC CLAUDICATION: Chronic | ICD-10-CM

## 2025-08-11 DIAGNOSIS — M25.551 CHRONIC RIGHT HIP PAIN: ICD-10-CM

## 2025-08-11 DIAGNOSIS — M54.12 CERVICAL RADICULAR PAIN: ICD-10-CM

## 2025-08-11 DIAGNOSIS — G89.29 CHRONIC SI JOINT PAIN: Chronic | ICD-10-CM

## 2025-08-11 DIAGNOSIS — R26.89 IMBALANCE: Primary | Chronic | ICD-10-CM

## 2025-08-11 DIAGNOSIS — M54.16 LUMBAR RADICULOPATHY: ICD-10-CM

## 2025-08-11 DIAGNOSIS — Z79.891 CHRONIC USE OF OPIATE DRUG FOR THERAPEUTIC PURPOSE: Chronic | ICD-10-CM

## 2025-08-11 DIAGNOSIS — M17.0 PRIMARY OSTEOARTHRITIS OF BOTH KNEES: ICD-10-CM

## 2025-08-11 DIAGNOSIS — M16.11 PRIMARY OSTEOARTHRITIS OF RIGHT HIP: ICD-10-CM

## 2025-08-11 DIAGNOSIS — M47.816 LUMBAR SPONDYLOSIS: ICD-10-CM

## 2025-08-11 DIAGNOSIS — M53.3 CHRONIC SI JOINT PAIN: Chronic | ICD-10-CM

## 2025-08-11 DIAGNOSIS — M96.1 FAILED BACK SYNDROME OF LUMBAR SPINE: ICD-10-CM

## 2025-08-11 PROCEDURE — 99213 OFFICE O/P EST LOW 20 MIN: CPT | Performed by: NURSE PRACTITIONER

## 2025-08-11 PROCEDURE — 99213 OFFICE O/P EST LOW 20 MIN: CPT

## 2025-08-11 RX ORDER — OXYCODONE AND ACETAMINOPHEN 7.5; 325 MG/1; MG/1
1 TABLET ORAL EVERY 6 HOURS PRN
Qty: 120 TABLET | Refills: 0 | Status: SHIPPED | OUTPATIENT
Start: 2025-08-13 | End: 2025-09-12

## 2025-08-11 ASSESSMENT — ENCOUNTER SYMPTOMS
CONSTIPATION: 0
SHORTNESS OF BREATH: 0
COUGH: 0
BACK PAIN: 1

## (undated) DEVICE — SOLUTION IV IRRIG POUR BRL 0.9% SODIUM CHL 2F7124

## (undated) DEVICE — HANDPIECE SET WITH SUCTION TUBING: Brand: INTERPULSE

## (undated) DEVICE — SPONGE,PEANUT,XRAY,ST,SM,3/8",5/CARD: Brand: MEDLINE INDUSTRIES, INC.

## (undated) DEVICE — DRESSING BORDERED ADH GZ UNIV GEN USE 5IN 4IN AND 2 1/2IN

## (undated) DEVICE — SUTURE VCRL + SZ 3-0 L36IN ABSRB UD L36MM CT-1 1/2 CIR VCP944H

## (undated) DEVICE — 6.0MM X-COARSE DIAMOND

## (undated) DEVICE — CARBIDE BUR, SIDE CUTTING,  6 FLUTES, MED., 2MM DIA.: Brand: MICROAIRE®

## (undated) DEVICE — COVER BK TBL L STRL

## (undated) DEVICE — BIPOLAR SEALER 23-113-1 AQM 2.3 OM NEURO: Brand: AQUAMANTYS ®

## (undated) DEVICE — 3M™ IOBAN™ 2 ANTIMICROBIAL INCISE DRAPE 6650EZ: Brand: IOBAN™ 2

## (undated) DEVICE — OBTURATOR ROBOTIC DIA8MM BLDELSS ENDOSCP DISP DA VINCI SI

## (undated) DEVICE — SURGICAL PROCEDURE PACK GWN W/ BTC A

## (undated) DEVICE — DISCONTINUED USE 405792 GLOVE SURG SENSICARE ALOE LT LF PF ST GRN SZ 7

## (undated) DEVICE — ABS MED DISTRACTION PIN , 12MM POUCH
Type: IMPLANTABLE DEVICE | Site: SPINE CERVICAL | Status: NON-FUNCTIONAL
Brand: ABS MED DISTRACTION PIN
Removed: 2019-11-14

## (undated) DEVICE — GLOVE SURG SZ 65 THK91MIL LTX FREE SYN POLYISOPRENE

## (undated) DEVICE — STERILE POLYISOPRENE POWDER-FREE SURGICAL GLOVES WITH EMOLLIENT COATING: Brand: PROTEXIS

## (undated) DEVICE — Device: Brand: LEVEL 1

## (undated) DEVICE — GLOVE ORANGE PI 8   MSG9080

## (undated) DEVICE — PAD,NON-ADHERENT,3X8,STERILE,LF,1/PK: Brand: MEDLINE

## (undated) DEVICE — BLADE ES L6IN ELASTOMERIC COAT INSUL DURABLE BEND UPTO

## (undated) DEVICE — 3M™ WARMING BLANKET, LOWER BODY, 10 PER CASE, 42568: Brand: BAIR HUGGER™

## (undated) DEVICE — NO USE 18 MONTHS GOWN STD LG  1153D

## (undated) DEVICE — APPLICATOR SURG XL L38CM FOR ARISTA ABSRB HEMSTAT FLEXITIP

## (undated) DEVICE — 1200CC GUARDIAN II: Brand: GUARDIAN

## (undated) DEVICE — KIT POS FRME SFT TCH HDRST PLLW ARM CRADL PD CVR DRP BAR

## (undated) DEVICE — CODMAN® SURGICAL PATTIES 1/2" X 1/2" (1.27CM X 1.27CM): Brand: CODMAN®

## (undated) DEVICE — 3.0MM PRECISION NEURO (MATCH HEAD)

## (undated) DEVICE — NEEDLE SPNL 18GA L3.5IN W/ QNCKE SHARPER BVL DURA CLICK

## (undated) DEVICE — SUTURE MCRYL SZ 4-0 L18IN ABSRB UD L16MM PC-3 3/8 CIR PRIM Y845G

## (undated) DEVICE — TUBE NASOGASTRIC 48IN 16FR 2 LUMEN 11

## (undated) DEVICE — SUTURE VCRL + SZ 3-0 L27IN ABSRB WHT FS-1 3/8 CIR REV CUT VCP442H

## (undated) DEVICE — DRAPE THYROID

## (undated) DEVICE — Z INACTIVE USE 2641839 CLIP INT M L POLYMER LOK LIG HEM O LOK

## (undated) DEVICE — NEEDLE, QUINCKE, 18GX3.5": Brand: MEDLINE

## (undated) DEVICE — 20 ML SYRINGE LUER-LOCK TIP: Brand: MONOJECT

## (undated) DEVICE — SOLUTION IV 1000ML 0.9% SOD CHL PH 5 INJ USP VIAFLX PLAS

## (undated) DEVICE — KIT DRN FLAT W/ 100CC EVAC 7MM FULL PERF

## (undated) DEVICE — SUTURE MCRYL SZ 3-0 L18IN ABSRB UD L19MM PS-2 3/8 CIR PRIM Y497G

## (undated) DEVICE — GLOVE SURG SZ 8 L12IN FNGR THK13MIL BRN LTX SYN POLYMER W

## (undated) DEVICE — GARMENT,MEDLINE,DVT,INT,CALF,MED, GEN2: Brand: MEDLINE

## (undated) DEVICE — BLADE ES ELASTOMERIC COAT INSUL DURABLE BEND UPTO 90DEG

## (undated) DEVICE — 1010 S-DRAPE TOWEL DRAPE 10/BX: Brand: STERI-DRAPE™

## (undated) DEVICE — AGENT HEMOSTATIC SURGIFLOW MATRIX KIT W/THROMBIN

## (undated) DEVICE — SYRINGE,CONTROL,LL,FINGER,GRIP: Brand: MEDLINE INDUSTRIES, INC.

## (undated) DEVICE — SUTURE MCRYL + SZ 4-0 L27IN ABSRB UD L19MM PS-2 3/8 CIR MCP426H

## (undated) DEVICE — BLADE SURG NO10 S STL STR DISP GLASSVAN

## (undated) DEVICE — C-ARM: Brand: UNBRANDED

## (undated) DEVICE — STRAP,POSITIONING,KNEE/BODY,FOAM,4X60": Brand: MEDLINE

## (undated) DEVICE — DISPOSABLE LAPAROSCOPIC CORDS, 1 PER POUCH: Brand: A&E MEDICAL / DISPOSABLE LAPAROSCOPIC CORDS

## (undated) DEVICE — TUBE LUKENS 20CC 6 1/4": Brand: ALLEGIANCE

## (undated) DEVICE — ELECTRODE PT RET AD L9FT HI MOIST COND ADH HYDRGEL CORDED

## (undated) DEVICE — KIT DRP 3 ARM ACC DISP ENDOWRIST DA VINCI SI

## (undated) DEVICE — TUBE ET DIA7.5MM ORAL NSL CUF MURPHY EYE HI LO RADPQ LN

## (undated) DEVICE — SUTURE VCRL SZ 2-0 L18IN ABSRB UD CT-1 L36MM 1/2 CIR J839D

## (undated) DEVICE — Z DISCONTINUED APPLICATOR SURG PREP 0.35OZ 2% CHG 70% ISO ALC W/ HI LT

## (undated) DEVICE — E-Z CLEAN, NON-STICK, PTFE COATED, ELECTROSURGICAL BLADE ELECTRODE, MODIFIED EXTENDED INSULATION, 2.5 INCH (6.35 CM): Brand: MEGADYNE

## (undated) DEVICE — SUTURE VCRL SZ 0 L18IN ABSRB UD L36MM CT-1 1/2 CIR J840D

## (undated) DEVICE — GLOVE ORANGE PI 7 1/2   MSG9075

## (undated) DEVICE — GLOVE ORANGE PI 8 1/2   MSG9085

## (undated) DEVICE — STVZ LUMBAR SPINE PACK: Brand: MEDLINE INDUSTRIES, INC.

## (undated) DEVICE — GOWN,AURORA,NONREINFORCED,LARGE: Brand: MEDLINE

## (undated) DEVICE — TROCARS: Brand: KII® BALLOON BLUNT TIP SYSTEM

## (undated) DEVICE — CONTROL SYRINGE LUER-LOCK TIP: Brand: MONOJECT

## (undated) DEVICE — TROCAR: Brand: KII FIOS FIRST ENTRY

## (undated) DEVICE — SPONGE LAP W18XL18IN WHT COT 4 PLY FLD STRUNG RADPQ DISP ST 2 PER PACK

## (undated) DEVICE — 20 ML SYRINGE REGULAR TIP: Brand: MONOJECT

## (undated) DEVICE — STANDARD HYPODERMIC NEEDLE,POLYPROPYLENE HUB: Brand: MONOJECT

## (undated) DEVICE — SUTURE PDS II SZ 0 L27IN ABSRB VLT UR-6 L26MM 1/2 CIR D7185

## (undated) DEVICE — TRAY URIN CATH 16FR DRNGE BG STATLOK STBL DEV F SURSTP

## (undated) DEVICE — SET TBNG DISP TIP FOR AHTO

## (undated) DEVICE — MASK MED O2 FEMALE PANORAMIC CAPNO LF

## (undated) DEVICE — COLLAR CERV UNIV AD L13-19IN TRACH OPN TWO PC RIG POLYETH

## (undated) DEVICE — DRAPE,REIN 53X77,STERILE: Brand: MEDLINE

## (undated) DEVICE — DRAPE EQUIP STAND XL MAYO CVR

## (undated) DEVICE — PROTECTOR ULN NRV PUR FOAM HK LOOP STRP ANATOMICALLY

## (undated) DEVICE — MARKER,SKIN,WI/RULER AND LABELS: Brand: MEDLINE

## (undated) DEVICE — YANKAUER,BULB TIP,W/O VENT,RIGID,STERILE: Brand: MEDLINE

## (undated) DEVICE — GAUZE,SPONGE,4"X4",16PLY,XRAY,STRL,LF: Brand: MEDLINE

## (undated) DEVICE — SUTURE VCRL + SZ 2 L27IN ABSRB UD L40MM CP 1/2 CIR REV CUT VCP195H

## (undated) DEVICE — CRANIALMASK TRACKER: Brand: CRANIALMASK TRACKER

## (undated) DEVICE — 3M™ STERI-STRIP™ WOUND CLOSURE SYSTEMS 5 EACH/PACK 25 PACKS/CARTON 4 CARTONS/CASE W8516: Brand: 3M™ STERI-STRIP™

## (undated) DEVICE — Z DISCONTINUED PER MEDLINE USE 2741942 DRESSING AQUACEL 6 IN ALG W9XL15CM SIL CVR WTRPRF VIR BACT BARR ANTIMIC

## (undated) DEVICE — COVER,MAYO STAND,STERILE: Brand: MEDLINE

## (undated) DEVICE — DRAPE,LAP,CHOLE,W/TROUGHS,STERILE: Brand: MEDLINE

## (undated) DEVICE — Z CONVERTED USE 2271043 CONTAINER SPEC COLL 4OZ SCR ON LID PEEL PCH

## (undated) DEVICE — AGENT HEMSTAT 5GM ARISTA AH

## (undated) DEVICE — BITEBLOCK 54FR W/ DENT RIM BLOX

## (undated) DEVICE — APPLICATOR MEDICATED 26 CC SOLUTION HI LT ORNG CHLORAPREP

## (undated) DEVICE — MEDI-VAC YANKAUER SUCTION HANDLE W/BULBOUS TIP: Brand: CARDINAL HEALTH

## (undated) DEVICE — SUTURE PDS + SZ 2 0 L27IN ABSRB VLT L36MM CT 1 1 2 CIR PDP339H

## (undated) DEVICE — 3M™ WARMING BLANKET, UPPER BODY, 10 PER CASE, 42268: Brand: BAIR HUGGER™

## (undated) DEVICE — GOWN,SIRUS,NONRNF,SETINSLV,XL,20/CS: Brand: MEDLINE

## (undated) DEVICE — Z INACTIVE USE 2660664 SOLUTION IRRIG 3000ML 0.9% SOD CHL USP UROMATIC PLAS CONT

## (undated) DEVICE — 3M™ RANGER™ BLOOD/FLUID WARMING STANDARD FLOW SET, 24200, 10/CASE: Brand: 3M™ RANGER™

## (undated) DEVICE — PACK PROCEDURE SURG LUMBAR SPINE SVMMC

## (undated) DEVICE — INSTRUMENT BATTERY

## (undated) DEVICE — GLOVE SURG SZ 8 L12IN FNGR THK79MIL GRN LTX FREE

## (undated) DEVICE — HIGH FLOW TIP

## (undated) DEVICE — Device

## (undated) DEVICE — 1LYRTR 16FR10ML100%SIL UMS SNP: Brand: MEDLINE INDUSTRIES, INC.

## (undated) DEVICE — GEL US 20GM NONIRRITATING OVERWRAPPED FILE PCH TRNSMIT

## (undated) DEVICE — GAUZE,SPONGE,FLUFF,6"X6.75",STRL,5/TRAY: Brand: MEDLINE

## (undated) DEVICE — Z DUP USE 2392665 BLADE LARYNGOSCOPE STAT GLIDESCOPE GVL 4

## (undated) DEVICE — TUBING, SUCTION, 3/16" X 10', STRAIGHT: Brand: MEDLINE

## (undated) DEVICE — SOLUTION ANTIFOG VIS SYS CLEARIFY LAPSCP

## (undated) DEVICE — TOWEL,OR,DSP,ST,NATURAL,DLX,4/PK,20PK/CS: Brand: MEDLINE

## (undated) DEVICE — DRAPE MICSCP W117XL305CM DIA65MM LENS W VARI LENS2 FOR LEICA

## (undated) DEVICE — CONNECTOR TBNG WHT PLAS SUCT STR 5IN1 LTWT W/ M CONN

## (undated) DEVICE — SUTURE PDS + SZ 0 L36IN ABSRB VLT CT 1 L36MM 1 2 CIR PDP346H

## (undated) DEVICE — ADHESIVE SKIN CLSR 0.7ML TOP DERMBND ADV

## (undated) DEVICE — EMG TUBE 8229707 NIM TRIVANTAGE 7.0MM ID: Brand: NIM TRIVANTAGE™

## (undated) DEVICE — PENCIL ES L3M BTTN SWCH HOLSTER W/ BLDE ELECTRD EDGE

## (undated) DEVICE — PATIENT RETURN ELECTRODE, SINGLE-USE, CONTACT QUALITY MONITORING, ADULT, WITH 9FT CORD, FOR PATIENTS WEIGING OVER 33LBS. (15KG): Brand: MEGADYNE

## (undated) DEVICE — SOLUTION PROC 3000ML 09% SOD CHL PLAS CONTAINER VIAFLX

## (undated) DEVICE — DRESSING TRNSPAR W2XL2.75IN FLM SHT SEMIPERMEABLE WIND

## (undated) DEVICE — COVER,TABLE,60X90,STERILE: Brand: MEDLINE

## (undated) DEVICE — CODMAN® SURGICAL PATTIES 1/2" X 3" (1.27CM X 7.62CM): Brand: CODMAN®

## (undated) DEVICE — TOTAL TRAY, 16FR 10ML SIL FOLEY, URN: Brand: MEDLINE

## (undated) DEVICE — SUTURE VCRL + SZ 2-0 L27IN ABSRB UD CP-1 1/2 CIR REV CUT VCP266H

## (undated) DEVICE — DRAPE C ARM UNIV W41XL74IN CLR PLAS XR VELC CLSR POLY STRP

## (undated) DEVICE — PROTECTOR EYE PT SELF ADH NS OPT GRD LF

## (undated) DEVICE — DISSECTOR LAP DIA5MM BLNT TIP ENDOPATH

## (undated) DEVICE — Z DISCONTINUED GLOVE SURG SZ 7.5 L12IN FNGR THK13MIL WHT ISOLEX

## (undated) DEVICE — C-ARMOR C-ARM EQUIPMENT COVERS CLEAR STERILE UNIVERSAL FIT 12 PER CASE: Brand: C-ARMOR

## (undated) DEVICE — BALLOON US E LIN RNG O KT FOR FG-32UA

## (undated) DEVICE — YANKAUER,FLEXIBLE HANDLE,REGLR CAPACITY: Brand: MEDLINE INDUSTRIES, INC.

## (undated) DEVICE — BLOOD TRANSFUSION FILTER: Brand: HAEMONETICS

## (undated) DEVICE — SOLUTION IV IRRIG WATER 1000ML POUR BRL 2F7114

## (undated) DEVICE — GOWN,AURORA,NONRNF,XL,30/CS: Brand: MEDLINE

## (undated) DEVICE — SUTURE NONABSORBABLE MONOFILAMENT 3-0 PS-1 18 IN BLK ETHILON 1663H

## (undated) DEVICE — DISCONTINUED NO SUB IDED TG GLOVE SURG SENSICARE ALOE LT LF PF ST GRN SZ 8

## (undated) DEVICE — ST CHARLES GEN LAPAROSCOPY PK: Brand: MEDLINE INDUSTRIES, INC.

## (undated) DEVICE — GLOVE ORANGE PI 7   MSG9070

## (undated) DEVICE — TOWEL SURG SM W12XL18IN CLR PLAS TEAR RESIST REINF ADH FRST

## (undated) DEVICE — DRESSING BORDERED ADH GZ UNIV GEN USE 8INX4IN AND 6INX2IN